# Patient Record
Sex: FEMALE | Race: WHITE | NOT HISPANIC OR LATINO | Employment: OTHER | URBAN - METROPOLITAN AREA
[De-identification: names, ages, dates, MRNs, and addresses within clinical notes are randomized per-mention and may not be internally consistent; named-entity substitution may affect disease eponyms.]

---

## 2017-01-16 ENCOUNTER — COMMUNICATION - HEALTHEAST (OUTPATIENT)
Dept: INTERNAL MEDICINE | Facility: CLINIC | Age: 62
End: 2017-01-16

## 2017-03-23 ENCOUNTER — COMMUNICATION - HEALTHEAST (OUTPATIENT)
Dept: INTERNAL MEDICINE | Facility: CLINIC | Age: 62
End: 2017-03-23

## 2017-03-23 DIAGNOSIS — F32.A DEPRESSION: ICD-10-CM

## 2017-05-09 ENCOUNTER — RECORDS - HEALTHEAST (OUTPATIENT)
Dept: MAMMOGRAPHY | Facility: CLINIC | Age: 62
End: 2017-05-09

## 2017-05-09 DIAGNOSIS — Z12.31 ENCOUNTER FOR SCREENING MAMMOGRAM FOR MALIGNANT NEOPLASM OF BREAST: ICD-10-CM

## 2017-05-22 ENCOUNTER — COMMUNICATION - HEALTHEAST (OUTPATIENT)
Dept: INTERNAL MEDICINE | Facility: CLINIC | Age: 62
End: 2017-05-22

## 2017-05-30 ENCOUNTER — RECORDS - HEALTHEAST (OUTPATIENT)
Dept: ADMINISTRATIVE | Facility: OTHER | Age: 62
End: 2017-05-30

## 2017-06-12 ENCOUNTER — COMMUNICATION - HEALTHEAST (OUTPATIENT)
Dept: INTERNAL MEDICINE | Facility: CLINIC | Age: 62
End: 2017-06-12

## 2017-06-12 DIAGNOSIS — F32.A DEPRESSION: ICD-10-CM

## 2017-07-05 ENCOUNTER — OFFICE VISIT - HEALTHEAST (OUTPATIENT)
Dept: INTERNAL MEDICINE | Facility: CLINIC | Age: 62
End: 2017-07-05

## 2017-07-05 DIAGNOSIS — N95.1 MENOPAUSAL HOT FLUSHES: ICD-10-CM

## 2017-07-05 DIAGNOSIS — F32.A DEPRESSION: ICD-10-CM

## 2017-07-05 DIAGNOSIS — M19.90 OSTEOARTHRITIS: ICD-10-CM

## 2017-07-05 DIAGNOSIS — G56.03 BILATERAL CARPAL TUNNEL SYNDROME: ICD-10-CM

## 2017-07-05 DIAGNOSIS — R25.2 CRAMP OF BOTH LOWER EXTREMITIES: ICD-10-CM

## 2017-07-17 ENCOUNTER — HOSPITAL ENCOUNTER (OUTPATIENT)
Dept: PHYSICAL MEDICINE AND REHAB | Facility: CLINIC | Age: 62
Discharge: HOME | End: 2017-07-17
Attending: INTERNAL MEDICINE

## 2017-07-17 DIAGNOSIS — R20.0 NUMBNESS AND TINGLING IN BOTH HANDS: ICD-10-CM

## 2017-07-17 DIAGNOSIS — R29.2 HYPERREFLEXIA: ICD-10-CM

## 2017-07-17 DIAGNOSIS — R29.6 FALLS FREQUENTLY: ICD-10-CM

## 2017-07-17 DIAGNOSIS — G56.03 BILATERAL CARPAL TUNNEL SYNDROME: ICD-10-CM

## 2017-07-17 DIAGNOSIS — R20.2 NUMBNESS AND TINGLING IN BOTH HANDS: ICD-10-CM

## 2017-07-17 ASSESSMENT — MIFFLIN-ST. JEOR: SCORE: 1446.47

## 2017-07-20 ENCOUNTER — COMMUNICATION - HEALTHEAST (OUTPATIENT)
Dept: PHYSICAL MEDICINE AND REHAB | Facility: CLINIC | Age: 62
End: 2017-07-20

## 2017-07-20 DIAGNOSIS — M48.02 CERVICAL STENOSIS OF SPINE: ICD-10-CM

## 2017-07-24 ENCOUNTER — RECORDS - HEALTHEAST (OUTPATIENT)
Dept: ADMINISTRATIVE | Facility: OTHER | Age: 62
End: 2017-07-24

## 2017-07-24 ENCOUNTER — OFFICE VISIT - HEALTHEAST (OUTPATIENT)
Dept: INTERNAL MEDICINE | Facility: CLINIC | Age: 62
End: 2017-07-24
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
## 2017-07-24 DIAGNOSIS — S01.01XA LACERATION OF SCALP: ICD-10-CM

## 2017-07-24 DIAGNOSIS — T14.8XXA CONTUSION: ICD-10-CM

## 2017-07-24 DIAGNOSIS — M48.02 CERVICAL SPINAL STENOSIS: ICD-10-CM

## 2017-07-26 ENCOUNTER — OFFICE VISIT - HEALTHEAST (OUTPATIENT)
Dept: INTERNAL MEDICINE | Facility: CLINIC | Age: 62
End: 2017-07-26

## 2017-07-27 ENCOUNTER — OFFICE VISIT - HEALTHEAST (OUTPATIENT)
Dept: NEUROSURGERY | Facility: CLINIC | Age: 62
End: 2017-07-27

## 2017-07-27 DIAGNOSIS — G95.89 MYELOMALACIA OF CERVICAL CORD (H): ICD-10-CM

## 2017-07-27 ASSESSMENT — MIFFLIN-ST. JEOR: SCORE: 1432.86

## 2017-07-28 ENCOUNTER — COMMUNICATION - HEALTHEAST (OUTPATIENT)
Dept: NEUROSURGERY | Facility: CLINIC | Age: 62
End: 2017-07-28

## 2017-07-31 ENCOUNTER — COMMUNICATION - HEALTHEAST (OUTPATIENT)
Dept: NEUROSURGERY | Facility: CLINIC | Age: 62
End: 2017-07-31

## 2017-07-31 ENCOUNTER — OFFICE VISIT - HEALTHEAST (OUTPATIENT)
Dept: INTERNAL MEDICINE | Facility: CLINIC | Age: 62
End: 2017-07-31

## 2017-07-31 ENCOUNTER — RECORDS - HEALTHEAST (OUTPATIENT)
Dept: ADMINISTRATIVE | Facility: OTHER | Age: 62
End: 2017-07-31

## 2017-07-31 DIAGNOSIS — Z01.818 PREOP EXAM FOR INTERNAL MEDICINE: ICD-10-CM

## 2017-07-31 DIAGNOSIS — M48.02 CERVICAL SPINAL STENOSIS: ICD-10-CM

## 2017-07-31 DIAGNOSIS — D47.9 LYMPHOPROLIFERATIVE DISORDER (H): ICD-10-CM

## 2017-07-31 DIAGNOSIS — J45.20 INTERMITTENT ASTHMA: ICD-10-CM

## 2017-07-31 ASSESSMENT — MIFFLIN-ST. JEOR: SCORE: 1432.86

## 2017-08-01 ENCOUNTER — COMMUNICATION - HEALTHEAST (OUTPATIENT)
Dept: INTERNAL MEDICINE | Facility: CLINIC | Age: 62
End: 2017-08-01

## 2017-08-01 ENCOUNTER — AMBULATORY - HEALTHEAST (OUTPATIENT)
Dept: NEUROSURGERY | Facility: CLINIC | Age: 62
End: 2017-08-01

## 2017-08-01 ENCOUNTER — AMBULATORY - HEALTHEAST (OUTPATIENT)
Dept: LAB | Facility: CLINIC | Age: 62
End: 2017-08-01

## 2017-08-01 ENCOUNTER — RECORDS - HEALTHEAST (OUTPATIENT)
Dept: ADMINISTRATIVE | Facility: OTHER | Age: 62
End: 2017-08-01

## 2017-08-01 DIAGNOSIS — Z01.818 PRE-OP EXAM: ICD-10-CM

## 2017-08-02 ENCOUNTER — ANESTHESIA - HEALTHEAST (OUTPATIENT)
Dept: SURGERY | Facility: CLINIC | Age: 62
End: 2017-08-02

## 2017-08-02 ENCOUNTER — SURGERY - HEALTHEAST (OUTPATIENT)
Dept: SURGERY | Facility: CLINIC | Age: 62
End: 2017-08-02

## 2017-08-02 ASSESSMENT — MIFFLIN-ST. JEOR: SCORE: 1427.76

## 2017-08-03 ENCOUNTER — COMMUNICATION - HEALTHEAST (OUTPATIENT)
Dept: NEUROSURGERY | Facility: CLINIC | Age: 62
End: 2017-08-03

## 2017-08-10 ENCOUNTER — AMBULATORY - HEALTHEAST (OUTPATIENT)
Dept: NEUROSURGERY | Facility: CLINIC | Age: 62
End: 2017-08-10

## 2017-08-10 DIAGNOSIS — G95.9 CERVICAL MYELOPATHY (H): ICD-10-CM

## 2017-08-10 DIAGNOSIS — Z51.89 VISIT FOR WOUND CHECK: ICD-10-CM

## 2017-08-23 ENCOUNTER — COMMUNICATION - HEALTHEAST (OUTPATIENT)
Dept: INTERNAL MEDICINE | Facility: CLINIC | Age: 62
End: 2017-08-23

## 2017-08-23 DIAGNOSIS — F32.A DEPRESSION: ICD-10-CM

## 2017-09-01 ENCOUNTER — OFFICE VISIT - HEALTHEAST (OUTPATIENT)
Dept: NEUROSURGERY | Facility: CLINIC | Age: 62
End: 2017-09-01

## 2017-09-01 ENCOUNTER — HOSPITAL ENCOUNTER (OUTPATIENT)
Dept: RADIOLOGY | Facility: CLINIC | Age: 62
Discharge: HOME | End: 2017-09-01
Attending: NEUROLOGICAL SURGERY

## 2017-09-01 DIAGNOSIS — G95.20 CORD COMPRESSION MYELOPATHY (H): ICD-10-CM

## 2017-09-01 DIAGNOSIS — G95.9 CERVICAL MYELOPATHY (H): ICD-10-CM

## 2017-09-06 ENCOUNTER — COMMUNICATION - HEALTHEAST (OUTPATIENT)
Dept: INTERNAL MEDICINE | Facility: CLINIC | Age: 62
End: 2017-09-06

## 2017-09-06 DIAGNOSIS — F32.A DEPRESSION: ICD-10-CM

## 2017-09-15 ENCOUNTER — RECORDS - HEALTHEAST (OUTPATIENT)
Dept: ADMINISTRATIVE | Facility: OTHER | Age: 62
End: 2017-09-15

## 2017-09-19 ENCOUNTER — RECORDS - HEALTHEAST (OUTPATIENT)
Dept: ADMINISTRATIVE | Facility: OTHER | Age: 62
End: 2017-09-19

## 2017-10-27 ENCOUNTER — RECORDS - HEALTHEAST (OUTPATIENT)
Dept: ADMINISTRATIVE | Facility: OTHER | Age: 62
End: 2017-10-27

## 2017-11-13 ENCOUNTER — RECORDS - HEALTHEAST (OUTPATIENT)
Dept: ADMINISTRATIVE | Facility: OTHER | Age: 62
End: 2017-11-13

## 2017-11-20 ENCOUNTER — RECORDS - HEALTHEAST (OUTPATIENT)
Dept: ADMINISTRATIVE | Facility: OTHER | Age: 62
End: 2017-11-20

## 2017-12-14 ENCOUNTER — RECORDS - HEALTHEAST (OUTPATIENT)
Dept: ADMINISTRATIVE | Facility: OTHER | Age: 62
End: 2017-12-14

## 2017-12-21 ENCOUNTER — RECORDS - HEALTHEAST (OUTPATIENT)
Dept: ADMINISTRATIVE | Facility: OTHER | Age: 62
End: 2017-12-21

## 2017-12-26 ENCOUNTER — COMMUNICATION - HEALTHEAST (OUTPATIENT)
Dept: INTERNAL MEDICINE | Facility: CLINIC | Age: 62
End: 2017-12-26

## 2017-12-26 DIAGNOSIS — F32.A DEPRESSION: ICD-10-CM

## 2018-01-03 ENCOUNTER — RECORDS - HEALTHEAST (OUTPATIENT)
Dept: ADMINISTRATIVE | Facility: OTHER | Age: 63
End: 2018-01-03

## 2018-03-03 ENCOUNTER — COMMUNICATION - HEALTHEAST (OUTPATIENT)
Dept: INTERNAL MEDICINE | Facility: CLINIC | Age: 63
End: 2018-03-03

## 2018-03-03 DIAGNOSIS — M19.90 OSTEOARTHRITIS: ICD-10-CM

## 2018-05-15 ENCOUNTER — RECORDS - HEALTHEAST (OUTPATIENT)
Dept: MAMMOGRAPHY | Facility: CLINIC | Age: 63
End: 2018-05-15

## 2018-05-15 ENCOUNTER — RECORDS - HEALTHEAST (OUTPATIENT)
Dept: ADMINISTRATIVE | Facility: OTHER | Age: 63
End: 2018-05-15

## 2018-05-15 DIAGNOSIS — Z12.31 ENCOUNTER FOR SCREENING MAMMOGRAM FOR MALIGNANT NEOPLASM OF BREAST: ICD-10-CM

## 2018-07-16 ENCOUNTER — COMMUNICATION - HEALTHEAST (OUTPATIENT)
Dept: INTERNAL MEDICINE | Facility: CLINIC | Age: 63
End: 2018-07-16

## 2018-07-16 DIAGNOSIS — F32.A DEPRESSION: ICD-10-CM

## 2018-07-18 ENCOUNTER — RECORDS - HEALTHEAST (OUTPATIENT)
Dept: ADMINISTRATIVE | Facility: OTHER | Age: 63
End: 2018-07-18

## 2018-07-19 ENCOUNTER — OFFICE VISIT - HEALTHEAST (OUTPATIENT)
Dept: INTERNAL MEDICINE | Facility: CLINIC | Age: 63
End: 2018-07-19

## 2018-07-19 DIAGNOSIS — R82.90 ABNORMAL URINE ODOR: ICD-10-CM

## 2018-07-19 DIAGNOSIS — R35.0 URINARY FREQUENCY: ICD-10-CM

## 2018-07-19 DIAGNOSIS — E78.5 HYPERLIPIDEMIA: ICD-10-CM

## 2018-07-19 DIAGNOSIS — M48.02 CERVICAL SPINAL STENOSIS: ICD-10-CM

## 2018-07-19 DIAGNOSIS — J45.20 INTERMITTENT ASTHMA: ICD-10-CM

## 2018-07-19 LAB
ALBUMIN UR-MCNC: NEGATIVE MG/DL
APPEARANCE UR: ABNORMAL
BILIRUB UR QL STRIP: ABNORMAL
COLOR UR AUTO: YELLOW
GLUCOSE UR STRIP-MCNC: NEGATIVE MG/DL
HGB UR QL STRIP: ABNORMAL
KETONES UR STRIP-MCNC: ABNORMAL MG/DL
LEUKOCYTE ESTERASE UR QL STRIP: NEGATIVE
NITRATE UR QL: NEGATIVE
PH UR STRIP: 5.5 [PH] (ref 5–8)
SP GR UR STRIP: >=1.03 (ref 1–1.03)
UROBILINOGEN UR STRIP-ACNC: ABNORMAL

## 2018-07-25 ENCOUNTER — RECORDS - HEALTHEAST (OUTPATIENT)
Dept: ADMINISTRATIVE | Facility: OTHER | Age: 63
End: 2018-07-25

## 2018-08-06 ENCOUNTER — COMMUNICATION - HEALTHEAST (OUTPATIENT)
Dept: INTERNAL MEDICINE | Facility: CLINIC | Age: 63
End: 2018-08-06

## 2018-08-06 ENCOUNTER — RECORDS - HEALTHEAST (OUTPATIENT)
Dept: ADMINISTRATIVE | Facility: OTHER | Age: 63
End: 2018-08-06

## 2018-08-06 DIAGNOSIS — M19.90 OSTEOARTHRITIS: ICD-10-CM

## 2018-08-27 ENCOUNTER — COMMUNICATION - HEALTHEAST (OUTPATIENT)
Dept: INTERNAL MEDICINE | Facility: CLINIC | Age: 63
End: 2018-08-27

## 2018-08-27 DIAGNOSIS — F32.A DEPRESSION: ICD-10-CM

## 2018-10-18 ENCOUNTER — OFFICE VISIT - HEALTHEAST (OUTPATIENT)
Dept: INTERNAL MEDICINE | Facility: CLINIC | Age: 63
End: 2018-10-18

## 2018-10-18 ENCOUNTER — COMMUNICATION - HEALTHEAST (OUTPATIENT)
Dept: SCHEDULING | Facility: CLINIC | Age: 63
End: 2018-10-18

## 2018-10-18 DIAGNOSIS — R03.0 ELEVATED BLOOD PRESSURE READING WITHOUT DIAGNOSIS OF HYPERTENSION: ICD-10-CM

## 2018-10-18 DIAGNOSIS — R73.09 OTHER ABNORMAL GLUCOSE: ICD-10-CM

## 2018-10-18 DIAGNOSIS — F32.9 MAJOR DEPRESSION: ICD-10-CM

## 2018-10-18 DIAGNOSIS — E78.5 HYPERLIPIDEMIA: ICD-10-CM

## 2018-10-18 DIAGNOSIS — E55.9 VITAMIN D DEFICIENCY: ICD-10-CM

## 2018-10-18 DIAGNOSIS — D47.9 LYMPHOPROLIFERATIVE DISORDER (H): ICD-10-CM

## 2018-10-18 DIAGNOSIS — Z23 NEED FOR VACCINATION FOR STREP PNEUMONIAE: ICD-10-CM

## 2018-10-18 DIAGNOSIS — M15.0 PRIMARY OSTEOARTHRITIS INVOLVING MULTIPLE JOINTS: ICD-10-CM

## 2018-10-18 LAB
ALBUMIN SERPL-MCNC: 3.7 G/DL (ref 3.5–5)
ALP SERPL-CCNC: 74 U/L (ref 45–120)
ALT SERPL W P-5'-P-CCNC: 21 U/L (ref 0–45)
ANION GAP SERPL CALCULATED.3IONS-SCNC: 11 MMOL/L (ref 5–18)
AST SERPL W P-5'-P-CCNC: 22 U/L (ref 0–40)
BASOPHILS # BLD AUTO: 0 THOU/UL (ref 0–0.2)
BASOPHILS NFR BLD AUTO: 0 % (ref 0–2)
BILIRUB DIRECT SERPL-MCNC: 0.2 MG/DL
BILIRUB SERPL-MCNC: 0.7 MG/DL (ref 0–1)
BUN SERPL-MCNC: 24 MG/DL (ref 8–22)
CALCIUM SERPL-MCNC: 9.5 MG/DL (ref 8.5–10.5)
CHLORIDE BLD-SCNC: 106 MMOL/L (ref 98–107)
CHOLEST SERPL-MCNC: 193 MG/DL
CO2 SERPL-SCNC: 24 MMOL/L (ref 22–31)
CREAT SERPL-MCNC: 0.67 MG/DL (ref 0.6–1.1)
EOSINOPHIL COUNT (ABSOLUTE): 0 THOU/UL (ref 0–0.4)
EOSINOPHIL NFR BLD AUTO: 0 % (ref 0–6)
ERYTHROCYTE [DISTWIDTH] IN BLOOD BY AUTOMATED COUNT: 14.8 % (ref 11–14.5)
FASTING STATUS PATIENT QL REPORTED: YES
GFR SERPL CREATININE-BSD FRML MDRD: >60 ML/MIN/1.73M2
GLUCOSE BLD-MCNC: 97 MG/DL (ref 70–125)
HBA1C MFR BLD: 6.1 % (ref 3.5–6)
HCT VFR BLD AUTO: 38.9 % (ref 35–47)
HDLC SERPL-MCNC: 56 MG/DL
HGB BLD-MCNC: 12.3 G/DL (ref 12–16)
LDLC SERPL CALC-MCNC: 124 MG/DL
LYMPHOCYTES # BLD AUTO: 27.7 THOU/UL (ref 0.8–4.4)
LYMPHOCYTES NFR BLD AUTO: 74 % (ref 20–40)
MCH RBC QN AUTO: 27.5 PG (ref 27–34)
MCHC RBC AUTO-ENTMCNC: 31.6 G/DL (ref 32–36)
MCV RBC AUTO: 87 FL (ref 80–100)
MONOCYTES # BLD AUTO: 4.1 THOU/UL (ref 0–0.9)
MONOCYTES NFR BLD AUTO: 11 % (ref 2–10)
PLAT MORPH BLD: NORMAL
PLATELET # BLD AUTO: 188 THOU/UL (ref 140–440)
PMV BLD AUTO: 11.1 FL (ref 8.5–12.5)
POTASSIUM BLD-SCNC: 3.8 MMOL/L (ref 3.5–5)
PROT SERPL-MCNC: 6.4 G/DL (ref 6–8)
RBC # BLD AUTO: 4.47 MILL/UL (ref 3.8–5.4)
REACTIVE LYMPHS: ABNORMAL
SODIUM SERPL-SCNC: 141 MMOL/L (ref 136–145)
TOTAL NEUTROPHILS-ABS(DIFF): 5.6 THOU/UL (ref 2–7.7)
TOTAL NEUTROPHILS-REL(DIFF): 15 % (ref 50–70)
TRIGL SERPL-MCNC: 66 MG/DL
TSH SERPL DL<=0.005 MIU/L-ACNC: 1.33 UIU/ML (ref 0.3–5)
WBC: 37.4 THOU/UL (ref 4–11)

## 2018-10-18 ASSESSMENT — MIFFLIN-ST. JEOR: SCORE: 1503.17

## 2018-10-19 LAB — 25(OH)D3 SERPL-MCNC: 35.1 NG/ML (ref 30–80)

## 2018-10-22 ENCOUNTER — COMMUNICATION - HEALTHEAST (OUTPATIENT)
Dept: INTERNAL MEDICINE | Facility: CLINIC | Age: 63
End: 2018-10-22

## 2018-11-21 ENCOUNTER — COMMUNICATION - HEALTHEAST (OUTPATIENT)
Dept: INTERNAL MEDICINE | Facility: CLINIC | Age: 63
End: 2018-11-21

## 2018-11-21 DIAGNOSIS — M15.0 PRIMARY OSTEOARTHRITIS INVOLVING MULTIPLE JOINTS: ICD-10-CM

## 2018-11-28 ENCOUNTER — RECORDS - HEALTHEAST (OUTPATIENT)
Dept: ADMINISTRATIVE | Facility: OTHER | Age: 63
End: 2018-11-28

## 2019-01-01 ENCOUNTER — COMMUNICATION - HEALTHEAST (OUTPATIENT)
Dept: INTERNAL MEDICINE | Facility: CLINIC | Age: 64
End: 2019-01-01

## 2019-01-01 DIAGNOSIS — M15.0 PRIMARY OSTEOARTHRITIS INVOLVING MULTIPLE JOINTS: ICD-10-CM

## 2019-01-16 ENCOUNTER — RECORDS - HEALTHEAST (OUTPATIENT)
Dept: ADMINISTRATIVE | Facility: OTHER | Age: 64
End: 2019-01-16

## 2019-01-22 ENCOUNTER — AMBULATORY - HEALTHEAST (OUTPATIENT)
Dept: SURGERY | Facility: CLINIC | Age: 64
End: 2019-01-22

## 2019-01-22 DIAGNOSIS — D17.30 LIPOMA OF SKIN AND SUBCUTANEOUS TISSUE: ICD-10-CM

## 2019-01-28 ENCOUNTER — OFFICE VISIT - HEALTHEAST (OUTPATIENT)
Dept: SURGERY | Facility: CLINIC | Age: 64
End: 2019-01-28

## 2019-01-28 DIAGNOSIS — D17.30 LIPOMA OF SKIN AND SUBCUTANEOUS TISSUE: ICD-10-CM

## 2019-01-28 ASSESSMENT — MIFFLIN-ST. JEOR: SCORE: 1503.17

## 2019-01-31 ENCOUNTER — RECORDS - HEALTHEAST (OUTPATIENT)
Dept: ADMINISTRATIVE | Facility: OTHER | Age: 64
End: 2019-01-31

## 2019-02-25 ENCOUNTER — RECORDS - HEALTHEAST (OUTPATIENT)
Dept: ADMINISTRATIVE | Facility: OTHER | Age: 64
End: 2019-02-25

## 2019-03-01 ENCOUNTER — COMMUNICATION - HEALTHEAST (OUTPATIENT)
Dept: NEUROSURGERY | Facility: CLINIC | Age: 64
End: 2019-03-01

## 2019-03-01 ENCOUNTER — AMBULATORY - HEALTHEAST (OUTPATIENT)
Dept: NEUROSURGERY | Facility: CLINIC | Age: 64
End: 2019-03-01

## 2019-03-01 DIAGNOSIS — M54.2 NECK PAIN: ICD-10-CM

## 2019-03-05 ENCOUNTER — RECORDS - HEALTHEAST (OUTPATIENT)
Dept: ADMINISTRATIVE | Facility: OTHER | Age: 64
End: 2019-03-05

## 2019-03-18 ENCOUNTER — HOSPITAL ENCOUNTER (OUTPATIENT)
Dept: MRI IMAGING | Facility: CLINIC | Age: 64
Discharge: HOME | End: 2019-03-18
Attending: SURGERY

## 2019-03-18 DIAGNOSIS — M54.2 NECK PAIN: ICD-10-CM

## 2019-03-18 LAB
CREAT BLD-MCNC: 0.8 MG/DL
CREAT BLD-MCNC: 0.8 MG/DL (ref 0.6–1.1)
POC GFR AMER AF HE - HISTORICAL: >60 ML/MIN/1.73M2
POC GFR NON AMER AF HE - HISTORICAL: >60 ML/MIN/1.73M2

## 2019-03-19 ENCOUNTER — RECORDS - HEALTHEAST (OUTPATIENT)
Dept: ADMINISTRATIVE | Facility: OTHER | Age: 64
End: 2019-03-19

## 2019-03-19 ENCOUNTER — AMBULATORY - HEALTHEAST (OUTPATIENT)
Dept: NEUROLOGY | Facility: CLINIC | Age: 64
End: 2019-03-19

## 2019-03-19 ENCOUNTER — OFFICE VISIT - HEALTHEAST (OUTPATIENT)
Dept: NEUROSURGERY | Facility: CLINIC | Age: 64
End: 2019-03-19

## 2019-03-19 ENCOUNTER — COMMUNICATION - HEALTHEAST (OUTPATIENT)
Dept: INTERNAL MEDICINE | Facility: CLINIC | Age: 64
End: 2019-03-19

## 2019-03-19 DIAGNOSIS — M48.02 SPINAL STENOSIS IN CERVICAL REGION: ICD-10-CM

## 2019-03-19 DIAGNOSIS — M47.12 CERVICAL SPONDYLOSIS WITH MYELOPATHY: ICD-10-CM

## 2019-03-19 ASSESSMENT — MIFFLIN-ST. JEOR: SCORE: 1503.17

## 2019-03-28 ENCOUNTER — HOSPITAL ENCOUNTER (OUTPATIENT)
Dept: CT IMAGING | Facility: CLINIC | Age: 64
Discharge: HOME | End: 2019-03-28
Attending: INTERNAL MEDICINE

## 2019-03-28 ENCOUNTER — HOSPITAL ENCOUNTER (OUTPATIENT)
Dept: RADIOLOGY | Facility: CLINIC | Age: 64
Discharge: HOME | End: 2019-03-28
Attending: SURGERY

## 2019-03-28 ENCOUNTER — HOSPITAL ENCOUNTER (OUTPATIENT)
Dept: CT IMAGING | Facility: CLINIC | Age: 64
Discharge: HOME | End: 2019-03-28
Attending: SURGERY

## 2019-03-28 DIAGNOSIS — M48.02 SPINAL STENOSIS IN CERVICAL REGION: ICD-10-CM

## 2019-03-28 DIAGNOSIS — D47.9 LYMPHOPROLIFERATIVE DISORDER (H): ICD-10-CM

## 2019-03-28 DIAGNOSIS — M47.12 CERVICAL SPONDYLOSIS WITH MYELOPATHY: ICD-10-CM

## 2019-04-08 ENCOUNTER — COMMUNICATION - HEALTHEAST (OUTPATIENT)
Dept: NEUROSURGERY | Facility: CLINIC | Age: 64
End: 2019-04-08

## 2019-04-09 ENCOUNTER — OFFICE VISIT - HEALTHEAST (OUTPATIENT)
Dept: INTERNAL MEDICINE | Facility: CLINIC | Age: 64
End: 2019-04-09

## 2019-04-09 ENCOUNTER — COMMUNICATION - HEALTHEAST (OUTPATIENT)
Dept: NEUROSURGERY | Facility: CLINIC | Age: 64
End: 2019-04-09

## 2019-04-09 DIAGNOSIS — D47.9 LYMPHOPROLIFERATIVE DISORDER (H): ICD-10-CM

## 2019-04-09 DIAGNOSIS — M15.0 PRIMARY OSTEOARTHRITIS INVOLVING MULTIPLE JOINTS: ICD-10-CM

## 2019-04-09 DIAGNOSIS — R15.9 INCONTINENCE OF FECES WITH FECAL URGENCY: ICD-10-CM

## 2019-04-09 DIAGNOSIS — D17.30 LIPOMA OF SKIN AND SUBCUTANEOUS TISSUE: ICD-10-CM

## 2019-04-09 DIAGNOSIS — R15.2 INCONTINENCE OF FECES WITH FECAL URGENCY: ICD-10-CM

## 2019-04-09 ASSESSMENT — MIFFLIN-ST. JEOR: SCORE: 1503.17

## 2019-04-24 ENCOUNTER — RECORDS - HEALTHEAST (OUTPATIENT)
Dept: ADMINISTRATIVE | Facility: OTHER | Age: 64
End: 2019-04-24

## 2019-05-01 ENCOUNTER — OFFICE VISIT - HEALTHEAST (OUTPATIENT)
Dept: NEUROSURGERY | Facility: CLINIC | Age: 64
End: 2019-05-01

## 2019-05-01 DIAGNOSIS — M47.12 CERVICAL SPONDYLOSIS WITH MYELOPATHY: ICD-10-CM

## 2019-05-01 ASSESSMENT — MIFFLIN-ST. JEOR: SCORE: 1503.17

## 2019-05-10 ENCOUNTER — RECORDS - HEALTHEAST (OUTPATIENT)
Dept: ADMINISTRATIVE | Facility: OTHER | Age: 64
End: 2019-05-10

## 2019-05-14 ENCOUNTER — HOSPITAL ENCOUNTER (OUTPATIENT)
Dept: INTERVENTIONAL RADIOLOGY/VASCULAR | Facility: HOSPITAL | Age: 64
Discharge: HOME | End: 2019-05-14
Attending: SURGERY

## 2019-05-14 ENCOUNTER — HOSPITAL ENCOUNTER (OUTPATIENT)
Dept: CT IMAGING | Facility: HOSPITAL | Age: 64
Discharge: HOME | End: 2019-05-14
Attending: SURGERY

## 2019-05-14 DIAGNOSIS — M47.12 CERVICAL SPONDYLOSIS WITH MYELOPATHY: ICD-10-CM

## 2019-05-14 DIAGNOSIS — M48.02 CERVICAL SPINAL STENOSIS: ICD-10-CM

## 2019-05-14 ASSESSMENT — MIFFLIN-ST. JEOR: SCORE: 1435.13

## 2019-05-17 ENCOUNTER — RECORDS - HEALTHEAST (OUTPATIENT)
Dept: MAMMOGRAPHY | Facility: CLINIC | Age: 64
End: 2019-05-17

## 2019-05-17 DIAGNOSIS — Z12.31 ENCOUNTER FOR SCREENING MAMMOGRAM FOR MALIGNANT NEOPLASM OF BREAST: ICD-10-CM

## 2019-05-28 ENCOUNTER — RECORDS - HEALTHEAST (OUTPATIENT)
Dept: ADMINISTRATIVE | Facility: OTHER | Age: 64
End: 2019-05-28

## 2019-06-11 ENCOUNTER — OFFICE VISIT - HEALTHEAST (OUTPATIENT)
Dept: NEUROSURGERY | Facility: CLINIC | Age: 64
End: 2019-06-11

## 2019-06-11 DIAGNOSIS — R26.89 IMBALANCE: ICD-10-CM

## 2019-06-11 ASSESSMENT — MIFFLIN-ST. JEOR: SCORE: 1435.13

## 2019-06-23 ENCOUNTER — COMMUNICATION - HEALTHEAST (OUTPATIENT)
Dept: INTERNAL MEDICINE | Facility: CLINIC | Age: 64
End: 2019-06-23

## 2019-06-23 DIAGNOSIS — F32.A DEPRESSION: ICD-10-CM

## 2019-06-28 ENCOUNTER — RECORDS - HEALTHEAST (OUTPATIENT)
Dept: ADMINISTRATIVE | Facility: OTHER | Age: 64
End: 2019-06-28

## 2019-07-09 ENCOUNTER — OFFICE VISIT - HEALTHEAST (OUTPATIENT)
Dept: INTERNAL MEDICINE | Facility: CLINIC | Age: 64
End: 2019-07-09

## 2019-07-09 DIAGNOSIS — E78.00 PURE HYPERCHOLESTEROLEMIA: ICD-10-CM

## 2019-07-09 DIAGNOSIS — M15.0 PRIMARY OSTEOARTHRITIS INVOLVING MULTIPLE JOINTS: ICD-10-CM

## 2019-07-09 DIAGNOSIS — M79.7 FIBROMYALGIA: ICD-10-CM

## 2019-07-09 DIAGNOSIS — G56.03 BILATERAL CARPAL TUNNEL SYNDROME: ICD-10-CM

## 2019-07-09 DIAGNOSIS — K21.9 GASTROESOPHAGEAL REFLUX DISEASE WITHOUT ESOPHAGITIS: ICD-10-CM

## 2019-07-09 DIAGNOSIS — F32.0 CURRENT MILD EPISODE OF MAJOR DEPRESSIVE DISORDER WITHOUT PRIOR EPISODE (H): ICD-10-CM

## 2019-07-09 ASSESSMENT — MIFFLIN-ST. JEOR: SCORE: 1435.13

## 2019-07-25 ASSESSMENT — MIFFLIN-ST. JEOR: SCORE: 1411.32

## 2019-08-06 ENCOUNTER — COMMUNICATION - HEALTHEAST (OUTPATIENT)
Dept: INTERNAL MEDICINE | Facility: CLINIC | Age: 64
End: 2019-08-06

## 2019-08-06 DIAGNOSIS — M15.0 PRIMARY OSTEOARTHRITIS INVOLVING MULTIPLE JOINTS: ICD-10-CM

## 2019-08-21 ENCOUNTER — COMMUNICATION - HEALTHEAST (OUTPATIENT)
Dept: INTERNAL MEDICINE | Facility: CLINIC | Age: 64
End: 2019-08-21

## 2019-08-21 DIAGNOSIS — M15.0 PRIMARY OSTEOARTHRITIS INVOLVING MULTIPLE JOINTS: ICD-10-CM

## 2019-09-04 ENCOUNTER — COMMUNICATION - HEALTHEAST (OUTPATIENT)
Dept: INTERNAL MEDICINE | Facility: CLINIC | Age: 64
End: 2019-09-04

## 2019-09-04 DIAGNOSIS — M15.0 PRIMARY OSTEOARTHRITIS INVOLVING MULTIPLE JOINTS: ICD-10-CM

## 2019-09-05 ENCOUNTER — COMMUNICATION - HEALTHEAST (OUTPATIENT)
Dept: INTERNAL MEDICINE | Facility: CLINIC | Age: 64
End: 2019-09-05

## 2019-09-05 ENCOUNTER — OFFICE VISIT - HEALTHEAST (OUTPATIENT)
Dept: INTERNAL MEDICINE | Facility: CLINIC | Age: 64
End: 2019-09-05

## 2019-09-05 DIAGNOSIS — M15.0 PRIMARY OSTEOARTHRITIS INVOLVING MULTIPLE JOINTS: ICD-10-CM

## 2019-09-05 DIAGNOSIS — D47.9 LYMPHOPROLIFERATIVE DISORDER (H): ICD-10-CM

## 2019-09-05 DIAGNOSIS — F32.0 CURRENT MILD EPISODE OF MAJOR DEPRESSIVE DISORDER WITHOUT PRIOR EPISODE (H): ICD-10-CM

## 2019-09-05 DIAGNOSIS — M17.11 PRIMARY OSTEOARTHRITIS OF RIGHT KNEE: ICD-10-CM

## 2019-09-05 DIAGNOSIS — Z01.818 PRE-OP EXAM: ICD-10-CM

## 2019-09-05 LAB
ALBUMIN SERPL-MCNC: 3.7 G/DL (ref 3.5–5)
ALP SERPL-CCNC: 70 U/L (ref 45–120)
ALT SERPL W P-5'-P-CCNC: 19 U/L (ref 0–45)
ANION GAP SERPL CALCULATED.3IONS-SCNC: 9 MMOL/L (ref 5–18)
AST SERPL W P-5'-P-CCNC: 23 U/L (ref 0–40)
ATRIAL RATE - MUSE: 73 BPM
BILIRUB DIRECT SERPL-MCNC: 0.2 MG/DL
BILIRUB SERPL-MCNC: 0.6 MG/DL (ref 0–1)
BUN SERPL-MCNC: 24 MG/DL (ref 8–22)
CALCIUM SERPL-MCNC: 9.5 MG/DL (ref 8.5–10.5)
CHLORIDE BLD-SCNC: 106 MMOL/L (ref 98–107)
CO2 SERPL-SCNC: 24 MMOL/L (ref 22–31)
CREAT SERPL-MCNC: 0.78 MG/DL (ref 0.6–1.1)
DIASTOLIC BLOOD PRESSURE - MUSE: NORMAL MMHG
GFR SERPL CREATININE-BSD FRML MDRD: >60 ML/MIN/1.73M2
GLUCOSE BLD-MCNC: 110 MG/DL (ref 70–125)
INTERPRETATION ECG - MUSE: NORMAL
P AXIS - MUSE: 55 DEGREES
POTASSIUM BLD-SCNC: 4.1 MMOL/L (ref 3.5–5)
PR INTERVAL - MUSE: 152 MS
PROT SERPL-MCNC: 6.6 G/DL (ref 6–8)
QRS DURATION - MUSE: 82 MS
QT - MUSE: 412 MS
QTC - MUSE: 453 MS
R AXIS - MUSE: 20 DEGREES
SODIUM SERPL-SCNC: 139 MMOL/L (ref 136–145)
SYSTOLIC BLOOD PRESSURE - MUSE: NORMAL MMHG
T AXIS - MUSE: 31 DEGREES
VENTRICULAR RATE- MUSE: 73 BPM

## 2019-09-05 ASSESSMENT — MIFFLIN-ST. JEOR: SCORE: 1438.54

## 2019-09-06 LAB
BASOPHILS # BLD AUTO: 0.2 THOU/UL (ref 0–0.2)
BASOPHILS NFR BLD AUTO: 1 % (ref 0–2)
EOSINOPHIL COUNT (ABSOLUTE): 0.3 THOU/UL (ref 0–0.4)
EOSINOPHIL NFR BLD AUTO: 1 % (ref 0–6)
ERYTHROCYTE [DISTWIDTH] IN BLOOD BY AUTOMATED COUNT: 15.4 % (ref 11–14.5)
HCT VFR BLD AUTO: 38.3 % (ref 35–47)
HGB BLD-MCNC: 11.8 G/DL (ref 12–16)
LYMPHOCYTES # BLD AUTO: 29.8 THOU/UL (ref 0.8–4.4)
LYMPHOCYTES NFR BLD AUTO: 86 % (ref 20–40)
MCH RBC QN AUTO: 25.8 PG (ref 27–34)
MCHC RBC AUTO-ENTMCNC: 30.8 G/DL (ref 32–36)
MCV RBC AUTO: 84 FL (ref 80–100)
MONOCYTES # BLD AUTO: 0.9 THOU/UL (ref 0–0.9)
MONOCYTES NFR BLD AUTO: 3 % (ref 2–10)
PLAT MORPH BLD: NORMAL
PLATELET # BLD AUTO: 191 THOU/UL (ref 140–440)
PMV BLD AUTO: 11.4 FL (ref 8.5–12.5)
RBC # BLD AUTO: 4.58 MILL/UL (ref 3.8–5.4)
TOTAL NEUTROPHILS-ABS(DIFF): 3.5 THOU/UL (ref 2–7.7)
TOTAL NEUTROPHILS-REL(DIFF): 10 % (ref 50–70)
WBC: 34.7 THOU/UL (ref 4–11)

## 2019-09-10 ENCOUNTER — COMMUNICATION - HEALTHEAST (OUTPATIENT)
Dept: INTERNAL MEDICINE | Facility: CLINIC | Age: 64
End: 2019-09-10

## 2019-09-10 DIAGNOSIS — M15.0 PRIMARY OSTEOARTHRITIS INVOLVING MULTIPLE JOINTS: ICD-10-CM

## 2019-09-11 ENCOUNTER — COMMUNICATION - HEALTHEAST (OUTPATIENT)
Dept: INTERNAL MEDICINE | Facility: CLINIC | Age: 64
End: 2019-09-11

## 2019-09-11 ENCOUNTER — ANESTHESIA - HEALTHEAST (OUTPATIENT)
Dept: SURGERY | Facility: CLINIC | Age: 64
End: 2019-09-11

## 2019-09-12 ENCOUNTER — SURGERY - HEALTHEAST (OUTPATIENT)
Dept: SURGERY | Facility: CLINIC | Age: 64
End: 2019-09-12

## 2019-09-12 ASSESSMENT — MIFFLIN-ST. JEOR
SCORE: 1447.61
SCORE: 1447.61

## 2019-09-17 ENCOUNTER — COMMUNICATION - HEALTHEAST (OUTPATIENT)
Dept: CARE COORDINATION | Facility: CLINIC | Age: 64
End: 2019-09-17

## 2019-09-20 ENCOUNTER — COMMUNICATION - HEALTHEAST (OUTPATIENT)
Dept: INTERNAL MEDICINE | Facility: CLINIC | Age: 64
End: 2019-09-20

## 2019-09-26 ENCOUNTER — RECORDS - HEALTHEAST (OUTPATIENT)
Dept: ADMINISTRATIVE | Facility: OTHER | Age: 64
End: 2019-09-26

## 2019-10-23 ENCOUNTER — RECORDS - HEALTHEAST (OUTPATIENT)
Dept: ADMINISTRATIVE | Facility: OTHER | Age: 64
End: 2019-10-23

## 2019-11-20 ENCOUNTER — RECORDS - HEALTHEAST (OUTPATIENT)
Dept: ADMINISTRATIVE | Facility: OTHER | Age: 64
End: 2019-11-20

## 2019-11-27 ENCOUNTER — RECORDS - HEALTHEAST (OUTPATIENT)
Dept: ADMINISTRATIVE | Facility: OTHER | Age: 64
End: 2019-11-27

## 2020-02-16 ENCOUNTER — HEALTH MAINTENANCE LETTER (OUTPATIENT)
Age: 65
End: 2020-02-16

## 2020-04-13 ENCOUNTER — RECORDS - HEALTHEAST (OUTPATIENT)
Dept: ADMINISTRATIVE | Facility: OTHER | Age: 65
End: 2020-04-13

## 2020-04-17 ENCOUNTER — RECORDS - HEALTHEAST (OUTPATIENT)
Dept: ADMINISTRATIVE | Facility: OTHER | Age: 65
End: 2020-04-17

## 2020-04-28 ENCOUNTER — RECORDS - HEALTHEAST (OUTPATIENT)
Dept: ADMINISTRATIVE | Facility: OTHER | Age: 65
End: 2020-04-28

## 2020-06-03 ENCOUNTER — HOSPITAL ENCOUNTER (OUTPATIENT)
Dept: CT IMAGING | Facility: CLINIC | Age: 65
Discharge: HOME | End: 2020-06-03
Attending: INTERNAL MEDICINE

## 2020-06-03 DIAGNOSIS — D47.9 ATYPICAL LYMPHOPROLIFERATIVE DISORDER (H): ICD-10-CM

## 2020-06-03 LAB
CREAT BLD-MCNC: 0.8 MG/DL (ref 0.6–1.1)
GFR SERPL CREATININE-BSD FRML MDRD: >60 ML/MIN/1.73M2

## 2020-06-12 ENCOUNTER — RECORDS - HEALTHEAST (OUTPATIENT)
Dept: ADMINISTRATIVE | Facility: OTHER | Age: 65
End: 2020-06-12

## 2020-06-16 ENCOUNTER — RECORDS - HEALTHEAST (OUTPATIENT)
Dept: ADMINISTRATIVE | Facility: OTHER | Age: 65
End: 2020-06-16

## 2020-07-05 ENCOUNTER — COMMUNICATION - HEALTHEAST (OUTPATIENT)
Dept: INTERNAL MEDICINE | Facility: CLINIC | Age: 65
End: 2020-07-05

## 2020-07-05 DIAGNOSIS — F32.A DEPRESSION: ICD-10-CM

## 2020-07-06 ENCOUNTER — RECORDS - HEALTHEAST (OUTPATIENT)
Dept: ADMINISTRATIVE | Facility: OTHER | Age: 65
End: 2020-07-06

## 2020-07-08 ENCOUNTER — COMMUNICATION - HEALTHEAST (OUTPATIENT)
Dept: INTERNAL MEDICINE | Facility: CLINIC | Age: 65
End: 2020-07-08

## 2020-07-08 DIAGNOSIS — M17.11 PRIMARY OSTEOARTHRITIS OF RIGHT KNEE: ICD-10-CM

## 2020-08-27 ENCOUNTER — OFFICE VISIT - HEALTHEAST (OUTPATIENT)
Dept: INTERNAL MEDICINE | Facility: CLINIC | Age: 65
End: 2020-08-27

## 2020-08-27 ENCOUNTER — COMMUNICATION - HEALTHEAST (OUTPATIENT)
Dept: LAB | Facility: CLINIC | Age: 65
End: 2020-08-27

## 2020-08-27 ENCOUNTER — COMMUNICATION - HEALTHEAST (OUTPATIENT)
Dept: SCHEDULING | Facility: CLINIC | Age: 65
End: 2020-08-27

## 2020-08-27 DIAGNOSIS — T73.3XXA FATIGUE DUE TO EXCESSIVE EXERTION, INITIAL ENCOUNTER: ICD-10-CM

## 2020-08-27 DIAGNOSIS — J45.20 MILD INTERMITTENT ASTHMA WITHOUT COMPLICATION: ICD-10-CM

## 2020-08-27 DIAGNOSIS — E78.00 PURE HYPERCHOLESTEROLEMIA: ICD-10-CM

## 2020-08-27 DIAGNOSIS — K21.9 GASTROESOPHAGEAL REFLUX DISEASE WITHOUT ESOPHAGITIS: ICD-10-CM

## 2020-08-27 DIAGNOSIS — F32.0 CURRENT MILD EPISODE OF MAJOR DEPRESSIVE DISORDER WITHOUT PRIOR EPISODE (H): ICD-10-CM

## 2020-08-27 DIAGNOSIS — M54.50 CHRONIC LOW BACK PAIN WITHOUT SCIATICA, UNSPECIFIED BACK PAIN LATERALITY: ICD-10-CM

## 2020-08-27 DIAGNOSIS — S46.012S TRAUMATIC TEAR OF LEFT ROTATOR CUFF, UNSPECIFIED TEAR EXTENT, SEQUELA: ICD-10-CM

## 2020-08-27 DIAGNOSIS — R60.0 LOWER LEG EDEMA: ICD-10-CM

## 2020-08-27 DIAGNOSIS — G89.29 CHRONIC LOW BACK PAIN WITHOUT SCIATICA, UNSPECIFIED BACK PAIN LATERALITY: ICD-10-CM

## 2020-08-27 LAB
ALBUMIN SERPL-MCNC: 3.7 G/DL (ref 3.5–5)
ALP SERPL-CCNC: 78 U/L (ref 45–120)
ALT SERPL W P-5'-P-CCNC: 23 U/L (ref 0–45)
ANION GAP SERPL CALCULATED.3IONS-SCNC: 12 MMOL/L (ref 5–18)
AST SERPL W P-5'-P-CCNC: 27 U/L (ref 0–40)
BILIRUB SERPL-MCNC: 0.4 MG/DL (ref 0–1)
BUN SERPL-MCNC: 31 MG/DL (ref 8–22)
CALCIUM SERPL-MCNC: 9.4 MG/DL (ref 8.5–10.5)
CHLORIDE BLD-SCNC: 108 MMOL/L (ref 98–107)
CO2 SERPL-SCNC: 23 MMOL/L (ref 22–31)
CREAT SERPL-MCNC: 0.71 MG/DL (ref 0.6–1.1)
ERYTHROCYTE [DISTWIDTH] IN BLOOD BY AUTOMATED COUNT: 15 % (ref 11–14.5)
GFR SERPL CREATININE-BSD FRML MDRD: >60 ML/MIN/1.73M2
GLUCOSE BLD-MCNC: 97 MG/DL (ref 70–125)
HCT VFR BLD AUTO: 35.3 % (ref 35–47)
HGB BLD-MCNC: 11 G/DL (ref 12–16)
MCH RBC QN AUTO: 25.2 PG (ref 27–34)
MCHC RBC AUTO-ENTMCNC: 31.2 G/DL (ref 32–36)
MCV RBC AUTO: 81 FL (ref 80–100)
PLATELET # BLD AUTO: 164 THOU/UL (ref 140–440)
PMV BLD AUTO: 11.6 FL (ref 8.5–12.5)
POTASSIUM BLD-SCNC: 3.9 MMOL/L (ref 3.5–5)
PROT SERPL-MCNC: 7.1 G/DL (ref 6–8)
RBC # BLD AUTO: 4.36 MILL/UL (ref 3.8–5.4)
SODIUM SERPL-SCNC: 143 MMOL/L (ref 136–145)
TSH SERPL DL<=0.005 MIU/L-ACNC: 1.96 UIU/ML (ref 0.3–5)
WBC: 42 THOU/UL (ref 4–11)

## 2020-08-27 RX ORDER — NAPROXEN SODIUM 220 MG
440 TABLET ORAL
Status: ON HOLD | COMMUNITY
Start: 2020-08-27 | End: 2024-02-26

## 2020-08-27 RX ORDER — MAGNESIUM 30 MG
30 TABLET ORAL DAILY
Status: ON HOLD | COMMUNITY
Start: 2020-08-27 | End: 2021-11-16

## 2020-08-27 ASSESSMENT — MIFFLIN-ST. JEOR
SCORE: 1470.29
SCORE: 1470.29

## 2020-08-28 LAB
25(OH)D3 SERPL-MCNC: 40.1 NG/ML (ref 30–80)
VIT B12 SERPL-MCNC: 514 PG/ML (ref 213–816)

## 2020-08-31 ENCOUNTER — COMMUNICATION - HEALTHEAST (OUTPATIENT)
Dept: INTERNAL MEDICINE | Facility: CLINIC | Age: 65
End: 2020-08-31

## 2020-08-31 ASSESSMENT — PATIENT HEALTH QUESTIONNAIRE - PHQ9: SUM OF ALL RESPONSES TO PHQ QUESTIONS 1-9: 12

## 2020-09-09 ENCOUNTER — RECORDS - HEALTHEAST (OUTPATIENT)
Dept: ADMINISTRATIVE | Facility: OTHER | Age: 65
End: 2020-09-09

## 2020-10-05 ENCOUNTER — COMMUNICATION - HEALTHEAST (OUTPATIENT)
Dept: INTERNAL MEDICINE | Facility: CLINIC | Age: 65
End: 2020-10-05

## 2020-10-05 DIAGNOSIS — F32.A DEPRESSION: ICD-10-CM

## 2020-11-10 ENCOUNTER — RECORDS - HEALTHEAST (OUTPATIENT)
Dept: ADMINISTRATIVE | Facility: OTHER | Age: 65
End: 2020-11-10

## 2020-11-16 ENCOUNTER — HEALTH MAINTENANCE LETTER (OUTPATIENT)
Age: 65
End: 2020-11-16

## 2020-12-03 ENCOUNTER — OFFICE VISIT (OUTPATIENT)
Dept: NEUROLOGY | Facility: CLINIC | Age: 65
End: 2020-12-03
Payer: COMMERCIAL

## 2020-12-03 DIAGNOSIS — R20.0 NUMBNESS: Primary | ICD-10-CM

## 2020-12-03 PROCEDURE — 95910 NRV CNDJ TEST 7-8 STUDIES: CPT | Performed by: PSYCHIATRY & NEUROLOGY

## 2020-12-03 PROCEDURE — 95886 MUSC TEST DONE W/N TEST COMP: CPT | Mod: RT | Performed by: PSYCHIATRY & NEUROLOGY

## 2020-12-03 NOTE — PROCEDURES
[facility]     (Formerly) [facility], .[address], [address]  [place] [address]  Tel: [phone]  Fax: [phone]          Full Name: Tessa Edwards Gender: Female  Patient ID: [id number] YOB: 1955      Visit Date: 12/3/2020 13:28  Age: 65 Years 4 Months Old  Interpreted By: Vinh Polanco MD   Ref Dr.: Irvin Bauman MD  Tech:    Height: 5 feet 4 inch  Reason for referral: Evaluate bilateral uppers. c/o pain, weakness, sensory changes in both hands > 3 years. Left > Right. h/o neck surgery, left shoulder surgery.       Motor NCS      Nerve / Sites Lat Amp Dist Saroj    ms mV cm m/s   L Median - APB      Wrist 4.69 7.1 7       Elbow 9.22 6.7 21.5 47   R Median - APB      Wrist 4.27 4.0 7       Elbow 9.17 3.9 23 47   L Ulnar - ADM      Wrist 2.86 8.4 7       B.Elbow 5.94 7.6 18 59      A.Elbow 7.66 7.2 10 58       F  Wave      Nerve Fmin    ms   L Ulnar - ADM 28.49       Sensory NCS      Nerve / Sites Onset Lat Pk Lat Amp.2-3 Dist Saroj Lat Diff    ms ms  V cm m/s ms   L Median - II (Antidr)      Wrist 3.02 3.65 26.6 14 46    R Median - II (Antidr)      Wrist 2.81 3.54 13.1 14 50    L Ulnar - V (Antidr)      Wrist 2.45 2.86 2.6 14 57    L Median, Ulnar - Transcarpal comparison      Median Palm 1.88 2.50 28.2 8 43       Ulnar Palm 1.88 2.14 3.7 8 43          0.36   R Median, Ulnar - Transcarpal comparison      Median Palm 1.93 2.55 21.4 8 42       Ulnar Palm 1.56 1.93 15.9 8 51          0.63       EMG Summary Table     Spontaneous MUAP Rcmt Note   Muscle Fib PSW Fasc IA # Amp Dur PPP Rate Type   L. Brachioradialis None None None N N N N N N N   L. Pronator teres None None None N N N N N N N   L. Biceps brachii None None None N N N N N N N   L. Deltoid None None None N N N N N N N   L. Flexor carpi ulnaris None None None N N N N N N N   L. Triceps brachii None None None N N N N N N N   L. First dorsal interosseous None None None N N  N N N N N   L. Abductor pollicis brevis None None None N N N N N N N   R. Brachioradialis None None None N N N N N N N   R. Pronator teres None None None N N N N N N N   R. Biceps brachii None None None N N N N N N N   R. Deltoid None None None N N N N N N N   R. Triceps brachii None None None N N N N N N N   R. First dorsal interosseous None None None N N N N N N N   R. Abductor pollicis brevis None None None N N N N N N N         SUMMARY  Nerve conduction and EMG study of bilateral upper extremities shows:    Normal right median nerve distal motor latency and borderline amplitude and borderline decreased conduction velocity.  Normal left median nerve distal motor latency, amplitude and borderline decreased conduction velocity.  Normal left ulnar distal motor latency, amplitude and conduction velocity.  Normal left ulnar F latency.  Normal bilateral median SNAPs (borderline amplitude on the right).  Decreased left ulnar SNAP amplitude.  Increased right median-ulnar transcarpal peak latency comparison.  Normal left median-ulnar transcarpal peak latency comparison.  Monopolar needle exam is normal.      CLINICAL INTERPRETATION:  This is an abnormal nerve conduction and EMG study.  The study is suggestive of a moderate right carpal tunnel syndrome.  The study is further suggestive of a mild left ulnar neuropathy.  Further clinical correlation is needed.     Vinh Polanco MD  Neurologist  [facility]  Tel:- [phone]

## 2020-12-03 NOTE — LETTER
12/3/2020         RE: Tessa Edwards  [address] [place] [address]        Dear Colleague,    Thank you for referring your patient, Tessa Edwards, to the [facility] [place]. Please see a copy of my visit note below.    See procedure note.       Again, thank you for allowing me to participate in the care of your patient.        Sincerely,        Vinh Polanco MD

## 2020-12-10 ENCOUNTER — RECORDS - HEALTHEAST (OUTPATIENT)
Dept: ADMINISTRATIVE | Facility: OTHER | Age: 65
End: 2020-12-10

## 2020-12-11 ENCOUNTER — HOSPITAL ENCOUNTER (OUTPATIENT)
Dept: CT IMAGING | Facility: CLINIC | Age: 65
Discharge: HOME | End: 2020-12-11
Attending: INTERNAL MEDICINE

## 2020-12-11 DIAGNOSIS — D47.9 ATYPICAL LYMPHOPROLIFERATIVE DISORDER (H): ICD-10-CM

## 2021-01-13 ENCOUNTER — COMMUNICATION - HEALTHEAST (OUTPATIENT)
Dept: INTERNAL MEDICINE | Facility: CLINIC | Age: 66
End: 2021-01-13

## 2021-01-13 DIAGNOSIS — G89.29 CHRONIC LOW BACK PAIN WITHOUT SCIATICA, UNSPECIFIED BACK PAIN LATERALITY: ICD-10-CM

## 2021-01-13 DIAGNOSIS — M54.50 CHRONIC LOW BACK PAIN WITHOUT SCIATICA, UNSPECIFIED BACK PAIN LATERALITY: ICD-10-CM

## 2021-01-14 RX ORDER — GABAPENTIN 300 MG/1
CAPSULE ORAL
Qty: 180 CAPSULE | Refills: 2 | Status: SHIPPED | OUTPATIENT
Start: 2021-01-14 | End: 2021-10-26

## 2021-02-23 ENCOUNTER — RECORDS - HEALTHEAST (OUTPATIENT)
Dept: ADMINISTRATIVE | Facility: OTHER | Age: 66
End: 2021-02-23

## 2021-03-11 ENCOUNTER — RECORDS - HEALTHEAST (OUTPATIENT)
Dept: ADMINISTRATIVE | Facility: OTHER | Age: 66
End: 2021-03-11

## 2021-04-04 ENCOUNTER — HEALTH MAINTENANCE LETTER (OUTPATIENT)
Age: 66
End: 2021-04-04

## 2021-04-15 ENCOUNTER — RECORDS - HEALTHEAST (OUTPATIENT)
Dept: ADMINISTRATIVE | Facility: OTHER | Age: 66
End: 2021-04-15

## 2021-05-20 ENCOUNTER — RECORDS - HEALTHEAST (OUTPATIENT)
Dept: ADMINISTRATIVE | Facility: OTHER | Age: 66
End: 2021-05-20

## 2021-05-27 ASSESSMENT — PATIENT HEALTH QUESTIONNAIRE - PHQ9: SUM OF ALL RESPONSES TO PHQ QUESTIONS 1-9: 12

## 2021-05-27 NOTE — TELEPHONE ENCOUNTER
Per Dr. Hudson, pt should follow up in clinic for discussion of CT results and the next step of treatment - referred to our .  Briseyda Acharya RN, CNRN

## 2021-05-27 NOTE — TELEPHONE ENCOUNTER
Patient called because she has not heard anything about her results of her most recent CT. She is concerned and frustrated because she has not received a call back on what the next steps are. I did not see a note in the chart if she was supposed to follow up with Dr. Hudson or if they were going to call with the results. Please review and call the patient back @ [phone] ok to CRISTO

## 2021-05-27 NOTE — PROGRESS NOTES
Office Visit - Follow Up   Skye Edwards   63 y.o. female    Date of Visit: 4/9/2019    Chief Complaint   Patient presents with     Diarrhea     every 2-3 days, incontinent     Hip Pain     RT hip 8/10, been to the pain clinic        Assessment and Plan   1. Lymphoproliferative disorder (H)  Had  incidental finding of abdominal lymphadenopathy  on her lumbar spine MRI.  An abdominal and pelvic CT was subsequently done  which showed stable lymphadenopathy and splenomegaly.  Has lymphoproliferative disorder and followed by oncology, Dr. Bucio and will see him sometime this month for follow-up.  Not getting treatment yet for her lymphoproliferative disorder.    2. Primary osteoarthritis involving multiple joints  Complains of aches and pains involving her hips lower back and neck.  Followed by neurosurgery.  Saw Dr. Mcintosh and subsequently Dr. Hudson in the absence of Dr. Mcintosh.  Assessed degenerative disc disease of her neck and lumbar spine.  Will follow with Dr. Hudson in the next few days for continuing neck and lower back pains.  The meantime okay to continue naproxen and Tylenol as needed.  - naproxen (NAPROSYN) 500 MG tablet; Take 1 tablet (500 mg total) by mouth daily as needed.  Dispense: 100 tablet; Refill: 3    3. Lipoma of skin and subcutaneous tissue  Has lumps on her belly and thighs due to lipomas.  Was seen by surgery, Dr. Lugo.  And was assessed no need to taken out since there is small and they are benign.    4. Incontinence of feces with fecal urgency  Complaints during this visit of fecal incontinence with urgency.  Happens every now and then.  Physical smears her underwear so she has to change to a new underwear when this happens.  Describes her stools to be loose.  Advised to avoid dairy products and avoid eating new diet since  these may be precipitating her loose stools.  In the meantime will give her loperamide 1 tablet twice a day until she sees gastroenterology.  Will refer  to gastroenterology for fecal incontinence.  - loperamide (IMODIUM) 2 mg capsule; Take 1 capsule (2 mg total) by mouth 2 (two) times a day.  Dispense: 60 capsule; Refill: 3  - Ambulatory referral to Gastroenterology    Reviewed her upper endoscopy and colonoscopy done on 4/29/2016.  Had polyps on her colonoscopy and was advised to have repeat colonoscopy in 5 years. Had negative upper endoscopy.    Reviewed Dr. Mcintosh's and Dr. Hudson's neurosurgery notes and their recommendations.  Reviewed her MRI and subsequent abdominal and pelvic CT.  Discussed these with the patient.      Follow up in in 3 months.     History of Present Illness   This 63 y.o. old female is here for follow-up.  Primarily she complains of intermittent fecal incontinence with urgency.  Described her stool to be loose.  Does not have abdominal cramps and pains.  Has to change her undergarment when it happens because stool smears her undergarment.  Happens once or twice a week.  Has some bumps and lumps on her thighs and abdomen which turned out to be lipomas.  Has lymphoproliferative disorder followed by  hematology.  On her MRI there were incidental findings of splenomegaly and abdominal lymphadenopathies.  Subsequent follow-up abdominal CT showed similar abdominal lymphadenopathy and splenomegaly but these are stable.  Has aches and pains involving her multiple joints.  Chronically complains of neck and low back pain due to degenerative disc disease of her cervical and lumbar spine respectively.  Followed and treated by neurosurgery.  Overall, stable.  No other complaints.    Review of Systems   A 12 point comprehensive review of systems was negative except as noted..     Medications, Allergies and Problem List   Reviewed and updated             Chief Complaint   Diarrhea (every 2-3 days, incontinent) and Hip Pain (RT hip 8/10, been to the pain clinic)       Patient Profile   Social History     Social History Narrative    , 5 grown  children. 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 Socially drinks alcohol.        Past Medical History   Patient Active Problem List   Diagnosis     Fibromyalgia     Esophageal Reflux     Vitamin D Deficiency     Intermittent asthma     Osteoarthritis     Prehypertension     Hyperlipidemia     Obesity     Prediabetes     Rotator cuff tear     Cervical spinal stenosis, C5-C6     Lymphoproliferative disorder (H)     Cord compression myelopathy (H)     Cervical nerve root compression     Major depression       Past Surgical History  She has a past surgical history that includes pr lap,cholecystectomy; pr total abdom hysterectomy; Hysterectomy; and Oophorectomy.       Medications and Allergies   Current Outpatient Medications   Medication Sig     FLUoxetine (PROZAC) 20 MG capsule TAKE 1 CAPSULE BY MOUTH ONCE DAILY     ibuprofen (ADVIL,MOTRIN) 600 MG tablet TAKE 1 TABLET BY MOUTH TWICE DAILY *MAX IBU DOSE IS 3200 MG IN 24 HOURS*     LYRICA 75 mg capsule Take 75 mg by mouth 2 (two) times a day.            MULTIVITAMIN (MULTIPLE VITAMINS ORAL) Take 1 tablet by mouth daily.     naproxen (NAPROSYN) 500 MG tablet Take 1 tablet (500 mg total) by mouth daily as needed.     omeprazole (PRILOSEC) 20 MG capsule Take 20 mg by mouth daily.     amoxicillin (AMOXIL) 500 MG tablet Take 2,000 mg by mouth once as needed. Prior to dental work     loperamide (IMODIUM) 2 mg capsule Take 1 capsule (2 mg total) by mouth 2 (two) times a day.     No Known Allergies     Family and Social History   Family History   Problem Relation Age of Onset     Heart disease Mother      Cancer Father      Breast cancer Paternal Grandmother      Breast cancer Paternal Aunt         Social History     Tobacco Use     Smoking status: Former Smoker     Last attempt to quit: 2000     Years since quittin.1     Smokeless tobacco: Never Used   Substance Use Topics     Alcohol use: Yes     Comment: Minimal social     Drug use: No        Physical  "Exam       Physical Exam  /68 (Patient Site: Left Arm, Patient Position: Sitting, Cuff Size: Adult Regular)   Pulse 77   Ht 5' 4.5\" (1.638 m)   Wt 215 lb (97.5 kg) Comment: patient refused  SpO2 97%   BMI 36.33 kg/m    General appearance: alert, appears stated age, cooperative, no distress and moderately obese  Head: Normocephalic, without obvious abnormality, atraumatic  Throat: lips, mucosa, and tongue normal; teeth and gums normal  Neck: no adenopathy, no carotid bruit, no JVD, supple, symmetrical, trachea midline and thyroid not enlarged, symmetric, no tenderness/mass/nodules  Lungs: clear to auscultation bilaterally  Heart: regular rate and rhythm, S1, S2 normal, no murmur, click, rub or gallop  Abdomen: soft, non-tender; bowel sounds normal; no masses,  no organomegaly  Extremities: extremities normal, atraumatic, no cyanosis or edema  Skin: Skin color, texture, turgor normal. No rashes or lesions  Neurologic: Grossly normal  Musculoskeletal: Normal neck and lower back exam with normal range of motion     Additional Information        Sreedhar Sidhu MD  Internal Medicine  Contact me at [phone]     Additional Information   Current Outpatient Medications   Medication Sig     FLUoxetine (PROZAC) 20 MG capsule TAKE 1 CAPSULE BY MOUTH ONCE DAILY     ibuprofen (ADVIL,MOTRIN) 600 MG tablet TAKE 1 TABLET BY MOUTH TWICE DAILY *MAX IBU DOSE IS 3200 MG IN 24 HOURS*     LYRICA 75 mg capsule Take 75 mg by mouth 2 (two) times a day.            MULTIVITAMIN (MULTIPLE VITAMINS ORAL) Take 1 tablet by mouth daily.     naproxen (NAPROSYN) 500 MG tablet Take 1 tablet (500 mg total) by mouth daily as needed.     omeprazole (PRILOSEC) 20 MG capsule Take 20 mg by mouth daily.     amoxicillin (AMOXIL) 500 MG tablet Take 2,000 mg by mouth once as needed. Prior to dental work     loperamide (IMODIUM) 2 mg capsule Take 1 capsule (2 mg total) by mouth 2 (two) times a day.     No Known Allergies  Social History "     Tobacco Use     Smoking status: Former Smoker     Last attempt to quit: 2000     Years since quittin.1     Smokeless tobacco: Never Used   Substance Use Topics     Alcohol use: Yes     Comment: Minimal social     Drug use: No         Time: total time spent with the patient was 40 minutes of which >50% was spent in counseling and coordination of care

## 2021-05-27 NOTE — TELEPHONE ENCOUNTER
[facility] to schedule f/u appt w Dr. Hudson to review imaging. Per Briseyda, just schedule first available.

## 2021-05-28 ASSESSMENT — ASTHMA QUESTIONNAIRES: ACT_TOTALSCORE: 25

## 2021-05-28 NOTE — PROGRESS NOTES
Tessa is here to f/u on imaging. She states symptoms are unchanged since last seen in clinic. She c/o pain in both arms, weakness in both hands, and numbness in both hands and feet feel chapped.   Zuleima,CMA

## 2021-05-28 NOTE — PROGRESS NOTES
"NEUROSURGERY FOLLOWUP  NOTE    Tessa Edwards comes today in f/u after prior apt on 3/19/19 for cervical myelopathy. 62 yo female s/p C5-6 MOBI-C by Dr Mcintosh on 8/2/17 for imbalance and hyperflexia and also s/p b/l CTR by Dr Hanson who presented with b/l hand burning and numbness and weakness, shoulder pain, imbalance, urinary urgency. She had a EMG following her CTR that showed moderate improved to mild median neuropathy. On exam she had signs of myelopathy.  MRI of the cervical spine was concerning for stenosis at the level of the prior MOBI-C. CT and xray of he cervical spine were obtained and she returns today in f/u.    She continues to have imbalance and urinary urgency. She feels her imbalance is worsening.       The pts PMH, PSH, ROS, Meds, Allergies, SH, FH are all unchanged and summarized in the pts health history from last visit        PHYSICAL EXAM:   Constitutional: /63   Pulse 84   Ht 5' 4.5\" (1.638 m)   Wt 215 lb (97.5 kg)   SpO2 96%   BMI 36.33 kg/m       Mental Status: A & O in no acute distress.  Affect is appropriate.  Speech is fluent.  Recent and remote memory are intact.  Attention span and concentration are normal.     Cranial Nerves: CN1: grossly intact per patient recall. CN2: No funduscopic exam performed. CN3,4 & 6: Pupillary light response, lateral and vertical gaze normal.  No nystagmus.  Visual fields are full to confrontation. CN5: Intact to touch CN7: No facial weakness, smile, facial symmetry intact. CN8: Intact to spoken voice. CN9&10: Gag reflex, uvula midline, palate rises with phonation. CN11: Shoulder shrug 5/5 intact bilaterally. CN12: Tongue midline and moves freely from side to side.     Motor: No pronator drift of upper extremity. Normal bulk and tone all muscle groups of upper and lower extremities.     Sensory: Sensation intact bilaterally to light touch.      Coordination: uneven wide based gait      Reflexes; supinator, biceps, triceps, knee/ ankle jerk " intact- brisk, L patellar absent (prior knee replacement).  b/l hoffmans/ no babinski/ clonus.    IMAGING:   I personally reviewed all radiographic images        CONSULTATION ASSESSMENT AND PLAN:    4 yo female s/p C5-6 MOBI-C by Dr Mcintosh on 8/2/17 for imbalance and hyperflexia and also s/p b/l CTR by Dr Hanson who presented with b/l hand burning and numbness and weakness, shoulder pain, imbalance, urinary urgency. She had a EMG following her CTR that showed moderate improved to mild median neuropathy. On exam she had signs of myelopathy.  Concern for ongoing stenosis at level of prior Mobi-C. Difficult to assess on MRI due to artifact from implant. She has anterior subluxation of C5 on C6 but with minimal change on flex/ex. CT cervical spine shows no hardware malfunction. Prior to her surgery she hd dorsal compression at C5-6 in combination with ventral C5-6 disc herniation resulting in cord compression. Question where her C6 dorsal osteophyte at the level of the C5-6 disc space in combination with her C6 dorsal disease is still not causing ongoing compression. Discussed that a CT myelogram would be recommended to definitively evaluate the canal at this level due to her artifact from her MOBI-C. She appeared to understand and agreed to proceed.     I spent more than 25 minutes in this apt, examining the pt, reviewing the scans, reviewing notes from chart, discussing treatment options with risks and benefits and coordinating care. >50 % clinic time was spent in face to face counseling and coordinating care    Brianne Hudson      CC:     Sreedhar Sidhu MD  [address] [place] [address]

## 2021-05-28 NOTE — PROCEDURES
Interventional Neuroradiology    Procedure:  Cervical myelogram    Radiologist:  Irvin Abreu    Fluoro Time:  1.6 minutes    Number of Images:  4    Complications:  none    Specimens:  None    Contrast:  15ml Omni 180    EBL: Minimal      Preliminary Findings (See dictation for full detail)  LP via L4-5 puncture, 15 ml Omni 180 instilled intrathecally  No myelographic block    Assess/Plan:  Bedrest for 2 hours  See Ct results    Irvin Abreu

## 2021-05-29 NOTE — PROGRESS NOTES
"NEUROSURGERY FOLLOWUP  NOTE    Tessa Edwards comes today in f/u after prior apt on 5/1/19. 64 yo female s/p C5-6 MOBI-C by Dr Mcintosh on 8/2/17 for imbalance and hyperflexia and also s/p b/l CTR by Dr Hanson who presented with b/l hand burning and numbness and weakness, shoulder pain, imbalance, urinary urgency. She had a EMG following her CTR that showed moderate improved to mild median neuropathy. Given her exam and history there was concern for ongoing stenosis at level of prior Mobi-C. Difficult to assess on MRI due to artifact from implant. She has anterior subluxation of C5 on C6 but with minimal change on flex/ex. CT cervical spine shows no hardware malfunction. She underwent a CT myelogram and returns for f/u.    She still has b/l hand burning and numbness and weakness. Will drop things. She also has imbalance. Of note she has a arthritis in her right knee and is undergoing a R TKA later this summer. Could be contributing to her imbalance. Urinary urgency unchanged.      The pts PMH, PSH, ROS, Meds, Allergies, SH, FH are all unchanged and summarized in the pts health history from last visit        PHYSICAL EXAM:   Constitutional: /78   Pulse 76   Resp 18   Ht 5' 4.5\" (1.638 m)   Wt 200 lb (90.7 kg)   SpO2 98%   BMI 33.80 kg/m       Mental Status: A & O in no acute distress.  Affect is appropriate.  Speech is fluent.  Recent and remote memory are intact.  Attention span and concentration are normal.    Motor:  Normal bulk and tone all muscle groups of upper and lower extremities.     Sensory: Sensation intact bilaterally to light touch.      Coordination:   Wide based gait      Reflexes; supinator, biceps, triceps, knee/ ankle jerk intact- slightly brisk UE>LE except L patellar abasent.      IMAGING:   I personally reviewed all radiographic images        CONSULTATION ASSESSMENT AND PLAN:    64 yo female s/p C5-6 MOBI-C by Dr Mcintosh on 8/2/17 for imbalance and hyperflexia and also s/p b/l CTR by " Dr Hanson who presented with b/l hand burning and numbness and weakness, shoulder pain, imbalance, urinary urgency. She had a EMG following her CTR that showed moderate improved to mild median neuropathy.  New CT myelogram shows again ADR at the C5-6 level. There is no significant canal stenosis. There is anterolisthesis of C3-4, C4-5, C5-6. Prior flex/ex showed very minimal accentuation with flexion and normal alignment with extension. Plans for R TKA late summer for knee arthritis. Difficult to say how much this may be contributing to her gait as well. Discussed presenting her case at spine board and following up with her with the results.      I spent more than 25 minutes in this apt, examining the pt, reviewing the scans, reviewing notes from chart, discussing treatment options with risks and benefits and coordinating care. >50 % clinic time was spent in face to face counseling and coordinating care    Brianne Hudson      CC:     Sreedhar Sidhu MD  [address] [place] [address]

## 2021-05-29 NOTE — TELEPHONE ENCOUNTER
Refill Approved    Rx renewed per Medication Renewal Policy. Medication was last renewed on 8/27/2018 for 90/2  Last OV 4/9/2019    Rajani Salmeron, [facility] Triage/Med Refill 6/25/2019     Requested Prescriptions   Pending Prescriptions Disp Refills     FLUoxetine (PROZAC) 20 MG capsule [Pharmacy Med Name: FLUOXETINE 20MG     CAP] 90 capsule 2     Sig: TAKE 1 CAPSULE BY MOUTH ONCE DAILY       SS[facility] Refill Protocol  Passed - 6/23/2019  2:34 PM        Passed - PCP or prescribing provider visit in last year     Last office visit with prescriber/PCP: 4/9/2019 Sreedhar Sidhu MD OR same dept: 4/9/2019 Sreedhar Sidhu MD OR same specialty: 4/9/2019 Sreedhar Sidhu MD  Last physical: 7/31/2017 Last MTM visit: Visit date not found   Next visit within 3 mo: Visit date not found  Next physical within 3 mo: Visit date not found  Prescriber OR PCP: Sreedhar Sidhu MD  Last diagnosis associated with med order: 1. Depression  - FLUoxetine (PROZAC) 20 MG capsule [Pharmacy Med Name: FLUOXETINE 20MG     CAP]; TAKE 1 CAPSULE BY MOUTH ONCE DAILY  Dispense: 90 capsule; Refill: 2    If protocol passes may refill for 12 months if within 3 months of last provider visit (or a total of 15 months).

## 2021-05-29 NOTE — PROGRESS NOTES
Tesas Edwards presents today for a follow up after recent myelograms. She c/o neck, arm (bilateral) and hand (bilateral) pain. She also c/o gait/balance changes, difficulty with picking things up with her hands and numbness/tingling from the elbows of both arms down . She states the bowel/bladder issues have resolved.   Angie Cooney, MARLENYN

## 2021-05-30 NOTE — PROGRESS NOTES
Office Visit - Follow Up   Tessa Edwards   63 y.o. female    Date of Visit: 7/9/2019    Chief Complaint   Patient presents with     Follow-up     seen neurosurgery x4 times and was dissatisfied. Wants US scan done on L arm, and R thigh to see if she has cyst or lymphomas.         Assessment and Plan   1. Primary osteoarthritis involving multiple joints   Has recurring aches and pains involving multiple joints as well as multiple muscles near the joints involved.  Due to osteoarthritis of multiple joints.  Okay to continue naproxen because it gives her relief.  Advised to take it twice a day.  - naproxen (NAPROSYN) 500 MG tablet; Take 1 tablet (500 mg total) by mouth 2 (two) times a day with meals.  Dispense: 100 tablet; Refill: 3    2. Fibromyalgia  Also has fibromyalgia causing her muscle pains aggravated by osteoarthritis.  Continue naproxen twice a day.    3. Pure hypercholesterolemia  Has borderline lipids specifically total cholesterol.  Does not need medication yet.  Continue low-fat diet.    4. Bilateral carpal tunnel syndrome  Has bilateral CTS.  Followed by orthopedics.  Will need carpal tunnel release soon.  Has a follow-up with her orthopedics next week.    5. Gastroesophageal reflux disease without esophagitis  Has GERD.  Takes omeprazole.  Continue omeprazole.  Gets GERD symptoms when she stopped taking it.    6. Current mild episode of major depressive disorder without prior episode (H)  Has mild major depression.  Controlled with duloxetine.  Continue fluoxetine.      Follow up in 3 months.     History of Present Illness   This 63 y.o. old female is here for follow-up.  Has osteoarthritis involving multiple joints causing aches and pains.  Aggravated by her fibromyalgia causing muscle pains.  Has CTS.  Followed by orthopedics.  Will need CTS  release soon.  Has hypercholesterolemia.  Not the medication.  Last lipids were borderline.  Has GERD.  Takes omeprazole.  Gets GERD symptoms when she  stopped taking omeprazole.  Has mild depression.  But now controlled by fluoxetine.  Overall, she feels well.  No complaints.    Review of Systems   A 12 point comprehensive review of systems was negative except as noted..     Medications, Allergies and Problem List   Reviewed and updated             Chief Complaint   Follow-up (seen neurosurgery x4 times and was dissatisfied. Wants US scan done on L arm, and R thigh to see if she has cyst or lymphomas. )       Patient Profile   Social History     Social History Narrative    , 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 Socially drinks alcohol.        Past Medical History   Patient Active Problem List   Diagnosis     Fibromyalgia     Esophageal Reflux     Vitamin D Deficiency     Intermittent asthma     Osteoarthritis     Prehypertension     Hyperlipidemia     Obesity     Prediabetes     Rotator cuff tear     Cervical spinal stenosis, C5-C6     Lymphoproliferative disorder (H)     Cord compression myelopathy (H)     Cervical nerve root compression     Major depression     Bilateral carpal tunnel syndrome       Past Surgical History  She has a past surgical history that includes pr lap,cholecystectomy; pr total abdom hysterectomy; Hysterectomy; Oophorectomy; and XR Myelogram Cervical (5/14/2019).       Medications and Allergies   Current Outpatient Medications   Medication Sig     amoxicillin (AMOXIL) 500 MG tablet Take 2,000 mg by mouth once as needed. Prior to dental work     cannabidiol (EPIDIOLEX) 100 mg/mL oral solution Take 2.5 mg/kg by mouth 2 (two) times a day.     FLUoxetine (PROZAC) 20 MG capsule Take 1 capsule (20 mg total) by mouth daily.     MULTIVITAMIN (MULTIPLE VITAMINS ORAL) Take 1 tablet by mouth daily.     naproxen (NAPROSYN) 500 MG tablet Take 1 tablet (500 mg total) by mouth 2 (two) times a day with meals.     omeprazole (PRILOSEC) 20 MG capsule Take 20 mg by mouth daily.     No Known Allergies     Family and  "Social History   Family History   Problem Relation Age of Onset     Heart disease Mother      Cancer Father      Breast cancer Paternal Grandmother      Breast cancer Paternal Aunt         Social History     Tobacco Use     Smoking status: Former Smoker     Last attempt to quit: 2000     Years since quittin.4     Smokeless tobacco: Never Used   Substance Use Topics     Alcohol use: Yes     Comment: Minimal social     Drug use: No        Physical Exam       Physical Exam  /70   Pulse 82   Ht 5' 4.5\" (1.638 m)   Wt 200 lb (90.7 kg)   SpO2 97%   BMI 33.80 kg/m    General appearance: alert, appears stated age, cooperative and no distress  Head: Normocephalic, without obvious abnormality, atraumatic  Throat: lips, mucosa, and tongue normal; teeth and gums normal  Neck: no adenopathy, no carotid bruit, no JVD, supple, symmetrical, trachea midline and thyroid not enlarged, symmetric, no tenderness/mass/nodules  Lungs: clear to auscultation bilaterally  Heart: regular rate and rhythm, S1, S2 normal, no murmur, click, rub or gallop  Abdomen: soft, non-tender; bowel sounds normal; no masses,  no organomegaly  Extremities: extremities normal, atraumatic, no cyanosis or edema  Skin: Skin color, texture, turgor normal. No rashes or lesions  Neurologic: Grossly normal       Additional Information        Sreedhar Sidhu MD  Internal Medicine  Contact me at [phone]     Additional Information   Current Outpatient Medications   Medication Sig     amoxicillin (AMOXIL) 500 MG tablet Take 2,000 mg by mouth once as needed. Prior to dental work     cannabidiol (EPIDIOLEX) 100 mg/mL oral solution Take 2.5 mg/kg by mouth 2 (two) times a day.     FLUoxetine (PROZAC) 20 MG capsule Take 1 capsule (20 mg total) by mouth daily.     MULTIVITAMIN (MULTIPLE VITAMINS ORAL) Take 1 tablet by mouth daily.     naproxen (NAPROSYN) 500 MG tablet Take 1 tablet (500 mg total) by mouth 2 (two) times a day with meals.     " omeprazole (PRILOSEC) 20 MG capsule Take 20 mg by mouth daily.     No Known Allergies  Social History     Tobacco Use     Smoking status: Former Smoker     Last attempt to quit: 2000     Years since quittin.4     Smokeless tobacco: Never Used   Substance Use Topics     Alcohol use: Yes     Comment: Minimal social     Drug use: No         Time: total time spent with the patient was 25 minutes of which >50% was spent in counseling and coordination of care

## 2021-05-31 VITALS — WEIGHT: 206 LBS | BODY MASS INDEX: 35.17 KG/M2 | HEIGHT: 64 IN

## 2021-05-31 VITALS — BODY MASS INDEX: 34.66 KG/M2 | HEIGHT: 64 IN | WEIGHT: 203 LBS

## 2021-05-31 VITALS — BODY MASS INDEX: 34.66 KG/M2 | WEIGHT: 203 LBS | HEIGHT: 64 IN

## 2021-05-31 VITALS — BODY MASS INDEX: 36.05 KG/M2 | WEIGHT: 206.75 LBS

## 2021-05-31 VITALS — BODY MASS INDEX: 35.48 KG/M2 | WEIGHT: 203.5 LBS

## 2021-05-31 VITALS — BODY MASS INDEX: 33.05 KG/M2 | WEIGHT: 198.38 LBS | HEIGHT: 65 IN

## 2021-05-31 NOTE — TELEPHONE ENCOUNTER
Controlled Substance Refill Request  Medication:   Requested Prescriptions     Pending Prescriptions Disp Refills     acetaminophen-codeine (TYLENOL #2) 300-15 mg per tablet [Pharmacy Med Name: ACETAMI/CODEI 300-15MG TAB] 30 tablet 0     Sig: TAKE 1 TABLET BY MOUTH EVERY 4 HOURS AS NEEDED FOR PAIN     Date Last Fill: 1/2/2019-not on current medication list  Pharmacy: [facility]   Submit electronically to pharmacy  Controlled Substance Agreement on File:   Encounter-Level CSA Scan Date:    There are no encounter-level csa scan date.       Last office visit: 7/9/2019 Sreedhar Sidhu MD

## 2021-05-31 NOTE — TELEPHONE ENCOUNTER
Prescription Monitoring Program activity reviewed with no discrepancies noted.      Last fill per : 8/7/19  Quantity/days supply: #30, 5 day supply    Controlled Substance Agreement on file: No  Date:     Last office visit with provider:  7/9/2019 Sreedhar Sidhu MD    Please advise.    Marry Lopez LPN

## 2021-05-31 NOTE — TELEPHONE ENCOUNTER
Controlled Substance Refill Request  Medication:   Requested Prescriptions     Pending Prescriptions Disp Refills     acetaminophen-codeine (TYLENOL #2) 300-15 mg per tablet [Pharmacy Med Name: ACETAMI/CODEI 300-15MG TAB] 30 tablet 0     Sig: TAKE 1 TABLET BY MOUTH EVERY 4 HOURS AS NEEDED FOR PAIN     Date Last Fill: 8/7/19  Pharmacy: [facility]   Submit electronically to pharmacy  Controlled Substance Agreement on File:   Encounter-Level CSA Scan Date:    There are no encounter-level csa scan date.       Last office visit: 7/9/2019 Sreedhar Sidhu MD Jill Thielen, RN  Triage Nurse Advisor

## 2021-06-01 ENCOUNTER — RECORDS - HEALTHEAST (OUTPATIENT)
Dept: ADMINISTRATIVE | Facility: CLINIC | Age: 66
End: 2021-06-01

## 2021-06-01 VITALS — BODY MASS INDEX: 35.82 KG/M2 | HEIGHT: 65 IN | WEIGHT: 215 LBS

## 2021-06-01 VITALS — BODY MASS INDEX: 33.53 KG/M2 | HEIGHT: 65 IN

## 2021-06-01 NOTE — TELEPHONE ENCOUNTER
Upcoming Appointment Question  When is the appointment: Tomorrow  What is your appointment for?: surgery  Who is your appointment scheduled with?: PCP only  What is your question/concern?: Mildred states they need an ok for patient to have surgery done with an WBC lab result of 34.7.   Need to call Mildred arauz at [phone] and can leave voice message.  Okay to leave a detailed message?: Yes

## 2021-06-01 NOTE — TELEPHONE ENCOUNTER
Prior Authorization Request  Who s requesting:  Pharmacy  Pharmacy Name and Location: [id number]  Medication Name: acetaminophen-codeine 300-15 mg  Insurance Plan: [employer]  Insurance Member ID Number:  [id number]  Informed patient that prior authorizations can take up to 10 business days for response:   No  Okay to leave a detailed message: No        KEY: [id number]

## 2021-06-01 NOTE — TELEPHONE ENCOUNTER
Controlled Substance Refill Request  Medication:   Requested Prescriptions     Pending Prescriptions Disp Refills     acetaminophen-codeine (TYLENOL #2) 300-15 mg per tablet [Pharmacy Med Name: ACETAMI/CODEI 300-15MG TAB] 30 tablet 0     Sig: TAKE 1 TABLET BY MOUTH EVERY 4 HOURS AS NEEDED FOR PAIN     Date Last Fill: 8/22/19  [id number]   Submit electronically to pharmacy  Controlled Substance Agreement on File:   Encounter-Level CSA Scan Date:    There are no encounter-level csa scan date.       Last office visit: Last office visit pertaining to requested medication was 7/9/19.

## 2021-06-01 NOTE — ANESTHESIA POSTPROCEDURE EVALUATION
Patient: Tessa Edwards  RIGHT TOTAL KNEE ARTHROPLASTY  Anesthesia type: No value filed.    Patient location: PACU  Last vitals:   Vitals Value Taken Time   /62 9/12/2019 11:56 AM   Temp 36.7  C (98  F) 9/12/2019 11:56 AM   Pulse 72 9/12/2019 11:56 AM   Resp 18 9/12/2019 11:56 AM   SpO2 97 % 9/12/2019 11:13 AM     Post vital signs: stable  Level of consciousness: awake and responds to simple questions  Post-anesthesia pain: pain controlled  Post-anesthesia nausea and vomiting: no  Pulmonary: unassisted, return to baseline  Cardiovascular: stable and blood pressure at baseline  Hydration: adequate  Anesthetic events: no    QCDR Measures:  ASA# 11 - Jojo-op Cardiac Arrest: ASA11B - Patient did NOT experience unanticipated cardiac arrest  ASA# 12 - Jojo-op Mortality Rate: ASA12B - Patient did NOT die  ASA# 13 - PACU Re-Intubation Rate: ASA13B - Patient did NOT require a new airway mgmt  ASA# 10 - Composite Anes Safety: ASA10A - No serious adverse event    Additional Notes:

## 2021-06-01 NOTE — PROGRESS NOTES
Hospital discharge follow up call to pt, no answer.  Left VM message reminding patient of their appt 9/18/19 at 1:00pm.  RN will attempt call back at another time.    Jasmyne Wilder RN Care Manager, Population Health

## 2021-06-01 NOTE — ANESTHESIA PROCEDURE NOTES
Spinal Block    Patient location during procedure: OR  Start time: 9/12/2019 7:42 AM  End time: 9/12/2019 7:47 AM  Reason for block: primary anesthetic    Staffing:  Performing  Anesthesiologist: Amna Morocho MD    Preanesthetic Checklist  Completed: patient identified, risks, benefits, and alternatives discussed, timeout performed, consent obtained, at patient's request, airway assessed, oxygen available, suction available, emergency drugs available and hand hygiene performed  Spinal Block  Patient position: sitting  Prep: ChloraPrep  Patient monitoring: heart rate, cardiac monitor, continuous pulse ox and blood pressure  Location: L3-4  Injection technique: single-shot  Needle type: pencil-tip   Needle gauge: 24 G    Assessment  Sensory level: T8    Additional Notes:  Facile block, single attempt, single pass.  Patient tolerated well and without complication.  Lot #[id number]

## 2021-06-01 NOTE — ANESTHESIA PROCEDURE NOTES
Peripheral Block    Patient location during procedure: pre-op  Start time: 9/12/2019 7:22 AM  End time: 9/12/2019 7:25 AM  post-op analgesia per surgeon order as noted in medical record  Staffing:  Performing  Anesthesiologist: Amna Morocho MD  Preanesthetic Checklist  Completed: patient identified, site marked, risks, benefits, and alternatives discussed, timeout performed, consent obtained, at patient's request, airway assessed, oxygen available, suction available, emergency drugs available and hand hygiene performed  Peripheral Block  Block type: saphenous, adductor canal block  Prep: ChloraPrep  Patient position: supine  Patient monitoring: cardiac monitor, continuous pulse oximetry, heart rate and blood pressure  Laterality: right  Injection technique: ultrasound guided    Ultrasound used to visualize needle placement in proximity to nerve being blocked: yes   Permanent ultrasound image captured for medical record  Sterile gel and probe cover used for ultrasound.    Needle  Needle type: Stimuplex   Needle gauge: 20G  Needle length: 4 in  no peripheral nerve catheter placed  Assessment  Injection assessment: no difficulty with injection, negative aspiration for heme, no paresthesia on injection and incremental injection  Additional Notes  Facile block, patient tolerated well and without complication.

## 2021-06-01 NOTE — TELEPHONE ENCOUNTER
Controlled Substance Refill Request  Medication:   Requested Prescriptions     Pending Prescriptions Disp Refills     acetaminophen-codeine (TYLENOL #2) 300-15 mg per tablet 30 tablet 0     Sig: Take 1 tablet by mouth every 4 (four) hours.     Date Last Fill: 9.5.19 #30  Pharmacy: [facility]   Submit electronically to pharmacy  Controlled Substance Agreement on File:   Encounter-Level CSA Scan Date:    There are no encounter-level csa scan date.       Last office visit: Last office visit pertaining to requested medication was 9.5.19.

## 2021-06-01 NOTE — PROGRESS NOTES
Second attempt to reach patient for hospital discharge follow up, no answer.  Left VM message reminding pt of their appt today at 1:00pm.  RN has made two unsuccessful attempts to reach patient.  Encounter closed.    Jasmyne Wilder RN Care Manager, Population Health

## 2021-06-01 NOTE — ANESTHESIA PROCEDURE NOTES
Peripheral Block    Patient location during procedure: pre-op  Start time: 9/12/2019 7:20 AM  End time: 9/12/2019 7:22 AM  post-op analgesia per surgeon order as noted in medical record  Staffing:  Performing  Anesthesiologist: Amna Morocho MD  Preanesthetic Checklist  Completed: patient identified, site marked, risks, benefits, and alternatives discussed, timeout performed, consent obtained, at patient's request, airway assessed, oxygen available, suction available, emergency drugs available and hand hygiene performed  Peripheral Block  Block type: sciatic, popliteal (selective tibial)  Prep: ChloraPrep  Patient position: supine  Patient monitoring: cardiac monitor, continuous pulse oximetry, heart rate and blood pressure  Laterality: right  Injection technique: ultrasound guided    Ultrasound used to visualize needle placement in proximity to nerve being blocked: yes   Permanent ultrasound image captured for medical record  Sterile gel and probe cover used for ultrasound.    Needle  Needle type: Stimuplex   Needle gauge: 20G  Needle length: 4 in  no peripheral nerve catheter placed  Assessment  Injection assessment: no difficulty with injection, negative aspiration for heme, no paresthesia on injection and incremental injection  Additional Notes  Facile block, patient tolerated well and without complication.  Good images obtained.

## 2021-06-01 NOTE — ANESTHESIA PREPROCEDURE EVALUATION
Anesthesia Evaluation      Patient summary reviewed   No history of anesthetic complications     Airway   Mallampati: II  Neck ROM: full   Pulmonary     breath sounds clear to auscultation  (+) asthma  mild, sleep apnea on CPAP, ,                          Cardiovascular   Exercise tolerance: > or = 4 METS  (+) , hypercholesterolemia,     ECG reviewed  Rhythm: regular  Rate: normal,         Neuro/Psych    (+) neuromuscular disease,      Comments: Cervical stenosis    Endo/Other - negative ROS   (-) no diabetes, hypothyroidism     GI/Hepatic/Renal    (+) GERD well controlled,             Dental - normal exam                        Anesthesia Plan  Planned anesthetic: spinal and peripheral nerve block  SAB  Adductor canal and selective tibial nerve block  1 PIV  Propofol gtt  Anti-emetic dexamethasone 10 mg, ondansetron 4 mg  ASA 2     Anesthetic plan and risks discussed with: patient  Anesthesia plan special considerations: antiemetics,   Post-op plan: routine recovery

## 2021-06-01 NOTE — TELEPHONE ENCOUNTER
Central PA team  [phone]  Pool: [facility] PA MED ([id number])          PA has been initiated.       PA form completed and faxed insurance via Cover My Meds     Key:  [id number]     Medication:  Acetaminophen-Codeine 300-15MG tablets    Insurance:  P1        Response will be received via fax and may take up to 5-10 business days depending on plan

## 2021-06-01 NOTE — PROGRESS NOTES
Preoperative Exam    Scheduled Procedure: RIGHT TOTAL KNEE ARTHROPLASTY  Surgery Date:  9/12/19  Surgery Location: [facility], fax [phone]    Surgeon:  Dr. Irvin Canas    Assessment/Plan:     1. Pre-op exam  Has normal physical exam.  Okay to go ahead with her surgery.  We will check the following.  - Electrocardiogram Perform and Read  - HM1(CBC and Differential)  - Basic Metabolic Panel  - Hepatic Profile  - HM1 (CBC with Diff)    2. Primary osteoarthritis of right knee  Has end-stage osteoarthritis of the right knee.  Followed by orthopedics.  Failed conservative treatment.  Has ongoing right knee pains and oftentimes right knee gives out when she walks.    3. Lymphoproliferative disorder (H)  Has lymphoproliferative disorder.  Followed by an hematology, Dr. Bucio twice a year.  Has indolent atypical lymphoproliferative disorder which does not need treatment unless she becomes symptomatic like night sweats, fever, weight loss.  Still completely asymptomatic.  Will check CBC.  - HM1(CBC and Differential)  - HM1 (CBC with Diff)    4. Current mild episode of major depressive disorder without prior episode (H)  Takes fluoxetine.  Okay to take fluoxetine in the morning of her surgery with sips of water.    Surgical Procedure Risk: Low (reported cardiac risk generally < 1%)  Have you had prior anesthesia?: Yes  Have you or any family members had a previous anesthesia reaction:  No  Do you or any family members have a history of a clotting or bleeding disorder?: No  Cardiac Risk Assessment: no increased risk for major cardiac complications    APPROVAL GIVEN to proceed with proposed procedure, without further diagnostic evaluation    Functional Status: Independent  Patient plans to recover at home with family.     Subjective:      Tessa Edwards is a 64 y.o. female who presents for a preoperative consultation.  Has persisting right knee pains due to end-stage osteoarthritis.  Did not respond to  conservative treatment of physical therapy, cortisone injection to the knee.  Was recommended right knee replacement.  Already had left knee replacement in 2010.  Also had neck surgery of C5 and C6 anterior cervical discectomy for cord compression with myelopathy in August 2017.  Tolerated both surgery without untoward complications or events.  Has indolent atypical lymphoproliferative disease which does not need treatment.  Followed by hematology, Dr. Bucio twice a year.  Was last seen in  April of this year.  Advised she was doing well.  Denies chest pains and shortness of breath.  Denies urinary and bowel symptoms.  Denies dizziness and lightheadedness.  Does not have lower extremity edema.  Overall, feels well except for recurring right knee pains.    All other systems reviewed and are negative, other than those listed in the HPI.    Pertinent History  Do you have difficulty breathing or chest pain after walking up a flight of stairs: No  History of obstructive sleep apnea: No  Steroid use in the last 6 months: No  Frequent Aspirin/NSAID use: Yes: Ibuprofen as needed for her right knee pains  Prior Blood Transfusion: No  Prior Blood Transfusion Reaction: No  If for some reason prior to, during or after the procedure, if it is medically indicated, would you be willing to have a blood transfusion?:  There is no transfusion refusal.    Current Outpatient Medications   Medication Sig Dispense Refill     acetaminophen-codeine (TYLENOL #2) 300-15 mg per tablet TAKE 1 TABLET BY MOUTH EVERY 4 HOURS AS NEEDED FOR PAIN 30 tablet 0     cannabidiol (EPIDIOLEX) 100 mg/mL oral solution Take 2.5 mg/kg by mouth 2 (two) times a day.       FLUoxetine (PROZAC) 20 MG capsule Take 1 capsule (20 mg total) by mouth daily. 90 capsule 3     MULTIVITAMIN (MULTIPLE VITAMINS ORAL) Take 1 tablet by mouth daily.       naproxen (NAPROSYN) 500 MG tablet Take 1 tablet (500 mg total) by mouth 2 (two) times a day with meals. 100 tablet 3      omeprazole (PRILOSEC) 20 MG capsule Take 20 mg by mouth daily.       amoxicillin (AMOXIL) 500 MG tablet Take 2,000 mg by mouth once as needed. Prior to dental work       No current facility-administered medications for this visit.         No Known Allergies    Patient Active Problem List   Diagnosis     Fibromyalgia     Esophageal Reflux     Vitamin D Deficiency     Intermittent asthma     Osteoarthritis     Prehypertension     Hyperlipidemia     Obesity     Prediabetes     Rotator cuff tear     Cervical spinal stenosis, C5-C6     Lymphoproliferative disorder (H)     Cord compression myelopathy (H)     Cervical nerve root compression     Major depression     Bilateral carpal tunnel syndrome       Past Medical History:   Diagnosis Date     Asthma      Cervical spinal stenosis      Depression      Fibromyalgia      GERD (gastroesophageal reflux disease)      Lymphoproliferative disorder (H)      Obesity      Osteoarthritis      Rotator cuff tear      Vitamin D deficiency        Past Surgical History:   Procedure Laterality Date     HYSTERECTOMY       JOINT REPLACEMENT       OOPHORECTOMY       [place] LAP,CHOLECYSTECTOMY      Description: Cholecystectomy Laparoscopic;  Recorded: 12/10/2012;     W[place] TOTAL ABDOM HYSTERECTOMY      Description: Total Abdominal Hysterectomy;  Recorded: 12/10/2012;  Comments: 44 Y/O FOR FIBROID TUMORS     XR MYELOGRAM CERVICAL  5/14/2019       Social History     Socioeconomic History     Marital status:      Spouse name: Not on file     Number of children: Not on file     Years of education: Not on file     Highest education level: Not on file   Occupational History     Not on file   Social Needs     Financial resource strain: Not on file     Food insecurity:     Worry: Not on file     Inability: Not on file     Transportation needs:     Medical: Not on file     Non-medical: Not on file   Tobacco Use     Smoking status: Former Smoker     Last attempt to quit: 2/18/2000     Years since  "quittin.5     Smokeless tobacco: Never Used   Substance and Sexual Activity     Alcohol use: Yes     Alcohol/week: 6.0 oz     Types: 10 Glasses of wine per week     Drug use: No     Sexual activity: Not on file   Lifestyle     Physical activity:     Days per week: Not on file     Minutes per session: Not on file     Stress: Not on file   Relationships     Social connections:     Talks on phone: Not on file     Gets together: Not on file     Attends L[employer] service: Not on file     Active member of club or organization: Not on file     Attends meetings of clubs or organizations: Not on file     Relationship status: Not on file     Intimate partner violence:     Fear of current or ex partner: Not on file     Emotionally abused: Not on file     Physically abused: Not on file     Forced sexual activity: Not on file   Other Topics Concern     Not on file   Social History Narrative    , 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 Socially drinks alcohol.       Patient Care Team:  Sreedhar Sidhu MD as PCP - General (Internal Medicine)  Sreedhar Sidhu MD as Assigned PCP          Objective:     Vitals:    19 0811   BP: 132/70   Pulse: 72   SpO2: 98%   Weight: 206 lb (93.4 kg)   Height: 5' 3\" (1.6 m)         Physical Exam:    General Appearance:    Alert, cooperative, no distress, appears stated age, pleasant   Head:    Normocephalic, without obvious abnormality, atraumatic   Eyes:    PERRL, conjunctiva/corneas clear, EOM's intact, fundi     benign, both eyes   Ears:    Normal TM's and external ear canals, both ears   Nose:   Nares normal, septum midline, mucosa normal, no drainage     or sinus tenderness   Throat:   Lips, mucosa, and tongue normal; teeth and gums normal   Neck:   Supple, symmetrical, trachea midline, no adenopathy;     thyroid:  no enlargement/tenderness/nodules; no carotid    bruit or JVD   Back:     Symmetric, no curvature, ROM normal, no CVA " tenderness   Lungs:     Clear to auscultation bilaterally, respirations unlabored   Chest Wall:    No tenderness or deformity    Heart:    Regular rate and rhythm, S1 and S2 normal, no murmur, rub    or gallop   Abdomen:     Soft, non-tender, bowel sounds active all four quadrants,     no masses, no organomegaly   Extremities:   Extremities normal, atraumatic, no cyanosis or edema   Pulses:   2+ and symmetric all extremities   Skin:   Skin color, texture, turgor normal, no rashes or lesions   Lymph nodes:   Cervical, supraclavicular, and axillary nodes normal   Neurologic:     Musculoskeletal:   CNII-XII intact, normal strength, sensation and reflexes     throughout    Normal right knee exam, only warm to touch without     swelling, redness and tenderness       There are no Patient Instructions on file for this visit.    EKG: Normal EKG.    Labs:  Labs pending at this time.  Results will be reviewed when available.    Immunization History   Administered Date(s) Administered     Influenza, Seasonal, Inj PF IIV3 10/02/2012, 10/05/2016, 09/27/2017     Influenza, inj, historic,unspecified 10/04/2011, 10/04/2018     Pneumo Conj 13-V (2010&after) 10/18/2018     Pneumo Polysac 23-V 09/06/2012     Td,adult,historic,unspecified 01/19/2004     Tdap 03/20/2013           Electronically signed by Sreedhar Sidhu MD 09/05/19 8:13 AM

## 2021-06-02 ENCOUNTER — RECORDS - HEALTHEAST (OUTPATIENT)
Dept: ADMINISTRATIVE | Facility: CLINIC | Age: 66
End: 2021-06-02

## 2021-06-02 VITALS — WEIGHT: 215 LBS | BODY MASS INDEX: 35.82 KG/M2 | HEIGHT: 65 IN

## 2021-06-02 VITALS — WEIGHT: 200 LBS | BODY MASS INDEX: 33.32 KG/M2 | HEIGHT: 65 IN

## 2021-06-02 VITALS — BODY MASS INDEX: 35.82 KG/M2 | HEIGHT: 65 IN | WEIGHT: 215 LBS

## 2021-06-02 VITALS — HEIGHT: 65 IN | WEIGHT: 215 LBS | BODY MASS INDEX: 35.82 KG/M2

## 2021-06-02 VITALS — WEIGHT: 200 LBS | HEIGHT: 65 IN | BODY MASS INDEX: 33.32 KG/M2

## 2021-06-03 VITALS
SYSTOLIC BLOOD PRESSURE: 132 MMHG | BODY MASS INDEX: 36.5 KG/M2 | HEIGHT: 63 IN | WEIGHT: 206 LBS | DIASTOLIC BLOOD PRESSURE: 70 MMHG | HEART RATE: 72 BPM | OXYGEN SATURATION: 98 %

## 2021-06-03 VITALS — BODY MASS INDEX: 33.32 KG/M2 | HEIGHT: 65 IN | WEIGHT: 200 LBS

## 2021-06-03 VITALS — WEIGHT: 208 LBS | BODY MASS INDEX: 36.86 KG/M2 | HEIGHT: 63 IN

## 2021-06-04 VITALS
HEIGHT: 63 IN | HEART RATE: 80 BPM | DIASTOLIC BLOOD PRESSURE: 80 MMHG | BODY MASS INDEX: 37.74 KG/M2 | SYSTOLIC BLOOD PRESSURE: 130 MMHG | WEIGHT: 213 LBS | OXYGEN SATURATION: 97 %

## 2021-06-09 ENCOUNTER — RECORDS - HEALTHEAST (OUTPATIENT)
Dept: ADMINISTRATIVE | Facility: OTHER | Age: 66
End: 2021-06-09

## 2021-06-09 NOTE — TELEPHONE ENCOUNTER
Spoke with the patient and relayed message below from Dr. Sidhu regarding requested prescription below.  She verbalized understanding and had no further questions at this time.    Prescription has been set up for Dr. Sidhu to review per message below.    Ute MOTTA CMA/TIGRE....................2:39 PM

## 2021-06-09 NOTE — TELEPHONE ENCOUNTER
Medication Request  Medication name: Amoxicillin 500 mg, four tablets all at once prior to dental procedure with 2 refills  Requested Pharmacy: [facility]  Reason for request: Patient has multiple upcoming dental procedures.  Patient has a history of knee replacement.  When did you use medication last?:  3 or 4 years ago  Patient offered appointment:  patient declined  Okay to leave a detailed message: yes

## 2021-06-09 NOTE — TELEPHONE ENCOUNTER
Refill Approved    Rx renewed per Medication Renewal Policy. Medication was last renewed on 6/25/19, last OV 9/5/19.    Greer Lloyd, [facility] Triage/Med Refill 7/5/2020     Requested Prescriptions   Pending Prescriptions Disp Refills     FLUoxetine (PROZAC) 20 MG capsule [Pharmacy Med Name: FLUoxetine HCl 20 MG Oral Capsule] 90 capsule 0     Sig: Take 1 capsule by mouth once daily       SSRI Refill Protocol  Passed - 7/5/2020  6:30 AM        Passed - PCP or prescribing provider visit in last year     Last office visit with prescriber/PCP: 7/9/2019 Sreedhar Sidhu MD OR same dept: 7/9/2019 Sreedhar Sidhu MD OR same specialty: 7/9/2019 Sreedhar Sidhu MD  Last physical: 9/5/2019 Last MTM visit: Visit date not found   Next visit within 3 mo: Visit date not found  Next physical within 3 mo: Visit date not found  Prescriber OR PCP: Sreedhar Sidhu MD  Last diagnosis associated with med order: 1. Depression  - FLUoxetine (PROZAC) 20 MG capsule [Pharmacy Med Name: FLUoxetine HCl 20 MG Oral Capsule]; Take 1 capsule by mouth once daily  Dispense: 90 capsule; Refill: 0    If protocol passes may refill for 12 months if within 3 months of last provider visit (or a total of 15 months).

## 2021-06-10 NOTE — TELEPHONE ENCOUNTER
junior had a critical wbc count, I put a copy of the prelim report on your desk, and the sample has to be sent to [facility]. Thank you.

## 2021-06-10 NOTE — TELEPHONE ENCOUNTER
Ana M with [facility] lab called to report critical white blood cell count - 42    Paged MD on call @ 2029 - Dr. Charlotte Warner on call.     MD visit today, complains of edema, fatigue, low back pain.     Return call from Dr. Warner @ 2031.  No new orders at this time.  Would like to have note routed to her for follow-up in the AM.     Will route note to primary and on-call MD.     Jasmyne Maharaj, RN/M [facility]    Reason for Disposition    Lab or radiology calling with CRITICAL test results    Additional Information    Negative: Lab calling with strep throat test results and triager can call in prescription    Negative: Lab calling with urinalysis test results and triager can call in prescription    Negative: Medication questions    Negative: ED call to PCP    Negative: Physician call to PCP    Negative: Call about patient who is currently hospitalized    Protocols used: PCP CALL - NO TRIAGE-A-

## 2021-06-10 NOTE — PROGRESS NOTES
Office Visit - Follow Up   Tessa Edwards   65 y.o. female    Date of Visit: 8/27/2020    Chief Complaint   Patient presents with     Edema     feet and ankles swelling, unable to put shoes on at times, and causing her pain     Fatigue     increased fatigue     Back Pain     wants refill on Gabapentin for low back pain        Assessment and Plan   1. Lower leg edema  Reports her legs are swollen for 3 months. Does not have shortness of breath and other cardiac complaints. Exam showed no pitting edema but more of fat collection in her legs. Advised patient to elevate her legs at all times when she can especially if she notices more swelling. Advised to avoid salty diet or foods. Suspect at the least she has venous insufficiency causing her leg swelling.      2. Fatigue due to excessive exertion, initial encounter  Complains of fatigue. Suspect due to deconditioning. Will check the following.   - HM2(CBC w/o Differential)  - Thyroid Stimulating Hormone (TSH)  - Comprehensive Metabolic Panel  - Vitamin D, Total (25-Hydroxy)  - Vitamin B12    3. Chronic low back pain without sciatica, unspecified back pain laterality  Has chronic back pains. Followed by ortho. Sent for physical therapy and still continuing to do it once a week. Ok to refill her gabapentin.   - gabapentin (NEURONTIN) 300 MG capsule; Take 2 capsules (600 mg total) by mouth at bedtime.  Dispense: 60 capsule; Refill: 3    4. Mild intermittent asthma without complication  In remission. ACT score is 25.     5. Traumatic tear of left rotator cuff, unspecified tear extent, sequela  Has left shoulder pains due to torn rotator cuff. Followed by ortho. Getting conservative treatment by physical therapy once a week.     6. Current mild episode of major depressive disorder without prior episode (H)  Has mild depression. Takes fluoxetine. PHQ 9 score today is 11. Advised I can increase her fluoxetine but wants to continue same dose of fluoxetine.     7.  Gastroesophageal reflux disease without esophagitis  Now controlled by omeprazole.     8. Pure hypercholesterolemia  Shows borderline increased lipids. Due for fasting lipids. Advised to fast next visit.     Has history of lymphoproliferative disorder which is indolent. She is followed by hematology, Dr Bucio. Her white cell count hovers between 40,000 to 47,000 and this remains stable. Her hematologist recommends no treatment for this. I expect her white cell count I ordered today will be increased.     Follow up in 4 weeks. Asked to fast on this visit.      History of Present Illness   This 65 y.o. old female is here for follow up. Was last seen in September 2019 for her preop prior to her right knee replacement. Now doing well with this. But has left shoulder pains due to torn rotator cuff and recurring low back pains due to osteoarthritis. Followed by ortho for these. Getting physical therapy for her left shoulder and low back. Has a few more sessions. Goes to her physical therapy. Still gets low back pains and left shoulder pains. Left should shows limited range of motion. Has mild depression currently takes fluoxetine. Had gerd which is now controlled by omeprazole. Has increased lipids specifically total cholesterol and borderline LDL. Overall, feels well.      Review of Systems   A 12 point comprehensive review of systems was negative except as noted..     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 Socially drinks alcohol.        Past Medical History   Patient Active Problem List   Diagnosis     Fibromyalgia     Esophageal Reflux      Vitamin D Deficiency     Intermittent asthma     Osteoarthritis     Prehypertension     Hyperlipidemia     Obesity     Prediabetes     Rotator cuff tear     Cervical spinal stenosis, C5-C6     Lymphoproliferative disorder (H)     Cord compression myelopathy (H)     Cervical nerve root compression     Major depression     Bilateral carpal tunnel syndrome     Osteoarthritis of right knee, unspecified osteoarthritis type       Past Surgical History  She has a past surgical history that includes pr lap,cholecystectomy; pr total abdom hysterectomy; Hysterectomy; Oophorectomy; XR Myelogram Cervical (2019); Joint replacement; Knee arthroscopy; Rotator cuff repair; Cervical discectomy (2017); Carpal tunnel release; Cervical disc arthroplasty; Carpal tunnel release; and pr total knee arthroplasty (Right, 2019).       Medications and Allergies   Current Outpatient Medications   Medication Sig     aspirin 81 MG EC tablet Take 81 mg by mouth daily.     calcium-vitamin D (CALCIUM-VITAMIN D) 500 mg(1,250mg) -200 unit per tablet Take 1 tablet by mouth 2 (two) times a day with meals.     FLUoxetine (PROZAC) 20 MG capsule Take 1 capsule by mouth once daily     gabapentin (NEURONTIN) 300 MG capsule Take 2 capsules (600 mg total) by mouth at bedtime.     magnesium 30 mg tablet Take 30 mg by mouth daily.     naproxen sodium (ALEVE) 220 MG tablet Take 440 mg by mouth 3 (three) times a day with meals.     omeprazole (PRILOSEC) 20 MG capsule Take 20 mg by mouth at bedtime.            No Known Allergies     Family and Social History   Family History   Problem Relation Age of Onset     Heart disease Mother      Cancer Father      Breast cancer Paternal Grandmother      Breast cancer Paternal Aunt         Social History     Tobacco Use     Smoking status: Former Smoker     Last attempt to quit: 2000     Years since quittin.5     Smokeless tobacco: Never Used   Substance Use Topics     Alcohol use: Yes     Comment:  "3-6     Drug use: No        Physical Exam       Physical Exam  /80   Pulse 80   Ht 5' 3\" (1.6 m)   Wt 208 lb (94.3 kg)   SpO2 97%   BMI 36.85 kg/m    General appearance: alert, appears stated age, cooperative and no distress  Head: Normocephalic, without obvious abnormality, atraumatic  Throat: lips, mucosa, and tongue normal; teeth and gums normal  Neck: no adenopathy, no carotid bruit, no JVD, supple, symmetrical, trachea midline and thyroid not enlarged, symmetric, no tenderness/mass/nodules  Lungs: clear to auscultation bilaterally  Heart: regular rate and rhythm, S1, S2 normal, no murmur, click, rub or gallop  Abdomen: soft, non-tender; bowel sounds normal; no masses,  no organomegaly  Extremities: extremities normal, atraumatic, no cyanosis or edema  Skin: Skin color, texture, turgor normal. No rashes or lesions  Musculoskeletal: tender lower back and left shoulder, limited abduction of the left shoulder     Additional Information   Post Discharge Medication Reconciliation Status: discharge medications reconciled, continue medications without change      Sreedhar Sidhu MD  Internal Medicine  Contact me at [phone]     Additional Information   Current Outpatient Medications   Medication Sig     aspirin 81 MG EC tablet Take 81 mg by mouth daily.     calcium-vitamin D (CALCIUM-VITAMIN D) 500 mg(1,250mg) -200 unit per tablet Take 1 tablet by mouth 2 (two) times a day with meals.     FLUoxetine (PROZAC) 20 MG capsule Take 1 capsule by mouth once daily     gabapentin (NEURONTIN) 300 MG capsule Take 2 capsules (600 mg total) by mouth at bedtime.     magnesium 30 mg tablet Take 30 mg by mouth daily.     naproxen sodium (ALEVE) 220 MG tablet Take 440 mg by mouth 3 (three) times a day with meals.     omeprazole (PRILOSEC) 20 MG capsule Take 20 mg by mouth at bedtime.            No Known Allergies  Social History     Tobacco Use     Smoking status: Former Smoker     Last attempt to quit: 2/18/2000     " Years since quittin.5     Smokeless tobacco: Never Used   Substance Use Topics     Alcohol use: Yes     Comment: 3-6     Drug use: No         Time: total time spent with the patient was 25 minutes of which >50% was spent in counseling and coordination of care

## 2021-06-11 ENCOUNTER — RECORDS - HEALTHEAST (OUTPATIENT)
Dept: ADMINISTRATIVE | Facility: OTHER | Age: 66
End: 2021-06-11

## 2021-06-11 ENCOUNTER — HOSPITAL ENCOUNTER (OUTPATIENT)
Dept: CT IMAGING | Facility: CLINIC | Age: 66
Discharge: HOME | End: 2021-06-11
Attending: INTERNAL MEDICINE
Payer: COMMERCIAL

## 2021-06-11 DIAGNOSIS — R16.1 SPLENOMEGALY: ICD-10-CM

## 2021-06-11 DIAGNOSIS — R59.0 ABDOMINAL LYMPHADENOPATHY: ICD-10-CM

## 2021-06-11 DIAGNOSIS — D47.9 ATYPICAL LYMPHOPROLIFERATIVE DISORDER (H): ICD-10-CM

## 2021-06-11 NOTE — PROGRESS NOTES
Office Visit - Follow Up   Skye Edwards   61 y.o. female    Date of Visit: 7/5/2017    Chief Complaint   Patient presents with     Peripheral Neuropathy     fell at work,        Assessment and Plan   1. Bilateral carpal tunnel syndrome  Has possible bilateral carpal tunnel syndrome.  Complains of tingling fingers which started on the second digit of the right hand followed by the second digit of the left hand in the last 2 or 3 months.  Numbness  progressed to involve both hands and wrists.  Now it is going up to her both arms.  Also has some intermittent pains.  Suspect she has bilateral carpal tunnel syndrome.  Will do EMG and then she will see her orthopedics Dr. Canas  of [facility].  Already followedfollowed by him for her osteoarthritis.  In fact she was just seen there 2 weeks ago for cortisone injection to her knee with relief of her knee pains.  - EMG; Future    2. Menopausal hot flushes  Complains of intermittent hot flashes occurring 2-3 times a day but for only 3 minutes at a time.  Already taking  fluoxetine 20 mg daily for her depression.  Advised to increase her fluoxetine to twice a day and see if this helps.  Will reassess in 1 month on her next follow-up.    3. Depression  Has depression.  Well controlled with fluoxetine.  Continue fluoxetine but increased the dose for her has osteoarthritis causing hot flashes.  - FLUoxetine (PROZAC) 20 MG capsule; Take 1 capsule (20 mg total) by mouth 2 (two) times a day.  Dispense: 90 capsule; Refill: 1    4. Osteoarthritis  Has aches and pains on multiple joints particularly knees and ankles.  Also has fibromyalgia causing diffuse muscle pains.  Relieved by ibuprofen.  Okay to get refill of ibuprofen  - ibuprofen (ADVIL,MOTRIN) 600 MG tablet; TAKE ONE TABLET BY MOUTH TWICE DAILY *MAX IBU DOSE IS 3200MG IN 24 HOURS*  Dispense: 60 tablet; Refill: 3    5. Cramp of both lower extremities  Complains of leg cramping intermittently.  Mostly during  the day and at night.  Describes this as restless leg which I think these are leg cramps.  Takes over-the-counter med for leg cramps which helps.  Advised to continue this med and will continue to observe this.  No need for prescription medication at this time.    Follow up in 4 weeks.      History of Present Illness   This 61 y.o. female was last seen for the first time in 2/ 2015 for preop prior to her left rotator cuff surgery.  Used to see Dr. Yang here.  No complaints of numbness on her fingers hands and arms.  Apparently started 2 or 3 months ago.  Uses her hands repeatedly.  Also complains of some pains with this numbness.  Has recurring diffuse aches and pains involving both multiple joints and muscles due to her osteoarthritis and fibromyalgia.  Takes ibuprofen which helps.  Complains of restless leg which is more of  leg cramps on her description.  Happens frequently during the day and not at night.  Has asthma but in remission.    Review of Systems   A 12 point comprehensive review of systems was negative except as noted..     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 Socially drinks alcohol.        Past Medical History   Patient Active Problem List   Diagnosis     Fibromyalgia     Esophageal Reflux     Vitamin D Deficiency     Intermittent Asthma     Osteoarthritis     Prehypertension     Hyperlipidemia     Obesity     Prediabetes     Rotator cuff tear       Past Surgical History  She has a past surgical history that includes lap,cholecystectomy; total abdom hysterectomy; Hysterectomy; and Oophorectomy.       Medications and Allergies   Current Outpatient Prescriptions   Medication Sig     albuterol (PROVENTIL HFA;VENTOLIN HFA) 90 mcg/actuation inhaler Inhale 2 puffs  every 6 (six) hours as needed for wheezing.     amoxicillin (AMOXIL) 500 MG tablet Take 2,000 mg by mouth once as needed. Prior to dental work     aspirin 81 MG EC tablet      FLUoxetine (PROZAC) 20 MG capsule Take 1 capsule (20 mg total) by mouth 2 (two) times a day.     ibuprofen (ADVIL,MOTRIN) 600 MG tablet TAKE ONE TABLET BY MOUTH TWICE DAILY *MAX IBU DOSE IS 3200MG IN 24 HOURS*     MULTIVITAMIN (MULTIPLE VITAMINS ORAL) Take 1 tablet by mouth daily.     omeprazole (PRILOSEC) 20 MG capsule Take 20 mg by mouth daily.     biotin 1 mg cap      calcium carbonate-vit D3-min (CALCIUM-VITAMIN D) 600 mg calcium- 400 unit Tab      DULoxetine (CYMBALTA) 60 MG capsule Take 1 capsule (60 mg total) by mouth daily.     FLUoxetine (PROZAC) 20 MG capsule TAKE ONE CAPSULE BY MOUTH ONCE DAILY     No Known Allergies     Family and Social History   Family History   Problem Relation Age of Onset     Heart disease Mother      Cancer Father      Breast cancer Paternal Grandmother      Breast cancer Paternal Aunt         Social History   Substance Use Topics     Smoking status: Former Smoker     Quit date: 2/18/2000     Smokeless tobacco: Never Used     Alcohol use Yes      Comment: Minimal social        Physical Exam       Physical Exam  /70  Pulse 76  Wt 206 lb 12 oz (93.8 kg)  BMI 36.05 kg/m2  General appearance: alert, appears stated age, cooperative and no distress  Head: Normocephalic, without obvious abnormality, atraumatic  Throat: lips, mucosa, and tongue normal; teeth and gums normal  Neck: no adenopathy, no carotid bruit, no JVD, supple, symmetrical, trachea midline and thyroid not enlarged, symmetric, no tenderness/mass/nodules  Lungs: clear to auscultation bilaterally  Heart: regular rate and rhythm, S1, S2 normal, no murmur, click, rub or gallop  Abdomen: soft, non-tender; bowel sounds normal; no masses,  no organomegaly  Extremities: extremities normal, atraumatic, no cyanosis or edema  Skin: Skin color, texture,  turgor normal. No rashes or lesions  Neurologic: Alert and oriented X 3, normal strength and tone. Normal symmetric reflexes. Normal coordination and gait  Musculoskeletal: Normal exam      Additional Information        Sreedhar Sidhu MD  Internal Medicine  Contact me at [phone]     Additional Information   Current Outpatient Prescriptions   Medication Sig     albuterol (PROVENTIL HFA;VENTOLIN HFA) 90 mcg/actuation inhaler Inhale 2 puffs every 6 (six) hours as needed for wheezing.     amoxicillin (AMOXIL) 500 MG tablet Take 2,000 mg by mouth once as needed. Prior to dental work     aspirin 81 MG EC tablet      FLUoxetine (PROZAC) 20 MG capsule Take 1 capsule (20 mg total) by mouth 2 (two) times a day.     ibuprofen (ADVIL,MOTRIN) 600 MG tablet TAKE ONE TABLET BY MOUTH TWICE DAILY *MAX IBU DOSE IS 3200MG IN 24 HOURS*     MULTIVITAMIN (MULTIPLE VITAMINS ORAL) Take 1 tablet by mouth daily.     omeprazole (PRILOSEC) 20 MG capsule Take 20 mg by mouth daily.     biotin 1 mg cap      calcium carbonate-vit D3-min (CALCIUM-VITAMIN D) 600 mg calcium- 400 unit Tab      DULoxetine (CYMBALTA) 60 MG capsule Take 1 capsule (60 mg total) by mouth daily.     FLUoxetine (PROZAC) 20 MG capsule TAKE ONE CAPSULE BY MOUTH ONCE DAILY     No Known Allergies  Social History   Substance Use Topics     Smoking status: Former Smoker     Quit date: 2/18/2000     Smokeless tobacco: Never Used     Alcohol use Yes      Comment: Minimal social         Time: total time spent with the patient was 25 minutes of which >50% was spent in counseling and coordination of care

## 2021-06-11 NOTE — PROGRESS NOTES
Assessment/Plan:      Diagnoses and all orders for this visit:    Numbness and tingling in both hands  -     MR Cervical Spine Without Contrast; Future; Expected date: 7/17/17  -     naproxen (NAPROSYN) 500 MG tablet; Take 1 tablet (500 mg total) by mouth 2 (two) times a day as needed (for pain.  Take with food.).  Dispense: 60 tablet; Refill: 0  -     MR Cervical Spine Without Contrast; Standing  -     MR Cervical Spine Without Contrast    Hyperreflexia  -     MR Cervical Spine Without Contrast; Future; Expected date: 7/17/17  -     naproxen (NAPROSYN) 500 MG tablet; Take 1 tablet (500 mg total) by mouth 2 (two) times a day as needed (for pain.  Take with food.).  Dispense: 60 tablet; Refill: 0  -     MR Cervical Spine Without Contrast; Standing  -     MR Cervical Spine Without Contrast    Falls frequently  -     MR Cervical Spine Without Contrast; Future; Expected date: 7/17/17  -     naproxen (NAPROSYN) 500 MG tablet; Take 1 tablet (500 mg total) by mouth 2 (two) times a day as needed (for pain.  Take with food.).  Dispense: 60 tablet; Refill: 0  -     MR Cervical Spine Without Contrast; Standing  -     MR Cervical Spine Without Contrast    Bilateral carpal tunnel syndrome  -     EMG; Standing  -     EMG        Assessment: Pleasant 61-year-old female with a history of depression with:    1.  Cervical thoracic junction pain with bilateral hand numbness and tingling rapidly progressive over the last 2-3 months.  She also has hyperreflexia.  The symptoms are most consistent with cervical myelopathy.  2.  Frequent falls.  3.  Question bilateral carpal tunnel.  From primary care provider.  Could have underlying carpal tunnel pathology however cervical symptoms are likely most significant issue for her.          Discussion:  1.  Discussed the diagnoses and treatment options.  She was referred for potential EMG of her upper extremities.  Given her significant myelopathic findings frequent falls and hyperreflexia, will  hold off on EMG for now.  We discussed options of proceeding with EMG versus further diagnostics.  2.  At this point I recommend MRI of the cervical spine to evaluate for spinal cord compression she agrees.  This will be ordered.  3.  Trial naproxen for some of her associated pain.  4.  We will contact by phone with results of imaging.  If her spinal cord is significantly compressed, would recommend neurosurgical evaluation.  Otherwise can see her back.  If there is no spinal cord compression will proceed with EMG as previously ordered.      It was our pleasure caring for your patient today, if there any questions or concerns please do not hesitate to contact us.      Subjective:   Patient ID: Skye Edwards is a 61 y.o. female.    History of Present Illness:Patient presents at the request of Dr. Sidhu for potential bilateral upper extremity EMG.  She has a 2-3 month history of numbness and tingling.  Started in the right hand digit to progress to both hands now up to the mid forearms.  She is having some cervical thoracic junction pain for the past 17 years which has been worsening and waxes and wanes.  This is not this early pain but a hot sensation.  She is having trouble with both arms becoming numb and tingly with activities such as driving.  She has poor balance and falls frequently.Walking down the halls she needs to use the wall for balance to prevent falls.  She is having restless leg symptoms over the past couple of months as well.  No recent illnesses fevers chills or sweats.  She is having some bowel irregularities but no bowel or bladder incontinence.  She works as a .      Imaging: No imaging of the cervical spine.      Review of Systems: No change in vision or hearing.  She has poor balance, she has bowel irregularities but no incontinence, frequent falls, restless legs with numbness and tingling in her hands.  Denies issues with her eyes, ears, chest, heart, abdomen.  She has some  reflux but takes omeprazole.  No recent illnesses fevers chills sweats.  Remainder of 12 point review systems negative unless listed above.    Past Medical History:   Diagnosis Date     Depression      GERD (gastroesophageal reflux disease)        Social history: She is a .    The following portions of the patient's history were reviewed and updated as appropriate: allergies, current medications, past family history, past medical history, past social history, past surgical history and problem list.      WHO 5: 14    NDI Score: 30      Objective:   Physical Exam:    Vitals:    07/17/17 1417   BP: 134/60   Pulse: 78       General:  Well-appearing female in no acute distress.  Pleasant, cooperative, and interactive throughout the examination and interview.  CV: No lower extremity edema on inspection or paltation.  Lymphatics: No cervical lymphadenopathy palpated.  Eyes: sclera clear.  Skin: No rashes or lesions seen .  Respirations unlabored.  MSK: Gait is cautious, mildly unsteady.    Increased sway on Romberg.  Spine: normal AP curves of the C, T, and L spine.  Generalized decreased cervical range of motion..  Palpation: T no significant tenderness palpation at that the cervical spine or thoracic spine.  Extremities: Full range of motion of the shoulders in abduction, elbows, and wrists with no effusions or tenderness to palpation.   Full range of motion of the  knees, and ankles from a seated position with no effusions . No hypermobility of the upper or lower extremities.  Neurologic exam: Mental status: Patient is alert and oriented with normal affect.  Attention, knowledge, memory, and language are intact.  Normal coordination throughout the examination.  Reflexes are 2+ and symmetric biceps, triceps, brachioradialis, 3+ left, 0 right patellar with crossed adductor reflex, and 2+Achilles with significant positive Miller's and equivocal toes.  Sensation is decreased to light touch bilateral upper  extremities to the mid forearms intact in the lower extremities.   Manual muscle testing reveals 5 out of 5 strength in the shoulder abductors, elbow flexors/extensors, wrist extensors, interosseous, and finger flexors; lower extremity strength appears grossly normal.   Normal muscle bulk and tone in the arms and legs.

## 2021-06-12 NOTE — PROGRESS NOTES
Office Visit - Follow Up   Skye Edwards   61 y.o. female    Date of Visit: 7/26/2017    Chief Complaint   Patient presents with     Suture / Staple Removal        Assessment and Plan   1. Laceration, scap  Incurred a laceration on the left side of the scalp after a fall hitting her head on the ground on 7/19/2017.  Was seen in the ER and laceration was sutured with 11 staples..  Scalp laceration is now healed.  Will remove sutures.  11 staple sutures were removed without difficulty.         History of Present Illness   This 61 y.o. old female is here for removal of the staple suture on the topmost left side of her scalp.  Incurred laceration this area after a fall.  Was seen in the ER and was sutured by by staple.  Now it is healed.  Needs to have the sutures removed.  Overall doing well.  No complaints.    Review of Systems   A 12 point comprehensive review of systems was negative except as noted..     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        Past Medical History   Patient Active Problem List   Diagnosis     Fibromyalgia     Esophageal Reflux     Vitamin D Deficiency     Intermittent Asthma     Osteoarthritis     Prehypertension     Hyperlipidemia     Obesity     Prediabetes     Rotator cuff tear     Cervical spinal stenosis, C5-C6       Past Surgical History  She has a past surgical history that includes lap,cholecystectomy; total abdom hysterectomy; Hysterectomy; and Oophorectomy.       Medications and Allergies   Current Outpatient Prescriptions   Medication Sig     albuterol (PROVENTIL HFA;VENTOLIN HFA) 90 mcg/actuation inhaler Inhale 2 puffs every 6 (six) hours as needed for wheezing.     amoxicillin (AMOXIL) 500 MG tablet Take 2,000 mg by mouth  once as needed. Prior to dental work     aspirin 81 MG EC tablet      biotin 1 mg cap      calcium carbonate-vit D3-min (CALCIUM-VITAMIN D) 600 mg calcium- 400 unit Tab      DULoxetine (CYMBALTA) 60 MG capsule Take 1 capsule (60 mg total) by mouth daily.     FLUoxetine (PROZAC) 20 MG capsule TAKE ONE CAPSULE BY MOUTH ONCE DAILY     FLUoxetine (PROZAC) 20 MG capsule Take 1 capsule (20 mg total) by mouth 2 (two) times a day.     ibuprofen (ADVIL,MOTRIN) 600 MG tablet TAKE ONE TABLET BY MOUTH TWICE DAILY *MAX IBU DOSE IS 3200MG IN 24 HOURS*     MULTIVITAMIN (MULTIPLE VITAMINS ORAL) Take 1 tablet by mouth daily.     naproxen (NAPROSYN) 500 MG tablet Take 1 tablet (500 mg total) by mouth 2 (two) times a day as needed (for pain.  Take with food.).     omeprazole (PRILOSEC) 20 MG capsule Take 20 mg by mouth daily.     No Known Allergies     Family and Social History   Family History   Problem Relation Age of Onset     Heart disease Mother      Cancer Father      Breast cancer Paternal Grandmother      Breast cancer Paternal Aunt         Social History   Substance Use Topics     Smoking status: Former Smoker     Quit date: 2/18/2000     Smokeless tobacco: Never Used     Alcohol use Yes      Comment: Minimal social        Physical Exam       Physical Exam  There were no vitals taken for this visit.  General appearance: alert, appears stated age, cooperative, no distress and pleasant  Head: Normocephalic, without obvious abnormality, Left side topmost scalp 11 s staple sutures were removed  Lungs: clear to auscultation bilaterally  Heart: regular rate and rhythm, S1, S2 normal, no murmur, click, rub or gallop  Abdomen: soft, non-tender; bowel sounds normal; no masses,  no organomegaly  Extremities: extremities normal, atraumatic, no cyanosis or edema     Additional Information        Sreedhar Sidhu MD  Internal Medicine  Contact me at [phone]     Additional Information   Current Outpatient Prescriptions   Medication  Sig     albuterol (PROVENTIL HFA;VENTOLIN HFA) 90 mcg/actuation inhaler Inhale 2 puffs every 6 (six) hours as needed for wheezing.     amoxicillin (AMOXIL) 500 MG tablet Take 2,000 mg by mouth once as needed. Prior to dental work     aspirin 81 MG EC tablet      biotin 1 mg cap      calcium carbonate-vit D3-min (CALCIUM-VITAMIN D) 600 mg calcium- 400 unit Tab      DULoxetine (CYMBALTA) 60 MG capsule Take 1 capsule (60 mg total) by mouth daily.     FLUoxetine (PROZAC) 20 MG capsule TAKE ONE CAPSULE BY MOUTH ONCE DAILY     FLUoxetine (PROZAC) 20 MG capsule Take 1 capsule (20 mg total) by mouth 2 (two) times a day.     ibuprofen (ADVIL,MOTRIN) 600 MG tablet TAKE ONE TABLET BY MOUTH TWICE DAILY *MAX IBU DOSE IS 3200MG IN 24 HOURS*     MULTIVITAMIN (MULTIPLE VITAMINS ORAL) Take 1 tablet by mouth daily.     naproxen (NAPROSYN) 500 MG tablet Take 1 tablet (500 mg total) by mouth 2 (two) times a day as needed (for pain.  Take with food.).     omeprazole (PRILOSEC) 20 MG capsule Take 20 mg by mouth daily.     No Known Allergies  Social History   Substance Use Topics     Smoking status: Former Smoker     Quit date: 2/18/2000     Smokeless tobacco: Never Used     Alcohol use Yes      Comment: Minimal social         Time: total time spent with the patient was 15 minutes of which >50% was spent in counseling and coordination of care

## 2021-06-12 NOTE — PROGRESS NOTES
Office Visit - Follow Up   Skye Edwards   61 y.o. female    Date of Visit: 7/24/2017    Chief Complaint   Patient presents with     Follow-up     [facility], hit head,Left side of face        Assessment and Plan   1. Fall  Fell on 7/19/2017 of work.  Apparently her foot got stuck on the linoleum.  Fell on her left side.  Incurred laceration on left top as part of the skull.  Also had contusion of the left face.  Was seen at [facility] ER on the same day.  Was worked up.  Had normal head CT and EKG.  Laceration was sutured.  Will need removal of the suture in 2 days.    2. Laceration of scalp  Status post laceration.  Now almost healed.  Will come back in 2 days for removal of suture.    3. Contusion  Has residual black and blue appearance of the left eye due to resolving hematoma..  No additional treatment needed.    4. Cervical spinal stenosis, C5-C6  Found to have cervical spinal stenosis C5 and C6 causing radiculopathy.  Will see neurosurgery Dr. Cheyenne Mcintosh in a few days.    Reviewed [facility] ER MD notes and her workups including head CT, EKG and labs.  Also reviewed her cervical neck MRI ordered by the spine care clinic.  All these were discussed with the patient.    Work slip was given to her that she can go back to work starting July 25, 2017 without restrictions.    Follow up in 2 days for suture removal.       History of Present Illness   This 61 y.o. old female here for ER follow-up.  Fell at work when her left foot caught on the linoleum.  Fell on her left side.  Incurred laceration on the left topmost part of the scalp.  Also had contusion of the left side of the face.  Evaluated at [facility] ER  Workups were negative including head CT, EKG other labs.  Laceration was sutured.  Now almost healed.  Has received well black and blue appearance of the left lower lead from her fall due to contusion and resolving hematoma.  Has some mild  finger numbness.  Evaluated at the spine care clinic.  Had a cervical MRI which showed cervical spinal stenosis of C5-C6 causing radiculopathy.  Was referred to neurosurgery and will see Dr. Mcintosh in few days.  Needs a work slip for her to return to work as a  In a nursing home.    Review of Systems   A 12 point comprehensive review of systems was negative except as noted..     Medications, Allergies and Problem List   Reviewed and updated             Chief Complaint   Follow-up ([facility], hit head,Left side of face)       Patient Profile   Social History     Social History Narrative    , 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 Socially drinks alcohol.        Past Medical History   Patient Active Problem List   Diagnosis     Fibromyalgia     Esophageal Reflux     Vitamin D Deficiency     Intermittent Asthma     Osteoarthritis     Prehypertension     Hyperlipidemia     Obesity     Prediabetes     Rotator cuff tear       Past Surgical History  She has a past surgical history that includes lap,cholecystectomy; total abdom hysterectomy; Hysterectomy; and Oophorectomy.       Medications and Allergies   Current Outpatient Prescriptions   Medication Sig     albuterol (PROVENTIL HFA;VENTOLIN HFA) 90 mcg/actuation inhaler Inhale 2 puffs every 6 (six) hours as needed for wheezing.     amoxicillin (AMOXIL) 500 MG tablet Take 2,000 mg by mouth once as needed. Prior to dental work     aspirin 81 MG EC tablet      biotin 1 mg cap      calcium carbonate-vit D3-min (CALCIUM-VITAMIN D) 600 mg calcium- 400 unit Tab      DULoxetine (CYMBALTA) 60 MG capsule Take 1 capsule (60 mg total) by mouth daily.     FLUoxetine (PROZAC) 20 MG capsule Take 1 capsule (20 mg total) by mouth 2 (two) times a day.     ibuprofen (ADVIL,MOTRIN) 600 MG tablet TAKE ONE TABLET BY MOUTH TWICE DAILY *MAX IBU DOSE IS 3200MG IN 24 HOURS*     MULTIVITAMIN (MULTIPLE VITAMINS ORAL) Take 1 tablet by mouth daily.     naproxen  (NAPROSYN) 500 MG tablet Take 1 tablet (500 mg total) by mouth 2 (two) times a day as needed (for pain.  Take with food.).     omeprazole (PRILOSEC) 20 MG capsule Take 20 mg by mouth daily.     FLUoxetine (PROZAC) 20 MG capsule TAKE ONE CAPSULE BY MOUTH ONCE DAILY     No Known Allergies     Family and Social History   Family History   Problem Relation Age of Onset     Heart disease Mother      Cancer Father      Breast cancer Paternal Grandmother      Breast cancer Paternal Aunt         Social History   Substance Use Topics     Smoking status: Former Smoker     Quit date: 2/18/2000     Smokeless tobacco: Never Used     Alcohol use Yes      Comment: Minimal social        Physical Exam       Physical Exam  /60  Pulse 64  Wt 203 lb 8 oz (92.3 kg)  BMI 35.48 kg/m2  General appearance: alert, appears stated age, cooperative and no distress  Head: Normocephalic, without obvious abnormality, atraumatic  Eyes: conjunctivae/corneas clear. PERRL, EOM's intact. Fundi benign.  Throat: lips, mucosa, and tongue normal; teeth and gums normal  Neck: no adenopathy, no carotid bruit, no JVD, supple, symmetrical, trachea midline and thyroid not enlarged, symmetric, no tenderness/mass/nodules  Lungs: clear to auscultation bilaterally  Heart: regular rate and rhythm, S1, S2 normal, no murmur, click, rub or gallop  Abdomen: soft, non-tender; bowel sounds normal; no masses,  no organomegaly  Extremities: extremities normal, atraumatic, no cyanosis or edema  Skin: Skin color, texture, turgor normal. No rashes or lesions  Neurologic: Alert and oriented X 3, normal strength and tone. Normal symmetric reflexes. Normal coordination and gait  Musculoskeletal: Received well contusion on the left lower lead and left shoulder manifesting as resolving hematoma      Additional Information        Sreedhar Sidhu MD  Internal Medicine  Contact me at [phone]     Additional Information   Current Outpatient Prescriptions   Medication Sig      albuterol (PROVENTIL HFA;VENTOLIN HFA) 90 mcg/actuation inhaler Inhale 2 puffs every 6 (six) hours as needed for wheezing.     amoxicillin (AMOXIL) 500 MG tablet Take 2,000 mg by mouth once as needed. Prior to dental work     aspirin 81 MG EC tablet      biotin 1 mg cap      calcium carbonate-vit D3-min (CALCIUM-VITAMIN D) 600 mg calcium- 400 unit Tab      DULoxetine (CYMBALTA) 60 MG capsule Take 1 capsule (60 mg total) by mouth daily.     FLUoxetine (PROZAC) 20 MG capsule Take 1 capsule (20 mg total) by mouth 2 (two) times a day.     ibuprofen (ADVIL,MOTRIN) 600 MG tablet TAKE ONE TABLET BY MOUTH TWICE DAILY *MAX IBU DOSE IS 3200MG IN 24 HOURS*     MULTIVITAMIN (MULTIPLE VITAMINS ORAL) Take 1 tablet by mouth daily.     naproxen (NAPROSYN) 500 MG tablet Take 1 tablet (500 mg total) by mouth 2 (two) times a day as needed (for pain.  Take with food.).     omeprazole (PRILOSEC) 20 MG capsule Take 20 mg by mouth daily.     FLUoxetine (PROZAC) 20 MG capsule TAKE ONE CAPSULE BY MOUTH ONCE DAILY     No Known Allergies  Social History   Substance Use Topics     Smoking status: Former Smoker     Quit date: 2/18/2000     Smokeless tobacco: Never Used     Alcohol use Yes      Comment: Minimal social         Time: total time spent with the patient was 40 minutes of which >50% was spent in counseling and coordination of care

## 2021-06-12 NOTE — ANESTHESIA CARE TRANSFER NOTE
Pt breathing spontaneously, follows commands, suctioned extubated. Spontaneous respirations with SFM to PACU. VSS.    Last vitals:   Vitals:    08/02/17 1634   BP: 164/73   Pulse: 80   Resp: 12   Temp: 37.1  C (98.8  F)   SpO2: 99%     Patient's level of consciousness is drowsy  Spontaneous respirations: yes  Maintains airway independently: yes  Dentition unchanged: yes  Oropharynx: oropharynx clear of all foreign objects    QCDR Measures:  ASA# 20 - Surgical Safety Checklist: WHO surgical safety checklist completed prior to induction  PQRS# 430 - Adult PONV Prevention: 4558F - Pt received => 2 anti-emetic agents (different classes) preop & intraop  ASA# 8 - Peds PONV Prevention: NA - Not pediatric patient, not GA or 2 or more risk factors NOT present  PQRS# 424 - Jojo-op Temp Management: 4559F - At least one body temp DOCUMENTED => 35.5C or 95.9F within required timeframe  PQRS# 426 - PACU Transfer Protocol: - Transfer of care checklist used  ASA# 14 - Acute Post-op Pain: ASA14B - Patient did NOT experience pain >= 7 out of 10

## 2021-06-12 NOTE — PROGRESS NOTES
NEUROSURGERY CONSULTATION NOTE:    Assessment:  Spinal cord compression, severe, with myelomalacia, C5-6    Plan: Due to the severity of her spinal cord compression, discal therapy would be dangerous.  She is having falls and at significant risk of quadriplegia with each of these falls.  She was just hospitalized at [facility] last week for a fall leaving her with a black eye and cuts on her head.  CT scan of the head was negative for subdural hematoma.  She was sent to me by Dr. Len Chow from the spine center because he felt there was no role for conservative management in this situation.  He has bilateral hand numbness, weakness in the proximal lower extremities, hyperreflexia, positive Babinski and positive Miller's.  I reviewed the risks and benefits of Mobi C disc arthroplasty.  She may need an ANNE MARIE fusion.  I will not know this for sure, until I am in the operating room and looking in the disc space.  I have told her this is urgent and in the meantime I am going to place her on steroids for protection.  He is to use her cane full-time to hopefully prevent another fall.  Time spent in this evaluation, interpretation of imaging, coronation of care, documentation and face-to-face discussion was 45 minutes.    Skye SHANI Edwards   [address] [address]  61 y.o. female is sent to me in consultation   by Sreedhar Sidhu MD     CC:    Chief Complaint   Patient presents with     Arm Pain     bilateral        HPI: Neurosurgery consultation was requested by: Dr. Len Chow  Pain:Denies neck pain   Radicular Pain is present: Pain in both hands and goes up the forearms.   Lhermitte sign: No  Motor complaints: Weakness in both hands, left worse  Sensory complaints: Tingling in both hands on and off   Gait and balance issues: Yes, using cane   Bowel or bladder issues: Denies   Duration of SX is: 3 months   The symptoms are worse with: Constant   The symptoms are better with: Resting    Injury: Denies   Severity is: Severe  Patient has tried the following conservative measures: None     PROBLEM LIST:  Cord myelomalacia, C5-6    REVIEW OF SYSTEMS:  A 12 point review is completed.  She has a black left eye from a fall last Wednesday.  No history of diabetes.  Does suffer from depression at times.  The neurological review as above.      Past Medical History:   Diagnosis Date     Depression      GERD (gastroesophageal reflux disease)          Past Surgical History:   Procedure Laterality Date     HYSTERECTOMY       OOPHORECTOMY       [place] LAP,CHOLECYSTECTOMY      Description: Cholecystectomy Laparoscopic;  Recorded: 12/10/2012;     [place] TOTAL ABDOM HYSTERECTOMY      Description: Total Abdominal Hysterectomy;  Recorded: 12/10/2012;  Comments: 46 Y/O FOR FIBROID TUMORS         MEDICATIONS:  Current Outpatient Prescriptions   Medication Sig Dispense Refill     albuterol (PROVENTIL HFA;VENTOLIN HFA) 90 mcg/actuation inhaler Inhale 2 puffs every 6 (six) hours as needed for wheezing. 1 Inhaler 1     aspirin 81 MG EC tablet        DULoxetine (CYMBALTA) 60 MG capsule Take 1 capsule (60 mg total) by mouth daily. 90 capsule 1     FLUoxetine (PROZAC) 20 MG capsule TAKE ONE CAPSULE BY MOUTH ONCE DAILY 90 capsule 0     ibuprofen (ADVIL,MOTRIN) 600 MG tablet TAKE ONE TABLET BY MOUTH TWICE DAILY *MAX IBU DOSE IS 3200MG IN 24 HOURS* 60 tablet 3     MULTIVITAMIN (MULTIPLE VITAMINS ORAL) Take 1 tablet by mouth daily.       naproxen (NAPROSYN) 500 MG tablet Take 1 tablet (500 mg total) by mouth 2 (two) times a day as needed (for pain.  Take with food.). 60 tablet 0     omeprazole (PRILOSEC) 20 MG capsule Take 20 mg by mouth daily.       amoxicillin (AMOXIL) 500 MG tablet Take 2,000 mg by mouth once as needed. Prior to dental work       No current facility-administered medications for this visit.          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         FAMILY HISTORY:  Family History   Problem Relation Age of Onset     Heart disease Mother      Cancer Father      Breast cancer Paternal Grandmother      Breast cancer Paternal Aunt         PHYSICAL EXAM:   Constitutional: /69  Pulse 89  Ht 5' 3.5\" (1.613 m)  Wt 203 lb (92.1 kg)  SpO2 97%  BMI 35.4 kg/m2    General appearance: Appropriately groomed.  No acute distress.  Interactive.  She has a black left eye and cuts on the left side of her skull.     Mental Status: Mental status: Alert and oriented, mood and affect appropriate, language reception and expression normal, recent and remote memory is normal, higher cortical function normal. Attention span, concentration and ability to follow commands is normal.       Cranial Nerves: Face is symmetric.  Extraocular movements are full, conjugate and without nystagmus.  Hearing is preserved.  Shoulder position is symmetric.  Tongue is midline with normal motion.       Motor: Motor exam with bilateral  weakness.  She has proximal hip weakness where she has to push herself out of the chair. Tone nl, bulk nl and strength 5/5 all other groups.      Sensory: She has a glove loss of sensation in both upper extremities.  No numbness of the feet.     Station and Gait: Very unstable with short steps.  She usually holds on to her cane.     Reflexes; marked hyperreflexia with spreading of reflexes.  Positive Miller's.  Babinski +    IMAGING:  I have personally reviewed the " images and discussed the findings with Skye Edwards.  She has a disc osteophyte complex at C5-6 that severely compresses her cervical spinal cord.  She has T2 changes within the spinal cord at this level.  She has other diffuse degenerative disease, as expected for age.    CC:     Len Chow, [id number] [place] [address]

## 2021-06-12 NOTE — ANESTHESIA PREPROCEDURE EVALUATION
Anesthesia Evaluation      Patient summary reviewed     Airway   Mallampati: II  Neck ROM: limited   Pulmonary - normal exam   (+) asthma                           Cardiovascular - negative ROS and normal exam  Rhythm: regular  Rate: normal,         Neuro/Psych      Comments: Cervical stenosis with cord compression      Endo/Other    (+) diabetes mellitus (pre diabetes),      GI/Hepatic/Renal    (+) GERD,             Dental - normal exam                 Chemistry        Component Value Date/Time     07/31/2017 1115    K 3.7 07/31/2017 1115     07/31/2017 1115    CO2 24 07/31/2017 1115    BUN 22 07/31/2017 1115    CREATININE 0.71 07/31/2017 1115     (H) 07/31/2017 1115        Component Value Date/Time    CALCIUM 9.7 07/31/2017 1115    ALKPHOS 103 05/06/2014 1433    AST 19 05/06/2014 1433    ALT 29 05/06/2014 1433    BILITOT 0.53 05/06/2014 1433        Lab Results   Component Value Date    WBC 40.7 (HH) 08/01/2017    HGB 12.4 08/01/2017    HCT 36.7 08/01/2017    MCV 91 08/01/2017     08/01/2017                Anesthesia Plan  Planned anesthetic: general endotracheal    ASA 2   Induction: intravenous   Anesthetic plan and risks discussed with: patient  Anesthesia plan special considerations: video-assisted,   Post-op plan: routine recovery

## 2021-06-12 NOTE — TELEPHONE ENCOUNTER
Refill Approved    Rx renewed per Medication Renewal Policy. Medication was last renewed on 07/05/2020.  Last office visit was 08/27/2020 with PCP.    Mildred Caicedo, [facility] Triage/Med Refill 10/7/2020     Requested Prescriptions   Pending Prescriptions Disp Refills     FLUoxetine (PROZAC) 20 MG capsule [Pharmacy Med Name: FLUoxetine HCl 20 MG Oral Capsule] 90 capsule 0     Sig: Take 1 capsule by mouth once daily       SSRI Refill Protocol  Passed - 10/5/2020  5:30 AM        Passed - PCP or prescribing provider visit in last year     Last office visit with prescriber/PCP: 8/27/2020 Sreedhar Sidhu MD OR same dept: 8/27/2020 Sreedhar Sidhu MD OR same specialty: 8/27/2020 Sreedhar Sidhu MD  Last physical: 9/5/2019 Last MTM visit: Visit date not found   Next visit within 3 mo: Visit date not found  Next physical within 3 mo: Visit date not found  Prescriber OR PCP: Sreedhar Sidhu MD  Last diagnosis associated with med order: 1. Depression  - FLUoxetine (PROZAC) 20 MG capsule [Pharmacy Med Name: FLUoxetine HCl 20 MG Oral Capsule]; Take 1 capsule by mouth once daily  Dispense: 90 capsule; Refill: 0    If protocol passes may refill for 12 months if within 3 months of last provider visit (or a total of 15 months).

## 2021-06-12 NOTE — ANESTHESIA POSTPROCEDURE EVALUATION
Patient: Skye MEDLEY DISC ARTHROPLASTY C5-6   Anesthesia type: general    Patient location: Phase II Recovery  Last vitals:   Vitals:    08/02/17 1715   BP: 145/66   Pulse: 80   Resp: 12   Temp:    SpO2: 97%     Post vital signs: stable  Level of consciousness: awake and responds to simple questions  Post-anesthesia pain: pain controlled  Post-anesthesia nausea and vomiting: no  Pulmonary: unassisted, return to baseline  Cardiovascular: stable and blood pressure at baseline  Hydration: adequate  Anesthetic events: no    QCDR Measures:  ASA# 11 - Jojo-op Cardiac Arrest: ASA11B - Patient did NOT experience unanticipated cardiac arrest  ASA# 12 - Jojo-op Mortality Rate: ASA12B - Patient did NOT die  ASA# 13 - PACU Re-Intubation Rate: ASA13B - Patient did NOT require a new airway mgmt  ASA# 10 - Composite Anes Safety: ASA10A - No serious adverse event  ASA# 38 - New Corneal Injury: ASA38A - No new exposure keratitis or corneal abrasion in PACU    Additional Notes:

## 2021-06-12 NOTE — PROGRESS NOTES
Neurosurgery consultation was requested by: Dr. Len Chow  Pain:Denies neck pain   Radicular Pain is present: Pain in both hands and goes up the forearms.   Lhermitte sign: No  Motor complaints: Weakness in both hands, left worse  Sensory complaints: Tingling in both hands on and off   Gait and balance issues: Yes, using cane   Bowel or bladder issues: Denies   Duration of SX is: 3 months   The symptoms are worse with: Constant   The symptoms are better with: Resting   Injury: Denies   Severity is: Severe  Patient has tried the following conservative measures: None   JShowen,CMA

## 2021-06-12 NOTE — PROGRESS NOTES
Pt is here for a wound check s/p MOBI C DISC ARTHROPLASTY C5-6 on 8-2-17 by Dr. Mcintosh.   She presented to surgery with gait disturbance and falls. She also had numbness in hand which Dr. Mcintosh told her might be carpal tunnel syndrome.   Today, she reports she is getting stronger every day. She is not using her cane as much, denies falls, feels more stable. Numbness in hands is better. Restless legs is gone.  Initially post op, she had swallowing issues as expected, but swallowing now back to normal. Still a little hoarse.     Surgical wound WNL, dermabond in place- CDI, no signs of infection or skin breakdown.  Incision healing well: good skin approximation, no redness or visible/palpable edema, no tenderness to palpation.  PT. AF, denies fever, chills or sweats.  Pt. reports that the symptoms are improved from pre-op.    Nika Leyva

## 2021-06-12 NOTE — PROGRESS NOTES
CHART NOTE     DATE OF SERVICE:  2017     : 1955   62 y.o.     ASSESSMENT :   Doing well with improvement in symptoms and function.       PLAN:  Loosen restrictions. Refer to PT. RTC prn. Enc to call with any new questions or concerns.     HPI:  Skye Edwards is status post MOBI C DISC ARTHROPLASTY C5-6 on 17 by Dr. Mcintosh.  Presented to surgery with gait disturbance and falls. Hyperreflexic on exam.  Also c/o numbness in hand which might be carpal tunnel syndrome.      TODAY, Skye Edwards reports feels that her balance is improving at least 20% better. Has fallen once but due to getting foot caught in a fence while gardening.  Legs and arms still feel weak.  Numbness in hands--has EMG ordered per Dr. Sidhu. Patient is back to work as a  at [employer].      PAST MEDICAL HISTORY, SURGICAL HISTORY, REVIEW OF SYMPTOMS, MEDICATIONS AND ALLERGIES:  Past medical history, surgical history, ROS, medications and allergies reviewed with patient and remain unchanged from previous visit.    Past Medical History:   Diagnosis Date     Asthma      Cervical spinal stenosis      Depression      Fibromyalgia      GERD (gastroesophageal reflux disease)      Lymphoproliferative disorder      Obesity      Osteoarthritis      Rotator cuff tear      Vitamin D deficiency        PHYSICAL EXAM:    /78  Pulse 77  Resp 16  SpO2 97%    Neurological exam reveals:  Respirations easy, non-labored.   Skin: W/D/I. No rashes, lesions or breaks in integrity.   Recent and remote memory intact, fund of knowledge wnl.    Alert and oriented x3, speech fluent and appropriate.   PERRL, EOMI, No nystagmus,   Face symmetric, tongue midline, Uvula midline,  palate rises with phonation   Shoulder shrug equal  Arm strength bilateral grasp, biceps, triceps, and deltoids 5/5   Leg strength bilateral dorsiflexion, plantar flexion, and hip flexion 5/5  No extremity edema noted.   Can heel/toe walk, do toe  rises and squats without difficulty.   Muscle Bulk and tone wnl.   Reflexes: Maximilian Miller's, mild spreading of reflexes.   Gait and station:Normal, little unsteady tandem gait but can maintain stride.   Incision: CDI without erythema or edema  NDI/JUDITH: 20%     RADIOGRAPHIC IMAGING: XR:   Intervertebral disc prosthesis at C5-C6. Stable 2 mm spondylolisthesis of C5 on C6. Sagittal alignment is otherwise within normal limits. Vertebral body heights are maintained. No prevertebral soft tissue edema. Moderate facet arthropathy at C5-C6. Mild facet arthropathy at C4-C5 and C6-C7. Intravertebral disc spaces are otherwise maintained.      Films personally reviewed and interpreted.  Also reviewed and discussed with Dr. Mcintosh.   Reviewed imaging with patient and family.

## 2021-06-12 NOTE — PROGRESS NOTES
Preoperative Consultation   Skye Edwards   61 y.o.  female    Date of visit: 7/31/2017  Physician: Sreedhar Sidhu MD    This is a preoperative consultation requested by Dr. Cheyenne Mcintosh  in preparation for C5-C6 disc arthroplasty on 8 2/2017 at [facility], fax [phone].       Assessment and Plan   Skye Edwards was seen in preoperative consultation in preparation for her C5-C6 this arthroplasty.  This is a low risk surgery and the patient has no increased risk for major cardiac complications.       1. Preop exam for internal medicine    2. Cervical spinal stenosis, C5-C6    3. Lymphoproliferative disorder    4. Intermittent asthma      Recommendations:  1.  Okay to proceed with her surgery.  No contraindications.  2.  Will give herself 2 puffs of albuterol an hour before her surgery.  Okay to take her  fluoxetine on the morning for surgery with sips of water.    Addendum:  Discussed with her hematologist, Dr. Bucio. Has lymphoproliferative disorder. Recommended getting differential on her increased white cell count of 34.9.  Differential shows absolute neutrophil count of  7.7 and absolute lymphocyte count of 30.5 which are in the range he wanted. Hence she is cleared to go ahead with her surgery. Expect her white cell count to increase post surgery.      Patient Profile   Social History     Social History Narrative    , 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 Socially drinks alcohol.        Past Medical History   Patient Active Problem List   Diagnosis     Fibromyalgia     Esophageal Reflux     Vitamin D Deficiency     Intermittent asthma     Osteoarthritis     Prehypertension     Hyperlipidemia     Obesity     Prediabetes     Rotator cuff tear     Cervical spinal stenosis, C5-C6     Lymphoproliferative disorder       Past Surgical History  She has a past surgical history that includes lap,cholecystectomy; total abdom hysterectomy;  Hysterectomy; and Oophorectomy.     History of Present Illness   This 61 y.o. old female here for her preop history and physical.  Found to have C5-C6 compression with severe myelomalacia after having some hand numbness.  Does not have neck pains.  Was referred initially to the [facility] care clinic for treatment.  Was thought to have this problem so she underwent MRI which showed C5-C6 spinal stenosis.  Was referred to Dr. Cheyenne Mcintosh and was recommended to undergo C5-C6 disc arthroplasty.  Overall, she is doing well.  Denies chest pain and shortness of breath.  Denies urinary and bowel symptoms.  Denies dizziness and lightheadedness.  Has lymphoproliferative disorder followed by hematology Dr. De La Torre and was last seen in on May 30, 2017.  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  Follows with hematology every 6 months and will get CAT scan of the chest, abdomen and pelvis.    Recent antiplatelet use: yes Already  stopped her naproxen and ibuprofen.  Personal or family history of bleeding or clotting disorders: no  Steroid use in the past year: no  Personal or family history of difficulty with anesthesia: no  Current cardiopulmonary symptoms: no    Review of Systems: A comprehensive review of systems was negative except as noted.     Medications and Allergies   Current Outpatient Prescriptions   Medication Sig Dispense Refill     albuterol (PROVENTIL HFA;VENTOLIN HFA) 90 mcg/actuation inhaler Inhale 2 puffs every 6 (six) hours as needed for wheezing. 1 Inhaler 1     amoxicillin (AMOXIL) 500 MG tablet Take 2,000 mg by mouth once as needed. Prior to dental work       aspirin 81 MG EC tablet        FLUoxetine (PROZAC) 20 MG capsule TAKE ONE CAPSULE BY MOUTH ONCE DAILY 90 capsule 0     ibuprofen (ADVIL,MOTRIN) 600 MG tablet TAKE ONE TABLET BY MOUTH TWICE DAILY *MAX IBU DOSE IS 3200MG IN 24 HOURS* 60 tablet 3     MULTIVITAMIN (MULTIPLE VITAMINS ORAL) Take 1 tablet by mouth daily.       naproxen  "(NAPROSYN) 500 MG tablet Take 1 tablet (500 mg total) by mouth 2 (two) times a day as needed (for pain.  Take with food.). 60 tablet 0     omeprazole (PRILOSEC) 20 MG capsule Take 20 mg by mouth daily.       DULoxetine (CYMBALTA) 60 MG capsule Take 1 capsule (60 mg total) by mouth daily. 90 capsule 1     No current facility-administered medications for this visit.      No Known Allergies     Family and Social History   Family History   Problem Relation Age of Onset     Heart disease Mother      Cancer Father      Breast cancer Paternal Grandmother      Breast cancer Paternal Aunt         Social History   Substance Use Topics     Smoking status: Former Smoker     Quit date: 2/18/2000     Smokeless tobacco: Never Used     Alcohol use Yes      Comment: Minimal social        Physical Exam   General Appearance:   Alert, oriented, pleasant, comfortable and not in acute distress or in pain.    /62  Pulse 68  Ht 5' 3.5\" (1.613 m)  Wt 203 lb (92.1 kg)  BMI 35.4 kg/m2    EYES: Eyelids, conjunctiva, and sclera were normal. Pupils were normal. Cornea, iris, and lens were normal bilaterally.  HEAD, EARS, NOSE, MOUTH, AND THROAT: Head and face were normal. Hearing was normal to voice and the ears were normal to external exam. Nose appearance was normal and there was no discharge. Oropharynx was normal.  NECK: Neck appearance was normal. There were no neck masses and the thyroid was not enlarged.  RESPIRATORY: Breathing pattern was normal and the chest moved symmetrically.  Percussion/auscultatory percussion was normal.  Lung sounds were normal and there were no abnormal sounds.  CARDIOVASCULAR: Heart rate and rhythm were normal.  S1 and S2 were normal and there were no extra sounds or murmurs. Peripheral pulses in arms and legs were normal.  Jugular venous pressure was normal.  There was no peripheral edema.  GASTROINTESTINAL: The abdomen was normal in contour.  Bowel sounds were present.  Percussion detected no organ " enlargement or tenderness.  Palpation detected no tenderness, mass, or enlarged organs.   MUSCULOSKELETAL: Skeletal configuration was normal and muscle mass was normal for age. Joint appearance was overall normal.  LYMPHATIC: There were no enlarged nodes.  SKIN/HAIR/NAILS: Skin color was normal.  There were no skin lesions.  Hair and nails were normal.  NEUROLOGIC: The patient was alert and oriented to person, place, time, and circumstance. Speech was normal. Cranial nerves were normal. Motor strength was normal for age. The patient was normally coordinated.  PSYCHIATRIC:  Mood and affect were normal and the patient had normal recent and remote memory. The patient's judgment and insight were normal.       Additional Tests   Laboratory: Pending labs are CBC and basic metabolic panel.    Radiology:    Electrocardiogram: Sinus rhythm with occasional PVC, 7/19/2017.    Total time spent with the patient today was 40 minutes of which > 50% was spent in counseling and coordination of care     Sreedhar Sidhu MD  Internal Medicine  Contact me at [phone]

## 2021-06-14 NOTE — TELEPHONE ENCOUNTER
Refill Approved    Rx renewed per Medication Renewal Policy. Medication was last renewed on 8/27/20.    Chiara Andres, [facility] Triage/Med Refill 1/14/2021     Requested Prescriptions   Pending Prescriptions Disp Refills     gabapentin (NEURONTIN) 300 MG capsule [Pharmacy Med Name: Gabapentin 300 MG Oral Capsule] 60 capsule 0     Sig: TAKE 2 CAPSULES BY MOUTH AT BEDTIME       Gabapentin/Levetiracetam/Tiagabine Refill Protocol  Passed - 1/13/2021  5:15 PM        Passed - PCP or prescribing provider visit in past 12 months or next 3 months     Last office visit with prescriber/PCP: 8/27/2020 Sreedhar Sidhu MD OR same dept: 8/27/2020 Sreedhar Sidhu MD OR same specialty: 8/27/2020 Sreedhar Sidhu MD  Last physical: 9/5/2019 Last MTM visit: Visit date not found   Next visit within 3 mo: Visit date not found  Next physical within 3 mo: Visit date not found  Prescriber OR PCP: Sreedhar Sidhu MD  Last diagnosis associated with med order: 1. Chronic low back pain without sciatica, unspecified back pain laterality  - gabapentin (NEURONTIN) 300 MG capsule [Pharmacy Med Name: Gabapentin 300 MG Oral Capsule]; TAKE 2 CAPSULES BY MOUTH AT BEDTIME  Dispense: 60 capsule; Refill: 0    If protocol passes may refill for 12 months if within 3 months of last provider visit (or a total of 15 months).

## 2021-06-15 ENCOUNTER — COMMUNICATION - HEALTHEAST (OUTPATIENT)
Dept: SCHEDULING | Facility: CLINIC | Age: 66
End: 2021-06-15

## 2021-06-16 NOTE — TELEPHONE ENCOUNTER
Telephone Encounter by Sofya Acosta at 9/26/2019  8:08 AM     Author: Sofya Acosta Service: -- Author Type: --    Filed: 9/26/2019  8:08 AM Encounter Date: 9/20/2019 Status: Signed    : Sofya Acosta APPROVED:    Approval start date:09/12/2019  Approval end date:03/12/2020    Pharmacy has been notified of approval and will contact patient when medication is ready for pickup.

## 2021-06-19 NOTE — PROGRESS NOTES
Office Visit - Follow Up   Skye Edwards   62 y.o. female    Date of Visit: 7/19/2018    Chief Complaint   Patient presents with     Urinary Tract Infection     complains of frequency, lower abdominal pain pressure, and odor with urination        Assessment and Plan   1. Urinary frequency  Complains of urinary frequency.  But no dysuria.  Afraid she has UTI.  Did her urinalysis and this was negative. Informed she does not have urinary tract infection.  Informed she does not need antibiotic.  - Urinalysis-UC if Indicated    2. Abnormal urine odor  Reports abnormal odor of her urine.  Urinalysis done is negative for infection.  Informed there is no need for antibiotic  - Urinalysis-UC if Indicated    3. Intermittent asthma  In remission.  ACT score is 23.  Only uses albuterol inhaler as needed.    4. Hyperlipidemia  Not controlled.  Was supposed to see me for follow-up for treatment of her hyperlipidemia.  But failed her appointments.  Lipids in 2014 showed , HDL 50, triglyceride 1.3 and total cholesterol 256.  Advised to see me for recheck of her lipids and subsequent treatment.    5. Cervical spinal stenosis, C5-C6  Status post disc arthroplasty for cervical spinal stenosis C5-C6 on 8/2/2017 by neurosurgery, Dr. Cheyenne Mcintosh due to radiculopathy from cervical spinal stenosis.  Doing well now.  No neck pains and upper extremity numbness or weakness.      Follow up in 3 months.  Will fast next visit.     History of Present Illness   This 62 y.o. old female complains of frequency and odor of her urine.  Does not have urgency, dysuria or hesitancy.  Afraid she has urinary tract infection.  So she came to see me.   have some intermittent discomfort on the lower abdomen.  But no flank or lumbar pains.  Has intermittent asthma but in remission.  Found to have hyperlipidemia.  Lipids were increased on her  previous visits.  But did not follow through with her treatment.  Does not have neck pains anymore does  not have upper extremity weakness and numbness.  Had cervical spinal stenosis of C5 and C6 with radiculopathy for which she underwent this arthroplasty by  neurosurgery in 2017.  Doing well overall except for her urinary symptoms.    Review of Systems   A 12 point comprehensive review of systems was negative except as noted..     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 Socially drinks alcohol.        Past Medical History   Patient Active Problem List   Diagnosis     Fibromyalgia     Esophageal Reflux     Vitamin D Deficiency     Intermittent asthma     Osteoarthritis     Prehypertension     Hyperlipidemia     Obesity     Prediabetes     Rotator cuff tear     Cervical spinal stenosis, C5-C6     Lymphoproliferative disorder (H)     Cord compression myelopathy (H)     Cervical nerve root compression       Past Surgical History  She has a past surgical history that includes pr lap,cholecystectomy; pr total abdom hysterectomy; Hysterectomy; and Oophorectomy.       Medications and Allergies   Current Outpatient Prescriptions   Medication Sig     albuterol (PROVENTIL HFA;VENTOLIN HFA) 90 mcg/actuation inhaler Inhale 2 puffs every 6 (six) hours as needed for wheezing.     amoxicillin (AMOXIL) 500 MG tablet Take 2,000 mg by mouth once as needed. Prior to dental work     FLUoxetine (PROZAC) 20 MG capsule Take 1 capsule (20 mg total) by mouth daily.     ibuprofen (ADVIL,MOTRIN) 600 MG tablet TAKE ONE TABLET BY MOUTH TWICE DAILY *MAX IBU DOSE IS 3200MG IN 24 HOURS*     MULTIVITAMIN (MULTIPLE VITAMINS ORAL) Take 1 tablet by mouth daily.     omeprazole (PRILOSEC) 20 MG capsule Take 20 mg by mouth daily.     No Known Allergies  "    Family and Social History   Family History   Problem Relation Age of Onset     Heart disease Mother      Cancer Father      Breast cancer Paternal Grandmother      Breast cancer Paternal Aunt         Social History   Substance Use Topics     Smoking status: Former Smoker     Quit date: 2/18/2000     Smokeless tobacco: Never Used     Alcohol use Yes      Comment: Minimal social      Physical Exam       Physical Exam  /80  Pulse 80  Temp 98.1  F (36.7  C) (Oral)   Ht 5' 4.5\" (1.638 m)  SpO2 96%  General appearance: alert, appears stated age, cooperative and no distress  Head: Normocephalic, without obvious abnormality, atraumatic  Throat: lips, mucosa, and tongue normal; teeth and gums normal  Neck: no adenopathy, no carotid bruit, no JVD, supple, symmetrical, trachea midline and thyroid not enlarged, symmetric, no tenderness/mass/nodules  Back: symmetric, no curvature. ROM normal. No CVA tenderness.  Lungs: clear to auscultation bilaterally  Heart: regular rate and rhythm, S1, S2 normal, no murmur, click, rub or gallop  Abdomen: soft, non-tender; bowel sounds normal; no masses,  no organomegaly  Extremities: extremities normal, atraumatic, no cyanosis or edema  Skin: Skin color, texture, turgor normal. No rashes or lesions     Additional Information        Sreedhar Sidhu MD  Internal Medicine  Contact me at [phone]     Additional Information   Current Outpatient Prescriptions   Medication Sig     albuterol (PROVENTIL HFA;VENTOLIN HFA) 90 mcg/actuation inhaler Inhale 2 puffs every 6 (six) hours as needed for wheezing.     amoxicillin (AMOXIL) 500 MG tablet Take 2,000 mg by mouth once as needed. Prior to dental work     FLUoxetine (PROZAC) 20 MG capsule Take 1 capsule (20 mg total) by mouth daily.     ibuprofen (ADVIL,MOTRIN) 600 MG tablet TAKE ONE TABLET BY MOUTH TWICE DAILY *MAX IBU DOSE IS 3200MG IN 24 HOURS*     MULTIVITAMIN (MULTIPLE VITAMINS ORAL) Take 1 tablet by mouth daily.     " omeprazole (PRILOSEC) 20 MG capsule Take 20 mg by mouth daily.     No Known Allergies  Social History   Substance Use Topics     Smoking status: Former Smoker     Quit date: 2/18/2000     Smokeless tobacco: Never Used     Alcohol use Yes      Comment: Minimal social         Time: total time spent with the patient was 25 minutes of which >50% was spent in counseling and coordination of care

## 2021-06-20 NOTE — LETTER
Letter by Sreedhar Sidhu MD at      Author: Sreedhar Sidhu MD Service: -- Author Type: --    Filed:  Encounter Date: 8/31/2020 Status: (Other)         Tessa Edwards  [address] [place] [address]             August 31, 2020         Dear Ms. Edwards,    Below are the results from your recent visit:    Resulted Orders   HM2(CBC w/o Differential)   Result Value Ref Range    WBC 42.0 (HH) 4.0 - 11.0 thou/uL    RBC 4.36 3.80 - 5.40 mill/uL    Hemoglobin 11.0 (L) 12.0 - 16.0 g/dL    Hematocrit 35.3 35.0 - 47.0 %    MCV 81 80 - 100 fL    MCH 25.2 (L) 27.0 - 34.0 pg    MCHC 31.2 (L) 32.0 - 36.0 g/dL    RDW 15.0 (H) 11.0 - 14.5 %    Platelets 164 140 - 440 thou/uL    MPV 11.6 8.5 - 12.5 fL   Thyroid Stimulating Hormone (TSH)   Result Value Ref Range    TSH 1.96 0.30 - 5.00 uIU/mL   Comprehensive Metabolic Panel   Result Value Ref Range    Sodium 143 136 - 145 mmol/L    Potassium 3.9 3.5 - 5.0 mmol/L    Chloride 108 (H) 98 - 107 mmol/L    CO2 23 22 - 31 mmol/L    Anion Gap, Calculation 12 5 - 18 mmol/L    Glucose 97 70 - 125 mg/dL    BUN 31 (H) 8 - 22 mg/dL    Creatinine 0.71 0.60 - 1.10 mg/dL    GFR MDRD Af Amer >60 >60 mL/min/1.73m2    GFR MDRD Non Af Amer >60 >60 mL/min/1.73m2    Bilirubin, Total 0.4 0.0 - 1.0 mg/dL    Calcium 9.4 8.5 - 10.5 mg/dL    Protein, Total 7.1 6.0 - 8.0 g/dL    Albumin 3.7 3.5 - 5.0 g/dL    Alkaline Phosphatase 78 45 - 120 U/L    AST 27 0 - 40 U/L    ALT 23 0 - 45 U/L    Narrative    Fasting Glucose reference range is 70-99 mg/dL per  American Diabetes Association (ADA) guidelines.   Vitamin D, Total (25-Hydroxy)   Result Value Ref Range    Vitamin D, Total (25-Hydroxy) 40.1 30.0 - 80.0 ng/mL    Narrative    Deficiency <10.0 ng/mL  Insufficiency 10.0-29.9 ng/mL  Sufficiency 30.0-80.0 ng/mL  Toxicity (possible) >100.0 ng/mL   Vitamin B12   Result Value Ref Range    Vitamin B-12 514 213 - 816 pg/mL       Increased white cell count but stable due to your lymphoproliferative  disorder. Continue to follow with Dr. Bucio.     Normal all other labs. Nothing in your labs to cause your fatigue.  Continue same medications. Thanks.     Please call with questions or contact us using [employer].    Sincerely,        Electronically signed by Sreedhar Sidhu MD

## 2021-06-21 NOTE — PROGRESS NOTES
Office Visit - Follow Up   Skye Edwards   63 y.o. female    Date of Visit: 10/18/2018    Chief Complaint   Patient presents with     Follow-up     fasting, complains of pain in arms, knees, R hip pain -wants to discuss pain control         Assessment and Plan   1. Primary osteoarthritis involving multiple joints  Complains of aches and pains involving multiple joints specifically hands, arms, shoulders, knees and ankles.  Due to osteoarthritis.  Already had bilateral carpal tunnel release of the left and right hands.  Despite the surgery, still complains of hand pains and wrist pains.  Thinks her carpal tunnel surgery did not help her.  Advised her aches and pains are due to osteoarthritis and not due to carpal tunnel syndrome.  Will try Celebrex 200 mg daily and  acetaminophen with codeine every 4-6 hours as needed.  - celecoxib (CELEBREX) 200 MG capsule; Take 1 capsule (200 mg total) by mouth daily.  Dispense: 30 capsule; Refill: 2  - acetaminophen-codeine (TYLENOL #2) 300-15 mg per tablet; Take 1 tablet by mouth every 4 (four) hours as needed for pain.  Dispense: 30 tablet; Refill: 0    2. Hyperlipidemia  Has not been checked for her lipids since her last visit with her previous MD.  Fasting today.  Check fasting lipids, TSH and liver function.  - Lipid Cascade  - Thyroid Stimulating Hormone (TSH)  - Hepatic Profile    3. Prehypertension  Has prehypertension but her blood pressures  are now always controlled.  Does not need blood pressure medication.    4. Prediabetes  Known  to have prediabetes.  But last A1c was 6.1.  Check A1c and basic metabolic panel.   - Glycosylated Hemoglobin A1c  - Basic Metabolic Panel    5. Lymphoproliferative disorder (H)  Followed by hematology, Dr. De La Torre.  Sees him twice a year.  Due to see him in November.  Not on treatment.  Blood counts are stable.  Check CBC.  - HM1(CBC and Differential)  - HM1 (CBC with Diff)    6. Vitamin D deficiency  Has vitamin D deficiency.   Check vitamin D.  Not taking vitamin D supplement anymore.  - Vitamin D, Total (25-Hydroxy)    7. Major depression  Has major depression.  PHQ 9 score is increased to 14.  Grieving because her significant other  2 weeks ago due to complication of liver cirrhosis.  This causes her to be more depressed. Takes fluoxetine and will continue  fluoxetine.    8. Need for vaccination for Strep pneumoniae  Due for Prevnar 13.  Will give it today  - Pneumococcal conjugate vaccine 13-valent 6wks-17yrs; >50yrs      Follow up in  to 4 months.       History of Present Illness   This 63 y.o. old female is here for follow-up.  Complains of aches and pains involving multiple joints of the hands, wrists, fingers, arms, knees and ankles.  Had bilateral carpal tunnel release last year.  Now feels her carpal tunnel surgery did not help her wrist pains.  Her aches and pains are due to osteoarthritis and not related to her carpal tunnel surgery.  Has hyperlipidemia.  Has not been checked for lipids since her last visit with his previous MD.  Has prediabetes.  But last A1c was normal.  Has vitamin D deficiency.  Does not take vitamin D supplement.  Has pre-tension.  But her blood pressure remains controlled now.  Does not need blood pressure medication anymore.  Grieving because her  partner life  2 weeks ago liver cirrhosis complications. Hence, this is aggravating her depression.  Takes fluoxetine for her depression.  Overall, however, she is stable.    Review of Systems   A 12 point comprehensive review of systems was negative except as noted..     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 Socially drinks alcohol.        Past Medical  "History   Patient Active Problem List   Diagnosis     Fibromyalgia     Esophageal Reflux     Vitamin D Deficiency     Intermittent asthma     Osteoarthritis     Prehypertension     Hyperlipidemia     Obesity     Prediabetes     Rotator cuff tear     Cervical spinal stenosis, C5-C6     Lymphoproliferative disorder (H)     Cord compression myelopathy (H)     Cervical nerve root compression     Major depression       Past Surgical History  She has a past surgical history that includes pr lap,cholecystectomy; pr total abdom hysterectomy; Hysterectomy; and Oophorectomy.       Medications and Allergies   Current Outpatient Prescriptions   Medication Sig     albuterol (PROVENTIL HFA;VENTOLIN HFA) 90 mcg/actuation inhaler Inhale 2 puffs every 6 (six) hours as needed for wheezing.     amoxicillin (AMOXIL) 500 MG tablet Take 2,000 mg by mouth once as needed. Prior to dental work     FLUoxetine (PROZAC) 20 MG capsule TAKE 1 CAPSULE BY MOUTH ONCE DAILY     ibuprofen (ADVIL,MOTRIN) 600 MG tablet TAKE 1 TABLET BY MOUTH TWICE DAILY *MAX IBU DOSE IS 3200 MG IN 24 HOURS*     MULTIVITAMIN (MULTIPLE VITAMINS ORAL) Take 1 tablet by mouth daily.     omeprazole (PRILOSEC) 20 MG capsule Take 20 mg by mouth daily.     acetaminophen-codeine (TYLENOL #2) 300-15 mg per tablet Take 1 tablet by mouth every 4 (four) hours as needed for pain.     celecoxib (CELEBREX) 200 MG capsule Take 1 capsule (200 mg total) by mouth daily.     No Known Allergies     Family and Social History   Family History   Problem Relation Age of Onset     Heart disease Mother      Cancer Father      Breast cancer Paternal Grandmother      Breast cancer Paternal Aunt         Social History   Substance Use Topics     Smoking status: Former Smoker     Quit date: 2/18/2000     Smokeless tobacco: Never Used     Alcohol use Yes      Comment: Minimal social      Physical Exam       Physical Exam  /70  Pulse 79  Ht 5' 4.5\" (1.638 m)  Wt 215 lb (97.5 kg)  SpO2 98%  BMI " 36.33 kg/m2  General appearance: alert, appears stated age, cooperative and no distress  Head: Normocephalic, without obvious abnormality, atraumatic  Throat: lips, mucosa, and tongue normal; teeth and gums normal  Neck: no adenopathy, no carotid bruit, no JVD, supple, symmetrical, trachea midline and thyroid not enlarged, symmetric, no tenderness/mass/nodules  Lungs: clear to auscultation bilaterally  Heart: regular rate and rhythm, S1, S2 normal, no murmur, click, rub or gallop  Abdomen: soft, non-tender; bowel sounds normal; no masses,  no organomegaly  Extremities: extremities normal, atraumatic, no cyanosis or edema  Skin: Skin color, texture, turgor normal. No rashes or lesions  Neurologic: Grossly normal  Psychiatric: Sad and bit stressed     Additional Information        Sreedhar Sidhu MD  Internal Medicine  Contact me at [phone]     Additional Information   Current Outpatient Prescriptions   Medication Sig     albuterol (PROVENTIL HFA;VENTOLIN HFA) 90 mcg/actuation inhaler Inhale 2 puffs every 6 (six) hours as needed for wheezing.     amoxicillin (AMOXIL) 500 MG tablet Take 2,000 mg by mouth once as needed. Prior to dental work     FLUoxetine (PROZAC) 20 MG capsule TAKE 1 CAPSULE BY MOUTH ONCE DAILY     ibuprofen (ADVIL,MOTRIN) 600 MG tablet TAKE 1 TABLET BY MOUTH TWICE DAILY *MAX IBU DOSE IS 3200 MG IN 24 HOURS*     MULTIVITAMIN (MULTIPLE VITAMINS ORAL) Take 1 tablet by mouth daily.     omeprazole (PRILOSEC) 20 MG capsule Take 20 mg by mouth daily.     acetaminophen-codeine (TYLENOL #2) 300-15 mg per tablet Take 1 tablet by mouth every 4 (four) hours as needed for pain.     celecoxib (CELEBREX) 200 MG capsule Take 1 capsule (200 mg total) by mouth daily.     No Known Allergies  Social History   Substance Use Topics     Smoking status: Former Smoker     Quit date: 2/18/2000     Smokeless tobacco: Never Used     Alcohol use Yes      Comment: Minimal social         Time: total time spent with the  patient was 25 minutes of which >50% was spent in counseling and coordination of care

## 2021-06-22 NOTE — TELEPHONE ENCOUNTER
Controlled Substance Refill Request  Medication:   Requested Prescriptions     Pending Prescriptions Disp Refills     acetaminophen-codeine (TYLENOL #2) 300-15 mg per tablet [Pharmacy Med Name: ACETAMI/CODEI 300-15MG TAB] 30 tablet 0     Sig: TAKE 1 TABLET BY MOUTH EVERY 4 HOURS AS NEEDED FOR PAIN     Date Last Fill: 11/21/18  Pharmacy: [id number]   Submit electronically to pharmacy  Controlled Substance Agreement on File:   Encounter-Level CSA Scan Date:    There are no encounter-level csa scan date.       Last office visit: Last office visit pertaining to requested medication was 10/18/18 .

## 2021-06-23 NOTE — PROGRESS NOTES
This is a consultation from Sreedhar Sidhu MD for 2 subcutaneous lesions.    HPI: Pt is here with concerns about masses located on the lateral aspect of the right thigh. These have been present for a year or so, but the timing of these lesions being present is not certain..   These lesion  are tender.  The lesions have not drained.  She can feel these lesions in the subcutaneous tissues but they are fairly indistinct in location and size.    Allergies:Patient has no known allergies.    Past Medical History:   Diagnosis Date     Asthma      Cervical spinal stenosis      Depression      Fibromyalgia      GERD (gastroesophageal reflux disease)      Lymphoproliferative disorder (H)      Obesity      Osteoarthritis      Rotator cuff tear      Vitamin D deficiency        Past Surgical History:   Procedure Laterality Date     HYSTERECTOMY       OOPHORECTOMY       [place] LAP,CHOLECYSTECTOMY      Description: Cholecystectomy Laparoscopic;  Recorded: 12/10/2012;     [place] TOTAL ABDOM HYSTERECTOMY      Description: Total Abdominal Hysterectomy;  Recorded: 12/10/2012;  Comments: 44 Y/O FOR FIBROID TUMORS       REVIEW OF SYSTEMS:  10 point ROS is negative except for the issues mentioned in the HPI and the PMHx.    CURRENT MEDS:    Current Outpatient Medications:      acetaminophen-codeine (TYLENOL #2) 300-15 mg per tablet, TAKE 1 TABLET BY MOUTH EVERY 4 HOURS AS NEEDED FOR PAIN, Disp: 30 tablet, Rfl: 0     albuterol (PROVENTIL HFA;VENTOLIN HFA) 90 mcg/actuation inhaler, Inhale 2 puffs every 6 (six) hours as needed for wheezing., Disp: 1 Inhaler, Rfl: 1     amoxicillin (AMOXIL) 500 MG tablet, Take 2,000 mg by mouth once as needed. Prior to dental work, Disp: , Rfl:      celecoxib (CELEBREX) 200 MG capsule, Take 1 capsule (200 mg total) by mouth daily., Disp: 30 capsule, Rfl: 2     FLUoxetine (PROZAC) 20 MG capsule, TAKE 1 CAPSULE BY MOUTH ONCE DAILY, Disp: 90 capsule, Rfl: 2     ibuprofen (ADVIL,MOTRIN) 600 MG tablet, TAKE 1 TABLET  BY MOUTH TWICE DAILY *MAX IBU DOSE IS 3200 MG IN 24 HOURS*, Disp: 60 tablet, Rfl: 3     MULTIVITAMIN (MULTIPLE VITAMINS ORAL), Take 1 tablet by mouth daily., Disp: , Rfl:      omeprazole (PRILOSEC) 20 MG capsule, Take 20 mg by mouth daily., Disp: , Rfl:     There were no vitals taken for this visit.  There is no height or weight on file to calculate BMI.    EXAM:  GENERAL:Well developed female appears her stated age  Lungs: Clear to auscultation  Heart: Regular rate and rhythm  HEAD & NECK: Extraocular motions intact, anicteric sclera, no neck masses  ABDOMEN: Soft and nondistended, positive bowel sounds  EXTREMITIES: Full mobility,   INTEGUMENT: The patient has subcutaneous lesions located on the lateral aspect of her right thigh.  The first lesions about 2 cm in diameter is low-profile deep in the subcutaneous tissues not particularly tender to palpation no erythema no induration.  The second lesion is a bit lower than that about two thirds of the way down the lateral thigh and its a much broader indistinct area of fatty tissue of which I cannot distinguish from the surrounding subcutaneous fat.    Assessment/Plan: Pt to subcutaneous lesions with the characteristics mentioned above.  These lesions are likely Lipomas.  Patient has pain in her legs but then again she has pain all through her body.  She describes her fibromyalgia is been a real problem and she is felt somewhat better since she has been started on a steroid treatment for some arthritides.  I described to her that removal of these lesions is most certainly going to cause her pain and that I suspect that the improvement that we are likely to see, if we will not be worth the pain that we are liable to cause with this procedure.  The upper lipoma is one that I can clearly palpate and that one I can see a role for removing.  The lower lesion is a very indistinct broad-based and these lesions in particularly, I cautioned against removing it because of the  broad-based nature it is very difficult to know where to stop taking fatty tissue.  The patient understands the issues that I presented and for now she plans to wait and see how these progress.  If they enlarge the becoming more symptomatic then we can reevaluate and potentially remove them.  If these lesions remain as they are then we decided that surgery may not be the best way to proceed..      Hakan Lugo MD  [phone]  [facility] Department of Surgery

## 2021-06-23 NOTE — PATIENT INSTRUCTIONS - HE
Excision education & surgery packet provided to [facility]  P: [phone]  F: [phone]

## 2021-06-24 NOTE — TELEPHONE ENCOUNTER
Skye called to make a follow up appt, she had surgery in 2017 with Dr. Mcintosh. She is having issues again would like to come back. She had an EMG in June 2018, she does not remember where, she has a copy and will bring it with her. No recent imaging. Please order any imaging needed for 3/20/19 appointment with Dr. Hudson.

## 2021-06-25 NOTE — TELEPHONE ENCOUNTER
Dr. Sidhu,  Please see message below.   Patient is wanting to know if she still needs to see a hematologist since she is under the care of a neurologist and oncologist.  Please advise.  Thank you.  Ute MOTTA CMA/TIGRE....................12:00 PM

## 2021-06-25 NOTE — TELEPHONE ENCOUNTER
Left detailed message for the patient relaying message below from Dr. Sidhu.  Asked the patient to call back if she has any further questions.  Ute MOTTA, BERNARD/CMT....................1:16 PM

## 2021-06-25 NOTE — TELEPHONE ENCOUNTER
Patient Returning Call  Reason for call:  Patient   Information relayed to patient:  Below information relayed. Patient states she is already under the care of a Neurologist and Oncologist. Stating the neurologist ordered the MRI and is ordering a CT for the same issue. Questioning if she needs a hematologist still?   Patient has additional questions:  Yes  If YES, what are your questions/concerns:  See above.   Okay to leave a detailed message?: Yes

## 2021-06-25 NOTE — PATIENT INSTRUCTIONS - HE
Orders Placed This Encounter   Procedures     XR Cervical Spine Flexion Extension 2-3     Include ap and lateral     Standing Status:   Future     Standing Expiration Date:   3/19/2020     Order Specific Question:   Can the procedure be changed per Radiologist protocol?     Answer:   Yes     CT of Cervical spine

## 2021-06-25 NOTE — PROGRESS NOTES
NEUROSURGERY CONSULTATION NOTE    3/19/2019     Tessa Edwards is a 63 y.o. female who is sent to us in consultation by Sreedhar Sidhu for evaluation of cervical stenosis.         CONSULTATION ASSESSMENT AND PLAN:    64 yo female s/p C5-6 MOBI-C by Dr Mcintosh on 8/2/17 for imbalance and hyperflexia and also s/p b/l CTR by Dr Hanson who presents with b/l hand burning and numbness and weakness, shoulder pain, imbalance, urinary urgency. She had a EMG following her CTR that showed moderate improved to mild median neuropathy. On exam has a arboleda on the R and imbalance. MRI of the cervical spine is concerning for stenosis at the level of the prior MOBI-C. Discussed with her obtaining a CT cervical spine and cervical flex/ex films and returning to clinic.     I spent more than 45 minutes in this apt, examining the pt, reviewing the scans, reviewing notes from chart, discussing treatment options with risks and benefits and coordinating care. >50 % clinic time was spent in face to face counseling and coordinating care    Brianne Hudson     HPI: 64 yo female s/p C5-6 MOBI-C by Dr Mcintosh on 8/2/17 for imbalance and hyperflexia and also s/p b/l CTR by Dr Hanson who presents with b/l hand burning and numbness and weakness, shoulder pain, imbalance, urinary urgency. She had a CTR b/l and never really had much improvement in her b/l hand burning and numbness. She also notes b/l hand weakness. She has pain in her b/l shoulders as well that radie through her arms. Has had imbalance but  worsneing over last 6 week. New urinary urgency for a few months as well. No weakness or numbness in the legs. No bowel incontinence.     Has been taking Lyrica, naproxen and tylenol. She feels some improved with her lyrica especially her fibromyalgia symptoms. No recent injections or PT sessions.       Past Medical History:   Diagnosis Date     Asthma      Cervical spinal stenosis      Depression      Fibromyalgia      GERD  (gastroesophageal reflux disease)      Lymphoproliferative disorder (H)      Obesity      Osteoarthritis      Rotator cuff tear      Vitamin D deficiency      Past Surgical History:   Procedure Laterality Date     HYSTERECTOMY       OOPHORECTOMY       OR LAP,CHOLECYSTECTOMY      Description: Cholecystectomy Laparoscopic;  Recorded: 12/10/2012;     [place] TOTAL ABDOM HYSTERECTOMY      Description: Total Abdominal Hysterectomy;  Recorded: 12/10/2012;  Comments: 46 Y/O FOR FIBROID TUMORS       REVIEW OF SYSTEMS:  ROS reviewed with pt as documented on pt health form of 3/19/2019.    Negative cardiac, pulmonary, hematological with exception of neurological as per HPI.  No family hx of anesthetic reactions .  No family hx of hypercoagulability.       MEDICATIONS:  Current Outpatient Medications   Medication Sig Dispense Refill     acetaminophen-codeine (TYLENOL #2) 300-15 mg per tablet TAKE 1 TABLET BY MOUTH EVERY 4 HOURS AS NEEDED FOR PAIN 30 tablet 0     albuterol (PROVENTIL HFA;VENTOLIN HFA) 90 mcg/actuation inhaler Inhale 2 puffs every 6 (six) hours as needed for wheezing. 1 Inhaler 1     amoxicillin (AMOXIL) 500 MG tablet Take 2,000 mg by mouth once as needed. Prior to dental work       FLUoxetine (PROZAC) 20 MG capsule TAKE 1 CAPSULE BY MOUTH ONCE DAILY 90 capsule 2     ibuprofen (ADVIL,MOTRIN) 600 MG tablet TAKE 1 TABLET BY MOUTH TWICE DAILY *MAX IBU DOSE IS 3200 MG IN 24 HOURS* 60 tablet 3     LYRICA 75 mg capsule        meloxicam (MOBIC) 7.5 MG tablet        MULTIVITAMIN (MULTIPLE VITAMINS ORAL) Take 1 tablet by mouth daily.       omeprazole (PRILOSEC) 20 MG capsule Take 20 mg by mouth daily.       naproxen (NAPROSYN) 500 MG tablet Take 1 tablet by mouth daily as needed.       No current facility-administered medications for this visit.          ALLERGIES/SENSITIVITIES:     Denies drug allergies    PERTINENT SOCIAL HISTORY:   Social History     Socioeconomic History     Marital status:      Spouse name: None  "    Number of children: None     Years of education: None     Highest education level: None   Occupational History     None   Social Needs     Financial resource strain: None     Food insecurity:     Worry: None     Inability: None     Transportation needs:     Medical: None     Non-medical: None   Tobacco Use     Smoking status: Former Smoker     Last attempt to quit: 2000     Years since quittin.0     Smokeless tobacco: Never Used   Substance and Sexual Activity     Alcohol use: Yes     Comment: Minimal social     Drug use: No     Sexual activity: None   Lifestyle     Physical activity:     Days per week: None     Minutes per session: None     Stress: None   Relationships     Social connections:     Talks on phone: None     Gets together: None     Attends [employer] service: None     Active member of club or organization: None     Attends meetings of clubs or organizations: None     Relationship status: None     Intimate partner violence:     Fear of current or ex partner: None     Emotionally abused: None     Physically abused: None     Forced sexual activity: None   Other Topics Concern     None   Social History Narrative    , 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 Socially drinks alcohol.         FAMILY HISTORY:  Family History   Problem Relation Age of Onset     Heart disease Mother      Cancer Father      Breast cancer Paternal Grandmother      Breast cancer Paternal Aunt         PHYSICAL EXAM:   Constitutional: /72   Pulse 77   Ht 5' 4.5\" (1.638 m)   Wt 215 lb (97.5 kg)   SpO2 97%   BMI 36.33 kg/m       Mental Status: A & O in no acute distress.  Affect is appropriate.  Speech is fluent.  Recent and remote memory are intact.  Attention span and concentration are normal.     Cranial Nerves: CN1: grossly intact per patient recall. CN2: No funduscopic exam performed. CN3,4 & 6: Pupillary light response, lateral and vertical gaze normal.  No nystagmus.  " Visual fields are full to confrontation. CN5: Intact to touch CN7: No facial weakness, smile, facial symmetry intact. CN8: Intact to spoken voice. CN9&10: Gag reflex, uvula midline, palate rises with phonation. CN11: Shoulder shrug 5/5 intact bilaterally. CN12: Tongue midline and moves freely from side to side.     Motor: No pronator drift of upper extremity. Normal bulk and tone all muscle groups of upper and lower extremities.      Sensory: Sensation intact bilaterally to light touch.      Coordination:  Uneven wide based gait.      Reflexes; supinator, biceps, triceps, knee/ ankle jerk intact. R hoffmans/ No babinski/ clonus.    IMAGING: I personally reviewed all radiographic images     EMG 7/25/18: b/l median mononeroatphies at the wrist R>L mild and chronic left C8 radiculopathy     Cc:   Sreedhar Sidhu MD  [address] [place] [address]

## 2021-06-25 NOTE — TELEPHONE ENCOUNTER
Been having shortness of breath and thinks she has sleep apnea and tired during the day.  Wakes often during sleep.    Was transferred to triage due to shortness of breath but none during the day.  Just a gasp once kathryn while that wakes her that she needs to talk to her pcp about.    Warm transferred to scheduling    Amna Decker RN [facility] Triage Nurse Advisor

## 2021-06-25 NOTE — PROGRESS NOTES
Neurosurgery consultation was requested by: Payam Sidhu   Pain: Denies neck pain  Radicular Pain is present: pain in both arms  Lhermitte sign: No  Motor complaints: Weakness in both hands   Sensory complaints: Numbness in both hands and feel chapped   Gait and balance issues: Yes  Bowel or bladder issues: Denies   Duration of SX is: 2 years   The symptoms are worse with: Constant   The symptoms are better with: Medication   Injury: Denies   Severity is: Mild - moderate  Patient has tried the following conservative measures: Pt has done PT and tens does help.  NDI score is :   JSBERNARD mojica

## 2021-06-25 NOTE — TELEPHONE ENCOUNTER
Left a detailed message with patient. Advised her MRI Cervical showed increased lymph nodes in neck, will need to see hematologist again per Dr. Sidhu.    Advised to call back and let Dr. Sidhu know she received the results. \    Marry Lopez LPN

## 2021-07-03 NOTE — ADDENDUM NOTE
Addendum Note by Nika David RN at 8/10/2017 10:35 AM     Author: Nika David RN Service: -- Author Type: Registered Nurse    Filed: 8/10/2017 10:35 AM Encounter Date: 8/10/2017 Status: Signed    : Nika David RN (Registered Nurse)    Addended by: NIKA DAVID on: 8/10/2017 10:35 AM        Modules accepted: Orders

## 2021-07-03 NOTE — ADDENDUM NOTE
Addendum Note by iNka David RN at 8/10/2017 11:50 AM     Author: Nika David RN Service: -- Author Type: Registered Nurse    Filed: 8/10/2017 11:50 AM Encounter Date: 8/10/2017 Status: Signed    : Nika David RN (Registered Nurse)    Addended by: NIKA DAVID on: 8/10/2017 11:50 AM        Modules accepted: Orders

## 2021-07-09 ENCOUNTER — RECORDS - HEALTHEAST (OUTPATIENT)
Dept: ADMINISTRATIVE | Facility: OTHER | Age: 66
End: 2021-07-09

## 2021-07-09 ENCOUNTER — TRANSFERRED RECORDS (OUTPATIENT)
Dept: HEALTH INFORMATION MANAGEMENT | Facility: CLINIC | Age: 66
End: 2021-07-09

## 2021-07-20 ENCOUNTER — HOSPITAL ENCOUNTER (INPATIENT)
Facility: HOSPITAL | Age: 66
Setting detail: SURGERY ADMIT
End: 2021-07-20
Attending: ORTHOPAEDIC SURGERY | Admitting: ORTHOPAEDIC SURGERY
Payer: COMMERCIAL

## 2021-07-20 DIAGNOSIS — Z11.59 ENCOUNTER FOR SCREENING FOR OTHER VIRAL DISEASES: ICD-10-CM

## 2021-07-22 ENCOUNTER — ANCILLARY PROCEDURE (OUTPATIENT)
Dept: MAMMOGRAPHY | Facility: CLINIC | Age: 66
End: 2021-07-22
Attending: INTERNAL MEDICINE
Payer: COMMERCIAL

## 2021-07-22 ENCOUNTER — ANCILLARY PROCEDURE (OUTPATIENT)
Dept: BONE DENSITY | Facility: CLINIC | Age: 66
End: 2021-07-22
Attending: INTERNAL MEDICINE
Payer: COMMERCIAL

## 2021-07-22 DIAGNOSIS — Z12.31 VISIT FOR SCREENING MAMMOGRAM: ICD-10-CM

## 2021-07-22 DIAGNOSIS — Z13.820 SCREENING FOR OSTEOPOROSIS: ICD-10-CM

## 2021-07-22 PROCEDURE — 77067 SCR MAMMO BI INCL CAD: CPT | Mod: TC | Performed by: RADIOLOGY

## 2021-07-22 PROCEDURE — 77080 DXA BONE DENSITY AXIAL: CPT | Performed by: INTERNAL MEDICINE

## 2021-08-02 ENCOUNTER — TRANSFERRED RECORDS (OUTPATIENT)
Dept: HEALTH INFORMATION MANAGEMENT | Facility: CLINIC | Age: 66
End: 2021-08-02

## 2021-08-04 ENCOUNTER — TRANSFERRED RECORDS (OUTPATIENT)
Dept: HEALTH INFORMATION MANAGEMENT | Facility: CLINIC | Age: 66
End: 2021-08-04

## 2021-08-23 ENCOUNTER — TRANSFERRED RECORDS (OUTPATIENT)
Dept: HEALTH INFORMATION MANAGEMENT | Facility: CLINIC | Age: 66
End: 2021-08-23

## 2021-08-30 ENCOUNTER — LAB (OUTPATIENT)
Dept: LAB | Facility: CLINIC | Age: 66
End: 2021-08-30
Payer: COMMERCIAL

## 2021-08-30 DIAGNOSIS — K63.89 MASS OF COLON: Primary | ICD-10-CM

## 2021-08-30 LAB — CEA SERPL-MCNC: 1.2 NG/ML (ref 0–3)

## 2021-08-30 PROCEDURE — 36415 COLL VENOUS BLD VENIPUNCTURE: CPT

## 2021-08-30 PROCEDURE — 82378 CARCINOEMBRYONIC ANTIGEN: CPT

## 2021-09-01 ENCOUNTER — HOSPITAL ENCOUNTER (INPATIENT)
Facility: HOSPITAL | Age: 66
Setting detail: SURGERY ADMIT
End: 2021-09-01
Attending: COLON & RECTAL SURGERY | Admitting: COLON & RECTAL SURGERY
Payer: MEDICARE

## 2021-09-01 DIAGNOSIS — Z11.59 ENCOUNTER FOR SCREENING FOR OTHER VIRAL DISEASES: ICD-10-CM

## 2021-09-03 RX ORDER — ONDANSETRON 2 MG/ML
4 INJECTION INTRAMUSCULAR; INTRAVENOUS ONCE
Status: CANCELLED | OUTPATIENT
Start: 2021-09-03 | End: 2021-09-03

## 2021-09-03 RX ORDER — CEFAZOLIN SODIUM 2 G/100ML
2 INJECTION, SOLUTION INTRAVENOUS SEE ADMIN INSTRUCTIONS
Status: CANCELLED | OUTPATIENT
Start: 2021-09-03

## 2021-09-03 RX ORDER — CEFAZOLIN SODIUM 2 G/100ML
2 INJECTION, SOLUTION INTRAVENOUS
Status: CANCELLED | OUTPATIENT
Start: 2021-09-03

## 2021-09-03 RX ORDER — ACETAMINOPHEN 325 MG/1
975 TABLET ORAL ONCE
Status: CANCELLED | OUTPATIENT
Start: 2021-09-03 | End: 2021-09-03

## 2021-09-03 RX ORDER — HEPARIN SODIUM 5000 [USP'U]/.5ML
5000 INJECTION, SOLUTION INTRAVENOUS; SUBCUTANEOUS
Status: CANCELLED | OUTPATIENT
Start: 2021-09-03

## 2021-09-05 ENCOUNTER — LAB (OUTPATIENT)
Dept: FAMILY MEDICINE | Facility: CLINIC | Age: 66
End: 2021-09-05
Payer: COMMERCIAL

## 2021-09-05 DIAGNOSIS — Z11.59 ENCOUNTER FOR SCREENING FOR OTHER VIRAL DISEASES: ICD-10-CM

## 2021-09-05 PROCEDURE — U0003 INFECTIOUS AGENT DETECTION BY NUCLEIC ACID (DNA OR RNA); SEVERE ACUTE RESPIRATORY SYNDROME CORONAVIRUS 2 (SARS-COV-2) (CORONAVIRUS DISEASE [COVID-19]), AMPLIFIED PROBE TECHNIQUE, MAKING USE OF HIGH THROUGHPUT TECHNOLOGIES AS DESCRIBED BY CMS-2020-01-R: HCPCS

## 2021-09-05 PROCEDURE — U0005 INFEC AGEN DETEC AMPLI PROBE: HCPCS

## 2021-09-06 ENCOUNTER — TELEPHONE (OUTPATIENT)
Dept: FAMILY MEDICINE | Facility: CLINIC | Age: 66
End: 2021-09-06

## 2021-09-06 LAB — SARS-COV-2 RNA RESP QL NAA+PROBE: POSITIVE

## 2021-09-06 NOTE — TELEPHONE ENCOUNTER
Coronavirus (COVID-19) Notification    Reason for call  Notify of POSITIVE  COVID-19 lab result, assess symptoms,  review [facility] recommendations    Lab Result   Lab test for 2019-nCoV rRt-PCR or SARS-COV-2 PCR  Oropharyngeal AND/OR nasopharyngeal swabs were POSITIVE for 2019-nCoV RNA [OR] SARS-COV-2 RNA (COVID-19) RNA     We have been unable to reach Patient by phone at this time to notify of their Positive COVID-19 result.  Left voicemail message requesting a call back to [phone] [facility] for results.        POSITIVE COVID-19 Letter sent.    Carly Del Rio LPN

## 2021-09-06 NOTE — TELEPHONE ENCOUNTER
"Coronavirus (COVID-19) Notification    Caller Name (Patient, parent, daughter/son, grandparent, etc)  patient    Reason for call  Notify of Positive Coronavirus (COVID-19) lab results, assess symptoms,  review [facility] recommendations    Lab Result    Lab test:  2019-nCoV rRt-PCR or SARS-CoV-2 PCR    Oropharyngeal AND/OR nasopharyngeal swabs is POSITIVE for 2019-nCoV RNA/SARS-COV-2 PCR (COVID-19 virus)    RN Recommendations/Instructions per [facility] Coronavirus COVID-19 recommendations    Brief introduction script  Introduce self then review script:  \"I am calling on behalf of [employer].  We were notified that your Coronavirus test (COVID-19) for was POSITIVE for the virus.  I have some information to relay to you but first I wanted to mention that the [place] Dept of Health will be contacting you shortly [it's possible MD already called Patient] to talk to you more about how you are feeling and other people you have had contact with who might now also have the virus.  Also, [facility] is Partnering with the [facility] for Covid-19 research, you may be contacted directly by research staff.\"    Patient reports being fully vaccinated for Covid with Moderna.    Assessment (Inquire about Patient's current symptoms)   Assessment   Current Symptoms at time of phone call: (if no symptoms, document No symptoms] Nasal congestion, cough, nausea   Symptoms onset (if applicable) 9/5/2021     If at time of call, Patients symptoms hare worsened, the Patient should contact 911 or have someone drive them to Emergency Dept promptly:      If Patient calling 911, inform 911 personal that you have tested positive for the Coronavirus (COVID-19).  Place mask on and await 911 to arrive.    If Emergency Dept, If possible, please have another adult drive you to the Emergency Dept but you need to wear mask when in contact with other people.      Monoclonal Antibody Administration    You may be eligible to " "receive a new treatment with a monoclonal antibody for preventing hospitalization in patients at high risk for complications from COVID-19.   This medication is still experimental and available on a limited basis; it is given through an IV and must be given at an infusion center. Please note that not all people who are eligible will receive the medication since it is in limited supply.     Are you interested in being considered for this medication?  No.   Does the patient fit the criteria: Patient declined    If patient qualifies based on above criteria:  \"You will be contacted if you are selected to receive this treatment in the next 1-2 business days.   This is time sensitive and if you are not selected in the next 1-2 business days, you will not receive the medication.  If you do not receive a call to schedule, you have not been selected.\"      Review information with Patient    Your result was positive. This means you have COVID-19 (coronavirus).  We have sent you a letter that reviews the information that I'll be reviewing with you now.    How can I protect others?    If you have symptoms: stay home and away from others (self-isolate) until:    You've had no fever--and no medicine that reduces fever--for 1 full day (24 hours). And       Your other symptoms have gotten better. For example, your cough or breathing has improved. And     At least 10 days have passed since your symptoms started. (If you've been told by a doctor that you have a weak immune system, wait 20 days.)     If you don't have symptoms: Stay home and away from others (self-isolate) until at least 10 days have passed since your first positive COVID-19 test. (Date test collected)    During this time:    Stay in your own room, including for meals. Use your own bathroom if you can.    Stay away from others in your home. No hugging, kissing or shaking hands. No visitors.     Don't go to work, school or anywhere else.     Clean  high touch  surfaces " often (doorknobs, counters, handles, etc.). Use a household cleaning spray or wipes. You'll find a full list on the EPA website at www.epa.gov/pesticide-registration/list-n-disinfectants-use-against-sars-cov-2.     Cover your mouth and nose with a mask, tissue or other face covering to avoid spreading germs.    Wash your hands and face often with soap and water.    Make a list of people you have been in close contact with recently, even if either of you wore a face covering.   ; Start your list from 2 days before you became ill or had a positive test.  ; Include anyone that was within 6 feet of you for a cumulative total of 15 minutes or more in 24 hours. (Example: if you sat next to Norberto for 5 minutes in the morning and 10 minutes in the afternoon, then you were in close contact for 15 minutes total that day. Norberto would be added to your list.)    A public health worker will call or text you. It is important that you answer. They will ask you questions about possible exposures to COVID-19, such as people you have been in direct contact with and places you have visited.    Tell the people on your list that you have COVID-19; they should stay away from others for 14 days starting from the last time they were in contact with you (unless you are told something different from a public health worker).     Caregivers in these groups are at risk for severe illness due to COVID-19:  o People 65 years and older  o People who live in a nursing home or long-term care facility  o People with chronic disease (lung, heart, cancer, diabetes, kidney, liver, immunologic)  o People who have a weakened immune system, including those who:  - Are in cancer treatment  - Take medicine that weakens the immune system, such as corticosteroids  - Had a bone marrow or organ transplant  - Have an immune deficiency  - Have poorly controlled HIV or AIDS  - Are obese (body mass index of 40 or higher)  - Smoke regularly    Caregivers should wear  gloves while washing dishes, handling laundry and cleaning bedrooms and bathrooms.    Wash and dry laundry with special caution. Don't shake dirty laundry, and use the warmest water setting you can.    If you have a weakened immune system, ask your doctor about other actions you should take.    For more tips, go to www.cdc.gov/coronavirus/2019-ncov/downloads/10Things.pdf.    You should not go back to work until you meet the guidelines above for ending your home isolation. You don't need to be retested for COVID-19 before going back to work--studies show that you won't spread the virus if it's been at least 10 days since your symptoms started (or 20 days, if you have a weak immune system).    Employers: This document serves as formal notice of your employee's medical guidelines for going back to work. They must meet the above guidelines before going back to work in person.    How can I take care of myself?    1. Get lots of rest. Drink extra fluids (unless a doctor has told you not to).    2. Take Tylenol (acetaminophen) for fever or pain. If you have liver or kidney problems, ask your family doctor if it's okay to take Tylenol.     Take either:     650 mg (two 325 mg pills) every 4 to 6 hours, or     1,000 mg (two 500 mg pills) every 8 hours as needed.     Note: Don't take more than 3,000 mg in one day. Acetaminophen is found in many medicines (both prescribed and over-the-counter medicines). Read all labels to be sure you don't take too much.    For children, check the Tylenol bottle for the right dose (based on their age or weight).    3. If you have other health problems (like cancer, heart failure, an organ transplant or severe kidney disease): Call your specialty clinic if you don't feel better in the next 2 days.    4. Know when to call 911: Emergency warning signs include:    Trouble breathing or shortness of breath    Pain or pressure in the chest that doesn't go away    Feeling confused like you haven't  felt before, or not being able to wake up    Bluish-colored lips or face    5. Sign up for [employer]. We know it's scary to hear that you have COVID-19. We want to track your symptoms to make sure you're okay over the next 2 weeks. Please look for an email from [employer]--this is a free, online program that we'll use to keep in touch. To sign up, follow the link in the email. Learn more at [email or web address].    Where can I get more information?    [facility]: [email or web address]    Coronavirus Basics: www.health.[facility].[place]/diseases/coronavirus/basics.html    What to Do If You're Sick: www.cdc.gov/coronavirus/2019-ncov/about/steps-when-sick.html    Ending Home Isolation: www.cdc.gov/coronavirus/2019-ncov/hcp/disposition-in-home-patients.html     Caring for Someone with COVID-19: www.cdc.gov/coronavirus/2019-ncov/if-you-are-sick/care-for-someone.html     [facility] clinical trials (COVID-19 research studies): clinicalaffairs.[facility]-clinical-trials     A Positive COVID-19 letter will be sent via [employer] or the mail. (Exception, no letters sent to Presurgerical/Preprocedure Patients)    Carly Del Rio LPN

## 2021-09-07 ENCOUNTER — TELEPHONE (OUTPATIENT)
Dept: FAMILY MEDICINE | Facility: CLINIC | Age: 66
End: 2021-09-07

## 2021-09-07 NOTE — TELEPHONE ENCOUNTER
Please call.  This patient is on my schedule this afternoon for a preoperative valuation.  However, she tested positive for Covid-19.  She should not come to the clinic for preop to avoid exposing other patients.      She should also contact the surgeon to see if the procedure should be canceled. If she has difficulty with this then we may need to assist and call the surgeon.       Finally, she should follow-up with her primary care physician, Dr. Sidhu,  as to when the preoperative physical examination can be done.

## 2021-09-14 ENCOUNTER — TELEPHONE (OUTPATIENT)
Dept: INTERNAL MEDICINE | Facility: CLINIC | Age: 66
End: 2021-09-14

## 2021-09-14 NOTE — TELEPHONE ENCOUNTER
Daughter in law Cynthia is on the phone with patient.  Verbal consent given.    Patient diagnosed with covid 9/6/21.  Pt is still very weak, and not feeling well.  Wondering if she should have some lab work done, or could this still be COVID.    [phone] Patient asked call is returned to [facility] as she is sleeping so much during the day she does not want to miss call.

## 2021-09-14 NOTE — TELEPHONE ENCOUNTER
Advise her this is still COVID residual effect. This will get better with time. No need for labs at this time.

## 2021-09-18 ENCOUNTER — HEALTH MAINTENANCE LETTER (OUTPATIENT)
Age: 66
End: 2021-09-18

## 2021-10-21 ENCOUNTER — HOSPITAL ENCOUNTER (OUTPATIENT)
Dept: PET IMAGING | Facility: HOSPITAL | Age: 66
Discharge: HOME | End: 2021-10-21
Attending: COLON & RECTAL SURGERY | Admitting: COLON & RECTAL SURGERY
Payer: COMMERCIAL

## 2021-10-21 DIAGNOSIS — K63.89 MASS OF COLON: ICD-10-CM

## 2021-10-21 LAB — GLUCOSE BLDC GLUCOMTR-MCNC: 123 MG/DL (ref 70–99)

## 2021-10-21 PROCEDURE — 343N000001 HC RX 343: Performed by: COLON & RECTAL SURGERY

## 2021-10-21 PROCEDURE — 78816 PET IMAGE W/CT FULL BODY: CPT | Mod: 26 | Performed by: STUDENT IN AN ORGANIZED HEALTH CARE EDUCATION/TRAINING PROGRAM

## 2021-10-21 PROCEDURE — 78816 PET IMAGE W/CT FULL BODY: CPT | Mod: PI

## 2021-10-21 PROCEDURE — 82962 GLUCOSE BLOOD TEST: CPT

## 2021-10-21 PROCEDURE — A9552 F18 FDG: HCPCS | Performed by: COLON & RECTAL SURGERY

## 2021-10-21 RX ADMIN — FLUDEOXYGLUCOSE F-18 12.3 MCI.: 500 INJECTION, SOLUTION INTRAVENOUS at 13:46

## 2021-10-22 DIAGNOSIS — Z11.59 ENCOUNTER FOR SCREENING FOR OTHER VIRAL DISEASES: ICD-10-CM

## 2021-10-26 ENCOUNTER — OFFICE VISIT (OUTPATIENT)
Dept: FAMILY MEDICINE | Facility: CLINIC | Age: 66
End: 2021-10-26
Payer: COMMERCIAL

## 2021-10-26 VITALS
BODY MASS INDEX: 33.97 KG/M2 | TEMPERATURE: 98.7 F | SYSTOLIC BLOOD PRESSURE: 137 MMHG | OXYGEN SATURATION: 95 % | HEART RATE: 89 BPM | WEIGHT: 199 LBS | HEIGHT: 64 IN | DIASTOLIC BLOOD PRESSURE: 79 MMHG

## 2021-10-26 DIAGNOSIS — R63.4 WEIGHT LOSS: ICD-10-CM

## 2021-10-26 DIAGNOSIS — D47.9 LYMPHOPROLIFERATIVE DISORDER (H): ICD-10-CM

## 2021-10-26 DIAGNOSIS — Z01.818 PREOP GENERAL PHYSICAL EXAM: Primary | ICD-10-CM

## 2021-10-26 DIAGNOSIS — G25.81 RESTLESS LEGS SYNDROME: ICD-10-CM

## 2021-10-26 DIAGNOSIS — D50.9 IRON DEFICIENCY ANEMIA, UNSPECIFIED IRON DEFICIENCY ANEMIA TYPE: ICD-10-CM

## 2021-10-26 DIAGNOSIS — R16.1 SPLENOMEGALY: ICD-10-CM

## 2021-10-26 DIAGNOSIS — E04.1 THYROID NODULE: ICD-10-CM

## 2021-10-26 DIAGNOSIS — M54.50 CHRONIC LOW BACK PAIN WITHOUT SCIATICA, UNSPECIFIED BACK PAIN LATERALITY: ICD-10-CM

## 2021-10-26 DIAGNOSIS — G89.29 CHRONIC LOW BACK PAIN WITHOUT SCIATICA, UNSPECIFIED BACK PAIN LATERALITY: ICD-10-CM

## 2021-10-26 DIAGNOSIS — R93.5 ABNORMAL CT OF THE ABDOMEN: ICD-10-CM

## 2021-10-26 PROCEDURE — 93000 ELECTROCARDIOGRAM COMPLETE: CPT | Performed by: FAMILY MEDICINE

## 2021-10-26 PROCEDURE — 80048 BASIC METABOLIC PNL TOTAL CA: CPT | Performed by: FAMILY MEDICINE

## 2021-10-26 PROCEDURE — 36415 COLL VENOUS BLD VENIPUNCTURE: CPT | Performed by: FAMILY MEDICINE

## 2021-10-26 PROCEDURE — 99214 OFFICE O/P EST MOD 30 MIN: CPT | Performed by: FAMILY MEDICINE

## 2021-10-26 RX ORDER — GABAPENTIN 300 MG/1
CAPSULE ORAL
Qty: 180 CAPSULE | Refills: 2 | Status: ON HOLD | COMMUNITY
Start: 2021-10-26 | End: 2021-11-16

## 2021-10-26 ASSESSMENT — MIFFLIN-ST. JEOR: SCORE: 1417.66

## 2021-10-26 NOTE — PROGRESS NOTES
[facility]  [id number] [place] [id number]  Phone: [phone]  Primary Provider: Sreedhar Sidhu  Pre-op Performing Provider: CHRISTIAN HASSAN      PREOPERATIVE EVALUATION:  Today's date: 10/26/2021    Tessa Edwards is a 66 year old female who presents for a preoperative evaluation.    Surgical Information:   Surgery/Procedure: laparoscopic Right Colectomy/colonoscopy  Surgery Location: [facility]  Surgeon: Efraín Guerrero MD  Surgery Date: 11/05/21- colonoscopy/ 11/16/21-laparoscopic Right Colectomy.  Time of Surgery: 11:55 AM- colonoscopy/ 12:00 PM- laparoscopic Right Colectomy.    Where patient plans to recover: At home with family  Fax number for surgical facility: Note does not need to be faxed, will be available electronically in Epic.    Type of Anesthesia Anticipated: General    Assessment & Plan     The proposed surgical procedure is considered INTERMEDIATE risk.    Problem List Items Addressed This Visit     Abnormal CT of the abdomen     She is to have colonoscopy under anesthesia followed by right hemicolectomy         Anemia, iron deficiency     2 recent iron infusions         Chronic low back pain without sciatica, unspecified back pain laterality     Gabapentin is taken at bedtime         Relevant Medications    gabapentin (NEURONTIN) 300 MG capsule    Lymphoproliferative disorder (H)     PET scan suggestive of lymphoma, diffuse         Restless legs syndrome     On gabapentin, Requip.  Recently worsened symptoms attributed to her blood disorder.         Relevant Orders    SLEEP EVALUATION & MANAGEMENT REFERRAL - ADULT -    Splenomegaly     Suggestive of lymphoma         Thyroid nodule     Hypermetabolic by PET.  Focal cancer versus lymphoma         Weight loss     Welcome, not volitional           Other Visit Diagnoses     Preop general physical exam    -  Primary    Relevant Orders    EKG 12-lead complete w/read - Clinics (Completed)     Basic metabolic panel  (Ca, Cl, CO2, Creat, Gluc, K, Na, BUN)              Body habitus suggests sleep apnea.  Empiric use of CPAP in the perioperative period if indicated  Patient will hold all medicines a.m. of surgery.  She will hold her nonsteroidals for 1 week prior to surgery    RECOMMENDATION:  APPROVAL GIVEN to proceed with proposed procedure, without further diagnostic evaluation.    Review of prior external note(s) from -    [facility], [facility], Colorectal                  Subjective     HPI related to upcoming procedure: Patient with R colonic wall thickening, diffuse lymphoproliferation, splenomegaly.  Colonoscopy unsuccessful previously.  Planned colonoscopy followed by right hemicolectomy      Preop Questions 8/18/2021   1. Have you ever had a heart attack or stroke? No   2. Have you ever had surgery on your heart or blood vessels, such as a stent placement, a coronary artery bypass, or surgery on an artery in your head, neck, heart, or legs? No   3. Do you have chest pain with activity? No   4. Do you have a history of  heart failure? No   5. Do you currently have a cold, bronchitis or symptoms of other infection? No   6. Do you have a cough, shortness of breath, or wheezing? No   7. Do you or anyone in your family have previous history of blood clots? No   8. Do you or does anyone in your family have a serious bleeding problem such as prolonged bleeding following surgeries or cuts? No   9. Have you ever had problems with anemia or been told to take iron pills? YES -    10. Have you had any abnormal blood loss such as black, tarry or bloody stools, or abnormal vaginal bleeding? No   11. Have you ever had a blood transfusion? No   12. Are you willing to have a blood transfusion if it is medically needed before, during, or after your surgery? Yes   13. Have you or any of your relatives ever had problems with anesthesia? UNKNOWN -    14. Do you have , excessive snoring or daytime drowsiness? YES -   "  14a. Do you have a CPAP machine? No   15. Do you have any artifical heart valves or other implanted medical devices like a pacemaker, defibrillator, or continuous glucose monitor? No   16. Do you have artificial joints? YES -    17. Are you allergic to latex? No     Health Care Directive:  Patient does not have a Health Care Directive or Living Will:     Preoperative Review of :   reviewed - controlled substances reflected in medication list.          Review of Systems   Constitutional: Negative for chills and fever.   HENT: Negative for ear pain and sore throat.    Eyes: Negative for pain, redness and visual disturbance.   Respiratory: Negative for cough and shortness of breath.         AM \"Phlegm\"   Cardiovascular: Negative for chest pain, palpitations and leg swelling.   Gastrointestinal: Negative for abdominal pain and vomiting.   Genitourinary: Negative for dysuria and hematuria.   Musculoskeletal: Negative for arthralgias and back pain.   Skin: Negative for color change and rash.   Neurological: Negative.    Hematological: Does not bruise/bleed easily.   Psychiatric/Behavioral: Negative for dysphoric mood.   All other systems reviewed and are negative.        Patient Active Problem List    Diagnosis Date Noted     Chronic low back pain without sciatica, unspecified back pain laterality 10/27/2021     Priority: Medium     Spondylosis,central  stenosis, foraminal encroachment       Thyroid nodule 10/27/2021     Priority: Medium     Splenomegaly 10/27/2021     Priority: Medium     Weight loss 10/27/2021     Priority: Medium     Lymphoproliferative disorder (H) 10/26/2021     Priority: Medium     Anemia, iron deficiency 10/26/2021     Priority: Medium     Restless legs syndrome 10/26/2021     Priority: Medium     Abnormal CT of the abdomen 10/26/2021     Priority: Medium     Primary localized osteoarthrosis, lower leg 08/27/2007     Priority: Medium      Past Medical History:   Diagnosis Date     Abnormal " CT of the abdomen 10/26/2021     Anemia, iron deficiency 10/26/2021     Asthma     resolved per pt. report     Cervical spinal stenosis      Chronic low back pain without sciatica, unspecified back pain laterality 10/27/2021    Spondylosis,central  stenosis, foraminal encroachment     Depression      Disease of thyroid gland      Fibromyalgia      GERD (gastroesophageal reflux disease)      Hyperlipemia      Lymphoproliferative disorder (H)      Obesity      Osteoarthritis      Primary localized osteoarthrosis, lower leg 8/27/2007     Restless legs syndrome 10/26/2021     Rotator cuff tear      Splenomegaly 10/27/2021     Thyroid nodule 10/27/2021     Vitamin D deficiency      Weight loss 10/27/2021     Past Surgical History:   Procedure Laterality Date     ARTHROPLASTY SHOULDER Left      ARTHROSCOPY KNEE       ARTHROSCOPY SHOULDER ROTATOR CUFF REPAIR       C LAP,CHOLECYSTECTOMY/EXPLORE      Description: Cholecystectomy Laparoscopic;  Recorded: 12/10/2012;     C TOTAL ABDOM HYSTERECTOMY      Description: Total Abdominal Hysterectomy;  Recorded: 12/10/2012;  Comments: 44 Y/O FOR FIBROID TUMORS     C TOTAL KNEE ARTHROPLASTY Right 09/12/2019    Procedure: RIGHT TOTAL KNEE ARTHROPLASTY;  Surgeon: Irvin Canas DO;  [facility];  Service: Orthopedics     CERVICAL DISC ARTHROPLASTY       CERVICAL DISCECTOMY  08/01/2017    C5, C6     CRW LT SHOULDER AP AXILLA 2 VW       HYSTERECTOMY       JOINT REPLACEMENT       OOPHORECTOMY       RELEASE CARPAL TUNNEL       RELEASE CARPAL TUNNEL       XR MYELOGRAM CERVICAL  05/14/2019     Current Outpatient Medications   Medication Sig Dispense Refill     gabapentin (NEURONTIN) 300 MG capsule [GABAPENTIN (NEURONTIN) 300 MG CAPSULE] TAKE 1CAPSULES BY MOUTH AT BEDTIME 180 capsule 2     calcium-vitamin D (CALCIUM-VITAMIN D) 500 mg(1,250mg) -200 unit per tablet [CALCIUM-VITAMIN D (CALCIUM-VITAMIN D) 500 MG(1,250MG) -200 UNIT PER TABLET] Take 1 tablet by mouth 2 (two)  "times a day with meals.       FLUoxetine (PROZAC) 40 MG capsule [FLUOXETINE (PROZAC) 40 MG CAPSULE] Take 1 capsule (40 mg total) by mouth daily. 90 capsule 3     magnesium 30 mg tablet [MAGNESIUM 30 MG TABLET] Take 30 mg by mouth daily.       naproxen sodium (ALEVE) 220 MG tablet [NAPROXEN SODIUM (ALEVE) 220 MG TABLET] Take 440 mg by mouth 3 (three) times a day with meals.       omeprazole (PRILOSEC) 20 MG capsule [OMEPRAZOLE (PRILOSEC) 20 MG CAPSULE] Take 20 mg by mouth at bedtime.              rOPINIRole (REQUIP) 0.25 MG tablet TAKE 1 TABLET BY MOUTH AT BEDTIME 90 tablet 1       No Known Allergies     Social History     Tobacco Use     Smoking status: Former Smoker     Quit date: 2000     Years since quittin.7     Smokeless tobacco: Never Used   Substance Use Topics     Alcohol use: Yes     Comment: Alcoholic Drinks/day: 3-6       History   Drug Use No         Objective     /79 (BP Location: Right arm, Patient Position: Chair, Cuff Size: Adult Large)   Pulse 89   Temp 98.7  F (37.1  C) (Oral)   Ht 1.626 m (5' 4\")   Wt 90.3 kg (199 lb)   SpO2 95%   BMI 34.16 kg/m      Physical Exam  Constitutional:       General: She is not in acute distress.     Appearance: She is well-developed.   HENT:      Right Ear: Tympanic membrane and external ear normal.      Left Ear: Tympanic membrane and external ear normal.      Nose: Nose normal.      Mouth/Throat:      Pharynx: No oropharyngeal exudate.   Eyes:      General:         Right eye: No discharge.         Left eye: No discharge.      Conjunctiva/sclera: Conjunctivae normal.      Pupils: Pupils are equal, round, and reactive to light.   Neck:      Thyroid: No thyromegaly.      Trachea: No tracheal deviation.   Cardiovascular:      Rate and Rhythm: Normal rate and regular rhythm.      Heart sounds: Normal heart sounds, S1 normal and S2 normal. No murmur heard.   No friction rub. No S3 or S4 sounds.    Pulmonary:      Effort: Pulmonary effort is normal. " No respiratory distress.      Breath sounds: Normal breath sounds. No wheezing or rales.   Abdominal:      General: Bowel sounds are normal.      Palpations: Abdomen is soft. There is no mass.      Tenderness: There is no abdominal tenderness.      Comments: Splenomegaly   Musculoskeletal:         General: Normal range of motion.      Cervical back: Neck supple.      Right lower leg: Edema present.      Left lower leg: Edema present.      Comments: trace   Skin:     General: Skin is warm and dry.      Findings: No rash.   Neurological:      Mental Status: She is alert and oriented to person, place, and time.      Motor: No abnormal muscle tone.      Deep Tendon Reflexes: Reflexes are normal and symmetric.   Psychiatric:         Thought Content: Thought content normal.         Judgment: Judgment normal.           Recent Labs   Lab Test 08/27/20  1423 06/03/20  1611   HGB 11.0*  --      --      --    POTASSIUM 3.9  --    CR 0.71 0.8        Diagnostics:  Labs pending at this time.  Results will be reviewed when available.  Additional lab has been signed and held for day of procedure  EKG: appears normal, NSR, normal axis, normal intervals, no acute ST/T changes c/w ischemia, no LVH by voltage criteria, unchanged from previous tracings    Revised Cardiac Risk Index (RCRI):  The patient has the following serious cardiovascular risks for perioperative complications:   - No serious cardiac risks = 0 points     RCRI Interpretation: 0 points: Class I (very low risk - 0.4% complication rate)           Signed Electronically by: Bryson Huang MD  Copy of this evaluation report is provided to requesting physician.    Bryson Huang MD

## 2021-10-26 NOTE — H&P (VIEW-ONLY)
[facility]  [id number] [place] [id number]  Phone: [phone]  Primary Provider: Sreedhar Sidhu  Pre-op Performing Provider: CHRISTIAN HASSAN      PREOPERATIVE EVALUATION:  Today's date: 10/26/2021    Tessa Edwards is a 66 year old female who presents for a preoperative evaluation.    Surgical Information:   Surgery/Procedure: laparoscopic Right Colectomy/colonoscopy  Surgery Location: [facility]  Surgeon: Efraín Guerrero MD  Surgery Date: 11/05/21- colonoscopy/ 11/16/21-laparoscopic Right Colectomy.  Time of Surgery: 11:55 AM- colonoscopy/ 12:00 PM- laparoscopic Right Colectomy.    Where patient plans to recover: At home with family  Fax number for surgical facility: Note does not need to be faxed, will be available electronically in Epic.    Type of Anesthesia Anticipated: General    Assessment & Plan     The proposed surgical procedure is considered INTERMEDIATE risk.    Problem List Items Addressed This Visit     Abnormal CT of the abdomen     She is to have colonoscopy under anesthesia followed by right hemicolectomy         Anemia, iron deficiency     2 recent iron infusions         Chronic low back pain without sciatica, unspecified back pain laterality     Gabapentin is taken at bedtime         Relevant Medications    gabapentin (NEURONTIN) 300 MG capsule    Lymphoproliferative disorder (H)     PET scan suggestive of lymphoma, diffuse         Restless legs syndrome     On gabapentin, Requip.  Recently worsened symptoms attributed to her blood disorder.         Relevant Orders    SLEEP EVALUATION & MANAGEMENT REFERRAL - ADULT -    Splenomegaly     Suggestive of lymphoma         Thyroid nodule     Hypermetabolic by PET.  Focal cancer versus lymphoma         Weight loss     Welcome, not volitional           Other Visit Diagnoses     Preop general physical exam    -  Primary    Relevant Orders    EKG 12-lead complete w/read - Clinics (Completed)     Basic metabolic panel  (Ca, Cl, CO2, Creat, Gluc, K, Na, BUN)              Body habitus suggests sleep apnea.  Empiric use of CPAP in the perioperative period if indicated  Patient will hold all medicines a.m. of surgery.  She will hold her nonsteroidals for 1 week prior to surgery    RECOMMENDATION:  APPROVAL GIVEN to proceed with proposed procedure, without further diagnostic evaluation.    Review of prior external note(s) from -    [facility], Colorectal                  Subjective     HPI related to upcoming procedure: Patient with R colonic wall thickening, diffuse lymphoproliferation, splenomegaly.  Colonoscopy unsuccessful previously.  Planned colonoscopy followed by right hemicolectomy      Preop Questions 8/18/2021   1. Have you ever had a heart attack or stroke? No   2. Have you ever had surgery on your heart or blood vessels, such as a stent placement, a coronary artery bypass, or surgery on an artery in your head, neck, heart, or legs? No   3. Do you have chest pain with activity? No   4. Do you have a history of  heart failure? No   5. Do you currently have a cold, bronchitis or symptoms of other infection? No   6. Do you have a cough, shortness of breath, or wheezing? No   7. Do you or anyone in your family have previous history of blood clots? No   8. Do you or does anyone in your family have a serious bleeding problem such as prolonged bleeding following surgeries or cuts? No   9. Have you ever had problems with anemia or been told to take iron pills? YES -    10. Have you had any abnormal blood loss such as black, tarry or bloody stools, or abnormal vaginal bleeding? No   11. Have you ever had a blood transfusion? No   12. Are you willing to have a blood transfusion if it is medically needed before, during, or after your surgery? Yes   13. Have you or any of your relatives ever had problems with anesthesia? UNKNOWN -    14. Do you have , excessive snoring or daytime drowsiness? YES -   "  14a. Do you have a CPAP machine? No   15. Do you have any artifical heart valves or other implanted medical devices like a pacemaker, defibrillator, or continuous glucose monitor? No   16. Do you have artificial joints? YES -    17. Are you allergic to latex? No     Health Care Directive:  Patient does not have a Health Care Directive or Living Will:     Preoperative Review of :   reviewed - controlled substances reflected in medication list.          Review of Systems   Constitutional: Negative for chills and fever.   HENT: Negative for ear pain and sore throat.    Eyes: Negative for pain, redness and visual disturbance.   Respiratory: Negative for cough and shortness of breath.         AM \"Phlegm\"   Cardiovascular: Negative for chest pain, palpitations and leg swelling.   Gastrointestinal: Negative for abdominal pain and vomiting.   Genitourinary: Negative for dysuria and hematuria.   Musculoskeletal: Negative for arthralgias and back pain.   Skin: Negative for color change and rash.   Neurological: Negative.    Hematological: Does not bruise/bleed easily.   Psychiatric/Behavioral: Negative for dysphoric mood.   All other systems reviewed and are negative.        Patient Active Problem List    Diagnosis Date Noted     Chronic low back pain without sciatica, unspecified back pain laterality 10/27/2021     Priority: Medium     Spondylosis,central  stenosis, foraminal encroachment       Thyroid nodule 10/27/2021     Priority: Medium     Splenomegaly 10/27/2021     Priority: Medium     Weight loss 10/27/2021     Priority: Medium     Lymphoproliferative disorder (H) 10/26/2021     Priority: Medium     Anemia, iron deficiency 10/26/2021     Priority: Medium     Restless legs syndrome 10/26/2021     Priority: Medium     Abnormal CT of the abdomen 10/26/2021     Priority: Medium     Primary localized osteoarthrosis, lower leg 08/27/2007     Priority: Medium      Past Medical History:   Diagnosis Date     Abnormal " CT of the abdomen 10/26/2021     Anemia, iron deficiency 10/26/2021     Asthma     resolved per pt. report     Cervical spinal stenosis      Chronic low back pain without sciatica, unspecified back pain laterality 10/27/2021    Spondylosis,central  stenosis, foraminal encroachment     Depression      Disease of thyroid gland      Fibromyalgia      GERD (gastroesophageal reflux disease)      Hyperlipemia      Lymphoproliferative disorder (H)      Obesity      Osteoarthritis      Primary localized osteoarthrosis, lower leg 8/27/2007     Restless legs syndrome 10/26/2021     Rotator cuff tear      Splenomegaly 10/27/2021     Thyroid nodule 10/27/2021     Vitamin D deficiency      Weight loss 10/27/2021     Past Surgical History:   Procedure Laterality Date     ARTHROPLASTY SHOULDER Left      ARTHROSCOPY KNEE       ARTHROSCOPY SHOULDER ROTATOR CUFF REPAIR       C LAP,CHOLECYSTECTOMY/EXPLORE      Description: Cholecystectomy Laparoscopic;  Recorded: 12/10/2012;     C TOTAL ABDOM HYSTERECTOMY      Description: Total Abdominal Hysterectomy;  Recorded: 12/10/2012;  Comments: 44 Y/O FOR FIBROID TUMORS     C TOTAL KNEE ARTHROPLASTY Right 09/12/2019    Procedure: RIGHT TOTAL KNEE ARTHROPLASTY;  Surgeon: Irvin Canas DO;  Location: [facility];  Service: Orthopedics     CERVICAL DISC ARTHROPLASTY       CERVICAL DISCECTOMY  08/01/2017    C5, C6     CRW LT SHOULDER AP AXILLA 2 VW       HYSTERECTOMY       JOINT REPLACEMENT       OOPHORECTOMY       RELEASE CARPAL TUNNEL       RELEASE CARPAL TUNNEL       XR MYELOGRAM CERVICAL  05/14/2019     Current Outpatient Medications   Medication Sig Dispense Refill     gabapentin (NEURONTIN) 300 MG capsule [GABAPENTIN (NEURONTIN) 300 MG CAPSULE] TAKE 1CAPSULES BY MOUTH AT BEDTIME 180 capsule 2     calcium-vitamin D (CALCIUM-VITAMIN D) 500 mg(1,250mg) -200 unit per tablet [CALCIUM-VITAMIN D (CALCIUM-VITAMIN D) 500 MG(1,250MG) -200 UNIT PER TABLET] Take 1 tablet by mouth 2 (two)  "times a day with meals.       FLUoxetine (PROZAC) 40 MG capsule [FLUOXETINE (PROZAC) 40 MG CAPSULE] Take 1 capsule (40 mg total) by mouth daily. 90 capsule 3     magnesium 30 mg tablet [MAGNESIUM 30 MG TABLET] Take 30 mg by mouth daily.       naproxen sodium (ALEVE) 220 MG tablet [NAPROXEN SODIUM (ALEVE) 220 MG TABLET] Take 440 mg by mouth 3 (three) times a day with meals.       omeprazole (PRILOSEC) 20 MG capsule [OMEPRAZOLE (PRILOSEC) 20 MG CAPSULE] Take 20 mg by mouth at bedtime.              rOPINIRole (REQUIP) 0.25 MG tablet TAKE 1 TABLET BY MOUTH AT BEDTIME 90 tablet 1       No Known Allergies     Social History     Tobacco Use     Smoking status: Former Smoker     Quit date: 2000     Years since quittin.7     Smokeless tobacco: Never Used   Substance Use Topics     Alcohol use: Yes     Comment: Alcoholic Drinks/day: 3-6       History   Drug Use No         Objective     /79 (BP Location: Right arm, Patient Position: Chair, Cuff Size: Adult Large)   Pulse 89   Temp 98.7  F (37.1  C) (Oral)   Ht 1.626 m (5' 4\")   Wt 90.3 kg (199 lb)   SpO2 95%   BMI 34.16 kg/m      Physical Exam  Constitutional:       General: She is not in acute distress.     Appearance: She is well-developed.   HENT:      Right Ear: Tympanic membrane and external ear normal.      Left Ear: Tympanic membrane and external ear normal.      Nose: Nose normal.      Mouth/Throat:      Pharynx: No oropharyngeal exudate.   Eyes:      General:         Right eye: No discharge.         Left eye: No discharge.      Conjunctiva/sclera: Conjunctivae normal.      Pupils: Pupils are equal, round, and reactive to light.   Neck:      Thyroid: No thyromegaly.      Trachea: No tracheal deviation.   Cardiovascular:      Rate and Rhythm: Normal rate and regular rhythm.      Heart sounds: Normal heart sounds, S1 normal and S2 normal. No murmur heard.   No friction rub. No S3 or S4 sounds.    Pulmonary:      Effort: Pulmonary effort is normal. " No respiratory distress.      Breath sounds: Normal breath sounds. No wheezing or rales.   Abdominal:      General: Bowel sounds are normal.      Palpations: Abdomen is soft. There is no mass.      Tenderness: There is no abdominal tenderness.      Comments: Splenomegaly   Musculoskeletal:         General: Normal range of motion.      Cervical back: Neck supple.      Right lower leg: Edema present.      Left lower leg: Edema present.      Comments: trace   Skin:     General: Skin is warm and dry.      Findings: No rash.   Neurological:      Mental Status: She is alert and oriented to person, place, and time.      Motor: No abnormal muscle tone.      Deep Tendon Reflexes: Reflexes are normal and symmetric.   Psychiatric:         Thought Content: Thought content normal.         Judgment: Judgment normal.           Recent Labs   Lab Test 08/27/20  1423 06/03/20  1611   HGB 11.0*  --      --      --    POTASSIUM 3.9  --    CR 0.71 0.8        Diagnostics:  Labs pending at this time.  Results will be reviewed when available.  Additional lab has been signed and held for day of procedure  EKG: appears normal, NSR, normal axis, normal intervals, no acute ST/T changes c/w ischemia, no LVH by voltage criteria, unchanged from previous tracings    Revised Cardiac Risk Index (RCRI):  The patient has the following serious cardiovascular risks for perioperative complications:   - No serious cardiac risks = 0 points     RCRI Interpretation: 0 points: Class I (very low risk - 0.4% complication rate)           Signed Electronically by: Bryson Huang MD  Copy of this evaluation report is provided to requesting physician.    Bryson Huang MD

## 2021-10-26 NOTE — H&P (VIEW-ONLY)
[facility]  [address] [place] [id number]  Phone: [phone]  Primary Provider: Sreedhar Sidhu  Pre-op Performing Provider: CHRISTIAN HASSAN      PREOPERATIVE EVALUATION:  Today's date: 10/26/2021    Tessa Edwards is a 66 year old female who presents for a preoperative evaluation.    Surgical Information:   Surgery/Procedure: laparoscopic Right Colectomy/colonoscopy  Surgery Location: [facility]  Surgeon: Efraín Guerrero MD  Surgery Date: 11/05/21- colonoscopy/ 11/16/21-laparoscopic Right Colectomy.  Time of Surgery: 11:55 AM- colonoscopy/ 12:00 PM- laparoscopic Right Colectomy.    Where patient plans to recover: At home with family  Fax number for surgical facility: Note does not need to be faxed, will be available electronically in Epic.    Type of Anesthesia Anticipated: General    Assessment & Plan     The proposed surgical procedure is considered INTERMEDIATE risk.    Problem List Items Addressed This Visit     Abnormal CT of the abdomen     She is to have colonoscopy under anesthesia followed by right hemicolectomy         Anemia, iron deficiency     2 recent iron infusions         Chronic low back pain without sciatica, unspecified back pain laterality     Gabapentin is taken at bedtime         Relevant Medications    gabapentin (NEURONTIN) 300 MG capsule    Lymphoproliferative disorder (H)     PET scan suggestive of lymphoma, diffuse         Restless legs syndrome     On gabapentin, Requip.  Recently worsened symptoms attributed to her blood disorder.         Relevant Orders    SLEEP EVALUATION & MANAGEMENT REFERRAL - ADULT -    Splenomegaly     Suggestive of lymphoma         Thyroid nodule     Hypermetabolic by PET.  Focal cancer versus lymphoma         Weight loss     Welcome, not volitional           Other Visit Diagnoses     Preop general physical exam    -  Primary    Relevant Orders    EKG 12-lead complete w/read - Clinics (Completed)     Basic metabolic panel  (Ca, Cl, CO2, Creat, Gluc, K, Na, BUN)              Body habitus suggests sleep apnea.  Empiric use of CPAP in the perioperative period if indicated  Patient will hold all medicines a.m. of surgery.  She will hold her nonsteroidals for 1 week prior to surgery    RECOMMENDATION:  APPROVAL GIVEN to proceed with proposed procedure, without further diagnostic evaluation.    Review of prior external note(s) from -    [facility], Colorectal                  Subjective     HPI related to upcoming procedure: Patient with R colonic wall thickening, diffuse lymphoproliferation, splenomegaly.  Colonoscopy unsuccessful previously.  Planned colonoscopy followed by right hemicolectomy      Preop Questions 8/18/2021   1. Have you ever had a heart attack or stroke? No   2. Have you ever had surgery on your heart or blood vessels, such as a stent placement, a coronary artery bypass, or surgery on an artery in your head, neck, heart, or legs? No   3. Do you have chest pain with activity? No   4. Do you have a history of  heart failure? No   5. Do you currently have a cold, bronchitis or symptoms of other infection? No   6. Do you have a cough, shortness of breath, or wheezing? No   7. Do you or anyone in your family have previous history of blood clots? No   8. Do you or does anyone in your family have a serious bleeding problem such as prolonged bleeding following surgeries or cuts? No   9. Have you ever had problems with anemia or been told to take iron pills? YES -    10. Have you had any abnormal blood loss such as black, tarry or bloody stools, or abnormal vaginal bleeding? No   11. Have you ever had a blood transfusion? No   12. Are you willing to have a blood transfusion if it is medically needed before, during, or after your surgery? Yes   13. Have you or any of your relatives ever had problems with anesthesia? UNKNOWN -    14. Do you have , excessive snoring or daytime drowsiness? YES -   "  14a. Do you have a CPAP machine? No   15. Do you have any artifical heart valves or other implanted medical devices like a pacemaker, defibrillator, or continuous glucose monitor? No   16. Do you have artificial joints? YES -    17. Are you allergic to latex? No     Health Care Directive:  Patient does not have a Health Care Directive or Living Will:     Preoperative Review of :   reviewed - controlled substances reflected in medication list.          Review of Systems   Constitutional: Negative for chills and fever.   HENT: Negative for ear pain and sore throat.    Eyes: Negative for pain, redness and visual disturbance.   Respiratory: Negative for cough and shortness of breath.         AM \"Phlegm\"   Cardiovascular: Negative for chest pain, palpitations and leg swelling.   Gastrointestinal: Negative for abdominal pain and vomiting.   Genitourinary: Negative for dysuria and hematuria.   Musculoskeletal: Negative for arthralgias and back pain.   Skin: Negative for color change and rash.   Neurological: Negative.    Hematological: Does not bruise/bleed easily.   Psychiatric/Behavioral: Negative for dysphoric mood.   All other systems reviewed and are negative.        Patient Active Problem List    Diagnosis Date Noted     Chronic low back pain without sciatica, unspecified back pain laterality 10/27/2021     Priority: Medium     Spondylosis,central  stenosis, foraminal encroachment       Thyroid nodule 10/27/2021     Priority: Medium     Splenomegaly 10/27/2021     Priority: Medium     Weight loss 10/27/2021     Priority: Medium     Lymphoproliferative disorder (H) 10/26/2021     Priority: Medium     Anemia, iron deficiency 10/26/2021     Priority: Medium     Restless legs syndrome 10/26/2021     Priority: Medium     Abnormal CT of the abdomen 10/26/2021     Priority: Medium     Primary localized osteoarthrosis, lower leg 08/27/2007     Priority: Medium      Past Medical History:   Diagnosis Date     Abnormal " CT of the abdomen 10/26/2021     Anemia, iron deficiency 10/26/2021     Asthma     resolved per pt. report     Cervical spinal stenosis      Chronic low back pain without sciatica, unspecified back pain laterality 10/27/2021    Spondylosis,central  stenosis, foraminal encroachment     Depression      Disease of thyroid gland      Fibromyalgia      GERD (gastroesophageal reflux disease)      Hyperlipemia      Lymphoproliferative disorder (H)      Obesity      Osteoarthritis      Primary localized osteoarthrosis, lower leg 8/27/2007     Restless legs syndrome 10/26/2021     Rotator cuff tear      Splenomegaly 10/27/2021     Thyroid nodule 10/27/2021     Vitamin D deficiency      Weight loss 10/27/2021     Past Surgical History:   Procedure Laterality Date     ARTHROPLASTY SHOULDER Left      ARTHROSCOPY KNEE       ARTHROSCOPY SHOULDER ROTATOR CUFF REPAIR       C LAP,CHOLECYSTECTOMY/EXPLORE      Description: Cholecystectomy Laparoscopic;  Recorded: 12/10/2012;     C TOTAL ABDOM HYSTERECTOMY      Description: Total Abdominal Hysterectomy;  Recorded: 12/10/2012;  Comments: 44 Y/O FOR FIBROID TUMORS     C TOTAL KNEE ARTHROPLASTY Right 09/12/2019    Procedure: RIGHT TOTAL KNEE ARTHROPLASTY;  Surgeon: Irvin Canas DO;  Location: [facility];  Service: Orthopedics     CERVICAL DISC ARTHROPLASTY       CERVICAL DISCECTOMY  08/01/2017    C5, C6     CRW LT SHOULDER AP AXILLA 2 VW       HYSTERECTOMY       JOINT REPLACEMENT       OOPHORECTOMY       RELEASE CARPAL TUNNEL       RELEASE CARPAL TUNNEL       XR MYELOGRAM CERVICAL  05/14/2019     Current Outpatient Medications   Medication Sig Dispense Refill     gabapentin (NEURONTIN) 300 MG capsule [GABAPENTIN (NEURONTIN) 300 MG CAPSULE] TAKE 1CAPSULES BY MOUTH AT BEDTIME 180 capsule 2     calcium-vitamin D (CALCIUM-VITAMIN D) 500 mg(1,250mg) -200 unit per tablet [CALCIUM-VITAMIN D (CALCIUM-VITAMIN D) 500 MG(1,250MG) -200 UNIT PER TABLET] Take 1 tablet by mouth 2 (two)  "times a day with meals.       FLUoxetine (PROZAC) 40 MG capsule [FLUOXETINE (PROZAC) 40 MG CAPSULE] Take 1 capsule (40 mg total) by mouth daily. 90 capsule 3     magnesium 30 mg tablet [MAGNESIUM 30 MG TABLET] Take 30 mg by mouth daily.       naproxen sodium (ALEVE) 220 MG tablet [NAPROXEN SODIUM (ALEVE) 220 MG TABLET] Take 440 mg by mouth 3 (three) times a day with meals.       omeprazole (PRILOSEC) 20 MG capsule [OMEPRAZOLE (PRILOSEC) 20 MG CAPSULE] Take 20 mg by mouth at bedtime.              rOPINIRole (REQUIP) 0.25 MG tablet TAKE 1 TABLET BY MOUTH AT BEDTIME 90 tablet 1       No Known Allergies     Social History     Tobacco Use     Smoking status: Former Smoker     Quit date: 2000     Years since quittin.7     Smokeless tobacco: Never Used   Substance Use Topics     Alcohol use: Yes     Comment: Alcoholic Drinks/day: 3-6       History   Drug Use No         Objective     /79 (BP Location: Right arm, Patient Position: Chair, Cuff Size: Adult Large)   Pulse 89   Temp 98.7  F (37.1  C) (Oral)   Ht 1.626 m (5' 4\")   Wt 90.3 kg (199 lb)   SpO2 95%   BMI 34.16 kg/m      Physical Exam  Constitutional:       General: She is not in acute distress.     Appearance: She is well-developed.   HENT:      Right Ear: Tympanic membrane and external ear normal.      Left Ear: Tympanic membrane and external ear normal.      Nose: Nose normal.      Mouth/Throat:      Pharynx: No oropharyngeal exudate.   Eyes:      General:         Right eye: No discharge.         Left eye: No discharge.      Conjunctiva/sclera: Conjunctivae normal.      Pupils: Pupils are equal, round, and reactive to light.   Neck:      Thyroid: No thyromegaly.      Trachea: No tracheal deviation.   Cardiovascular:      Rate and Rhythm: Normal rate and regular rhythm.      Heart sounds: Normal heart sounds, S1 normal and S2 normal. No murmur heard.   No friction rub. No S3 or S4 sounds.    Pulmonary:      Effort: Pulmonary effort is normal. " No respiratory distress.      Breath sounds: Normal breath sounds. No wheezing or rales.   Abdominal:      General: Bowel sounds are normal.      Palpations: Abdomen is soft. There is no mass.      Tenderness: There is no abdominal tenderness.      Comments: Splenomegaly   Musculoskeletal:         General: Normal range of motion.      Cervical back: Neck supple.      Right lower leg: Edema present.      Left lower leg: Edema present.      Comments: trace   Skin:     General: Skin is warm and dry.      Findings: No rash.   Neurological:      Mental Status: She is alert and oriented to person, place, and time.      Motor: No abnormal muscle tone.      Deep Tendon Reflexes: Reflexes are normal and symmetric.   Psychiatric:         Thought Content: Thought content normal.         Judgment: Judgment normal.           Recent Labs   Lab Test 08/27/20  1423 06/03/20  1611   HGB 11.0*  --      --      --    POTASSIUM 3.9  --    CR 0.71 0.8        Diagnostics:  Labs pending at this time.  Results will be reviewed when available.  Additional lab has been signed and held for day of procedure  EKG: appears normal, NSR, normal axis, normal intervals, no acute ST/T changes c/w ischemia, no LVH by voltage criteria, unchanged from previous tracings    Revised Cardiac Risk Index (RCRI):  The patient has the following serious cardiovascular risks for perioperative complications:   - No serious cardiac risks = 0 points     RCRI Interpretation: 0 points: Class I (very low risk - 0.4% complication rate)           Signed Electronically by: Bryson Huang MD  Copy of this evaluation report is provided to requesting physician.    Bryson Huang MD

## 2021-10-27 PROBLEM — M54.50 CHRONIC LOW BACK PAIN WITHOUT SCIATICA, UNSPECIFIED BACK PAIN LATERALITY: Status: ACTIVE | Noted: 2021-10-27

## 2021-10-27 PROBLEM — G89.29 CHRONIC LOW BACK PAIN WITHOUT SCIATICA, UNSPECIFIED BACK PAIN LATERALITY: Status: ACTIVE | Noted: 2021-10-27

## 2021-10-27 PROBLEM — R63.4 WEIGHT LOSS: Status: ACTIVE | Noted: 2021-10-27

## 2021-10-27 PROBLEM — E04.1 THYROID NODULE: Status: ACTIVE | Noted: 2021-10-27

## 2021-10-27 PROBLEM — R16.1 SPLENOMEGALY: Status: ACTIVE | Noted: 2021-10-27

## 2021-10-27 ASSESSMENT — ENCOUNTER SYMPTOMS
PALPITATIONS: 0
BACK PAIN: 0
ARTHRALGIAS: 0
VOMITING: 0
DYSURIA: 0
SHORTNESS OF BREATH: 0
BRUISES/BLEEDS EASILY: 0
FEVER: 0
SORE THROAT: 0
COUGH: 0
NEUROLOGICAL NEGATIVE: 1
EYE REDNESS: 0
EYE PAIN: 0
ABDOMINAL PAIN: 0
DYSPHORIC MOOD: 0
HEMATURIA: 0
CHILLS: 0
COLOR CHANGE: 0

## 2021-10-28 LAB
ANION GAP SERPL CALCULATED.3IONS-SCNC: 6 MMOL/L (ref 3–14)
BUN SERPL-MCNC: 27 MG/DL (ref 7–30)
CALCIUM SERPL-MCNC: 9 MG/DL (ref 8.5–10.1)
CHLORIDE BLD-SCNC: 109 MMOL/L (ref 94–109)
CO2 SERPL-SCNC: 23 MMOL/L (ref 20–32)
CREAT SERPL-MCNC: 0.66 MG/DL (ref 0.52–1.04)
GFR SERPL CREATININE-BSD FRML MDRD: >90 ML/MIN/1.73M2
GLUCOSE BLD-MCNC: 130 MG/DL (ref 70–99)
POTASSIUM BLD-SCNC: 5 MMOL/L (ref 3.4–5.3)
SODIUM SERPL-SCNC: 138 MMOL/L (ref 133–144)

## 2021-11-04 ENCOUNTER — ANESTHESIA EVENT (OUTPATIENT)
Dept: SURGERY | Facility: HOSPITAL | Age: 66
End: 2021-11-04
Payer: COMMERCIAL

## 2021-11-05 ENCOUNTER — ANESTHESIA (OUTPATIENT)
Dept: SURGERY | Facility: HOSPITAL | Age: 66
End: 2021-11-05
Payer: COMMERCIAL

## 2021-11-05 ENCOUNTER — HOSPITAL ENCOUNTER (OUTPATIENT)
Facility: HOSPITAL | Age: 66
Discharge: HOME | End: 2021-11-05
Attending: COLON & RECTAL SURGERY | Admitting: COLON & RECTAL SURGERY
Payer: COMMERCIAL

## 2021-11-05 VITALS
WEIGHT: 190.5 LBS | OXYGEN SATURATION: 95 % | DIASTOLIC BLOOD PRESSURE: 57 MMHG | RESPIRATION RATE: 14 BRPM | SYSTOLIC BLOOD PRESSURE: 127 MMHG | HEART RATE: 80 BPM | TEMPERATURE: 97.3 F | BODY MASS INDEX: 32.7 KG/M2

## 2021-11-05 PROCEDURE — 710N000009 HC RECOVERY PHASE 1, LEVEL 1, PER MIN: Performed by: COLON & RECTAL SURGERY

## 2021-11-05 PROCEDURE — 250N000011 HC RX IP 250 OP 636: Performed by: NURSE ANESTHETIST, CERTIFIED REGISTERED

## 2021-11-05 PROCEDURE — 250N000009 HC RX 250: Performed by: NURSE ANESTHETIST, CERTIFIED REGISTERED

## 2021-11-05 PROCEDURE — 710N000012 HC RECOVERY PHASE 2, PER MINUTE: Performed by: COLON & RECTAL SURGERY

## 2021-11-05 PROCEDURE — 272N000001 HC OR GENERAL SUPPLY STERILE: Performed by: COLON & RECTAL SURGERY

## 2021-11-05 PROCEDURE — 370N000017 HC ANESTHESIA TECHNICAL FEE, PER MIN: Performed by: COLON & RECTAL SURGERY

## 2021-11-05 PROCEDURE — 999N000141 HC STATISTIC PRE-PROCEDURE NURSING ASSESSMENT: Performed by: COLON & RECTAL SURGERY

## 2021-11-05 PROCEDURE — 250N000025 HC SEVOFLURANE, PER MIN: Performed by: COLON & RECTAL SURGERY

## 2021-11-05 PROCEDURE — 360N000075 HC SURGERY LEVEL 2, PER MIN: Performed by: COLON & RECTAL SURGERY

## 2021-11-05 PROCEDURE — 258N000003 HC RX IP 258 OP 636: Performed by: ANESTHESIOLOGY

## 2021-11-05 RX ORDER — ONDANSETRON 4 MG/1
4 TABLET, ORALLY DISINTEGRATING ORAL EVERY 30 MIN PRN
Status: DISCONTINUED | OUTPATIENT
Start: 2021-11-05 | End: 2021-11-05 | Stop reason: HOSPADM

## 2021-11-05 RX ORDER — FENTANYL CITRATE 50 UG/ML
25 INJECTION, SOLUTION INTRAMUSCULAR; INTRAVENOUS EVERY 5 MIN PRN
Status: DISCONTINUED | OUTPATIENT
Start: 2021-11-05 | End: 2021-11-05 | Stop reason: HOSPADM

## 2021-11-05 RX ORDER — FENTANYL CITRATE 50 UG/ML
25 INJECTION, SOLUTION INTRAMUSCULAR; INTRAVENOUS
Status: DISCONTINUED | OUTPATIENT
Start: 2021-11-05 | End: 2021-11-05 | Stop reason: HOSPADM

## 2021-11-05 RX ORDER — FLUMAZENIL 0.1 MG/ML
0.2 INJECTION, SOLUTION INTRAVENOUS
Status: DISCONTINUED | OUTPATIENT
Start: 2021-11-05 | End: 2021-11-05 | Stop reason: HOSPADM

## 2021-11-05 RX ORDER — NALOXONE HYDROCHLORIDE 0.4 MG/ML
0.2 INJECTION, SOLUTION INTRAMUSCULAR; INTRAVENOUS; SUBCUTANEOUS
Status: DISCONTINUED | OUTPATIENT
Start: 2021-11-05 | End: 2021-11-05 | Stop reason: HOSPADM

## 2021-11-05 RX ORDER — ONDANSETRON 2 MG/ML
4 INJECTION INTRAMUSCULAR; INTRAVENOUS EVERY 30 MIN PRN
Status: DISCONTINUED | OUTPATIENT
Start: 2021-11-05 | End: 2021-11-05 | Stop reason: HOSPADM

## 2021-11-05 RX ORDER — SODIUM CHLORIDE, SODIUM LACTATE, POTASSIUM CHLORIDE, CALCIUM CHLORIDE 600; 310; 30; 20 MG/100ML; MG/100ML; MG/100ML; MG/100ML
INJECTION, SOLUTION INTRAVENOUS CONTINUOUS
Status: DISCONTINUED | OUTPATIENT
Start: 2021-11-05 | End: 2021-11-05 | Stop reason: HOSPADM

## 2021-11-05 RX ORDER — PROPOFOL 10 MG/ML
INJECTION, EMULSION INTRAVENOUS PRN
Status: DISCONTINUED | OUTPATIENT
Start: 2021-11-05 | End: 2021-11-05

## 2021-11-05 RX ORDER — LIDOCAINE HYDROCHLORIDE 20 MG/ML
INJECTION, SOLUTION INFILTRATION; PERINEURAL PRN
Status: DISCONTINUED | OUTPATIENT
Start: 2021-11-05 | End: 2021-11-05

## 2021-11-05 RX ORDER — DEXAMETHASONE SODIUM PHOSPHATE 10 MG/ML
INJECTION, SOLUTION INTRAMUSCULAR; INTRAVENOUS PRN
Status: DISCONTINUED | OUTPATIENT
Start: 2021-11-05 | End: 2021-11-05

## 2021-11-05 RX ORDER — NALOXONE HYDROCHLORIDE 0.4 MG/ML
0.4 INJECTION, SOLUTION INTRAMUSCULAR; INTRAVENOUS; SUBCUTANEOUS
Status: DISCONTINUED | OUTPATIENT
Start: 2021-11-05 | End: 2021-11-05 | Stop reason: HOSPADM

## 2021-11-05 RX ORDER — LIDOCAINE 40 MG/G
CREAM TOPICAL
Status: DISCONTINUED | OUTPATIENT
Start: 2021-11-05 | End: 2021-11-05 | Stop reason: HOSPADM

## 2021-11-05 RX ORDER — ONDANSETRON 2 MG/ML
INJECTION INTRAMUSCULAR; INTRAVENOUS PRN
Status: DISCONTINUED | OUTPATIENT
Start: 2021-11-05 | End: 2021-11-05

## 2021-11-05 RX ADMIN — ONDANSETRON 4 MG: 2 INJECTION INTRAMUSCULAR; INTRAVENOUS at 13:23

## 2021-11-05 RX ADMIN — LIDOCAINE HYDROCHLORIDE 60 MG: 20 INJECTION, SOLUTION INFILTRATION; PERINEURAL at 13:07

## 2021-11-05 RX ADMIN — PROPOFOL 150 MG: 10 INJECTION, EMULSION INTRAVENOUS at 13:07

## 2021-11-05 RX ADMIN — DEXAMETHASONE SODIUM PHOSPHATE 10 MG: 10 INJECTION, SOLUTION INTRAMUSCULAR; INTRAVENOUS at 13:07

## 2021-11-05 RX ADMIN — Medication 100 MG: at 13:07

## 2021-11-05 RX ADMIN — PROPOFOL 50 MG: 10 INJECTION, EMULSION INTRAVENOUS at 13:13

## 2021-11-05 RX ADMIN — SODIUM CHLORIDE, POTASSIUM CHLORIDE, SODIUM LACTATE AND CALCIUM CHLORIDE: 600; 310; 30; 20 INJECTION, SOLUTION INTRAVENOUS at 10:47

## 2021-11-05 NOTE — INTERVAL H&P NOTE
I have reviewed the surgical (or preoperative) H&P that is linked to this encounter, and examined the patient. There are no significant changes    OK to proceed with colonoscopy.    Efraín Guerrero MD [facility]  [facility]  Office: [phone]  11/5/2021 12:54 PM

## 2021-11-05 NOTE — OP NOTE
CRSAL COLONOSCOPY PROCEDURE NOTE        Patient Name: Tessa Edwards    Date of Procedure: November 5, 2021    Endoscopist: Efraín Guerrero MD    Procedure: COLONOSCOPY (incomplete)    Pre-operative Diagnosis:   1. Lymphoproliferative disorder  2. Colon mass  3. Reflux    Post-operative Diagnosis:  Same    Indications: Colon mass.  Ms. Edwards is a 66-year-old female with multiple medical comorbidities including prior pelvic surgery and extensive sigmoid diverticulosis leading to incomplete colonoscopy in the past, lymphoproliferative disorder, splenomegaly, reflux disease, and recent diagnosis of COVID-19.  She has a right colon mass of unknown etiology.  We are attempting a repeat colonoscopy under general anesthesia to see if we can identify and biopsy this mass in the right colon.  She is aware that there is a higher risk than usual with this colonoscopy particularly for perforation in the sigmoid colon however, if we would be able to visualize the right colon and identify the fact that there is no colon cancer, this would potentially give her the opportunity to avoid abdominal surgery which given her comorbidities would be a significantly better choice.    Medications: See anesthesia (General endotracheal anesthesia)    Procedure Details    The patient was informed of the risks, benefits and alternatives and gave informed consent. The patient had stable cardiovascular status and was judged to be an  adequate candidate for general anesthesia and endoscopy. A timeout was performed.    The patient was placed in the left lateral decubitus position. A digital rectal examination was performed this was normal. The lubricated pediatric colonoscope was introduced through the anus and advanced to sigmoid colon.  The sigmoid colon was densely adherent at multiple locations within the pelvis making visualization extremely difficult.  In addition, she had significant diverticulosis making the lumen very difficult to  identify at multiple points along the way.  The pediatric colonoscope was changed out to an EGD scope however the areas of angulation and diverticulosis in the sigmoid colon could not be passed.  Therefore the procedure was terminated without reaching the right colon.    Prep Type: MiraLAX/Gatorade    Findings: Extensive angulation within the pelvis at the sigmoid colon with innumerable diverticuli.    Estimated Blood Loss: 0    Specimens: None    Withdrawal time: Not applicable    Complications: None; patient tolerated the procedure well.    Impression: Incomplete colonoscopy secondary to sigmoid colon angulation and diverticulosis        Recommendations: Proceed with surgery for minimally invasive right colectomy    Efraín Guerrero MD, AQUILES  [facility]  Office: [phone]  11/5/2021 1:54 PM

## 2021-11-05 NOTE — ANESTHESIA PROCEDURE NOTES
Airway       Patient location during procedure: OR       Procedure Start/Stop Times: 11/5/2021 1:08 PM  Staff -        Performed By: CRNA  Consent for Airway        Urgency: elective  Indications and Patient Condition       Indications for airway management: nadia-procedural       Induction type:intravenous       Mask difficulty assessment: 0 - not attempted    Final Airway Details       Final airway type: endotracheal airway       Successful airway: ETT - single  Endotracheal Airway Details        ETT size (mm): 7.0       Successful intubation technique: video laryngoscopy       VL Blade Size: Glidescope 3       Grade View of Cords: 1       Adjucts: stylet and tooth guard       Position: Right       Measured from: lips       Secured at (cm): 22    Post intubation assessment        Placement verified by: capnometry, equal breath sounds and chest rise        Number of attempts at approach: 1       Secured with: silk tape       Ease of procedure: easy       Dentition: Intact and Unchanged

## 2021-11-05 NOTE — ANESTHESIA POSTPROCEDURE EVALUATION
Patient: Tessa Edwards    Procedure: Procedure(s):  COLONOSCOPY       Diagnosis:Mass of colon [K63.89]  Diagnosis Additional Information: No value filed.    Anesthesia Type:  General    Note:  Disposition: Outpatient   Postop Pain Control: Uneventful            Sign Out: Well controlled pain   PONV: No   Neuro/Psych: Uneventful            Sign Out: Acceptable/Baseline neuro status   Airway/Respiratory: Uneventful            Sign Out: Acceptable/Baseline resp. status   CV/Hemodynamics: Uneventful            Sign Out: Acceptable CV status; No obvious hypovolemia; No obvious fluid overload   Other NRE: NONE   DID A NON-ROUTINE EVENT OCCUR? No           Last vitals:  Vitals Value Taken Time   /57 11/05/21 1415   Temp 36.3  C (97.3  F) 11/05/21 1400   Pulse 80 11/05/21 1419   Resp 17 11/05/21 1419   SpO2 95 % 11/05/21 1419   Vitals shown include unvalidated device data.    Electronically Signed By: KRYSTAL NICHOLS MD  November 5, 2021  3:28 PM

## 2021-11-05 NOTE — ANESTHESIA CARE TRANSFER NOTE
Patient: Tessa Edwards    Procedure: Procedure(s):  COLONOSCOPY       Diagnosis: Mass of colon [K63.89]  Diagnosis Additional Information: No value filed.    Anesthesia Type:   General     Note:    Oropharynx: oropharynx clear of all foreign objects  Level of Consciousness: awake  Oxygen Supplementation: face mask  Level of Supplemental Oxygen (L/min / FiO2): 10  Independent Airway: airway patency satisfactory and stable  Dentition: dentition unchanged  Vital Signs Stable: post-procedure vital signs reviewed and stable  Report to RN Given: handoff report given  Patient transferred to: PACU    Handoff Report: Identifed the Patient, Identified the Reponsible Provider, Reviewed the pertinent medical history, Discussed the surgical course, Reviewed Intra-OP anesthesia mangement and issues during anesthesia, Set expectations for post-procedure period and Allowed opportunity for questions and acknowledgement of understanding      Vitals:  Vitals Value Taken Time   /67 11/05/21 1400   Temp 36.3  C (97.3  F) 11/05/21 1400   Pulse 79 11/05/21 1402   Resp 20 11/05/21 1402   SpO2 100 % 11/05/21 1402   Vitals shown include unvalidated device data.    Electronically Signed By: FELIPE Velez CRNA  November 5, 2021  2:03 PM

## 2021-11-05 NOTE — DISCHARGE INSTRUCTIONS
Colonoscopy Discharge Instructions:     You may be drowsy for the next few hours.  DO NOT conduct important business, have any alcohol, drive a vehicle, or operate heavy machinery for the next 12 hours.   It is recommended that the responsible person stay with the patient for a minimum of 12 hours.  You may resume your usual diet.  You may resume your regular activity tomorrow.  If you experience a temperature above 101 degrees, please call your doctor.  If you have redness or swelling where the medications were given, place a warm, wet washcloth over the affected area for 20 minutes, 3 times a day until the redness subsides. If the symptoms continue for more than 2-3 days call your doctor.  Notify your doctor immediately if you experience more than 2 tablespoons of blood from your rectum.  Notify your doctor immediately if you develop severe abdominal pain.  You are advised to avoid air travel for 24 hours following your procedure.  [facility] has physicians on call 24 hours a day, 7 days per week.

## 2021-11-05 NOTE — ANESTHESIA PREPROCEDURE EVALUATION
Anesthesia Pre-Procedure Evaluation    Patient: Tessa Edwards   MRN: [id number] : 1955        Preoperative Diagnosis: Mass of colon [K63.89]    Procedure : Procedure(s):  COLONOSCOPY          Past Medical History:   Diagnosis Date     Abnormal CT of the abdomen 10/26/2021     Anemia, iron deficiency 10/26/2021     Cervical spinal stenosis      Chronic low back pain without sciatica, unspecified back pain laterality 10/27/2021    Spondylosis,central  stenosis, foraminal encroachment     Depression      Disease of thyroid gland      Fibromyalgia      GERD (gastroesophageal reflux disease)      Hyperlipemia      Lymphoproliferative disorder (H)      Obesity      Osteoarthritis      Primary localized osteoarthrosis, lower leg 2007     Restless legs syndrome 10/26/2021     Rotator cuff tear      Sleep apnea      Splenomegaly 10/27/2021     Thyroid nodule 10/27/2021     Vitamin D deficiency      Weight loss 10/27/2021      Past Surgical History:   Procedure Laterality Date     ARTHROPLASTY SHOULDER Left      ARTHROSCOPY KNEE       ARTHROSCOPY SHOULDER ROTATOR CUFF REPAIR       C LAP,CHOLECYSTECTOMY/EXPLORE      Description: Cholecystectomy Laparoscopic;  Recorded: 12/10/2012;     C TOTAL ABDOM HYSTERECTOMY      Description: Total Abdominal Hysterectomy;  Recorded: 12/10/2012;  Comments: 46 Y/O FOR FIBROID TUMORS     C TOTAL KNEE ARTHROPLASTY Right 2019    Procedure: RIGHT TOTAL KNEE ARTHROPLASTY;  Surgeon: Irvin Canas DO;  Location: [facility];  Service: Orthopedics     CERVICAL DISC ARTHROPLASTY       CERVICAL DISCECTOMY  2017    C5, C6     CRW LT SHOULDER AP AXILLA 2 VW       HYSTERECTOMY       JOINT REPLACEMENT       OOPHORECTOMY       RELEASE CARPAL TUNNEL       RELEASE CARPAL TUNNEL       XR MYELOGRAM CERVICAL  2019      No Known Allergies   Social History     Tobacco Use     Smoking status: Former Smoker     Quit date: 2000     Years since quittin.7      Smokeless tobacco: Never Used   Substance Use Topics     Alcohol use: Yes     Comment: Alcoholic Drinks/day: 3-6      Wt Readings from Last 1 Encounters:   11/05/21 86.4 kg (190 lb 8 oz)        Anesthesia Evaluation   Pt has had prior anesthetic.     No history of anesthetic complications       ROS/MED HX  ENT/Pulmonary: Comment: Hx of thick pulmonary secretions, currently symptomatic. Further evaluation pending      (+) sleep apnea (Getting tested for CPAP next week),     Neurologic: Comment: Hx of cervical stenosis s/p surgical repair  RLS  Numbness in bilateral LE 2/2 lower back; uses a cane      Cardiovascular:  - neg cardiovascular ROS     METS/Exercise Tolerance:     Hematologic: Comments: Lymphoproliferative disorder       Musculoskeletal: Comment: Fibromylagia  (+) arthritis,     GI/Hepatic:     (+) GERD, Asymptomatic on medication,     Renal/Genitourinary:  - neg Renal ROS     Endo:     (+) Obesity,     Psychiatric/Substance Use:     (+) psychiatric history anxiety and depression     Infectious Disease:       Malignancy:  - neg malignancy ROS     Other:  - neg other ROS          Physical Exam    Airway  airway exam normal      Mallampati: II   TM distance: > 3 FB   Neck ROM: full   Mouth opening: > 3 cm    Respiratory Devices and Support         Dental  no notable dental history     (+) partials      Cardiovascular   cardiovascular exam normal       Rhythm and rate: regular and normal     Pulmonary   pulmonary exam normal        breath sounds clear to auscultation           OUTSIDE LABS:  CBC:   Lab Results   Component Value Date    WBC 42.0 (HH) 08/27/2020    WBC 36.7 (HH) 09/12/2019    HGB 11.0 (L) 08/27/2020    HGB 9.4 (L) 09/14/2019    HCT 35.3 08/27/2020    HCT 36.4 09/12/2019     08/27/2020     09/12/2019     BMP:   Lab Results   Component Value Date     10/26/2021     08/27/2020    POTASSIUM 5.0 10/26/2021    POTASSIUM 3.9 08/27/2020    CHLORIDE 109 10/26/2021    CHLORIDE  108 (H) 08/27/2020    CO2 23 10/26/2021    CO2 23 08/27/2020    BUN 27 10/26/2021    BUN 31 (H) 08/27/2020    CR 0.66 10/26/2021    CR 0.71 08/27/2020     (H) 10/26/2021     (H) 10/21/2021     COAGS:   Lab Results   Component Value Date    PTT 23 (L) 09/12/2019    INR 0.99 09/14/2019     POC: No results found for: BGM, HCG, HCGS  HEPATIC:   Lab Results   Component Value Date    ALBUMIN 3.7 08/27/2020    PROTTOTAL 7.1 08/27/2020    ALT 23 08/27/2020    AST 27 08/27/2020    ALKPHOS 78 08/27/2020    BILITOTAL 0.4 08/27/2020     OTHER:   Lab Results   Component Value Date    A1C 6.1 (H) 10/18/2018    KAYLAN 9.0 10/26/2021    TSH 1.96 08/27/2020       Anesthesia Plan    ASA Status:  3   NPO Status:  NPO Appropriate    Anesthesia Type: General.     - Airway: ETT   Induction: Intravenous, RSI, Propofol.   Maintenance: Balanced.   Techniques and Equipment:     - Airway: Video-Laryngoscope         Consents    Anesthesia Plan(s) and associated risks, benefits, and realistic alternatives discussed. Questions answered and patient/representative(s) expressed understanding.     - Discussed with:  Patient         Postoperative Care    Pain management: IV analgesics, Oral pain medications, Multi-modal analgesia.   PONV prophylaxis: Ondansetron (or other 5HT-3), Dexamethasone or Solumedrol, Droperidol or Haldol, Background Propofol Infusion     Comments:                KRYSTAL NICHOLS MD

## 2021-11-10 ENCOUNTER — TELEPHONE (OUTPATIENT)
Dept: NURSING | Facility: CLINIC | Age: 66
End: 2021-11-10
Payer: COMMERCIAL

## 2021-11-10 NOTE — TELEPHONE ENCOUNTER
Telephone Call:    Pt wants a prescription sent to a new pharmacy. Pt was advised to call the pharmacy that the patient wants her prescription transferred to and have that pharmacy call the pharmacy that the medication is at for the transfer. Pt is going to do that now.     Vianca Gonzalez RN  [facility] Nurse Advisor 12:25 PM 11/10/2021

## 2021-11-12 RX ORDER — CEFAZOLIN SODIUM 2 G/100ML
2 INJECTION, SOLUTION INTRAVENOUS
Status: CANCELLED | OUTPATIENT
Start: 2021-11-12

## 2021-11-12 RX ORDER — CEFAZOLIN SODIUM 2 G/100ML
2 INJECTION, SOLUTION INTRAVENOUS SEE ADMIN INSTRUCTIONS
Status: CANCELLED | OUTPATIENT
Start: 2021-11-12

## 2021-11-12 RX ORDER — HEPARIN SODIUM 5000 [USP'U]/.5ML
5000 INJECTION, SOLUTION INTRAVENOUS; SUBCUTANEOUS
Status: CANCELLED | OUTPATIENT
Start: 2021-11-12

## 2021-11-12 RX ORDER — ONDANSETRON 2 MG/ML
4 INJECTION INTRAMUSCULAR; INTRAVENOUS ONCE
Status: CANCELLED | OUTPATIENT
Start: 2021-11-12 | End: 2021-11-12

## 2021-11-12 RX ORDER — ACETAMINOPHEN 325 MG/1
975 TABLET ORAL ONCE
Status: CANCELLED | OUTPATIENT
Start: 2021-11-12 | End: 2021-11-12

## 2021-11-16 ENCOUNTER — ANESTHESIA (OUTPATIENT)
Dept: SURGERY | Facility: HOSPITAL | Age: 66
DRG: 821 | End: 2021-11-16
Payer: COMMERCIAL

## 2021-11-16 ENCOUNTER — ANESTHESIA EVENT (OUTPATIENT)
Dept: SURGERY | Facility: HOSPITAL | Age: 66
DRG: 821 | End: 2021-11-16
Payer: COMMERCIAL

## 2021-11-16 ENCOUNTER — HOSPITAL ENCOUNTER (INPATIENT)
Facility: HOSPITAL | Age: 66
LOS: 3 days | Discharge: HOME | DRG: 821 | End: 2021-11-19
Attending: COLON & RECTAL SURGERY | Admitting: COLON & RECTAL SURGERY
Payer: COMMERCIAL

## 2021-11-16 ENCOUNTER — APPOINTMENT (OUTPATIENT)
Dept: RADIOLOGY | Facility: HOSPITAL | Age: 66
DRG: 821 | End: 2021-11-16
Attending: COLON & RECTAL SURGERY
Payer: COMMERCIAL

## 2021-11-16 DIAGNOSIS — K63.89 COLONIC MASS: Primary | ICD-10-CM

## 2021-11-16 LAB
ABO/RH(D): NORMAL
ANION GAP SERPL CALCULATED.3IONS-SCNC: 10 MMOL/L (ref 5–18)
ANTIBODY SCREEN: NEGATIVE
BUN SERPL-MCNC: 15 MG/DL (ref 8–22)
CALCIUM SERPL-MCNC: 9.1 MG/DL (ref 8.5–10.5)
CHLORIDE BLD-SCNC: 106 MMOL/L (ref 98–107)
CO2 SERPL-SCNC: 23 MMOL/L (ref 22–31)
CREAT SERPL-MCNC: 0.73 MG/DL (ref 0.6–1.1)
GFR SERPL CREATININE-BSD FRML MDRD: 85 ML/MIN/1.73M2
GLUCOSE BLD-MCNC: 103 MG/DL (ref 70–125)
HGB BLD-MCNC: 11.4 G/DL (ref 11.7–15.7)
HOLD SPECIMEN: NORMAL
POTASSIUM BLD-SCNC: 4 MMOL/L (ref 3.5–5)
SODIUM SERPL-SCNC: 139 MMOL/L (ref 136–145)
SPECIMEN EXPIRATION DATE: NORMAL

## 2021-11-16 PROCEDURE — 250N000011 HC RX IP 250 OP 636: Performed by: NURSE ANESTHETIST, CERTIFIED REGISTERED

## 2021-11-16 PROCEDURE — 74018 RADEX ABDOMEN 1 VIEW: CPT

## 2021-11-16 PROCEDURE — 250N000011 HC RX IP 250 OP 636: Performed by: SURGERY

## 2021-11-16 PROCEDURE — 258N000003 HC RX IP 258 OP 636: Performed by: NURSE ANESTHETIST, CERTIFIED REGISTERED

## 2021-11-16 PROCEDURE — 120N000001 HC R&B MED SURG/OB

## 2021-11-16 PROCEDURE — 88184 FLOWCYTOMETRY/ TC 1 MARKER: CPT | Performed by: PATHOLOGY

## 2021-11-16 PROCEDURE — 272N000001 HC OR GENERAL SUPPLY STERILE: Performed by: COLON & RECTAL SURGERY

## 2021-11-16 PROCEDURE — 999N000141 HC STATISTIC PRE-PROCEDURE NURSING ASSESSMENT: Performed by: COLON & RECTAL SURGERY

## 2021-11-16 PROCEDURE — 258N000003 HC RX IP 258 OP 636: Performed by: ANESTHESIOLOGY

## 2021-11-16 PROCEDURE — 250N000013 HC RX MED GY IP 250 OP 250 PS 637: Performed by: STUDENT IN AN ORGANIZED HEALTH CARE EDUCATION/TRAINING PROGRAM

## 2021-11-16 PROCEDURE — 88185 FLOWCYTOMETRY/TC ADD-ON: CPT | Performed by: COLON & RECTAL SURGERY

## 2021-11-16 PROCEDURE — 250N000025 HC SEVOFLURANE, PER MIN: Performed by: COLON & RECTAL SURGERY

## 2021-11-16 PROCEDURE — 86900 BLOOD TYPING SEROLOGIC ABO: CPT | Performed by: STUDENT IN AN ORGANIZED HEALTH CARE EDUCATION/TRAINING PROGRAM

## 2021-11-16 PROCEDURE — 250N000011 HC RX IP 250 OP 636: Performed by: ANESTHESIOLOGY

## 2021-11-16 PROCEDURE — 250N000013 HC RX MED GY IP 250 OP 250 PS 637: Performed by: SURGERY

## 2021-11-16 PROCEDURE — 370N000017 HC ANESTHESIA TECHNICAL FEE, PER MIN: Performed by: COLON & RECTAL SURGERY

## 2021-11-16 PROCEDURE — P9041 ALBUMIN (HUMAN),5%, 50ML: HCPCS | Performed by: NURSE ANESTHETIST, CERTIFIED REGISTERED

## 2021-11-16 PROCEDURE — 250N000011 HC RX IP 250 OP 636: Performed by: STUDENT IN AN ORGANIZED HEALTH CARE EDUCATION/TRAINING PROGRAM

## 2021-11-16 PROCEDURE — 88309 TISSUE EXAM BY PATHOLOGIST: CPT | Mod: TC | Performed by: COLON & RECTAL SURGERY

## 2021-11-16 PROCEDURE — 88184 FLOWCYTOMETRY/ TC 1 MARKER: CPT | Performed by: COLON & RECTAL SURGERY

## 2021-11-16 PROCEDURE — 99223 1ST HOSP IP/OBS HIGH 75: CPT | Performed by: INTERNAL MEDICINE

## 2021-11-16 PROCEDURE — 250N000009 HC RX 250: Performed by: NURSE ANESTHETIST, CERTIFIED REGISTERED

## 2021-11-16 PROCEDURE — 250N000013 HC RX MED GY IP 250 OP 250 PS 637: Performed by: INTERNAL MEDICINE

## 2021-11-16 PROCEDURE — 258N000003 HC RX IP 258 OP 636: Performed by: SURGERY

## 2021-11-16 PROCEDURE — C9290 INJ, BUPIVACAINE LIPOSOME: HCPCS | Performed by: ANESTHESIOLOGY

## 2021-11-16 PROCEDURE — 88189 FLOWCYTOMETRY/READ 16 & >: CPT | Performed by: PATHOLOGY

## 2021-11-16 PROCEDURE — 36415 COLL VENOUS BLD VENIPUNCTURE: CPT | Performed by: STUDENT IN AN ORGANIZED HEALTH CARE EDUCATION/TRAINING PROGRAM

## 2021-11-16 PROCEDURE — 80048 BASIC METABOLIC PNL TOTAL CA: CPT | Performed by: ANESTHESIOLOGY

## 2021-11-16 PROCEDURE — 88360 TUMOR IMMUNOHISTOCHEM/MANUAL: CPT | Mod: TC | Performed by: COLON & RECTAL SURGERY

## 2021-11-16 PROCEDURE — 710N000009 HC RECOVERY PHASE 1, LEVEL 1, PER MIN: Performed by: COLON & RECTAL SURGERY

## 2021-11-16 PROCEDURE — 360N000077 HC SURGERY LEVEL 4, PER MIN: Performed by: COLON & RECTAL SURGERY

## 2021-11-16 PROCEDURE — 99207 PR CDG-CODE CATEGORY CHANGED: CPT | Performed by: INTERNAL MEDICINE

## 2021-11-16 PROCEDURE — 85018 HEMOGLOBIN: CPT | Performed by: STUDENT IN AN ORGANIZED HEALTH CARE EDUCATION/TRAINING PROGRAM

## 2021-11-16 RX ORDER — ALBUMIN, HUMAN INJ 5% 5 %
SOLUTION INTRAVENOUS CONTINUOUS PRN
Status: DISCONTINUED | OUTPATIENT
Start: 2021-11-16 | End: 2021-11-16

## 2021-11-16 RX ORDER — ESMOLOL HYDROCHLORIDE 10 MG/ML
INJECTION INTRAVENOUS PRN
Status: DISCONTINUED | OUTPATIENT
Start: 2021-11-16 | End: 2021-11-16

## 2021-11-16 RX ORDER — NALOXONE HYDROCHLORIDE 0.4 MG/ML
0.4 INJECTION, SOLUTION INTRAMUSCULAR; INTRAVENOUS; SUBCUTANEOUS
Status: DISCONTINUED | OUTPATIENT
Start: 2021-11-16 | End: 2021-11-19 | Stop reason: HOSPADM

## 2021-11-16 RX ORDER — DEXAMETHASONE SODIUM PHOSPHATE 10 MG/ML
INJECTION, SOLUTION INTRAMUSCULAR; INTRAVENOUS PRN
Status: DISCONTINUED | OUTPATIENT
Start: 2021-11-16 | End: 2021-11-16

## 2021-11-16 RX ORDER — ONDANSETRON 4 MG/1
4 TABLET, ORALLY DISINTEGRATING ORAL EVERY 6 HOURS PRN
Status: DISCONTINUED | OUTPATIENT
Start: 2021-11-16 | End: 2021-11-19 | Stop reason: HOSPADM

## 2021-11-16 RX ORDER — FENTANYL CITRATE 50 UG/ML
INJECTION, SOLUTION INTRAMUSCULAR; INTRAVENOUS PRN
Status: DISCONTINUED | OUTPATIENT
Start: 2021-11-16 | End: 2021-11-16

## 2021-11-16 RX ORDER — ONDANSETRON 2 MG/ML
4 INJECTION INTRAMUSCULAR; INTRAVENOUS ONCE
Status: COMPLETED | OUTPATIENT
Start: 2021-11-16 | End: 2021-11-16

## 2021-11-16 RX ORDER — NALOXONE HYDROCHLORIDE 0.4 MG/ML
0.2 INJECTION, SOLUTION INTRAMUSCULAR; INTRAVENOUS; SUBCUTANEOUS
Status: DISCONTINUED | OUTPATIENT
Start: 2021-11-16 | End: 2021-11-19 | Stop reason: HOSPADM

## 2021-11-16 RX ORDER — ACETAMINOPHEN 325 MG/1
975 TABLET ORAL ONCE
Status: COMPLETED | OUTPATIENT
Start: 2021-11-16 | End: 2021-11-16

## 2021-11-16 RX ORDER — LIDOCAINE HYDROCHLORIDE 20 MG/ML
INJECTION, SOLUTION INFILTRATION; PERINEURAL PRN
Status: DISCONTINUED | OUTPATIENT
Start: 2021-11-16 | End: 2021-11-16

## 2021-11-16 RX ORDER — FENTANYL CITRATE 50 UG/ML
25 INJECTION, SOLUTION INTRAMUSCULAR; INTRAVENOUS
Status: DISCONTINUED | OUTPATIENT
Start: 2021-11-16 | End: 2021-11-16 | Stop reason: HOSPADM

## 2021-11-16 RX ORDER — ACETAMINOPHEN 325 MG/1
975 TABLET ORAL EVERY 8 HOURS
Status: DISCONTINUED | OUTPATIENT
Start: 2021-11-16 | End: 2021-11-19 | Stop reason: HOSPADM

## 2021-11-16 RX ORDER — HYDROMORPHONE HCL IN WATER/PF 6 MG/30 ML
0.4 PATIENT CONTROLLED ANALGESIA SYRINGE INTRAVENOUS EVERY 5 MIN PRN
Status: DISCONTINUED | OUTPATIENT
Start: 2021-11-16 | End: 2021-11-16 | Stop reason: HOSPADM

## 2021-11-16 RX ORDER — ACETAMINOPHEN 500 MG
1000 TABLET ORAL
Status: ON HOLD | COMMUNITY
End: 2024-03-12

## 2021-11-16 RX ORDER — SODIUM CHLORIDE, SODIUM LACTATE, POTASSIUM CHLORIDE, CALCIUM CHLORIDE 600; 310; 30; 20 MG/100ML; MG/100ML; MG/100ML; MG/100ML
INJECTION, SOLUTION INTRAVENOUS CONTINUOUS
Status: DISCONTINUED | OUTPATIENT
Start: 2021-11-16 | End: 2021-11-16 | Stop reason: HOSPADM

## 2021-11-16 RX ORDER — CEFAZOLIN SODIUM 2 G/100ML
2 INJECTION, SOLUTION INTRAVENOUS
Status: COMPLETED | OUTPATIENT
Start: 2021-11-16 | End: 2021-11-16

## 2021-11-16 RX ORDER — ACETAMINOPHEN 325 MG/1
650 TABLET ORAL EVERY 4 HOURS PRN
Status: DISCONTINUED | OUTPATIENT
Start: 2021-11-19 | End: 2021-11-19 | Stop reason: HOSPADM

## 2021-11-16 RX ORDER — LABETALOL HYDROCHLORIDE 5 MG/ML
5 INJECTION, SOLUTION INTRAVENOUS EVERY 5 MIN PRN
Status: DISCONTINUED | OUTPATIENT
Start: 2021-11-16 | End: 2021-11-16 | Stop reason: HOSPADM

## 2021-11-16 RX ORDER — ACETAMINOPHEN 325 MG/1
975 TABLET ORAL
Status: DISCONTINUED | OUTPATIENT
Start: 2021-11-16 | End: 2021-11-16 | Stop reason: HOSPADM

## 2021-11-16 RX ORDER — HALOPERIDOL 5 MG/ML
1 INJECTION INTRAMUSCULAR
Status: COMPLETED | OUTPATIENT
Start: 2021-11-16 | End: 2021-11-16

## 2021-11-16 RX ORDER — MAGNESIUM SULFATE 4 G/50ML
4 INJECTION INTRAVENOUS ONCE
Status: COMPLETED | OUTPATIENT
Start: 2021-11-16 | End: 2021-11-16

## 2021-11-16 RX ORDER — GABAPENTIN 100 MG/1
100 CAPSULE ORAL AT BEDTIME
Status: DISCONTINUED | OUTPATIENT
Start: 2021-11-16 | End: 2021-11-17

## 2021-11-16 RX ORDER — CEFAZOLIN SODIUM 2 G/100ML
2 INJECTION, SOLUTION INTRAVENOUS SEE ADMIN INSTRUCTIONS
Status: DISCONTINUED | OUTPATIENT
Start: 2021-11-16 | End: 2021-11-16 | Stop reason: HOSPADM

## 2021-11-16 RX ORDER — HYDROMORPHONE HCL IN WATER/PF 6 MG/30 ML
0.2 PATIENT CONTROLLED ANALGESIA SYRINGE INTRAVENOUS
Status: DISCONTINUED | OUTPATIENT
Start: 2021-11-16 | End: 2021-11-19 | Stop reason: HOSPADM

## 2021-11-16 RX ORDER — BUPIVACAINE HYDROCHLORIDE 2.5 MG/ML
INJECTION, SOLUTION EPIDURAL; INFILTRATION; INTRACAUDAL PRN
Status: DISCONTINUED | OUTPATIENT
Start: 2021-11-16 | End: 2021-11-16

## 2021-11-16 RX ORDER — ONDANSETRON 2 MG/ML
4 INJECTION INTRAMUSCULAR; INTRAVENOUS EVERY 30 MIN PRN
Status: DISCONTINUED | OUTPATIENT
Start: 2021-11-16 | End: 2021-11-16 | Stop reason: HOSPADM

## 2021-11-16 RX ORDER — ONDANSETRON 2 MG/ML
4 INJECTION INTRAMUSCULAR; INTRAVENOUS EVERY 6 HOURS PRN
Status: DISCONTINUED | OUTPATIENT
Start: 2021-11-16 | End: 2021-11-19 | Stop reason: HOSPADM

## 2021-11-16 RX ORDER — HYDROMORPHONE HCL IN WATER/PF 6 MG/30 ML
0.4 PATIENT CONTROLLED ANALGESIA SYRINGE INTRAVENOUS
Status: DISCONTINUED | OUTPATIENT
Start: 2021-11-16 | End: 2021-11-19 | Stop reason: HOSPADM

## 2021-11-16 RX ORDER — KETAMINE HYDROCHLORIDE 50 MG/ML
INJECTION, SOLUTION INTRAMUSCULAR; INTRAVENOUS PRN
Status: DISCONTINUED | OUTPATIENT
Start: 2021-11-16 | End: 2021-11-16

## 2021-11-16 RX ORDER — LIDOCAINE 40 MG/G
CREAM TOPICAL
Status: DISCONTINUED | OUTPATIENT
Start: 2021-11-16 | End: 2021-11-16 | Stop reason: HOSPADM

## 2021-11-16 RX ORDER — MULTIPLE VITAMINS W/ MINERALS TAB 9MG-400MCG
1 TAB ORAL DAILY
COMMUNITY

## 2021-11-16 RX ORDER — GLYCOPYRROLATE 0.2 MG/ML
INJECTION, SOLUTION INTRAMUSCULAR; INTRAVENOUS PRN
Status: DISCONTINUED | OUTPATIENT
Start: 2021-11-16 | End: 2021-11-16

## 2021-11-16 RX ORDER — PROPOFOL 10 MG/ML
INJECTION, EMULSION INTRAVENOUS CONTINUOUS PRN
Status: DISCONTINUED | OUTPATIENT
Start: 2021-11-16 | End: 2021-11-16

## 2021-11-16 RX ORDER — SODIUM CHLORIDE, SODIUM LACTATE, POTASSIUM CHLORIDE, CALCIUM CHLORIDE 600; 310; 30; 20 MG/100ML; MG/100ML; MG/100ML; MG/100ML
INJECTION, SOLUTION INTRAVENOUS CONTINUOUS
Status: DISCONTINUED | OUTPATIENT
Start: 2021-11-16 | End: 2021-11-18

## 2021-11-16 RX ORDER — ROPINIROLE 0.25 MG/1
0.25 TABLET, FILM COATED ORAL AT BEDTIME
Status: DISCONTINUED | OUTPATIENT
Start: 2021-11-16 | End: 2021-11-19 | Stop reason: HOSPADM

## 2021-11-16 RX ORDER — FENTANYL CITRATE 50 UG/ML
25-50 INJECTION, SOLUTION INTRAMUSCULAR; INTRAVENOUS EVERY 5 MIN PRN
Status: DISCONTINUED | OUTPATIENT
Start: 2021-11-16 | End: 2021-11-16 | Stop reason: HOSPADM

## 2021-11-16 RX ORDER — LIDOCAINE 40 MG/G
CREAM TOPICAL
Status: DISCONTINUED | OUTPATIENT
Start: 2021-11-16 | End: 2021-11-19 | Stop reason: HOSPADM

## 2021-11-16 RX ORDER — HEPARIN SODIUM 5000 [USP'U]/.5ML
5000 INJECTION, SOLUTION INTRAVENOUS; SUBCUTANEOUS
Status: COMPLETED | OUTPATIENT
Start: 2021-11-16 | End: 2021-11-16

## 2021-11-16 RX ORDER — ONDANSETRON 4 MG/1
4 TABLET, ORALLY DISINTEGRATING ORAL EVERY 30 MIN PRN
Status: DISCONTINUED | OUTPATIENT
Start: 2021-11-16 | End: 2021-11-16 | Stop reason: HOSPADM

## 2021-11-16 RX ORDER — OXYCODONE HYDROCHLORIDE 5 MG/1
5 TABLET ORAL EVERY 4 HOURS PRN
Status: DISCONTINUED | OUTPATIENT
Start: 2021-11-16 | End: 2021-11-16 | Stop reason: HOSPADM

## 2021-11-16 RX ADMIN — ONDANSETRON 4 MG: 2 INJECTION INTRAMUSCULAR; INTRAVENOUS at 11:05

## 2021-11-16 RX ADMIN — MAGNESIUM SULFATE HEPTAHYDRATE 4 G: 80 INJECTION, SOLUTION INTRAVENOUS at 11:27

## 2021-11-16 RX ADMIN — CEFAZOLIN SODIUM 2 G: 2 INJECTION, SOLUTION INTRAVENOUS at 15:12

## 2021-11-16 RX ADMIN — BUPIVACAINE HYDROCHLORIDE 40 ML: 2.5 INJECTION, SOLUTION EPIDURAL; INFILTRATION; INTRACAUDAL at 15:22

## 2021-11-16 RX ADMIN — HYDROMORPHONE HYDROCHLORIDE 0.2 MG: 0.2 INJECTION, SOLUTION INTRAMUSCULAR; INTRAVENOUS; SUBCUTANEOUS at 23:13

## 2021-11-16 RX ADMIN — GLYCOPYRROLATE 0.4 MG: 0.2 INJECTION, SOLUTION INTRAMUSCULAR; INTRAVENOUS at 15:00

## 2021-11-16 RX ADMIN — FLUOXETINE HYDROCHLORIDE 40 MG: 20 CAPSULE ORAL at 22:01

## 2021-11-16 RX ADMIN — ROCURONIUM BROMIDE 20 MG: 50 INJECTION, SOLUTION INTRAVENOUS at 17:43

## 2021-11-16 RX ADMIN — MIDAZOLAM HYDROCHLORIDE 2 MG: 1 INJECTION, SOLUTION INTRAMUSCULAR; INTRAVENOUS at 14:52

## 2021-11-16 RX ADMIN — HYDROMORPHONE HYDROCHLORIDE 0.5 MG: 1 INJECTION, SOLUTION INTRAMUSCULAR; INTRAVENOUS; SUBCUTANEOUS at 16:05

## 2021-11-16 RX ADMIN — FENTANYL CITRATE 50 MCG: 50 INJECTION, SOLUTION INTRAMUSCULAR; INTRAVENOUS at 15:00

## 2021-11-16 RX ADMIN — PROPOFOL 30 MCG/KG/MIN: 10 INJECTION, EMULSION INTRAVENOUS at 15:08

## 2021-11-16 RX ADMIN — PROPOFOL 110 MG: 10 INJECTION, EMULSION INTRAVENOUS at 15:00

## 2021-11-16 RX ADMIN — CALCIUM CARBONATE-VITAMIN D TAB 500 MG-200 UNIT 1 TABLET: 500-200 TAB at 23:56

## 2021-11-16 RX ADMIN — SODIUM CHLORIDE, POTASSIUM CHLORIDE, SODIUM LACTATE AND CALCIUM CHLORIDE: 600; 310; 30; 20 INJECTION, SOLUTION INTRAVENOUS at 15:38

## 2021-11-16 RX ADMIN — ESMOLOL HYDROCHLORIDE 100 MG: 100 INJECTION, SOLUTION INTRAVENOUS at 15:00

## 2021-11-16 RX ADMIN — KETAMINE HYDROCHLORIDE 50 MG: 50 INJECTION, SOLUTION INTRAMUSCULAR; INTRAVENOUS at 15:00

## 2021-11-16 RX ADMIN — OMEPRAZOLE 20 MG: 20 CAPSULE, DELAYED RELEASE ORAL at 23:20

## 2021-11-16 RX ADMIN — DEXAMETHASONE SODIUM PHOSPHATE 10 MG: 10 INJECTION, SOLUTION INTRAMUSCULAR; INTRAVENOUS at 15:34

## 2021-11-16 RX ADMIN — ROCURONIUM BROMIDE 50 MG: 50 INJECTION, SOLUTION INTRAVENOUS at 15:00

## 2021-11-16 RX ADMIN — PHENYLEPHRINE HYDROCHLORIDE 100 MCG: 10 INJECTION INTRAVENOUS at 17:03

## 2021-11-16 RX ADMIN — HEPARIN SODIUM 5000 UNITS: 10000 INJECTION, SOLUTION INTRAVENOUS; SUBCUTANEOUS at 11:07

## 2021-11-16 RX ADMIN — ROCURONIUM BROMIDE 10 MG: 50 INJECTION, SOLUTION INTRAVENOUS at 16:30

## 2021-11-16 RX ADMIN — HYDROMORPHONE HYDROCHLORIDE 0.5 MG: 1 INJECTION, SOLUTION INTRAMUSCULAR; INTRAVENOUS; SUBCUTANEOUS at 16:18

## 2021-11-16 RX ADMIN — ONDANSETRON 4 MG: 2 INJECTION INTRAMUSCULAR; INTRAVENOUS at 15:34

## 2021-11-16 RX ADMIN — SODIUM CHLORIDE, POTASSIUM CHLORIDE, SODIUM LACTATE AND CALCIUM CHLORIDE: 600; 310; 30; 20 INJECTION, SOLUTION INTRAVENOUS at 11:05

## 2021-11-16 RX ADMIN — ROCURONIUM BROMIDE 20 MG: 50 INJECTION, SOLUTION INTRAVENOUS at 15:40

## 2021-11-16 RX ADMIN — METRONIDAZOLE 500 MG: 500 INJECTION, SOLUTION INTRAVENOUS at 11:20

## 2021-11-16 RX ADMIN — ACETAMINOPHEN 975 MG: 325 TABLET ORAL at 21:58

## 2021-11-16 RX ADMIN — HALOPERIDOL LACTATE 1 MG: 5 INJECTION, SOLUTION INTRAMUSCULAR at 19:22

## 2021-11-16 RX ADMIN — ALBUMIN HUMAN: 0.05 INJECTION, SOLUTION INTRAVENOUS at 17:02

## 2021-11-16 RX ADMIN — LIDOCAINE HYDROCHLORIDE 2 ML: 20 INJECTION, SOLUTION INFILTRATION; PERINEURAL at 15:00

## 2021-11-16 RX ADMIN — BUPIVACAINE 20 ML: 13.3 INJECTION, SUSPENSION, LIPOSOMAL INFILTRATION at 15:22

## 2021-11-16 RX ADMIN — SODIUM CHLORIDE, POTASSIUM CHLORIDE, SODIUM LACTATE AND CALCIUM CHLORIDE: 600; 310; 30; 20 INJECTION, SOLUTION INTRAVENOUS at 21:21

## 2021-11-16 RX ADMIN — ACETAMINOPHEN 975 MG: 325 TABLET ORAL at 11:17

## 2021-11-16 RX ADMIN — SUGAMMADEX 200 MG: 100 INJECTION, SOLUTION INTRAVENOUS at 18:45

## 2021-11-16 RX ADMIN — PHENYLEPHRINE HYDROCHLORIDE 150 MCG: 10 INJECTION INTRAVENOUS at 16:37

## 2021-11-16 RX ADMIN — PHENYLEPHRINE HYDROCHLORIDE 200 MCG: 10 INJECTION INTRAVENOUS at 15:43

## 2021-11-16 RX ADMIN — FENTANYL CITRATE 50 MCG: 50 INJECTION, SOLUTION INTRAMUSCULAR; INTRAVENOUS at 15:12

## 2021-11-16 RX ADMIN — ROPINIROLE HYDROCHLORIDE 0.25 MG: 0.25 TABLET, FILM COATED ORAL at 23:19

## 2021-11-16 ASSESSMENT — ACTIVITIES OF DAILY LIVING (ADL)
ADLS_ACUITY_SCORE: 3
ADLS_ACUITY_SCORE: 3
ADLS_ACUITY_SCORE: 7
ADLS_ACUITY_SCORE: 3
ADLS_ACUITY_SCORE: 3
ADLS_ACUITY_SCORE: 7
ADLS_ACUITY_SCORE: 3
ADLS_ACUITY_SCORE: 5
ADLS_ACUITY_SCORE: 3
ADLS_ACUITY_SCORE: 3
ADLS_ACUITY_SCORE: 5

## 2021-11-16 ASSESSMENT — MIFFLIN-ST. JEOR: SCORE: 1385.45

## 2021-11-16 NOTE — ANESTHESIA PROCEDURE NOTES
Airway       Patient location during procedure: OR       Procedure Start/Stop Times: 11/16/2021 3:06 PM  Staff -        Performed By: CRNAIndications and Patient Condition       Indications for airway management: nadia-procedural       Induction type:intravenous       Mask difficulty assessment: 1 - vent by mask    Final Airway Details       Final airway type: endotracheal airway       Successful airway: ETT - single  Endotracheal Airway Details        ETT size (mm): 7.0       Cuffed: yes       Successful intubation technique: direct laryngoscopy       DL Blade Type: Saha 2       Grade View of Cords: 1       Adjucts: stylet and tooth guard       Position: Center       Measured from: lips       Secured at (cm): 21       Bite block used: None    Post intubation assessment        Placement verified by: capnometry, equal breath sounds and chest rise        Number of attempts at approach: 1       Number of other approaches attempted: 0       Secured with: silk tape       Ease of procedure: easy       Dentition: Intact

## 2021-11-16 NOTE — INTERVAL H&P NOTE
I have reviewed the surgical (or preoperative) H&P that is linked to this encounter, and examined the patient. There are no significant changes    Plan for lap right colectomy.    Efraín Guerrero MD, AQUILES  [facility]  Office: [phone]  11/16/2021 2:06 PM

## 2021-11-16 NOTE — ANESTHESIA PROCEDURE NOTES
TAP (Subcostal) Procedure Note    Pre-Procedure   Staff -        Anesthesiologist:  Jana Quiroz MD       Performed By: anesthesiologist       Location: OR       Procedure Start/Stop Times: 11/16/2021 3:16 PM and 11/16/2021 3:19 PM       Pre-Anesthestic Checklist: patient identified, IV checked, site marked, risks and benefits discussed, informed consent, monitors and equipment checked, pre-op evaluation, at physician/surgeon's request and post-op pain management  Timeout:       Correct Patient: Yes        Correct Procedure: Yes        Correct Site: Yes        Correct Position: Yes        Correct Laterality: Yes        Site Marked: Yes  Procedure Documentation  Procedure: TAP (Subcostal)       Laterality: bilateral       Patient Position: supine       Patient Prep/Sterile Barriers: sterile gloves, mask       Skin prep: Chloraprep       Needle Gauge: 20.        Needle Length (Inches): 4        Ultrasound guided       1. Ultrasound was used to identify targeted nerve, plexus, vascular marker, or fascial plane and place a needle adjacent to it in real-time.       2. Ultrasound was used to visualize the spread of anesthetic in close proximity to the above referenced structure.       3. A permanent image is entered into the patient's record.       4. The visualized anatomic structures appeared normal.       5. There were no apparent abnormal pathologic findings.    Assessment/Narrative         The placement was negative for: blood aspirated and painful injection       Paresthesias: No.     Bolus given via needle..        Secured via.        Insertion/Infusion Method: Single Shot       Complications: none

## 2021-11-16 NOTE — PHARMACY-ADMISSION MEDICATION HISTORY
Pharmacy Note - Admission Medication History    Pertinent Provider Info: Patient completed antibiotic prep yesterday as instructed.     ______________________________________________________________________    Prior To Admission (PTA) med list completed and updated in EMR.       PTA Med List   Medication Sig Last Dose     acetaminophen (TYLENOL) 500 MG tablet Take 1,000 mg by mouth 3 times daily 11/16/2021 at 0400     calcium-vitamin D (CALCIUM-VITAMIN D) 500 mg(1,250mg) -200 unit per tablet Take 1 tablet by mouth every evening  Past Week     FLUoxetine (PROZAC) 40 MG capsule [FLUOXETINE (PROZAC) 40 MG CAPSULE] Take 1 capsule (40 mg total) by mouth daily. 11/15/2021     multivitamin w/minerals (THERA-VIT-M) tablet Take 1 tablet by mouth daily Past Week     naproxen sodium (ALEVE) 220 MG tablet [NAPROXEN SODIUM (ALEVE) 220 MG TABLET] Take 440 mg by mouth 3 (three) times a day with meals. 11/15/2021     omeprazole (PRILOSEC) 20 MG capsule [OMEPRAZOLE (PRILOSEC) 20 MG CAPSULE] Take 20 mg by mouth at bedtime.        11/15/2021     rOPINIRole (REQUIP) 0.25 MG tablet TAKE 1 TABLET BY MOUTH AT BEDTIME 11/15/2021 at Unknown time       Information source(s): Patient, Clinic records and [facility]    Method of interview communication: in-person    Patient was asked about OTC/herbal products specifically.  PTA med list reflects this.    Based on the pharmacist's assessment, the PTA med list information appears reliable    Allergies were reviewed, assessed, and updated with the patient.      Patient does not use any multi-dose medications prior to admission.     Thank you for the opportunity to participate in the care of this patient.      Pablito Warner [facility]     11/16/2021     11:01 AM

## 2021-11-16 NOTE — ANESTHESIA PREPROCEDURE EVALUATION
Anesthesia Pre-Procedure Evaluation    Patient: Tessa Edwards   MRN: [id number] : 1955        Preoperative Diagnosis: Mass of colon [K63.89]    Procedure : Procedure(s):  LAPAROSCOPIC RIGHT COLECTOMY (Right Abdomen)            Past Medical History:   Diagnosis Date     Abnormal CT of the abdomen 10/26/2021     Anemia, iron deficiency 10/26/2021     Cervical spinal stenosis      Chronic low back pain without sciatica, unspecified back pain laterality 10/27/2021    Spondylosis,central  stenosis, foraminal encroachment     Depression      Disease of thyroid gland      Fibromyalgia      GERD (gastroesophageal reflux disease)      Hyperlipemia      Lymphoproliferative disorder (H)      Obesity      Osteoarthritis      Primary localized osteoarthrosis, lower leg 2007     Restless legs syndrome 10/26/2021     Rotator cuff tear      Sleep apnea      Splenomegaly 10/27/2021     Thyroid nodule 10/27/2021     Weight loss 10/27/2021      Past Surgical History:   Procedure Laterality Date     ARTHROPLASTY SHOULDER Left      ARTHROSCOPY KNEE       ARTHROSCOPY SHOULDER ROTATOR CUFF REPAIR       C LAP,CHOLECYSTECTOMY/EXPLORE      Description: Cholecystectomy Laparoscopic;  Recorded: 12/10/2012;     C TOTAL ABDOM HYSTERECTOMY      Description: Total Abdominal Hysterectomy;  Recorded: 12/10/2012;  Comments: 46 Y/O FOR FIBROID TUMORS     C TOTAL KNEE ARTHROPLASTY Right 2019    Procedure: RIGHT TOTAL KNEE ARTHROPLASTY;  Surgeon: Irvin Canas DO;  Location: [facility];  Service: Orthopedics     CERVICAL DISC ARTHROPLASTY       CERVICAL DISCECTOMY  2017    C5, C6     COLONOSCOPY N/A 2021    Procedure: COLONOSCOPY;  Surgeon: Efraín Guerrero MD;  Location: [facility] [place]     CRW LT SHOULDER AP AXILLA 2 VW       HYSTERECTOMY       JOINT REPLACEMENT       OOPHORECTOMY       RELEASE CARPAL TUNNEL       RELEASE CARPAL TUNNEL       XR MYELOGRAM CERVICAL  2019      No Known Allergies    Social History     Tobacco Use     Smoking status: Former Smoker     Quit date: 2000     Years since quittin.7     Smokeless tobacco: Never Used   Substance Use Topics     Alcohol use: Yes     Comment: states socially      Wt Readings from Last 1 Encounters:   21 86.4 kg (190 lb 6.4 oz)        Anesthesia Evaluation   Pt has had prior anesthetic.     No history of anesthetic complications       ROS/MED HX  ENT/Pulmonary: Comment: Hx of thick pulmonary secretions, currently symptomatic. Further evaluation pending      (+) sleep apnea (Getting tested for CPAP next week),     Neurologic: Comment: Hx of cervical stenosis s/p surgical repair  RLS  Numbness in bilateral LE 2/2 lower back; uses a cane      Cardiovascular:  - neg cardiovascular ROS     METS/Exercise Tolerance:     Hematologic: Comments: Lymphoproliferative disorder       Musculoskeletal: Comment: Fibromylagia  (+) arthritis,     GI/Hepatic:     (+) GERD, Asymptomatic on medication,     Renal/Genitourinary:  - neg Renal ROS     Endo:     (+) Obesity,     Psychiatric/Substance Use:     (+) psychiatric history anxiety and depression     Infectious Disease:       Malignancy:  - neg malignancy ROS     Other:  - neg other ROS          Physical Exam    Airway  airway exam normal      Mallampati: II   TM distance: > 3 FB   Neck ROM: full   Mouth opening: > 3 cm    Respiratory Devices and Support         Dental  no notable dental history     (+) partials      Cardiovascular   cardiovascular exam normal       Rhythm and rate: regular and normal     Pulmonary   pulmonary exam normal        breath sounds clear to auscultation           OUTSIDE LABS:  CBC:   Lab Results   Component Value Date    WBC 42.0 (HH) 2020    WBC 36.7 (HH) 2019    HGB 11.4 (L) 2021    HGB 11.0 (L) 2020    HCT 35.3 2020    HCT 36.4 2019     2020     2019     BMP:   Lab Results   Component Value Date      10/26/2021     2020    POTASSIUM 5.0 10/26/2021    POTASSIUM 3.9 2020    CHLORIDE 109 10/26/2021    CHLORIDE 108 (H) 2020    CO2 23 10/26/2021    CO2 23 2020    BUN 27 10/26/2021    BUN 31 (H) 2020    CR 0.66 10/26/2021    CR 0.71 2020     (H) 10/26/2021     (H) 10/21/2021     COAGS:   Lab Results   Component Value Date    PTT 23 (L) 2019    INR 0.99 2019     POC: No results found for: BGM, HCG, HCGS  HEPATIC:   Lab Results   Component Value Date    ALBUMIN 3.7 2020    PROTTOTAL 7.1 2020    ALT 23 2020    AST 27 2020    ALKPHOS 78 2020    BILITOTAL 0.4 2020     OTHER:   Lab Results   Component Value Date    A1C 6.1 (H) 10/18/2018    KAYLAN 9.0 10/26/2021    TSH 1.96 2020       Anesthesia Plan    ASA Status:  3   NPO Status:  NPO Appropriate    Anesthesia Type: General.     - Airway: ETT   Induction: Intravenous, RSI, Propofol.   Maintenance: Balanced.   Techniques and Equipment:     - Airway: Video-Laryngoscope         Consents    Anesthesia Plan(s) and associated risks, benefits, and realistic alternatives discussed. Questions answered and patient/representative(s) expressed understanding.     - Discussed with:  Patient         Postoperative Care    Pain management: IV analgesics, Oral pain medications, Multi-modal analgesia.   PONV prophylaxis: Ondansetron (or other 5HT-3), Dexamethasone or Solumedrol, Droperidol or Haldol, Background Propofol Infusion     Comments:                Emily Elder Pre-Procedure Evaluation    Patient: Tessa Edwards   MRN: [id number] : 1955        Preoperative Diagnosis: Mass of colon [K63.89]    Procedure : Procedure(s):  LAPAROSCOPIC RIGHT COLECTOMY  POSSIBLE OPEN          Past Medical History:   Diagnosis Date     Abnormal CT of the abdomen 10/26/2021     Anemia, iron deficiency 10/26/2021     Cervical spinal stenosis      Chronic low back  pain without sciatica, unspecified back pain laterality 10/27/2021    Spondylosis,central  stenosis, foraminal encroachment     Depression      Disease of thyroid gland      Fibromyalgia      GERD (gastroesophageal reflux disease)      Hyperlipemia      Lymphoproliferative disorder (H)      Obesity      Osteoarthritis      Primary localized osteoarthrosis, lower leg 2007     Restless legs syndrome 10/26/2021     Rotator cuff tear      Sleep apnea      Splenomegaly 10/27/2021     Thyroid nodule 10/27/2021     Weight loss 10/27/2021      Past Surgical History:   Procedure Laterality Date     ARTHROPLASTY SHOULDER Left      ARTHROSCOPY KNEE       ARTHROSCOPY SHOULDER ROTATOR CUFF REPAIR       C LAP,CHOLECYSTECTOMY/EXPLORE      Description: Cholecystectomy Laparoscopic;  Recorded: 12/10/2012;     C TOTAL ABDOM HYSTERECTOMY      Description: Total Abdominal Hysterectomy;  Recorded: 12/10/2012;  Comments: 46 Y/O FOR FIBROID TUMORS     C TOTAL KNEE ARTHROPLASTY Right 2019    Procedure: RIGHT TOTAL KNEE ARTHROPLASTY;  Surgeon: Irvin Canas DO;  Location: [facility];  Service: Orthopedics     CERVICAL DISC ARTHROPLASTY       CERVICAL DISCECTOMY  2017    C5, C6     COLONOSCOPY N/A 2021    Procedure: COLONOSCOPY;  Surgeon: Efraín Guerrero MD;  Location: [facility]     CRW LT SHOULDER AP AXILLA 2 VW       HYSTERECTOMY       JOINT REPLACEMENT       OOPHORECTOMY       RELEASE CARPAL TUNNEL       RELEASE CARPAL TUNNEL       XR MYELOGRAM CERVICAL  2019      No Known Allergies   Social History     Tobacco Use     Smoking status: Former Smoker     Quit date: 2000     Years since quittin.7     Smokeless tobacco: Never Used   Substance Use Topics     Alcohol use: Yes     Comment: states socially      Wt Readings from Last 1 Encounters:   21 86.4 kg (190 lb 6.4 oz)        Anesthesia Evaluation            ROS/MED HX  ENT/Pulmonary:     (+) SUZIE risk factors, Intermittent,  asthma     Neurologic:  - neg neurologic ROS     Cardiovascular:       METS/Exercise Tolerance: 4 - Raking leaves, gardening    Hematologic:  - neg hematologic  ROS     Musculoskeletal:   (+) arthritis,     GI/Hepatic:     (+) GERD,     Renal/Genitourinary:  - neg Renal ROS     Endo:     (+) thyroid problem,  nodules, Obesity,     Psychiatric/Substance Use:     (+) psychiatric history anxiety and depression     Infectious Disease:  - neg infectious disease ROS     Malignancy:   (+) Malignancy, History of GI and Lymphoma/Leukemia.    Other:      (+) , H/O Chronic Pain,        Physical Exam    Airway        Mallampati: III       Respiratory Devices and Support         Dental     Comment: partial        Cardiovascular          Rhythm and rate: regular     Pulmonary   pulmonary exam normal        breath sounds clear to auscultation           OUTSIDE LABS:  CBC:   Lab Results   Component Value Date    WBC 42.0 (HH) 08/27/2020    WBC 36.7 (HH) 09/12/2019    HGB 11.4 (L) 11/16/2021    HGB 11.0 (L) 08/27/2020    HCT 35.3 08/27/2020    HCT 36.4 09/12/2019     08/27/2020     09/12/2019     BMP:   Lab Results   Component Value Date     10/26/2021     08/27/2020    POTASSIUM 5.0 10/26/2021    POTASSIUM 3.9 08/27/2020    CHLORIDE 109 10/26/2021    CHLORIDE 108 (H) 08/27/2020    CO2 23 10/26/2021    CO2 23 08/27/2020    BUN 27 10/26/2021    BUN 31 (H) 08/27/2020    CR 0.66 10/26/2021    CR 0.71 08/27/2020     (H) 10/26/2021     (H) 10/21/2021     COAGS:   Lab Results   Component Value Date    PTT 23 (L) 09/12/2019    INR 0.99 09/14/2019     POC: No results found for: BGM, HCG, HCGS  HEPATIC:   Lab Results   Component Value Date    ALBUMIN 3.7 08/27/2020    PROTTOTAL 7.1 08/27/2020    ALT 23 08/27/2020    AST 27 08/27/2020    ALKPHOS 78 08/27/2020    BILITOTAL 0.4 08/27/2020     OTHER:   Lab Results   Component Value Date    A1C 6.1 (H) 10/18/2018    KAYLAN 9.0 10/26/2021    TSH 1.96 08/27/2020        Anesthesia Plan    ASA Status:  3   NPO Status:  NPO Appropriate    Anesthesia Type: General.   Induction: Intravenous.   Maintenance: Balanced.   Techniques and Equipment:     - Lines/Monitors: 2nd IV     Consents    Anesthesia Plan(s) and associated risks, benefits, and realistic alternatives discussed. Questions answered and patient/representative(s) expressed understanding.     - Discussed with:  Patient      - Patient is DNR/DNI Status: No    Use of blood products discussed: Yes.     - Discussed with: Patient.     - Consented: consented to blood products            Reason for refusal: other.     Postoperative Care    Pain management: Multi-modal analgesia, Peripheral nerve block (Single Shot).   PONV prophylaxis: Dexamethasone or Solumedrol, Ondansetron (or other 5HT-3), Background Propofol Infusion     Comments:        Ketamine  Mag gtt  Esmolol  Robinul  Zofran, decadron 10 mg  TAP blocks with Exparel                Jana Quiroz MD

## 2021-11-16 NOTE — ANESTHESIA PROCEDURE NOTES
TAP (Classic) Procedure Note    Pre-Procedure   Staff -        Anesthesiologist:  Jana Quiroz MD       Performed By: anesthesiologist       Location: OR       Procedure Start/Stop Times: 11/16/2021 3:19 PM and 11/16/2021 3:22 PM       Pre-Anesthestic Checklist: patient identified, IV checked, site marked, risks and benefits discussed, informed consent, monitors and equipment checked, pre-op evaluation, at physician/surgeon's request and post-op pain management  Timeout:       Correct Patient: Yes        Correct Procedure: Yes        Correct Site: Yes        Correct Position: Yes        Correct Laterality: Yes        Site Marked: Yes  Procedure Documentation  Procedure: TAP (Classic)       Laterality: bilateral       Patient Position: supine       Patient Prep/Sterile Barriers: sterile gloves, mask       Skin prep: Chloraprep       Needle Gauge: 20.        Needle Length (Inches): 4        Ultrasound guided       1. Ultrasound was used to identify targeted nerve, plexus, vascular marker, or fascial plane and place a needle adjacent to it in real-time.       2. Ultrasound was used to visualize the spread of anesthetic in close proximity to the above referenced structure.       3. A permanent image is entered into the patient's record.       4. The visualized anatomic structures appeared normal.       5. There were no apparent abnormal pathologic findings.    Assessment/Narrative         The placement was negative for: blood aspirated and painful injection       Paresthesias: No.     Bolus given via needle..        Secured via.        Insertion/Infusion Method: Single Shot       Complications: none

## 2021-11-17 ENCOUNTER — APPOINTMENT (OUTPATIENT)
Dept: PHYSICAL THERAPY | Facility: HOSPITAL | Age: 66
DRG: 821 | End: 2021-11-17
Attending: COLON & RECTAL SURGERY
Payer: COMMERCIAL

## 2021-11-17 ENCOUNTER — APPOINTMENT (OUTPATIENT)
Dept: OCCUPATIONAL THERAPY | Facility: HOSPITAL | Age: 66
DRG: 821 | End: 2021-11-17
Attending: COLON & RECTAL SURGERY
Payer: COMMERCIAL

## 2021-11-17 LAB
ANION GAP SERPL CALCULATED.3IONS-SCNC: 6 MMOL/L (ref 5–18)
BUN SERPL-MCNC: 11 MG/DL (ref 8–22)
CALCIUM SERPL-MCNC: 8.4 MG/DL (ref 8.5–10.5)
CHLORIDE BLD-SCNC: 107 MMOL/L (ref 98–107)
CO2 SERPL-SCNC: 26 MMOL/L (ref 22–31)
CREAT SERPL-MCNC: 0.68 MG/DL (ref 0.6–1.1)
ERYTHROCYTE [DISTWIDTH] IN BLOOD BY AUTOMATED COUNT: 20.2 % (ref 10–15)
GFR SERPL CREATININE-BSD FRML MDRD: 90 ML/MIN/1.73M2
GLUCOSE BLD-MCNC: 104 MG/DL (ref 70–125)
GLUCOSE BLDC GLUCOMTR-MCNC: 98 MG/DL (ref 70–99)
HCT VFR BLD AUTO: 32.6 % (ref 35–47)
HGB BLD-MCNC: 9.7 G/DL (ref 11.7–15.7)
MCH RBC QN AUTO: 26.1 PG (ref 26.5–33)
MCHC RBC AUTO-ENTMCNC: 29.8 G/DL (ref 31.5–36.5)
MCV RBC AUTO: 88 FL (ref 78–100)
PLATELET # BLD AUTO: 123 10E3/UL (ref 150–450)
POTASSIUM BLD-SCNC: 3.8 MMOL/L (ref 3.5–5)
RBC # BLD AUTO: 3.71 10E6/UL (ref 3.8–5.2)
SODIUM SERPL-SCNC: 139 MMOL/L (ref 136–145)
WBC # BLD AUTO: 23.6 10E3/UL (ref 4–11)

## 2021-11-17 PROCEDURE — 250N000013 HC RX MED GY IP 250 OP 250 PS 637: Performed by: COLON & RECTAL SURGERY

## 2021-11-17 PROCEDURE — 250N000013 HC RX MED GY IP 250 OP 250 PS 637: Performed by: SURGERY

## 2021-11-17 PROCEDURE — 80048 BASIC METABOLIC PNL TOTAL CA: CPT | Performed by: SURGERY

## 2021-11-17 PROCEDURE — 97530 THERAPEUTIC ACTIVITIES: CPT | Mod: GP

## 2021-11-17 PROCEDURE — 97162 PT EVAL MOD COMPLEX 30 MIN: CPT | Mod: GP

## 2021-11-17 PROCEDURE — 97530 THERAPEUTIC ACTIVITIES: CPT | Mod: GO | Performed by: OCCUPATIONAL THERAPIST

## 2021-11-17 PROCEDURE — 99232 SBSQ HOSP IP/OBS MODERATE 35: CPT | Performed by: INTERNAL MEDICINE

## 2021-11-17 PROCEDURE — 250N000011 HC RX IP 250 OP 636: Performed by: SURGERY

## 2021-11-17 PROCEDURE — 99207 PR CDG-DOWN CODE ROS EXAM: CPT | Performed by: INTERNAL MEDICINE

## 2021-11-17 PROCEDURE — 120N000001 HC R&B MED SURG/OB

## 2021-11-17 PROCEDURE — 97116 GAIT TRAINING THERAPY: CPT | Mod: GP

## 2021-11-17 PROCEDURE — 85027 COMPLETE CBC AUTOMATED: CPT | Performed by: SURGERY

## 2021-11-17 PROCEDURE — 36415 COLL VENOUS BLD VENIPUNCTURE: CPT | Performed by: SURGERY

## 2021-11-17 PROCEDURE — 97165 OT EVAL LOW COMPLEX 30 MIN: CPT | Mod: GO | Performed by: OCCUPATIONAL THERAPIST

## 2021-11-17 RX ORDER — GABAPENTIN 100 MG/1
100 CAPSULE ORAL 3 TIMES DAILY
Status: DISCONTINUED | OUTPATIENT
Start: 2021-11-17 | End: 2021-11-19 | Stop reason: HOSPADM

## 2021-11-17 RX ADMIN — HYDROMORPHONE HYDROCHLORIDE 0.2 MG: 0.2 INJECTION, SOLUTION INTRAMUSCULAR; INTRAVENOUS; SUBCUTANEOUS at 09:48

## 2021-11-17 RX ADMIN — OMEPRAZOLE 20 MG: 20 CAPSULE, DELAYED RELEASE ORAL at 20:29

## 2021-11-17 RX ADMIN — ACETAMINOPHEN 975 MG: 325 TABLET ORAL at 13:42

## 2021-11-17 RX ADMIN — FLUOXETINE HYDROCHLORIDE 40 MG: 20 CAPSULE ORAL at 09:44

## 2021-11-17 RX ADMIN — GABAPENTIN 100 MG: 100 CAPSULE ORAL at 09:44

## 2021-11-17 RX ADMIN — HYDROMORPHONE HYDROCHLORIDE 0.2 MG: 0.2 INJECTION, SOLUTION INTRAMUSCULAR; INTRAVENOUS; SUBCUTANEOUS at 01:54

## 2021-11-17 RX ADMIN — ROPINIROLE HYDROCHLORIDE 0.25 MG: 0.25 TABLET, FILM COATED ORAL at 20:29

## 2021-11-17 RX ADMIN — HYDROMORPHONE HYDROCHLORIDE 0.2 MG: 0.2 INJECTION, SOLUTION INTRAMUSCULAR; INTRAVENOUS; SUBCUTANEOUS at 01:52

## 2021-11-17 RX ADMIN — HYDROMORPHONE HYDROCHLORIDE 0.4 MG: 0.2 INJECTION, SOLUTION INTRAMUSCULAR; INTRAVENOUS; SUBCUTANEOUS at 20:29

## 2021-11-17 RX ADMIN — ACETAMINOPHEN 975 MG: 325 TABLET ORAL at 04:49

## 2021-11-17 ASSESSMENT — ACTIVITIES OF DAILY LIVING (ADL)
ADLS_ACUITY_SCORE: 7
ADLS_ACUITY_SCORE: 7
ADLS_ACUITY_SCORE: 6
ADLS_ACUITY_SCORE: 7
ADLS_ACUITY_SCORE: 5
ADLS_ACUITY_SCORE: 7
ADLS_ACUITY_SCORE: 6
ADLS_ACUITY_SCORE: 5
ADLS_ACUITY_SCORE: 7
ADLS_ACUITY_SCORE: 6
ADLS_ACUITY_SCORE: 7
ADLS_ACUITY_SCORE: 7
ADLS_ACUITY_SCORE: 6
ADLS_ACUITY_SCORE: 6
PREVIOUS_RESPONSIBILITIES: MEAL PREP;HOUSEKEEPING;LAUNDRY;SHOPPING;MEDICATION MANAGEMENT;FINANCES;DRIVING;WORK
ADLS_ACUITY_SCORE: 6
ADLS_ACUITY_SCORE: 7
ADLS_ACUITY_SCORE: 6
ADLS_ACUITY_SCORE: 6

## 2021-11-17 NOTE — ANESTHESIA CARE TRANSFER NOTE
Patient: Tessa Edwards    Procedure: Procedure(s):  LAPAROSCOPIC RIGHT COLECTOMY       Diagnosis: Mass of colon [K63.89]  Diagnosis Additional Information: No value filed.    Anesthesia Type:   General     Note:    Oropharynx: oropharynx clear of all foreign objects  Level of Consciousness: awake  Oxygen Supplementation: face mask  Level of Supplemental Oxygen (L/min / FiO2): 8  Independent Airway: airway patency satisfactory and stable  Dentition: dentition unchanged  Vital Signs Stable: post-procedure vital signs reviewed and stable  Report to RN Given: handoff report given  Patient transferred to: PACU    Handoff Report: Identifed the Patient, Identified the Reponsible Provider, Reviewed the pertinent medical history, Discussed the surgical course, Reviewed Intra-OP anesthesia mangement and issues during anesthesia, Set expectations for post-procedure period and Allowed opportunity for questions and acknowledgement of understanding      Vitals:  Vitals Value Taken Time   /66 11/16/21 1902   Temp 36.7  C (98  F) 11/16/21 1902   Pulse 83 11/16/21 1902   Resp 18 11/16/21 1902   SpO2 100%        Electronically Signed By: FELIPE Grover CRNA  November 16, 2021  7:05 PM

## 2021-11-17 NOTE — PLAN OF CARE
Pt up to chair this morning for breakfast. She is currently working with PT.    Denies nausea.    Pain controlled with prn dilaudid and scheduled tylenol.    Wean off O2 this am unsuccessful. Wean off O2 this afternoon successful.

## 2021-11-17 NOTE — PROGRESS NOTES
[facility]  Daily Progress Note    Subjective  No acute events overnight. Pain well controlled with medications. Tolerating clears without nausea. No return of bowel function yet. Adequate clear UOP. Requiring 4 L O2 NC while sleeping, monitor during day shift.     Objective  Intake/Output last 24 hrs:    Intake/Output Summary (Last 24 hours) at 11/17/2021 0731  Last data filed at 11/17/2021 0700  Gross per 24 hour   Intake 3115 ml   Output 1140 ml   Net 1975 ml     Temp:  [97.8  F (36.6  C)-99.1  F (37.3  C)] 98.2  F (36.8  C)  Pulse:  [76-86] 77  Resp:  [12-18] 17  BP: (114-150)/(55-66) 118/59  SpO2:  [85 %-98 %] 97 %    Physical Exam:  General: awake, alert, laying in bed, in no acute distress  Head: normocephalic, atraumatic  Respiratory: non-labored breathing  Abdomen: soft, appropriately tender and non-distended              Incisions: clean, dry and intact  Skin: No rashes or lesions  Musculoskeletal: moves all four extremities equally; no calf edema or tenderness  Psychological: alert and oriented, answers questions appropriately    Pertinent Labs  Lab Results: personally reviewed.  Lab Results   Component Value Date     11/16/2021     10/26/2021     08/27/2020    CO2 23 11/16/2021    CO2 23 10/26/2021    CO2 23 08/27/2020    BUN 15 11/16/2021    BUN 27 10/26/2021    BUN 31 08/27/2020     Lab Results   Component Value Date    WBC 42.0 08/27/2020    WBC 36.7 09/12/2019    WBC 34.7 09/05/2019    HGB 11.4 11/16/2021    HGB 11.0 08/27/2020    HGB 9.4 09/14/2019    HCT 35.3 08/27/2020    HCT 36.4 09/12/2019    HCT 38.3 09/05/2019    MCV 81 08/27/2020    MCV 82 09/12/2019    MCV 84 09/05/2019     08/27/2020     09/12/2019     09/05/2019       Assessment/Plan: This is a 66 year old female POD #1 s/p laparoscopic right colectomy for mass of colon    Labs pending   VSS    -Discontinue marrero today  -Advance to fulls liquids  -OOB/ambulate  -Encourage IS  -Plan to  start lovenox dvt ppx once labs return  -Monitor I&Os  -AROBF  -IV narcotics until tolerating LFD  -Recheck labs tomorrow    Discussed with Dr. Cesar Posada PA-C  [facility]  [phone]..............................main

## 2021-11-17 NOTE — PROGRESS NOTES
11/17/21 1347   Quick Adds   Type of Visit Initial PT Evaluation   Living Environment   People in home child(saad), adult  (will stay at son and DILs home. )   Current Living Arrangements house   Home Accessibility stairs to enter home;stairs within home  (one step to enter with no rail. Split level entry.  )   Number of Stairs, Main Entrance 1   Stair Railings, Main Entrance none   Number of Stairs, Within Home, Primary 6   Stair Railings, Within Home, Primary railings on both sides of stairs   Transportation Anticipated car, drives self   Living Environment Comments Pt stays upper level.,     Self-Care   Usual Activity Tolerance good   Equipment Currently Used at Home cane, straight;walker, rolling   Activity/Exercise/Self-Care Comment Pt is indep with and without the SEC, Indep with all mobiolity.    Disability/Function   Hearing Difficulty or Deaf no   Wear Glasses or Blind yes   Vision Management glasses.    Concentrating, Remembering or Making Decisions Difficulty no   Difficulty Communicating no   Walking or Climbing Stairs ambulation difficulty, assistance 1 person   Mobility Management   (LBP. )   Change in Functional Status Since Onset of Current Illness/Injury yes  (Pt was to have back surg and has right leg pain. )   General Information   Onset of Illness/Injury or Date of Surgery 11/16/21   Referring Physician Dr Vineet Mancuso   Patient/Family Therapy Goals Statement (PT) To go home.    Pertinent History of Current Problem (include personal factors and/or comorbidities that impact the POC) Pt was admitted colon mass.  Pt is s/p right colectomy.   (Pt was going to have back surg and they found a colon mass. )   Existing Precautions/Restrictions   (abdominal and can use abdominal binder for comfort.)   Weight-Bearing Status - LLE weight-bearing as tolerated   Weight-Bearing Status - RLE weight-bearing as tolerated   Cognition   Orientation Status (Cognition) oriented x 4   Pain Assessment   Patient Currently  in Pain   (some back pain and R leg pain. Abdominal pain.)   Posture    Posture Comments Cues for posture.    Range of Motion (ROM)   ROM Comment LE AROM WFL with some abdominal discomfort.    Strength   Manual Muscle Testing Quick Adds Strength WNL   Bed Mobility   Comment (Bed Mobility) supine>sit with mod A x 1 with HOB elevated.  Sit >supine with min A x LEs.  Cues for technique. .    (increased abdominal pain. . )   Transfers   Transfer Safety Comments CGA with sit<>stand with FWW.  Cues for hand placement.      Gait/Stairs (Locomotion)   [place] Level (Gait) contact guard;verbal cues   Assistive Device (Gait) walker, front-wheeled   Distance in Feet (Required for LE Total Joints) 160   Pattern (Gait) step-through  (Pt walked slowly and did have some abdominal pain. )   Comment (Gait/Stairs) steps not traci yet.    Balance   Balance Comments CGA with FWW   Sensory Examination   Sensory Perception WNL   Clinical Impression   Criteria for Skilled Therapeutic Intervention yes, treatment indicated   PT Diagnosis (PT) impaired mobility   Influenced by the following impairments bed mobility, transfers, gait and steps.    Functional limitations due to impairments abd surg, weakness, LE pain, and pt is below her PLOF.  dec endurance.    Clinical Presentation Evolving/Changing   Clinical Presentation Rationale Pt presents medically diagnosed.    Clinical Decision Making (Complexity) moderate complexity   Therapy Frequency (PT) Daily   Predicted Duration of Therapy Intervention (days/wks) 7   Planned Therapy Interventions (PT) bed mobility training;gait training;home exercise program;stair training;strengthening   Anticipated Equipment Needs at Discharge (PT) cane, straight;walker, rolling  (FWW TBD)   Risk & Benefits of therapy have been explained evaluation/treatment results reviewed;care plan/treatment goals reviewed;risks/benefits reviewed;patient;participants voiced agreement with care plan   PT Discharge  Planning    PT Discharge Recommendation (DC Rec) home with assist  (Does not want any home PT. )   PT Rationale for DC Rec home pending progress with steps. Pt will have assist at home.  Pt does have steps to get into the house and steps inside.  Pt did have incr. abdominal pain with mobility. .     Total Evaluation Time   Total Evaluation Time (Minutes) 10

## 2021-11-17 NOTE — PLAN OF CARE
VSS, A&Ox4,  Pleasant and Cooperative with cares. O2 now on 4L NC, desats to about 85% when sleeping when on O2 3L NC. Verbalized good pain control on current meds. Denies nausea. Abdominal incision dermabonded & well approximated. No drainage noted. Abdominal binder on. 240cc's  clear liquids tolerated well. Camejo catheter patent & draining. Uses call light appropriately. Continue to monitor.

## 2021-11-17 NOTE — PROGRESS NOTES
Daily Progress Note        CODE STATUS:  Full Code    11/17/21  Assessment/Plan:  Tessa Edwards is a 66 year old female admitted on 11/16/2021.  Patient has a past medical history including anemia, sciatica, restless leg syndrome, cervical spine stenosis, GERD, fibromyalgia, osteoarthritis, vitamin D deficiency and mood disorder. She is admitted to the hospital for right colectomy after being found to have incidental finding of colon mass while being prepped for a spinal surgery.  On 11/16/2021 underwent right colectomy.     Colon mass   -- Status post laparoscopic right colectomy 11/16/2021  -- Postop care per colorectal surgery     Acute postoperative respiratory insufficiency  -- Wean O2 as able     Anemia  -- Hemoglobin currently 11.4.  Baseline appears to be 9-11 range  -- No active source of bleeding  --Leucocytosis and thrombocytopenia noted. Has a h/o atypical lymphoproliferative disorder per patient. Follows up with Dr Hill     Restless leg syndrome  -- Continue Requip     Vitamin D deficiency  --Continue vitamin D     Mood disorder  -- Continue Prozac     GERD  --Continue omeprazole     Fibromyalgia  -- Cont home meds     LOS: 1 day     Subjective:  Interval History: Patient seen and examined this morning.  Notes, labs, imaging reports personally reviewed. Patient is new to me. Chart reviewed. Patient reported doing better. No gas or BMs yet.    Review of Systems:   As mentioned in subjective.    Patient Active Problem List   Diagnosis     Primary localized osteoarthrosis, lower leg     Lymphoproliferative disorder (H)     Anemia, iron deficiency     Restless legs syndrome     Abnormal CT of the abdomen     Chronic low back pain without sciatica, unspecified back pain laterality     Thyroid nodule     Splenomegaly     Weight loss     Colonic mass       Scheduled Meds:    acetaminophen  975 mg Oral Q8H     calcium carbonate-vitamin D  1 tablet Oral QPM     FLUoxetine  40 mg Oral Daily      "gabapentin  100 mg Oral TID     omeprazole  20 mg Oral At Bedtime     rOPINIRole  0.25 mg Oral At Bedtime     sodium chloride (PF)  3 mL Intracatheter Q8H     Continuous Infusions:    bupivacaine liposome (EXPAREL) LA inj was given in the infiltration site to produce post-op analgesia. Duration of action is up to 72 hours. Other \"timo\" meds should not be given for 96 hours except for lidocaine 4% patch. This is for INFORMATION ONLY.       lactated ringers 75 mL/hr at 11/17/21 0642     PRN Meds:.[START ON 11/19/2021] acetaminophen, bupivacaine liposome (EXPAREL) LA inj was given in the infiltration site to produce post-op analgesia. Duration of action is up to 72 hours. Other \"timo\" meds should not be given for 96 hours except for lidocaine 4% patch. This is for INFORMATION ONLY., HYDROmorphone **OR** HYDROmorphone, lidocaine 4%, lidocaine (buffered or not buffered), naloxone **OR** naloxone **OR** naloxone **OR** naloxone, ondansetron **OR** ondansetron, sodium chloride (PF)    Objective:  Vital signs in last 24 hours:  Temp:  [98  F (36.7  C)-99.1  F (37.3  C)] 98  F (36.7  C)  Pulse:  [76-86] 82  Resp:  [12-18] 18  BP: (114-150)/(55-66) 129/60  SpO2:  [85 %-98 %] 93 %        Intake/Output Summary (Last 24 hours) at 11/17/2021 1437  Last data filed at 11/17/2021 1429  Gross per 24 hour   Intake 3435 ml   Output 1465 ml   Net 1970 ml       Physical Exam:    General: Not in obvious distress.  HEENT: NC, AT   Chest: Clear to auscultation bilaterally  Heart: S1S2 normal, regular. No M/R/G  Abdomen: Soft. Expected tenderness. Bowel sounds- active.  Extremities: No legs swelling  Neuro: alert and awake, grossly non-focal      Lab Results:(I have personally reviewed the results)    Recent Results (from the past 24 hour(s))   Glucose by meter    Collection Time: 11/17/21  5:16 AM   Result Value Ref Range    GLUCOSE BY METER POCT 98 70 - 99 mg/dL   Basic metabolic panel    Collection Time: 11/17/21  7:29 AM   Result Value " Ref Range    Sodium 139 136 - 145 mmol/L    Potassium 3.8 3.5 - 5.0 mmol/L    Chloride 107 98 - 107 mmol/L    Carbon Dioxide (CO2) 26 22 - 31 mmol/L    Anion Gap 6 5 - 18 mmol/L    Urea Nitrogen 11 8 - 22 mg/dL    Creatinine 0.68 0.60 - 1.10 mg/dL    Calcium 8.4 (L) 8.5 - 10.5 mg/dL    Glucose 104 70 - 125 mg/dL    GFR Estimate 90 >60 mL/min/1.73m2   CBC with platelets    Collection Time: 11/17/21  7:29 AM   Result Value Ref Range    WBC Count 23.6 (H) 4.0 - 11.0 10e3/uL    RBC Count 3.71 (L) 3.80 - 5.20 10e6/uL    Hemoglobin 9.7 (L) 11.7 - 15.7 g/dL    Hematocrit 32.6 (L) 35.0 - 47.0 %    MCV 88 78 - 100 fL    MCH 26.1 (L) 26.5 - 33.0 pg    MCHC 29.8 (L) 31.5 - 36.5 g/dL    RDW 20.2 (H) 10.0 - 15.0 %    Platelet Count 123 (L) 150 - 450 10e3/uL       All laboratory and imaging data in the past 24 hours reviewed  Serum Glucose range:   Recent Labs   Lab 11/17/21  0729 11/17/21  0516 11/16/21  1030    98 103     ABG: No lab results found in last 7 days.  CBC:   Recent Labs   Lab 11/17/21  0729 11/16/21  1031   WBC 23.6*  --    HGB 9.7* 11.4*   HCT 32.6*  --    MCV 88  --    *  --      Chemistry:   Recent Labs   Lab 11/17/21  0729 11/16/21  1030    139   POTASSIUM 3.8 4.0   CHLORIDE 107 106   CO2 26 23   BUN 11 15   CR 0.68 0.73   GFRESTIMATED 90 85   KAYLAN 8.4* 9.1     Coags:  No results for input(s): INR, PROTIME, PTT in the last 168 hours.    Invalid input(s): APTT  Cardiac Markers:  No results for input(s): CKTOTAL, TROPONINI in the last 168 hours.       XR Abdomen Port 1 View    Result Date: 11/16/2021  EXAM: XR ABDOMEN PORT 1 VIEWS LOCATION: [facility] DATE/TIME: 11/16/2021 6:28 PM INDICATION: instrument count- more instruments than what was counted COMPARISON: CT 08/30/2021     IMPRESSION: Staple line seen in the right midabdomen from recent right hemicolectomy. Clips again noted from prior cholecystectomy. No other foreign bodies. Findings called by the undersigned to  the OR staff at 1850.    PET Oncology Whole Body    Result Date: 10/21/2021  Combined Report of: PET-CT on  10/21/2021 3:17 PM: 1. PET of the neck, chest, abdomen, and pelvis. 2. PET-CT Fusion for Attenuation Correction and Anatomical Localization.  3. CT of the chest, abdomen and pelvis obtained for attenuation correction. 4. 3D MIP and PET-CT fused images were processed on an independent workstation and archived to PACS and reviewed by a radiologist. Technique: 1. PET: The patient received 12.3 mCi of 18F-FDG. Serum glucose was 123 mg/dL prior to administration. Body weight was 95.7 kg. Images were evaluated in the axial, sagittal and coronal planes as well as the rotational whole body MIP. Images were acquired from the vertex to the feet. Uptake was measured at 64 minutes. BACKGROUND: Liver SUV max= 4.2; Aorta Blood SUV Max: 2.6. 2. CT: Volumetric acquisition of the chest, abdomen and pelvis acquired at 3 mm sections without intravenous contrast. The chest, abdomen and pelvis were evaluated at 5 mm sections in bone, soft tissue and lung windows.  3. 3D MIP and PET-CT fused images were processed on an independent workstation and archived to PACS and reviewed by a radiologist. INDICATION: Lymphadenopathy COMPARISON: CT dated 6/11/2021.. FINDINGS: HEAD/NECK: Right thyroid uptake measuring SUV max 11.7. Bilateral palatine tonsil uptake measuring up to SUV max 12.2. There is mild fullness of the palatine tonsils on CT images. The paranasal sinuses are clear. Mastoid air cells are clear. No masses, mass effect or pathologically enlarged lymph nodes. CHEST: Numerous prominent mildly hypermetabolic mediastinal lymph nodes are mildly increased since 6/11/2021. For example a right low paratracheal lymph node measuring 1.5 x 1.2 cm with SUV max 2.9 (series 3, image 177), previously 1.4 x 1.1 cm. Central tracheal brachial tree is patent. Heart size is normal. No pericardial effusion. No pleural effusion or pneumothorax.  Multiple solid pulmonary nodules, including in the left lower lobe measuring 3 mm (series 3 image 208) is unchanged and too small to characterize by PET. New scattered subpleural groundglass opacities without significant FDG avidity, favor atelectasis. ABDOMEN AND PELVIS: Increased hazy attenuation in the central mesentery with generalized low levels of uptake. There are mesenteric and retroperitoneal lymphadenopathy demonstrating mild abnormal FDG uptake, mildly worsened since 6/11/2021. For example in the central mesentery a 1.8 x 1.6 cm lymph node with SUV max 4.3 (series 3, image 329), which previously measured 1.7 x 0.9 cm. In the inferior left peripancreatic space, there is a larger 1.7 x 0.9 cm lymph node with SUV max 3.4 (series 3, image 313), previously 1.4 x 0.9 cm. Mildly increased splenomegaly measuring 18.5 cm in craniocaudal dimension, previously 17.2 cm on 6/11/2021. There is mild generalized uptake throughout the spleen. Nonspecific FDG uptake to the bowel. For example to the cecum with SUV max 6.6 (series 5, image 380), and to the proximal ascending colon with SUV max 10.7 (series 5, image 365). No bowel obstruction. No suspicious hepatic lesions. Hepatic cyst in the right hepatic lobe measuring 1.4 cm. Cholecystectomy. No pancreas masses or ductal dilatation. Increased pulmonary blood. No adrenal nodules. No hydronephrosis or obstructing renal stones. No suspicious renal masses or cysts. The bladder is partially filled and otherwise unremarkable. LOWER EXTREMITIES: Focus of activity uptake in the subcutaneous fat overlying the right buttocks measuring SUV max 3.2. BONES: Focal uptake in the proximal left humeral head measuring SUV max 9.1 (series 3 image 121). This appears within vicinity of multiple linear tracts through the cortex and is favored to be postsurgical in etiology. Bilateral knee arthroplasties.     IMPRESSION: In this patient with a history of lymphadenopathy: 1. Mildly avid  "lymphadenopathy in the chest, abdomen, and pelvis suspicious for low-grade lymphoma such as follicular lymphoma. These lymph nodes are mildly larger since 6/11/2021. 2. Increased splenomegaly also suspicious for lymphoma. 3. Bilateral palatine tonsil hypermetabolism and tissue fullness which could be related to lymphoma versus infectious/inflammatory etiology. 4. Hypermetabolic right thyroid nodule which has increased risk for primary thyroid malignancy, or possibly lymphoma within thyroid. Recommend thyroid ultrasound and consideration of tissue sampling. 5. Multiple tiny solid pulmonary nodules are too small to characterize by PET. Attention on follow-up. 6. Multifocal bowel uptake favored to be related to physiologic from peristalsis, although bowel involvement of malignancy is a possibility. I have personally reviewed the examination and initial interpretation and I agree with the findings. ALMAS MARTINEZ MD   SYSTEM ID:  [id number]    POC US Guidance Needle Placement    Result Date: 11/16/2021  Ultrasound was performed as guidance to an anesthesia procedure.  Click \"PACS images\" hyperlink below to view any stored images.  For specific procedure details, view procedure note authored by anesthesia.      Latest radiology report personally reviewed.    Note created using dragon voice recognition software so sounds alike errors may have escaped editing.      11/17/2021   Heron Argueta MD  Hospitalist, [facility]  Pager: [phone]                "

## 2021-11-17 NOTE — PROGRESS NOTES
11/17/21 1105   Quick Adds   Type of Visit Initial Occupational Therapy Evaluation   Living Environment   People in home child(saad), adult   Current Living Arrangements house   Home Accessibility no concerns   Transportation Anticipated car, drives self   Living Environment Comments Per pt, is planning on going to son/DIL home for recovery time after hosp. Just bought a home with dtr. Will go there after son's home.    Self-Care   Usual Activity Tolerance good   Current Activity Tolerance moderate   Equipment Currently Used at Home cane, straight   Instrumental Activities of Daily Living (IADL)   Previous Responsibilities meal prep;housekeeping;laundry;shopping;medication management;finances;driving;work   Disability/Function   Hearing Difficulty or Deaf no   Wear Glasses or Blind yes   Vision Management glasses   Concentrating, Remembering or Making Decisions Difficulty no   Difficulty Communicating no   Difficulty Eating/Swallowing no   Walking or Climbing Stairs Difficulty no   Mobility Management cane d/t low back pain.    Dressing/Bathing Difficulty no   Toileting issues no   Doing Errands Independently Difficulty (such as shopping) no   Fall history within last six months no   Change in Functional Status Since Onset of Current Illness/Injury yes   General Information   Onset of Illness/Injury or Date of Surgery 11/16/21   Referring Physician  Vineet Torres MD    Patient/Family Therapy Goal Statement (OT) get home   Additional Occupational Profile Info/Pertinent History of Current Problem  66 year old female admitted on 11/16/2021.  Patient has a past medical history including anemia, sciatica, restless leg syndrome, cervical spine stenosis, GERD, fibromyalgia, osteoarthritis, vitamin D deficiency and mood disorder. She is admitted to the hospital for right colectomy after being found to have incidental finding of colon mass while being prepped for a spinal surgery.  On 11/16/2021 underwent right colectomy.    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    Left Upper Extremity (Weight-bearing Status) full weight-bearing (FWB)   Right Upper Extremity (Weight-bearing Status) full weight-bearing (FWB)   Left Lower Extremity (Weight-bearing Status) full weight-bearing (FWB)   Right Lower Extremity (Weight-bearing Status) full weight-bearing (FWB)   General Observations and Info Pt alert, cooperative and eager to increase IND for d/c home.    Cognitive Status Examination   Orientation Status orientation to person, place and time   Affect/Mental Status (Cognitive) WNL   Follows Commands WNL   Visual Perception   Visual Impairment/Limitations WNL   Sensory   Sensory Quick Adds No deficits were identified   Pain Assessment   Patient Currently in Pain No   Integumentary/Edema   Integumentary/Edema no deficits were identifed   Posture   Posture not impaired   Range of Motion Comprehensive   General Range of Motion no range of motion deficits identified   Strength Comprehensive (MMT)   General Manual Muscle Testing (MMT) Assessment no strength deficits identified   Muscle Tone Assessment   Muscle Tone Quick Adds No deficits were identified   Coordination   Upper Extremity Coordination No deficits were identified   Transfers   Transfers sit-stand transfer;bed-chair transfer   Transfer Comments completed w/ SEC. O2L, SATs 91-93%   Transfer Skill: Bed to Chair/Chair to Bed   Bed-Chair [place] (Transfers) modified independence   Assistive Device (Bed-Chair Transfers) straight cane   Transfer Comments No LOB   Sit-Stand Transfer   Sit-Stand [place] (Transfers) independent   Assistive Device (Sit-Stand Transfers) cane, straight   Sit/Stand Transfer Comments demo good hand placement and effort with standing despite some discomfort at surgery site.    Balance   Balance Assessment no deficits were identified    Clinical Impression   Criteria for Skilled Therapeutic Interventions Met (OT) yes;skilled treatment is necessary   OT Diagnosis decresaed ADL/trf and activity tolerance   OT Problem List-Impairments impacting ADL problems related to;strength  (activity tolerance)   ADL comments/analysis dressing, bathing, trf   Assessment of Occupational Performance 1-3 Performance Deficits   Planned Therapy Interventions (OT) ADL retraining;transfer training   Clinical Decision Making Complexity (OT) low complexity   Therapy Frequency (OT) Daily   Predicted Duration of Therapy x4 days   Risk & Benefits of therapy have been explained evaluation/treatment results reviewed;care plan/treatment goals reviewed   Comment-Clinical Impression Pt prestning with decreased activity tolerance fr daily tasks. LImited by lines and fatigue.    OT Discharge Planning    OT Discharge Recommendation (DC Rec) Home with assist   OT Rationale for DC Rec Pt young, motivated to increase IND and well aware of needs. Has a good support system at home.    Total Evaluation Time (Minutes)   Total Evaluation Time (Minutes) 13

## 2021-11-17 NOTE — BRIEF OP NOTE
[facility]    Brief Operative Note    Pre-operative diagnosis: Mass of colon [K63.89]  Post-operative diagnosis Same as pre-operative diagnosis    Procedure: Procedure(s):  LAPAROSCOPIC RIGHT COLECTOMY  Surgeon: Surgeon(s) and Role:     * Efraín Guerrero MD - Primary     * Hakan Barr II, MD - Assisting  Anesthesia: Combined General with Tap Block   Estimated Blood Loss: 50 mL    Drains: None  Specimens:   ID Type Source Tests Collected by Time Destination   1 : Messentaric lymphnode, LYMPHOMA WORKUP Tissue Mesentery SURGICAL PATHOLOGY EXAM Efraín Guerrero MD 11/16/2021  5:28 PM    2 :  Tissue Large Intestine, Colon SURGICAL PATHOLOGY EXAM Efraín Guerrero MD 11/16/2021  5:28 PM    3 :  Tissue Omentum SURGICAL PATHOLOGY EXAM Efraín Guerrero MD 11/16/2021  6:08 PM      Findings:   mass in the cecum, mesenteric lymphadenopathy .  Complications: None.  Implants: * No implants in log *      Condition on discharge from OR: Satisfactory    Vineet Torres MD   [facility]   [phone].        ADDENDUM:    PATIENT DATA  Indicate Y or N:  Home O2 No  Hemodialysis  No  Transplant patient  No  Cirrhosis  No  Steroids in last 30 days  No  Immunomodulators in last 30 days  No  Anticoagulation at time of surgery  No   List medication NO  Prior abdominal surgery  Yes  Pelvic irradiation  No    Albumin within 30 days if known no  Hgb within 30 days if known 11.4  Hemoglobin   Date Value Ref Range Status   11/16/2021 11.4 (L) 11.7 - 15.7 g/dL Final   ]  Cr within 30 days if known 0.73  Creatinine   Date Value Ref Range Status   11/16/2021 0.73 0.60 - 1.10 mg/dL Final   ]  Body mass index is 32.68 kg/m .      OR DATA  Emergent  No   <24 hours  No   <1 week  No  Bowel Prep Yes  Antibiotics  Yes  DVT prophylaxis    Heparin  Yes   SCD  Yes   None  No  Drain  No  ASA (1,2,3,4) 3  OR time (min) 183  Stents  No  Transfuse >/= 2U  No  Anastomosis   Stapled  Yes   Handsewn  No  Leak Test     Positive  No   Negative  No   Not done  Yes    FOR CANCER ALSO COMPLETE:  Preoperative treatment (Y or N)   Chemo  No   Radiation No   Diversion  No    CEA 1.2  Metastatic disease at time of operation  No

## 2021-11-17 NOTE — CONSULTS
[facility]  Consult Note - Hospitalist Service     Date of Admission:  11/16/2021  Consult Requested by: Dr. Vineet Torres  Reason for Consult: Medical management     Assessment & Plan   Tessa Edwards is a 66 year old female admitted on 11/16/2021.  Patient has a past medical history including anemia, sciatica, restless leg syndrome, cervical spine stenosis, GERD, fibromyalgia, osteoarthritis, vitamin D deficiency and mood disorder.  She is admitted to the hospital for right colectomy after being found to have incidental finding of colon mass while being prepped for a spinal surgery.  On 11/16/2021 underwent right colectomy.    Colon mass status post laparoscopic right colectomy 11/16/2021   Postop care per colorectal surgery    Acute postoperative respiratory insufficiency   Wean O2    Anemia   Hemoglobin currently 11.4.  Baseline appears to be 9-11 range   No active source of bleeding    Restless leg syndrome-continue Requip    Vitamin D deficiency-continue vitamin D    Mood disorder-continue Prozac    GERD-continue omeprazole    Fibromyalgia        Olamide Downey DO  [facility]  Securely message with the Vocera Web Console (learn more here)  Text page via [employer] Paging/Directory        Clinically Significant Risk Factors Present on Admission                   ______________________________________________________________________    Chief Complaint   Colon mass    History is obtained from the patient    History of Present Illness   Tessa Edwards is a 66 year old female who has a h/o anemia, sciatica, restless leg syndrome, cervical spine stenosis, GERD, fibromyalgia, osteoarthritis, vitamin D deficiency and mood disorder.  She is admitted to the hospital for right colectomy after being found to have incidental finding of colon mass while being prepped for a spinal surgery.  On 11/16/2021 underwent right colectomy.  On my evaluation of the patient, patient  notes that her pain is well controlled.  She denies any chest pain, shortness of breath, chest pressure, nausea or vomiting.  She is otherwise feeling well.      Review of Systems   12 point review of systems is reviewed and is negative except for what has already been mention in the history of presenting illness.    Past Medical History    I have reviewed this patient's medical history and updated it with pertinent information if needed.   Past Medical History:   Diagnosis Date     Abnormal CT of the abdomen 10/26/2021     Anemia, iron deficiency 10/26/2021     Cervical spinal stenosis      Chronic low back pain without sciatica, unspecified back pain laterality 10/27/2021    Spondylosis,central  stenosis, foraminal encroachment     Depression      Disease of thyroid gland      Fibromyalgia      GERD (gastroesophageal reflux disease)      Hyperlipemia      Lymphoproliferative disorder (H)      Obesity      Osteoarthritis      Primary localized osteoarthrosis, lower leg 8/27/2007     Restless legs syndrome 10/26/2021     Rotator cuff tear      Sleep apnea      Splenomegaly 10/27/2021     Thyroid nodule 10/27/2021     Weight loss 10/27/2021       Past Surgical History   I have reviewed this patient's surgical history and updated it with pertinent information if needed.  Past Surgical History:   Procedure Laterality Date     ARTHROPLASTY SHOULDER Left      ARTHROSCOPY KNEE       ARTHROSCOPY SHOULDER ROTATOR CUFF REPAIR       C LAP,CHOLECYSTECTOMY/EXPLORE      Description: Cholecystectomy Laparoscopic;  Recorded: 12/10/2012;     C TOTAL ABDOM HYSTERECTOMY      Description: Total Abdominal Hysterectomy;  Recorded: 12/10/2012;  Comments: 44 Y/O FOR FIBROID TUMORS     C TOTAL KNEE ARTHROPLASTY Right 09/12/2019    Procedure: RIGHT TOTAL KNEE ARTHROPLASTY;  Surgeon: Irvin Canas DO;  Location: [facility];  Service: Orthopedics     CERVICAL DISC ARTHROPLASTY       CERVICAL DISCECTOMY  08/01/2017    C5, C6      COLONOSCOPY N/A 2021    Procedure: COLONOSCOPY;  Surgeon: Efraín Guerrero MD;  Location: [facility]     [facility] LT SHOULDER AP AXILLA 2 VW       HYSTERECTOMY       JOINT REPLACEMENT       OOPHORECTOMY       RELEASE CARPAL TUNNEL       RELEASE CARPAL TUNNEL       XR MYELOGRAM CERVICAL  2019       Social History   I have reviewed this patient's social history and updated it with pertinent information if needed.  Social History     Tobacco Use     Smoking status: Former Smoker     Quit date: 2000     Years since quittin.7     Smokeless tobacco: Never Used   Substance Use Topics     Alcohol use: Yes     Comment: states socially     Drug use: No       Family History   I have reviewed this patient's family history and updated it with pertinent information if needed.  Family History   Problem Relation Age of Onset     Heart Disease Mother      Cancer Father      Breast Cancer Paternal Grandmother      Breast Cancer Paternal Aunt        Medications   I have reviewed this patient's current medications    Allergies   No Known Allergies    Physical Exam   Vital Signs: Temp: 98.8  F (37.1  C) Temp src: Oral BP: 122/60 Pulse: 78   Resp: 16 SpO2: 94 % O2 Device: Nasal cannula Oxygen Delivery: 3 LPM  Weight: 191 lbs 14.4 oz    GENRL: Alert and answering questions appropriately. Not in acute distress. Lying in bed   HEENT: Moist mucous membranes. no lymphadenopathy or thyromegaly  CHEST: Clear to auscultation bilaterally. No wheezes, rhonchi or crackles. Breathing easily   HEART: Regular rate and rhythm, S1S2 auscultated.   ABDMN: Soft. Non-tender, abdominal binder in place.    EXTRM: No pedal edema, DP pulses 2+. No discoloration   NEURO: Cranial nerves II-XII grossly intact. No focal neurological deficit. No involuntary movements. Normal mentation  PSYCH: Normal affect and mood.   INTGM: No skin rash, no cyanosis or clubbing    Data

## 2021-11-17 NOTE — PLAN OF CARE
"./58 (BP Location: Left arm)   Pulse 80   Temp 99  F (37.2  C) (Oral)   Resp 18   Ht 1.626 m (5' 4\")   Wt 87 kg (191 lb 14.4 oz)   SpO2 96%   BMI 32.94 kg/m     Patient denied nausea and vomiting. She also complained of  abdominal discomfort and requested PRN Pain medication. Patient voided 200 and intake was 300. Patient burping on this shift but had not passed flatus.Continue to monitor.Kaley Crowell, RN    Problem: Pain Acute  Goal: Acceptable Pain Control and Functional Ability  Outcome: Improving  Intervention: Develop Pain Management Plan  Recent Flowsheet Documentation  Taken 11/16/2021 2158 by Kaley Castro, RN  Pain Management Interventions: medication (see MAR)     Problem: Postoperative Nausea and Vomiting (Surgery Nonspecified)  Goal: Nausea and Vomiting Relief  Outcome: Improving     Problem: Postoperative Urinary Retention (Surgery Nonspecified)  Goal: Effective Urinary Elimination  Outcome: Improving     "

## 2021-11-17 NOTE — ANESTHESIA POSTPROCEDURE EVALUATION
Patient: Tessa Edwards    Procedure: Procedure(s):  LAPAROSCOPIC RIGHT COLECTOMY       Diagnosis:Mass of colon [K63.89]  Diagnosis Additional Information: No value filed.    Anesthesia Type:  General    Note:  Disposition: Inpatient   Postop Pain Control: Uneventful            Sign Out: Well controlled pain   PONV: No   Neuro/Psych: Uneventful            Sign Out: Acceptable/Baseline neuro status   Airway/Respiratory: Uneventful            Sign Out: Acceptable/Baseline resp. status   CV/Hemodynamics: Uneventful            Sign Out: Acceptable CV status; No obvious hypovolemia; No obvious fluid overload   Other NRE: NONE   DID A NON-ROUTINE EVENT OCCUR? No           Last vitals:  Vitals Value Taken Time   /56 11/16/21 2000   Temp 36.7  C (98  F) 11/16/21 1902   Pulse 78 11/16/21 2000   Resp 14 11/16/21 2000   SpO2 96 % 11/16/21 2000       Electronically Signed By: Jhon Verma MD  November 16, 2021  8:04 PM

## 2021-11-18 LAB
ANION GAP SERPL CALCULATED.3IONS-SCNC: 8 MMOL/L (ref 5–18)
BUN SERPL-MCNC: 9 MG/DL (ref 8–22)
CALCIUM SERPL-MCNC: 8.8 MG/DL (ref 8.5–10.5)
CHLORIDE BLD-SCNC: 106 MMOL/L (ref 98–107)
CO2 SERPL-SCNC: 25 MMOL/L (ref 22–31)
CREAT SERPL-MCNC: 0.59 MG/DL (ref 0.6–1.1)
ERYTHROCYTE [DISTWIDTH] IN BLOOD BY AUTOMATED COUNT: 20.4 % (ref 10–15)
GFR SERPL CREATININE-BSD FRML MDRD: >90 ML/MIN/1.73M2
GLUCOSE BLD-MCNC: 128 MG/DL (ref 70–125)
GLUCOSE BLDC GLUCOMTR-MCNC: 104 MG/DL (ref 70–99)
HCT VFR BLD AUTO: 34.4 % (ref 35–47)
HGB BLD-MCNC: 10.2 G/DL (ref 11.7–15.7)
MCH RBC QN AUTO: 26.4 PG (ref 26.5–33)
MCHC RBC AUTO-ENTMCNC: 29.7 G/DL (ref 31.5–36.5)
MCV RBC AUTO: 89 FL (ref 78–100)
PLATELET # BLD AUTO: 126 10E3/UL (ref 150–450)
POTASSIUM BLD-SCNC: 3.7 MMOL/L (ref 3.5–5)
RBC # BLD AUTO: 3.86 10E6/UL (ref 3.8–5.2)
SODIUM SERPL-SCNC: 139 MMOL/L (ref 136–145)
WBC # BLD AUTO: 35.3 10E3/UL (ref 4–11)

## 2021-11-18 PROCEDURE — 99231 SBSQ HOSP IP/OBS SF/LOW 25: CPT | Performed by: INTERNAL MEDICINE

## 2021-11-18 PROCEDURE — 120N000001 HC R&B MED SURG/OB

## 2021-11-18 PROCEDURE — 250N000011 HC RX IP 250 OP 636: Performed by: SURGERY

## 2021-11-18 PROCEDURE — 250N000013 HC RX MED GY IP 250 OP 250 PS 637: Performed by: SURGERY

## 2021-11-18 PROCEDURE — 0DBU4ZX EXCISION OF OMENTUM, PERCUTANEOUS ENDOSCOPIC APPROACH, DIAGNOSTIC: ICD-10-PCS | Performed by: COLON & RECTAL SURGERY

## 2021-11-18 PROCEDURE — 85027 COMPLETE CBC AUTOMATED: CPT | Performed by: COLON & RECTAL SURGERY

## 2021-11-18 PROCEDURE — 80048 BASIC METABOLIC PNL TOTAL CA: CPT | Performed by: COLON & RECTAL SURGERY

## 2021-11-18 PROCEDURE — 99207 PR CDG-DOWN CODE ROS EXAM: CPT | Performed by: INTERNAL MEDICINE

## 2021-11-18 PROCEDURE — 0DTF4ZZ RESECTION OF RIGHT LARGE INTESTINE, PERCUTANEOUS ENDOSCOPIC APPROACH: ICD-10-PCS | Performed by: COLON & RECTAL SURGERY

## 2021-11-18 PROCEDURE — 36415 COLL VENOUS BLD VENIPUNCTURE: CPT | Performed by: COLON & RECTAL SURGERY

## 2021-11-18 RX ORDER — OXYCODONE HYDROCHLORIDE 5 MG/1
5-10 TABLET ORAL EVERY 4 HOURS PRN
Status: DISCONTINUED | OUTPATIENT
Start: 2021-11-18 | End: 2021-11-19 | Stop reason: HOSPADM

## 2021-11-18 RX ADMIN — ROPINIROLE HYDROCHLORIDE 0.25 MG: 0.25 TABLET, FILM COATED ORAL at 21:01

## 2021-11-18 RX ADMIN — ONDANSETRON 4 MG: 2 INJECTION INTRAMUSCULAR; INTRAVENOUS at 15:17

## 2021-11-18 RX ADMIN — OMEPRAZOLE 20 MG: 20 CAPSULE, DELAYED RELEASE ORAL at 21:01

## 2021-11-18 RX ADMIN — HYDROMORPHONE HYDROCHLORIDE 0.4 MG: 0.2 INJECTION, SOLUTION INTRAMUSCULAR; INTRAVENOUS; SUBCUTANEOUS at 03:32

## 2021-11-18 RX ADMIN — ACETAMINOPHEN 975 MG: 325 TABLET ORAL at 04:22

## 2021-11-18 RX ADMIN — FLUOXETINE HYDROCHLORIDE 40 MG: 20 CAPSULE ORAL at 09:30

## 2021-11-18 RX ADMIN — ONDANSETRON 4 MG: 2 INJECTION INTRAMUSCULAR; INTRAVENOUS at 03:39

## 2021-11-18 RX ADMIN — ACETAMINOPHEN 975 MG: 325 TABLET ORAL at 16:30

## 2021-11-18 ASSESSMENT — ACTIVITIES OF DAILY LIVING (ADL)
ADLS_ACUITY_SCORE: 6

## 2021-11-18 NOTE — PLAN OF CARE
Problem: Pain Acute  Goal: Acceptable Pain Control and Functional Ability  Outcome: Improving  Intervention: Develop Pain Management Plan  Recent Flowsheet Documentation  Taken 11/17/2021 1559 by Val Helms RN  Pain Management Interventions: declines     Problem: Postoperative Urinary Retention (Surgery Nonspecified)  Goal: Effective Urinary Elimination  Outcome: Improving   Patient reports abdominal pain 3-4/10, and declined need for medication until HS. Given IV Dilaudid 0.4mg with good relief. Tolerating diet. Not passing gas yet. Camejo discontinue 1420. Patient voided well after dinner time. AMB halls with SBA. Incisions with Derma bond and CDI.  O2 sats drop when sleeping. Back on 1 liter at HS. Also gave IS for patient to work on. 1250.

## 2021-11-18 NOTE — PROGRESS NOTES
Colon and Rectal Surgery  Daily Progress Note    Subjective  No acute events overnight. Pain well controlled with medications. Tolerating full liquids without nausea. No return of bowel function yet.    Objective    Intake/Output Summary (Last 24 hours) at 11/18/2021 0937  Last data filed at 11/18/2021 0539  Gross per 24 hour   Intake 1040 ml   Output 725 ml   Net 315 ml         Temp:  [97.5  F (36.4  C)-99.6  F (37.6  C)] 97.5  F (36.4  C)  Pulse:  [77-85] 78  Resp:  [16-18] 16  BP: (129-147)/(60-65) 147/65  FiO2 (%):  [2 %] 2 %  SpO2:  [80 %-95 %] 93 %    Physical Exam:  General: awake, alert, laying in bed, in no acute distress  Head: normocephalic, atraumatic  Respiratory: non-labored breathing  Abdomen: soft, appropriately tender and non-distended              Incisions: clean, dry and intact  Skin: No rashes or lesions  Musculoskeletal: moves all four extremities equally; no calf edema or tenderness  Psychological: alert and oriented, answers questions appropriately    Pertinent Labs  Lab Results: personally reviewed.  Lab Results   Component Value Date     11/16/2021     10/26/2021     08/27/2020    CO2 23 11/16/2021    CO2 23 10/26/2021    CO2 23 08/27/2020    BUN 15 11/16/2021    BUN 27 10/26/2021    BUN 31 08/27/2020     Lab Results   Component Value Date    WBC 42.0 08/27/2020    WBC 36.7 09/12/2019    WBC 34.7 09/05/2019    HGB 11.4 11/16/2021    HGB 11.0 08/27/2020    HGB 9.4 09/14/2019    HCT 35.3 08/27/2020    HCT 36.4 09/12/2019    HCT 38.3 09/05/2019    MCV 81 08/27/2020    MCV 82 09/12/2019    MCV 84 09/05/2019     08/27/2020     09/12/2019     09/05/2019       Assessment/Plan: This is a 66 year old female POD #2 s/p laparoscopic right colectomy for mass of colon    - follow-up CBC/BMP  -Continue fulls liquids  -OOB/ambulate  -Encourage IS  -Monitor I&Os  -AROBF  -IV narcotics until tolerating LFD    Discussed with Dr. Cesar Torres MD  Fellow in Colon and  Rectal Surgery  [facility]  Pager: [phone]    Colorectal Surgery Staff:  I have seen and examined the patient. I agree with the above documentation and plan of the fellow/PA above with the following additions/chages:    Passing gas and stool. Did well with fulls. Has chronic nausea and spit up since prior to surgery secondary to her splenomegaly and compression on the stomach. Has been ambulated.     Abdomen soft, still a little tympanic over the stomach.    WBC: 23.6 ->35.3  PLT: 123->126  Hb: 9.7->10.2    66F w/ POD 2 s/p lap right.  - Doing well overall  - WBC likely increased from lymphoproliferative disorder  - Low fiber diet/HLIV/oral meds  - Potentially home tomorrow    Efraín Guerrero MD MBA  [facility]  Office: [phone]  11/18/2021 4:15 PM

## 2021-11-18 NOTE — PLAN OF CARE
Denies pain throughout the shift.    Incisions MAMADOU, CDI, and approximated with dermabond.    Denies nausea.     Refused lovenox

## 2021-11-18 NOTE — OP NOTE
COLORECTAL SURGERY OPERATIVE NOTE    Date of Service: 11/16/2021    Pre-Operative Diagnosis:   1. Right colon mass  2. B-cell lymphoproliferative disorder    Post-Operative Diagnosis: Same    Procedure: Laparoscopic right colectomy    Surgeon: Efraín Guerrero MD    Assistant: Hakan Barr MD    Second Assistant: Vineet Torres MD [facility] Colorectal Surgery Fellow    Anesthesia: General     Estimated Blood Loss: 50mL    Indication: The patient is a 66 year old female with a history of a B-cell lymphoproliferative disorder, splenomegaly, and sigmoid diverticulosis causing angulation of the colon to an extent that colonoscopy was not possible.  She was found to have a mass in her right colon on CT colonography. We discussed the plan of laparoscopic right colectomy.  Please see the details of our discussion regarding our preoperative discussions.  We discussed the risks, benefits and alternatives. The risks include bleeding, infection, and damage to surrounding structures. Specifically, we discussed infection in terms of anastomotic leak (<5%) requiring anything from antibiotics to a percutaneous drain, to a temporary or permanent ostomy. We discussed damage to surrounding structures including the ureters and small bowel. Finally we discussed the general medical risks including heart attack, stroke, and even death. The patient elected to proceed.    Findings: Significant splenomegaly.  The retroperitoneum was large and bulky with lymphadenopathy.  The peritoneum felt somewhat fibrotic and it was difficult to separate the tissue planes in the normal anatomic planes.  A mass was seen in the cecum and a primary anastomosis was performed after right colectomy.    Specimen: Right colon    Operative Details: After informed consent was obtained, the patient was brought to the operating room and placed in supine position on the operating room table. A Pigazzi pink pad was used. Sequential compression devices were  placed on bilateral lower extremities prior to induction, and general anesthesia was induced without difficulty. A marrero catheter was placed by nursing.  Tap blocks were performed by anesthesia. The patient had been given 5000 units of subcutaneous heparin in the pre-operative holding area. The patient was placed in well-padded dorsal lithotomy position and IV antibiotics were administered. The patient was secured to the table with an additional chest strap. The patient's abdomen was sterilely prepped and draped in usual fashion.    We began with a Tan cutdown technique in the supraumbilical position.  This was taken down to the skin and the subcutaneous tissues, however there was a fascial defect here.  It was difficult to enter the peritoneal cavity as the preperitoneal fat would push away within the hernia sac.  Due to the splenomegaly, Veress entry in the left upper quadrant would not be possible, so therefore I turned my attention to performing an optical trocar entry in the right midabdomen.  A 5 mm incision was made in the right midabdomen.  A 5 mm 0 degree scope was used to evaluate the layers of the abdominal wall and enter the peritoneal cavity.  The peritoneal cavity was insufflated to 15 mmHg.  The laparoscope was inserted.  There was no evidence of any entry injury.  We then proceeded to make 2 more laparoscopic trocar sites a 12 mm balloon port in the supraumbilical position and a 5 mm port in the left lower quadrant.    The peritoneal cavity was inspected.  There is no evidence of metastatic disease on the liver, stomach, peritoneal surfaces, or pelvis.  She had a very large spleen.  The liver also demonstrated fatty changes.  The patient was moved into Trendelenburg position and the left side was placed down.  Due to the redundancy of the colon, an additional 5 mm trocar was placed in the suprapubic position to aid with retraction.  The pedicle of the ileocolic trunk was elevated and the  peritoneum between the mesentery and the retroperitoneum was scored.  However, it was very difficult to separate this plane in its normal anatomical way.  Therefore, I turned to a lateral to medial approach.  Again the peritoneum was very fibrotic and the typical plane did not open up.  I turned back to the medial to lateral plane.  After further dissection, I was able to eventually get into the correct plane between the mesentery and the retroperitoneum.  The duodenum was identified and swept down out of harm's way.  There was a significant amount of fat on the vascular pedicle of the ileocolic artery which was cleared off using the LigaSure device.  With the artery skeletonized, I used the LigaSure device to divide the artery twice on the patient side, twice on the specimen side, and twice in between.  I continued my dissection in a cephalad fashion until I was underneath the transverse colon.  I continued this dissection out laterally to the sidewall.  I now again turned to the lateral sidewall.  I could see my dissection from underneath and used the LigaSure device to separate the thickened peritoneum so the colon was free laterally as well.  The patient was then turned into reverse Trendelenburg position.  The omentum was reflected cephalad.  I entered the plane between the greater omentum and the mesentery of the transverse colon.  Continuing this plane I eventually dissected down to my previously dissected area from below.  I connected this to the mobilization of the hepatic flexure laterally.  The duodenum was again identified and clearly out of harm's way.  A grasper was placed on the appendix.  We now turned our attention to exteriorization and anastomosis.    Our incision was extended up approximately 4 cm.  The fascia was opened up accordingly.  An Cliff wound protector was placed.  The colon was easily exteriorized.  I could feel a palpable lump in the cecum.  A window was created in the terminal ileum  small bowel mesentery and the colon was divided using a KAILEY 75 mm blue load stapler.  I selected an appropriate point for transection of the proximal transverse colon and divided the colon in the same way.  The intervening mesentery was divided using the LigaSure device.  The specimen was passed off of the field.  This was opened on the back table by my partner Dr. Barr and we did confirm that there was a mass in the right colon.  Sterile blue towels were placed down and we turned our attention to creation of the anastomosis.    The corners were cut off of the staple lines and a limb of a KAILEY 75 mm blue load stapler was advanced into each limb.  The limbs of the bowel were rotated in an antimesenteric fashion and the stapler was fired.  The mucosa was inspected and hemostatic.  The staple lines were offset and then closed using a single firing of a TA 90 blue load stapler.  Outer gloves were now changed and the dirty instruments were passed off the field.  The transverse staple line was oversewn using 3-0 Vicryl Lembert sutures.  A 3-0 Vicryl crotch stitch was placed.  Our anastomosis was visually inspected and appeared intact.  We returned to the peritoneal cavity.  We then irrigated until the effluent ran clear.  The greater omentum was pulled down over the anastomosis.  We now turned our attention to closure.    Gowns, gloves, and instruments were changed for the clean closure portion of the operation.  The hernia at the umbilicus was identified and the fascia was mobilized to get good clean fascia outside of hernia sac.  The hernia sac was excised.  The fascia was then closed primarily using running #1 PDS sutures.  The soft tissues were then washed out.  Due to the amount of fat in the depth of this cavity, it was reapproximated using 3-0 Vicryl sutures.  The skin was closed using 4-0 Monocryl.  The 5 mm laparoscopic port sites were also closed using 4-0 Monocryl and all the incisions were sealed with  Dermabond.  This completed our planned procedure.    All sponge, needle, and instrument counts were correct x 2.     Complications: None    Disposition: Stable, extubated, to PACU    Efraín Guerrero MD, AQUILES  [facility]  Pager: [phone]  Office: [phone]  11/18/2021 7:33 AM

## 2021-11-18 NOTE — PLAN OF CARE
Problem: Bowel Motility Impaired (Surgery Nonspecified)  Goal: Effective Bowel Elimination  Outcome: No Change     Problem: Postoperative Nausea and Vomiting (Surgery Nonspecified)  Goal: Nausea and Vomiting Relief  Outcome: No Change  Intervention: Prevent or Manage Nausea and Vomiting  Recent Flowsheet Documentation  Taken 11/18/2021 0332 by Dawna Bangura, RN  Nausea/Vomiting Interventions: antiemetic       Unable to wean O2, sats drop to low 80's while asleep, oxymask placed d/t sinus congestion.  Abdominal pain 7/10, PRN dilaudid effective, Zofran given x1 for nausea/ vomiting after pain med.  Tolerating PO fluids, hypoactive bowels, no flatus.  Voiding frequently in small amounts, bladder scan 92.

## 2021-11-18 NOTE — PROGRESS NOTES
"CLINICAL NUTRITION SERVICES - ASSESSMENT NOTE     Nutrition Prescription    RECOMMENDATIONS FOR MDs/PROVIDERS TO ORDER:  None at this time    Malnutrition Status:    Patient does not meet two criteria    Recommendations already ordered by Registered Dietitian (RD):  None at this time    Future/Additional Recommendations:  Monitor intake, suggest supplements if less than 75% of needs     REASON FOR ASSESSMENT  Tessa Edwards is a/an 66 year old female assessed by the dietitian for Admission Nutrition Risk Screen for positive with 24-33lb weight loss d/t poor appetite.     PMHx: anemia, sciatica, restless leg syndrome, cervical spine stenosis, GERD, fibromyalgia, osteoarthritis, vitamin D deficiency and mood disorder.     NUTRITION HISTORY  Pt's weight loss this summer was voluntary. She did not have any special kind of diet. More recently, she reports weight loss of about 30lb over 3mo starting with illness from COVID. She reports she does not have much of an appetite. She avoids dairy, says that she gags when she eats too much of it. She does not take any nutritional supplements.     CURRENT NUTRITION ORDERS  Diet: Full Liquid  Intake/Tolerance: Underwent right colectomy 11/16 for colon mass. Per RN note 11/17, she tolerated clears well and per charting ate all of her pudding and ice cream at dinner after switching to full liquid 11/17. 11/18-- she ordered a breakfast of desserts and hot chocolate but declined a lunch.     LABS  Labs reviewed  - Creat 0.59 (L)  - K WNL  - BG , more recently 98    MEDICATIONS  Medications reviewed  - Ca + Vit D  - Omeprazole    ANTHROPOMETRICS  Height: 162.6 cm (5' 4\")  Most Recent Weight: 87 kg (191 lb 14.4 oz)    IBW: 54.5 kg (120 lb)  BMI: Obesity Grade I BMI 30-34.9  Weight History: 9% wt loss in 3mo clinically significant (pt was not trying to lose wt at this time)  Wt Readings from Last 10 Encounters:   11/16/21 87 kg (191 lb 14.4 oz)   11/05/21 86.4 kg (190 lb 8 oz) "   10/26/21 90.3 kg (199 lb)   08/18/21 95.7 kg (211 lb)   08/27/20 96.6 kg (213 lb)   09/12/19 94.3 kg (208 lb)   09/05/19 93.4 kg (206 lb)   07/09/19 90.7 kg (200 lb)   06/11/19 90.7 kg (200 lb)   05/01/19 97.5 kg (215 lb)     Dosing Weight: 62.6 kg, adjusted    ASSESSED NUTRITION NEEDS  Estimated Energy Needs: 1565- 1878 kcals/day (25 - 30 kcals/kg)  Justification: Maintenance and Obese  Estimated Protein Needs: 75-94 grams protein/day (1.2 - 1.5 grams of pro/kg)  Justification: Maintenance and Surgical wound healing  Estimated Fluid Needs: 1565- 1878 mL/day (1 mL/kcal)   Justification: Maintenance and Per provider pending fluid status    PHYSICAL FINDINGS  See malnutrition section below.  No distention or tension in abdomen & appropriate tenderness per provider 11/17    MALNUTRITION:  % Weight Loss:  > 7.5% in 3 months (severe malnutrition)  % Intake:  Decreased intake does not meet criteria  Subcutaneous Fat Loss:  None observed  Muscle Loss:  None observed  Fluid Retention:  None noted    Malnutrition Diagnosis: Patient does not meet two of the above criteria necessary for diagnosing malnutrition    NUTRITION DIAGNOSIS  Inadequate protein-energy intake related to poor appetite as evidenced by pt report of poor appetite and recent operation.     INTERVENTIONS  Implementation  Advance diet per provider  Pt declined supplements    Goals  Patient to consume % of nutritionally adequate meals three times per day, or the equivalent with supplements/snacks.     Monitoring/Evaluation  Progress toward goals will be monitored and evaluated per protocol.

## 2021-11-19 ENCOUNTER — APPOINTMENT (OUTPATIENT)
Dept: OCCUPATIONAL THERAPY | Facility: HOSPITAL | Age: 66
DRG: 821 | End: 2021-11-19
Attending: COLON & RECTAL SURGERY
Payer: COMMERCIAL

## 2021-11-19 VITALS
HEART RATE: 83 BPM | SYSTOLIC BLOOD PRESSURE: 138 MMHG | OXYGEN SATURATION: 92 % | WEIGHT: 191.9 LBS | RESPIRATION RATE: 15 BRPM | DIASTOLIC BLOOD PRESSURE: 64 MMHG | BODY MASS INDEX: 32.76 KG/M2 | TEMPERATURE: 97.6 F | HEIGHT: 64 IN

## 2021-11-19 LAB
PATH REPORT.COMMENTS IMP SPEC: NORMAL
PATH REPORT.FINAL DX SPEC: NORMAL
PATH REPORT.MICROSCOPIC SPEC OTHER STN: NORMAL
PATH REPORT.RELEVANT HX SPEC: NORMAL

## 2021-11-19 PROCEDURE — 88365 INSITU HYBRIDIZATION (FISH): CPT | Mod: 26 | Performed by: PATHOLOGY

## 2021-11-19 PROCEDURE — 88305 TISSUE EXAM BY PATHOLOGIST: CPT | Mod: 26 | Performed by: PATHOLOGY

## 2021-11-19 PROCEDURE — 88341 IMHCHEM/IMCYTCHM EA ADD ANTB: CPT | Mod: 26 | Performed by: PATHOLOGY

## 2021-11-19 PROCEDURE — 99207 PR CDG-DOWN CODE ROS EXAM: CPT | Performed by: INTERNAL MEDICINE

## 2021-11-19 PROCEDURE — 88342 IMHCHEM/IMCYTCHM 1ST ANTB: CPT | Mod: 26 | Performed by: PATHOLOGY

## 2021-11-19 PROCEDURE — 250N000011 HC RX IP 250 OP 636: Performed by: SURGERY

## 2021-11-19 PROCEDURE — 250N000013 HC RX MED GY IP 250 OP 250 PS 637: Performed by: SURGERY

## 2021-11-19 PROCEDURE — 88360 TUMOR IMMUNOHISTOCHEM/MANUAL: CPT | Mod: 26 | Performed by: PATHOLOGY

## 2021-11-19 PROCEDURE — 99231 SBSQ HOSP IP/OBS SF/LOW 25: CPT | Performed by: INTERNAL MEDICINE

## 2021-11-19 PROCEDURE — 97535 SELF CARE MNGMENT TRAINING: CPT | Mod: GO

## 2021-11-19 PROCEDURE — 88309 TISSUE EXAM BY PATHOLOGIST: CPT | Mod: 26 | Performed by: PATHOLOGY

## 2021-11-19 RX ORDER — OXYCODONE HYDROCHLORIDE 5 MG/1
5 TABLET ORAL EVERY 4 HOURS PRN
Qty: 6 TABLET | Refills: 0 | Status: SHIPPED | OUTPATIENT
Start: 2021-11-19 | End: 2022-03-22

## 2021-11-19 RX ADMIN — FLUOXETINE HYDROCHLORIDE 40 MG: 20 CAPSULE ORAL at 08:05

## 2021-11-19 RX ADMIN — ACETAMINOPHEN 650 MG: 325 TABLET ORAL at 08:06

## 2021-11-19 RX ADMIN — ENOXAPARIN SODIUM 40 MG: 40 INJECTION SUBCUTANEOUS at 12:45

## 2021-11-19 RX ADMIN — ACETAMINOPHEN 975 MG: 325 TABLET ORAL at 07:25

## 2021-11-19 ASSESSMENT — ACTIVITIES OF DAILY LIVING (ADL)
ADLS_ACUITY_SCORE: 6

## 2021-11-19 NOTE — PROGRESS NOTES
Colon and Rectal Surgery  Daily Progress Note    Subjective  Patient reports continuing to do well, eating, drinking, passing gas and bowel movements.    Objective  Intake/Output last 24 hrs:    Intake/Output Summary (Last 24 hours) at 11/19/2021 1201  Last data filed at 11/19/2021 0700  Gross per 24 hour   Intake 363 ml   Output 425 ml   Net -62 ml     Temp:  [97.6  F (36.4  C)-98.6  F (37  C)] 97.6  F (36.4  C)  Pulse:  [83-91] 83  Resp:  [15-20] 15  BP: (138-148)/(64-67) 138/64  SpO2:  [92 %-96 %] 92 %    Physical Exam:  General: awake, alert, in no acute distress  Respiratory: non-labored breathing  Abdomen: soft, appropriately tender, non-distended              Incisions: clean, dry and intact  Musculoskeletal: moves all four extremities equally; no calf edema or tenderness  Psychological: alert and oriented, answers questions appropriately    Pertinent Labs  Lab Results: personally reviewed.  Lab Results   Component Value Date     11/18/2021     11/17/2021     11/16/2021    CO2 25 11/18/2021    CO2 26 11/17/2021    CO2 23 11/16/2021    BUN 9 11/18/2021    BUN 11 11/17/2021    BUN 15 11/16/2021     Lab Results   Component Value Date    WBC 35.3 11/18/2021    WBC 23.6 11/17/2021    WBC 42.0 08/27/2020    WBC 36.7 09/12/2019    WBC 34.7 09/05/2019    HGB 10.2 11/18/2021    HGB 9.7 11/17/2021    HGB 11.4 11/16/2021    HCT 34.4 11/18/2021    HCT 32.6 11/17/2021    HCT 35.3 08/27/2020    MCV 89 11/18/2021    MCV 88 11/17/2021    MCV 81 08/27/2020     11/18/2021     11/17/2021     08/27/2020       Assessment/Plan: This is a 66 year old female POD # 3 s/p lap right colectomy.  - Eating well, pain controlled, moving her bowels, ready for discharge home.   - We discussed discharge instructions.  - She will go home on 28 days Lovenox DVT PPx.    Efraín Guerrero MD, AQUILES  [facility]  Office: [phone]  11/19/2021 12:01 PM

## 2021-11-19 NOTE — PLAN OF CARE
Problem: Pain Acute  Goal: Acceptable Pain Control and Functional Ability  Outcome: Adequate for Discharge  Intervention: Prevent or Manage Pain  Recent Flowsheet Documentation  Taken 11/19/2021 0800 by Steph Dee RN  Medication Review/Management: medications reviewed     Problem: Bleeding (Surgery Nonspecified)  Goal: Absence of Bleeding  Outcome: Adequate for Discharge     Problem: Bowel Motility Impaired (Surgery Nonspecified)  Goal: Effective Bowel Elimination  Outcome: Adequate for Discharge     Problem: Infection (Surgery Nonspecified)  Goal: Absence of Infection Signs and Symptoms  Outcome: Adequate for Discharge     Problem: Ongoing Anesthesia Effects (Surgery Nonspecified)  Goal: Anesthesia/Sedation Recovery  Outcome: Adequate for Discharge  Intervention: Optimize Anesthesia Recovery  Recent Flowsheet Documentation  Taken 11/19/2021 0800 by Steph Dee RN  Safety Promotion/Fall Prevention: assistive device/personal items within reach     Problem: Pain (Surgery Nonspecified)  Goal: Acceptable Pain Control  Outcome: Adequate for Discharge     Problem: Postoperative Nausea and Vomiting (Surgery Nonspecified)  Goal: Nausea and Vomiting Relief  Outcome: Adequate for Discharge  Intervention: Prevent or Manage Nausea and Vomiting  Recent Flowsheet Documentation  Taken 11/19/2021 0800 by Steph Dee RN  Nausea/Vomiting Interventions: acupressure applied     Problem: Postoperative Urinary Retention (Surgery Nonspecified)  Goal: Effective Urinary Elimination  Outcome: Adequate for Discharge     Problem: Adult Inpatient Plan of Care  Goal: Plan of Care Review  Outcome: Adequate for Discharge  Goal: Patient-Specific Goal (Individualized)  Outcome: Adequate for Discharge  Goal: Absence of Hospital-Acquired Illness or Injury  Outcome: Adequate for Discharge  Intervention: Identify and Manage Fall Risk  Recent Flowsheet Documentation  Taken 11/19/2021 0800 by Steph Dee RN  Safety  Promotion/Fall Prevention: assistive device/personal items within reach  Intervention: Prevent Skin Injury  Recent Flowsheet Documentation  Taken 11/19/2021 0800 by Steph Dee, RN  Body Position: position changed independently  Goal: Optimal Comfort and Wellbeing  Outcome: Adequate for Discharge  Goal: Readiness for Transition of Care  Outcome: Adequate for Discharge     Problem: OT General Care Plan  Goal: Toilet Transfer/Toileting (OT)  Description: Toilet Transfer/Toileting (OT)  Outcome: Adequate for Discharge

## 2021-11-19 NOTE — PROGRESS NOTES
ONCOLOGY CHART CHECK:    Primary oncologist:  Norberto Bucio MD  [facility] [phone]    Awaiting path report/flow cytometry from 11/16/2021 surgery.  Will continue to check for results.      Patient has an appointment to follow up with Dr. Bucio on Wednesday, December 15, 2021 with labs at 9:55 am and MD visit at 10:15 am.       We will continue to follow remotely for pathology results.      Please contact me if there are any questions or concerns.     Vale Mcnulty, RN, MS, CNP  [facility]  Cell:  [phone] (Tuesday through Friday, 8:30 am to 5 pm)

## 2021-11-19 NOTE — PLAN OF CARE
Occupational Therapy Discharge Summary    Reason for therapy discharge:    Discharged to home.    Progress towards therapy goal(s). See goals on Care Plan in [facility] electronic health record for goal details.  Goals partially met.  Barriers to achieving goals:   discharge from facility.    Therapy recommendation(s):    No further therapy is recommended.

## 2021-11-19 NOTE — DISCHARGE SUMMARY
Discharge Summary    Patient name: Tessa Edwards  YOB: 1955   Age: 66 year old  Medical Record Number: [id number]  Primary Care Physician:  Sreedhar Sidhu       Admission Date: 11/16/2021.  Discharge Date: 11/19/2021  Condition at Discharge: Stable       Principal Diagnosis:  <principal problem not specified>    HISTORY OF PRESENT ILLNESS (per H&P, operative note, or clinic note): The patient is a 66 year old female with a history of a B-cell lymphoproliferative disorder, splenomegaly, and sigmoid diverticulosis causing angulation of the colon to an extent that colonoscopy was not possible.  She was found to have a mass in her right colon on CT colonography. We discussed the plan of laparoscopic right colectomy.     PROCEDURES PERFORMED DURING HOSPITALIZATION: Laparoscopic right colectomy    FINAL PATHOLOGY:   A. MESENTERIC LYMPH NODE, EXCISION:     CD5(+) B-CELL LYMPHOMA ([id number], [facility])    PENDING FISH STUDIES ([facility], ADDENDUM)     B. RIGHT COLON, RIGHT COLECTOMY:     CD5(+) B-CELL LYMPHOMA    TUMOR INVOLVES PERIPHERAL SURGICAL MARGINS AND INVADES INTO BUT NOT THROUGH PERICOLIC ADIPOSE TISSUE    FORTY-TWO LYMPH NODES POSITIVE FOR LYMPHOMA (42/42)     OMENTUM BIOPSY:     CD5(+) B-CELL LYMPHOMA            BRIEF HOSPITAL COURSE: This 66 year old female presented for an elective laparoscopic right colectomy and was transferred to the surgical [facility] following the procedure.  Diet was advanced with return of bowel function.  Pain medication was transitioned from IV to oral uneventfully.  At the time of discharge, she was voiding freely, tolerating a regular diet, and pain was controlled with oral pain medications.  The patient was discharged home on POD #3 in stable condition.     PHYSICAL EXAM ON DATE OF DISCHARGE: Temp:  [97.6  F (36.4  C)-98.6  F (37  C)] 97.6  F (36.4  C)  Pulse:  [83-91] 83  Resp:  [15-20] 15  BP: (138-148)/(64-67) 138/64  SpO2:  [92 %-96 %] 92 %     Physical Exam:  General:  awake, alert, in no acute distress  Respiratory: non-labored breathing  Abdomen: soft, appropriately tender, non-distended              Incisions: clean, dry and intact  Musculoskeletal: moves all four extremities equally; no calf edema or tenderness  Psychological: alert and oriented, answers questions appropriately    Performed by Dr. Guerrero    Vital Signs in last 24 hours:   Temp:  [97.6  F (36.4  C)-98.6  F (37  C)] 97.6  F (36.4  C)  Pulse:  [83-91] 83  Resp:  [15-20] 15  BP: (138-148)/(64-67) 138/64  SpO2:  [92 %-96 %] 92 %    COMPLICATIONS IN HOSPITAL: None  IMPORTANT PENDING TEST RESULTS: None    Discharge Medications/Orders:  Lovenox extended cours    FOLLOW UP: She should see Sreedhar Sidhu in 1 week.  Follow up with Dr. Guerrero in 3-4 weeks.     Total time spent for discharge on date of discharge: 15 minutes, with over half spent in counseling and coordinating care  I saw the patient on the date of discharge    Doroteo Posada PA-C  [facility]  [phone]    ADDENDUM:  Length of stay: 3 days  Indicate Y or N for the following:  UTI NO  C diff NO  PNA NO  SSI NO  DVT NO  PE NO  CVA NO  MI NO  Enterocutaneous fistula NO  Peripheral nerve injury NO  Abscess (not adjacent to anastomosis) NO  Leak NO            Death within 30 days NO  Reintubation NO  Reoperation NO    FOR CANCER CASES:  CD5(+) B-CELL LYMPHOMA

## 2021-11-19 NOTE — PROGRESS NOTES
Daily Progress Note        CODE STATUS:  Full Code    11/17/21  Assessment/Plan:  Tessa Edwards is a 66 year old female admitted on 11/16/2021.  Patient has a past medical history including anemia, sciatica, restless leg syndrome, cervical spine stenosis, GERD, fibromyalgia, osteoarthritis, vitamin D deficiency and mood disorder. She is admitted to the hospital for right colectomy after being found to have incidental finding of colon mass while being prepped for a spinal surgery.  On 11/16/2021 underwent right colectomy.     Colon mass   -- Status post laparoscopic right colectomy 11/16/2021  -- Postop care per colorectal surgery     Acute postoperative respiratory insufficiency  -- Wean O2 as able     Anemia  -- Hemoglobin currently 11.4.  Baseline appears to be 9-11 range  -- No active source of bleeding  --Leucocytosis and thrombocytopenia noted. Has a h/o atypical lymphoproliferative disorder per patient. Follows up with Dr Hill     Restless leg syndrome  -- Continue Requip     Vitamin D deficiency  --Continue vitamin D     Mood disorder  -- Continue Prozac     GERD  --Continue omeprazole     Fibromyalgia  -- Cont home meds     LOS: 1 day     Subjective:  Interval History: Patient seen and examined this morning. Doing well. Passing gas and BM. Tolerating regular food. Excited about the prospect of going home today.     Review of Systems:   As mentioned in subjective.    Patient Active Problem List   Diagnosis     Primary localized osteoarthrosis, lower leg     Lymphoproliferative disorder (H)     Anemia, iron deficiency     Restless legs syndrome     Abnormal CT of the abdomen     Chronic low back pain without sciatica, unspecified back pain laterality     Thyroid nodule     Splenomegaly     Weight loss     Colonic mass       Scheduled Meds:    acetaminophen  975 mg Oral Q8H     calcium carbonate-vitamin D  1 tablet Oral QPM     enoxaparin ANTICOAGULANT  40 mg Subcutaneous Q24H     FLUoxetine  40 mg  Date of Surgery Update:  Levi Blair was seen, history and physical examination reviewed, and patient examined by me today. There have been no significant clinical changes since the completion of the previous history and physical.    The risk, benefits, and alternatives of the proposed procedure have been explained to the patient (or appropriate guardian) and understanding verbalized. All questions answered. Patient wishes to proceed.     Electronically signed by: Beatriz Almanzar MD,3/15/2021,11:59 AM "Oral Daily     gabapentin  100 mg Oral TID     omeprazole  20 mg Oral At Bedtime     rOPINIRole  0.25 mg Oral At Bedtime     sodium chloride (PF)  3 mL Intracatheter Q8H     Continuous Infusions:    bupivacaine liposome (EXPAREL) LA inj was given in the infiltration site to produce post-op analgesia. Duration of action is up to 72 hours. Other \"timo\" meds should not be given for 96 hours except for lidocaine 4% patch. This is for INFORMATION ONLY.       PRN Meds:.acetaminophen, bupivacaine liposome (EXPAREL) LA inj was given in the infiltration site to produce post-op analgesia. Duration of action is up to 72 hours. Other \"timo\" meds should not be given for 96 hours except for lidocaine 4% patch. This is for INFORMATION ONLY., HYDROmorphone **OR** HYDROmorphone, lidocaine 4%, lidocaine (buffered or not buffered), naloxone **OR** naloxone **OR** naloxone **OR** naloxone, ondansetron **OR** ondansetron, oxyCODONE, sodium chloride (PF)    Objective:  Vital signs in last 24 hours:  Temp:  [97.6  F (36.4  C)-98.6  F (37  C)] 97.6  F (36.4  C)  Pulse:  [83-91] 83  Resp:  [15-20] 15  BP: (138-148)/(64-67) 138/64  SpO2:  [92 %-96 %] 92 %        Intake/Output Summary (Last 24 hours) at 11/17/2021 1437  Last data filed at 11/17/2021 1429  Gross per 24 hour   Intake 3435 ml   Output 1465 ml   Net 1970 ml       Physical Exam:    General: Not in obvious distress.  HEENT: NC, AT   Chest: Clear to auscultation bilaterally  Heart: S1S2 normal, regular. No M/R/G  Abdomen: Soft. Expected tenderness. Bowel sounds- active.  Extremities: No legs swelling  Neuro: alert and awake, grossly non-focal      Lab Results:(I have personally reviewed the results)    No results found for this or any previous visit (from the past 24 hour(s)).    All laboratory and imaging data in the past 24 hours reviewed  Serum Glucose range:   Recent Labs   Lab 11/18/21  1013 11/18/21  0813 11/17/21  0729 11/17/21  0516   * 104* 104 98     ABG: No lab " results found in last 7 days.  CBC:   Recent Labs   Lab 11/18/21  1013 11/17/21  0729 11/16/21  1031   WBC 35.3* 23.6*  --    HGB 10.2* 9.7* 11.4*   HCT 34.4* 32.6*  --    MCV 89 88  --    * 123*  --      Chemistry:   Recent Labs   Lab 11/18/21  1013 11/17/21  0729 11/16/21  1030    139 139   POTASSIUM 3.7 3.8 4.0   CHLORIDE 106 107 106   CO2 25 26 23   BUN 9 11 15   CR 0.59* 0.68 0.73   GFRESTIMATED >90 90 85   KAYLAN 8.8 8.4* 9.1     Coags:  No results for input(s): INR, PROTIME, PTT in the last 168 hours.    Invalid input(s): APTT  Cardiac Markers:  No results for input(s): CKTOTAL, TROPONINI in the last 168 hours.       XR Abdomen Port 1 View    Result Date: 11/16/2021  EXAM: XR ABDOMEN PORT 1 VIEWS LOCATION: [facility] DATE/TIME: 11/16/2021 6:28 PM INDICATION: instrument count- more instruments than what was counted COMPARISON: CT 08/30/2021     IMPRESSION: Staple line seen in the right midabdomen from recent right hemicolectomy. Clips again noted from prior cholecystectomy. No other foreign bodies. Findings called by the undersigned to the OR staff at 1850.    PET Oncology Whole Body    Result Date: 10/21/2021  Combined Report of: PET-CT on  10/21/2021 3:17 PM: 1. PET of the neck, chest, abdomen, and pelvis. 2. PET-CT Fusion for Attenuation Correction and Anatomical Localization.  3. CT of the chest, abdomen and pelvis obtained for attenuation correction. 4. 3D MIP and PET-CT fused images were processed on an independent workstation and archived to PACS and reviewed by a radiologist. Technique: 1. PET: The patient received 12.3 mCi of 18F-FDG. Serum glucose was 123 mg/dL prior to administration. Body weight was 95.7 kg. Images were evaluated in the axial, sagittal and coronal planes as well as the rotational whole body MIP. Images were acquired from the vertex to the feet. Uptake was measured at 64 minutes. BACKGROUND: Liver SUV max= 4.2; Aorta Blood SUV Max: 2.6. 2. CT:  Volumetric acquisition of the chest, abdomen and pelvis acquired at 3 mm sections without intravenous contrast. The chest, abdomen and pelvis were evaluated at 5 mm sections in bone, soft tissue and lung windows.  3. 3D MIP and PET-CT fused images were processed on an independent workstation and archived to PACS and reviewed by a radiologist. INDICATION: Lymphadenopathy COMPARISON: CT dated 6/11/2021.. FINDINGS: HEAD/NECK: Right thyroid uptake measuring SUV max 11.7. Bilateral palatine tonsil uptake measuring up to SUV max 12.2. There is mild fullness of the palatine tonsils on CT images. The paranasal sinuses are clear. Mastoid air cells are clear. No masses, mass effect or pathologically enlarged lymph nodes. CHEST: Numerous prominent mildly hypermetabolic mediastinal lymph nodes are mildly increased since 6/11/2021. For example a right low paratracheal lymph node measuring 1.5 x 1.2 cm with SUV max 2.9 (series 3, image 177), previously 1.4 x 1.1 cm. Central tracheal brachial tree is patent. Heart size is normal. No pericardial effusion. No pleural effusion or pneumothorax. Multiple solid pulmonary nodules, including in the left lower lobe measuring 3 mm (series 3 image 208) is unchanged and too small to characterize by PET. New scattered subpleural groundglass opacities without significant FDG avidity, favor atelectasis. ABDOMEN AND PELVIS: Increased hazy attenuation in the central mesentery with generalized low levels of uptake. There are mesenteric and retroperitoneal lymphadenopathy demonstrating mild abnormal FDG uptake, mildly worsened since 6/11/2021. For example in the central mesentery a 1.8 x 1.6 cm lymph node with SUV max 4.3 (series 3, image 329), which previously measured 1.7 x 0.9 cm. In the inferior left peripancreatic space, there is a larger 1.7 x 0.9 cm lymph node with SUV max 3.4 (series 3, image 313), previously 1.4 x 0.9 cm. Mildly increased splenomegaly measuring 18.5 cm in craniocaudal  dimension, previously 17.2 cm on 6/11/2021. There is mild generalized uptake throughout the spleen. Nonspecific FDG uptake to the bowel. For example to the cecum with SUV max 6.6 (series 5, image 380), and to the proximal ascending colon with SUV max 10.7 (series 5, image 365). No bowel obstruction. No suspicious hepatic lesions. Hepatic cyst in the right hepatic lobe measuring 1.4 cm. Cholecystectomy. No pancreas masses or ductal dilatation. Increased pulmonary blood. No adrenal nodules. No hydronephrosis or obstructing renal stones. No suspicious renal masses or cysts. The bladder is partially filled and otherwise unremarkable. LOWER EXTREMITIES: Focus of activity uptake in the subcutaneous fat overlying the right buttocks measuring SUV max 3.2. BONES: Focal uptake in the proximal left humeral head measuring SUV max 9.1 (series 3 image 121). This appears within vicinity of multiple linear tracts through the cortex and is favored to be postsurgical in etiology. Bilateral knee arthroplasties.     IMPRESSION: In this patient with a history of lymphadenopathy: 1. Mildly avid lymphadenopathy in the chest, abdomen, and pelvis suspicious for low-grade lymphoma such as follicular lymphoma. These lymph nodes are mildly larger since 6/11/2021. 2. Increased splenomegaly also suspicious for lymphoma. 3. Bilateral palatine tonsil hypermetabolism and tissue fullness which could be related to lymphoma versus infectious/inflammatory etiology. 4. Hypermetabolic right thyroid nodule which has increased risk for primary thyroid malignancy, or possibly lymphoma within thyroid. Recommend thyroid ultrasound and consideration of tissue sampling. 5. Multiple tiny solid pulmonary nodules are too small to characterize by PET. Attention on follow-up. 6. Multifocal bowel uptake favored to be related to physiologic from peristalsis, although bowel involvement of malignancy is a possibility. I have personally reviewed the examination and  "initial interpretation and I agree with the findings. ALMAS MARTINEZ MD   SYSTEM ID:  [id number]    POC US Guidance Needle Placement    Result Date: 11/16/2021  Ultrasound was performed as guidance to an anesthesia procedure.  Click \"PACS images\" hyperlink below to view any stored images.  For specific procedure details, view procedure note authored by anesthesia.      Latest radiology report personally reviewed.    Note created using dragon voice recognition software so sounds alike errors may have escaped editing.      11/19/2021   Heron Argueta MD  Hospitalist, [facility]  Pager: [phone]                "

## 2021-11-19 NOTE — PLAN OF CARE
Problem: Pain Acute  Goal: Acceptable Pain Control and Functional Ability  Outcome: Improving  Intervention: Develop Pain Management Plan  Recent Flowsheet Documentation  Taken 11/18/2021 2102 by Val Helms, RN  Pain Management Interventions: medication (see MAR)  Taken 11/18/2021 1630 by Val Helms, RN  Pain Management Interventions: medication (see MAR)   Patient had some nausea at very beginning of shift. Was medicated with Zofran and she said it helped a lot. Tolerated dinner. Out of bed to BR. Daughter in law here for visit. Tylenol given at 1630 for RLE ache, and was helpful.  Incisions CDI.

## 2021-11-19 NOTE — PLAN OF CARE
Physical Therapy Discharge Summary    Reason for therapy discharge:    Discharged to home with home therapy.    Progress towards therapy goal(s). See goals on Care Plan in [facility] electronic health record for goal details.  Goals not met.  Barriers to achieving goals:   discharge from facility.    Therapy recommendation(s):    Continued therapy is recommended.  Rationale/Recommendations:  to  improve mobility and strength.  PT does recommend the pt have assist with walking and assist on steps.  Steps not traci yet.   .

## 2021-11-19 NOTE — PLAN OF CARE
Problem: Pain Acute  Goal: Acceptable Pain Control and Functional Ability  Outcome: Improving  Intervention: Prevent or Manage Pain  Recent Flowsheet Documentation  Taken 11/19/2021 0145 by Paige Mari RN  Medication Review/Management: medications reviewed     Problem: Infection (Surgery Nonspecified)  Goal: Absence of Infection Signs and Symptoms  Outcome: Improving     Problem: Postoperative Nausea and Vomiting (Surgery Nonspecified)  Goal: Nausea and Vomiting Relief  Outcome: Improving  Intervention: Prevent or Manage Nausea and Vomiting  Recent Flowsheet Documentation  Taken 11/19/2021 0145 by Paige Mari RN  Nausea/Vomiting Interventions: acupressure applied  Patient alert, oriented x 3. Denied pain. Reported occasional dry heaving, offered Z-band. Pleasant and co-operative with cares. Independent to use the bathroom. Used a walker to ambulate in the room. Noted sleeping well overnight.

## 2021-11-20 ENCOUNTER — PATIENT OUTREACH (OUTPATIENT)
Dept: CARE COORDINATION | Facility: CLINIC | Age: 66
End: 2021-11-20
Payer: COMMERCIAL

## 2021-11-20 DIAGNOSIS — Z71.89 OTHER SPECIFIED COUNSELING: ICD-10-CM

## 2021-11-20 NOTE — PROGRESS NOTES
Clinic Care Coordination Contact  [facility]/Voicemail       Clinical Data: Care Coordinator Outreach  Outreach attempted x 1.  Left message on patient's voicemail with call back information and requested return call.  Plan:  Care Coordinator will try to reach patient again in 1-2 business days.      ABBY Carranza  [phone]  [facility]

## 2021-11-21 NOTE — PROGRESS NOTES
Clinic Care Coordination Contact  [facility]/Voicemail       Clinical Data: Care Coordinator Outreach  Outreach attempted x 2.  Left message on patient's voicemail with call back information and requested return call.  Plan:  Care Coordinator will do no further outreaches at this time.      ABBY Carranza  [phone]  [facility]

## 2021-12-02 LAB
PATH REPORT.ADDENDUM SPEC: ABNORMAL
PATH REPORT.COMMENTS IMP SPEC: ABNORMAL
PATH REPORT.COMMENTS IMP SPEC: YES
PATH REPORT.FINAL DX SPEC: ABNORMAL
PATH REPORT.GROSS SPEC: ABNORMAL
PATH REPORT.MICROSCOPIC SPEC OTHER STN: ABNORMAL
PATH REPORT.MICROSCOPIC SPEC OTHER STN: ABNORMAL
PATH REPORT.RELEVANT HX SPEC: ABNORMAL
PHOTO IMAGE: ABNORMAL

## 2022-02-09 ENCOUNTER — VIRTUAL VISIT (OUTPATIENT)
Dept: SLEEP MEDICINE | Facility: CLINIC | Age: 67
End: 2022-02-09
Payer: COMMERCIAL

## 2022-02-09 VITALS — WEIGHT: 175 LBS | HEIGHT: 64 IN | BODY MASS INDEX: 29.88 KG/M2

## 2022-02-09 DIAGNOSIS — R53.82 CHRONIC FATIGUE: Primary | ICD-10-CM

## 2022-02-09 ASSESSMENT — SLEEP AND FATIGUE QUESTIONNAIRES
HOW LIKELY ARE YOU TO NOD OFF OR FALL ASLEEP WHILE SITTING AND TALKING TO SOMEONE: MODERATE CHANCE OF DOZING
HOW LIKELY ARE YOU TO NOD OFF OR FALL ASLEEP WHILE SITTING INACTIVE IN A PUBLIC PLACE: MODERATE CHANCE OF DOZING
HOW LIKELY ARE YOU TO NOD OFF OR FALL ASLEEP WHILE LYING DOWN TO REST IN THE AFTERNOON WHEN CIRCUMSTANCES PERMIT: HIGH CHANCE OF DOZING
HOW LIKELY ARE YOU TO NOD OFF OR FALL ASLEEP WHILE SITTING AND READING: HIGH CHANCE OF DOZING
HOW LIKELY ARE YOU TO NOD OFF OR FALL ASLEEP IN A CAR, WHILE STOPPED FOR A FEW MINUTES IN TRAFFIC: SLIGHT CHANCE OF DOZING
HOW LIKELY ARE YOU TO NOD OFF OR FALL ASLEEP WHEN YOU ARE A PASSENGER IN A CAR FOR AN HOUR WITHOUT A BREAK: WOULD NEVER DOZE
HOW LIKELY ARE YOU TO NOD OFF OR FALL ASLEEP WHILE WATCHING TV: SLIGHT CHANCE OF DOZING
HOW LIKELY ARE YOU TO NOD OFF OR FALL ASLEEP WHILE SITTING QUIETLY AFTER LUNCH WITHOUT ALCOHOL: MODERATE CHANCE OF DOZING

## 2022-02-09 ASSESSMENT — MIFFLIN-ST. JEOR: SCORE: 1308.79

## 2022-02-09 NOTE — PROGRESS NOTES
Tessa is a 66 year old who is being evaluated via a billable video visit.      How would you like to obtain your AVS? MyChart  If the video visit is dropped, the invitation should be resent by: Text to cell phone: [phone]  Will anyone else be joining your video visit? No      Spoke with patient. She states that most of her symptoms are most likely related to her recent diagnosis of non-Hodgkin's lymphoma. She would like to cancel today's visit.    No charge.

## 2022-02-17 ENCOUNTER — TRANSFERRED RECORDS (OUTPATIENT)
Dept: HEALTH INFORMATION MANAGEMENT | Facility: CLINIC | Age: 67
End: 2022-02-17
Payer: COMMERCIAL

## 2022-03-15 NOTE — PROGRESS NOTES
Pre-Visit Planning   Next 5 appointments (look out 90 days)    Mar 22, 2022 10:30 AM  (Arrive by 10:10 AM)  Provider Visit with Bryson Huang MD  [facility] ([facility] ) [address] [place] [id number] [phone]        Appointment Notes for this encounter:   follow up    Questionnaires Reviewed/Assigned  Additional questionnaires assigned: PHQ-9, SAUL-7    Patient preferred phone number: [phone]    Contacted patient via phone/ProtoSharet. Are there any additional questions or concerns you'd like to review with your provider during your visit?     Visit is not preventive.    Meds  Entered patient-preferred pharmacy. and [facility] [address]     Current Outpatient Medications   Medication     acetaminophen (TYLENOL) 500 MG tablet     calcium-vitamin D (CALCIUM-VITAMIN D) 500 mg(1,250mg) -200 unit per tablet     FLUoxetine (PROZAC) 40 MG capsule     multivitamin w/minerals (THERA-VIT-M) tablet     naproxen sodium (ALEVE) 220 MG tablet     omeprazole (PRILOSEC) 20 MG capsule     oxyCODONE (ROXICODONE) 5 MG tablet     rOPINIRole (REQUIP) 0.25 MG tablet     No current facility-administered medications for this visit.     Health Maintenance   Health Maintenance Due   Topic Date Due     URINE DRUG SCREEN  Never done     ASTHMA ACTION PLAN  Never done     ADVANCE CARE PLANNING  Never done     DEPRESSION ACTION PLAN  Never done     HEPATITIS C SCREENING  Never done     ZOSTER IMMUNIZATION (1 of 2) Never done     MEDICARE ANNUAL WELLNESS VISIT  Never done     Pneumococcal Vaccine: 65+ Years (3 of 4 - PPSV23) 12/13/2018     ASTHMA CONTROL TEST  12/29/2021     PHQ-9  02/18/2022       Health Maintenance reviewed and Health Maintenance orders pended    [employer]  Patient is active on [employer].    Questionnaire Review   Offered information on completing questionnaires via [employer].    Call Summary  Advised patient  to call back at [phone] if needed.   Arianna Irene, Registered Nurse, PAL (Patient Advocate Liaison)   [facility]     [phone]      Answers for HPI/ROS submitted by the patient on 3/22/2022  If you checked off any problems, how difficult have these problems made it for you to do your work, take care of things at home, or get along with other people?: Somewhat difficult  PHQ9 TOTAL SCORE: 9  SAUL 7 TOTAL SCORE: 11

## 2022-03-22 ENCOUNTER — OFFICE VISIT (OUTPATIENT)
Dept: FAMILY MEDICINE | Facility: CLINIC | Age: 67
End: 2022-03-22
Payer: COMMERCIAL

## 2022-03-22 VITALS
OXYGEN SATURATION: 98 % | BODY MASS INDEX: 31.97 KG/M2 | SYSTOLIC BLOOD PRESSURE: 138 MMHG | DIASTOLIC BLOOD PRESSURE: 64 MMHG | TEMPERATURE: 99.2 F | WEIGHT: 186.25 LBS | HEART RATE: 81 BPM | RESPIRATION RATE: 16 BRPM

## 2022-03-22 DIAGNOSIS — F32.1 MODERATE MAJOR DEPRESSION (H): ICD-10-CM

## 2022-03-22 DIAGNOSIS — C85.90 NON-HODGKIN'S LYMPHOMA, UNSPECIFIED BODY REGION, UNSPECIFIED NON-HODGKIN LYMPHOMA TYPE (H): Primary | ICD-10-CM

## 2022-03-22 DIAGNOSIS — Z23 ENCOUNTER FOR IMMUNIZATION: ICD-10-CM

## 2022-03-22 DIAGNOSIS — G25.81 RESTLESS LEG SYNDROME: ICD-10-CM

## 2022-03-22 DIAGNOSIS — R33.9 URINARY RETENTION: ICD-10-CM

## 2022-03-22 DIAGNOSIS — R10.13 DYSPEPSIA: ICD-10-CM

## 2022-03-22 DIAGNOSIS — R35.0 URINARY FREQUENCY: ICD-10-CM

## 2022-03-22 PROBLEM — G56.03 BILATERAL CARPAL TUNNEL SYNDROME: Status: ACTIVE | Noted: 2019-07-09

## 2022-03-22 PROBLEM — K63.89 COLONIC MASS: Status: RESOLVED | Noted: 2021-11-16 | Resolved: 2022-03-22

## 2022-03-22 PROBLEM — E66.01 MORBID OBESITY (H): Status: ACTIVE | Noted: 2022-03-22

## 2022-03-22 PROBLEM — R73.09 OTHER ABNORMAL GLUCOSE: Status: ACTIVE | Noted: 2022-03-22

## 2022-03-22 PROBLEM — G54.2 CERVICAL NERVE ROOT COMPRESSION: Status: ACTIVE | Noted: 2017-07-24

## 2022-03-22 PROBLEM — D47.9 LYMPHOPROLIFERATIVE DISORDER (H): Status: RESOLVED | Noted: 2021-10-26 | Resolved: 2022-03-22

## 2022-03-22 PROBLEM — R93.5 ABNORMAL CT OF THE ABDOMEN: Status: RESOLVED | Noted: 2021-10-26 | Resolved: 2022-03-22

## 2022-03-22 PROBLEM — E66.01 MORBID OBESITY (H): Status: RESOLVED | Noted: 2022-03-22 | Resolved: 2022-03-22

## 2022-03-22 PROBLEM — M17.11 OSTEOARTHRITIS OF RIGHT KNEE, UNSPECIFIED OSTEOARTHRITIS TYPE: Status: ACTIVE | Noted: 2019-09-12

## 2022-03-22 PROBLEM — M48.061 SPINAL STENOSIS OF LUMBAR REGION WITHOUT NEUROGENIC CLAUDICATION: Status: ACTIVE | Noted: 2021-10-27

## 2022-03-22 LAB
ALBUMIN UR-MCNC: NEGATIVE MG/DL
APPEARANCE UR: CLEAR
BILIRUB UR QL STRIP: NEGATIVE
COLOR UR AUTO: YELLOW
GLUCOSE UR STRIP-MCNC: NEGATIVE MG/DL
HGB UR QL STRIP: NEGATIVE
KETONES UR STRIP-MCNC: NEGATIVE MG/DL
LEUKOCYTE ESTERASE UR QL STRIP: NEGATIVE
NITRATE UR QL: NEGATIVE
PH UR STRIP: 5 [PH] (ref 5–7)
SP GR UR STRIP: 1.02 (ref 1–1.03)
UROBILINOGEN UR STRIP-ACNC: 0.2 E.U./DL

## 2022-03-22 PROCEDURE — 96127 BRIEF EMOTIONAL/BEHAV ASSMT: CPT | Performed by: FAMILY MEDICINE

## 2022-03-22 PROCEDURE — 90750 HZV VACC RECOMBINANT IM: CPT | Performed by: FAMILY MEDICINE

## 2022-03-22 PROCEDURE — 81003 URINALYSIS AUTO W/O SCOPE: CPT | Performed by: FAMILY MEDICINE

## 2022-03-22 PROCEDURE — 90732 PPSV23 VACC 2 YRS+ SUBQ/IM: CPT | Performed by: FAMILY MEDICINE

## 2022-03-22 PROCEDURE — 90472 IMMUNIZATION ADMIN EACH ADD: CPT | Performed by: FAMILY MEDICINE

## 2022-03-22 PROCEDURE — 87086 URINE CULTURE/COLONY COUNT: CPT | Performed by: FAMILY MEDICINE

## 2022-03-22 PROCEDURE — 99214 OFFICE O/P EST MOD 30 MIN: CPT | Mod: 25 | Performed by: FAMILY MEDICINE

## 2022-03-22 PROCEDURE — 90471 IMMUNIZATION ADMIN: CPT | Performed by: FAMILY MEDICINE

## 2022-03-22 RX ORDER — ROPINIROLE 0.5 MG/1
0.5 TABLET, FILM COATED ORAL 3 TIMES DAILY
Qty: 90 TABLET | Refills: 3 | Status: SHIPPED | OUTPATIENT
Start: 2022-03-22 | End: 2022-07-26

## 2022-03-22 RX ORDER — ACALABRUTINIB 100 MG/1
100 CAPSULE, GELATIN COATED ORAL 2 TIMES DAILY
Status: ON HOLD
Start: 2022-03-22 | End: 2024-03-14

## 2022-03-22 ASSESSMENT — PATIENT HEALTH QUESTIONNAIRE - PHQ9
SUM OF ALL RESPONSES TO PHQ QUESTIONS 1-9: 9
SUM OF ALL RESPONSES TO PHQ QUESTIONS 1-9: 9
10. IF YOU CHECKED OFF ANY PROBLEMS, HOW DIFFICULT HAVE THESE PROBLEMS MADE IT FOR YOU TO DO YOUR WORK, TAKE CARE OF THINGS AT HOME, OR GET ALONG WITH OTHER PEOPLE: SOMEWHAT DIFFICULT

## 2022-03-22 ASSESSMENT — ANXIETY QUESTIONNAIRES
4. TROUBLE RELAXING: MORE THAN HALF THE DAYS
5. BEING SO RESTLESS THAT IT IS HARD TO SIT STILL: SEVERAL DAYS
GAD7 TOTAL SCORE: 11
6. BECOMING EASILY ANNOYED OR IRRITABLE: NOT AT ALL
7. FEELING AFRAID AS IF SOMETHING AWFUL MIGHT HAPPEN: NEARLY EVERY DAY
7. FEELING AFRAID AS IF SOMETHING AWFUL MIGHT HAPPEN: NEARLY EVERY DAY
1. FEELING NERVOUS, ANXIOUS, OR ON EDGE: SEVERAL DAYS
2. NOT BEING ABLE TO STOP OR CONTROL WORRYING: MORE THAN HALF THE DAYS
GAD7 TOTAL SCORE: 11
3. WORRYING TOO MUCH ABOUT DIFFERENT THINGS: MORE THAN HALF THE DAYS
GAD7 TOTAL SCORE: 11

## 2022-03-22 NOTE — PROGRESS NOTES
"  Assessment & Plan   Problem List Items Addressed This Visit     Dyspepsia     Effective PPI contraindicated, reduces efficacy of antineoplastic therapies.  Placed on Pepcid twice daily, this is ineffective.  Refer for study.  She may need to put up with discomfort course is complete         Relevant Orders    Adult Gastro Ref - Procedure Only    Moderate major depression (H)     Satisfied with current fluoxetine.  PHQ-9 score:    PHQ 3/22/2022   PHQ-9 Total Score 9   Q9: Thoughts of better off dead/self-harm past 2 weeks Not at all   F/U: Thoughts of suicide or self-harm -   F/U: Safety concerns -     Continue                 Non-Hodgkin's lymphoma (H) - Primary     Currently responding to chemotherapy well-tolerated.  Discussed         Urinary frequency     Urinary frequency and difficulty initiating urination without dysuria, longstanding.  Broaden database, refer         Relevant Orders    Adult Urology Referral    UA Macro with Reflex to Micro and Culture - lab collect (Completed)    Urine Culture Aerobic Bacterial - lab collect    Urinary retention     Difficulty draining her bladder, no dysuria.  Refer         Relevant Orders    Adult Urology Referral    UA Macro with Reflex to Micro and Culture - lab collect (Completed)    Urine Culture Aerobic Bacterial - lab collect      Other Visit Diagnoses     Restless leg syndrome        Relevant Medications    rOPINIRole (REQUIP) 0.5 MG tablet    Encounter for immunization        Relevant Orders    Pneumococcal vaccine 23 valent PPSV23  (Pneumovax) [16872] (Completed)           Patient notes that she is thinking of retiring.  Her current chemotherapy is expensive and well covered by her current insurance.  She is responding.  Medicare does not cover this agent.  She is meeting with a  this afternoon discussed         BMI:   Estimated body mass index is 31.97 kg/m  as calculated from the following:    Height as of 2/9/22: 1.626 m (5' 4\").    Weight as " of this encounter: 84.5 kg (186 lb 4 oz).   Weight management plan: maintain        Return in 6 months (on 9/22/2022) for P 23 to lab go, Physical Exam.    Bryson Huang MD  [facility]    Armando Zarate is a 66 year old who presents for the following health issues     HPI     Pt is here for a follow up.      Review of Systems   As described      Objective    /64 (BP Location: Right arm, Patient Position: Chair, Cuff Size: Adult Regular)   Pulse 81   Temp 99.2  F (37.3  C) (Oral)   Resp 16   Wt 84.5 kg (186 lb 4 oz)   SpO2 98%   BMI 31.97 kg/m    Body mass index is 31.97 kg/m .  Physical Exam   Affect is cheerful  Abdomen is soft        Bryson Huang MD

## 2022-03-23 ASSESSMENT — ANXIETY QUESTIONNAIRES: GAD7 TOTAL SCORE: 11

## 2022-03-23 NOTE — ASSESSMENT & PLAN NOTE
Urinary frequency and difficulty initiating urination without dysuria, longstanding.  Broaden database, refer

## 2022-03-23 NOTE — ASSESSMENT & PLAN NOTE
Effective PPI contraindicated, reduces efficacy of antineoplastic therapies.  Placed on Pepcid twice daily, this is ineffective.  Refer for study.  She may need to put up with discomfort course is complete

## 2022-03-23 NOTE — ASSESSMENT & PLAN NOTE
Satisfied with current fluoxetine.  PHQ-9 score:    PHQ 3/22/2022   PHQ-9 Total Score 9   Q9: Thoughts of better off dead/self-harm past 2 weeks Not at all   F/U: Thoughts of suicide or self-harm -   F/U: Safety concerns -     Continue

## 2022-03-24 LAB — BACTERIA UR CULT: NORMAL

## 2022-03-25 ENCOUNTER — HOSPITAL ENCOUNTER (OUTPATIENT)
Dept: ULTRASOUND IMAGING | Facility: HOSPITAL | Age: 67
Discharge: HOME | End: 2022-03-25
Attending: INTERNAL MEDICINE
Payer: COMMERCIAL

## 2022-03-25 DIAGNOSIS — C83.07: ICD-10-CM

## 2022-03-25 PROCEDURE — 272N000431 US BIOPSY THYROID FINE NEEDLE ASPIRATION

## 2022-03-25 PROCEDURE — 76536 US EXAM OF HEAD AND NECK: CPT

## 2022-03-25 PROCEDURE — 88173 CYTOPATH EVAL FNA REPORT: CPT | Mod: TC | Performed by: INTERNAL MEDICINE

## 2022-03-29 LAB
PATH REPORT.COMMENTS IMP SPEC: NORMAL
PATH REPORT.COMMENTS IMP SPEC: NORMAL
PATH REPORT.FINAL DX SPEC: NORMAL
PATH REPORT.GROSS SPEC: NORMAL
PATH REPORT.MICROSCOPIC SPEC OTHER STN: NORMAL
PATH REPORT.RELEVANT HX SPEC: NORMAL

## 2022-03-29 PROCEDURE — 88173 CYTOPATH EVAL FNA REPORT: CPT | Mod: 26 | Performed by: PATHOLOGY

## 2022-03-29 PROCEDURE — 88305 TISSUE EXAM BY PATHOLOGIST: CPT | Mod: 26 | Performed by: PATHOLOGY

## 2022-03-29 PROCEDURE — 88172 CYTP DX EVAL FNA 1ST EA SITE: CPT | Mod: 26 | Performed by: PATHOLOGY

## 2022-04-01 ENCOUNTER — TELEPHONE (OUTPATIENT)
Dept: GASTROENTEROLOGY | Facility: CLINIC | Age: 67
End: 2022-04-01
Payer: COMMERCIAL

## 2022-04-01 NOTE — TELEPHONE ENCOUNTER
Screening Questions  BlueKIND OF PREP RedLOCATION [review exclusion criteria] GreenSEDATION TYPE  1. Have you had a positive covid test in the last 90 days? n     2. Are you active on mychart? Y    3. What insurance is in the chart? [employer]     3.   Ordering/Referring Provider: Bryson Huang MD in  FAMILY PRACTICE    4. BMI 31.9  [BMI OVER 40-EXTENDED PREP]  If greater than 40 review exclusion criteria [PAC APPT IF @ [facility]]        5.  Respiratory Screening :  [If yes to any of the following HOSPITAL setting only]     Do you use daily home oxygen? N    Do you have mod to severe Obstructive Sleep Apnea? N  [OKAY @ [facility] SH PH RI]   Do you have Pulmonary Hypertension? N     Do you have UNCONTROLLED asthma? N        6.   Have you had a heart or lung transplant? N      7.   Are you currently on dialysis? N [ If yes, G-PREP & HOSPITAL setting only]     8.   Do you have chronic kidney disease? N [ If yes, G-PREP ]    9.   Have you had a stroke or Transient ischemic attack (TIA - aka  mini stroke ) within 6 months?  N (If yes, please review exclusion criteria)    10.   In the past 6 months, have you had any heart related issues including cardiomyopathy or heart attack? N           If yes, did it require cardiac stenting or other implantable device? N      11.   Do you have any implantable devices in your body (pacemaker, defib, LVAD)? N (If yes, please review exclusion criteria)    12.   Do you take nitroglycerin? N           If yes, how often? N  (if yes, HOSPITAL setting ONLY)    13.   Are you currently taking any blood thinners? N           [IF YES, INFORM PATIENT TO FOLLOW UP W/ ORDERING PROVIDER FOR BRIDGING INSTRUCTIONS]     14.   Do you have a diagnosis of diabetes? N   [ If yes, G-PREP ]    15.   [FEMALES] Are you currently pregnant?     If yes, how many weeks?     16.   Are you taking any prescription pain medications on a routine schedule?  N  [ If yes, EXTENDED PREP.] [If yes, MAC]    17.   Do  you have any chemical dependencies such as alcohol, street drugs, or methadone?  N [If yes, MAC]    18.   Do you have any history of post-traumatic stress syndrome, severe anxiety or history of psychosis?  N  [If yes, MAC]    19.   Do you have a significant intellectual disability? N [If yes, MAC]    20.   Do you transfer independently?  Y    21.  On a regular basis do you go 3-5 days between bowel movements?    [ If yes, EXTENDED PREP.]    22.   Preferred LOCAL Pharmacy for Pre Prescription      [facility] [address] [place], [place] - [address] [facility] [address] [place] [address] [address] [place] [address] PHARMACY [place], [place] - [address] [facility]      Scheduling Details      Caller : Tessa  (Please ask for phone number if not scheduled by patient)    Type of Procedure Scheduled: EGD  Which Colonoscopy Prep was Sent?:   DAV CF PATIENTS & GROEN'S PATIENTS NEEDS EXTENDED PREP  Surgeon:   Date of Procedure:   Location: [facility]      Sedation Type: MAC  Conscious Sedation- Needs  for 6 hours after the procedure  MAC/General-Needs  for 24 hours after procedure    Pre-op Required at [facility] [place] [place] and OR for MAC sedation:   (advise patient they will need a pre-op prior to procedure -)      Informed patient they will need an adult    Cannot take any type of public or medical transportation alone    Pre-Procedure Covid test to be completed at [facility] or Externally:     Confirmed Nurse will call to complete assessment y    Additional comments: Pt requests [facility] because of mobility in/out. Pt requests afternoon appt. Only, before April 29 (when she retires). Pt will return call to us to inquire about any April afternoon availability at [facility] only. Currently first available is May 3 @ [facility]. Not interested in [facility] or [facility].

## 2022-04-27 ENCOUNTER — TRANSFERRED RECORDS (OUTPATIENT)
Dept: HEALTH INFORMATION MANAGEMENT | Facility: CLINIC | Age: 67
End: 2022-04-27
Payer: COMMERCIAL

## 2022-04-30 ENCOUNTER — HEALTH MAINTENANCE LETTER (OUTPATIENT)
Age: 67
End: 2022-04-30

## 2022-05-05 ENCOUNTER — TRANSFERRED RECORDS (OUTPATIENT)
Dept: HEALTH INFORMATION MANAGEMENT | Facility: CLINIC | Age: 67
End: 2022-05-05
Payer: COMMERCIAL

## 2022-05-17 ENCOUNTER — TRANSFERRED RECORDS (OUTPATIENT)
Dept: HEALTH INFORMATION MANAGEMENT | Facility: CLINIC | Age: 67
End: 2022-05-17
Payer: COMMERCIAL

## 2022-05-24 ENCOUNTER — ALLIED HEALTH/NURSE VISIT (OUTPATIENT)
Dept: FAMILY MEDICINE | Facility: CLINIC | Age: 67
End: 2022-05-24
Payer: COMMERCIAL

## 2022-05-24 DIAGNOSIS — Z23 NEED FOR SHINGLES VACCINE: Primary | ICD-10-CM

## 2022-05-24 PROCEDURE — 99207 PR NO CHARGE NURSE ONLY: CPT

## 2022-05-24 PROCEDURE — 90471 IMMUNIZATION ADMIN: CPT

## 2022-05-24 PROCEDURE — 90750 HZV VACC RECOMBINANT IM: CPT

## 2022-06-10 ENCOUNTER — TRANSFERRED RECORDS (OUTPATIENT)
Dept: HEALTH INFORMATION MANAGEMENT | Facility: CLINIC | Age: 67
End: 2022-06-10
Payer: COMMERCIAL

## 2022-07-25 DIAGNOSIS — G25.81 RESTLESS LEG SYNDROME: ICD-10-CM

## 2022-07-26 RX ORDER — ROPINIROLE 0.5 MG/1
TABLET, FILM COATED ORAL
Qty: 90 TABLET | Refills: 0 | Status: SHIPPED | OUTPATIENT
Start: 2022-07-26 | End: 2022-08-08

## 2022-07-26 NOTE — TELEPHONE ENCOUNTER
Refilled x 1 month; needs follow-up visit with PCP  Covering for Dr. Huang while out of office.  FELIPE Fields CNP on 7/26/2022 at 4:56 PM

## 2022-07-26 NOTE — TELEPHONE ENCOUNTER
Routing refill request to provider for review/approval because:  Patty given x1 and patient did not follow up, please advise  Labs not current:  DC Orosco RN on 7/26/2022 at 4:43 PM

## 2022-08-08 ENCOUNTER — VIRTUAL VISIT (OUTPATIENT)
Dept: FAMILY MEDICINE | Facility: CLINIC | Age: 67
End: 2022-08-08
Payer: COMMERCIAL

## 2022-08-08 DIAGNOSIS — G25.81 RESTLESS LEG SYNDROME: ICD-10-CM

## 2022-08-08 DIAGNOSIS — Z29.9 ENCOUNTER FOR PREVENTIVE MEASURE: ICD-10-CM

## 2022-08-08 DIAGNOSIS — F32.1 MODERATE MAJOR DEPRESSION (H): ICD-10-CM

## 2022-08-08 DIAGNOSIS — C83.07 SMALL B-CELL LYMPHOMA OF SPLEEN (H): Primary | ICD-10-CM

## 2022-08-08 PROCEDURE — 99214 OFFICE O/P EST MOD 30 MIN: CPT | Mod: 95 | Performed by: FAMILY MEDICINE

## 2022-08-08 RX ORDER — ROPINIROLE 1 MG/1
TABLET, FILM COATED ORAL
Qty: 90 TABLET | Refills: 5 | Status: SHIPPED | OUTPATIENT
Start: 2022-08-08 | End: 2023-01-02

## 2022-08-08 RX ORDER — FLUOXETINE 40 MG/1
40 CAPSULE ORAL DAILY
Qty: 90 CAPSULE | Refills: 3 | Status: SHIPPED | OUTPATIENT
Start: 2022-08-08 | End: 2023-07-27

## 2022-08-08 NOTE — PROGRESS NOTES
Tessa is a 67 year old who is being evaluated via a billable telephone visit.      What phone number would you like to be contacted at? [phone]  How would you like to obtain your AVS? [facility]    Assessment & Plan   Problem List Items Addressed This Visit     Encounter for preventive measure     Routine           Relevant Orders    Hepatitis C Screen Reflex to HCV RNA Quant and Genotype    Moderate major depression (H)     PHQ-9 score:    PHQ 3/22/2022   PHQ-9 Total Score 9   Q9: Thoughts of better off dead/self-harm past 2 weeks Not at all   F/U: Thoughts of suicide or self-harm -   F/U: Safety concerns -     Fluoxetine refills.  Continue                   Relevant Medications    FLUoxetine (PROZAC) 40 MG capsule    Non-Hodgkin's lymphoma (H) - Primary     Monthly lab.  Infusion scheduled later this week           Restless leg syndrome     Clinical diagnosis.  Requip incompletely effective, no intolerance.  Increased dose           Relevant Medications    rOPINIRole (REQUIP) 1 MG tablet                        No follow-ups on file.   Follow-up Visit   Expected date:  Nov 08, 2022 (Approximate)      Follow Up Appointment Details:     Follow-up with whom?: Me    Follow-Up for what?: Adult Preventive    Any Additional Chronic Condition Management?: Asthma    How?: In Person    Is this an as-needed follow-up?: No                    Bryson Huang MD  [facility]   Tessa is a 67 year old presenting for the following health issues:  No chief complaint on file.      HPI     Medication Followup of Ropinirole    Taking Medication as prescribed: yes    Side Effects:  None    Medication Helping Symptoms:  Yes. But I take 3 times a day and I think it wares off before my next dose time        Review of Systems   She feels well except for her restless legs      Objective           Vitals:  No vitals were obtained today due to virtual visit.    Physical Exam   healthy, alert and no  distress  PSYCH: Alert and oriented times 3; coherent speech, normal   rate and volume, able to articulate logical thoughts, able   to abstract reason, no tangential thoughts, no hallucinations   or delusions  Her affect is normal  RESP: No cough, no audible wheezing, able to talk in full sentences  Remainder of exam unable to be completed due to telephone visits                Phone call duration: 14 minutes    Bryson Huang MD      .  ..

## 2022-08-08 NOTE — LETTER
My Depression Action Plan  Name: Tessa Edwards   Date of Birth 1955  Date: 8/8/2022    My doctor: No Ref-Primary, Physician   My clinic: [address] [address] [phone]          GREEN    ZONE   Good Control    What it looks like:     Things are going generally well. You have normal ups and downs. You may even feel depressed from time to time, but bad moods usually last less than a day.   What you need to do:  1. Continue to care for yourself (see self care plan)  2. Check your depression survival kit and update it as needed  3. Follow your physician s recommendations including any medication.  4. Do not stop taking medication unless you consult with your physician first.           YELLOW         ZONE Getting Worse    What it looks like:     Depression is starting to interfere with your life.     It may be hard to get out of bed; you may be starting to isolate yourself from others.    Symptoms of depression are starting to last most all day and this has happened for several days.     You may have suicidal thoughts but they are not constant.   What you need to do:     1. Call your care team. Your response to treatment will improve if you keep your care team informed of your progress. Yellow periods are signs an adjustment may need to be made.     2. Continue your self-care.  Just get dressed and ready for the day.  Don't give yourself time to talk yourself out of it.    3. Talk to someone in your support network.    4. Open up your Depression Self-Care Plan/Wellness Kit.           RED    ZONE Medical Alert - Get Help    What it looks like:     Depression is seriously interfering with your life.     You may experience these or other symptoms: You can t get out of bed most days, can t work or engage in other necessary activities, you have trouble taking care of basic hygiene, or basic responsibilities, thoughts of suicide or death  that will not go away, self-injurious behavior.     What you need to do:  1. Call your care team and request a same-day appointment. If they are not available (weekends or after hours) call your local crisis line, emergency room or 911.          Depression Self-Care Plan / Wellness Kit    Many people find that medication and therapy are helpful treatments for managing depression. In addition, making small changes to your everyday life can help to boost your mood and improve your wellbeing. Below are some tips for you to consider. Be sure to talk with your medical provider and/or behavioral health consultant if your symptoms are worsening or not improving.     Sleep   Sleep hygiene  means all of the habits that support good, restful sleep. It includes maintaining a consistent bedtime and wake time, using your bedroom only for sleeping or sex, and keeping the bedroom dark and free of distractions like a computer, smartphone, or television.     Develop a Healthy Routine  Maintain good hygiene. Get out of bed in the morning, make your bed, brush your teeth, take a shower, and get dressed. Don t spend too much time viewing media that makes you feel stressed. Find time to relax each day.    Exercise  Get some form of exercise every day. This will help reduce pain and release endorphins, the  feel good  chemicals in your brain. It can be as simple as just going for a walk or doing some gardening, anything that will get you moving.      Diet  Strive to eat healthy foods, including fruits and vegetables. Drink plenty of water. Avoid excessive sugar, caffeine, alcohol, and other mood-altering substances.     Stay Connected with Others  Stay in touch with friends and family members.    Manage Your Mood  Try deep breathing, massage therapy, biofeedback, or meditation. Take part in fun activities when you can. Try to find something to smile about each day.     Psychotherapy  Be open to working with a therapist if your provider  recommends it.     Medication  Be sure to take your medication as prescribed. Most anti-depressants need to be taken every day. It usually takes several weeks for medications to work. Not all medicines work for all people. It is important to follow-up with your provider to make sure you have a treatment plan that is working for you. Do not stop your medication abruptly without first discussing it with your provider.    Crisis Resources   These hotlines are for both adults and children. They and are open 24 hours a day, 7 days a week unless noted otherwise.      National Suicide Prevention Lifeline   988 or 4-742-123-GVLR (0606)      Crisis Text Line    www.crisistextline.org  Text HOME to 575122 from anywhere in the [place], anytime, about any type of crisis. A live, trained crisis counselor will receive the text and respond quickly.      [place] Lifeline for LGBTQ Youth  A national crisis intervention and suicide lifeline for LGBTQ youth under 25. Provides a safe place to talk without judgement. Call 1-242.494.7880; text START to 062938 or visit www.thetrevorproject.org to talk to a trained counselor.      For [facility] crisis numbers, visit the [facility] website at:  https://[place].gov/dhs/people-we-serve/adults/health-care/mental-health/resources/crisis-contacts.jsp

## 2022-08-08 NOTE — ASSESSMENT & PLAN NOTE
PHQ-9 score:    PHQ 3/22/2022   PHQ-9 Total Score 9   Q9: Thoughts of better off dead/self-harm past 2 weeks Not at all   F/U: Thoughts of suicide or self-harm -   F/U: Safety concerns -     Fluoxetine refills.  Continue

## 2022-08-15 ENCOUNTER — ANCILLARY PROCEDURE (OUTPATIENT)
Dept: MAMMOGRAPHY | Facility: CLINIC | Age: 67
End: 2022-08-15
Payer: COMMERCIAL

## 2022-08-15 DIAGNOSIS — Z12.31 VISIT FOR SCREENING MAMMOGRAM: ICD-10-CM

## 2022-08-15 PROCEDURE — 77067 SCR MAMMO BI INCL CAD: CPT | Mod: TC | Performed by: RADIOLOGY

## 2022-08-17 ENCOUNTER — HOSPITAL ENCOUNTER (OUTPATIENT)
Facility: CLINIC | Age: 67
Discharge: HOME | End: 2022-08-17
Admitting: FAMILY MEDICINE
Payer: COMMERCIAL

## 2022-08-17 PROCEDURE — 88368 INSITU HYBRIDIZATION MANUAL: CPT

## 2022-08-17 PROCEDURE — 88369 M/PHMTRC ALYSISHQUANT/SEMIQ: CPT

## 2022-08-17 PROCEDURE — 84999 UNLISTED CHEMISTRY PROCEDURE: CPT

## 2022-08-17 PROCEDURE — 88377 M/PHMTRC ALYS ISHQUANT/SEMIQ: CPT

## 2022-09-20 ENCOUNTER — TRANSFERRED RECORDS (OUTPATIENT)
Dept: HEALTH INFORMATION MANAGEMENT | Facility: CLINIC | Age: 67
End: 2022-09-20

## 2022-10-11 ENCOUNTER — TRANSFERRED RECORDS (OUTPATIENT)
Dept: HEALTH INFORMATION MANAGEMENT | Facility: CLINIC | Age: 67
End: 2022-10-11

## 2022-11-20 ENCOUNTER — HEALTH MAINTENANCE LETTER (OUTPATIENT)
Age: 67
End: 2022-11-20

## 2022-12-06 ENCOUNTER — TRANSFERRED RECORDS (OUTPATIENT)
Dept: HEALTH INFORMATION MANAGEMENT | Facility: CLINIC | Age: 67
End: 2022-12-06

## 2022-12-30 SDOH — ECONOMIC STABILITY: FOOD INSECURITY: WITHIN THE PAST 12 MONTHS, YOU WORRIED THAT YOUR FOOD WOULD RUN OUT BEFORE YOU GOT MONEY TO BUY MORE.: PATIENT DECLINED

## 2022-12-30 SDOH — HEALTH STABILITY: PHYSICAL HEALTH
ON AVERAGE, HOW MANY DAYS PER WEEK DO YOU ENGAGE IN MODERATE TO STRENUOUS EXERCISE (LIKE A BRISK WALK)?: PATIENT DECLINED

## 2022-12-30 SDOH — ECONOMIC STABILITY: INCOME INSECURITY: HOW HARD IS IT FOR YOU TO PAY FOR THE VERY BASICS LIKE FOOD, HOUSING, MEDICAL CARE, AND HEATING?: PATIENT DECLINED

## 2022-12-30 SDOH — ECONOMIC STABILITY: FOOD INSECURITY: WITHIN THE PAST 12 MONTHS, THE FOOD YOU BOUGHT JUST DIDN'T LAST AND YOU DIDN'T HAVE MONEY TO GET MORE.: PATIENT DECLINED

## 2022-12-30 SDOH — ECONOMIC STABILITY: INCOME INSECURITY: IN THE LAST 12 MONTHS, WAS THERE A TIME WHEN YOU WERE NOT ABLE TO PAY THE MORTGAGE OR RENT ON TIME?: PATIENT REFUSED

## 2022-12-30 SDOH — HEALTH STABILITY: PHYSICAL HEALTH: ON AVERAGE, HOW MANY MINUTES DO YOU ENGAGE IN EXERCISE AT THIS LEVEL?: PATIENT DECLINED

## 2022-12-30 SDOH — ECONOMIC STABILITY: TRANSPORTATION INSECURITY
IN THE PAST 12 MONTHS, HAS LACK OF TRANSPORTATION KEPT YOU FROM MEETINGS, WORK, OR FROM GETTING THINGS NEEDED FOR DAILY LIVING?: PATIENT DECLINED

## 2022-12-30 SDOH — ECONOMIC STABILITY: TRANSPORTATION INSECURITY
IN THE PAST 12 MONTHS, HAS THE LACK OF TRANSPORTATION KEPT YOU FROM MEDICAL APPOINTMENTS OR FROM GETTING MEDICATIONS?: PATIENT DECLINED

## 2022-12-30 ASSESSMENT — ANXIETY QUESTIONNAIRES
IF YOU CHECKED OFF ANY PROBLEMS ON THIS QUESTIONNAIRE, HOW DIFFICULT HAVE THESE PROBLEMS MADE IT FOR YOU TO DO YOUR WORK, TAKE CARE OF THINGS AT HOME, OR GET ALONG WITH OTHER PEOPLE: SOMEWHAT DIFFICULT
6. BECOMING EASILY ANNOYED OR IRRITABLE: SEVERAL DAYS
GAD7 TOTAL SCORE: 15
7. FEELING AFRAID AS IF SOMETHING AWFUL MIGHT HAPPEN: MORE THAN HALF THE DAYS
2. NOT BEING ABLE TO STOP OR CONTROL WORRYING: MORE THAN HALF THE DAYS
3. WORRYING TOO MUCH ABOUT DIFFERENT THINGS: NEARLY EVERY DAY
GAD7 TOTAL SCORE: 15
1. FEELING NERVOUS, ANXIOUS, OR ON EDGE: NEARLY EVERY DAY
5. BEING SO RESTLESS THAT IT IS HARD TO SIT STILL: SEVERAL DAYS
GAD7 TOTAL SCORE: 15
4. TROUBLE RELAXING: NEARLY EVERY DAY
7. FEELING AFRAID AS IF SOMETHING AWFUL MIGHT HAPPEN: MORE THAN HALF THE DAYS
8. IF YOU CHECKED OFF ANY PROBLEMS, HOW DIFFICULT HAVE THESE MADE IT FOR YOU TO DO YOUR WORK, TAKE CARE OF THINGS AT HOME, OR GET ALONG WITH OTHER PEOPLE?: SOMEWHAT DIFFICULT

## 2022-12-30 ASSESSMENT — ASTHMA QUESTIONNAIRES
ACT_TOTALSCORE: 25
QUESTION_1 LAST FOUR WEEKS HOW MUCH OF THE TIME DID YOUR ASTHMA KEEP YOU FROM GETTING AS MUCH DONE AT WORK, SCHOOL OR AT HOME: NONE OF THE TIME
QUESTION_5 LAST FOUR WEEKS HOW WOULD YOU RATE YOUR ASTHMA CONTROL: COMPLETELY CONTROLLED
QUESTION_2 LAST FOUR WEEKS HOW OFTEN HAVE YOU HAD SHORTNESS OF BREATH: NOT AT ALL
ACT_TOTALSCORE: 25
QUESTION_4 LAST FOUR WEEKS HOW OFTEN HAVE YOU USED YOUR RESCUE INHALER OR NEBULIZER MEDICATION (SUCH AS ALBUTEROL): NOT AT ALL
QUESTION_3 LAST FOUR WEEKS HOW OFTEN DID YOUR ASTHMA SYMPTOMS (WHEEZING, COUGHING, SHORTNESS OF BREATH, CHEST TIGHTNESS OR PAIN) WAKE YOU UP AT NIGHT OR EARLIER THAN USUAL IN THE MORNING: NOT AT ALL

## 2022-12-30 ASSESSMENT — LIFESTYLE VARIABLES
SKIP TO QUESTIONS 9-10: 0
HOW MANY STANDARD DRINKS CONTAINING ALCOHOL DO YOU HAVE ON A TYPICAL DAY: PATIENT DECLINED
HOW OFTEN DO YOU HAVE SIX OR MORE DRINKS ON ONE OCCASION: PATIENT DECLINED
AUDIT-C TOTAL SCORE: -1
HOW OFTEN DO YOU HAVE A DRINK CONTAINING ALCOHOL: PATIENT DECLINED

## 2022-12-30 ASSESSMENT — SOCIAL DETERMINANTS OF HEALTH (SDOH)
DO YOU BELONG TO ANY CLUBS OR ORGANIZATIONS SUCH AS CHURCH GROUPS UNIONS, FRATERNAL OR ATHLETIC GROUPS, OR SCHOOL GROUPS?: PATIENT DECLINED
ARE YOU MARRIED, WIDOWED, DIVORCED, SEPARATED, NEVER MARRIED, OR LIVING WITH A PARTNER?: PATIENT DECLINED
IN A TYPICAL WEEK, HOW MANY TIMES DO YOU TALK ON THE PHONE WITH FAMILY, FRIENDS, OR NEIGHBORS?: PATIENT DECLINED
HOW OFTEN DO YOU GET TOGETHER WITH FRIENDS OR RELATIVES?: PATIENT DECLINED
HOW OFTEN DO YOU ATTEND CHURCH OR RELIGIOUS SERVICES?: PATIENT DECLINED

## 2023-01-02 ENCOUNTER — OFFICE VISIT (OUTPATIENT)
Dept: FAMILY MEDICINE | Facility: CLINIC | Age: 68
End: 2023-01-02
Payer: COMMERCIAL

## 2023-01-02 VITALS
DIASTOLIC BLOOD PRESSURE: 52 MMHG | BODY MASS INDEX: 31.97 KG/M2 | HEART RATE: 79 BPM | RESPIRATION RATE: 15 BRPM | SYSTOLIC BLOOD PRESSURE: 128 MMHG | OXYGEN SATURATION: 99 % | TEMPERATURE: 98.7 F | HEIGHT: 64 IN

## 2023-01-02 DIAGNOSIS — R73.09 OTHER ABNORMAL GLUCOSE: ICD-10-CM

## 2023-01-02 DIAGNOSIS — Z11.59 SCREENING FOR VIRAL DISEASE: ICD-10-CM

## 2023-01-02 DIAGNOSIS — G25.81 RESTLESS LEG SYNDROME: ICD-10-CM

## 2023-01-02 DIAGNOSIS — C83.07 SMALL B-CELL LYMPHOMA OF SPLEEN (H): ICD-10-CM

## 2023-01-02 DIAGNOSIS — Z01.818 PREOP GENERAL PHYSICAL EXAM: Primary | ICD-10-CM

## 2023-01-02 DIAGNOSIS — D50.9 IRON DEFICIENCY ANEMIA, UNSPECIFIED IRON DEFICIENCY ANEMIA TYPE: ICD-10-CM

## 2023-01-02 DIAGNOSIS — F32.1 MODERATE MAJOR DEPRESSION (H): ICD-10-CM

## 2023-01-02 DIAGNOSIS — M79.7 FIBROMYALGIA: ICD-10-CM

## 2023-01-02 DIAGNOSIS — M54.16 LUMBAR RADICULOPATHY: ICD-10-CM

## 2023-01-02 LAB
ANION GAP SERPL CALCULATED.3IONS-SCNC: 11 MMOL/L (ref 7–15)
BASOPHILS # BLD AUTO: 0.1 10E3/UL (ref 0–0.2)
BASOPHILS NFR BLD AUTO: 1 %
BUN SERPL-MCNC: 26.7 MG/DL (ref 8–23)
CALCIUM SERPL-MCNC: 9.8 MG/DL (ref 8.8–10.2)
CHLORIDE SERPL-SCNC: 106 MMOL/L (ref 98–107)
CREAT SERPL-MCNC: 0.7 MG/DL (ref 0.51–0.95)
DEPRECATED HCO3 PLAS-SCNC: 23 MMOL/L (ref 22–29)
EOSINOPHIL # BLD AUTO: 0.2 10E3/UL (ref 0–0.7)
EOSINOPHIL NFR BLD AUTO: 1 %
ERYTHROCYTE [DISTWIDTH] IN BLOOD BY AUTOMATED COUNT: 12.1 % (ref 10–15)
GFR SERPL CREATININE-BSD FRML MDRD: >90 ML/MIN/1.73M2
GLUCOSE SERPL-MCNC: 104 MG/DL (ref 70–99)
HBA1C MFR BLD: 5.4 %
HCT VFR BLD AUTO: 38.4 % (ref 35–47)
HCV AB SERPL QL IA: NONREACTIVE
HGB BLD-MCNC: 12 G/DL (ref 11.7–15.7)
IMM GRANULOCYTES # BLD: 0.1 10E3/UL
IMM GRANULOCYTES NFR BLD: 0 %
LYMPHOCYTES # BLD AUTO: 15.8 10E3/UL (ref 0.8–5.3)
LYMPHOCYTES NFR BLD AUTO: 70 %
MCH RBC QN AUTO: 29.4 PG (ref 26.5–33)
MCHC RBC AUTO-ENTMCNC: 31.3 G/DL (ref 31.5–36.5)
MCV RBC AUTO: 94 FL (ref 78–100)
MONOCYTES # BLD AUTO: 0.9 10E3/UL (ref 0–1.3)
MONOCYTES NFR BLD AUTO: 4 %
NEUTROPHILS # BLD AUTO: 5.5 10E3/UL (ref 1.6–8.3)
NEUTROPHILS NFR BLD AUTO: 24 %
NRBC # BLD AUTO: 0 10E3/UL
NRBC BLD AUTO-RTO: 0 /100
PLAT MORPH BLD: ABNORMAL
PLATELET # BLD AUTO: 161 10E3/UL (ref 150–450)
POTASSIUM SERPL-SCNC: 4.3 MMOL/L (ref 3.4–5.3)
RBC # BLD AUTO: 4.08 10E6/UL (ref 3.8–5.2)
RBC MORPH BLD: ABNORMAL
SODIUM SERPL-SCNC: 140 MMOL/L (ref 136–145)
VARIANT LYMPHS BLD QL SMEAR: PRESENT
WBC # BLD AUTO: 22.6 10E3/UL (ref 4–11)

## 2023-01-02 PROCEDURE — 36415 COLL VENOUS BLD VENIPUNCTURE: CPT | Performed by: FAMILY MEDICINE

## 2023-01-02 PROCEDURE — 93000 ELECTROCARDIOGRAM COMPLETE: CPT | Performed by: FAMILY MEDICINE

## 2023-01-02 PROCEDURE — 85025 COMPLETE CBC W/AUTO DIFF WBC: CPT | Performed by: FAMILY MEDICINE

## 2023-01-02 PROCEDURE — 83036 HEMOGLOBIN GLYCOSYLATED A1C: CPT | Performed by: FAMILY MEDICINE

## 2023-01-02 PROCEDURE — 80048 BASIC METABOLIC PNL TOTAL CA: CPT | Performed by: FAMILY MEDICINE

## 2023-01-02 PROCEDURE — 86803 HEPATITIS C AB TEST: CPT | Performed by: FAMILY MEDICINE

## 2023-01-02 PROCEDURE — 99214 OFFICE O/P EST MOD 30 MIN: CPT | Performed by: FAMILY MEDICINE

## 2023-01-02 RX ORDER — ROPINIROLE 1 MG/1
TABLET, FILM COATED ORAL
Qty: 120 TABLET | Refills: 3 | Status: SHIPPED | OUTPATIENT
Start: 2023-01-02 | End: 2023-05-05

## 2023-01-02 NOTE — PROGRESS NOTES
[facility]  [id number] [place] [id number]  Phone: [phone]  Primary Provider: No Ref-Primary, Physician  Pre-op Performing Provider: CHRISTIAN HASSAN      PREOPERATIVE EVALUATION:  Today's date: 1/2/2023    Tessa Edwards is a 67 year old female who presents for a preoperative evaluation.    Surgical Information:  Surgery/Procedure: Carpel Tunnel on left hand  Surgery Location: [facility] in [place]  Surgeon: Dr. Marin  Surgery Date: 1/9/2023  Time of Surgery: TBD  Where patient plans to recover: At home with family  Fax number for surgical facility: patient does not have fax number    Type of Anesthesia Anticipated: to be determined    Assessment & Plan     The proposed surgical procedure is considered LOW risk.    Problem List Items Addressed This Visit     Anemia, iron deficiency     Update database         Fibromyalgia     She wonders if her leg complaints might represent fibromyalgia.  Discussed broadening database         Lumbar radiculopathy     She reports that her surgeon recommends a lumbar fusion.  Discussed evaluation.  I recommend a neurology referral but time constraints are present.  Proceed with EMG/NCV         Relevant Medications    rOPINIRole (REQUIP) 1 MG tablet    Other Relevant Orders    Adult Neurology  Referral    Moderate major depression (H)     She reports she feels well.  No PHQ-9         Non-Hodgkin's lymphoma (H)     Ongoing  acalabrutinib therapy         Other abnormal glucose     Hemoglobin A1C   Date Value Ref Range Status   10/18/2018 6.1 (H) 3.5 - 6.0 % Final     Update database         Relevant Orders    Hemoglobin A1c    Restless leg syndrome     She has found increased ropinirole effective but incompletely so.  Increase to maximum 4 mg daily.  Discussed gabapentinoid therapy         Relevant Medications    rOPINIRole (REQUIP) 1 MG tablet    Screening for viral disease     Routine         Relevant Orders    Hepatitis  C Screen Reflex to HCV RNA Quant and Genotype (Completed)   Other Visit Diagnoses     Preop general physical exam    -  Primary    Relevant Orders    EKG 12-lead complete w/read - Clinics (Completed)    CBC with platelets and differential (Completed)    Basic metabolic panel  (Ca, Cl, CO2, Creat, Gluc, K, Na, BUN) (Completed)            Patient wonders about the multiple surgeries recommended by her surgeons.  She has no primary care physician to organize and direct her care, therefore is doing it herself.  She expresses concerns about the diagnosis of pain that radiates down her right leg to various places.  Her surgeon is recommended a lumbar fusion.  I will order a nerve conduction study.  However, I note for the patient that I am retiring and not excepting new patients.  She would do well to establish with a primary care physician.  We shall attempt to facilitate             Patient will hold her naproxen starting now.  She reports that she has some OxyContin at home that she can use for pain while not on naproxen.  I recommend she not take OxyContin a.m. of surgery.  She will take her other medications    RECOMMENDATION:  APPROVAL GIVEN to proceed with proposed procedure, without further diagnostic evaluation.            Subjective     HPI related to upcoming procedure: Symptomatic carpal tunnel recurrence    Preop Questions 12/30/2022   1. Have you ever had a heart attack or stroke? No   2. Have you ever had surgery on your heart or blood vessels, such as a stent placement, a coronary artery bypass, or surgery on an artery in your head, neck, heart, or legs? No   3. Do you have chest pain with activity? No   4. Do you have a history of  heart failure? No   5. Do you currently have a cold, bronchitis or symptoms of other infection? No   6. Do you have a cough, shortness of breath, or wheezing? No   7. Do you or anyone in your family have previous history of blood clots? No   8. Do you or does anyone in your  family have a serious bleeding problem such as prolonged bleeding following surgeries or cuts? No   9. Have you ever had problems with anemia or been told to take iron pills? YES -    10. Have you had any abnormal blood loss such as black, tarry or bloody stools, or abnormal vaginal bleeding? No   11. Have you ever had a blood transfusion? YES -    11a. Have you ever had a transfusion reaction? No   12. Are you willing to have a blood transfusion if it is medically needed before, during, or after your surgery? Yes   13. Have you or any of your relatives ever had problems with anesthesia? No   14. Do you have sleep apnea, excessive snoring or daytime drowsiness? UNKNOWN -    14a. Do you have a CPAP machine? -   15. Do you have any artifical heart valves or other implanted medical devices like a pacemaker, defibrillator, or continuous glucose monitor? No   16. Do you have artificial joints? YES -    17. Are you allergic to latex? No       Health Care Directive:  Patient does not have a Health Care Directive or Living Will:     Preoperative Review of :   reviewed - no record of controlled substances prescribed.          Review of Systems  CONSTITUTIONAL: NEGATIVE for fever, chills, change in weight  ENT/MOUTH: NEGATIVE for ear, mouth and throat problems  RESP: NEGATIVE for significant cough or SOB  CV: NEGATIVE for chest pain, palpitations or peripheral edema  HEME/ALLERGY/IMMUNE: NEGATIVE for bleeding problems    Patient Active Problem List    Diagnosis Date Noted     Encounter for preventive measure 08/08/2022     Priority: Medium     Other abnormal glucose 03/22/2022     Priority: Medium     Formatting of this note might be different from the original.  Created by Conversion       Fibromyalgia 03/22/2022     Priority: Medium     Formatting of this note might be different from the original.  Created by Conversion       Moderate major depression (H) 03/22/2022     Priority: Medium     Non-Hodgkin's lymphoma (H)  03/22/2022     Priority: Medium     Urinary retention 03/22/2022     Priority: Medium     Urinary frequency 03/22/2022     Priority: Medium     Dyspepsia 03/22/2022     Priority: Medium     Spinal stenosis of lumbar region without neurogenic claudication 10/27/2021     Priority: Medium     Spondylosis,central  stenosis, foraminal encroachment       Thyroid nodule 10/27/2021     Priority: Medium     Splenomegaly 10/27/2021     Priority: Medium     Weight loss 10/27/2021     Priority: Medium     Anemia, iron deficiency 10/26/2021     Priority: Medium     Restless leg syndrome 10/26/2021     Priority: Medium     Osteoarthritis of right knee, unspecified osteoarthritis type 09/12/2019     Priority: Medium     Bilateral carpal tunnel syndrome 07/09/2019     Priority: Medium     Cervical nerve root compression 07/24/2017     Priority: Medium     Allergic rhinitis 11/05/2015     Priority: Medium     Vocal cord dysfunction 11/05/2015     Priority: Medium     Rotator cuff tear 02/18/2015     Priority: Medium     Primary localized osteoarthrosis, lower leg 08/27/2007     Priority: Medium      Past Medical History:   Diagnosis Date     Abnormal CT of the abdomen 10/26/2021     Allergic rhinitis 11/05/2015     Anemia, iron deficiency 10/26/2021     Bilateral carpal tunnel syndrome 07/09/2019     Cervical nerve root compression 07/24/2017     Cervical spinal stenosis      Chronic low back pain without sciatica, unspecified back pain laterality 10/27/2021    Spondylosis,central  stenosis, foraminal encroachment     Colonic mass 11/16/2021     Depression      Depressive disorder      Disease of thyroid gland      Dyspepsia 03/22/2022     Fibromyalgia      GERD (gastroesophageal reflux disease)      History of blood transfusion      Hyperlipemia      Lymphoproliferative disorder (H)      Moderate major depression (H) 03/22/2022     Morbid obesity (H) 03/22/2022     Non-Hodgkin's lymphoma (H) 03/22/2022     Obesity       Osteoarthritis      Osteoarthritis of right knee, unspecified osteoarthritis type 09/12/2019     Other abnormal glucose 03/22/2022    Formatting of this note might be different from the original. Created by Conversion     Primary localized osteoarthrosis, lower leg 08/27/2007     Restless legs syndrome 10/26/2021     Rotator cuff tear      Sleep apnea      Spinal stenosis of lumbar region without neurogenic claudication 10/27/2021    Spondylosis,central  stenosis, foraminal encroachment     Splenomegaly 10/27/2021     Thyroid nodule 10/27/2021     Urinary frequency 03/22/2022     Urinary retention 03/22/2022     Vocal cord dysfunction 11/05/2015     Weight loss 10/27/2021     Past Surgical History:   Procedure Laterality Date     ARTHROPLASTY SHOULDER Left      ARTHROSCOPY KNEE       ARTHROSCOPY SHOULDER ROTATOR CUFF REPAIR       BACK SURGERY       BIOPSY       CERVICAL DISC ARTHROPLASTY       CERVICAL DISCECTOMY  08/01/2017    C5, C6     COLONOSCOPY N/A 11/05/2021    Procedure: COLONOSCOPY;  Surgeon: Efraín Guerrero MD;  Location: [facility] [place]     CRW LT SHOULDER AP AXILLA 2 VW       HYSTERECTOMY       JOINT REPLACEMENT       LAPAROSCOPIC ASSISTED COLECTOMY Right 11/16/2021    Procedure: LAPAROSCOPIC RIGHT COLECTOMY;  Surgeon: Erfaín Guerrero MD;  Location: [facility] [place]     OOPHORECTOMY       RELEASE CARPAL TUNNEL       RELEASE CARPAL TUNNEL       XR MYELOGRAM CERVICAL  05/14/2019     [facility]CHOLE[facility]ECT[facility]/EXPLORE      Description: Cholecystectomy Laparoscopic;  Recorded: 12/10/2012;     [facility] TOTAL ABDOM HYSTERECTOMY      Description: Total Abdominal Hysterectomy;  Recorded: 12/10/2012;  Comments: 46 Y/O FOR FIBROID TUMORS     [facility] TOTAL KNEE ARTHROPLASTY Right 09/12/2019    Procedure: RIGHT TOTAL KNEE ARTHROPLASTY;  Surgeon: Irvin Canas DO;  Location: [facility];  Service: Orthopedics     Current Outpatient Medications   Medication Sig Dispense Refill     acalabrutinib (CALQUENCE) 100 MG  "capsule Take 1 capsule (100 mg) by mouth 2 times daily       acetaminophen (TYLENOL) 500 MG tablet Take 1,000 mg by mouth 3 times daily       calcium-vitamin D (CALCIUM-VITAMIN D) 500 mg(1,250mg) -200 unit per tablet Take 1 tablet by mouth every evening        FLUoxetine (PROZAC) 40 MG capsule Take 1 capsule (40 mg) by mouth daily 90 capsule 3     multivitamin w/minerals (THERA-VIT-M) tablet Take 1 tablet by mouth daily       naproxen sodium (ALEVE) 220 MG tablet [NAPROXEN SODIUM (ALEVE) 220 MG TABLET] Take 440 mg by mouth 3 (three) times a day with meals.       rOPINIRole (REQUIP) 1 MG tablet TAKE 1 TABLET BY MOUTH THREE TIMES DAILY 90 tablet 5       No Known Allergies     Social History     Tobacco Use     Smoking status: Former     Packs/day: 0.50     Years: 10.00     Pack years: 5.00     Types: Cigarettes     Quit date: 2000     Years since quittin.8     Smokeless tobacco: Never   Substance Use Topics     Alcohol use: Yes     Comment: social       History   Drug Use No         Objective     /52 (BP Location: Right arm, Patient Position: Sitting, Cuff Size: Adult Large)   Pulse 79   Temp 98.7  F (37.1  C) (Oral)   Resp 15   Ht 1.626 m (5' 4\")   SpO2 99%   BMI 31.97 kg/m      Physical Exam  Constitutional:       Appearance: Normal appearance.   HENT:      Head: Atraumatic.      Right Ear: Tympanic membrane normal.      Left Ear: Tympanic membrane normal.      Nose: Nose normal.      Mouth/Throat:      Mouth: Mucous membranes are moist.   Eyes:      Pupils: Pupils are equal, round, and reactive to light.   Cardiovascular:      Rate and Rhythm: Normal rate and regular rhythm.      Heart sounds: Normal heart sounds.   Pulmonary:      Effort: Pulmonary effort is normal.      Breath sounds: Normal breath sounds.   Abdominal:      General: Bowel sounds are normal.      Palpations: Abdomen is soft.   Musculoskeletal:      Cervical back: Neck supple.   Skin:     General: Skin is warm and dry. "   Neurological:      General: No focal deficit present.      Mental Status: She is alert.   Psychiatric:         Mood and Affect: Mood normal.           Recent Labs   Lab Test 11/18/21  1013 11/17/21  0729   HGB 10.2* 9.7*   * 123*    139   POTASSIUM 3.7 3.8   CR 0.59* 0.68        Diagnostics:  Labs pending at this time.  Results will be reviewed when available.   EKG: appears normal, NSR, normal axis, normal intervals, no acute ST/T changes c/w ischemia, no LVH by voltage criteria, unchanged from previous tracings    Revised Cardiac Risk Index (RCRI):  The patient has the following serious cardiovascular risks for perioperative complications:   - No serious cardiac risks = 0 points     RCRI Interpretation: 0 points: Class I (very low risk - 0.4% complication rate)           Signed Electronically by: Bryson Huang MD  Copy of this evaluation report is provided to requesting physician.

## 2023-01-03 PROBLEM — R73.03 PREDIABETES: Status: ACTIVE | Noted: 2022-03-22

## 2023-01-03 NOTE — ASSESSMENT & PLAN NOTE
She has found increased ropinirole effective but incompletely so.  Increase to maximum 4 mg daily.  Discussed gabapentinoid therapy

## 2023-01-03 NOTE — ASSESSMENT & PLAN NOTE
Hemoglobin A1C   Date Value Ref Range Status   10/18/2018 6.1 (H) 3.5 - 6.0 % Final     Update database

## 2023-01-03 NOTE — RESULT ENCOUNTER NOTE
Still prediabetes.  Check that yearly.  Your white count is lower than it has been in the past.  Kidneys have excellent function  Bryson Huang MD

## 2023-01-03 NOTE — ASSESSMENT & PLAN NOTE
She reports that her surgeon recommends a lumbar fusion.  Discussed evaluation.  I recommend a neurology referral but time constraints are present.  Proceed with EMG/NCV

## 2023-01-11 ENCOUNTER — TRANSFERRED RECORDS (OUTPATIENT)
Dept: HEALTH INFORMATION MANAGEMENT | Facility: CLINIC | Age: 68
End: 2023-01-11

## 2023-01-20 ENCOUNTER — TRANSFERRED RECORDS (OUTPATIENT)
Dept: HEALTH INFORMATION MANAGEMENT | Facility: CLINIC | Age: 68
End: 2023-01-20

## 2023-04-03 ENCOUNTER — OFFICE VISIT (OUTPATIENT)
Dept: NEUROLOGY | Facility: CLINIC | Age: 68
End: 2023-04-03
Attending: FAMILY MEDICINE
Payer: COMMERCIAL

## 2023-04-03 VITALS — SYSTOLIC BLOOD PRESSURE: 120 MMHG | RESPIRATION RATE: 18 BRPM | DIASTOLIC BLOOD PRESSURE: 66 MMHG | HEART RATE: 72 BPM

## 2023-04-03 DIAGNOSIS — M54.41 CHRONIC BILATERAL LOW BACK PAIN WITH RIGHT-SIDED SCIATICA: ICD-10-CM

## 2023-04-03 DIAGNOSIS — G57.93 NEUROPATHY OF BOTH FEET: ICD-10-CM

## 2023-04-03 DIAGNOSIS — G89.29 CHRONIC BILATERAL LOW BACK PAIN WITH RIGHT-SIDED SCIATICA: ICD-10-CM

## 2023-04-03 DIAGNOSIS — M79.661 PAIN OF RIGHT LOWER LEG: ICD-10-CM

## 2023-04-03 DIAGNOSIS — M54.16 LUMBAR RADICULOPATHY: ICD-10-CM

## 2023-04-03 DIAGNOSIS — G62.9 NEUROPATHY: Primary | ICD-10-CM

## 2023-04-03 PROCEDURE — 99205 OFFICE O/P NEW HI 60 MIN: CPT | Performed by: PSYCHIATRY & NEUROLOGY

## 2023-04-03 NOTE — LETTER
4/3/2023         RE: Tessa Edwards  [address] [facility] [address]        Dear Colleague,    Thank you for referring your patient, Tessa Edwards, to the [facility] [place]. Please see a copy of my visit note below.    NEUROLOGY OUTPATIENT CONSULT NOTE   Apr 3, 2023     CHIEF COMPLAINT/REASON FOR VISIT/REASON FOR CONSULT  Patient presents with:  Consult: Generalized Pain     REASON FOR CONSULTATION- Leg pain    REFERRAL SOURCE  Dr. Bryson Huang  CC Dr. Bryson Huang    HISTORY OF PRESENT ILLNESS  Tessa Edwards is a 67 year old female seen today for evaluation of leg pain/back pain.  She reports that she has been having this pain for several years.  She has been seen by [facility] and they recommended for her to have surgery for lumbar spinal stenosis.  She also reports that she has had the right knee replaced about 3 years ago.  Pain generally starts in the hip and then can radiate down to the knee.  Sometimes this can be a sharp pain.  She is tried taking 2 Aleve's and 2 Tylenol which reduces the pain symptoms slightly.  Has also tried oxycodone in the past.  Gabapentin was tried years ago without benefit.  Has not done oral steroids.  Denies any numbness or weakness.  There is questionable foot drop on the right side with the patient.  No other new neurological symptoms.  Has had cervical spine surgery in the past.    Previous history is reviewed and this is unchanged.    PAST MEDICAL/SURGICAL HISTORY  Past Medical History:   Diagnosis Date     Abnormal CT of the abdomen 10/26/2021     Allergic rhinitis 11/05/2015     Anemia, iron deficiency 10/26/2021     Bilateral carpal tunnel syndrome 07/09/2019     Cervical nerve root compression 07/24/2017     Cervical spinal stenosis      Chronic low back pain without sciatica, unspecified back pain laterality 10/27/2021    Spondylosis,central  stenosis, foraminal encroachment     Colonic mass 11/16/2021      Depression      Depressive disorder      Disease of thyroid gland      Dyspepsia 03/22/2022     Fibromyalgia      GERD (gastroesophageal reflux disease)      History of blood transfusion      Hyperlipemia      Lymphoproliferative disorder (H)      Moderate major depression (H) 03/22/2022     Morbid obesity (H) 03/22/2022     Non-Hodgkin's lymphoma (H) 03/22/2022     Obesity      Osteoarthritis      Osteoarthritis of right knee, unspecified osteoarthritis type 09/12/2019     Other abnormal glucose 03/22/2022    Formatting of this note might be different from the original. Created by Conversion     Prediabetes 3/22/2022    Formatting of this note might be different from the original. Created by Conversion     Primary localized osteoarthrosis, lower leg 08/27/2007     Restless legs syndrome 10/26/2021     Rotator cuff tear      Sleep apnea      Spinal stenosis of lumbar region without neurogenic claudication 10/27/2021    Spondylosis,central  stenosis, foraminal encroachment     Splenomegaly 10/27/2021     Thyroid nodule 10/27/2021     Urinary frequency 03/22/2022     Urinary retention 03/22/2022     Vocal cord dysfunction 11/05/2015     Weight loss 10/27/2021     Patient Active Problem List   Diagnosis     Primary localized osteoarthrosis, lower leg     Anemia, iron deficiency     Restless leg syndrome     Spinal stenosis of lumbar region without neurogenic claudication     Thyroid nodule     Splenomegaly     Weight loss     Prediabetes     Allergic rhinitis     Bilateral carpal tunnel syndrome     Cervical nerve root compression     Osteoarthritis of right knee, unspecified osteoarthritis type     Fibromyalgia     Rotator cuff tear     Vocal cord dysfunction     Moderate major depression (H)     Non-Hodgkin's lymphoma (H)     Urinary retention     Urinary frequency     Dyspepsia     Encounter for preventive measure     Screening for viral disease     Lumbar radiculopathy   Significant arthritis, depression,  osteoporosis, lymphoma with use of Calquence.  Diagnosis fibromyalgia.     FAMILY HISTORY  Family History   Problem Relation Age of Onset     Heart Disease Mother      Cancer Father      Breast Cancer Paternal Grandmother      Breast Cancer Paternal Aunt      Diabetes Sister      Colon Cancer Brother      Depression Sister      Obesity Brother     Negative for neurological problems.    SOCIAL HISTORY  Social History     Tobacco Use     Smoking status: Former     Packs/day: 0.50     Years: 10.00     Pack years: 5.00     Types: Cigarettes     Quit date: 2000     Years since quittin.1     Smokeless tobacco: Never   Substance Use Topics     Alcohol use: Yes     Comment: social     Drug use: No       SYSTEMS REVIEW  Twelve-system ROS was done and other than the HPI this was negative except for neck pain, back pain, neck pain, joint pain, numbness/.  No weakness/paralysis, difficulty walking, balance/coordination problems, bladder symptoms, sleepy during the day, anxiety, depression, weight gain.    MEDICATIONS  acalabrutinib (CALQUENCE) 100 MG capsule, Take 1 capsule (100 mg) by mouth 2 times daily  acetaminophen (TYLENOL) 500 MG tablet, Take 1,000 mg by mouth 3 times daily  calcium-vitamin D (CALCIUM-VITAMIN D) 500 mg(1,250mg) -200 unit per tablet, Take 1 tablet by mouth every evening   FLUoxetine (PROZAC) 40 MG capsule, Take 1 capsule (40 mg) by mouth daily  multivitamin w/minerals (THERA-VIT-M) tablet, Take 1 tablet by mouth daily  naproxen sodium (ALEVE) 220 MG tablet, [NAPROXEN SODIUM (ALEVE) 220 MG TABLET] Take 440 mg by mouth 3 (three) times a day with meals.  rOPINIRole (REQUIP) 1 MG tablet, TAKE 1 TABLET BY MOUTH TWO TIMES DAILY, 2 AT BEDTIME    No current facility-administered medications on file prior to visit.       PHYSICAL EXAMINATION  VITALS: /66   Pulse 72   Resp 18   GENERAL: Healthy appearing, alert, no acute distress, normal habitus.  CARDIOVASCULAR: Extremities warm and well  perfused. Pulses present.   NEUROLOGICAL:  Patient is awake and oriented to self, place and time.  Attention span is normal.  Memory is grossly intact.  Language is fluent and follows commands appropriately.  Appropriate fund of knowledge. Cranial nerves 2-12 are intact. There is no pronator drift.  Motor exam shows 5/5 strength in all extremities.  Tone is symmetric bilaterally in upper and lower extremities.  Reflexes are symmetric and 2+ in upper extremities and 2+ brisk in the left patella and 1+ in the right patella.  Ankle jerks were decreased bilaterally. Sensory exam is grossly intact to light touch, pin prick and vibration except for decreased pinprick and vibratory sensation to the midshin level.  Finger to nose and heel to shin is without dysmetria.  Romberg is unsteady.  Gait is slightly wide-based and the patient is able to do tandem walk and walk on toes and heels with difficulty.    DIAGNOSTICS  MRI C spine  1.  Postoperative changes related to intervertebral disc placement C5-C6. Spinal canal stenosis appears to be present noting that the exact degree is difficult to assess due to artifact. Bilateral C5-C6 left greater than right foraminal stenosis.  2.  No additional site of significant spinal canal stenosis.  3.  Moderate left foraminal narrowing at C4-C5.  4.  Extensive bilateral cervical lymphadenopathy measuring up to 1.7 cm on the left is nonspecific. While this could be reactive/infectious, neoplastic considerations should be considered and further evaluation is needed.    EMG  CLINICAL INTERPRETATION:  This is an abnormal nerve conduction and EMG study.  The study is suggestive of a moderate right carpal tunnel syndrome.  The study is further suggestive of a mild left ulnar neuropathy.  Further clinical correlation is needed.     EMG 2019 [facility]    RELEVANT LABS  Component      Latest Ref Rng 1/2/2023   WBC      4.0 - 11.0 10e3/uL 22.6 (H)    RBC Count      3.80 - 5.20 10e6/uL 4.08     Hemoglobin      11.7 - 15.7 g/dL 12.0    Hematocrit      35.0 - 47.0 % 38.4    MCV      78 - 100 fL 94    MCH      26.5 - 33.0 pg 29.4    MCHC      31.5 - 36.5 g/dL 31.3 (L)    RDW      10.0 - 15.0 % 12.1    Platelet Count      150 - 450 10e3/uL 161    % Neutrophils      % 24    % Lymphocytes      % 70    % Monocytes      % 4    % Eosinophils      % 1    % Basophils      % 1    % Immature Granulocytes      % 0    NRBCs per 100 WBC      <1 /100 0    Absolute Neutrophils      1.6 - 8.3 10e3/uL 5.5    Absolute Lymphocytes      0.8 - 5.3 10e3/uL 15.8 (H)    Absolute Monocytes      0.0 - 1.3 10e3/uL 0.9    Absolute Eosinophils      0.0 - 0.7 10e3/uL 0.2    Absolute Basophils      0.0 - 0.2 10e3/uL 0.1    Absolute Immature Granulocytes      <=0.4 10e3/uL 0.1    Absolute NRBCs      10e3/uL 0.0    Sodium      136 - 145 mmol/L 140    Potassium      3.4 - 5.3 mmol/L 4.3    Chloride      98 - 107 mmol/L 106    Carbon Dioxide (CO2)      22 - 29 mmol/L 23    Anion Gap      7 - 15 mmol/L 11    Urea Nitrogen      8.0 - 23.0 mg/dL 26.7 (H)    Creatinine      0.51 - 0.95 mg/dL 0.70    Calcium      8.8 - 10.2 mg/dL 9.8    Glucose      70 - 99 mg/dL 104 (H)    GFR Estimate      >60 mL/min/1.73m2 >90    Platelet Morphology      Automated Count Confirmed. Platelet morphology is normal.  Automated Count Confirmed. Platelet morphology is normal.    Reactive Lymphs      None Seen  Present !    RBC Morphology Confirmed RBC Indices    Hemoglobin A1C      <5.7 % 5.4       OUTSIDE RECORDS  Outside referral notes and chart notes were reviewed and pertinent information has been summarized (in addition to the HPI):-        MRI L spine      IMPRESSION/REPORT/PLAN  Neuropathy  Rule out lumbar radiculopathy  Pain of right lower leg  Chronic bilateral low back pain with possible right-sided sciatica  Lumbar spinal stenosis  Neuropathy of both feet    This is a 67 year old female with history of chronic generalized pain previously diagnosed as  fibromyalgia with right thigh pain and low back pain.    1.  On exam patient has decreased vibratory and pinprick sensation in both legs suggestive of a peripheral neuropathy.  We will check an EMG and blood work to look for evidence of neuropathy.  Would be unrelated to her generalized pain or the right thigh pain.    2.  MRI L-spine has shown severe right-sided neuroforaminal stenosis at the L4-L5 level and moderate neuroforaminal stenosis at L2-L3.  This could be related to the patient's right thigh pain.  Other possibilities could be meralgia paresthetica versus neuromuscular disorder.  We will see what the EMG shows.  Discussed treatment options.    3.  MRI L-spine does show severe spinal canal stenosis.  She has been recommended surgery by orthopedics.  This might or might not help with the right leg pain.    I can see her back after the testing.    -     Vitamin B12; Future  -     Vitamin B1 whole blood; Future  -     TSH with free T4 reflex; Future  -     Protein Immunofixation Serum; Future  -     Protein electrophoresis; Future  -     CK total; Future  -     EMG; Future    Return for In-Clinic Visit (must), After testing.    Over 60 minutes were spent coordinating the care for the patient on the day of the encounter.  This includes previsit, during visit and post visit activities as documented above.  Counseling patient.  Reviewing chart.  Notes from multiple sources reviewed.  Reviewing previous testing.  Testing ordered.  (Activities include but not inclusive of reviewing chart, reviewing outside records, reviewing labs and imaging study results as well as the images, patient visit time including getting history and exam,  use if applicable, review of test results with the patient and coming up with a plan in a shared model, counseling patient and family, education and answering patient questions, EMR , EMR diagnosis entry and problem list management, medication reconciliation and  prescription management if applicable, paperwork if applicable, printing documents and documentation of the visit activities.)    Vinh Polanco MD  Neurologist  [facility]  Tel:- [phone]    This note was dictated using voice recognition software.  Any grammatical or context distortions are unintentional and inherent to the software.        Again, thank you for allowing me to participate in the care of your patient.        Sincerely,        Vinh Polanco MD

## 2023-04-03 NOTE — NURSING NOTE
Chief Complaint   Patient presents with     Consult     Generalized Pain     Rosa Azevedo MA,CMA,9:12 AM

## 2023-04-03 NOTE — PROGRESS NOTES
NEUROLOGY OUTPATIENT CONSULT NOTE   Apr 3, 2023     CHIEF COMPLAINT/REASON FOR VISIT/REASON FOR CONSULT  Patient presents with:  Consult: Generalized Pain     REASON FOR CONSULTATION- Leg pain    REFERRAL SOURCE  Dr. Bryson Huang  CC Dr. Bryson Huang    HISTORY OF PRESENT ILLNESS  Tessa Edwards is a 67 year old female seen today for evaluation of leg pain/back pain.  She reports that she has been having this pain for several years.  She has been seen by [facility] and they recommended for her to have surgery for lumbar spinal stenosis.  She also reports that she has had the right knee replaced about 3 years ago.  Pain generally starts in the hip and then can radiate down to the knee.  Sometimes this can be a sharp pain.  She is tried taking 2 Aleve's and 2 Tylenol which reduces the pain symptoms slightly.  Has also tried oxycodone in the past.  Gabapentin was tried years ago without benefit.  Has not done oral steroids.  Denies any numbness or weakness.  There is questionable foot drop on the right side with the patient.  No other new neurological symptoms.  Has had cervical spine surgery in the past.    Previous history is reviewed and this is unchanged.    PAST MEDICAL/SURGICAL HISTORY  Past Medical History:   Diagnosis Date     Abnormal CT of the abdomen 10/26/2021     Allergic rhinitis 11/05/2015     Anemia, iron deficiency 10/26/2021     Bilateral carpal tunnel syndrome 07/09/2019     Cervical nerve root compression 07/24/2017     Cervical spinal stenosis      Chronic low back pain without sciatica, unspecified back pain laterality 10/27/2021    Spondylosis,central  stenosis, foraminal encroachment     Colonic mass 11/16/2021     Depression      Depressive disorder      Disease of thyroid gland      Dyspepsia 03/22/2022     Fibromyalgia      GERD (gastroesophageal reflux disease)      History of blood transfusion      Hyperlipemia      Lymphoproliferative disorder (H)      Moderate major  depression (H) 03/22/2022     Morbid obesity (H) 03/22/2022     Non-Hodgkin's lymphoma (H) 03/22/2022     Obesity      Osteoarthritis      Osteoarthritis of right knee, unspecified osteoarthritis type 09/12/2019     Other abnormal glucose 03/22/2022    Formatting of this note might be different from the original. Created by Conversion     Prediabetes 3/22/2022    Formatting of this note might be different from the original. Created by Conversion     Primary localized osteoarthrosis, lower leg 08/27/2007     Restless legs syndrome 10/26/2021     Rotator cuff tear      Sleep apnea      Spinal stenosis of lumbar region without neurogenic claudication 10/27/2021    Spondylosis,central  stenosis, foraminal encroachment     Splenomegaly 10/27/2021     Thyroid nodule 10/27/2021     Urinary frequency 03/22/2022     Urinary retention 03/22/2022     Vocal cord dysfunction 11/05/2015     Weight loss 10/27/2021     Patient Active Problem List   Diagnosis     Primary localized osteoarthrosis, lower leg     Anemia, iron deficiency     Restless leg syndrome     Spinal stenosis of lumbar region without neurogenic claudication     Thyroid nodule     Splenomegaly     Weight loss     Prediabetes     Allergic rhinitis     Bilateral carpal tunnel syndrome     Cervical nerve root compression     Osteoarthritis of right knee, unspecified osteoarthritis type     Fibromyalgia     Rotator cuff tear     Vocal cord dysfunction     Moderate major depression (H)     Non-Hodgkin's lymphoma (H)     Urinary retention     Urinary frequency     Dyspepsia     Encounter for preventive measure     Screening for viral disease     Lumbar radiculopathy   Significant arthritis, depression, osteoporosis, lymphoma with use of Calquence.  Diagnosis fibromyalgia.     FAMILY HISTORY  Family History   Problem Relation Age of Onset     Heart Disease Mother      Cancer Father      Breast Cancer Paternal Grandmother      Breast Cancer Paternal Aunt      Diabetes  Sister      Colon Cancer Brother      Depression Sister      Obesity Brother     Negative for neurological problems.    SOCIAL HISTORY  Social History     Tobacco Use     Smoking status: Former     Packs/day: 0.50     Years: 10.00     Pack years: 5.00     Types: Cigarettes     Quit date: 2000     Years since quittin.1     Smokeless tobacco: Never   Substance Use Topics     Alcohol use: Yes     Comment: social     Drug use: No       SYSTEMS REVIEW  Twelve-system ROS was done and other than the HPI this was negative except for neck pain, back pain, neck pain, joint pain, numbness/.  No weakness/paralysis, difficulty walking, balance/coordination problems, bladder symptoms, sleepy during the day, anxiety, depression, weight gain.    MEDICATIONS  acalabrutinib (CALQUENCE) 100 MG capsule, Take 1 capsule (100 mg) by mouth 2 times daily  acetaminophen (TYLENOL) 500 MG tablet, Take 1,000 mg by mouth 3 times daily  calcium-vitamin D (CALCIUM-VITAMIN D) 500 mg(1,250mg) -200 unit per tablet, Take 1 tablet by mouth every evening   FLUoxetine (PROZAC) 40 MG capsule, Take 1 capsule (40 mg) by mouth daily  multivitamin w/minerals (THERA-VIT-M) tablet, Take 1 tablet by mouth daily  naproxen sodium (ALEVE) 220 MG tablet, [NAPROXEN SODIUM (ALEVE) 220 MG TABLET] Take 440 mg by mouth 3 (three) times a day with meals.  rOPINIRole (REQUIP) 1 MG tablet, TAKE 1 TABLET BY MOUTH TWO TIMES DAILY, 2 AT BEDTIME    No current facility-administered medications on file prior to visit.       PHYSICAL EXAMINATION  VITALS: /66   Pulse 72   Resp 18   GENERAL: Healthy appearing, alert, no acute distress, normal habitus.  CARDIOVASCULAR: Extremities warm and well perfused. Pulses present.   NEUROLOGICAL:  Patient is awake and oriented to self, place and time.  Attention span is normal.  Memory is grossly intact.  Language is fluent and follows commands appropriately.  Appropriate fund of knowledge. Cranial nerves 2-12 are intact. There  is no pronator drift.  Motor exam shows 5/5 strength in all extremities.  Tone is symmetric bilaterally in upper and lower extremities.  Reflexes are symmetric and 2+ in upper extremities and 2+ brisk in the left patella and 1+ in the right patella.  Ankle jerks were decreased bilaterally. Sensory exam is grossly intact to light touch, pin prick and vibration except for decreased pinprick and vibratory sensation to the midshin level.  Finger to nose and heel to shin is without dysmetria.  Romberg is unsteady.  Gait is slightly wide-based and the patient is able to do tandem walk and walk on toes and heels with difficulty.    DIAGNOSTICS  MRI C spine  1.  Postoperative changes related to intervertebral disc placement C5-C6. Spinal canal stenosis appears to be present noting that the exact degree is difficult to assess due to artifact. Bilateral C5-C6 left greater than right foraminal stenosis.  2.  No additional site of significant spinal canal stenosis.  3.  Moderate left foraminal narrowing at C4-C5.  4.  Extensive bilateral cervical lymphadenopathy measuring up to 1.7 cm on the left is nonspecific. While this could be reactive/infectious, neoplastic considerations should be considered and further evaluation is needed.    EMG  CLINICAL INTERPRETATION:  This is an abnormal nerve conduction and EMG study.  The study is suggestive of a moderate right carpal tunnel syndrome.  The study is further suggestive of a mild left ulnar neuropathy.  Further clinical correlation is needed.     EMG 2019 [facility]    RELEVANT LABS  Component      Latest Ref Rng 1/2/2023   WBC      4.0 - 11.0 10e3/uL 22.6 (H)    RBC Count      3.80 - 5.20 10e6/uL 4.08    Hemoglobin      11.7 - 15.7 g/dL 12.0    Hematocrit      35.0 - 47.0 % 38.4    MCV      78 - 100 fL 94    MCH      26.5 - 33.0 pg 29.4    MCHC      31.5 - 36.5 g/dL 31.3 (L)    RDW      10.0 - 15.0 % 12.1    Platelet Count      150 - 450 10e3/uL 161    % Neutrophils      % 24     % Lymphocytes      % 70    % Monocytes      % 4    % Eosinophils      % 1    % Basophils      % 1    % Immature Granulocytes      % 0    NRBCs per 100 WBC      <1 /100 0    Absolute Neutrophils      1.6 - 8.3 10e3/uL 5.5    Absolute Lymphocytes      0.8 - 5.3 10e3/uL 15.8 (H)    Absolute Monocytes      0.0 - 1.3 10e3/uL 0.9    Absolute Eosinophils      0.0 - 0.7 10e3/uL 0.2    Absolute Basophils      0.0 - 0.2 10e3/uL 0.1    Absolute Immature Granulocytes      <=0.4 10e3/uL 0.1    Absolute NRBCs      10e3/uL 0.0    Sodium      136 - 145 mmol/L 140    Potassium      3.4 - 5.3 mmol/L 4.3    Chloride      98 - 107 mmol/L 106    Carbon Dioxide (CO2)      22 - 29 mmol/L 23    Anion Gap      7 - 15 mmol/L 11    Urea Nitrogen      8.0 - 23.0 mg/dL 26.7 (H)    Creatinine      0.51 - 0.95 mg/dL 0.70    Calcium      8.8 - 10.2 mg/dL 9.8    Glucose      70 - 99 mg/dL 104 (H)    GFR Estimate      >60 mL/min/1.73m2 >90    Platelet Morphology      Automated Count Confirmed. Platelet morphology is normal.  Automated Count Confirmed. Platelet morphology is normal.    Reactive Lymphs      None Seen  Present !    RBC Morphology Confirmed RBC Indices    Hemoglobin A1C      <5.7 % 5.4       OUTSIDE RECORDS  Outside referral notes and chart notes were reviewed and pertinent information has been summarized (in addition to the HPI):-        MRI L spine      IMPRESSION/REPORT/PLAN  Neuropathy  Rule out lumbar radiculopathy  Pain of right lower leg  Chronic bilateral low back pain with possible right-sided sciatica  Lumbar spinal stenosis  Neuropathy of both feet    This is a 67 year old female with history of chronic generalized pain previously diagnosed as fibromyalgia with right thigh pain and low back pain.    1.  On exam patient has decreased vibratory and pinprick sensation in both legs suggestive of a peripheral neuropathy.  We will check an EMG and blood work to look for evidence of neuropathy.  Would be unrelated to her  generalized pain or the right thigh pain.    2.  MRI L-spine has shown severe right-sided neuroforaminal stenosis at the L4-L5 level and moderate neuroforaminal stenosis at L2-L3.  This could be related to the patient's right thigh pain.  Other possibilities could be meralgia paresthetica versus neuromuscular disorder.  We will see what the EMG shows.  Discussed treatment options.    3.  MRI L-spine does show severe spinal canal stenosis.  She has been recommended surgery by orthopedics.  This might or might not help with the right leg pain.    I can see her back after the testing.    -     Vitamin B12; Future  -     Vitamin B1 whole blood; Future  -     TSH with free T4 reflex; Future  -     Protein Immunofixation Serum; Future  -     Protein electrophoresis; Future  -     CK total; Future  -     EMG; Future    Return for In-Clinic Visit (must), After testing.    Over 60 minutes were spent coordinating the care for the patient on the day of the encounter.  This includes previsit, during visit and post visit activities as documented above.  Counseling patient.  Reviewing chart.  Notes from multiple sources reviewed.  Reviewing previous testing.  Testing ordered.  (Activities include but not inclusive of reviewing chart, reviewing outside records, reviewing labs and imaging study results as well as the images, patient visit time including getting history and exam,  use if applicable, review of test results with the patient and coming up with a plan in a shared model, counseling patient and family, education and answering patient questions, EMR , EMR diagnosis entry and problem list management, medication reconciliation and prescription management if applicable, paperwork if applicable, printing documents and documentation of the visit activities.)    Vinh Polanco MD  Neurologist  [facility]  Tel:- [phone]    This note was dictated using voice  recognition software.  Any grammatical or context distortions are unintentional and inherent to the software.

## 2023-04-05 ENCOUNTER — LAB (OUTPATIENT)
Dept: LAB | Facility: CLINIC | Age: 68
End: 2023-04-05
Payer: COMMERCIAL

## 2023-04-05 DIAGNOSIS — G62.9 NEUROPATHY: ICD-10-CM

## 2023-04-05 DIAGNOSIS — G57.93 NEUROPATHY OF BOTH FEET: ICD-10-CM

## 2023-04-05 LAB
CK SERPL-CCNC: 74 U/L (ref 30–190)
TOTAL PROTEIN SERUM FOR ELP: 5.8 G/DL (ref 6.4–8.3)
TSH SERPL DL<=0.005 MIU/L-ACNC: 0.97 UIU/ML (ref 0.3–5)
VIT B12 SERPL-MCNC: 564 PG/ML (ref 232–1245)

## 2023-04-05 PROCEDURE — 86334 IMMUNOFIX E-PHORESIS SERUM: CPT | Performed by: PATHOLOGY

## 2023-04-05 PROCEDURE — 84425 ASSAY OF VITAMIN B-1: CPT

## 2023-04-05 PROCEDURE — 82550 ASSAY OF CK (CPK): CPT

## 2023-04-05 PROCEDURE — 84165 PROTEIN E-PHORESIS SERUM: CPT | Mod: TC | Performed by: PATHOLOGY

## 2023-04-05 PROCEDURE — 82607 VITAMIN B-12: CPT

## 2023-04-05 PROCEDURE — 84443 ASSAY THYROID STIM HORMONE: CPT

## 2023-04-05 PROCEDURE — 84155 ASSAY OF PROTEIN SERUM: CPT

## 2023-04-05 PROCEDURE — 36415 COLL VENOUS BLD VENIPUNCTURE: CPT

## 2023-04-06 LAB
ALBUMIN SERPL ELPH-MCNC: 3.8 G/DL (ref 3.7–5.1)
ALPHA1 GLOB SERPL ELPH-MCNC: 0.3 G/DL (ref 0.2–0.4)
ALPHA2 GLOB SERPL ELPH-MCNC: 0.6 G/DL (ref 0.5–0.9)
B-GLOBULIN SERPL ELPH-MCNC: 0.7 G/DL (ref 0.6–1)
GAMMA GLOB SERPL ELPH-MCNC: 0.5 G/DL (ref 0.7–1.6)
M PROTEIN SERPL ELPH-MCNC: 0 G/DL
PROT PATTERN SERPL ELPH-IMP: ABNORMAL
PROT PATTERN SERPL IFE-IMP: NORMAL

## 2023-04-06 PROCEDURE — 84165 PROTEIN E-PHORESIS SERUM: CPT | Mod: 26

## 2023-04-06 PROCEDURE — 86334 IMMUNOFIX E-PHORESIS SERUM: CPT | Mod: 26

## 2023-04-09 LAB — VIT B1 PYROPHOSHATE BLD-SCNC: 195 NMOL/L

## 2023-05-03 DIAGNOSIS — G25.81 RESTLESS LEG SYNDROME: ICD-10-CM

## 2023-05-04 RX ORDER — ROPINIROLE 1 MG/1
TABLET, FILM COATED ORAL
Qty: 180 TABLET | Refills: 0 | OUTPATIENT
Start: 2023-05-04

## 2023-05-04 NOTE — TELEPHONE ENCOUNTER
Routing refill request to provider for review/approval because:  Labs not current:    Lab Results   Component Value Date    ALT 23 08/27/2020

## 2023-05-04 NOTE — TELEPHONE ENCOUNTER
Declined  Neurology recently adjusted dose upward  She should contact that office  Also needs to establish with any provider seeing new patients  Bryson Huang MD

## 2023-05-05 DIAGNOSIS — G25.81 RESTLESS LEG SYNDROME: ICD-10-CM

## 2023-05-05 NOTE — TELEPHONE ENCOUNTER
"ABISAI called and spoke with pt   -Informed of declined refill request for rOPINIRole (REQUIP), Neurology managing  -Advised to contact Neurology office [phone] for refill of medication   -Offered to schedule appt to establish care with provider accepting new patients, pt declined and states she will call back and schedule- \"I have too much stuff going on right now\"    Patient was given an opportunity to ask questions, verbalized understanding of plan, and is agreeable.    Leticia MCLEOD RN  Patient Advocate Liaison - PAL RN  [facility]  [phone]    "

## 2023-05-05 NOTE — TELEPHONE ENCOUNTER
Refill request for: Ropinirole   Directions: Take 1 tablet by mouth two times daily and 2 at bedtime     LOV: 4/3/23  NOV: 7/11/23    30 day supply with 3 refills Medication T'd for review and signature

## 2023-05-05 NOTE — TELEPHONE ENCOUNTER
[facility] Call Center    Phone Message    May a detailed message be left on voicemail: yes     Reason for Call: Medication Refill Request    Has the patient contacted the pharmacy for the refill? Yes   Name of medication being requested: rOPINIRole (REQUIP)   Provider who prescribed the medication:  Bryson Huang MD- pmd will retire soon and pt will be out in 2 weeks  Pharmacy: [facility] [address] [place] [address]  Date medication is needed:     Patient called states that her primary provider is retiring and she was informed by her pharmacy that  will be taking over her refills for her rOPINIRole (REQUIP). Patient is running low with only 2 weeks left and will need a refill soon. Per patient there is an issue with her getting this refilled at the pharmacy. Please call patient back to discuss at [phone]     Action Taken: Other: [facility] neurology    Travel Screening: Not Applicable

## 2023-05-08 RX ORDER — ROPINIROLE 1 MG/1
TABLET, FILM COATED ORAL
Qty: 120 TABLET | Refills: 3 | Status: SHIPPED | OUTPATIENT
Start: 2023-05-08 | End: 2024-02-14

## 2023-06-02 ENCOUNTER — HEALTH MAINTENANCE LETTER (OUTPATIENT)
Age: 68
End: 2023-06-02

## 2023-07-11 ENCOUNTER — OFFICE VISIT (OUTPATIENT)
Dept: NEUROLOGY | Facility: CLINIC | Age: 68
End: 2023-07-11
Attending: PSYCHIATRY & NEUROLOGY
Payer: COMMERCIAL

## 2023-07-11 VITALS — HEART RATE: 68 BPM | SYSTOLIC BLOOD PRESSURE: 136 MMHG | DIASTOLIC BLOOD PRESSURE: 70 MMHG | RESPIRATION RATE: 16 BRPM

## 2023-07-11 DIAGNOSIS — G89.29 CHRONIC BILATERAL LOW BACK PAIN WITH RIGHT-SIDED SCIATICA: ICD-10-CM

## 2023-07-11 DIAGNOSIS — M79.661 PAIN OF RIGHT LOWER LEG: ICD-10-CM

## 2023-07-11 DIAGNOSIS — M54.41 CHRONIC BILATERAL LOW BACK PAIN WITH RIGHT-SIDED SCIATICA: ICD-10-CM

## 2023-07-11 DIAGNOSIS — G62.9 NEUROPATHY: Primary | ICD-10-CM

## 2023-07-11 DIAGNOSIS — G62.9 NEUROPATHY: ICD-10-CM

## 2023-07-11 PROCEDURE — 95910 NRV CNDJ TEST 7-8 STUDIES: CPT | Performed by: PSYCHIATRY & NEUROLOGY

## 2023-07-11 PROCEDURE — 95886 MUSC TEST DONE W/N TEST COMP: CPT | Mod: RT | Performed by: PSYCHIATRY & NEUROLOGY

## 2023-07-11 PROCEDURE — 99215 OFFICE O/P EST HI 40 MIN: CPT | Performed by: PSYCHIATRY & NEUROLOGY

## 2023-07-11 RX ORDER — BACLOFEN 10 MG/1
10 TABLET ORAL 3 TIMES DAILY
Qty: 90 TABLET | Refills: 1 | Status: SHIPPED | OUTPATIENT
Start: 2023-07-11 | End: 2023-09-23

## 2023-07-11 NOTE — NURSING NOTE
Chief Complaint   Patient presents with     Follow Up     EMG review     Rosa Azevedo MA,CMA,9:30 AM

## 2023-07-11 NOTE — LETTER
7/11/2023         RE: Tessa Edwards  [address] [facility] [address]        Dear Colleague,    Thank you for referring your patient, Tessa Edwards, to the [facility] [place]. Please see a copy of my visit note below.    NEUROLOGY OUTPATIENT PROGRESS NOTE   Jul 11, 2023     CHIEF COMPLAINT/REASON FOR VISIT/REASON FOR CONSULT  Patient presents with:  Follow Up: EMG review     REASON FOR CONSULTATION- Leg pain    REFERRAL SOURCE  Dr. Bryson Huang  CC Dr. Bryson Huang    HISTORY OF PRESENT ILLNESS  Tessa Edwards is a 67 year old female seen today for evaluation of leg pain/back pain.  She reports that she has been having this pain for several years.  She has been seen by [facility] and they recommended for her to have surgery for lumbar spinal stenosis.  She also reports that she has had the right knee replaced about 3 years ago.  Pain generally starts in the hip and then can radiate down to the knee.  Sometimes this can be a sharp pain.  She is tried taking 2 Aleve's and 2 Tylenol which reduces the pain symptoms slightly.  Has also tried oxycodone in the past.  Gabapentin was tried years ago without benefit.  Has not done oral steroids.  Denies any numbness or weakness.  There is questionable foot drop on the right side with the patient.  No other new neurological symptoms.  Has had cervical spine surgery in the past.    7/11/23  Patient returns today.  Symptoms are about the same as before.  Has gained about 50 pounds of weight in the last 3 months.  Continues to have right leg pain.  Also has pain in her knees and thighs.  This is mainly with standing or exercising.  Continues to have back pain.  Does complain of balance issues.  Reviewed with patient she has non-Hodgkin's lymphoma though never needed any chemotherapy.  She was drinking significant amount of alcohol about 3 years ago for several years.  Has been sober for 3 years currently drinks about  once or twice a month.    Previous history is reviewed and this is unchanged.    PAST MEDICAL/SURGICAL HISTORY  Past Medical History:   Diagnosis Date     Abnormal CT of the abdomen 10/26/2021     Allergic rhinitis 11/05/2015     Anemia, iron deficiency 10/26/2021     Bilateral carpal tunnel syndrome 07/09/2019     Cervical nerve root compression 07/24/2017     Cervical spinal stenosis      Chronic low back pain without sciatica, unspecified back pain laterality 10/27/2021    Spondylosis,central  stenosis, foraminal encroachment     Colonic mass 11/16/2021     Depression      Depressive disorder      Disease of thyroid gland      Dyspepsia 03/22/2022     Fibromyalgia      GERD (gastroesophageal reflux disease)      History of blood transfusion      Hyperlipemia      Lymphoproliferative disorder (H)      Moderate major depression (H) 03/22/2022     Morbid obesity (H) 03/22/2022     Non-Hodgkin's lymphoma (H) 03/22/2022     Obesity      Osteoarthritis      Osteoarthritis of right knee, unspecified osteoarthritis type 09/12/2019     Other abnormal glucose 03/22/2022    Formatting of this note might be different from the original. Created by Conversion     Prediabetes 3/22/2022    Formatting of this note might be different from the original. Created by Conversion     Primary localized osteoarthrosis, lower leg 08/27/2007     Restless legs syndrome 10/26/2021     Rotator cuff tear      Sleep apnea      Spinal stenosis of lumbar region without neurogenic claudication 10/27/2021    Spondylosis,central  stenosis, foraminal encroachment     Splenomegaly 10/27/2021     Thyroid nodule 10/27/2021     Urinary frequency 03/22/2022     Urinary retention 03/22/2022     Vocal cord dysfunction 11/05/2015     Weight loss 10/27/2021     Patient Active Problem List   Diagnosis     Primary localized osteoarthrosis, lower leg     Anemia, iron deficiency     Restless leg syndrome     Spinal stenosis of lumbar region without neurogenic  claudication     Thyroid nodule     Splenomegaly     Weight loss     Prediabetes     Allergic rhinitis     Bilateral carpal tunnel syndrome     Cervical nerve root compression     Osteoarthritis of right knee, unspecified osteoarthritis type     Fibromyalgia     Rotator cuff tear     Vocal cord dysfunction     Moderate major depression (H)     Non-Hodgkin's lymphoma (H)     Urinary retention     Urinary frequency     Dyspepsia     Encounter for preventive measure     Screening for viral disease     Lumbar radiculopathy   Significant arthritis, depression, osteoporosis, lymphoma with use of Calquence.  Diagnosis fibromyalgia.     FAMILY HISTORY  Family History   Problem Relation Age of Onset     Heart Disease Mother      Cancer Father      Breast Cancer Paternal Grandmother      Breast Cancer Paternal Aunt      Diabetes Sister      Colon Cancer Brother      Depression Sister      Obesity Brother     Negative for neurological problems.    SOCIAL HISTORY  Social History     Tobacco Use     Smoking status: Former     Packs/day: 0.50     Years: 10.00     Pack years: 5.00     Types: Cigarettes     Quit date: 2000     Years since quittin.4     Smokeless tobacco: Never   Substance Use Topics     Alcohol use: Yes     Comment: social     Drug use: No       SYSTEMS REVIEW  Twelve-system ROS was done and other than the HPI this was negative except for neck pain, back pain, neck pain, joint pain, numbness/.  No weakness/paralysis, difficulty walking, balance/coordination problems, bladder symptoms, sleepy during the day, anxiety, depression, weight gain.  No new concerns/issues.    MEDICATIONS  acalabrutinib (CALQUENCE) 100 MG capsule, Take 1 capsule (100 mg) by mouth 2 times daily  acetaminophen (TYLENOL) 500 MG tablet, Take 1,000 mg by mouth 3 times daily  calcium-vitamin D (CALCIUM-VITAMIN D) 500 mg(1,250mg) -200 unit per tablet, Take 1 tablet by mouth every evening   FLUoxetine (PROZAC) 40 MG capsule, Take 1  capsule (40 mg) by mouth daily  multivitamin w/minerals (THERA-VIT-M) tablet, Take 1 tablet by mouth daily  naproxen sodium (ALEVE) 220 MG tablet, [NAPROXEN SODIUM (ALEVE) 220 MG TABLET] Take 440 mg by mouth 3 (three) times a day with meals.  rOPINIRole (REQUIP) 1 MG tablet, TAKE 1 TABLET BY MOUTH TWO TIMES DAILY, 2 AT BEDTIME    No current facility-administered medications on file prior to visit.       PHYSICAL EXAMINATION  VITALS: /70   Pulse 68   Resp 16   GENERAL: Healthy appearing, alert, no acute distress, normal habitus.  CARDIOVASCULAR: Extremities warm and well perfused. Pulses present.   NEUROLOGICAL:  Patient is awake and oriented to self, place and time.  Attention span is normal.  Memory is grossly intact.  Language is fluent and follows commands appropriately.  Appropriate fund of knowledge. Cranial nerves 2-12 are intact. There is no pronator drift.  Motor exam shows 5/5 strength in all extremities.  Tone is symmetric bilaterally in upper and lower extremities.  Reflexes are symmetric and 2+ in upper extremities and 2+ brisk in the left patella and 1+ in the right patella.  Ankle jerks were decreased bilaterally. Sensory exam is grossly intact to light touch, pin prick and vibration except for decreased pinprick and vibratory sensation to the midshin level.  Finger to nose and heel to shin is without dysmetria.  Romberg is unsteady.  Gait is slightly wide-based and the patient is able to do tandem walk and walk on toes and heels with difficulty.  Exam stable compared to before.    DIAGNOSTICS  MRI C spine  1.  Postoperative changes related to intervertebral disc placement C5-C6. Spinal canal stenosis appears to be present noting that the exact degree is difficult to assess due to artifact. Bilateral C5-C6 left greater than right foraminal stenosis.  2.  No additional site of significant spinal canal stenosis.  3.  Moderate left foraminal narrowing at C4-C5.  4.  Extensive bilateral cervical  lymphadenopathy measuring up to 1.7 cm on the left is nonspecific. While this could be reactive/infectious, neoplastic considerations should be considered and further evaluation is needed.    EMG  CLINICAL INTERPRETATION:  This is an abnormal nerve conduction and EMG study.  The study is suggestive of a moderate right carpal tunnel syndrome.  The study is further suggestive of a mild left ulnar neuropathy.  Further clinical correlation is needed.     EMG 2019 [facility]    RELEVANT LABS  Component      Latest Ref Rng 1/2/2023   WBC      4.0 - 11.0 10e3/uL 22.6 (H)    RBC Count      3.80 - 5.20 10e6/uL 4.08    Hemoglobin      11.7 - 15.7 g/dL 12.0    Hematocrit      35.0 - 47.0 % 38.4    MCV      78 - 100 fL 94    MCH      26.5 - 33.0 pg 29.4    MCHC      31.5 - 36.5 g/dL 31.3 (L)    RDW      10.0 - 15.0 % 12.1    Platelet Count      150 - 450 10e3/uL 161    % Neutrophils      % 24    % Lymphocytes      % 70    % Monocytes      % 4    % Eosinophils      % 1    % Basophils      % 1    % Immature Granulocytes      % 0    NRBCs per 100 WBC      <1 /100 0    Absolute Neutrophils      1.6 - 8.3 10e3/uL 5.5    Absolute Lymphocytes      0.8 - 5.3 10e3/uL 15.8 (H)    Absolute Monocytes      0.0 - 1.3 10e3/uL 0.9    Absolute Eosinophils      0.0 - 0.7 10e3/uL 0.2    Absolute Basophils      0.0 - 0.2 10e3/uL 0.1    Absolute Immature Granulocytes      <=0.4 10e3/uL 0.1    Absolute NRBCs      10e3/uL 0.0    Sodium      136 - 145 mmol/L 140    Potassium      3.4 - 5.3 mmol/L 4.3    Chloride      98 - 107 mmol/L 106    Carbon Dioxide (CO2)      22 - 29 mmol/L 23    Anion Gap      7 - 15 mmol/L 11    Urea Nitrogen      8.0 - 23.0 mg/dL 26.7 (H)    Creatinine      0.51 - 0.95 mg/dL 0.70    Calcium      8.8 - 10.2 mg/dL 9.8    Glucose      70 - 99 mg/dL 104 (H)    GFR Estimate      >60 mL/min/1.73m2 >90    Platelet Morphology      Automated Count Confirmed. Platelet morphology is normal.  Automated Count Confirmed. Platelet  morphology is normal.    Reactive Lymphs      None Seen  Present !    RBC Morphology Confirmed RBC Indices    Hemoglobin A1C      <5.7 % 5.4       OUTSIDE RECORDS  Outside referral notes and chart notes were reviewed and pertinent information has been summarized (in addition to the HPI):-        MRI L spine    EMG  CLINICAL INTERPRETATION:  This is an abnormal nerve conduction and EMG study.  The study is suggestive of a severe sensorimotor polyneuropathy in both legs.  Due to presence of an underlying severe neuropathy the study is unable to rule out a superimposed lumbar radiculopathy.  Further clinical correlation is needed.    LABS    Component      Latest Ref Rng 4/5/2023  12:50 PM   Albumin Fraction      3.7 - 5.1 g/dL 3.8    Alpha 1 Fraction      0.2 - 0.4 g/dL 0.3    Alpha 2 Fraction      0.5 - 0.9 g/dL 0.6    Beta Fraction      0.6 - 1.0 g/dL 0.7    Gamma Fraction      0.7 - 1.6 g/dL 0.5 (L)    Monoclonal Peak      <=0.0 g/dL 0.0    ELP Interpretation: Hypogammaglobulinemia. No monoclonal protein seen, recommend quantitative immunoglobulins if clinically indicated. Recommend a urine for immunofixation to rule out a light chain secreting myeloma. Pathologic significance requires clinical correlation. Mari Melo M.D.    CK Total      30 - 190 U/L 74    Immunofixation ELP No monoclonal protein seen on immunofixation. Pathologic significance requires clinical correlation. Mari Melo MD    TSH      0.30 - 5.00 uIU/mL 0.97    Vitamin B1 Whole Blood Level      70 - 180 nmol/L 195 (H)    Vitamin B12      232 - 1,245 pg/mL 564    Total Protein Serum for ELP      6.4 - 8.3 g/dL 5.8 (L)       Legend:  (L) Low  (H) High    EMG  CLINICAL INTERPRETATION:  This is an abnormal nerve conduction and EMG study.  The study is suggestive of a severe sensorimotor polyneuropathy in both legs.  Due to presence of an underlying severe neuropathy the study is unable to rule out a superimposed lumbar radiculopathy.   Further clinical correlation is needed.       IMPRESSION/REPORT/PLAN  Neuropathy  Rule out lumbar radiculopathy  Pain of right lower leg  Chronic bilateral low back pain with possible right-sided sciatica  Lumbar spinal stenosis/neurogenic claudication  Neuropathy of both feet    This is a 67 year old female with history of chronic generalized pain previously diagnosed as fibromyalgia with right thigh pain and low back pain.    1.  On exam patient has decreased vibratory and pinprick sensation in both legs suggestive of a peripheral neuropathy.  EMG does suggest a severe neuropathy.  Blood work overall has been noncontributory though could be related to history of prediabetes/obesity.  Could also be related to history of alcohol abuse.  Discussed prognosis of idiopathic neuropathy.  She does not have any neuropathic pain in her feet.  This would be unrelated to her right thigh pain/generalized pain.    2.  MRI L-spine has shown severe right-sided neuroforaminal stenosis at the L4-L5 level and moderate neuroforaminal stenosis at L2-L3.  This could be related to the patient's right thigh pain.  Other possibilities could be meralgia paresthetica due to recent weight gain.  She is interested in medical management will refer to the spine center.  Encouraged her to lose weight.  EMG was limited due to underlying severe neuropathy.    3.  MRI L-spine does show severe spinal canal stenosis.  She has been recommended surgery by orthopedics.  She wants to try medical management of this first.  Will refer to the spine center.  We will prescribe baclofen for neurogenic claudication to see if that helps.  This would explain more for generalized leg pain.    He can see her back on as-needed basis.    -    Recommend working with primary care to lose weight.  -     baclofen (LIORESAL) 10 MG tablet; Take 1 tablet (10 mg) by mouth 3 times daily  -     Spine  Referral; Future    Return if symptoms worsen or fail to improve,  for In-Clinic Visit (must).    Over 40 minutes were spent coordinating the care for the patient on the day of the encounter.  This includes previsit, during visit and post visit activities as documented above.  Counseling patient.  Multiple test reviewed.  Prescription management.  Multiple problems reviewed/addressed.  (Activities include but not inclusive of reviewing chart, reviewing outside records, reviewing labs and imaging study results as well as the images, patient visit time including getting history and exam,  use if applicable, review of test results with the patient and coming up with a plan in a shared model, counseling patient and family, education and answering patient questions, EMR , EMR diagnosis entry and problem list management, medication reconciliation and prescription management if applicable, paperwork if applicable, printing documents and documentation of the visit activities.)    Vinh Polanco MD  Neurologist  [facility]  Tel:- [phone]    This note was dictated using voice recognition software.  Any grammatical or context distortions are unintentional and inherent to the software.        Again, thank you for allowing me to participate in the care of your patient.        Sincerely,        Vinh Polanco MD

## 2023-07-11 NOTE — PROCEDURES
[facility]    Formerly [facility]  [address], [address]  [place] [address]  Tel: [phone]  Fax: [phone]          Full Name: Tessa Edwards Gender: Female  Patient ID: [id number] YOB: 1955      Visit Date: 7/11/2023 08:46  Age: 67 Years 11 Months Old  Interpreted By: Vinh Polanco MD   Ref Dr.: [facility]  Tech: ST   Height: 5 feet 4 inch  Reason for referral: Evaluate bilateral lowers. c/o pain in both hips/legs/feet > 4 years. h/o both knees replaced.       Motor NCS      Nerve / Sites Lat Amp Dist Saroj    ms mV cm m/s   R Peroneal - EDB      Ankle NR NR 8       Fib head NR NR 34 NR      Pop fossa NR NR 11 NR   L Peroneal - EDB      Ankle NR NR 8       Fib head NR NR 29 NR      Pop fossa NR NR 11 NR   R Tibial - AH      Ankle NR NR 8       Pop fossa NR NR 38 NR   L Tibial - AH      Ankle NR NR 8       Pop fossa NR NR 36 NR   R Peroneal - Tib Ant      Fib Head NR NR        Pop fossa NR NR 11 NR       Sensory NCS      Nerve / Sites Onset Lat Pk Lat Amp.2-3 Dist Saroj    ms ms  V cm m/s   R Sural - Ankle (Calf)      Calf NR NR NR 14 NR   R Superficial peroneal - Ankle      Lat leg NR NR NR 12 NR       EMG Summary Table     Spontaneous MUAP Rcmt Note   Muscle Fib PSW Fasc IA # Amp Dur PPP Rate Type   R. Gluteus medius None None None N N N N N N N   R. Gluteus prabhakar None None None N N N N N N N   R. L3 paraspinal None None None N N N N N N N   R. L4 paraspinal None None None N N N N N N N   R. L5 paraspinal None None None N N N N N N N   R. S1 paraspinal None None None N N N N N N N   R. Iliopsoas None None None N N N N N N N   R. Adductor soto None None None N N N N N N N   R. Quadriceps None None None N N N N N N N   R. Tibialis anterior Occ Occ None N Mod Red Incr Incr N N N   R. Peroneus longus Occ Occ None N Mod Red Incr Incr N N N   R. Gastrocnemius (Medial head) Occ Occ None N Mod Red Incr Incr N  N N   R. Gastrocnemius (Lateral head) Occ Occ None N Mod Red Incr Incr N N N   R. Tibialis posterior Occ Occ None N Mod Red Incr Incr N N N   L. Adductor soto None None None N N N N N N N   L. Quadriceps None None None N N N N N N N   L. Tibialis anterior Occ Occ None N Mod Red Incr Incr N N N   L. Peroneus longus Occ Occ None N Mod Red Incr Incr N N N   L. Gastrocnemius (Medial head) Occ Occ None N Mod Red Incr Incr N N N   L. Gastrocnemius (Lateral head) Occ Occ None N Mod Red Incr Incr N N N   L. Tibialis posterior Occ Occ None N Mod Red Incr Incr N N N     SUMMARY  Nerve conduction and EMG study of bilateral lower extremities shows:    Absent bilateral tibial and peroneal CMAP.  Absent right peroneal CMAP to the anterior tibialis muscle.  Absent right sural and superficial peroneal SNAP.  Monopolar needle exam as above.      CLINICAL INTERPRETATION:  This is an abnormal nerve conduction and EMG study.  The study is suggestive of a severe sensorimotor polyneuropathy in both legs.  Due to presence of an underlying severe neuropathy the study is unable to rule out a superimposed lumbar radiculopathy.  Further clinical correlation is needed.     Vinh Polanco MD  Neurologist  [facility] Neurology [facility]  Tel:- [phone]

## 2023-07-11 NOTE — PROGRESS NOTES
NEUROLOGY OUTPATIENT PROGRESS NOTE   Jul 11, 2023     CHIEF COMPLAINT/REASON FOR VISIT/REASON FOR CONSULT  Patient presents with:  Follow Up: EMG review     REASON FOR CONSULTATION- Leg pain    REFERRAL SOURCE  Dr. Bryson Huang  CC Dr. Bryson Huang    HISTORY OF PRESENT ILLNESS  Tessa Edwards is a 67 year old female seen today for evaluation of leg pain/back pain.  She reports that she has been having this pain for several years.  She has been seen by [facility] and they recommended for her to have surgery for lumbar spinal stenosis.  She also reports that she has had the right knee replaced about 3 years ago.  Pain generally starts in the hip and then can radiate down to the knee.  Sometimes this can be a sharp pain.  She is tried taking 2 Aleve's and 2 Tylenol which reduces the pain symptoms slightly.  Has also tried oxycodone in the past.  Gabapentin was tried years ago without benefit.  Has not done oral steroids.  Denies any numbness or weakness.  There is questionable foot drop on the right side with the patient.  No other new neurological symptoms.  Has had cervical spine surgery in the past.    7/11/23  Patient returns today.  Symptoms are about the same as before.  Has gained about 50 pounds of weight in the last 3 months.  Continues to have right leg pain.  Also has pain in her knees and thighs.  This is mainly with standing or exercising.  Continues to have back pain.  Does complain of balance issues.  Reviewed with patient she has non-Hodgkin's lymphoma though never needed any chemotherapy.  She was drinking significant amount of alcohol about 3 years ago for several years.  Has been sober for 3 years currently drinks about once or twice a month.    Previous history is reviewed and this is unchanged.    PAST MEDICAL/SURGICAL HISTORY  Past Medical History:   Diagnosis Date     Abnormal CT of the abdomen 10/26/2021     Allergic rhinitis 11/05/2015     Anemia, iron deficiency 10/26/2021      Bilateral carpal tunnel syndrome 07/09/2019     Cervical nerve root compression 07/24/2017     Cervical spinal stenosis      Chronic low back pain without sciatica, unspecified back pain laterality 10/27/2021    Spondylosis,central  stenosis, foraminal encroachment     Colonic mass 11/16/2021     Depression      Depressive disorder      Disease of thyroid gland      Dyspepsia 03/22/2022     Fibromyalgia      GERD (gastroesophageal reflux disease)      History of blood transfusion      Hyperlipemia      Lymphoproliferative disorder (H)      Moderate major depression (H) 03/22/2022     Morbid obesity (H) 03/22/2022     Non-Hodgkin's lymphoma (H) 03/22/2022     Obesity      Osteoarthritis      Osteoarthritis of right knee, unspecified osteoarthritis type 09/12/2019     Other abnormal glucose 03/22/2022    Formatting of this note might be different from the original. Created by Conversion     Prediabetes 3/22/2022    Formatting of this note might be different from the original. Created by Conversion     Primary localized osteoarthrosis, lower leg 08/27/2007     Restless legs syndrome 10/26/2021     Rotator cuff tear      Sleep apnea      Spinal stenosis of lumbar region without neurogenic claudication 10/27/2021    Spondylosis,central  stenosis, foraminal encroachment     Splenomegaly 10/27/2021     Thyroid nodule 10/27/2021     Urinary frequency 03/22/2022     Urinary retention 03/22/2022     Vocal cord dysfunction 11/05/2015     Weight loss 10/27/2021     Patient Active Problem List   Diagnosis     Primary localized osteoarthrosis, lower leg     Anemia, iron deficiency     Restless leg syndrome     Spinal stenosis of lumbar region without neurogenic claudication     Thyroid nodule     Splenomegaly     Weight loss     Prediabetes     Allergic rhinitis     Bilateral carpal tunnel syndrome     Cervical nerve root compression     Osteoarthritis of right knee, unspecified osteoarthritis type     Fibromyalgia     Rotator  cuff tear     Vocal cord dysfunction     Moderate major depression (H)     Non-Hodgkin's lymphoma (H)     Urinary retention     Urinary frequency     Dyspepsia     Encounter for preventive measure     Screening for viral disease     Lumbar radiculopathy   Significant arthritis, depression, osteoporosis, lymphoma with use of Calquence.  Diagnosis fibromyalgia.     FAMILY HISTORY  Family History   Problem Relation Age of Onset     Heart Disease Mother      Cancer Father      Breast Cancer Paternal Grandmother      Breast Cancer Paternal Aunt      Diabetes Sister      Colon Cancer Brother      Depression Sister      Obesity Brother     Negative for neurological problems.    SOCIAL HISTORY  Social History     Tobacco Use     Smoking status: Former     Packs/day: 0.50     Years: 10.00     Pack years: 5.00     Types: Cigarettes     Quit date: 2000     Years since quittin.4     Smokeless tobacco: Never   Substance Use Topics     Alcohol use: Yes     Comment: social     Drug use: No       SYSTEMS REVIEW  Twelve-system ROS was done and other than the HPI this was negative except for neck pain, back pain, neck pain, joint pain, numbness/.  No weakness/paralysis, difficulty walking, balance/coordination problems, bladder symptoms, sleepy during the day, anxiety, depression, weight gain.  No new concerns/issues.    MEDICATIONS  acalabrutinib (CALQUENCE) 100 MG capsule, Take 1 capsule (100 mg) by mouth 2 times daily  acetaminophen (TYLENOL) 500 MG tablet, Take 1,000 mg by mouth 3 times daily  calcium-vitamin D (CALCIUM-VITAMIN D) 500 mg(1,250mg) -200 unit per tablet, Take 1 tablet by mouth every evening   FLUoxetine (PROZAC) 40 MG capsule, Take 1 capsule (40 mg) by mouth daily  multivitamin w/minerals (THERA-VIT-M) tablet, Take 1 tablet by mouth daily  naproxen sodium (ALEVE) 220 MG tablet, [NAPROXEN SODIUM (ALEVE) 220 MG TABLET] Take 440 mg by mouth 3 (three) times a day with meals.  rOPINIRole (REQUIP) 1 MG tablet,  TAKE 1 TABLET BY MOUTH TWO TIMES DAILY, 2 AT BEDTIME    No current facility-administered medications on file prior to visit.       PHYSICAL EXAMINATION  VITALS: /70   Pulse 68   Resp 16   GENERAL: Healthy appearing, alert, no acute distress, normal habitus.  CARDIOVASCULAR: Extremities warm and well perfused. Pulses present.   NEUROLOGICAL:  Patient is awake and oriented to self, place and time.  Attention span is normal.  Memory is grossly intact.  Language is fluent and follows commands appropriately.  Appropriate fund of knowledge. Cranial nerves 2-12 are intact. There is no pronator drift.  Motor exam shows 5/5 strength in all extremities.  Tone is symmetric bilaterally in upper and lower extremities.  Reflexes are symmetric and 2+ in upper extremities and 2+ brisk in the left patella and 1+ in the right patella.  Ankle jerks were decreased bilaterally. Sensory exam is grossly intact to light touch, pin prick and vibration except for decreased pinprick and vibratory sensation to the midshin level.  Finger to nose and heel to shin is without dysmetria.  Romberg is unsteady.  Gait is slightly wide-based and the patient is able to do tandem walk and walk on toes and heels with difficulty.  Exam stable compared to before.    DIAGNOSTICS  MRI C spine  1.  Postoperative changes related to intervertebral disc placement C5-C6. Spinal canal stenosis appears to be present noting that the exact degree is difficult to assess due to artifact. Bilateral C5-C6 left greater than right foraminal stenosis.  2.  No additional site of significant spinal canal stenosis.  3.  Moderate left foraminal narrowing at C4-C5.  4.  Extensive bilateral cervical lymphadenopathy measuring up to 1.7 cm on the left is nonspecific. While this could be reactive/infectious, neoplastic considerations should be considered and further evaluation is needed.    EMG  CLINICAL INTERPRETATION:  This is an abnormal nerve conduction and EMG study.   The study is suggestive of a moderate right carpal tunnel syndrome.  The study is further suggestive of a mild left ulnar neuropathy.  Further clinical correlation is needed.     EMG 2019 [facility]    RELEVANT LABS  Component      Latest Ref Rng 1/2/2023   WBC      4.0 - 11.0 10e3/uL 22.6 (H)    RBC Count      3.80 - 5.20 10e6/uL 4.08    Hemoglobin      11.7 - 15.7 g/dL 12.0    Hematocrit      35.0 - 47.0 % 38.4    MCV      78 - 100 fL 94    MCH      26.5 - 33.0 pg 29.4    MCHC      31.5 - 36.5 g/dL 31.3 (L)    RDW      10.0 - 15.0 % 12.1    Platelet Count      150 - 450 10e3/uL 161    % Neutrophils      % 24    % Lymphocytes      % 70    % Monocytes      % 4    % Eosinophils      % 1    % Basophils      % 1    % Immature Granulocytes      % 0    NRBCs per 100 WBC      <1 /100 0    Absolute Neutrophils      1.6 - 8.3 10e3/uL 5.5    Absolute Lymphocytes      0.8 - 5.3 10e3/uL 15.8 (H)    Absolute Monocytes      0.0 - 1.3 10e3/uL 0.9    Absolute Eosinophils      0.0 - 0.7 10e3/uL 0.2    Absolute Basophils      0.0 - 0.2 10e3/uL 0.1    Absolute Immature Granulocytes      <=0.4 10e3/uL 0.1    Absolute NRBCs      10e3/uL 0.0    Sodium      136 - 145 mmol/L 140    Potassium      3.4 - 5.3 mmol/L 4.3    Chloride      98 - 107 mmol/L 106    Carbon Dioxide (CO2)      22 - 29 mmol/L 23    Anion Gap      7 - 15 mmol/L 11    Urea Nitrogen      8.0 - 23.0 mg/dL 26.7 (H)    Creatinine      0.51 - 0.95 mg/dL 0.70    Calcium      8.8 - 10.2 mg/dL 9.8    Glucose      70 - 99 mg/dL 104 (H)    GFR Estimate      >60 mL/min/1.73m2 >90    Platelet Morphology      Automated Count Confirmed. Platelet morphology is normal.  Automated Count Confirmed. Platelet morphology is normal.    Reactive Lymphs      None Seen  Present !    RBC Morphology Confirmed RBC Indices    Hemoglobin A1C      <5.7 % 5.4       OUTSIDE RECORDS  Outside referral notes and chart notes were reviewed and pertinent information has been summarized (in addition to the  HPI):-        MRI L spine    EMG  CLINICAL INTERPRETATION:  This is an abnormal nerve conduction and EMG study.  The study is suggestive of a severe sensorimotor polyneuropathy in both legs.  Due to presence of an underlying severe neuropathy the study is unable to rule out a superimposed lumbar radiculopathy.  Further clinical correlation is needed.    LABS    Component      Latest Ref Rng 4/5/2023  12:50 PM   Albumin Fraction      3.7 - 5.1 g/dL 3.8    Alpha 1 Fraction      0.2 - 0.4 g/dL 0.3    Alpha 2 Fraction      0.5 - 0.9 g/dL 0.6    Beta Fraction      0.6 - 1.0 g/dL 0.7    Gamma Fraction      0.7 - 1.6 g/dL 0.5 (L)    Monoclonal Peak      <=0.0 g/dL 0.0    ELP Interpretation: Hypogammaglobulinemia. No monoclonal protein seen, recommend quantitative immunoglobulins if clinically indicated. Recommend a urine for immunofixation to rule out a light chain secreting myeloma. Pathologic significance requires clinical correlation. Mari Melo M.D.    CK Total      30 - 190 U/L 74    Immunofixation ELP No monoclonal protein seen on immunofixation. Pathologic significance requires clinical correlation. Mari Melo MD    TSH      0.30 - 5.00 uIU/mL 0.97    Vitamin B1 Whole Blood Level      70 - 180 nmol/L 195 (H)    Vitamin B12      232 - 1,245 pg/mL 564    Total Protein Serum for ELP      6.4 - 8.3 g/dL 5.8 (L)       Legend:  (L) Low  (H) High    EMG  CLINICAL INTERPRETATION:  This is an abnormal nerve conduction and EMG study.  The study is suggestive of a severe sensorimotor polyneuropathy in both legs.  Due to presence of an underlying severe neuropathy the study is unable to rule out a superimposed lumbar radiculopathy.  Further clinical correlation is needed.       IMPRESSION/REPORT/PLAN  Neuropathy  Rule out lumbar radiculopathy  Pain of right lower leg  Chronic bilateral low back pain with possible right-sided sciatica  Lumbar spinal stenosis/neurogenic claudication  Neuropathy of both feet    This is  a 67 year old female with history of chronic generalized pain previously diagnosed as fibromyalgia with right thigh pain and low back pain.    1.  On exam patient has decreased vibratory and pinprick sensation in both legs suggestive of a peripheral neuropathy.  EMG does suggest a severe neuropathy.  Blood work overall has been noncontributory though could be related to history of prediabetes/obesity.  Could also be related to history of alcohol abuse.  Discussed prognosis of idiopathic neuropathy.  She does not have any neuropathic pain in her feet.  This would be unrelated to her right thigh pain/generalized pain.  Of note-she complains of some unsteadiness which most likely is from her neuropathy.  She does have some hyperreflexia on exam which most likely is related to history of cervical spine surgery/stenosis.  This could also be affecting her unsteadiness in addition to her lumbar problems.    2.  MRI L-spine has shown severe right-sided neuroforaminal stenosis at the L4-L5 level and moderate neuroforaminal stenosis at L2-L3.  This could be related to the patient's right thigh pain.  Other possibilities could be meralgia paresthetica due to recent weight gain.  She is interested in medical management will refer to the spine center.  Encouraged her to lose weight.  EMG was limited due to underlying severe neuropathy.    3.  MRI L-spine does show severe spinal canal stenosis.  She has been recommended surgery by orthopedics.  She wants to try medical management of this first.  Will refer to the spine center.  We will prescribe baclofen for neurogenic claudication to see if that helps.  This would explain more for generalized leg pain.    He can see her back on as-needed basis.    -    Recommend working with primary care to lose weight.  -     baclofen (LIORESAL) 10 MG tablet; Take 1 tablet (10 mg) by mouth 3 times daily  -     Spine  Referral; Future    Return if symptoms worsen or fail to improve, for  In-Clinic Visit (must).    Over 40 minutes were spent coordinating the care for the patient on the day of the encounter.  This includes previsit, during visit and post visit activities as documented above.  Counseling patient.  Multiple test reviewed.  Prescription management.  Multiple problems reviewed/addressed.  (Activities include but not inclusive of reviewing chart, reviewing outside records, reviewing labs and imaging study results as well as the images, patient visit time including getting history and exam,  use if applicable, review of test results with the patient and coming up with a plan in a shared model, counseling patient and family, education and answering patient questions, EMR , EMR diagnosis entry and problem list management, medication reconciliation and prescription management if applicable, paperwork if applicable, printing documents and documentation of the visit activities.)    Vinh Polanco MD  Neurologist  [facility] Neurology [facility]  Tel:- [phone]    This note was dictated using voice recognition software.  Any grammatical or context distortions are unintentional and inherent to the software.

## 2023-07-11 NOTE — LETTER
7/11/2023         RE: Tessa Edwards  [address] [place] [address]        Dear Colleague,    Thank you for referring your patient, Tessa Edwards, to the [facility] [place]. Please see a copy of my visit note below.    See procedure note.       Again, thank you for allowing me to participate in the care of your patient.        Sincerely,        Vinh Polanco MD

## 2023-07-27 ENCOUNTER — OFFICE VISIT (OUTPATIENT)
Dept: PHYSICAL MEDICINE AND REHAB | Facility: CLINIC | Age: 68
End: 2023-07-27
Payer: COMMERCIAL

## 2023-07-27 VITALS
DIASTOLIC BLOOD PRESSURE: 67 MMHG | WEIGHT: 200 LBS | HEART RATE: 75 BPM | SYSTOLIC BLOOD PRESSURE: 143 MMHG | BODY MASS INDEX: 33.32 KG/M2 | HEIGHT: 65 IN

## 2023-07-27 DIAGNOSIS — M54.41 CHRONIC BILATERAL LOW BACK PAIN WITH RIGHT-SIDED SCIATICA: ICD-10-CM

## 2023-07-27 DIAGNOSIS — R29.898 WEAKNESS OF BOTH HANDS: ICD-10-CM

## 2023-07-27 DIAGNOSIS — M54.2 NECK PAIN: ICD-10-CM

## 2023-07-27 DIAGNOSIS — M48.062 SPINAL STENOSIS OF LUMBAR REGION WITH NEUROGENIC CLAUDICATION: Primary | ICD-10-CM

## 2023-07-27 DIAGNOSIS — R26.89 IMBALANCE: ICD-10-CM

## 2023-07-27 DIAGNOSIS — R32 URINARY INCONTINENCE, UNSPECIFIED TYPE: ICD-10-CM

## 2023-07-27 DIAGNOSIS — G89.29 CHRONIC BILATERAL LOW BACK PAIN WITH RIGHT-SIDED SCIATICA: ICD-10-CM

## 2023-07-27 PROCEDURE — 99204 OFFICE O/P NEW MOD 45 MIN: CPT | Performed by: NURSE PRACTITIONER

## 2023-07-27 RX ORDER — GABAPENTIN 300 MG/1
CAPSULE ORAL
Qty: 90 CAPSULE | Refills: 0 | Status: SHIPPED | OUTPATIENT
Start: 2023-07-27 | End: 2023-08-29

## 2023-07-27 ASSESSMENT — PAIN SCALES - GENERAL: PAINLEVEL: SEVERE PAIN (7)

## 2023-07-27 NOTE — PATIENT INSTRUCTIONS
Imaging (Xray, CT, or MRI) has been ordered today.   Radiology will call you to schedule. Please call below if you do not hear from them in the next couple of days.     [facility] Scheduling:  Please call [phone] to schedule your image(s) (select option #1).    There are 3 different locations:    [facility]  [address] [address]    [facility] [place]  [address]   [place] [facility] [address]    [place] [address]     Prescribed Gabapentin today, 300 mg tablets, to be titrated up to 3 tablets 3 times a day as tolerated for your nerve pain. Please follow Gabapentin dosing chart below.    Gabapentin 300mg Dosing Chart    DATE  MORNING AFTERNOON BEDTIME    Day 1 0 0 1    Day 2 0 0 1    Day 3 0 0 1    Day 4 1 0 1    Day 5 1 0 1    Day 6 1 0 1    Day 7 1 1 1    Day 8 1 1 1    Day 9 1 1 1    Day 10 1 1 2    Day 11 1 1 2    Day 12 1 1 2    Day 13 2 1 2    Day 14 2 1 2    Day 15 2 1 2    Day 16 2 2 2    Day 17 2 2 2    Day 18 2 2 2    Day 19 2 2 3    Day 20 2 2 3    Day 21 2 2 3    Day 22 3 2 3    Day 23 3 2 3    Day 24 3 2 3    Day 25 3 3 3    Day 26 3 3 3    Day 27 3 3 3     Continue medication, taking 3 capsules three times daily    Please call if you have any questions regarding how to take your medication  Clinic Phone # [phone]        ~You have been referred for Physical Therapy to [facility]. They will call you to schedule an appointment.      Scheduling phone number is [phone] for [facility], or [place] location.  If you have not heard from the scheduling office within 2 business days, please call [phone] for ALL other locations.    Discussed the importance of core strengthening, ROM, stretching exercises and how each of these entities is important in decreasing pain and improving long term spine health.  The purpose of  physical therapy is to teach you an individualized home exercise program.  These exercises need to be performed every day in order to decrease pain and prevent future occurrences of pain.         ~Please call our [facility] Nurse Navigation line [phone] with any questions or concerns about your treatment plan, if symptoms worsen and you would like to be seen urgently, or if you have any new or worsening numbness, weakness, or problems controlling bladder and bowel function.  ~You are also welcome to contact Pamela Warner via [employer], but please be aware that responses to [employer] message may take 2-3 days due to the high volume of patients seen in clinic.         We will decide plan based on your imaging results. Please call our office to let us know when your imaging is complete and we will call to discuss

## 2023-07-27 NOTE — LETTER
7/27/2023         RE: Tessa Edwards  [address] [facility] [address]        Dear Colleague,    Thank you for referring your patient, Tessa Edwards, to the [facility]. Please see a copy of my visit note below.    ASSESSMENT: Tessa Edwards is a 67 year old female who presents for consultation at the request of PCP No Ref-Primary, Physician, who presents today for new patient evaluation of:    -Lumbar stenosis, cervical radiculopathy    Patient complains of symptoms concerning for cervical myelopathy and potential progression of her already severe lumbar canal stenosis. Given her progressive neurologic decline, recommend rechecking both areas with a new MRI study for treatment planning. Recommend starting PT and trialing gabapentin. We will review results of new imaging and she will call to discuss plan.         No data to display                     Diagnoses and all orders for this visit:  Spinal stenosis of lumbar region with neurogenic claudication  -     gabapentin (NEURONTIN) 300 MG capsule; 300mg PO at bedtime x 3 days, 300mg twice daily x 3 days, then 300mg TID ongoing. Follow titration schedule  -     Physical Therapy Referral; Future  Chronic bilateral low back pain with right-sided sciatica  -     Spine  Referral  Urinary incontinence, unspecified type  -     MR Lumbar Spine w/o & w Contrast; Future  -     gabapentin (NEURONTIN) 300 MG capsule; 300mg PO at bedtime x 3 days, 300mg twice daily x 3 days, then 300mg TID ongoing. Follow titration schedule  Neck pain  -     Physical Therapy Referral; Future  Imbalance  -     MR Cervical Spine w/o Contrast; Future  Weakness of both hands  -     MR Cervical Spine w/o Contrast; Future      PLAN:  Reviewed spine anatomy and disease process. Discussed diagnosis and treatment options with the patient today. A shared decision making model was used.  The patient's values and choices were respected. The  following represents what was discussed and decided upon by the provider and the patient.      -DIAGNOSTIC TESTS:  Images were personally reviewed and interpreted and explained to patient today using a spine model.   -- I ordered an MRI lumbar spine without contrast given her leg symptoms and history of moderate to severe spinal canal stenosis on her prior lumbar MRI from several years ago.  -I ordered an MRI cervical spine without contrast given her arm symptoms and progressing imbalance.    -PHYSICAL THERAPY:    -I ordered a full course of physical therapy today  Discussed the importance of core strengthening, ROM, stretching exercises with the patient and how each of these entities is important in decreasing pain.  Explained to the patient that the purpose of physical therapy is to teach the patient a home exercise program.  These exercises need to be performed every day in order to decrease pain and prevent future occurrences of pain.        -MEDICATIONS:    -I sent gabapentin 300 mg to her pharmacy today, to be increased slowly per titration schedule, which I gave the patient at time of appointment.  Discussed multiple medication options today with patient. Discussed risks, side effects, and proper use of medications. Patient verbalized understanding.    -INTERVENTIONS:    We discussed the potential for lumbar spine injections for pain control, however did explain that they do not help critical pathology heal. if she has severe lumbar canal stenosis on her MRI, I would likely only order 1 maximum 2 lumbar epidural steroid injections prior to referring her for surgical consult with neurosurgery.  Discussed risks and benefits of injections with patient today.    -PATIENT EDUCATION:  Total time of 40 minutes, on the day of service, spent with the patient, reviewing the chart, placing orders, and documenting.   -Today we also discussed the pros and cons of the current treatment plan.    -FOLLOW-UP:   Patient will  call for results of MRI scans and we will decide follow-up plan    Advised patient to call the Spine Center if symptoms worsen, new numbness or weakness develops in the legs, or if they develop new or worsening problems controlling bladder or bowel function.   ______________________________________________________________________    SUBJECTIVE:    HPI:  Tessa Edwards  Is a 67 year old retired  with history of C5-6 MOBI-C by Dr Mcintosh in 2017, bilateral carpal tunnel surgeries, asthma, acid reflux, anemia, depression, and nonhodgkin's lymphoma who presents today for new patient evaluation of neck pain, bilateral arm pain, bilateral arm numbness, lower back pain, bilateral leg pain.    She complains of lower back pain going into both legs posteriorly into the top of the feet, and into the right hip which started 4 to 5 years ago without any known injury.  She describes it as dull, shooting.  Today the pain is between a 7 and 8.  Pain at worst is a 10 out of 10.  Pain at best is a 5 out of 10.  The pain is worse with standing, walking, and physical activity and work.  The pain improves with sitting down and bending forward. She was reportedly scheduled for lumbar surgery in June of 2021, but it was delayed due to anemia. In anemia workup, she had a colonoscopy which revealed a mass, which is now s/p resection in Dec 2021. She saw [facility] about her lower back and felt reluctant about proceeding due to concerns that she may have ongoing symptoms afterwards despite surgery. After that, she saw her pcp for H&P and was referred for evaluation at the spine center.  She does endorse urinary incontinence over the last 6 months.  She has to change pads frequently during the day.  This is a new problem.  She walks without assistive device.  She lives independently.    She also describes pain in the left side of her neck, left arm, and both wrists.  She has numbness and tingling in both hands. She has been dropping  things, her dexterity is worsening. She has been followed by [facility] with surgeries done on her hands left shoulder and knees.    Had seen [facility] in 2019 and case was to be discussed at spine board and is not sure the outcome.    She is followed by Dr Thompson with colorectal who ordered pelvic PT and imaging of her colon. She was told to discontinue her aleve due to what they think is a GI Bleed in the last 2-3 mos, but she is back on the aleve due to the severity of her pain.  She states she did take gabapentin in the past but it did not help.    She was referred to pain management, neurology,       -Treatment to Date:     -Medications:  -aleve  -tylenol  -gabapentin    Current Outpatient Medications   Medication     acalabrutinib (CALQUENCE) 100 MG capsule     acetaminophen (TYLENOL) 500 MG tablet     baclofen (LIORESAL) 10 MG tablet     calcium-vitamin D (CALCIUM-VITAMIN D) 500 mg(1,250mg) -200 unit per tablet     gabapentin (NEURONTIN) 300 MG capsule     multivitamin w/minerals (THERA-VIT-M) tablet     naproxen sodium (ALEVE) 220 MG tablet     rOPINIRole (REQUIP) 1 MG tablet     FLUoxetine (PROZAC) 40 MG capsule     No current facility-administered medications for this visit.       No Known Allergies    Past Medical History:   Diagnosis Date     Abnormal CT of the abdomen 10/26/2021     Allergic rhinitis 11/05/2015     Anemia, iron deficiency 10/26/2021     Bilateral carpal tunnel syndrome 07/09/2019     Cervical nerve root compression 07/24/2017     Cervical spinal stenosis      Chronic low back pain without sciatica, unspecified back pain laterality 10/27/2021    Spondylosis,central  stenosis, foraminal encroachment     Colonic mass 11/16/2021     Depression      Depressive disorder      Disease of thyroid gland      Dyspepsia 03/22/2022     Fibromyalgia      GERD (gastroesophageal reflux disease)      History of blood transfusion      Hyperlipemia      Lymphoproliferative disorder (H)      Moderate major  depression (H) 03/22/2022     Morbid obesity (H) 03/22/2022     Non-Hodgkin's lymphoma (H) 03/22/2022     Obesity      Osteoarthritis      Osteoarthritis of right knee, unspecified osteoarthritis type 09/12/2019     Other abnormal glucose 03/22/2022    Formatting of this note might be different from the original. Created by Conversion     Prediabetes 3/22/2022    Formatting of this note might be different from the original. Created by Conversion     Primary localized osteoarthrosis, lower leg 08/27/2007     Restless legs syndrome 10/26/2021     Rotator cuff tear      Sleep apnea      Spinal stenosis of lumbar region without neurogenic claudication 10/27/2021    Spondylosis,central  stenosis, foraminal encroachment     Splenomegaly 10/27/2021     Thyroid nodule 10/27/2021     Urinary frequency 03/22/2022     Urinary retention 03/22/2022     Vocal cord dysfunction 11/05/2015     Weight loss 10/27/2021        Patient Active Problem List   Diagnosis     Primary localized osteoarthrosis, lower leg     Anemia, iron deficiency     Restless leg syndrome     Spinal stenosis of lumbar region without neurogenic claudication     Thyroid nodule     Splenomegaly     Weight loss     Prediabetes     Allergic rhinitis     Bilateral carpal tunnel syndrome     Cervical nerve root compression     Osteoarthritis of right knee, unspecified osteoarthritis type     Fibromyalgia     Rotator cuff tear     Vocal cord dysfunction     Moderate major depression (H)     Non-Hodgkin's lymphoma (H)     Urinary retention     Urinary frequency     Dyspepsia     Encounter for preventive measure     Screening for viral disease     Lumbar radiculopathy       Past Surgical History:   Procedure Laterality Date     ARTHROPLASTY SHOULDER Left      ARTHROSCOPY KNEE       ARTHROSCOPY SHOULDER ROTATOR CUFF REPAIR       BACK SURGERY       BIOPSY       CERVICAL DISC ARTHROPLASTY       CERVICAL DISCECTOMY  08/01/2017    C5, C6     COLONOSCOPY N/A 11/05/2021     "Procedure: COLONOSCOPY;  Surgeon: Efraín Guerrero MD;  [place] [facility] [place]     CRW LT SHOULDER AP AXILLA 2 VW       HYSTERECTOMY       JOINT REPLACEMENT       LAPAROSCOPIC ASSISTED COLECTOMY Right 11/16/2021    Procedure: LAPAROSCOPIC RIGHT COLECTOMY;  Surgeon: Efraín Guerrero MD;  [place] [facility] [place] [facility] [place]     OOPHORECTOMY       RELEASE CARPAL TUNNEL       RELEASE CARPAL TUNNEL       XR MYELOGRAM CERVICAL  05/14/2019     [facility] LAP,CHOLECYSTECTOMY/EXPLORE      Description: Cholecystectomy Laparoscopic;  Recorded: 12/10/2012;     ZZC TOTAL ABDOM HYSTERECTOMY      Description: Total Abdominal Hysterectomy;  Recorded: 12/10/2012;  Comments: 46 Y/O FOR FIBROID TUMORS     [facility] TOTAL KNEE ARTHROPLASTY Right 09/12/2019    Procedure: RIGHT TOTAL KNEE ARTHROPLASTY;  Surgeon: Irvin Canas DO;  Location: [facility];  Service: Orthopedics       Family History   Problem Relation Age of Onset     Heart Disease Mother      Cancer Father      Breast Cancer Paternal Grandmother      Breast Cancer Paternal Aunt      Diabetes Sister      Colon Cancer Brother      Depression Sister      Obesity Brother        Reviewed past medical, surgical, and family history with patient found on new patient intake packet located in EMR [employer] tab.     SOCIAL HX: Social alcohol use, non-smoker, no recreational drug use    ROS: Positive for weight gain, hoarseness, diarrhea, some nausea and vomiting, loss of bladder control, joint pain, muscle pain, muscle fatigue, imbalance, excessive tiredness, and depression.  Specifically negative for headache, dizziness, foot drop, fevers, chills, appetite changes, nausea/vomiting, unexplained weight loss. Otherwise 13 systems reviewed are negative. Please see the patient's intake questionnaire from today for details.    OBJECTIVE:  BP (!) 143/67   Pulse 75   Ht 5' 4.5\" (1.638 m)   Wt 200 lb (90.7 kg)   BMI 33.80 kg/m      PHYSICAL EXAMINATION:    --CONSTITUTIONAL:  Vital signs as above.  No " acute distress.  The patient is well nourished and well groomed.  --PSYCHIATRIC:  Appropriate mood and affect. The patient is awake, alert, oriented to person, place, time and answering questions appropriately with clear speech.    --SKIN:  Skin over the face, bilateral lower extremities, and posterior torso is clean, dry, intact without rashes.    --RESPIRATORY: Normal rhythm and effort. No abnormal accessory muscle breathing patterns noted.   --ABDOMINAL:  Non-distended.    --GROSS MOTOR: Poor baseline gait, Poor tandem gait. Unable to walk on heels, unable to walk on toes due to poor balance    Some restriction left shoulder due to torn rotator     UPPER EXTREMITY MOTOR TESTING:  Deltoids, triceps, biceps, hand  were 5/5 bilaterally  Intrinsics and extensors were 4/5 bilaterally    --LOWER EXTREMITY MOTOR TESTING:  Hip flexion: right 5/5, left 5/5  Hip abduction: right 5/5, left 5/5  Hip adduction: right 5/5, left 5/5   Quads: right 5/5, left 5/5  Hamstrings: right 5/5, left 5/5  Dorsiflexion: right 5/5, left 5/5  Plantar flexion: right 5/5, left 5/5    Great toe MTP extension/EHL: right 5/5, left 5/5    --NEUROLOGICAL: Hyperreflexia throughout upper extremities bilaterally. 0+ patellar and achilles reflexes bilaterally. Sensation to light touch is intact throughout both lower extremities. Babinski is negative. No clonus.  Miller's positive bilaterally    --STANDING EXAMINATION:  Normal lumbar lordosis noted, no lateral shift.    --MUSCULOSKELETAL: Lumbar spine inspection reveals no evidence of deformity. Range of motion is not limited in lumbar flexion, extension, lateral rotation. No point tenderness to palpation lumbar spine. No paraspinal musculature tenderness. Straight leg raising is negative.     --SACROILIAC JOINT: No tenderness to palp of either SI joint. One finger point test was negative.    --VASCULAR:  Bilateral lower extremities are warm without any discoloration.  There is no pitting edema of  the bilateral lower extremities.    RESULTS:   Prior medical [facility] from [facility] and [facility] Everywhere were reviewed today.    Imaging: Spine imaging was personally reviewed and interpreted today. The images were shown to the patient and the findings were explained using a spine model.    Narrative & Impression   EXAM: CT CERVICAL MYELOGRAM  LOCATION: [facility]  DATE/TIME: 5/14/2019 10:33 AM     INDICATION: Worsening imbalance.  COMPARISON: 3/28/2019 cervical spine CT and 3/18/2019 cervical spine MRI.  TECHNIQUE: Routine with intrathecal contrast administered in a separately dictated procedure. Multiplanar reformats. Dose reduction techniques were used.     FINDINGS:  Broad kyphosis spanning the lower cervical and partially visualized upper thoracic spine has developed in the interim. This is accompanied by a stable 2 mm anterolisthesis of C3 on C4, C4 on C5, and C5 on C6. Vertebral body heights are unchanged. Small   Schmorl's node along the inferior endplate of C2. C5-C6 disc prosthesis with component parts in stable position. No evident hardware complication. No identifiable fracture and no suspicious lytic or blastic bone lesion. Mild atlantodental degenerative   change. 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 Normal myelographic appearance of the spinal cord. Partially visualized lymphadenopathy involving the bilateral cervical   chain and upper mediastinum. Upper mediastinal lymphadenopathy is evaluated to better effect on recent chest CT. 1.3 cm hypoattenuating nodule in the right thyroid lobe and 0.9 cm hypoattenuating nodule in the left thyroid lobe are unchanged.     C2-C3: Minimal loss of disc height with no posterior disc osteophyte complex and no significant facet arthropathy. Mild right-sided uncinate spurring. Spinal canal is patent. Mild right and no significant left neural foraminal stenosis.      C3-C4: Normal disc height with no significant posterior disc osteophyte complex. Facet arthropathy is mild on the left. Spinal canal stenosis is minimal. Uncinate spurring contributes to a mild right and minimal left neural foraminal stenosis.     C4-C5: Minimal loss of disc height with no significant posterior disc osteophyte complex. Mild right and moderate left-sided facet arthropathy. Uncinate spurring bilaterally. Spinal canal stenosis is minimal. Minimal right neural foraminal stenosis.   Moderate left neural foraminal stenosis.     C5-C6: Disc prosthesis with component parts in stable position. No identifiable posterior disc osteophyte complex. Moderate bilateral facet arthropathy. 2 mm anterolisthesis of C5 on C6. Uncinate spurring bilaterally. Mild spinal canal stenosis with   mild/minimal right and minimal left neural foraminal stenosis.     C6-C7: Normal disc height with no posterior disc osteophyte complex. Mild left and moderate right-sided facet arthropathy. Spinal canal is patent. Minimal bilateral neural foraminal stenosis.     C7-T1: Normal disc height with no posterior disc osteophyte complex and no significant facet arthropathy. Spinal canal and neural foramina are widely patent.     IMPRESSION:  CONCLUSION:  1.  C5-C6 disc prosthesis with component parts in stable position. No evident hardware complication.     2.  Stable multilevel 2 mm anterolisthesis of C3 on C4, C4 on C5, and C5 on C6.     3.  No high-grade spinal canal stenosis at any level.     4.  Stable moderate left C4-C5 neural foraminal stenosis with more mild/minimal neural foraminal stenosis elsewhere.     5.  Partially visualized cervical and upper mediastinal lymphadenopathy.          Narrative & Impression   EXAM: MR CERVICAL SPINE W WO CONTRAST  LOCATION: [facility]  DATE/TIME: 3/18/2019 2:33 PM     INDICATION: Recurrent neck pain.  COMPARISON: 7/19/2017, 9/1/2017.  CONTRAST: Gadavist 9 mL.   TECHNIQUE: Without and with  IV contrast.     FINDINGS:   Again seen is an interbody disc at C5-C6 with associated artifact. Alignment and assessment at the bone metallic interface is limited. No concerning marrow replacing lesions. Remaining alignment of the cervical spine is within normal limits. No cord   signal abnormality is identified. No rim-enhancing extraspinal fluid collections. There are numerous and diffuse bilateral cervical lymph nodes some of which are enlarged measuring up to 1.7 cm in short axis dimension in the left level 2A region.   Numerous intraparotid lymph nodes are also present. There is a 9 mm left thyroid nodule.     C2-C3: Normal disc height. No herniation. No facet arthropathy. No spinal canal stenosis. No right neural foraminal stenosis. No left neural foraminal stenosis.     C3-C4: Normal disc height. No herniation. Uncovertebral and facet arthropathy. No spinal canal stenosis. Mild right neural foraminal stenosis. Mild left neural foraminal stenosis.     C4-C5: Normal disc height. No herniation. Uncovertebral and facet arthropathy. No spinal canal stenosis. Mild right neural foraminal stenosis. Moderate left neural foraminal stenosis.       C5-C6: Artifact from intervertebral disc placement. There is suggestion that spinal canal stenosis may be present; however, evaluation is limited. There is bilateral foraminal narrowing, greater on the left.     C6-C7: Small posterior disc osteophyte complex. Uncovertebral and facet arthropathy. Mild effacement of ventral CSF without dipesh spinal canal stenosis. No right neural foraminal stenosis. Mild left neural foraminal stenosis.     C7-T1: Normal disc height. No herniation. No facet arthropathy. No spinal canal stenosis. No right neural foraminal stenosis. No left neural foraminal stenosis.     IMPRESSION:  CONCLUSION:  1.  Postoperative changes related to intervertebral disc placement C5-C6. Spinal canal stenosis appears to be present noting that the exact degree is  difficult to assess due to artifact. Bilateral C5-C6 left greater than right foraminal stenosis.  2.  No additional site of significant spinal canal stenosis.  3.  Moderate left foraminal narrowing at C4-C5.  4.  Extensive bilateral cervical lymphadenopathy measuring up to 1.7 cm on the left is nonspecific. While this could be reactive/infectious, neoplastic considerations should be considered and further evaluation is needed.     Findings discussed with Brianne Hudson at 9 am on 3/19/2019.      NOTE: ABNORMAL REPORT     THE DICTATION ABOVE DESCRIBES AN ABNORMALITY FOR WHICH FOLLOW-UP IS NEEDED.         Lumbar MRI from Feb 2022  No report available however   Severe canal stenosis at L4-5 looks worse than in 2021    Lumbar MRI without contrast Mar 2021     impression:  Severe spinal stenosis at L4-5 secondary to severe facet arthropathy with degenerative anterior spondylolisthesis with severe right and moderate to severe left foraminal stenosis at this level.  Moderate to severe left and moderate right foraminal stenosis at L2-3 with mild foraminal stenosis at other levels as detailed.  Please see outside report for more details.      Last DXA July 2021 was normal    Pamela Warner FNP-C  [facility]  O. [phone]             Again, thank you for allowing me to participate in the care of your patient.        Sincerely,        Pamela Warner, FELIPE CNP

## 2023-07-27 NOTE — PROGRESS NOTES
ASSESSMENT: Tessa Edwards is a 67 year old female who presents for consultation at the request of PCP No Ref-Primary, Physician, who presents today for new patient evaluation of:    -Lumbar stenosis, cervical radiculopathy    Patient complains of symptoms concerning for cervical myelopathy and potential progression of her already severe lumbar canal stenosis. Given her progressive neurologic decline, recommend rechecking both areas with a new MRI study for treatment planning. Recommend starting PT and trialing gabapentin. We will review results of new imaging and she will call to discuss plan.         No data to display                     Diagnoses and all orders for this visit:  Spinal stenosis of lumbar region with neurogenic claudication  -     gabapentin (NEURONTIN) 300 MG capsule; 300mg PO at bedtime x 3 days, 300mg twice daily x 3 days, then 300mg TID ongoing. Follow titration schedule  -     Physical Therapy Referral; Future  Chronic bilateral low back pain with right-sided sciatica  -     Spine  Referral  Urinary incontinence, unspecified type  -     MR Lumbar Spine w/o & w Contrast; Future  -     gabapentin (NEURONTIN) 300 MG capsule; 300mg PO at bedtime x 3 days, 300mg twice daily x 3 days, then 300mg TID ongoing. Follow titration schedule  Neck pain  -     Physical Therapy Referral; Future  Imbalance  -     MR Cervical Spine w/o Contrast; Future  Weakness of both hands  -     MR Cervical Spine w/o Contrast; Future      PLAN:  Reviewed spine anatomy and disease process. Discussed diagnosis and treatment options with the patient today. A shared decision making model was used.  The patient's values and choices were respected. The following represents what was discussed and decided upon by the provider and the patient.      -DIAGNOSTIC TESTS:  Images were personally reviewed and interpreted and explained to patient today using a spine model.   -- I ordered an MRI lumbar spine without contrast  given her leg symptoms and history of moderate to severe spinal canal stenosis on her prior lumbar MRI from several years ago.  -I ordered an MRI cervical spine without contrast given her arm symptoms and progressing imbalance.    -PHYSICAL THERAPY:    -I ordered a full course of physical therapy today  Discussed the importance of core strengthening, ROM, stretching exercises with the patient and how each of these entities is important in decreasing pain.  Explained to the patient that the purpose of physical therapy is to teach the patient a home exercise program.  These exercises need to be performed every day in order to decrease pain and prevent future occurrences of pain.        -MEDICATIONS:    -I sent gabapentin 300 mg to her pharmacy today, to be increased slowly per titration schedule, which I gave the patient at time of appointment.  Discussed multiple medication options today with patient. Discussed risks, side effects, and proper use of medications. Patient verbalized understanding.    -INTERVENTIONS:    We discussed the potential for lumbar spine injections for pain control, however did explain that they do not help critical pathology heal. if she has severe lumbar canal stenosis on her MRI, I would likely only order 1 maximum 2 lumbar epidural steroid injections prior to referring her for surgical consult with neurosurgery.  Discussed risks and benefits of injections with patient today.    -PATIENT EDUCATION:  Total time of 40 minutes, on the day of service, spent with the patient, reviewing the chart, placing orders, and documenting.   -Today we also discussed the pros and cons of the current treatment plan.    -FOLLOW-UP:   Patient will call for results of MRI scans and we will decide follow-up plan    Advised patient to call the Spine Center if symptoms worsen, new numbness or weakness develops in the legs, or if they develop new or worsening problems controlling bladder or bowel function.    ______________________________________________________________________    SUBJECTIVE:    HPI:  Tessa Edwards  Is a 67 year old retired  with history of C5-6 MOBI-C by Dr Mcintosh in 2017, bilateral carpal tunnel surgeries, asthma, acid reflux, anemia, depression, and nonhodgkin's lymphoma who presents today for new patient evaluation of neck pain, bilateral arm pain, bilateral arm numbness, lower back pain, bilateral leg pain.    She complains of lower back pain going into both legs posteriorly into the top of the feet, and into the right hip which started 4 to 5 years ago without any known injury.  She describes it as dull, shooting.  Today the pain is between a 7 and 8.  Pain at worst is a 10 out of 10.  Pain at best is a 5 out of 10.  The pain is worse with standing, walking, and physical activity and work.  The pain improves with sitting down and bending forward. She was reportedly scheduled for lumbar surgery in June of 2021, but it was delayed due to anemia. In anemia workup, she had a colonoscopy which revealed a mass, which is now s/p resection in Dec 2021. She saw Jamshid about her lower back and felt reluctant about proceeding due to concerns that she may have ongoing symptoms afterwards despite surgery. After that, she saw her pcp for H&P and was referred for evaluation at the spine center.  She does endorse urinary incontinence over the last 6 months.  She has to change pads frequently during the day.  This is a new problem.  She walks without assistive device.  She lives independently.    She also describes pain in the left side of her neck, left arm, and both wrists.  She has numbness and tingling in both hands. She has been dropping things, her dexterity is worsening. She has been followed by Jamshid with surgeries done on her hands left shoulder and knees.    Had seen Tristan in 2019 and case was to be discussed at spine board and is not sure the outcome.    She is followed by Dr Thompson  with colorectal who ordered pelvic PT and imaging of her colon. She was told to discontinue her aleve due to what they think is a GI Bleed in the last 2-3 mos, but she is back on the aleve due to the severity of her pain.  She states she did take gabapentin in the past but it did not help.    She was referred to pain management, neurology,       -Treatment to Date:     -Medications:  -aleve  -tylenol  -gabapentin    Current Outpatient Medications   Medication    acalabrutinib (CALQUENCE) 100 MG capsule    acetaminophen (TYLENOL) 500 MG tablet    baclofen (LIORESAL) 10 MG tablet    calcium-vitamin D (CALCIUM-VITAMIN D) 500 mg(1,250mg) -200 unit per tablet    gabapentin (NEURONTIN) 300 MG capsule    multivitamin w/minerals (THERA-VIT-M) tablet    naproxen sodium (ALEVE) 220 MG tablet    rOPINIRole (REQUIP) 1 MG tablet    FLUoxetine (PROZAC) 40 MG capsule     No current facility-administered medications for this visit.       No Known Allergies    Past Medical History:   Diagnosis Date    Abnormal CT of the abdomen 10/26/2021    Allergic rhinitis 11/05/2015    Anemia, iron deficiency 10/26/2021    Bilateral carpal tunnel syndrome 07/09/2019    Cervical nerve root compression 07/24/2017    Cervical spinal stenosis     Chronic low back pain without sciatica, unspecified back pain laterality 10/27/2021    Spondylosis,central  stenosis, foraminal encroachment    Colonic mass 11/16/2021    Depression     Depressive disorder     Disease of thyroid gland     Dyspepsia 03/22/2022    Fibromyalgia     GERD (gastroesophageal reflux disease)     History of blood transfusion     Hyperlipemia     Lymphoproliferative disorder (H)     Moderate major depression (H) 03/22/2022    Morbid obesity (H) 03/22/2022    Non-Hodgkin's lymphoma (H) 03/22/2022    Obesity     Osteoarthritis     Osteoarthritis of right knee, unspecified osteoarthritis type 09/12/2019    Other abnormal glucose 03/22/2022    Formatting of this note might be different  from the original. Created by Conversion    Prediabetes 3/22/2022    Formatting of this note might be different from the original. Created by Conversion    Primary localized osteoarthrosis, lower leg 08/27/2007    Restless legs syndrome 10/26/2021    Rotator cuff tear     Sleep apnea     Spinal stenosis of lumbar region without neurogenic claudication 10/27/2021    Spondylosis,central  stenosis, foraminal encroachment    Splenomegaly 10/27/2021    Thyroid nodule 10/27/2021    Urinary frequency 03/22/2022    Urinary retention 03/22/2022    Vocal cord dysfunction 11/05/2015    Weight loss 10/27/2021        Patient Active Problem List   Diagnosis    Primary localized osteoarthrosis, lower leg    Anemia, iron deficiency    Restless leg syndrome    Spinal stenosis of lumbar region without neurogenic claudication    Thyroid nodule    Splenomegaly    Weight loss    Prediabetes    Allergic rhinitis    Bilateral carpal tunnel syndrome    Cervical nerve root compression    Osteoarthritis of right knee, unspecified osteoarthritis type    Fibromyalgia    Rotator cuff tear    Vocal cord dysfunction    Moderate major depression (H)    Non-Hodgkin's lymphoma (H)    Urinary retention    Urinary frequency    Dyspepsia    Encounter for preventive measure    Screening for viral disease    Lumbar radiculopathy       Past Surgical History:   Procedure Laterality Date    ARTHROPLASTY SHOULDER Left     ARTHROSCOPY KNEE      ARTHROSCOPY SHOULDER ROTATOR CUFF REPAIR      BACK SURGERY      BIOPSY      CERVICAL DISC ARTHROPLASTY      CERVICAL DISCECTOMY  08/01/2017    C5, C6    COLONOSCOPY N/A 11/05/2021    Procedure: COLONOSCOPY;  Surgeon: Efraín Guerrero MD;  Location: [facility] OR    CRW LT SHOULDER AP AXILLA 2 VW      HYSTERECTOMY      JOINT REPLACEMENT      LAPAROSCOPIC ASSISTED COLECTOMY Right 11/16/2021    Procedure: LAPAROSCOPIC RIGHT COLECTOMY;  Surgeon: Efraín Guerrero MD;  Location: [facility] OR    OOPHORECTOMY      RELEASE  "CARPAL TUNNEL      RELEASE CARPAL TUNNEL      XR MYELOGRAM CERVICAL  05/14/2019    [facility] LAP,CHOLECYSTECTOMY/EXPLORE      Description: Cholecystectomy Laparoscopic;  Recorded: 12/10/2012;    [facility] TOTAL ABDOM HYSTERECTOMY      Description: Total Abdominal Hysterectomy;  Recorded: 12/10/2012;  Comments: 44 Y/O FOR FIBROID TUMORS    [facility] TOTAL KNEE ARTHROPLASTY Right 09/12/2019    Procedure: RIGHT TOTAL KNEE ARTHROPLASTY;  Surgeon: Irvin Canas DO;  Location: [facility] OR;  Service: Orthopedics       Family History   Problem Relation Age of Onset    Heart Disease Mother     Cancer Father     Breast Cancer Paternal Grandmother     Breast Cancer Paternal Aunt     Diabetes Sister     Colon Cancer Brother     Depression Sister     Obesity Brother        Reviewed past medical, surgical, and family history with patient found on new patient intake packet located in EMR Media tab.     SOCIAL HX: Social alcohol use, non-smoker, no recreational drug use    ROS: Positive for weight gain, hoarseness, diarrhea, some nausea and vomiting, loss of bladder control, joint pain, muscle pain, muscle fatigue, imbalance, excessive tiredness, and depression.  Specifically negative for headache, dizziness, foot drop, fevers, chills, appetite changes, nausea/vomiting, unexplained weight loss. Otherwise 13 systems reviewed are negative. Please see the patient's intake questionnaire from today for details.    OBJECTIVE:  BP (!) 143/67   Pulse 75   Ht 5' 4.5\" (1.638 m)   Wt 200 lb (90.7 kg)   BMI 33.80 kg/m      PHYSICAL EXAMINATION:    --CONSTITUTIONAL:  Vital signs as above.  No acute distress.  The patient is well nourished and well groomed.  --PSYCHIATRIC:  Appropriate mood and affect. The patient is awake, alert, oriented to person, place, time and answering questions appropriately with clear speech.    --SKIN:  Skin over the face, bilateral lower extremities, and posterior torso is clean, dry, intact without rashes.  "   --RESPIRATORY: Normal rhythm and effort. No abnormal accessory muscle breathing patterns noted.   --ABDOMINAL:  Non-distended.    --GROSS MOTOR: Poor baseline gait, Poor tandem gait. Unable to walk on heels, unable to walk on toes due to poor balance    Some restriction left shoulder due to torn rotator     UPPER EXTREMITY MOTOR TESTING:  Deltoids, triceps, biceps, hand  were 5/5 bilaterally  Intrinsics and extensors were 4/5 bilaterally    --LOWER EXTREMITY MOTOR TESTING:  Hip flexion: right 5/5, left 5/5  Hip abduction: right 5/5, left 5/5  Hip adduction: right 5/5, left 5/5   Quads: right 5/5, left 5/5  Hamstrings: right 5/5, left 5/5  Dorsiflexion: right 5/5, left 5/5  Plantar flexion: right 5/5, left 5/5    Great toe MTP extension/EHL: right 5/5, left 5/5    --NEUROLOGICAL: Hyperreflexia throughout upper extremities bilaterally. 0+ patellar and achilles reflexes bilaterally. Sensation to light touch is intact throughout both lower extremities. Babinski is negative. No clonus.  Miller's positive bilaterally    --STANDING EXAMINATION:  Normal lumbar lordosis noted, no lateral shift.    --MUSCULOSKELETAL: Lumbar spine inspection reveals no evidence of deformity. Range of motion is not limited in lumbar flexion, extension, lateral rotation. No point tenderness to palpation lumbar spine. No paraspinal musculature tenderness. Straight leg raising is negative.     --SACROILIAC JOINT: No tenderness to palp of either SI joint. One finger point test was negative.    --VASCULAR:  Bilateral lower extremities are warm without any discoloration.  There is no pitting edema of the bilateral lower extremities.    RESULTS:   Prior medical records from [facility]where were reviewed today.    Imaging: Spine imaging was personally reviewed and interpreted today. The images were shown to the patient and the findings were explained using a spine model.    Narrative & Impression   EXAM: CT CERVICAL  MYELOGRAM  LOCATION: [facility]  DATE/TIME: 5/14/2019 10:33 AM     INDICATION: Worsening imbalance.  COMPARISON: 3/28/2019 cervical spine CT and 3/18/2019 cervical spine MRI.  TECHNIQUE: Routine with intrathecal contrast administered in a separately dictated procedure. Multiplanar reformats. Dose reduction techniques were used.     FINDINGS:  Broad kyphosis spanning the lower cervical and partially visualized upper thoracic spine has developed in the interim. This is accompanied by a stable 2 mm anterolisthesis of C3 on C4, C4 on C5, and C5 on C6. Vertebral body heights are unchanged. Small   Schmorl's node along the inferior endplate of C2. C5-C6 disc prosthesis with component parts in stable position. No evident hardware complication. No identifiable fracture and no suspicious lytic or blastic bone lesion. Mild atlantodental degenerative   change. 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 Normal myelographic appearance of the spinal cord. Partially visualized lymphadenopathy involving the bilateral cervical   chain and upper mediastinum. Upper mediastinal lymphadenopathy is evaluated to better effect on recent chest CT. 1.3 cm hypoattenuating nodule in the right thyroid lobe and 0.9 cm hypoattenuating nodule in the left thyroid lobe are unchanged.     C2-C3: Minimal loss of disc height with no posterior disc osteophyte complex and no significant facet arthropathy. Mild right-sided uncinate spurring. Spinal canal is patent. Mild right and no significant left neural foraminal stenosis.     C3-C4: Normal disc height with no significant posterior disc osteophyte complex. Facet arthropathy is mild on the left. Spinal canal stenosis is minimal. Uncinate spurring contributes to a mild right and minimal left neural foraminal stenosis.     C4-C5: Minimal loss of disc height with no significant posterior disc osteophyte complex. Mild  right and moderate left-sided facet arthropathy. Uncinate spurring bilaterally. Spinal canal stenosis is minimal. Minimal right neural foraminal stenosis.   Moderate left neural foraminal stenosis.     C5-C6: Disc prosthesis with component parts in stable position. No identifiable posterior disc osteophyte complex. Moderate bilateral facet arthropathy. 2 mm anterolisthesis of C5 on C6. Uncinate spurring bilaterally. Mild spinal canal stenosis with   mild/minimal right and minimal left neural foraminal stenosis.     C6-C7: Normal disc height with no posterior disc osteophyte complex. Mild left and moderate right-sided facet arthropathy. Spinal canal is patent. Minimal bilateral neural foraminal stenosis.     C7-T1: Normal disc height with no posterior disc osteophyte complex and no significant facet arthropathy. Spinal canal and neural foramina are widely patent.     IMPRESSION:  CONCLUSION:  1.  C5-C6 disc prosthesis with component parts in stable position. No evident hardware complication.     2.  Stable multilevel 2 mm anterolisthesis of C3 on C4, C4 on C5, and C5 on C6.     3.  No high-grade spinal canal stenosis at any level.     4.  Stable moderate left C4-C5 neural foraminal stenosis with more mild/minimal neural foraminal stenosis elsewhere.     5.  Partially visualized cervical and upper mediastinal lymphadenopathy.          Narrative & Impression   EXAM: MR CERVICAL SPINE W WO CONTRAST  LOCATION: [facility]  DATE/TIME: 3/18/2019 2:33 PM     INDICATION: Recurrent neck pain.  COMPARISON: 7/19/2017, 9/1/2017.  CONTRAST: Gadavist 9 mL.   TECHNIQUE: Without and with IV contrast.     FINDINGS:   Again seen is an interbody disc at C5-C6 with associated artifact. Alignment and assessment at the bone metallic interface is limited. No concerning marrow replacing lesions. Remaining alignment of the cervical spine is within normal limits. No cord   signal abnormality is identified. No rim-enhancing extraspinal  fluid collections. There are numerous and diffuse bilateral cervical lymph nodes some of which are enlarged measuring up to 1.7 cm in short axis dimension in the left level 2A region.   Numerous intraparotid lymph nodes are also present. There is a 9 mm left thyroid nodule.     C2-C3: Normal disc height. No herniation. No facet arthropathy. No spinal canal stenosis. No right neural foraminal stenosis. No left neural foraminal stenosis.     C3-C4: Normal disc height. No herniation. Uncovertebral and facet arthropathy. No spinal canal stenosis. Mild right neural foraminal stenosis. Mild left neural foraminal stenosis.     C4-C5: Normal disc height. No herniation. Uncovertebral and facet arthropathy. No spinal canal stenosis. Mild right neural foraminal stenosis. Moderate left neural foraminal stenosis.       C5-C6: Artifact from intervertebral disc placement. There is suggestion that spinal canal stenosis may be present; however, evaluation is limited. There is bilateral foraminal narrowing, greater on the left.     C6-C7: Small posterior disc osteophyte complex. Uncovertebral and facet arthropathy. Mild effacement of ventral CSF without dipesh spinal canal stenosis. No right neural foraminal stenosis. Mild left neural foraminal stenosis.     C7-T1: Normal disc height. No herniation. No facet arthropathy. No spinal canal stenosis. No right neural foraminal stenosis. No left neural foraminal stenosis.     IMPRESSION:  CONCLUSION:  1.  Postoperative changes related to intervertebral disc placement C5-C6. Spinal canal stenosis appears to be present noting that the exact degree is difficult to assess due to artifact. Bilateral C5-C6 left greater than right foraminal stenosis.  2.  No additional site of significant spinal canal stenosis.  3.  Moderate left foraminal narrowing at C4-C5.  4.  Extensive bilateral cervical lymphadenopathy measuring up to 1.7 cm on the left is nonspecific. While this could be  reactive/infectious, neoplastic considerations should be considered and further evaluation is needed.     Findings discussed with Brianne Hudson at 9 am on 3/19/2019.      NOTE: ABNORMAL REPORT     THE DICTATION ABOVE DESCRIBES AN ABNORMALITY FOR WHICH FOLLOW-UP IS NEEDED.         Lumbar MRI from Feb 2022  No report available however   Severe canal stenosis at L4-5 looks worse than in 2021    Lumbar MRI without contrast Mar 2021     impression:  Severe spinal stenosis at L4-5 secondary to severe facet arthropathy with degenerative anterior spondylolisthesis with severe right and moderate to severe left foraminal stenosis at this level.  Moderate to severe left and moderate right foraminal stenosis at L2-3 with mild foraminal stenosis at other levels as detailed.  Please see outside report for more details.      Last DXA July 2021 was normal    Pamela DEXTERP-C  [facility]  O. [phone]

## 2023-08-11 ENCOUNTER — HOSPITAL ENCOUNTER (OUTPATIENT)
Dept: MRI IMAGING | Facility: CLINIC | Age: 68
Discharge: HOME | End: 2023-08-11
Attending: NURSE PRACTITIONER
Payer: COMMERCIAL

## 2023-08-11 DIAGNOSIS — R32 URINARY INCONTINENCE, UNSPECIFIED TYPE: ICD-10-CM

## 2023-08-11 DIAGNOSIS — R29.898 WEAKNESS OF BOTH HANDS: ICD-10-CM

## 2023-08-11 DIAGNOSIS — R26.89 IMBALANCE: ICD-10-CM

## 2023-08-11 PROCEDURE — 72158 MRI LUMBAR SPINE W/O & W/DYE: CPT

## 2023-08-11 PROCEDURE — A9585 GADOBUTROL INJECTION: HCPCS | Performed by: NURSE PRACTITIONER

## 2023-08-11 PROCEDURE — 72141 MRI NECK SPINE W/O DYE: CPT

## 2023-08-11 PROCEDURE — 255N000002 HC RX 255 OP 636: Performed by: NURSE PRACTITIONER

## 2023-08-11 RX ORDER — GADOBUTROL 604.72 MG/ML
9 INJECTION INTRAVENOUS ONCE
Status: COMPLETED | OUTPATIENT
Start: 2023-08-11 | End: 2023-08-11

## 2023-08-11 RX ADMIN — GADOBUTROL 9 ML: 604.72 INJECTION INTRAVENOUS at 14:07

## 2023-08-15 ENCOUNTER — TELEPHONE (OUTPATIENT)
Dept: PHYSICAL MEDICINE AND REHAB | Facility: CLINIC | Age: 68
End: 2023-08-15
Payer: COMMERCIAL

## 2023-08-15 DIAGNOSIS — M43.16 ANTEROLISTHESIS OF LUMBAR SPINE: Primary | ICD-10-CM

## 2023-08-15 NOTE — TELEPHONE ENCOUNTER
----- Message from Lazaro Roberts DO sent at 8/15/2023  7:01 AM CDT -----  Please call the patient and let her know that I have reviewed MRI of her cervical spine as Pamela Warner is out of the office.  It does show wear-and-tear changes including  moderate arthritis at C5-6 with no severe spinal canal stenosis.  There is some difficulty seen this level at C5-6 because of the interbody fusion.  I recommend that the patient follow-up with Pamela Warner regarding reviewing this MRI as well as the MRI of lumbar spine which will have a separate result note.

## 2023-08-15 NOTE — TELEPHONE ENCOUNTER
"Also per Dr. Roberts, \"Please call the patient and let her know I reviewed MRI of the lumbar spine which does show a grade 1 anterolisthesis of L4 on L5.  There are large bilateral joint effusions or fluid in the facet joints at L4-5.  There is question if there is instability at this level.  I would like to order x-rays and flexion-extension to further evaluate.  The patient is okay with this please order this.  There is no evidence of malignant process in the lumbar spine.  Recommend the patient be scheduled with Pamela Warner for follow-up to review cervical and lumbar MRIs.\"    Call placed to patient with provider's results and recommendations.  Left message to return call.    "

## 2023-08-18 NOTE — TELEPHONE ENCOUNTER
Patient returned call. Results given and explained for both the lumbar and cervical MRIs. Discussed recommendation for further imaging to check for instability at L4-L5 level. States she has had this type of injection in past so she is aware of what they would be doing. Contact information provided for [facility] radiology scheduling at [facility]. Order placed in Epic for Lumbar x-rays 2/3 views (flex/ext views).    Transferred to scheduling to make follow up appointment with PSP FELIPE Gillespie, CNP.      Patient did report that she has noticed more unsteadiness or instability in her gait since she was seen. Did use her cane when she knew she had to walk a further distance. Also noting she has to sit down a lot sooner when walk. Explained PSP will be notified of the update/concern. Patient will be called if any further recommendations. Stated understanding.

## 2023-08-21 ENCOUNTER — HOSPITAL ENCOUNTER (OUTPATIENT)
Dept: GENERAL RADIOLOGY | Facility: CLINIC | Age: 68
Discharge: HOME | End: 2023-08-21
Attending: NURSE PRACTITIONER | Admitting: NURSE PRACTITIONER
Payer: COMMERCIAL

## 2023-08-21 ENCOUNTER — TELEPHONE (OUTPATIENT)
Dept: PHYSICAL MEDICINE AND REHAB | Facility: CLINIC | Age: 68
End: 2023-08-21

## 2023-08-21 DIAGNOSIS — M43.16 ANTEROLISTHESIS OF LUMBAR SPINE: ICD-10-CM

## 2023-08-21 PROCEDURE — 72100 X-RAY EXAM L-S SPINE 2/3 VWS: CPT

## 2023-08-21 NOTE — TELEPHONE ENCOUNTER
----- Message from Lazaro Roberts DO sent at 8/21/2023 12:38 PM CDT -----  Please call the patient and let her know that x-ray of the lumbar spine shows no instability.  Recommend that she follow-up with Pamela Warner to further evaluate.

## 2023-08-21 NOTE — TELEPHONE ENCOUNTER
MARLONM for pt to call [facility] for results and plan to follow up with Pamela GARCÍA as she is already scheduled.

## 2023-08-29 DIAGNOSIS — M48.062 SPINAL STENOSIS OF LUMBAR REGION WITH NEUROGENIC CLAUDICATION: ICD-10-CM

## 2023-08-29 DIAGNOSIS — R32 URINARY INCONTINENCE, UNSPECIFIED TYPE: ICD-10-CM

## 2023-08-29 RX ORDER — GABAPENTIN 300 MG/1
CAPSULE ORAL
Qty: 90 CAPSULE | Refills: 1 | Status: ON HOLD | OUTPATIENT
Start: 2023-08-29 | End: 2024-02-23

## 2023-09-07 ENCOUNTER — OFFICE VISIT (OUTPATIENT)
Dept: PHYSICAL MEDICINE AND REHAB | Facility: CLINIC | Age: 68
End: 2023-09-07
Payer: COMMERCIAL

## 2023-09-07 ENCOUNTER — HOSPITAL ENCOUNTER (OUTPATIENT)
Dept: RADIOLOGY | Facility: HOSPITAL | Age: 68
Discharge: HOME | End: 2023-09-07
Attending: NURSE PRACTITIONER | Admitting: NURSE PRACTITIONER
Payer: COMMERCIAL

## 2023-09-07 VITALS — HEART RATE: 71 BPM | DIASTOLIC BLOOD PRESSURE: 72 MMHG | SYSTOLIC BLOOD PRESSURE: 167 MMHG

## 2023-09-07 DIAGNOSIS — M48.061 SPINAL STENOSIS, LUMBAR REGION, WITHOUT NEUROGENIC CLAUDICATION: ICD-10-CM

## 2023-09-07 DIAGNOSIS — M48.02 CERVICAL STENOSIS OF SPINAL CANAL: ICD-10-CM

## 2023-09-07 DIAGNOSIS — R32 URINARY INCONTINENCE, UNSPECIFIED TYPE: ICD-10-CM

## 2023-09-07 DIAGNOSIS — R32 URINARY INCONTINENCE, UNSPECIFIED TYPE: Primary | ICD-10-CM

## 2023-09-07 PROCEDURE — 99214 OFFICE O/P EST MOD 30 MIN: CPT | Performed by: NURSE PRACTITIONER

## 2023-09-07 PROCEDURE — 72040 X-RAY EXAM NECK SPINE 2-3 VW: CPT

## 2023-09-07 ASSESSMENT — PAIN SCALES - GENERAL: PAINLEVEL: SEVERE PAIN (6)

## 2023-09-07 NOTE — PATIENT INSTRUCTIONS
Imaging (Xray, CT, or MRI) has been ordered today.   Radiology will call you to schedule. Please call below if you do not hear from them in the next couple of days.     [facility] Radiology Scheduling:  Please call [phone] to schedule your image(s) (select option #1).    There are 3 different locations:    [facility]  [address] [place] [address] Imaging - [place]  [address]   [place] [phone]       Follow up with neurosurgery On Tuesday with Dr Hudson in Neurosurgery at 3pm for surgical consult to discuss plan

## 2023-09-07 NOTE — PROGRESS NOTES
ASSESSMENT: Tessa Edwards is a 67 year old female who presents for consultation at the request of PCP No Ref-Primary, Physician, who presents today for a follow up visit for evaluation of:    -Lumbar stenosis, cervical stenosis    Tessa's balance and urinary incontinence has worsened, even since her last appointment. We reviewed her MRI studies. Her severe lumbar stenosis at L4-5 appears worse than prior. She also has significant C5-6 canal stenosis with cord signal change. It is unclear if the signal change is new. Her balance is quite poor on exam and she has positive faith bilaterally. She also has positive tinel and phalen's suggesting her hand symptoms may be multifactoriaI. I recommended seeing neurosurgery for an opinion on any intervention for either area. If Dr Hudson does not feel surgery would be of benefit or if she is not a candidate, I explained we could proceed with further conservative cares.    Last DXA July 2021 was normal. nonsmoker        8/1/2023     3:37 PM   OSWESTRY DISABILITY INDEX   Count 9   Sum 16   Oswestry Score (%) 35.56 %            Diagnoses and all orders for this visit:  Urinary incontinence, unspecified type  -     XR Cervical Spine Flex/Ext 2/3 Views; Future  -     Neurosurgery Referral; Future  Cervical stenosis of spinal canal  -     XR Cervical Spine Flex/Ext 2/3 Views; Future  -     Neurosurgery Referral; Future  Spinal stenosis, lumbar region, without neurogenic claudication  -     Neurosurgery Referral; Future        PLAN:  Reviewed spine anatomy and disease process. Discussed diagnosis and treatment options with the patient today. A shared decision making model was used.  The patient's values and choices were respected. The following represents what was discussed and decided upon by the provider and the patient.      -DIAGNOSTIC TESTS:  Images were personally reviewed and interpreted and explained to patient today using a spine model.   -I ordered flexion extension  "cervical xr today to have done prior to her surgical consult    -PHYSICAL THERAPY:    -recommended continuing PT      -MEDICATIONS:    -recommended continuing gabapentin, naproxen, tylenol  Discussed multiple medication options today with patient. Discussed risks, side effects, and proper use of medications. Patient verbalized understanding.    -INTERVENTIONS:    We will hold off on injections for now in light of her declining neurologic exam  and refer her to neurosurgery. Discussed with Dr Hudson    -PATIENT EDUCATION:  Total time of 40 minutes, on the day of service, spent with the patient, reviewing the chart, placing orders, and documenting.   -Today we also discussed the pros and cons of the current treatment plan.    -FOLLOW-UP:   follow up prn       ______________________________________________________________________    SUBJECTIVE:  Tessa reports worsening balance since last visit as well as worsening bladder incontinence. Now whenever she stands up, she loses bladder control. She is holding on to furniture when she walks to help with balance. She otherwise uses a cane. She still has claudicating back pain into her R thigh and knee at a 6/10. It is 5/10 at best and 10/10 at worst. She describes ongoing bilateral hand pain, numbness, and difficulty with dexterity. She has had multiple carpal tunnel surgeries in the past, but they did not provide any relief. Her neck pain however has improved since starting on gabapentin. She continues to take tylenol, naproxen sometimes as well.    She denies radicular arm pain, left leg symptoms, or leg weakness.           From previous visit:   HPI:\"Tessa Edwards  Is a 67 year old retired  with history of C5-6 MOBI-C by Dr Mcintosh in 2017, bilateral carpal tunnel surgeries, asthma, acid reflux, anemia, depression, and nonhodgkin's lymphoma who presents today for new patient evaluation of neck pain, bilateral arm pain, bilateral arm numbness, lower back pain, " bilateral leg pain.    She complains of lower back pain going into both legs posteriorly into the top of the feet, and into the right hip which started 4 to 5 years ago without any known injury.  She describes it as dull, shooting.  Today the pain is between a 7 and 8.  Pain at worst is a 10 out of 10.  Pain at best is a 5 out of 10.  The pain is worse with standing, walking, and physical activity and work.  The pain improves with sitting down and bending forward. She was reportedly scheduled for lumbar surgery in June of 2021, but it was delayed due to anemia. In anemia workup, she had a colonoscopy which revealed a mass, which is now s/p resection in Dec 2021. She saw Maury about her lower back and felt reluctant about proceeding due to concerns that she may have ongoing symptoms afterwards despite surgery. After that, she saw her pcp for H&P and was referred for evaluation at the spine center.  She does endorse urinary incontinence over the last 6 months.  She has to change pads frequently during the day.  This is a new problem.  She walks without assistive device.  She lives independently.    She also describes pain in the left side of her neck, left arm, and both wrists.  She has numbness and tingling in both hands. She has been dropping things, her dexterity is worsening. She has been followed by Maury with surgeries done on her hands left shoulder and knees.    Had seen Tristan in 2019 and case was to be discussed at spine board and is not sure the outcome.    She is followed by Dr Thompson with colorectal who ordered pelvic PT and imaging of her colon. She was told to discontinue her aleve due to what they think is a GI Bleed in the last 2-3 mos, but she is back on the aleve due to the severity of her pain.  She states she did take gabapentin in the past but it did not help.    She was referred to pain management, neurology,       -Treatment to Date:  "    -Medications:  -aleve  -tylenol  -gabapentin\"    Current Outpatient Medications   Medication    acalabrutinib (CALQUENCE) 100 MG capsule    acetaminophen (TYLENOL) 500 MG tablet    calcium-vitamin D (CALCIUM-VITAMIN D) 500 mg(1,250mg) -200 unit per tablet    FLUoxetine (PROZAC) 40 MG capsule    gabapentin (NEURONTIN) 300 MG capsule    multivitamin w/minerals (THERA-VIT-M) tablet    naproxen sodium (ALEVE) 220 MG tablet    rOPINIRole (REQUIP) 1 MG tablet    baclofen (LIORESAL) 10 MG tablet     No current facility-administered medications for this visit.       No Known Allergies    Past Medical History:   Diagnosis Date    Abnormal CT of the abdomen 10/26/2021    Allergic rhinitis 11/05/2015    Anemia, iron deficiency 10/26/2021    Bilateral carpal tunnel syndrome 07/09/2019    Cervical nerve root compression 07/24/2017    Cervical spinal stenosis     Chronic low back pain without sciatica, unspecified back pain laterality 10/27/2021    Spondylosis,central  stenosis, foraminal encroachment    Colonic mass 11/16/2021    Depression     Depressive disorder     Disease of thyroid gland     Dyspepsia 03/22/2022    Fibromyalgia     GERD (gastroesophageal reflux disease)     History of blood transfusion     Hyperlipemia     Lymphoproliferative disorder (H)     Moderate major depression (H) 03/22/2022    Morbid obesity (H) 03/22/2022    Non-Hodgkin's lymphoma (H) 03/22/2022    Obesity     Osteoarthritis     Osteoarthritis of right knee, unspecified osteoarthritis type 09/12/2019    Other abnormal glucose 03/22/2022    Formatting of this note might be different from the original. Created by Conversion    Prediabetes 3/22/2022    Formatting of this note might be different from the original. Created by Conversion    Primary localized osteoarthrosis, lower leg 08/27/2007    Restless legs syndrome 10/26/2021    Rotator cuff tear     Sleep apnea     Spinal stenosis of lumbar region without neurogenic claudication 10/27/2021    " Spondylosis,central  stenosis, foraminal encroachment    Splenomegaly 10/27/2021    Thyroid nodule 10/27/2021    Urinary frequency 03/22/2022    Urinary retention 03/22/2022    Vocal cord dysfunction 11/05/2015    Weight loss 10/27/2021          OBJECTIVE:  BP (!) 167/72   Pulse 71     PHYSICAL EXAMINATION:    --CONSTITUTIONAL:  Vital signs as above.  No acute distress.  The patient is well nourished and well groomed.  --PSYCHIATRIC:  Appropriate mood and affect. The patient is awake, alert, oriented to person, place, time and answering questions appropriately with clear speech.    --SKIN:  Skin over the face, bilateral lower extremities, and posterior torso is clean, dry, intact without rashes.    --RESPIRATORY: Normal rhythm and effort. No abnormal accessory muscle breathing patterns noted.   --ABDOMINAL:  Non-distended.    --GROSS MOTOR: Poor winding baseline gait, Poor tandem gait. Unable to walk on heels, unable to walk on toes due to poor balance       UPPER EXTREMITY MOTOR TESTING:  Deltoids, triceps, biceps, hand  were 5/5 bilaterally  Intrinsics and extensors were 4/5 bilaterally    --LOWER EXTREMITY MOTOR TESTING:  Hip flexion: right 5/5, left 5/5  Hip abduction: right 5/5, left 5/5  Hip adduction: right 5/5, left 5/5   Quads: right 5/5, left 5/5  Hamstrings: right 5/5, left 5/5  Dorsiflexion: right 5/5, left 5/5  Plantar flexion: right 5/5, left 5/5    Great toe MTP extension/EHL: right 5/5, left 5/5    --NEUROLOGICAL: Hyperreflexia throughout upper extremities bilaterally. 0+ patellar and achilles reflexes bilaterally. Sensation to light touch is intact throughout both lower extremities. Babinski is negative. No clonus.  Miller's positive bilaterally    Positive tinel and phalens bilaterally    --MUSCULOSKELETAL: Lumbar spine inspection reveals no evidence of deformity. No point tenderness to palpation lumbar spine. No paraspinal musculature tenderness. Straight leg raising is negative.      Cervical spine is without any point tenderness or paraspinal musculature tenderness.    --SACROILIAC JOINT: No tenderness to palp of either SI joint. One finger point test was negative.    Mildly positive R MIGUEL  R hip tenderness    --VASCULAR:  Bilateral lower extremities are warm without any discoloration.  There is no pitting edema of the bilateral lower extremities.    RESULTS:   Prior medical records from [facility] were reviewed today.    Imaging: Spine imaging was personally reviewed and interpreted today. The images were shown to the patient and the findings were explained using a spine model.    Last DXA July 2021 was normal    Narrative & Impression   XR LUMBAR SPINE TWO-THREE VIEWS   8/21/2023 11:01 AM      COMPARISON: Lumbar spine MRI 8/11/2023     HISTORY: Grade 1 anterolisthesis of L4 on L5; large bilateral joint  effusions or fluid in the facet joints at L4-5 Looking for instability  here; Anterolisthesis of lumbar spine                                                                      IMPRESSION: Minimal degenerative retrolisthesis of L2 upon L3, grade  1-2 degenerative anterolisthesis of L4 upon L5 and grade 1  degenerative anterolisthesis of L5 upon S1 again noted. Alignment  otherwise normal. Vertebral body heights normal. No fractures. Loss of  disc space height and degenerative endplate spurring at T12-L1, L2-L3  and L4-L5. Facet arthropathy at L4-L5 and L5-S1.     KARIN LEBLANC MD         SYSTEM ID:  [id number]     Narrative & Impression   MRI OF THE LUMBAR SPINE WITHOUT AND WITH CONTRAST  8/11/2023 3:19 PM      HISTORY: History cancer, severe lumbar stenosis. New bladder  incontinence. Urinary incontinence, unspecified type     TECHNIQUE: Multiplanar, multisequence MRI images of the lumbar spine  were acquired without and with 9 mL Gadavist IV contrast.     COMPARISON: None.     FINDINGS: There are five lumbar-type vertebrae for the purposes of  this  dictation.      Normal vertebral body heights and marrow signal. 5 mm L4-L5  degenerative anterolisthesis. The conus tip is identified at L2.  Incidental 23.9 x 13.4 mm lipoma in the right dorsal paraspinal soft  tissues at L1-L2. No abnormal contrast enhancement.     T12-L1: Moderate disc height and T2 signal loss. Mild circumferential  disc osteophyte complex. Normal facets. No spinal canal or neural  foraminal stenosis.     L1-L2: Normal disc height and signal. No herniation. Normal facets. No  spinal canal or neural foraminal stenosis.     L2-L3: Moderate disc height and T2 signal loss. Mild circumferential  disc osteophyte complex. Mild right and moderate left facet  arthropathy. No spinal canal stenosis. Mild right neural foraminal  stenosis. Moderate to severe left neural foraminal stenosis.     L3-L4: Mild disc height loss. Mild posterior annular bulge. Mild  bilateral facet arthropathy and thickening of ligamenta flava. No  spinal canal stenosis. Mild bilateral neural foraminal stenosis.     L4-L5: 5 mm degenerative anterolisthesis. Severe disc height and T2  signal loss. Moderate unroofing of the disc. Severe bilateral facet  arthropathy. Large bilateral joint effusions. Severe spinal canal  stenosis. Severe bilateral neural foraminal stenosis.     L5-S1: Robust iliolumbar ligaments bilaterally. Moderate disc height  and T2 signal loss. Mild posterior annular bulge. Moderate bilateral  facet arthropathy and thickening of ligamenta flava. No spinal canal  or neural foraminal stenosis.                                                                      IMPRESSION:  1.  5 mm L4-L5 degenerative anterolisthesis associated with severe  spinal canal stenosis and severe bilateral neural foraminal stenoses.  2.  Large bilateral L4-L5 joint effusions. Correlate for segmental  instability.  3.  No MRI evidence of a malignant process.  4.  Incidental right dorsal paraspinal lipoma at L1-L2.     IVY GREENE MD          SYSTEM ID:  [id number]     Narrative & Impression   MRI OF THE CERVICAL SPINE WITHOUT CONTRAST August 11, 2023 3:00 PM      HISTORY: Positive Miller, history neck surgery, worsening hand  functioning. Imbalance. Weakness of both hands.     TECHNIQUE: Multiplanar, multisequence MRI images of the cervical spine  were acquired without contrast.     COMPARISON: None.     FINDINGS: Normal vertebral body heights and alignment. Normal  visualized marrow signal. Susceptibility at C5-C6 secondary to  interbody instrumentation. This was present on the prior study.  Interval development of T2 prolongation within the cord at C5-C6.  Visualized extraspinal soft tissues are within normal limits.      C2-C3: Normal disc height. No herniation. Normal facets. No spinal  canal or neural foraminal stenosis.     C3-C4: Normal disc height. Mild left uncinate spurring. Moderate right  and mild left facet arthropathy. No spinal canal or right neural  foraminal stenosis. Mild left neural foraminal stenosis.     C4-C5: 1 mm degenerative anterolisthesis. Normal disc height. Mild  posterior annular bulge. Moderate left uncinate spurring. Mild  bilateral facet arthropathy. No spinal canal or right neural foraminal  stenosis. Moderate left neural foraminal stenosis.     C5-C6: Susceptibility from an arthroplasty or interbody fusion.  Moderate bilateral facet arthropathy, more pronounced than on the  prior exam. There may now be severe canal stenosis. Mild bilateral  neural foraminal stenoses. Evaluation is limited.     C6-C7: Mild disc height loss. Mild posterior osteophytic spurring.  Normal facets. Mild effacement of the ventral spinal canal. No neural  foraminal stenosis.     C7-T1: Normal disc height. No herniation. Normal facets. No spinal  canal or neural foraminal stenosis.                                                                      IMPRESSION:  1.  Susceptibility again noted at C5-C6 secondary to an interbody  fusion  device or arthroplasty.  2.  Moderate bilateral C5-C6 facet arthropathy appears progressed  compared to 3/18/2019.  3.  There may now be severe canal stenosis and abnormal cord signal at  C5-C6. Evaluation is limited secondary to susceptibility.  4.  Progressed C6-C7 interbody degeneration without high-grade canal  or foraminal stenosis.  5.  Unchanged moderate left C4-C5 neural foraminal stenosis.     IVY GREENE MD         SYSTEM ID:  [id number]               Pamela TREVINOC  [facility]  O. [phone]

## 2023-09-07 NOTE — LETTER
9/7/2023         RE: Tessa Edwards  [address] [facility] [address]        Dear Colleague,    Thank you for referring your patient, Tessa Edwards, to the [facility]. Please see a copy of my visit note below.    ASSESSMENT: Tessa Edwards is a 67 year old female who presents for consultation at the request of PCP No Ref-Primary, Physician, who presents today for a follow up visit for evaluation of:    -Lumbar stenosis, cervical stenosis    Maylins balance and urinary incontinence has worsened, even since her last appointment. We reviewed her MRI studies. Her severe lumbar stenosis at L4-5 appears worse than prior. She also has significant C5-6 canal stenosis with cord signal change. It is unclear if the signal change is new. Her balance is quite poor on exam and she has positive faith bilaterally. She also has positive tinel and phalen's suggesting her hand symptoms may be multifactoriaI. I recommended seeing neurosurgery for an opinion on any intervention for either area. If Dr Hudson does not feel surgery would be of benefit or if she is not a candidate, I explained we could proceed with further conservative cares.    Last DXA July 2021 was normal. nonsmoker        8/1/2023     3:37 PM   OSWESTRY DISABILITY INDEX   Count 9   Sum 16   Oswestry Score (%) 35.56 %            Diagnoses and all orders for this visit:  Urinary incontinence, unspecified type  -     XR Cervical Spine Flex/Ext 2/3 Views; Future  -     Neurosurgery Referral; Future  Cervical stenosis of spinal canal  -     XR Cervical Spine Flex/Ext 2/3 Views; Future  -     Neurosurgery Referral; Future  Spinal stenosis, lumbar region, without neurogenic claudication  -     Neurosurgery Referral; Future        PLAN:  Reviewed spine anatomy and disease process. Discussed diagnosis and treatment options with the patient today. A shared decision making model was used.  The patient's values and choices  "were respected. The following represents what was discussed and decided upon by the provider and the patient.      -DIAGNOSTIC TESTS:  Images were personally reviewed and interpreted and explained to patient today using a spine model.   -I ordered flexion extension cervical xr today to have done prior to her surgical consult    -PHYSICAL THERAPY:    -recommended continuing PT      -MEDICATIONS:    -recommended continuing gabapentin, naproxen, tylenol  Discussed multiple medication options today with patient. Discussed risks, side effects, and proper use of medications. Patient verbalized understanding.    -INTERVENTIONS:    We will hold off on injections for now in light of her declining neurologic exam  and refer her to neurosurgery. Discussed with Dr Hudson    -PATIENT EDUCATION:  Total time of 40 minutes, on the day of service, spent with the patient, reviewing the chart, placing orders, and documenting.   -Today we also discussed the pros and cons of the current treatment plan.    -FOLLOW-UP:   follow up prn       ______________________________________________________________________    SUBJECTIVE:  Tessa reports worsening balance since last visit as well as worsening bladder incontinence. Now whenever she stands up, she loses bladder control. She is holding on to furniture when she walks to help with balance. She otherwise uses a cane. She still has claudicating back pain into her R thigh and knee at a 6/10. It is 5/10 at best and 10/10 at worst. She describes ongoing bilateral hand pain, numbness, and difficulty with dexterity. She has had multiple carpal tunnel surgeries in the past, but they did not provide any relief. Her neck pain however has improved since starting on gabapentin. She continues to take tylenol, naproxen sometimes as well.    She denies radicular arm pain, left leg symptoms, or leg weakness.           From previous visit:   HPI:\"Tessa Edwards  Is a 67 year old retired  with " history of C5-6 MOBI-C by Dr Mcintosh in 2017, bilateral carpal tunnel surgeries, asthma, acid reflux, anemia, depression, and nonhodgkin's lymphoma who presents today for new patient evaluation of neck pain, bilateral arm pain, bilateral arm numbness, lower back pain, bilateral leg pain.    She complains of lower back pain going into both legs posteriorly into the top of the feet, and into the right hip which started 4 to 5 years ago without any known injury.  She describes it as dull, shooting.  Today the pain is between a 7 and 8.  Pain at worst is a 10 out of 10.  Pain at best is a 5 out of 10.  The pain is worse with standing, walking, and physical activity and work.  The pain improves with sitting down and bending forward. She was reportedly scheduled for lumbar surgery in June of 2021, but it was delayed due to anemia. In anemia workup, she had a colonoscopy which revealed a mass, which is now s/p resection in Dec 2021. She saw [facility]son about her lower back and felt reluctant about proceeding due to concerns that she may have ongoing symptoms afterwards despite surgery. After that, she saw her pcp for H&P and was referred for evaluation at the spine center.  She does endorse urinary incontinence over the last 6 months.  She has to change pads frequently during the day.  This is a new problem.  She walks without assistive device.  She lives independently.    She also describes pain in the left side of her neck, left arm, and both wrists.  She has numbness and tingling in both hands. She has been dropping things, her dexterity is worsening. She has been followed by [facility]son with surgeries done on her hands left shoulder and knees.    Had seen Tristan in 2019 and case was to be discussed at spine board and is not sure the outcome.    She is followed by Dr Thompson with colorectal who ordered pelvic PT and imaging of her colon. She was told to discontinue her aleve due to what they think is a GI Bleed in the last 2-3  "mos, but she is back on the aleve due to the severity of her pain.  She states she did take gabapentin in the past but it did not help.    She was referred to pain management, neurology,       -Treatment to Date:     -Medications:  -aleve  -tylenol  -gabapentin\"    Current Outpatient Medications   Medication     acalabrutinib (CALQUENCE) 100 MG capsule     acetaminophen (TYLENOL) 500 MG tablet     calcium-vitamin D (CALCIUM-VITAMIN D) 500 mg(1,250mg) -200 unit per tablet     FLUoxetine (PROZAC) 40 MG capsule     gabapentin (NEURONTIN) 300 MG capsule     multivitamin w/minerals (THERA-VIT-M) tablet     naproxen sodium (ALEVE) 220 MG tablet     rOPINIRole (REQUIP) 1 MG tablet     baclofen (LIORESAL) 10 MG tablet     No current facility-administered medications for this visit.       No Known Allergies    Past Medical History:   Diagnosis Date     Abnormal CT of the abdomen 10/26/2021     Allergic rhinitis 11/05/2015     Anemia, iron deficiency 10/26/2021     Bilateral carpal tunnel syndrome 07/09/2019     Cervical nerve root compression 07/24/2017     Cervical spinal stenosis      Chronic low back pain without sciatica, unspecified back pain laterality 10/27/2021    Spondylosis,central  stenosis, foraminal encroachment     Colonic mass 11/16/2021     Depression      Depressive disorder      Disease of thyroid gland      Dyspepsia 03/22/2022     Fibromyalgia      GERD (gastroesophageal reflux disease)      History of blood transfusion      Hyperlipemia      Lymphoproliferative disorder (H)      Moderate major depression (H) 03/22/2022     Morbid obesity (H) 03/22/2022     Non-Hodgkin's lymphoma (H) 03/22/2022     Obesity      Osteoarthritis      Osteoarthritis of right knee, unspecified osteoarthritis type 09/12/2019     Other abnormal glucose 03/22/2022    Formatting of this note might be different from the original. Created by Conversion     Prediabetes 3/22/2022    Formatting of this note might be different from the " original. Created by Conversion     Primary localized osteoarthrosis, lower leg 08/27/2007     Restless legs syndrome 10/26/2021     Rotator cuff tear      Sleep apnea      Spinal stenosis of lumbar region without neurogenic claudication 10/27/2021    Spondylosis,central  stenosis, foraminal encroachment     Splenomegaly 10/27/2021     Thyroid nodule 10/27/2021     Urinary frequency 03/22/2022     Urinary retention 03/22/2022     Vocal cord dysfunction 11/05/2015     Weight loss 10/27/2021          OBJECTIVE:  BP (!) 167/72   Pulse 71     PHYSICAL EXAMINATION:    --CONSTITUTIONAL:  Vital signs as above.  No acute distress.  The patient is well nourished and well groomed.  --PSYCHIATRIC:  Appropriate mood and affect. The patient is awake, alert, oriented to person, place, time and answering questions appropriately with clear speech.    --SKIN:  Skin over the face, bilateral lower extremities, and posterior torso is clean, dry, intact without rashes.    --RESPIRATORY: Normal rhythm and effort. No abnormal accessory muscle breathing patterns noted.   --ABDOMINAL:  Non-distended.    --GROSS MOTOR: Poor winding baseline gait, Poor tandem gait. Unable to walk on heels, unable to walk on toes due to poor balance       UPPER EXTREMITY MOTOR TESTING:  Deltoids, triceps, biceps, hand  were 5/5 bilaterally  Intrinsics and extensors were 4/5 bilaterally    --LOWER EXTREMITY MOTOR TESTING:  Hip flexion: right 5/5, left 5/5  Hip abduction: right 5/5, left 5/5  Hip adduction: right 5/5, left 5/5   Quads: right 5/5, left 5/5  Hamstrings: right 5/5, left 5/5  Dorsiflexion: right 5/5, left 5/5  Plantar flexion: right 5/5, left 5/5    Great toe MTP extension/EHL: right 5/5, left 5/5    --NEUROLOGICAL: Hyperreflexia throughout upper extremities bilaterally. 0+ patellar and achilles reflexes bilaterally. Sensation to light touch is intact throughout both lower extremities. Babinski is negative. No clonus.  Miller's positive  bilaterally    Positive tinel and phalens bilaterally    --MUSCULOSKELETAL: Lumbar spine inspection reveals no evidence of deformity. No point tenderness to palpation lumbar spine. No paraspinal musculature tenderness. Straight leg raising is negative.     Cervical spine is without any point tenderness or paraspinal musculature tenderness.    --SACROILIAC JOINT: No tenderness to palp of either SI joint. One finger point test was negative.    Mildly positive R MIGUEL  R hip tenderness    --VASCULAR:  Bilateral lower extremities are warm without any discoloration.  There is no pitting edema of the bilateral lower extremities.    RESULTS:   Prior medical records from [facility]where were reviewed today.    Imaging: Spine imaging was personally reviewed and interpreted today. The images were shown to the patient and the findings were explained using a spine model.    Last DXA July 2021 was normal    Narrative & Impression   XR LUMBAR SPINE TWO-THREE VIEWS   8/21/2023 11:01 AM      COMPARISON: Lumbar spine MRI 8/11/2023     HISTORY: Grade 1 anterolisthesis of L4 on L5; large bilateral joint  effusions or fluid in the facet joints at L4-5 Looking for instability  here; Anterolisthesis of lumbar spine                                                                      IMPRESSION: Minimal degenerative retrolisthesis of L2 upon L3, grade  1-2 degenerative anterolisthesis of L4 upon L5 and grade 1  degenerative anterolisthesis of L5 upon S1 again noted. Alignment  otherwise normal. Vertebral body heights normal. No fractures. Loss of  disc space height and degenerative endplate spurring at T12-L1, L2-L3  and L4-L5. Facet arthropathy at L4-L5 and L5-S1.     KARIN LEBLANC MD         SYSTEM ID:  [id number]     Narrative & Impression   MRI OF THE LUMBAR SPINE WITHOUT AND WITH CONTRAST  8/11/2023 3:19 PM      HISTORY: History cancer, severe lumbar stenosis. New bladder  incontinence. Urinary incontinence,  unspecified type     TECHNIQUE: Multiplanar, multisequence MRI images of the lumbar spine  were acquired without and with 9 mL Gadavist IV contrast.     COMPARISON: None.     FINDINGS: There are five lumbar-type vertebrae for the purposes of  this dictation.      Normal vertebral body heights and marrow signal. 5 mm L4-L5  degenerative anterolisthesis. The conus tip is identified at L2.  Incidental 23.9 x 13.4 mm lipoma in the right dorsal paraspinal soft  tissues at L1-L2. No abnormal contrast enhancement.     T12-L1: Moderate disc height and T2 signal loss. Mild circumferential  disc osteophyte complex. Normal facets. No spinal canal or neural  foraminal stenosis.     L1-L2: Normal disc height and signal. No herniation. Normal facets. No  spinal canal or neural foraminal stenosis.     L2-L3: Moderate disc height and T2 signal loss. Mild circumferential  disc osteophyte complex. Mild right and moderate left facet  arthropathy. No spinal canal stenosis. Mild right neural foraminal  stenosis. Moderate to severe left neural foraminal stenosis.     L3-L4: Mild disc height loss. Mild posterior annular bulge. Mild  bilateral facet arthropathy and thickening of ligamenta flava. No  spinal canal stenosis. Mild bilateral neural foraminal stenosis.     L4-L5: 5 mm degenerative anterolisthesis. Severe disc height and T2  signal loss. Moderate unroofing of the disc. Severe bilateral facet  arthropathy. Large bilateral joint effusions. Severe spinal canal  stenosis. Severe bilateral neural foraminal stenosis.     L5-S1: Robust iliolumbar ligaments bilaterally. Moderate disc height  and T2 signal loss. Mild posterior annular bulge. Moderate bilateral  facet arthropathy and thickening of ligamenta flava. No spinal canal  or neural foraminal stenosis.                                                                      IMPRESSION:  1.  5 mm L4-L5 degenerative anterolisthesis associated with severe  spinal canal stenosis and severe  bilateral neural foraminal stenoses.  2.  Large bilateral L4-L5 joint effusions. Correlate for segmental  instability.  3.  No MRI evidence of a malignant process.  4.  Incidental right dorsal paraspinal lipoma at L1-L2.     IVY GREENE MD         SYSTEM ID:  [id number]     Narrative & Impression   MRI OF THE CERVICAL SPINE WITHOUT CONTRAST August 11, 2023 3:00 PM      HISTORY: Positive Miller, history neck surgery, worsening hand  functioning. Imbalance. Weakness of both hands.     TECHNIQUE: Multiplanar, multisequence MRI images of the cervical spine  were acquired without contrast.     COMPARISON: None.     FINDINGS: Normal vertebral body heights and alignment. Normal  visualized marrow signal. Susceptibility at C5-C6 secondary to  interbody instrumentation. This was present on the prior study.  Interval development of T2 prolongation within the cord at C5-C6.  Visualized extraspinal soft tissues are within normal limits.      C2-C3: Normal disc height. No herniation. Normal facets. No spinal  canal or neural foraminal stenosis.     C3-C4: Normal disc height. Mild left uncinate spurring. Moderate right  and mild left facet arthropathy. No spinal canal or right neural  foraminal stenosis. Mild left neural foraminal stenosis.     C4-C5: 1 mm degenerative anterolisthesis. Normal disc height. Mild  posterior annular bulge. Moderate left uncinate spurring. Mild  bilateral facet arthropathy. No spinal canal or right neural foraminal  stenosis. Moderate left neural foraminal stenosis.     C5-C6: Susceptibility from an arthroplasty or interbody fusion.  Moderate bilateral facet arthropathy, more pronounced than on the  prior exam. There may now be severe canal stenosis. Mild bilateral  neural foraminal stenoses. Evaluation is limited.     C6-C7: Mild disc height loss. Mild posterior osteophytic spurring.  Normal facets. Mild effacement of the ventral spinal canal. No neural  foraminal stenosis.     C7-T1: Normal disc  height. No herniation. Normal facets. No spinal  canal or neural foraminal stenosis.                                                                      IMPRESSION:  1.  Susceptibility again noted at C5-C6 secondary to an interbody  fusion device or arthroplasty.  2.  Moderate bilateral C5-C6 facet arthropathy appears progressed  compared to 3/18/2019.  3.  There may now be severe canal stenosis and abnormal cord signal at  C5-C6. Evaluation is limited secondary to susceptibility.  4.  Progressed C6-C7 interbody degeneration without high-grade canal  or foraminal stenosis.  5.  Unchanged moderate left C4-C5 neural foraminal stenosis.     IVY GREENE MD         SYSTEM ID:  [id number]               Pamela Warner [facility]  [facility]  O. [phone]             Again, thank you for allowing me to participate in the care of your patient.        Sincerely,        FELIPE Gillespie CNP

## 2023-09-08 ENCOUNTER — PRE VISIT (OUTPATIENT)
Dept: NEUROSURGERY | Facility: CLINIC | Age: 68
End: 2023-09-08
Payer: COMMERCIAL

## 2023-09-08 NOTE — TELEPHONE ENCOUNTER
"  RECORDS RECEIVED FROM: Internal   REFERRED BY: Pamela Warner CNP   REASON FOR VISIT: Urinary incontinence, unspecified type; cervical stenosis of spinal canal; spinal stenosis, lumbar region, without neurogenic claudication   Date of Appt: 9/12/23   NOTES (FOR ALL VISITS) STATUS DETAILS   OFFICE NOTE from referring provider Internal Pamela Warner 9/7/23:  \"Tessa's balance and urinary incontinence has worsened, even since her last appointment. We reviewed her MRI studies. Her severe lumbar stenosis at L4-5 appears worse than prior. She also has significant C5-6 canal stenosis with cord signal change. It is unclear if the signal change is new. Her balance is quite poor on exam and she has positive afith bilaterally. She also has positive tinel and phalen's suggesting her hand symptoms may be multifactoriaI. I recommended seeing neurosurgery for an opinion on any intervention for either area. If Dr Hudson does not feel surgery would be of benefit or if she is not a candidate, I explained we could proceed with further conservative cares. \"   OFFICE NOTE from other specialist N/A    DISCHARGE SUMMARY from hospital N/A    DISCHARGE REPORT from the ER N/A    SURGERY Internal C5-6 MOBI-C  by Dr Mcintosh in 2017  Bilateral carpal tunnel release (?)   PHYSICAL THERAPY Internal Referred recently, though no encounters found in chart or via CE   INJECTIONS N/A NO   EMG Internal Bilateral LE 7/11/23   IMAGING  (FOR ALL VISITS)     XR (HEAD, NECK, SPINE) Internal LUMBAR 8/21/23  CERVICAL 9/8/23   MRI (HEAD, NECK, SPINE) Internal LUMBAR 8/11/23  CERVICAL 8/11/23   CT (HEAD, NECK, SPINE) N/A        "

## 2023-09-12 ENCOUNTER — TELEPHONE (OUTPATIENT)
Dept: NEUROSURGERY | Facility: CLINIC | Age: 68
End: 2023-09-12

## 2023-09-12 NOTE — TELEPHONE ENCOUNTER
Date: 9/12/2023  (Provide the date when patient was called)  Provider: Tristan  (with whom patient needs to schedule with)  Detailed message: LDVM for patient to call and move appt with Tristan to 12:30 pm. If unable patient will need to r/s appt with Tristan per Provider.

## 2023-09-19 ENCOUNTER — PREP FOR PROCEDURE (OUTPATIENT)
Dept: NEUROLOGY | Facility: CLINIC | Age: 68
End: 2023-09-19

## 2023-09-19 ENCOUNTER — OFFICE VISIT (OUTPATIENT)
Dept: NEUROSURGERY | Facility: CLINIC | Age: 68
End: 2023-09-19
Attending: NURSE PRACTITIONER
Payer: COMMERCIAL

## 2023-09-19 VITALS
DIASTOLIC BLOOD PRESSURE: 57 MMHG | HEART RATE: 67 BPM | OXYGEN SATURATION: 98 % | SYSTOLIC BLOOD PRESSURE: 118 MMHG | HEIGHT: 64 IN | WEIGHT: 200 LBS | BODY MASS INDEX: 34.15 KG/M2

## 2023-09-19 DIAGNOSIS — M48.02 CERVICAL STENOSIS OF SPINAL CANAL: Primary | ICD-10-CM

## 2023-09-19 DIAGNOSIS — M48.061 SPINAL STENOSIS, LUMBAR REGION, WITHOUT NEUROGENIC CLAUDICATION: ICD-10-CM

## 2023-09-19 DIAGNOSIS — R32 URINARY INCONTINENCE, UNSPECIFIED TYPE: ICD-10-CM

## 2023-09-19 DIAGNOSIS — M47.12 CERVICAL SPONDYLOSIS WITH MYELOPATHY: Primary | ICD-10-CM

## 2023-09-19 DIAGNOSIS — G95.20 CORD COMPRESSION (H): ICD-10-CM

## 2023-09-19 PROCEDURE — 99214 OFFICE O/P EST MOD 30 MIN: CPT | Performed by: SURGERY

## 2023-09-19 ASSESSMENT — PAIN SCALES - GENERAL: PAINLEVEL: EXTREME PAIN (8)

## 2023-09-19 ASSESSMENT — PATIENT HEALTH QUESTIONNAIRE - PHQ9: SUM OF ALL RESPONSES TO PHQ QUESTIONS 1-9: 9

## 2023-09-19 NOTE — PATIENT INSTRUCTIONS
Recommend cervical decompression and fusion at C5-6    Please review COMPLETE information about your proposed surgery, pre-operative requirements, post-operative course and expectations - available in a folder provided to you in clinic!    Your surgery scheduler will call you within 3 business days to begin the process of scheduling your surgery and appointments.     Pre-Operative    Pre-operative physical / Lab work with primary care physician within 30 days of surgical date. We prefer the pre-op exam to be done 2 weeks prior to surgery. We also prefer pre-op lab work be done at [facility] outpatient lab, 2 weeks prior to surgery.     If all pre-op appointments/test are not completed prior to your surgery date, you will be asked to reschedule your surgery.           As part of preparation for your upcoming procedure your primary care doctor may order a test to rule out a COVID-19 infection. This is no longer a requirement prior to surgery.     Readiness Visits    Prior to surgery, you may have a telephone or in person readiness visit with our RN team to discuss your upcomming surgery, results of your pre-op physical, and lab work.   If you will require a collar/neck brace after surgery, you will be fitted for one at your readiness visit prior to surgery (scheduled by the surgery scheduler).     Shower procedure    Hibiclens shower: Use one packet the night before surgery and one packet the morning of surgery for a whole body shower. Avoid face, hair, and genitals.      Eating/Drinking    Stop all solid foods 8 hours before surgery.  Stop all clear liquids 2 hours prior to arrival time     Clear liquids include water, clear juice, black coffee, or clear tea without milk, Gatorade, clear soda.     Medications - please refer to the pre-operative medication instructions sheet in your folder    Hold Aspirin, NSAIDs (Advil/Ibuprofen, Indocin, Naproxen,Nuprin,Relafen/Nabumetone, Diclofenac,Meloxicam,  Aleve, Celebrex) and all vitamins and supplements x 7-10 days prior to surgical date  You can take Tylenol (Acetaminophen) for pain up until the date of your surgery   Do not exceed 3,000 mg per day   Any other medications prescribed, please discuss with your primary care provider at your pre-operative physical. Please discuss when/if it is safe for you to stop taking blood thinners with your primary care provider.   We will NOT provide pain medications prior to surgery. We will prescribe post-op pain medications for up to 6 weeks after surgery.       FMLA/Short-term disability    If you are currently employed, you will likely need to be off work for 4-6 weeks for post-op recovery and healing.  Please fax any FMLA/short term disability paperwork to [phone], mail it into the clinic, drop it off in person, or send via a Vayable message.   You may also call our clinic with the date in which you'd like to return to work, and we can provide a work letter to your employer  We will support Short-Term Disability up to 12 weeks, beginning the date of your surgery. We do NOT support Long-Term Disability/Social Security Benefits.     Pain Management after surgery    Dealing with pain    As your body heals, you might feel a stabbing, burning, or aching pain. You may also have some numbness.  Everyone feels pain differently, we may ask you to rate your pain using a pain scale. This will let us know how much pain you feel.   Keep in mind that medicine won't take away all of your pain. It helps to try other ways to relax and ease pain.     Things to help with pain    After surgery, we will give you medicine for your pain. These medications work well, but they can make you drowsy, itchy, or sick to your stomach, and constipated. Try to take narcotics with food if they cause nausea.   For mild to moderate pain, you can take medication such as Tylenol or Ibuprofen. These can be used with narcotics and muscle relaxants. *If you  have had a fusion: do NOT use NSAIDs for 6 months after surgery.   Do NOT drive while taking narcotic pain medication  Do NOT drink alcohol while using narcotic pain medication  You can utilize ice as needed (no longer than 20 minutes at one time) you may apply this over your covered incision  Utilize heat for muscle spasms, do not apply heat over your incision  If a muscle relaxer is prescribed, please do NOT take it at the same time as your narcotic pain medication. Take them at least 90 minutes apart.   You may also use pain cream/patches on sore muscles. Do NOT apply these on your incision. Patches may be cut in 1/2 if needed.     *After your surgery, if you will be staying in-patient, a nursing team will be monitoring you closely throughout your stay and communicate your health status to your surgeon and other providers.  You will be seen by Advanced Practice Providers (e.g., nurse practitioners, clinic nurse specialists, and physician assistants) who will check on you regularly to assess the status of your surgical recovery.     Incision Care    Look at your incision site every day. You  may need a mirror, or family member to help you.   Do not submerge your incision in water such as pools, hot tubs, baths for at least 6 weeks or until incision is healed  You may get your incision wet in the shower. Allow water and soap to run over incision, and gently pat dry.   Remove the dressing the day after you are discharged from the hospital. Keep the incision clean and dry at all times. This may require several bandage changes.   Contact us right away if you have:   Fever over 101 degrees farenheit  Green or yellow drainage (pus) from your incision or increased bloody drainage   Redness, swelling, or warmth at your surgery site   Notify the clinic [phone].    Activity Restrictions    For the first 6 weeks, no lifting,pushing, or pulling > 5-10 pounds, no bending, twisting.  Use the stairs in moderation   Walking:  Walking is the best way to start exercise after surgery. Take short frequent walks. You may gradually increase the distance as tolerated. If you feel pain, decrease your activity, but DO NOT stop walking. Walking will help you regain strength.  Avoid prolonged positioning for longer than 30 minutes (change positions frequently while awake)  No contact sports until after follow up visit  No high impact activities such as; running/jogging, snowmobile or 4 brito riding or any other recreational vehicles until deemed safe by your surgeon/care team.   Please call the clinic if you develop any of the following symptoms:  Swelling and/or warmth in one or both legs  Pain or tenderness in your leg, ankle, foot, or arm   Red or discolored/pale skin     Post-Op Follow Up Appointments    We will call you to schedule these appointments after your discharge from the hospital.   Incision assessment within 2 weeks with a Registered Nurse   6 week post-op with a Nurse Practitioner/Physician Assistant. Your surgeon will be available on this day.

## 2023-09-19 NOTE — LETTER
9/19/2023         RE: Tessa Edwards  [address] [facility] [address]        Dear Colleague,    Thank you for referring your patient, Tessa Edwards, to the [facility]. Please see a copy of my visit note below.    NEUROSURGERY CONSULTATION NOTE      Neurosurgery was asked to see this patient by Pamela Warner* for evaluation of cervical and lumbar stenosis.       CONSULTATION ASSESSMENT AND PLAN:    69 yo female s/p C5-6 MOBI-C by Dr Mcintosh on 8/2/17 for imbalance and hyperflexia and also s/p b/l CTR by Dr Hanson who presents with imbalance as well as claudicating low back pain.  MRI of her cervical spine shows what looks like severe spinal canal stenosis with abnormal cord signal change cervical 5-6 in addition to moderate bilateral facet arthropathy which progressed since 2019.  There is susceptibility from her past Mobi-C at this level.  Does not appear to be any other high-grade stenosis.  We discussed getting a CT of her cervical spine to confirm the narrowing at this level would likely recommend a posterior cervical 5-6 bilateral laminectomies and instrumented fusion and arthrodesis.  Her findings on imaging are likely to worsen. We discussed the natural history of cervical myelopathy and the tendency for patient's symptoms to progress overtime.  We also discussed if he elected not to have surgery any new neurological deficits may not be reversed with the surgery at a later date.  Surgery is done to decompress the spinal cord and stabilize his symptoms with a possibility to improve his current symptoms but that is not guaranteed.  Risks and benefits were discussed in detail including but not limited to infection, hematoma, nerve damage including paralysis, post op radiculitis, durotomy, lack of a sold bone fusion, hardware malfunction, risks associated with the use of general anesthesia, blood clots in the lungs or legs.     In regards to her  claudicating low back pain, we reviewed her MRI which shows a degenerative anterolisthesis at lumbar 4-5 resulting in severe spinal canal stenosis with severe bilateral foraminal narrowing in the setting of large bilateral joint effusions given these findings if she would wish to proceed with lumbar surgery would recommend lumbar 4-5 bilateral laminectomies and posterior instrumented fusion.  Would proceed with cervical surgery prior to lumbar surgery and see how recovery went from cervical surgery prior to scheduling lumbar surgery.     Brianne Hudson MD      HPI:   69 yo female s/p C5-6 MOBI-C by Dr Mcintosh on 8/2/17 for imbalance and hyperflexia and also s/p b/l CTR by Dr Hanson who presents with imbalance. Last 4-5 months more significant worsening of her gait. Ambulates with a cane. Recent diagnosis of peripheral neuropathy.  She thought was more restless legs at first. She now notices it more. Back pain worsens with standing and walking and improves with sitting. Hands are now more clumsy and weak. She was diagnosed with CTS and had CTR but never got better. No significant arm weakness or tingling or numbness. No neck pain currently. She has a left torn rotator cuff.       Past Medical History:   Diagnosis Date     Abnormal CT of the abdomen 10/26/2021     Allergic rhinitis 11/05/2015     Anemia, iron deficiency 10/26/2021     Bilateral carpal tunnel syndrome 07/09/2019     Cervical nerve root compression 07/24/2017     Cervical spinal stenosis      Chronic low back pain without sciatica, unspecified back pain laterality 10/27/2021    Spondylosis,central  stenosis, foraminal encroachment     Colonic mass 11/16/2021     Depression      Depressive disorder      Disease of thyroid gland      Dyspepsia 03/22/2022     Fibromyalgia      GERD (gastroesophageal reflux disease)      History of blood transfusion      Hyperlipemia      Lymphoproliferative disorder (H)      Moderate major depression (H) 03/22/2022      Morbid obesity (H) 03/22/2022     Non-Hodgkin's lymphoma (H) 03/22/2022     Obesity      Osteoarthritis      Osteoarthritis of right knee, unspecified osteoarthritis type 09/12/2019     Other abnormal glucose 03/22/2022    Formatting of this note might be different from the original. Created by Conversion     Prediabetes 3/22/2022    Formatting of this note might be different from the original. Created by Conversion     Primary localized osteoarthrosis, lower leg 08/27/2007     Restless legs syndrome 10/26/2021     Rotator cuff tear      Sleep apnea      Spinal stenosis of lumbar region without neurogenic claudication 10/27/2021    Spondylosis,central  stenosis, foraminal encroachment     Splenomegaly 10/27/2021     Thyroid nodule 10/27/2021     Urinary frequency 03/22/2022     Urinary retention 03/22/2022     Vocal cord dysfunction 11/05/2015     Weight loss 10/27/2021       Past Surgical History:   Procedure Laterality Date     ARTHROPLASTY SHOULDER Left      ARTHROSCOPY KNEE       ARTHROSCOPY SHOULDER ROTATOR CUFF REPAIR       BACK SURGERY       BIOPSY       CERVICAL DISC ARTHROPLASTY       CERVICAL DISCECTOMY  08/01/2017    C5, C6     COLONOSCOPY N/A 11/05/2021    Procedure: COLONOSCOPY;  Surgeon: Efraín Guerrero MD;  Location: [facility] OR     [facility] LT SHOULDER AP AXILLA 2 VW       HYSTERECTOMY       JOINT REPLACEMENT       LAPAROSCOPIC ASSISTED COLECTOMY Right 11/16/2021    Procedure: LAPAROSCOPIC RIGHT COLECTOMY;  Surgeon: Efraín Guerrero MD;  Location: [facility] OR     OOPHORECTOMY       RELEASE CARPAL TUNNEL       RELEASE CARPAL TUNNEL       XR MYELOGRAM CERVICAL  05/14/2019     [facility] LAP,CHOLECYSTECTOMY/EXPLORE      Description: Cholecystectomy Laparoscopic;  Recorded: 12/10/2012;     [facility] TOTAL ABDOM HYSTERECTOMY      Description: Total Abdominal Hysterectomy;  Recorded: 12/10/2012;  Comments: 44 Y/O FOR FIBROID TUMORS     [facility] TOTAL KNEE ARTHROPLASTY Right 09/12/2019    Procedure: RIGHT TOTAL KNEE  ARTHROPLASTY;  Surgeon: Irvin Canas DO;  Location: [facility] OR;  Service: Orthopedics       REVIEW OF SYSTEMS:  See ROS form under media     MEDICATIONS:    Current Outpatient Medications   Medication Sig Dispense Refill     acalabrutinib (CALQUENCE) 100 MG capsule Take 1 capsule (100 mg) by mouth 2 times daily       acetaminophen (TYLENOL) 500 MG tablet Take 1,000 mg by mouth 3 times daily       calcium-vitamin D (CALCIUM-VITAMIN D) 500 mg(1,250mg) -200 unit per tablet Take 1 tablet by mouth every evening        FLUoxetine (PROZAC) 40 MG capsule Take 1 capsule by mouth once daily 90 capsule 0     gabapentin (NEURONTIN) 300 MG capsule Please take 300mg PO TID 90 capsule 1     multivitamin w/minerals (THERA-VIT-M) tablet Take 1 tablet by mouth daily       naproxen sodium (ALEVE) 220 MG tablet [NAPROXEN SODIUM (ALEVE) 220 MG TABLET] Take 440 mg by mouth 3 (three) times a day with meals.       rOPINIRole (REQUIP) 1 MG tablet TAKE 1 TABLET BY MOUTH TWO TIMES DAILY, 2 AT BEDTIME 120 tablet 3     baclofen (LIORESAL) 10 MG tablet Take 1 tablet (10 mg) by mouth 3 times daily 90 tablet 1         ALLERGIES/SENSITIVITIES:     No Known Allergies    PERTINENT SOCIAL HISTORY:   Social History     Socioeconomic History     Marital status: Single   Tobacco Use     Smoking status: Former     Packs/day: 0.50     Years: 10.00     Pack years: 5.00     Types: Cigarettes     Quit date: 2000     Years since quittin.6     Smokeless tobacco: Never   Substance and Sexual Activity     Alcohol use: Yes     Comment: social     Drug use: No     Sexual activity: Not Currently   Other Topics Concern     Parent/sibling w/ CABG, MI or angioplasty before 65F 55M? No   Social History Narrative    , 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 Socially drinks alcohol.     Social Determinants of Health     Financial Resource Strain: Unknown (2022)    Overall Financial Resource Strain  "(CARDIA)      Difficulty of Paying Living Expenses: Patient refused   Food Insecurity: Unknown (12/30/2022)    Hunger Vital Sign      Worried About Running Out of Food in the Last Year: Patient refused      Ran Out of Food in the Last Year: Patient refused   Transportation Needs: Unknown (12/30/2022)    PRAPARE - Transportation      Lack of Transportation (Medical): Patient refused      Lack of Transportation (Non-Medical): Patient refused   Physical Activity: Unknown (12/30/2022)    Exercise Vital Sign      Days of Exercise per Week: Patient refused      Minutes of Exercise per Session: Patient refused   Stress: Unknown (12/30/2022)    [employer] - Occupational Stress Questionnaire      Feeling of Stress : Patient refused   Social Connections: Unknown (12/30/2022)    Social Connection and Isolation Panel [NHANES]      Frequency of Communication with Friends and Family: Patient refused      Frequency of Social Gatherings with Friends and Family: Patient refused      Attends Denominational Services: Patient refused      Active Member of Clubs or Organizations: Patient refused      Marital Status: Patient refused   Housing Stability: Unknown (12/30/2022)    Housing Stability Vital Sign      Unable to Pay for Housing in the Last Year: Patient refused      Unstable Housing in the Last Year: Patient refused         FAMILY HISTORY:  Family History   Problem Relation Age of Onset     Heart Disease Mother      Cancer Father      Breast Cancer Paternal Grandmother      Breast Cancer Paternal Aunt      Diabetes Sister      Colon Cancer Brother      Depression Sister      Obesity Brother         PHYSICAL EXAM:   Constitutional: /57   Pulse 67   Ht 1.626 m (5' 4\")   Wt 90.7 kg (200 lb)   SpO2 98%   BMI 34.33 kg/m       Mental Status: A & O in no acute distress.  Affect is appropriate.  Speech is fluent.  Recent and remote memory are intact.  Attention span and concentration are " normal.    Motor:  Normal bulk and tone all muscle groups of upper and lower extremities.    Strength: 5/5 except slight bilateral HF weakness L>R     Sensory: Sensation intact bilaterally to light touch.     Coordination: ambulates with a cane.     Reflexes; supinator, biceps, triceps, knee/ ankle jerk intact- 3+/4 in bilateral UE with 1+/4 in bilateral LE. Positive bilateral arboleda's    IMAGING:  I personally reviewed all radiographic images         Cc:   No Ref-Primary, Physician             Again, thank you for allowing me to participate in the care of your patient.        Sincerely,        Brianne Hudson MD

## 2023-09-19 NOTE — NURSING NOTE
"Tessa Edwards is a 68 year old female who presents for:  Chief Complaint   Patient presents with    Consult     Low back pain  Right side thigh and knee pain, sometimes left side pain  Referred by Pamela Warner at Spine          Initial Vitals:  /57   Pulse 67   Ht 5' 4\" (1.626 m)   Wt 200 lb (90.7 kg)   SpO2 98%   BMI 34.33 kg/m   Estimated body mass index is 34.33 kg/m  as calculated from the following:    Height as of this encounter: 5' 4\" (1.626 m).    Weight as of this encounter: 200 lb (90.7 kg).. Body surface area is 2.02 meters squared. BP completed using cuff size: regular  Extreme Pain (8)      Jhon Odom  "

## 2023-09-19 NOTE — PROGRESS NOTES
NEUROSURGERY CONSULTATION NOTE      Neurosurgery was asked to see this patient by Pamela Warner* for evaluation of cervical and lumbar stenosis.       CONSULTATION ASSESSMENT AND PLAN:    69 yo female s/p C5-6 MOBI-C by Dr Mcintosh on 8/2/17 for imbalance and hyperflexia and also s/p b/l CTR by Dr Hanson who presents with imbalance as well as claudicating low back pain.  MRI of her cervical spine shows what looks like severe spinal canal stenosis with abnormal cord signal change cervical 5-6 in addition to moderate bilateral facet arthropathy which progressed since 2019.  There is susceptibility from her past Mobi-C at this level.  Does not appear to be any other high-grade stenosis.  We discussed getting a CT of her cervical spine to confirm the narrowing at this level would likely recommend a posterior cervical 5-6 bilateral laminectomies and instrumented fusion and arthrodesis.  Her findings on imaging are likely to worsen. We discussed the natural history of cervical myelopathy and the tendency for patient's symptoms to progress overtime.  We also discussed if he elected not to have surgery any new neurological deficits may not be reversed with the surgery at a later date.  Surgery is done to decompress the spinal cord and stabilize his symptoms with a possibility to improve his current symptoms but that is not guaranteed.  Risks and benefits were discussed in detail including but not limited to infection, hematoma, nerve damage including paralysis, post op radiculitis, durotomy, lack of a sold bone fusion, hardware malfunction, risks associated with the use of general anesthesia, blood clots in the lungs or legs.     In regards to her claudicating low back pain, we reviewed her MRI which shows a degenerative anterolisthesis at lumbar 4-5 resulting in severe spinal canal stenosis with severe bilateral foraminal narrowing in the setting of large bilateral joint effusions given these findings if she would  wish to proceed with lumbar surgery would recommend lumbar 4-5 bilateral laminectomies and posterior instrumented fusion.  Would proceed with cervical surgery prior to lumbar surgery and see how recovery went from cervical surgery prior to scheduling lumbar surgery.     Brianne Hudson MD      HPI:   69 yo female s/p C5-6 MOBI-C by Dr Mcintosh on 8/2/17 for imbalance and hyperflexia and also s/p b/l CTR by Dr Hanson who presents with imbalance. Last 4-5 months more significant worsening of her gait. Ambulates with a cane. Recent diagnosis of peripheral neuropathy.  She thought was more restless legs at first. She now notices it more. Back pain worsens with standing and walking and improves with sitting. Hands are now more clumsy and weak. She was diagnosed with CTS and had CTR but never got better. No significant arm weakness or tingling or numbness. No neck pain currently. She has a left torn rotator cuff.       Past Medical History:   Diagnosis Date    Abnormal CT of the abdomen 10/26/2021    Allergic rhinitis 11/05/2015    Anemia, iron deficiency 10/26/2021    Bilateral carpal tunnel syndrome 07/09/2019    Cervical nerve root compression 07/24/2017    Cervical spinal stenosis     Chronic low back pain without sciatica, unspecified back pain laterality 10/27/2021    Spondylosis,central  stenosis, foraminal encroachment    Colonic mass 11/16/2021    Depression     Depressive disorder     Disease of thyroid gland     Dyspepsia 03/22/2022    Fibromyalgia     GERD (gastroesophageal reflux disease)     History of blood transfusion     Hyperlipemia     Lymphoproliferative disorder (H)     Moderate major depression (H) 03/22/2022    Morbid obesity (H) 03/22/2022    Non-Hodgkin's lymphoma (H) 03/22/2022    Obesity     Osteoarthritis     Osteoarthritis of right knee, unspecified osteoarthritis type 09/12/2019    Other abnormal glucose 03/22/2022    Formatting of this note might be different from the original. Created  by Conversion    Prediabetes 3/22/2022    Formatting of this note might be different from the original. Created by Conversion    Primary localized osteoarthrosis, lower leg 08/27/2007    Restless legs syndrome 10/26/2021    Rotator cuff tear     Sleep apnea     Spinal stenosis of lumbar region without neurogenic claudication 10/27/2021    Spondylosis,central  stenosis, foraminal encroachment    Splenomegaly 10/27/2021    Thyroid nodule 10/27/2021    Urinary frequency 03/22/2022    Urinary retention 03/22/2022    Vocal cord dysfunction 11/05/2015    Weight loss 10/27/2021       Past Surgical History:   Procedure Laterality Date    ARTHROPLASTY SHOULDER Left     ARTHROSCOPY KNEE      ARTHROSCOPY SHOULDER ROTATOR CUFF REPAIR      BACK SURGERY      BIOPSY      CERVICAL DISC ARTHROPLASTY      CERVICAL DISCECTOMY  08/01/2017    C5, C6    COLONOSCOPY N/A 11/05/2021    Procedure: COLONOSCOPY;  Surgeon: Efraín Guerrero MD;  Location: [facility]    CRW LT SHOULDER AP AXILLA 2 VW      HYSTERECTOMY      JOINT REPLACEMENT      LAPAROSCOPIC ASSISTED COLECTOMY Right 11/16/2021    Procedure: LAPAROSCOPIC RIGHT COLECTOMY;  Surgeon: Efraín Guerrero MD;  Location: [facility]    OOPHORECTOMY      RELEASE CARPAL TUNNEL      RELEASE CARPAL TUNNEL      XR MYELOGRAM CERVICAL  05/14/2019    [facility] LAP,CHOLECYSTECTOMY/EXPLORE      Description: Cholecystectomy Laparoscopic;  Recorded: 12/10/2012;    [facility] TOTAL ABDOM HYSTERECTOMY      Description: Total Abdominal Hysterectomy;  Recorded: 12/10/2012;  Comments: 44 Y/O FOR FIBROID TUMORS    [facility] TOTAL KNEE ARTHROPLASTY Right 09/12/2019    Procedure: RIGHT TOTAL KNEE ARTHROPLASTY;  Surgeon: Irvin Canas DO;  Location: [facility];  Service: Orthopedics       REVIEW OF SYSTEMS:  See ROS form under media     MEDICATIONS:    Current Outpatient Medications   Medication Sig Dispense Refill    acalabrutinib (CALQUENCE) 100 MG capsule Take 1 capsule (100 mg) by mouth 2 times daily       acetaminophen (TYLENOL) 500 MG tablet Take 1,000 mg by mouth 3 times daily      calcium-vitamin D (CALCIUM-VITAMIN D) 500 mg(1,250mg) -200 unit per tablet Take 1 tablet by mouth every evening       FLUoxetine (PROZAC) 40 MG capsule Take 1 capsule by mouth once daily 90 capsule 0    gabapentin (NEURONTIN) 300 MG capsule Please take 300mg PO TID 90 capsule 1    multivitamin w/minerals (THERA-VIT-M) tablet Take 1 tablet by mouth daily      naproxen sodium (ALEVE) 220 MG tablet [NAPROXEN SODIUM (ALEVE) 220 MG TABLET] Take 440 mg by mouth 3 (three) times a day with meals.      rOPINIRole (REQUIP) 1 MG tablet TAKE 1 TABLET BY MOUTH TWO TIMES DAILY, 2 AT BEDTIME 120 tablet 3    baclofen (LIORESAL) 10 MG tablet Take 1 tablet (10 mg) by mouth 3 times daily 90 tablet 1         ALLERGIES/SENSITIVITIES:     No Known Allergies    PERTINENT SOCIAL HISTORY:   Social History     Socioeconomic History    Marital status: Single   Tobacco Use    Smoking status: Former     Packs/day: 0.50     Years: 10.00     Pack years: 5.00     Types: Cigarettes     Quit date: 2000     Years since quittin.6    Smokeless tobacco: Never   Substance and Sexual Activity    Alcohol use: Yes     Comment: social    Drug use: No    Sexual activity: Not Currently   Other Topics Concern    Parent/sibling w/ CABG, MI or angioplasty before 65F 55M? No   Social History Narrative    , 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 Socially drinks alcohol.     Social Determinants of Health     Financial Resource Strain: Unknown (2022)    Overall Financial Resource Strain (CARDIA)     Difficulty of Paying Living Expenses: Patient refused   Food Insecurity: Unknown (2022)    Hunger Vital Sign     Worried About Running Out of Food in the Last Year: Patient refused     Ran Out of Food in the Last Year: Patient refused   Transportation Needs: Unknown (2022)    PRAPARE - Transportation     Lack of Transportation  "(Medical): Patient refused     Lack of Transportation (Non-Medical): Patient refused   Physical Activity: Unknown (12/30/2022)    Exercise Vital Sign     Days of Exercise per Week: Patient refused     Minutes of Exercise per Session: Patient refused   Stress: Unknown (12/30/2022)    [employer] - Occupational Stress Questionnaire     Feeling of Stress : Patient refused   Social Connections: Unknown (12/30/2022)    Social Connection and Isolation Panel [NHANES]     Frequency of Communication with Friends and Family: Patient refused     Frequency of Social Gatherings with Friends and Family: Patient refused     Attends [employer] Services: Patient refused     Active Member of Clubs or Organizations: Patient refused     Marital Status: Patient refused   Housing Stability: Unknown (12/30/2022)    Housing Stability Vital Sign     Unable to Pay for Housing in the Last Year: Patient refused     Unstable Housing in the Last Year: Patient refused         FAMILY HISTORY:  Family History   Problem Relation Age of Onset    Heart Disease Mother     Cancer Father     Breast Cancer Paternal Grandmother     Breast Cancer Paternal Aunt     Diabetes Sister     Colon Cancer Brother     Depression Sister     Obesity Brother         PHYSICAL EXAM:   Constitutional: /57   Pulse 67   Ht 1.626 m (5' 4\")   Wt 90.7 kg (200 lb)   SpO2 98%   BMI 34.33 kg/m       Mental Status: A & O in no acute distress.  Affect is appropriate.  Speech is fluent.  Recent and remote memory are intact.  Attention span and concentration are normal.    Motor:  Normal bulk and tone all muscle groups of upper and lower extremities.    Strength: 5/5 except slight bilateral HF weakness L>R     Sensory: Sensation intact bilaterally to light touch.     Coordination: ambulates with a cane.     Reflexes; supinator, biceps, triceps, knee/ ankle jerk intact- 3+/4 in bilateral UE with 1+/4 in bilateral LE. Positive bilateral " robles's    IMAGING:  I personally reviewed all radiographic images         Cc:   No Ref-Primary, Physician

## 2023-09-20 ENCOUNTER — TELEPHONE (OUTPATIENT)
Dept: NEUROSURGERY | Facility: CLINIC | Age: 68
End: 2023-09-20
Payer: COMMERCIAL

## 2023-09-20 NOTE — TELEPHONE ENCOUNTER
Date:9/20/23  (Provide the date when patient was called)  Provider: Radiology Department  (with whom patient needs to schedule with)  Detailed message: LM to call back to schedule CT Scan prior to surgery.

## 2023-10-01 ENCOUNTER — HOSPITAL ENCOUNTER (OUTPATIENT)
Dept: CT IMAGING | Facility: CLINIC | Age: 68
Discharge: HOME | End: 2023-10-01
Attending: SURGERY | Admitting: SURGERY
Payer: COMMERCIAL

## 2023-10-01 DIAGNOSIS — M48.02 CERVICAL STENOSIS OF SPINAL CANAL: ICD-10-CM

## 2023-10-01 DIAGNOSIS — M48.061 SPINAL STENOSIS, LUMBAR REGION, WITHOUT NEUROGENIC CLAUDICATION: ICD-10-CM

## 2023-10-01 PROCEDURE — 72125 CT NECK SPINE W/O DYE: CPT

## 2023-10-04 ENCOUNTER — HOSPITAL ENCOUNTER (INPATIENT)
Facility: CLINIC | Age: 68
Setting detail: SURGERY ADMIT
End: 2023-10-04
Attending: SURGERY | Admitting: SURGERY
Payer: COMMERCIAL

## 2023-10-04 ENCOUNTER — TELEPHONE (OUTPATIENT)
Dept: NEUROSURGERY | Facility: CLINIC | Age: 68
End: 2023-10-04
Payer: COMMERCIAL

## 2023-10-04 NOTE — TELEPHONE ENCOUNTER
Date: 10/4/2023  (Provide the date when patient was called)  Provider: Ke thacker/ Tristan  (with whom patient needs to schedule with)  Detailed message:  Ldvm for patient to call and jr tele-visit with Tristan to review imaging

## 2023-10-09 ENCOUNTER — TELEPHONE (OUTPATIENT)
Dept: NEUROSURGERY | Facility: CLINIC | Age: 68
End: 2023-10-09
Payer: COMMERCIAL

## 2023-10-10 ENCOUNTER — VIRTUAL VISIT (OUTPATIENT)
Dept: NEUROSURGERY | Facility: CLINIC | Age: 68
End: 2023-10-10
Payer: COMMERCIAL

## 2023-10-10 DIAGNOSIS — M48.02 CERVICAL STENOSIS OF SPINAL CANAL: Primary | ICD-10-CM

## 2023-10-10 PROCEDURE — 99207 PR NO CHARGE LOS: CPT | Performed by: SURGERY

## 2023-10-10 NOTE — LETTER
10/10/2023         RE: Tessa Edwards  [address] [facility] [address]        Dear Colleague,    Thank you for referring your patient, Tessa Edwards, to the [facility]INE [facility] NEUROSURGERY. Please see a copy of my visit note below.    Billable Telephone Visit    Recommend CT myelogram given CT results discordant from MRI cervical. She will follow-up after CT myelogram.     Location of patient: [place]  Location of Dr Hudson: [facility] [place], [place]   Length of Telephone visit: 2: 45 minutes    Brianne Hudson MD        Again, thank you for allowing me to participate in the care of your patient.        Sincerely,        Brianne Hudson MD

## 2023-10-10 NOTE — PROGRESS NOTES
Billable Telephone Visit    Recommend CT myelogram given CT results discordant from MRI cervical. She will follow-up after CT myelogram.     Location of patient: [place]  Location of Dr Hudson: [place], [place]   Length of Telephone visit: 2: 45 minutes    Brianne Hudson MD

## 2023-10-16 ENCOUNTER — TELEPHONE (OUTPATIENT)
Dept: NEUROSURGERY | Facility: CLINIC | Age: 68
End: 2023-10-16
Payer: COMMERCIAL

## 2023-10-17 NOTE — TELEPHONE ENCOUNTER
Called Tessa to discuss the necessity for obtaining a cervical CT/Myelogram - she verbalized understanding and agreement.  Briseyda Acharya RN, CNRN

## 2023-10-23 ENCOUNTER — TELEPHONE (OUTPATIENT)
Dept: NEUROSURGERY | Facility: CLINIC | Age: 68
End: 2023-10-23
Payer: COMMERCIAL

## 2023-10-25 ENCOUNTER — TELEPHONE (OUTPATIENT)
Dept: INTERVENTIONAL RADIOLOGY/VASCULAR | Facility: HOSPITAL | Age: 68
End: 2023-10-25
Payer: COMMERCIAL

## 2023-10-30 ENCOUNTER — HOSPITAL ENCOUNTER (OUTPATIENT)
Dept: RADIOLOGY | Facility: HOSPITAL | Age: 68
Discharge: HOME | End: 2023-10-30
Attending: SURGERY
Payer: COMMERCIAL

## 2023-10-30 ENCOUNTER — HOSPITAL ENCOUNTER (OUTPATIENT)
Dept: CT IMAGING | Facility: HOSPITAL | Age: 68
Discharge: HOME | End: 2023-10-30
Attending: SURGERY
Payer: COMMERCIAL

## 2023-10-30 VITALS
OXYGEN SATURATION: 95 % | SYSTOLIC BLOOD PRESSURE: 127 MMHG | DIASTOLIC BLOOD PRESSURE: 60 MMHG | RESPIRATION RATE: 16 BRPM | HEART RATE: 66 BPM | TEMPERATURE: 98.1 F

## 2023-10-30 DIAGNOSIS — M48.02 CERVICAL STENOSIS OF SPINAL CANAL: ICD-10-CM

## 2023-10-30 PROCEDURE — 72126 CT NECK SPINE W/DYE: CPT

## 2023-10-30 PROCEDURE — 255N000002 HC RX 255 OP 636: Performed by: SURGERY

## 2023-10-30 PROCEDURE — 62302 MYELOGRAPHY LUMBAR INJECTION: CPT

## 2023-10-30 RX ORDER — LOPERAMIDE HCL 2 MG
2 CAPSULE ORAL 3 TIMES DAILY
COMMUNITY

## 2023-10-30 RX ADMIN — IOHEXOL 18 ML: 180 INJECTION INTRAVENOUS at 13:55

## 2023-10-30 NOTE — PROCEDURES
Neurointerventional Surgery    Procedure:  Cervical myelogram     Radiologist:  Irvin Abreu MD    Fluoro Time:  1.1 minutes    Air Kerma:  47.5 mGy     Complications:  None     Contrast:  18 ml Omni 180     EBL: Minimal    Preliminary Findings (See dictation for full detail)  Cervical and lumbar degenerative chances. See CT results.     Assess/Plan:  Bedrest for 2 hours   Discharge when meets criteria        Irvin Abreu MD  Pager: [phone]  Emergency pager: [phone]  Office: [phone]

## 2023-10-30 NOTE — PROGRESS NOTES
Reviewed discharge instructions with pt. Verbalized understanding. Pt accompanied to car to meet ride via W/C.

## 2023-11-01 ENCOUNTER — TELEPHONE (OUTPATIENT)
Dept: NEUROSURGERY | Facility: CLINIC | Age: 68
End: 2023-11-01
Payer: COMMERCIAL

## 2023-12-20 ENCOUNTER — TRANSFERRED RECORDS (OUTPATIENT)
Dept: HEALTH INFORMATION MANAGEMENT | Facility: CLINIC | Age: 68
End: 2023-12-20
Payer: COMMERCIAL

## 2023-12-27 DIAGNOSIS — G25.81 RESTLESS LEG SYNDROME: ICD-10-CM

## 2023-12-27 RX ORDER — ROPINIROLE 1 MG/1
TABLET, FILM COATED ORAL
Qty: 120 TABLET | Refills: 0 | OUTPATIENT
Start: 2023-12-27

## 2023-12-27 NOTE — TELEPHONE ENCOUNTER
Refill request for: rOPINIRole (REQUIP) 1 MG tablet    Directions: TAKE 1 TABLET BY MOUTH TWO TIMES DAILY, 2 AT BEDTIME     LOV: 7/11/23  NOV: Not scheduled. Per last OV follow up PRN- Do you want to see her back July 2024?    30 day supply with 0 refills Medication T'd for review and signature  Celena Argueta CMA on 12/27/2023 at 10:24 AM  [facility]

## 2023-12-27 NOTE — TELEPHONE ENCOUNTER
What is the reasoning for refusal?   Patient was advised to follow up PRN. Does she need appointment?

## 2023-12-27 NOTE — TELEPHONE ENCOUNTER
You prescribed medication on 5/8/23.   Her primary retired and you were taking over the medication refills for this medication. See refill encounter 5/5/23.

## 2023-12-27 NOTE — TELEPHONE ENCOUNTER
Would she be establishing with a different primary?  I am not a primary care provider.  It was a one-time refill only.  What is this medication prescribed for?  If it is for restless leg it is not properly dosed?  If we need to address the restless leg she will need an appointment.

## 2023-12-27 NOTE — TELEPHONE ENCOUNTER
Left message for patient to return call to clinic.   Need to clarify if patient is wanting a refill of Requip and if so she should schedule an appointment with Dr. Polanco for med management.

## 2023-12-27 NOTE — TELEPHONE ENCOUNTER
It is for restless legs. I do not see in her chart that she has established with a new pcp.   You put the following in your note.

## 2023-12-27 NOTE — TELEPHONE ENCOUNTER
That's primary care note.   I can do temp refill. She will need appointment to establish this condition.

## 2023-12-28 ENCOUNTER — OFFICE VISIT (OUTPATIENT)
Dept: NEUROSURGERY | Facility: CLINIC | Age: 68
End: 2023-12-28
Payer: COMMERCIAL

## 2023-12-28 ENCOUNTER — PREP FOR PROCEDURE (OUTPATIENT)
Dept: NEUROLOGY | Facility: CLINIC | Age: 68
End: 2023-12-28

## 2023-12-28 VITALS — OXYGEN SATURATION: 97 % | SYSTOLIC BLOOD PRESSURE: 128 MMHG | HEART RATE: 68 BPM | DIASTOLIC BLOOD PRESSURE: 60 MMHG

## 2023-12-28 DIAGNOSIS — M48.062 SPINAL STENOSIS OF LUMBAR REGION WITH NEUROGENIC CLAUDICATION: Primary | ICD-10-CM

## 2023-12-28 DIAGNOSIS — G83.4 CAUDA EQUINA COMPRESSION (H): ICD-10-CM

## 2023-12-28 DIAGNOSIS — M54.16 LUMBAR RADICULOPATHY: Primary | ICD-10-CM

## 2023-12-28 PROCEDURE — 99214 OFFICE O/P EST MOD 30 MIN: CPT | Performed by: SURGERY

## 2023-12-28 ASSESSMENT — PAIN SCALES - GENERAL: PAINLEVEL: SEVERE PAIN (6)

## 2023-12-28 NOTE — NURSING NOTE
"Tessa Edwards is a 68 year old female who presents for:  Chief Complaint   Patient presents with    Follow Up     Low left back pain  Right leg pain  Pain in both hands        Initial Vitals:  /60   Pulse 68   SpO2 97%  Estimated body mass index is 34.33 kg/m  as calculated from the following:    Height as of 9/19/23: 5' 4\" (1.626 m).    Weight as of 9/19/23: 200 lb (90.7 kg).. There is no height or weight on file to calculate BSA. BP completed using cuff size: regular  Severe Pain (6)    Jhon Odom  "

## 2023-12-28 NOTE — LETTER
12/28/2023         RE: Tessa Edwards  [address] [place] [facility] [address]        Dear Colleague,    Thank you for referring your patient, Tessa Edwards, to the [facility] SPINE AND NEUROSURGERY. Please see a copy of my visit note below.    NEUROSURGERY FOLLOW UP  NOTE    Tessa Edwards comes today in follow-up.     Past CTR multiple times. Ongoing tingling and numbness in her hands. Some neck pain ongoing as well. MRI cervical spine was concerning for cord compression at prior mobi-c level, cervical 5-6.    However, she underwent a CT myelogram which showed only mild spinal canal stenosis without any severe spinal cord compression.  Numbness and tingling likely residual from past multiple CTR or past myelopathy.     Ongoing low back pain. This gets worse with standing and walking. Substantial right leg pain. Wiggles to try to get comfortable with sitting. Can be 12/10. Right posterolateral leg to her knee. Also pain behind the knees. Uncomfortable to sleep. Gabapentin, Requip, and taking CBD gummies to help her sleep. She also did fall due to the pain. Feet have numbness. Legs feel weak with prolonged standing. Using a cane. Bowel and bladder loss. This is getting worse and is newer. On imodium.     Normal bone quality in 2021.    PHYSICAL EXAM:   Constitutional: /60   Pulse 68   SpO2 97%      Mental Status: A & O in no acute distress.  Affect is appropriate.  Speech is fluent.  Recent and remote memory are intact.  Attention span and concentration are normal.     Motor:  Normal bulk and tone all muscle groups of lower extremities.     Sensory: Sensation intact bilaterally to light touch in LE     Reflexes; knee/ ankle jerk 1+     IMAGING:   I personally reviewed all radiographic images        CONSULTATION ASSESSMENT AND PLAN:    Patient has ongoing symptoms of cauda equina compression including bowel bladder loss as well as pain and weakness in her low back and lower  extremities.Her spine shows severe spinal canal stenosis at lumbar 4-5 with large facet effusions at this level suspicious for segmental instability.  Does appear to be some worsening of her spondylolisthesis from sitting to standing positions. Recommend bilateral lumbar 4-lumbar 5 laminectomies, medial facetectomies, foraminotomies and right transforaminal lumbar interbody fusion and bilateral posterior instrumented fusion with posterolateral arthrodesis and use of stealth.  Risks and benefits were discussed in detail including but not limited to infection, hematoma, nerve damage including paralysis, post op radiculitis, durotomy, lack of a sold bone fusion, hardware malfunction, risks associated with the use of general anesthesia, blood clots in the lungs or legs. She agreed to proceed.      Brianne Hudson MD      CC:     [facility]  [id number] [place] [id number]      Again, thank you for allowing me to participate in the care of your patient.        Sincerely,        Brianne Hudson MD

## 2023-12-28 NOTE — PROGRESS NOTES
NEUROSURGERY FOLLOW UP  NOTE    Tessa Edwards comes today in follow-up.     Past CTR multiple times. Ongoing tingling and numbness in her hands. Some neck pain ongoing as well. MRI cervical spine was concerning for cord compression at prior mobi-c level, cervical 5-6.    However, she underwent a CT myelogram which showed only mild spinal canal stenosis without any severe spinal cord compression.  Numbness and tingling likely residual from past multiple CTR or past myelopathy.     Ongoing low back pain. This gets worse with standing and walking. Substantial right leg pain. Wiggles to try to get comfortable with sitting. Can be 12/10. Right posterolateral leg to her knee. Also pain behind the knees. Uncomfortable to sleep. Gabapentin, Requip, and taking CBD gummies to help her sleep. She also did fall due to the pain. Feet have numbness. Legs feel weak with prolonged standing. Using a cane. Bowel and bladder loss. This is getting worse and is newer. On imodium.     Normal bone quality in 2021.    PHYSICAL EXAM:   Constitutional: /60   Pulse 68   SpO2 97%      Mental Status: A & O in no acute distress.  Affect is appropriate.  Speech is fluent.  Recent and remote memory are intact.  Attention span and concentration are normal.     Motor:  Normal bulk and tone all muscle groups of lower extremities.     Sensory: Sensation intact bilaterally to light touch in LE     Reflexes; knee/ ankle jerk 1+     IMAGING:   I personally reviewed all radiographic images        CONSULTATION ASSESSMENT AND PLAN:    Patient has ongoing symptoms of cauda equina compression including bowel bladder loss as well as pain and weakness in her low back and lower extremities.Her spine shows severe spinal canal stenosis at lumbar 4-5 with large facet effusions at this level suspicious for segmental instability.  Does appear to be some worsening of her spondylolisthesis from sitting to standing positions. Recommend bilateral lumbar  4-lumbar 5 laminectomies, medial facetectomies, foraminotomies and right transforaminal lumbar interbody fusion and bilateral posterior instrumented fusion with posterolateral arthrodesis and use of stealth.  Risks and benefits were discussed in detail including but not limited to infection, hematoma, nerve damage including paralysis, post op radiculitis, durotomy, lack of a sold bone fusion, hardware malfunction, risks associated with the use of general anesthesia, blood clots in the lungs or legs. She agreed to proceed.      Brianne Hudson MD      CC:     Clinic - [facility]  [address] [facility] [address]

## 2023-12-28 NOTE — PATIENT INSTRUCTIONS
Recommend lumbar 4-5 laminectomies and instrumented fusion.        Please review COMPLETE information about your proposed surgery, pre-operative requirements, post-operative course and expectations - available in a folder provided to you in clinic!    Your surgery scheduler will call you within 3 business days to begin the process of scheduling your surgery and appointments.     Pre-Operative    Pre-operative physical / Lab work with primary care physician within 30 days of surgical date. We prefer the pre-op exam to be done 2 weeks prior to surgery. We also prefer pre-op lab work be done at [facility] outpatient lab, 2 weeks prior to surgery.     If all pre-op appointments/test are not completed prior to your surgery date, you will be asked to reschedule your surgery.           As part of preparation for your upcoming procedure your primary care doctor may order a test to rule out a COVID-19 infection. This is no longer a requirement prior to surgery.     Readiness Visits    Prior to surgery, you may have a telephone or in person readiness visit with our RN team to discuss your upcomming surgery, results of your pre-op physical, and lab work.   If you will require a collar/neck brace after surgery, you will be fitted for one at your readiness visit prior to surgery (scheduled by the surgery scheduler).     Shower procedure    Hibiclens shower: Use one packet the night before surgery and one packet the morning of surgery for a whole body shower. Avoid face, hair, and genitals.      Eating/Drinking    Stop all solid foods 8 hours before surgery.  Stop all clear liquids 2 hours prior to arrival time     Clear liquids include water, clear juice, black coffee, or clear tea without milk, Gatorade, clear soda.     Medications - please refer to the pre-operative medication instructions sheet in your folder    Hold Aspirin, NSAIDs (Advil/Ibuprofen, Indocin, Naproxen,Nuprin,Relafen/Nabumetone,  Diclofenac,Meloxicam, Aleve, Celebrex) and all vitamins and supplements x 7-10 days prior to surgical date  You can take Tylenol (Acetaminophen) for pain up until the date of your surgery   Do not exceed 3,000 mg per day   Any other medications prescribed, please discuss with your primary care provider at your pre-operative physical. Please discuss when/if it is safe for you to stop taking blood thinners with your primary care provider.   We will NOT provide pain medications prior to surgery. We will prescribe post-op pain medications for up to 6 weeks after surgery.       FMLA/Short-term disability    If you are currently employed, you will likely need to be off work for 4-6 weeks for post-op recovery and healing.  Please fax any FMLA/short term disability paperwork to [phone], mail it into the clinic, drop it off in person, or send via a [employer] message.   You may also call our clinic with the date in which you'd like to return to work, and we can provide a work letter to your employer  We will support Short-Term Disability up to 12 weeks, beginning the date of your surgery. We do NOT support Long-Term Disability/Social Security Benefits.     Pain Management after surgery    Dealing with pain    As your body heals, you might feel a stabbing, burning, or aching pain. You may also have some numbness.  Everyone feels pain differently, we may ask you to rate your pain using a pain scale. This will let us know how much pain you feel.   Keep in mind that medicine won't take away all of your pain. It helps to try other ways to relax and ease pain.     Things to help with pain    After surgery, we will give you medicine for your pain. These medications work well, but they can make you drowsy, itchy, or sick to your stomach, and constipated. Try to take narcotics with food if they cause nausea.   For mild to moderate pain, you can take medication such as Tylenol or Ibuprofen. These can be used with narcotics and  muscle relaxants. *If you have had a fusion: do NOT use NSAIDs for 6 months after surgery.   Do NOT drive while taking narcotic pain medication  Do NOT drink alcohol while using narcotic pain medication  You can utilize ice as needed (no longer than 20 minutes at one time) you may apply this over your covered incision  Utilize heat for muscle spasms, do not apply heat over your incision  If a muscle relaxer is prescribed, please do NOT take it at the same time as your narcotic pain medication. Take them at least 90 minutes apart.   You may also use pain cream/patches on sore muscles. Do NOT apply these on your incision. Patches may be cut in 1/2 if needed.     *After your surgery, if you will be staying in-patient, a nursing team will be monitoring you closely throughout your stay and communicate your health status to your surgeon and other providers.  You will be seen by Advanced Practice Providers (e.g., nurse practitioners, clinic nurse specialists, and physician assistants) who will check on you regularly to assess the status of your surgical recovery.     Incision Care    Look at your incision site every day. You  may need a mirror, or family member to help you.   Do not submerge your incision in water such as pools, hot tubs, baths for at least 6 weeks or until incision is healed  You may get your incision wet in the shower. Allow water and soap to run over incision, and gently pat dry.   Remove the dressing the day after you are discharged from the hospital. Keep the incision clean and dry at all times. This may require several bandage changes.   Contact us right away if you have:   Fever over 101 degrees farenheit  Green or yellow drainage (pus) from your incision or increased bloody drainage   Redness, swelling, or warmth at your surgery site   Notify the clinic [phone].    Activity Restrictions    For the first 6 weeks, no lifting,pushing, or pulling > 5-10 pounds, no bending, twisting.  Use the  stairs in moderation   Walking: Walking is the best way to start exercise after surgery. Take short frequent walks. You may gradually increase the distance as tolerated. If you feel pain, decrease your activity, but DO NOT stop walking. Walking will help you regain strength.  Avoid prolonged positioning for longer than 30 minutes (change positions frequently while awake)  No contact sports until after follow up visit  No high impact activities such as; running/jogging, snowmobile or 4 brito riding or any other recreational vehicles until deemed safe by your surgeon/care team.   Please call the clinic if you develop any of the following symptoms:  Swelling and/or warmth in one or both legs  Pain or tenderness in your leg, ankle, foot, or arm   Red or discolored/pale skin     Post-Op Follow Up Appointments    We will call you to schedule these appointments after your discharge from the hospital.   Incision assessment within 2 weeks with a Registered Nurse   6 week post-op with a Nurse Practitioner/Physician Assistant. Your surgeon will be available on this day.

## 2023-12-28 NOTE — TELEPHONE ENCOUNTER
[facility] Call Center    Phone Message    May a detailed message be left on voicemail: yes     Reason for Call: Other: Pt was returning a call from [employer]. Please return [employer]'s call at [phone]    Action Taken: Message routed to:  Other: [facility]     Travel Screening: Not Applicable

## 2023-12-29 ENCOUNTER — TELEPHONE (OUTPATIENT)
Dept: NEUROSURGERY | Facility: CLINIC | Age: 68
End: 2023-12-29
Payer: COMMERCIAL

## 2023-12-29 NOTE — TELEPHONE ENCOUNTER
Left a voice message asking the patient to return the call to receive surgery details for 2/23/24.

## 2023-12-29 NOTE — TELEPHONE ENCOUNTER
Left message for patient to return call.   If patient wants this medication refilled, please schedule appointment with Dr. Polanco.

## 2024-01-04 NOTE — TELEPHONE ENCOUNTER
Patient is scheduled for surgery on 2/23/24. I gave the patient surgery details, and she verbalized understanding.

## 2024-01-09 ENCOUNTER — ANCILLARY PROCEDURE (OUTPATIENT)
Dept: MAMMOGRAPHY | Facility: CLINIC | Age: 69
End: 2024-01-09
Payer: COMMERCIAL

## 2024-01-09 DIAGNOSIS — Z12.31 VISIT FOR SCREENING MAMMOGRAM: ICD-10-CM

## 2024-01-09 PROCEDURE — 77067 SCR MAMMO BI INCL CAD: CPT | Mod: TC | Performed by: RADIOLOGY

## 2024-02-08 ENCOUNTER — TELEPHONE (OUTPATIENT)
Dept: NEUROSURGERY | Facility: CLINIC | Age: 69
End: 2024-02-08
Payer: COMMERCIAL

## 2024-02-08 NOTE — TELEPHONE ENCOUNTER
[facility] Call Center    Phone Message    May a detailed message be left on voicemail: yes     Reason for Call: Other: patient calling today as she has lost the list of labs needed for her upcoming surgery. He appt is today at 10:30 am and she is requesting this list of labs be faxed over to her provider. The fax number is: [phone] - [facility]      Action Taken: Other: TE     Travel Screening: Not Applicable

## 2024-02-08 NOTE — H&P (VIEW-ONLY)
[facility]      Preoperative Consultation   TITUS Edwards   : 1955   Gender: female    Date of Encounter: 2024    Nursing Notes:   Gaby Baum  2024 10:47 AM  Sign at exiting of workspace  Chief Complaint   Patient presents with     Pre-Op Exam       Additional visit information (chief complaint/health maintenance) shared by patient:     Health Maintenance Due   Topic Date Due     Depression screening for age 12+  Never done     Hepatitis C screening for age 18-79  Never done     Mammogram for age 45-75  04/10/2013     Colonoscopy through age 75  2016     DEXA/DXA scan for age 65+  Never done     Medicare Wellness for age 65+  Never done     Lipids for age 45-75  10/18/2023     COVID-19 vaccine series ( season) 2024       Health maintenance reviewed with patient Yes    Patient presents for an in-person office visit: alone  Communication Method: Patient is active on [employer] and has been instructed that results/communications will be made via [employer]  If a phone call is needed, the preferred number is:  Mobile   Home Phone [phone]   Mobile [phone]     May we leave a detailed message at this number? Yes    Gaby Baum MA    ....................  2024   10:44 AM      TITUS Edwards is a 68 y.o. female (1955) who presents for preop evaluation undergoing Lumbar radiculopthy 4 and 5     Date of Surgery: 24  Surgical Specialty: [facility]/Surgical Facility:  [facility]   Fax number: [phone]  Surgery type: inpatient  Primary Physician: Anne-Marie Doll MA...................  2024   10:47 AM         History of Present Illness   TITUS Edwards is a 68 y.o. female who presents to the clinic for a pre-operative evaluation.  Patient is undergoing L4/L5 Fusion.     Fever NONE  Chest Pain NONE  Shortness of Breath NONE  URI/COVID-19 symptoms NONE  UTI symptoms  NONE  Tobacco use NONE (FORMER, Q 1998)  SUZIE NONE      Review of Systems   A comprehensive review of systems was negative except for items noted in HPI.    Patient Active Problem List   Diagnosis Code     Degenerative joint disease of knee M17.9     Myalgia M79.10     Arthralgia M25.50     Depression F32.A     Hyperlipidemia E78.5     Heel spur M77.30     Postmenopausal Z78.0     Left upper lobe pneumonia J18.9     Lung nodules R91.8     Health Maintanence Z00.00     Vocal cord dysfunction J38.3     Allergic rhinitis J30.9     Bilateral incipient cataracts H26.9     Morbid obesity (HC) E66.01     Lymphoproliferative disorder (HC) D47.9     Current Outpatient Medications   Medication Sig     acalabrutinib (Calquence) 100 mg capsule Take 100 mg by mouth. 2 times per day     amoxicillin (AMOXIL) 500 mg capsule Take 4 capsules by mouth one time. PRIOR TO DENTAL VISITS ONLY     calcium with vitamin D3 (OS-KAYLAN 500 + D) tablet Calcium-Cholecalciferol Oral 600 mg-10 mcg (400 unit)    1 daily    active     famotidine (PEPCID) 10 mg tablet Famotidine Oral    QD    inactive     FLUoxetine (PROZAC) 40 mg capsule Take 1 Capsule (40 mg) by mouth every morning.     gabapentin (NEURONTIN) 100 mg capsule TAKE 1 CAPSULE BY MOUTH THREE TIMES DAILY     gabapentin (NEURONTIN) 300 mg capsule Take 1 tablet (300mg) by mouth once daily prn for pain.     loperamide (IMODIUM) 2 mg capsule Take 2 mg by mouth.     MULTIPLE VITAMIN, WOMENS TAB 1 daily     naproxen (NAPROSYN) 500 mg tablet Take 500 mg by mouth.     omeprazole (PRILOSEC OTC) 20 mg tablet Take 1 tablet by mouth once daily.     pseudoephedrine-guaFENesin CR,  mg, (GUAIFENEX PSE 60) tablet      ropinirole HCl (ROPINIROLE ORAL) 10 mg at bed times  or up to 3 times per day     No current facility-administered medications for this visit.     Medications have been reviewed by me and are current to the best of my knowledge and ability.     No Known Allergies  Past Surgical History:  "  . Laterality Date     BLADDER SUSPENSION       HYSTERECTOMY  2000    fibroids     Laparoscopic cholecystectomy  2010     MYOMECTOMY  1985     Social History     Tobacco Use     Smoking status: Former     Current packs/day: 0.00     Types: Cigarettes     Quit date: 1998     Years since quittin.8     Smokeless tobacco: Never     Tobacco comments:     quit    smokes outside   Vaping Use     Vaping Use: Never used   Substance Use Topics     Alcohol use: Yes     Comment: ocassionally     Drug use: Yes     Comment: thc gummies 1/4 of one     Family History   Problem Relation Age of Onset     Arthritis Sister         rheumatoid arthritis     Cancer Father         Pancreatic cancer     Alcohol/Drug Father      Heart Disease Mother      Cancer Mother         Melanoma     Other Brother         Idiopathic pulmonary fibrosis     Hypertension Brother      Cancer-breast Paternal Grandmother        PAST DIFFICULTY WITH ANESTHESIA:  Most recent surgery (about 1 year ago) - patient reports oxygen levels were dropping so had to stop early.  After surgery ended 02 improved and no further or ongoing issues.     Physical Exam   /68   Pulse 70   Ht 1.6 m (5' 3\")   Wt 103.1 kg (227 lb 6.4 oz)   SpO2 97%   BMI 40.28 kg/m   Body mass index is 40.28 kg/m .  General Appearance: Pleasant, alert, appropriate appearance for age. No acute distress  Head Exam: Normocephalic, atraumatic.  Eye Exam:  Normal external eye, conjunctiva, lids.  Ear Exam: Normal TM's bilaterally. Normal auditory canals and external ears. Non-tender.  Nose Exam: Normal external nose, mucous membranes.  OroPharynx Exam:  Dental hygiene adequate. Normal buccal mucosa. Normal pharynx.  Neck Exam:  Supple.  Chest/Respiratory Exam: Normal chest wall and respirations. Clear to auscultation.  Cardiovascular Exam: Regular rate and rhythm. S1, S2, no murmur, click, gallop, or rubs.  Musculoskeletal Exam: Full ROM of upper and lower " extremities.   Skin: no rash or abnormalities  Neurologic Exam: Nonfocal, normal gross motor function and tone. Coordination normal. No tremor. Normal gait.  Psychiatric Exam: Alert and oriented - appropriate affect. Mood, thought process and judgment normal.       Assessment / Plan       The Pre-Op Tool    Recommendations      Intermediate Risk Procedure    Risk of CV Complication (RCRI)  0.5%    Current Cardiac Status  Good exertional capacity ( > 4 mets )    Cardiac History  No history of coronary artery disease    COVID-19  COVID-19 > 7 weeks ago, now asymptomatic: proceed with surgery as planned.           Labs  HGB within last 30 days  PLT within last 30 days  EKG  Baseline EKG within the last 12 months  CXR  Not indicated    Stress Testing  Not indicated    * Testing recommendations are intended to assist, but not direct, clinical decisions.           Type & Screen should be obtained by Anesthesia only if the risk of transfusion is > 5% for the procedure         Hold Naproxen (Aleve) for 4 days prior to the procedure.  Take your other medications as usual prior to the procedure  Hold vitamins and/or supplements for 1 week prior to the procedure  Hold fish oil for 2 weeks prior to the procedure  Okay to take Acetaminophen (Tylenol) up until the procedure    * Medication recommendations are not intended to be exhaustive; they are limited to common medications that are potentially dangerous if incorrectly managed          Labs  * Data supports elimination of  routine  laboratory testing in favor of focused,  indicated  testing based on medical co-morbidities. A 2009 study randomized 1061 patients undergoing ambulatory, non-cataract surgery to routine or to indicated testing. Perioperative adverse events were similar (Anesthesia & Analgesia 2009;108:467-75; Anesthesiol. Clin. 2016 Mar;34(1):43-58).  Stress Testing  * The current ACC/AHA guideline states that 'non-invasive stress testing is not useful for patients  [with no clinical risk factors] undergoing noncardiac surgery' ([facility]. 2014;64(21);e1-76.).     Session ID: [id number]  Endnotes and bibliography available upon request: [email or web address]    Labs: pending  ECG: no - completed 10/24/2023    Pre-op examination  -     CBC W PLT NO DIFF; Future  -     VITAMIN D 25 (DEFICIENCY); Future  -     BASIC METABOLIC PANEL; Future  -     PROTIME-INR; Future  -     CBC W PLT NO DIFF  -     VITAMIN D 25 (DEFICIENCY)  -     BASIC METABOLIC PANEL  -     PROTIME-INR    Cervical radicular pain    Spinal stenosis of lumbar region with neurogenic claudication    Body mass index (BMI) 40.0-44.9, adult (HC)  -     VITAMIN D 25 (DEFICIENCY); Future  -     VITAMIN D 25 (DEFICIENCY)    Primary osteoarthritis, other specified site  -     PROTIME-INR; Future  -     PROTIME-INR    Patient is cleared, pending tests ordered today, for planned procedure.     Electronically Signed by:   PILY Jj   2/8/2024    CBC: WBC 12.1 otherwise WNL  BMP: Cr 0.93 BUN 24 eGFR 66 otherwise WNL  Vit D: 38  INR: 11.1    Patient's active problems diagnostically and therapeutically are optimized for planned procedure.

## 2024-02-14 DIAGNOSIS — G25.81 RESTLESS LEG SYNDROME: ICD-10-CM

## 2024-02-14 RX ORDER — ROPINIROLE 1 MG/1
TABLET, FILM COATED ORAL
Qty: 180 TABLET | Refills: 0 | Status: SHIPPED | OUTPATIENT
Start: 2024-02-14 | End: 2024-03-27

## 2024-02-14 NOTE — TELEPHONE ENCOUNTER
Refill request for: Ropinerole    Directions: Take 1 tablet BID     LOV: 7/11/23  NOV:  PRN     90 day supply with 0 refills Medication T'd for review and signature

## 2024-02-16 ENCOUNTER — VIRTUAL VISIT (OUTPATIENT)
Dept: NEUROSURGERY | Facility: CLINIC | Age: 69
End: 2024-02-16
Payer: COMMERCIAL

## 2024-02-16 DIAGNOSIS — Z01.818 PRE-OP TESTING: Primary | ICD-10-CM

## 2024-02-16 PROCEDURE — 99207 PR NO CHARGE NURSE ONLY: CPT | Mod: 93

## 2024-02-16 NOTE — PROGRESS NOTES
Called patient, discussed surgery, post-op course, expectations, follow up plan.    Reviewed H&P from - 2/8/24  Labs, EKG - Elevated WBC count. CBC was redrawn by [facility] today, will watch for result and address if needed.    MRI done on  - in [place]    To OR as planned.     Check in - 5:30 AM    Nothing to eat or drink after midnight the night before surgery.     Reviewed with patient: Arrive 2 hours prior to scheduled surgery.    Continue to refrain from NSAIDS (Ibuprofen, Aleve, Naprosyn), ASA, Over the counter herbal medications or supplements, anti-coagulants and blood thinners.     Patient confirmed they have help/assistance in place at home upon discharge    Instructions: using a washcloth and a bottle of provided Hibiclens, wash your body, avoiding your face and genitals. Preferably, shower the night before surgery and the morning of surgery using a half a bottle each time for your whole body shower.    Patient was informed that we will provide up to 6 weeks prescription of pain medication for post-operative pain.    Instructed patient about the following: After your surgery, if you will be staying in-patient, a nursing provider team will be monitoring you closely throughout your stay and communicate your health status to your surgeon and other providers.  You will be seen by Advanced Practice Providers (e.g., nurse practitioners, clinic nurse specialist, and physician assistants) who will check on you regularly to assess the status of your surgery.   Answered all questions to patient satisfaction.  Addendum:  Reviewed results from [facility], WBC 8.5 as of 2/16/24.

## 2024-02-22 ENCOUNTER — ANESTHESIA EVENT (OUTPATIENT)
Dept: SURGERY | Facility: CLINIC | Age: 69
DRG: 454 | End: 2024-02-22
Payer: COMMERCIAL

## 2024-02-23 ENCOUNTER — APPOINTMENT (OUTPATIENT)
Dept: RADIOLOGY | Facility: CLINIC | Age: 69
DRG: 454 | End: 2024-02-23
Attending: NURSE PRACTITIONER
Payer: COMMERCIAL

## 2024-02-23 ENCOUNTER — APPOINTMENT (OUTPATIENT)
Dept: RADIOLOGY | Facility: CLINIC | Age: 69
DRG: 454 | End: 2024-02-23
Attending: SURGERY
Payer: COMMERCIAL

## 2024-02-23 ENCOUNTER — ANESTHESIA (OUTPATIENT)
Dept: SURGERY | Facility: CLINIC | Age: 69
DRG: 454 | End: 2024-02-23
Payer: COMMERCIAL

## 2024-02-23 ENCOUNTER — HOSPITAL ENCOUNTER (INPATIENT)
Facility: CLINIC | Age: 69
LOS: 3 days | Discharge: HOME | DRG: 454 | End: 2024-02-26
Attending: SURGERY | Admitting: SURGERY
Payer: COMMERCIAL

## 2024-02-23 DIAGNOSIS — Z98.1 S/P LUMBAR SPINAL FUSION: Primary | ICD-10-CM

## 2024-02-23 PROBLEM — F32.1 MODERATE MAJOR DEPRESSION (H): Status: ACTIVE | Noted: 2022-03-22

## 2024-02-23 PROBLEM — R33.9 URINARY RETENTION: Status: ACTIVE | Noted: 2022-03-22

## 2024-02-23 PROBLEM — C85.90 NON-HODGKIN'S LYMPHOMA (H): Status: ACTIVE | Noted: 2022-03-22

## 2024-02-23 PROBLEM — Z41.9 SURGERY, ELECTIVE: Status: ACTIVE | Noted: 2024-02-23

## 2024-02-23 PROBLEM — G25.81 RESTLESS LEG SYNDROME: Status: ACTIVE | Noted: 2021-10-26

## 2024-02-23 PROBLEM — M79.7 FIBROMYALGIA: Status: ACTIVE | Noted: 2022-03-22

## 2024-02-23 LAB — GLUCOSE BLDC GLUCOMTR-MCNC: 110 MG/DL (ref 70–99)

## 2024-02-23 PROCEDURE — 99223 1ST HOSP IP/OBS HIGH 75: CPT | Performed by: HOSPITALIST

## 2024-02-23 PROCEDURE — 22840 INSERT SPINE FIXATION DEVICE: CPT | Performed by: SURGERY

## 2024-02-23 PROCEDURE — 250N000009 HC RX 250: Performed by: NURSE PRACTITIONER

## 2024-02-23 PROCEDURE — 999N000141 HC STATISTIC PRE-PROCEDURE NURSING ASSESSMENT: Performed by: SURGERY

## 2024-02-23 PROCEDURE — 250N000025 HC SEVOFLURANE, PER MIN: Performed by: SURGERY

## 2024-02-23 PROCEDURE — 250N000005 HC OR RX SURGIFLO HEMOSTATIC MATRIX 10ML 199102S OPNP: Performed by: SURGERY

## 2024-02-23 PROCEDURE — 0SG00AJ FUSION OF LUMBAR VERTEBRAL JOINT WITH INTERBODY FUSION DEVICE, POSTERIOR APPROACH, ANTERIOR COLUMN, OPEN APPROACH: ICD-10-PCS | Performed by: SURGERY

## 2024-02-23 PROCEDURE — 258N000003 HC RX IP 258 OP 636: Performed by: PHYSICIAN ASSISTANT

## 2024-02-23 PROCEDURE — 250N000011 HC RX IP 250 OP 636: Performed by: ANESTHESIOLOGY

## 2024-02-23 PROCEDURE — 63052 LAM FACETC/FRMT ARTHRD LUM 1: CPT | Mod: AS | Performed by: PHYSICIAN ASSISTANT

## 2024-02-23 PROCEDURE — 250N000011 HC RX IP 250 OP 636: Performed by: PHYSICIAN ASSISTANT

## 2024-02-23 PROCEDURE — 999N000063 XR CROSSTABLE LATERAL LUMBAR SPINE PORTABLE

## 2024-02-23 PROCEDURE — 250N000011 HC RX IP 250 OP 636: Performed by: NURSE PRACTITIONER

## 2024-02-23 PROCEDURE — 20930 SP BONE ALGRFT MORSEL ADD-ON: CPT | Performed by: SURGERY

## 2024-02-23 PROCEDURE — 258N000003 HC RX IP 258 OP 636: Performed by: ANESTHESIOLOGY

## 2024-02-23 PROCEDURE — 250N000013 HC RX MED GY IP 250 OP 250 PS 637: Performed by: SURGERY

## 2024-02-23 PROCEDURE — 0QB00ZZ EXCISION OF LUMBAR VERTEBRA, OPEN APPROACH: ICD-10-PCS | Performed by: SURGERY

## 2024-02-23 PROCEDURE — 250N000011 HC RX IP 250 OP 636: Performed by: NURSE ANESTHETIST, CERTIFIED REGISTERED

## 2024-02-23 PROCEDURE — 01NB0ZZ RELEASE LUMBAR NERVE, OPEN APPROACH: ICD-10-PCS | Performed by: SURGERY

## 2024-02-23 PROCEDURE — 710N000010 HC RECOVERY PHASE 1, LEVEL 2, PER MIN: Performed by: SURGERY

## 2024-02-23 PROCEDURE — 61783 SCAN PROC SPINAL: CPT | Mod: 59 | Performed by: SURGERY

## 2024-02-23 PROCEDURE — 360N000086 HC SURGERY LEVEL 6 W/ FLUORO, PER MIN: Performed by: SURGERY

## 2024-02-23 PROCEDURE — 22853 INSJ BIOMECHANICAL DEVICE: CPT | Performed by: SURGERY

## 2024-02-23 PROCEDURE — 250N000009 HC RX 250: Performed by: NURSE ANESTHETIST, CERTIFIED REGISTERED

## 2024-02-23 PROCEDURE — 0SG0071 FUSION OF LUMBAR VERTEBRAL JOINT WITH AUTOLOGOUS TISSUE SUBSTITUTE, POSTERIOR APPROACH, POSTERIOR COLUMN, OPEN APPROACH: ICD-10-PCS | Performed by: SURGERY

## 2024-02-23 PROCEDURE — 999N000182 XR SURGERY CARM FLUORO GREATER THAN 5 MIN: Mod: TC

## 2024-02-23 PROCEDURE — 120N000001 HC R&B MED SURG/OB

## 2024-02-23 PROCEDURE — 22853 INSJ BIOMECHANICAL DEVICE: CPT | Mod: AS | Performed by: PHYSICIAN ASSISTANT

## 2024-02-23 PROCEDURE — 22840 INSERT SPINE FIXATION DEVICE: CPT | Mod: AS | Performed by: PHYSICIAN ASSISTANT

## 2024-02-23 PROCEDURE — 250N000013 HC RX MED GY IP 250 OP 250 PS 637: Performed by: PHYSICIAN ASSISTANT

## 2024-02-23 PROCEDURE — 20936 SP BONE AGRFT LOCAL ADD-ON: CPT | Performed by: SURGERY

## 2024-02-23 PROCEDURE — 63052 LAM FACETC/FRMT ARTHRD LUM 1: CPT | Performed by: SURGERY

## 2024-02-23 PROCEDURE — 250N000013 HC RX MED GY IP 250 OP 250 PS 637: Performed by: ANESTHESIOLOGY

## 2024-02-23 PROCEDURE — 22633 ARTHRD CMBN 1NTRSPC LUMBAR: CPT | Mod: AS | Performed by: PHYSICIAN ASSISTANT

## 2024-02-23 PROCEDURE — C1762 CONN TISS, HUMAN(INC FASCIA): HCPCS | Performed by: SURGERY

## 2024-02-23 PROCEDURE — 250N000009 HC RX 250: Performed by: SURGERY

## 2024-02-23 PROCEDURE — 370N000017 HC ANESTHESIA TECHNICAL FEE, PER MIN: Performed by: SURGERY

## 2024-02-23 PROCEDURE — 22633 ARTHRD CMBN 1NTRSPC LUMBAR: CPT | Performed by: SURGERY

## 2024-02-23 PROCEDURE — 272N000001 HC OR GENERAL SUPPLY STERILE: Performed by: SURGERY

## 2024-02-23 PROCEDURE — 250N000013 HC RX MED GY IP 250 OP 250 PS 637: Performed by: NURSE PRACTITIONER

## 2024-02-23 PROCEDURE — C1713 ANCHOR/SCREW BN/BN,TIS/BN: HCPCS | Performed by: SURGERY

## 2024-02-23 PROCEDURE — 8E0WXBZ COMPUTER ASSISTED PROCEDURE OF TRUNK REGION: ICD-10-PCS | Performed by: SURGERY

## 2024-02-23 PROCEDURE — 67830 REVISE EYELASHES: CPT | Mod: AS | Performed by: PHYSICIAN ASSISTANT

## 2024-02-23 DEVICE — IMP ROD MEDT 3.5CM 1475501030: Type: IMPLANTABLE DEVICE | Site: BACK | Status: FUNCTIONAL

## 2024-02-23 DEVICE — MAGNETOS EASYPACK PUTTY 2.5CC 1-2MM USA
Type: IMPLANTABLE DEVICE | Status: FUNCTIONAL
Brand: MAGNETOS

## 2024-02-23 DEVICE — IMP SCR MEDT BREAK-OFF SET SOLERA 4.75MM TI 5440030: Type: IMPLANTABLE DEVICE | Site: BACK | Status: FUNCTIONAL

## 2024-02-23 DEVICE — KIT BNGF 6CC DMNR CORT FBR ACCELERATE GRFTN CNN T50206: Type: IMPLANTABLE DEVICE | Status: FUNCTIONAL

## 2024-02-23 DEVICE — IMP SCR MEDT 4.75MM SOLERA 6.5X45MM MA 54840006545: Type: IMPLANTABLE DEVICE | Site: BACK | Status: FUNCTIONAL

## 2024-02-23 DEVICE — SPACER 6068073 CATALYFT PL SHORT 7MM
Type: IMPLANTABLE DEVICE | Site: BACK | Status: FUNCTIONAL
Brand: CATALYFT PL EXPANDABLE INTERBODY SYSTEM

## 2024-02-23 RX ORDER — GINSENG 100 MG
CAPSULE ORAL PRN
Status: DISCONTINUED | OUTPATIENT
Start: 2024-02-23 | End: 2024-02-23 | Stop reason: HOSPADM

## 2024-02-23 RX ORDER — ROPINIROLE 0.25 MG/1
1 TABLET, FILM COATED ORAL 2 TIMES DAILY
Status: DISCONTINUED | OUTPATIENT
Start: 2024-02-23 | End: 2024-02-26 | Stop reason: HOSPADM

## 2024-02-23 RX ORDER — CEFAZOLIN SODIUM/WATER 2 G/20 ML
2 SYRINGE (ML) INTRAVENOUS SEE ADMIN INSTRUCTIONS
Status: DISCONTINUED | OUTPATIENT
Start: 2024-02-23 | End: 2024-02-23 | Stop reason: HOSPADM

## 2024-02-23 RX ORDER — FENTANYL CITRATE 50 UG/ML
50 INJECTION, SOLUTION INTRAMUSCULAR; INTRAVENOUS EVERY 5 MIN PRN
Status: DISCONTINUED | OUTPATIENT
Start: 2024-02-23 | End: 2024-02-23 | Stop reason: HOSPADM

## 2024-02-23 RX ORDER — HYDROMORPHONE HCL IN WATER/PF 6 MG/30 ML
0.4 PATIENT CONTROLLED ANALGESIA SYRINGE INTRAVENOUS
Status: DISCONTINUED | OUTPATIENT
Start: 2024-02-23 | End: 2024-02-25

## 2024-02-23 RX ORDER — AMOXICILLIN 500 MG/1
2000 CAPSULE ORAL SEE ADMIN INSTRUCTIONS
COMMUNITY
End: 2024-04-04

## 2024-02-23 RX ORDER — GLYCOPYRROLATE 0.2 MG/ML
INJECTION, SOLUTION INTRAMUSCULAR; INTRAVENOUS PRN
Status: DISCONTINUED | OUTPATIENT
Start: 2024-02-23 | End: 2024-02-23

## 2024-02-23 RX ORDER — HYDROMORPHONE HCL IN WATER/PF 6 MG/30 ML
0.4 PATIENT CONTROLLED ANALGESIA SYRINGE INTRAVENOUS EVERY 5 MIN PRN
Status: DISCONTINUED | OUTPATIENT
Start: 2024-02-23 | End: 2024-02-23 | Stop reason: HOSPADM

## 2024-02-23 RX ORDER — GABAPENTIN 100 MG/1
100 CAPSULE ORAL
Status: DISCONTINUED | OUTPATIENT
Start: 2024-02-23 | End: 2024-02-26 | Stop reason: HOSPADM

## 2024-02-23 RX ORDER — ONDANSETRON 2 MG/ML
4 INJECTION INTRAMUSCULAR; INTRAVENOUS EVERY 30 MIN PRN
Status: DISCONTINUED | OUTPATIENT
Start: 2024-02-23 | End: 2024-02-23 | Stop reason: HOSPADM

## 2024-02-23 RX ORDER — NALOXONE HYDROCHLORIDE 0.4 MG/ML
0.4 INJECTION, SOLUTION INTRAMUSCULAR; INTRAVENOUS; SUBCUTANEOUS
Status: DISCONTINUED | OUTPATIENT
Start: 2024-02-23 | End: 2024-02-26 | Stop reason: HOSPADM

## 2024-02-23 RX ORDER — SODIUM CHLORIDE, SODIUM LACTATE, POTASSIUM CHLORIDE, CALCIUM CHLORIDE 600; 310; 30; 20 MG/100ML; MG/100ML; MG/100ML; MG/100ML
INJECTION, SOLUTION INTRAVENOUS CONTINUOUS
Status: DISCONTINUED | OUTPATIENT
Start: 2024-02-23 | End: 2024-02-23 | Stop reason: HOSPADM

## 2024-02-23 RX ORDER — LIDOCAINE HYDROCHLORIDE 10 MG/ML
INJECTION, SOLUTION INFILTRATION; PERINEURAL PRN
Status: DISCONTINUED | OUTPATIENT
Start: 2024-02-23 | End: 2024-02-23

## 2024-02-23 RX ORDER — GUAIFENESIN, PSEUDOEPHEDRINE HYDROCHLORIDE 600; 60 MG/1; MG/1
1 TABLET, EXTENDED RELEASE ORAL 2 TIMES DAILY PRN
COMMUNITY
End: 2024-07-23

## 2024-02-23 RX ORDER — KETAMINE HYDROCHLORIDE 10 MG/ML
INJECTION INTRAMUSCULAR; INTRAVENOUS PRN
Status: DISCONTINUED | OUTPATIENT
Start: 2024-02-23 | End: 2024-02-23

## 2024-02-23 RX ORDER — CEFAZOLIN SODIUM 2 G/100ML
2 INJECTION, SOLUTION INTRAVENOUS EVERY 8 HOURS
Qty: 200 ML | Refills: 0 | Status: DISCONTINUED | OUTPATIENT
Start: 2024-02-23 | End: 2024-02-23

## 2024-02-23 RX ORDER — POLYETHYLENE GLYCOL 3350 17 G/17G
17 POWDER, FOR SOLUTION ORAL DAILY
Status: DISCONTINUED | OUTPATIENT
Start: 2024-02-24 | End: 2024-02-25

## 2024-02-23 RX ORDER — HYDROMORPHONE HCL IN WATER/PF 6 MG/30 ML
0.2 PATIENT CONTROLLED ANALGESIA SYRINGE INTRAVENOUS EVERY 5 MIN PRN
Status: DISCONTINUED | OUTPATIENT
Start: 2024-02-23 | End: 2024-02-23 | Stop reason: HOSPADM

## 2024-02-23 RX ORDER — GABAPENTIN 100 MG/1
100 CAPSULE ORAL
COMMUNITY

## 2024-02-23 RX ORDER — ONDANSETRON 4 MG/1
4 TABLET, ORALLY DISINTEGRATING ORAL EVERY 6 HOURS PRN
Status: DISCONTINUED | OUTPATIENT
Start: 2024-02-23 | End: 2024-02-26 | Stop reason: HOSPADM

## 2024-02-23 RX ORDER — SODIUM CHLORIDE 9 MG/ML
INJECTION, SOLUTION INTRAVENOUS CONTINUOUS
Status: DISCONTINUED | OUTPATIENT
Start: 2024-02-23 | End: 2024-02-24

## 2024-02-23 RX ORDER — ENOXAPARIN SODIUM 100 MG/ML
40 INJECTION SUBCUTANEOUS EVERY 24 HOURS
Status: DISCONTINUED | OUTPATIENT
Start: 2024-02-25 | End: 2024-02-26 | Stop reason: HOSPADM

## 2024-02-23 RX ORDER — NALOXONE HYDROCHLORIDE 0.4 MG/ML
0.2 INJECTION, SOLUTION INTRAMUSCULAR; INTRAVENOUS; SUBCUTANEOUS
Status: DISCONTINUED | OUTPATIENT
Start: 2024-02-23 | End: 2024-02-26 | Stop reason: HOSPADM

## 2024-02-23 RX ORDER — THERA TABS 400 MCG
1 TAB ORAL DAILY
Status: DISCONTINUED | OUTPATIENT
Start: 2024-02-24 | End: 2024-02-23

## 2024-02-23 RX ORDER — DEXAMETHASONE SODIUM PHOSPHATE 4 MG/ML
INJECTION, SOLUTION INTRA-ARTICULAR; INTRALESIONAL; INTRAMUSCULAR; INTRAVENOUS; SOFT TISSUE PRN
Status: DISCONTINUED | OUTPATIENT
Start: 2024-02-23 | End: 2024-02-23

## 2024-02-23 RX ORDER — GABAPENTIN 100 MG/1
100 CAPSULE ORAL
Status: COMPLETED | OUTPATIENT
Start: 2024-02-23 | End: 2024-02-23

## 2024-02-23 RX ORDER — ONDANSETRON 2 MG/ML
4 INJECTION INTRAMUSCULAR; INTRAVENOUS EVERY 6 HOURS PRN
Status: DISCONTINUED | OUTPATIENT
Start: 2024-02-23 | End: 2024-02-26 | Stop reason: HOSPADM

## 2024-02-23 RX ORDER — CEFAZOLIN SODIUM/WATER 2 G/20 ML
2 SYRINGE (ML) INTRAVENOUS
Status: COMPLETED | OUTPATIENT
Start: 2024-02-23 | End: 2024-02-23

## 2024-02-23 RX ORDER — GINSENG 100 MG
CAPSULE ORAL
Status: DISCONTINUED
Start: 2024-02-23 | End: 2024-02-23 | Stop reason: HOSPADM

## 2024-02-23 RX ORDER — BUPIVACAINE HCL/EPINEPHRINE 0.25-.0005
VIAL (ML) INJECTION PRN
Status: DISCONTINUED | OUTPATIENT
Start: 2024-02-23 | End: 2024-02-23 | Stop reason: HOSPADM

## 2024-02-23 RX ORDER — PANTOPRAZOLE SODIUM 40 MG/1
40 TABLET, DELAYED RELEASE ORAL
Status: DISCONTINUED | OUTPATIENT
Start: 2024-02-24 | End: 2024-02-26 | Stop reason: HOSPADM

## 2024-02-23 RX ORDER — MAGNESIUM SULFATE 4 G/50ML
4 INJECTION INTRAVENOUS ONCE
Status: COMPLETED | OUTPATIENT
Start: 2024-02-23 | End: 2024-02-23

## 2024-02-23 RX ORDER — HYDROXYZINE HYDROCHLORIDE 10 MG/1
10 TABLET, FILM COATED ORAL EVERY 6 HOURS PRN
Status: DISCONTINUED | OUTPATIENT
Start: 2024-02-23 | End: 2024-02-26 | Stop reason: HOSPADM

## 2024-02-23 RX ORDER — LIDOCAINE 40 MG/G
CREAM TOPICAL
Status: DISCONTINUED | OUTPATIENT
Start: 2024-02-23 | End: 2024-02-23 | Stop reason: HOSPADM

## 2024-02-23 RX ORDER — BISACODYL 10 MG
10 SUPPOSITORY, RECTAL RECTAL DAILY PRN
Status: DISCONTINUED | OUTPATIENT
Start: 2024-02-26 | End: 2024-02-26 | Stop reason: HOSPADM

## 2024-02-23 RX ORDER — OXYCODONE HYDROCHLORIDE 5 MG/1
5 TABLET ORAL EVERY 4 HOURS PRN
Status: DISCONTINUED | OUTPATIENT
Start: 2024-02-23 | End: 2024-02-24

## 2024-02-23 RX ORDER — ONDANSETRON 4 MG/1
4 TABLET, ORALLY DISINTEGRATING ORAL EVERY 30 MIN PRN
Status: DISCONTINUED | OUTPATIENT
Start: 2024-02-23 | End: 2024-02-23 | Stop reason: HOSPADM

## 2024-02-23 RX ORDER — OXYCODONE HYDROCHLORIDE 5 MG/1
10 TABLET ORAL EVERY 4 HOURS PRN
Status: DISCONTINUED | OUTPATIENT
Start: 2024-02-23 | End: 2024-02-24

## 2024-02-23 RX ORDER — MULTIPLE VITAMINS W/ MINERALS TAB 9MG-400MCG
1 TAB ORAL DAILY
Status: DISCONTINUED | OUTPATIENT
Start: 2024-02-23 | End: 2024-02-26 | Stop reason: HOSPADM

## 2024-02-23 RX ORDER — GABAPENTIN 100 MG/1
100 CAPSULE ORAL 3 TIMES DAILY
Status: DISCONTINUED | OUTPATIENT
Start: 2024-02-23 | End: 2024-02-26 | Stop reason: HOSPADM

## 2024-02-23 RX ORDER — ONDANSETRON 2 MG/ML
INJECTION INTRAMUSCULAR; INTRAVENOUS PRN
Status: DISCONTINUED | OUTPATIENT
Start: 2024-02-23 | End: 2024-02-23

## 2024-02-23 RX ORDER — LORATADINE 10 MG/1
10 TABLET ORAL DAILY PRN
Status: DISCONTINUED | OUTPATIENT
Start: 2024-02-23 | End: 2024-02-26 | Stop reason: HOSPADM

## 2024-02-23 RX ORDER — PROCHLORPERAZINE MALEATE 5 MG/1
5 TABLET ORAL EVERY 6 HOURS PRN
Status: DISCONTINUED | OUTPATIENT
Start: 2024-02-23 | End: 2024-02-26 | Stop reason: HOSPADM

## 2024-02-23 RX ORDER — ROPINIROLE 1 MG/1
2 TABLET, FILM COATED ORAL AT BEDTIME
Status: DISCONTINUED | OUTPATIENT
Start: 2024-02-23 | End: 2024-02-26 | Stop reason: HOSPADM

## 2024-02-23 RX ORDER — GABAPENTIN 100 MG/1
100 CAPSULE ORAL 3 TIMES DAILY
COMMUNITY

## 2024-02-23 RX ORDER — ACETAMINOPHEN 325 MG/1
650 TABLET ORAL EVERY 4 HOURS PRN
Status: DISCONTINUED | OUTPATIENT
Start: 2024-02-26 | End: 2024-02-26 | Stop reason: HOSPADM

## 2024-02-23 RX ORDER — FENTANYL CITRATE 50 UG/ML
INJECTION, SOLUTION INTRAMUSCULAR; INTRAVENOUS PRN
Status: DISCONTINUED | OUTPATIENT
Start: 2024-02-23 | End: 2024-02-23

## 2024-02-23 RX ORDER — FENTANYL CITRATE 50 UG/ML
25 INJECTION, SOLUTION INTRAMUSCULAR; INTRAVENOUS EVERY 5 MIN PRN
Status: DISCONTINUED | OUTPATIENT
Start: 2024-02-23 | End: 2024-02-23 | Stop reason: HOSPADM

## 2024-02-23 RX ORDER — ROPINIROLE 0.25 MG/1
1 TABLET, FILM COATED ORAL SEE ADMIN INSTRUCTIONS
Status: DISCONTINUED | OUTPATIENT
Start: 2024-02-23 | End: 2024-02-23

## 2024-02-23 RX ORDER — PROPOFOL 10 MG/ML
INJECTION, EMULSION INTRAVENOUS PRN
Status: DISCONTINUED | OUTPATIENT
Start: 2024-02-23 | End: 2024-02-23

## 2024-02-23 RX ORDER — CEFAZOLIN SODIUM 1 G/3ML
1 INJECTION, POWDER, FOR SOLUTION INTRAMUSCULAR; INTRAVENOUS EVERY 8 HOURS
Qty: 15 ML | Refills: 0 | Status: COMPLETED | OUTPATIENT
Start: 2024-02-23 | End: 2024-02-24

## 2024-02-23 RX ORDER — HYDROMORPHONE HCL IN WATER/PF 6 MG/30 ML
0.2 PATIENT CONTROLLED ANALGESIA SYRINGE INTRAVENOUS
Status: DISCONTINUED | OUTPATIENT
Start: 2024-02-23 | End: 2024-02-25

## 2024-02-23 RX ORDER — AMOXICILLIN 250 MG
1 CAPSULE ORAL 2 TIMES DAILY
Status: DISCONTINUED | OUTPATIENT
Start: 2024-02-23 | End: 2024-02-26 | Stop reason: HOSPADM

## 2024-02-23 RX ORDER — NALOXONE HYDROCHLORIDE 0.4 MG/ML
0.1 INJECTION, SOLUTION INTRAMUSCULAR; INTRAVENOUS; SUBCUTANEOUS
Status: DISCONTINUED | OUTPATIENT
Start: 2024-02-23 | End: 2024-02-23 | Stop reason: HOSPADM

## 2024-02-23 RX ORDER — ACETAMINOPHEN 325 MG/1
975 TABLET ORAL ONCE
Status: COMPLETED | OUTPATIENT
Start: 2024-02-23 | End: 2024-02-23

## 2024-02-23 RX ORDER — ACETAMINOPHEN 325 MG/1
975 TABLET ORAL EVERY 8 HOURS
Status: COMPLETED | OUTPATIENT
Start: 2024-02-23 | End: 2024-02-26

## 2024-02-23 RX ORDER — METHOCARBAMOL 750 MG/1
750 TABLET, FILM COATED ORAL EVERY 6 HOURS
Status: DISCONTINUED | OUTPATIENT
Start: 2024-02-23 | End: 2024-02-26 | Stop reason: HOSPADM

## 2024-02-23 RX ORDER — BUPIVACAINE HYDROCHLORIDE AND EPINEPHRINE 2.5; 5 MG/ML; UG/ML
INJECTION, SOLUTION EPIDURAL; INFILTRATION; INTRACAUDAL; PERINEURAL
Status: DISCONTINUED
Start: 2024-02-23 | End: 2024-02-23 | Stop reason: HOSPADM

## 2024-02-23 RX ADMIN — Medication 1 TABLET: at 20:59

## 2024-02-23 RX ADMIN — ACETAMINOPHEN 975 MG: 325 TABLET ORAL at 16:01

## 2024-02-23 RX ADMIN — KETAMINE HYDROCHLORIDE 25 MG: 10 INJECTION INTRAMUSCULAR; INTRAVENOUS at 07:33

## 2024-02-23 RX ADMIN — MAGNESIUM SULFATE HEPTAHYDRATE 4 G: 4 INJECTION, SOLUTION INTRAVENOUS at 06:30

## 2024-02-23 RX ADMIN — HYDROMORPHONE HYDROCHLORIDE 0.5 MG: 1 INJECTION, SOLUTION INTRAMUSCULAR; INTRAVENOUS; SUBCUTANEOUS at 07:39

## 2024-02-23 RX ADMIN — LIDOCAINE HYDROCHLORIDE 5 ML: 10 INJECTION, SOLUTION INFILTRATION; PERINEURAL at 07:39

## 2024-02-23 RX ADMIN — HYDROMORPHONE HYDROCHLORIDE 0.4 MG: 0.2 INJECTION, SOLUTION INTRAMUSCULAR; INTRAVENOUS; SUBCUTANEOUS at 12:00

## 2024-02-23 RX ADMIN — ROPINIROLE HYDROCHLORIDE 2 MG: 1 TABLET, FILM COATED ORAL at 21:00

## 2024-02-23 RX ADMIN — GABAPENTIN 100 MG: 100 CAPSULE ORAL at 16:10

## 2024-02-23 RX ADMIN — FLUOXETINE 40 MG: 20 CAPSULE ORAL at 16:01

## 2024-02-23 RX ADMIN — FENTANYL CITRATE 50 MCG: 50 INJECTION, SOLUTION INTRAMUSCULAR; INTRAVENOUS at 11:23

## 2024-02-23 RX ADMIN — GABAPENTIN 100 MG: 100 CAPSULE ORAL at 06:19

## 2024-02-23 RX ADMIN — METHOCARBAMOL TABLETS 750 MG: 750 TABLET, COATED ORAL at 16:01

## 2024-02-23 RX ADMIN — FENTANYL CITRATE 50 MCG: 50 INJECTION INTRAMUSCULAR; INTRAVENOUS at 07:39

## 2024-02-23 RX ADMIN — ACETAMINOPHEN 975 MG: 325 TABLET ORAL at 22:57

## 2024-02-23 RX ADMIN — ONDANSETRON 4 MG: 2 INJECTION INTRAMUSCULAR; INTRAVENOUS at 10:57

## 2024-02-23 RX ADMIN — SENNOSIDES AND DOCUSATE SODIUM 1 TABLET: 8.6; 5 TABLET ORAL at 20:59

## 2024-02-23 RX ADMIN — Medication 1 TABLET: at 16:01

## 2024-02-23 RX ADMIN — HYDROMORPHONE HYDROCHLORIDE 0.4 MG: 0.2 INJECTION, SOLUTION INTRAMUSCULAR; INTRAVENOUS; SUBCUTANEOUS at 11:36

## 2024-02-23 RX ADMIN — BENZOCAINE AND MENTHOL 1 LOZENGE: 15; 3.6 LOZENGE ORAL at 23:33

## 2024-02-23 RX ADMIN — ACETAMINOPHEN 975 MG: 325 TABLET ORAL at 06:19

## 2024-02-23 RX ADMIN — KETAMINE HYDROCHLORIDE 25 MG: 10 INJECTION INTRAMUSCULAR; INTRAVENOUS at 07:39

## 2024-02-23 RX ADMIN — CEFAZOLIN 1 G: 1 INJECTION, POWDER, FOR SOLUTION INTRAMUSCULAR; INTRAVENOUS at 18:56

## 2024-02-23 RX ADMIN — GABAPENTIN 100 MG: 100 CAPSULE ORAL at 20:58

## 2024-02-23 RX ADMIN — SUGAMMADEX 200 MG: 100 INJECTION, SOLUTION INTRAVENOUS at 11:03

## 2024-02-23 RX ADMIN — GLYCOPYRROLATE 0.2 MG: 0.2 INJECTION INTRAMUSCULAR; INTRAVENOUS at 07:51

## 2024-02-23 RX ADMIN — HYDROMORPHONE HYDROCHLORIDE 0.4 MG: 0.2 INJECTION, SOLUTION INTRAMUSCULAR; INTRAVENOUS; SUBCUTANEOUS at 12:05

## 2024-02-23 RX ADMIN — DEXAMETHASONE SODIUM PHOSPHATE 4 MG: 4 INJECTION, SOLUTION INTRA-ARTICULAR; INTRALESIONAL; INTRAMUSCULAR; INTRAVENOUS; SOFT TISSUE at 07:51

## 2024-02-23 RX ADMIN — HYDROMORPHONE HYDROCHLORIDE 0.5 MG: 1 INJECTION, SOLUTION INTRAMUSCULAR; INTRAVENOUS; SUBCUTANEOUS at 08:00

## 2024-02-23 RX ADMIN — SODIUM CHLORIDE, POTASSIUM CHLORIDE, SODIUM LACTATE AND CALCIUM CHLORIDE: 600; 310; 30; 20 INJECTION, SOLUTION INTRAVENOUS at 06:29

## 2024-02-23 RX ADMIN — ROPINIROLE 1 MG: 0.25 TABLET, FILM COATED ORAL at 16:10

## 2024-02-23 RX ADMIN — SODIUM CHLORIDE: 9 INJECTION, SOLUTION INTRAVENOUS at 23:35

## 2024-02-23 RX ADMIN — FENTANYL CITRATE 50 MCG: 50 INJECTION INTRAMUSCULAR; INTRAVENOUS at 07:33

## 2024-02-23 RX ADMIN — PROPOFOL 150 MG: 10 INJECTION, EMULSION INTRAVENOUS at 07:39

## 2024-02-23 RX ADMIN — OXYCODONE 10 MG: 5 TABLET ORAL at 20:58

## 2024-02-23 RX ADMIN — HYDROMORPHONE HYDROCHLORIDE 0.4 MG: 0.2 INJECTION, SOLUTION INTRAMUSCULAR; INTRAVENOUS; SUBCUTANEOUS at 11:53

## 2024-02-23 RX ADMIN — Medication 2 G: at 07:46

## 2024-02-23 RX ADMIN — Medication 2 G: at 10:45

## 2024-02-23 RX ADMIN — FENTANYL CITRATE 50 MCG: 50 INJECTION, SOLUTION INTRAMUSCULAR; INTRAVENOUS at 11:28

## 2024-02-23 RX ADMIN — ROCURONIUM BROMIDE 30 MG: 10 INJECTION, SOLUTION INTRAVENOUS at 09:24

## 2024-02-23 RX ADMIN — ROCURONIUM BROMIDE 50 MG: 10 INJECTION, SOLUTION INTRAVENOUS at 07:39

## 2024-02-23 RX ADMIN — METHOCARBAMOL TABLETS 750 MG: 750 TABLET, COATED ORAL at 20:59

## 2024-02-23 ASSESSMENT — ACTIVITIES OF DAILY LIVING (ADL)
ADLS_ACUITY_SCORE: 27
ADLS_ACUITY_SCORE: 23
ADLS_ACUITY_SCORE: 35
ADLS_ACUITY_SCORE: 23
ADLS_ACUITY_SCORE: 27
ADLS_ACUITY_SCORE: 23
ADLS_ACUITY_SCORE: 27
ADLS_ACUITY_SCORE: 23
ADLS_ACUITY_SCORE: 27
ADLS_ACUITY_SCORE: 23

## 2024-02-23 NOTE — BRIEF OP NOTE
[facility]    Brief Operative Note    Pre-operative diagnosis: Lumbar radiculopathy [M54.16]  Cauda equina compression (H) [G83.4]  Post-operative diagnosis Same as pre-operative diagnosis    Procedure: bilateral lumbar 4-lumbar 5 laminectomies, medial facetectomies, foraminotomies and right transforaminal lumbar interbody fusion and bilateral posterior instrumented fusion with posterolateral arthrodesis and use of stealth., Bilateral - Spine    Surgeon: Surgeon(s) and Role:     * Brianne Hudson MD - Primary  Anesthesia: General   Estimated Blood Loss: 150cc    Drains: None  Specimens: * No specimens in log *  Findings:   None.  Complications: None.  Implants:   Implant Name Type Inv. Item Serial No.  Lot No. LRB No. Used Action   BONE GRAFT PUTTY MAGNETOS EASYPACK PUTTY 2.5CC 703-050-US - [id number] Bone/Tissue Synthetic BONE GRAFT PUTTY MAGNETOS EASYPACK PUTTY 2.5CC 703-050-US  [employer]   1 Implanted   BONE GRAFT PUTTY MAGNETOS EASYPACK PUTTY 2.5CC 703-050-US - [id number] Bone/Tissue Synthetic BONE GRAFT PUTTY MAGNETOS EASYPACK PUTTY 2.5CC 703-050-US  [employer]   1 Implanted   KIT BNGF 6CC DMNR GRADY FBR ACCELERATE GRFTN [facility] [id number] - [id number] Bone/Tissue/Biologic KIT BNGF 6CC DMNR GRADY FBR ACCELERATE GRFTN [facility] [id number] [employer]   1 Implanted   IMP SPI INTERBODY MEDT CATALYFT PL SHORT 7MM [id number] - [id number] Metallic Hardware/[place] IMP SPI INTERBODY MEDT CATALYFT PL SHORT 7MM [id number]  [employer] [id number] Right 1 Implanted   IMP SCR MEDT BREAK-OFF SET SOLERA 4.75MM TI [id number] - [id number] Metallic Hardware/[place] IMP SCR MEDT BREAK-OFF SET SOLERA 4.75MM TI [id number]  [employer]ANE[employer] N/A Right 4 Implanted   IMP SCR MEDT 4.75MM SOLERA 6.5X45MM MA [id number] - [id number] Metallic Hardware/[place] IMP SCR MEDT 4.75MM SOLERA 6.5X45MM MA [id number]  [employer] N/A Right 4 Implanted   IMP VANDANA MEDT 3.5CM [id number] -  [id number] Metallic Hardware/[place] IMP VANDANA MEDT 3.5CM [id number]  [employer] [facility] N/A Right 2 Implanted

## 2024-02-23 NOTE — ANESTHESIA PREPROCEDURE EVALUATION
Anesthesia Pre-Procedure Evaluation    Patient: Tessa Edwards   MRN: [id number] : 1955        Procedure : Procedure(s):  bilateral lumbar 4-lumbar 5 laminectomies, medial facetectomies, foraminotomies and right transforaminal lumbar interbody fusion and bilateral posterior instrumented fusion with posterolateral arthrodesis and use of stealth.          Past Medical History:   Diagnosis Date    Abnormal CT of the abdomen 10/26/2021    Allergic rhinitis 2015    Anemia, iron deficiency 10/26/2021    Bilateral carpal tunnel syndrome 2019    Cervical nerve root compression 2017    Cervical spinal stenosis     Chronic low back pain without sciatica, unspecified back pain laterality 10/27/2021    Spondylosis,central  stenosis, foraminal encroachment    Colonic mass 2021    Depression     Depressive disorder     Disease of thyroid gland     Dyspepsia 2022    Fibromyalgia     GERD (gastroesophageal reflux disease)     History of blood transfusion     Hyperlipemia     Lymphoproliferative disorder (H)     Moderate major depression (H) 2022    Morbid obesity (H) 2022    Non-Hodgkin's lymphoma (H) 2022    Obesity     Osteoarthritis     Osteoarthritis of right knee, unspecified osteoarthritis type 2019    Other abnormal glucose 2022    Formatting of this note might be different from the original. Created by Conversion    Prediabetes 3/22/2022    Formatting of this note might be different from the original. Created by Conversion    Primary localized osteoarthrosis, lower leg 2007    Restless legs syndrome 10/26/2021    Rotator cuff tear     Sleep apnea     Spinal stenosis of lumbar region without neurogenic claudication 10/27/2021    Spondylosis,central  stenosis, foraminal encroachment    Splenomegaly 10/27/2021    Thyroid nodule 10/27/2021    Urinary frequency 2022    Urinary retention 2022    Vocal cord dysfunction 2015    Weight loss  10/27/2021      Past Surgical History:   Procedure Laterality Date    ARTHROPLASTY SHOULDER Left     ARTHROSCOPY KNEE      ARTHROSCOPY SHOULDER ROTATOR CUFF REPAIR      BACK SURGERY      BIOPSY      CERVICAL DISC ARTHROPLASTY      CERVICAL DISCECTOMY  2017    C5, C6    COLONOSCOPY N/A 2021    Procedure: COLONOSCOPY;  Surgeon: Efraín Guerrero MD;  Location: [facility] [place]    CRW LT SHOULDER AP AXILLA 2 VW      HYSTERECTOMY      JOINT REPLACEMENT      LAPAROSCOPIC ASSISTED COLECTOMY Right 2021    Procedure: LAPAROSCOPIC RIGHT COLECTOMY;  Surgeon: Efraín Guerrero MD;  Location: [facility] [place]    OOPHORECTOMY      RELEASE CARPAL TUNNEL      RELEASE CARPAL TUNNEL      XR MYELOGRAM CERVICAL  2019    [facility] LAP,CHOLECYSTECTOMY/EXPLORE      Description: Cholecystectomy Laparoscopic;  Recorded: 12/10/2012;    [facility] TOTAL ABDOM HYSTERECTOMY      Description: Total Abdominal Hysterectomy;  Recorded: 12/10/2012;  Comments: 44 Y/O FOR FIBROID TUMORS    [facility] TOTAL KNEE ARTHROPLASTY Right 2019    Procedure: RIGHT TOTAL KNEE ARTHROPLASTY;  Surgeon: Irvin Canas DO;  Location: [facility];  Service: Orthopedics      No Known Allergies   Social History     Tobacco Use    Smoking status: Former     Packs/day: 0.50     Years: 10.00     Additional pack years: 0.00     Total pack years: 5.00     Types: Cigarettes     Quit date: 2000     Years since quittin.0    Smokeless tobacco: Never   Substance Use Topics    Alcohol use: Yes     Comment: social      Wt Readings from Last 1 Encounters:   24 103 kg (227 lb)        Anesthesia Evaluation            ROS/MED HX  ENT/Pulmonary:     (+) sleep apnea,                                       Neurologic: Comment: Fibromyalgia  Chronic pain  Occ'l THC use (gummies)      Cardiovascular:     (+) Dyslipidemia - -   -  - -                                      METS/Exercise Tolerance:     Hematologic: Comments: Lymphoproliferative disorder     "  Musculoskeletal:       GI/Hepatic:     (+) GERD,                   Renal/Genitourinary:  - neg Renal ROS     Endo:     (+)          thyroid problem,     Obesity,       Psychiatric/Substance Use:       Infectious Disease:       Malignancy:       Other:            Physical Exam    Airway        Mallampati: II   TM distance: > 3 FB   Neck ROM: full   Mouth opening: > 3 cm    Respiratory Devices and Support         Dental       (+) Multiple visibly decayed, broken teeth      Cardiovascular          Rhythm and rate: regular and normal     Pulmonary           breath sounds clear to auscultation           OUTSIDE LABS:  CBC:   Lab Results   Component Value Date    WBC 22.6 (H) 01/02/2023    WBC 35.3 (H) 11/18/2021    HGB 12.0 01/02/2023    HGB 10.2 (L) 11/18/2021    HCT 38.4 01/02/2023    HCT 34.4 (L) 11/18/2021     01/02/2023     (L) 11/18/2021     BMP:   Lab Results   Component Value Date     01/02/2023     11/18/2021    POTASSIUM 4.3 01/02/2023    POTASSIUM 3.7 11/18/2021    CHLORIDE 106 01/02/2023    CHLORIDE 106 11/18/2021    CO2 23 01/02/2023    CO2 25 11/18/2021    BUN 26.7 (H) 01/02/2023    BUN 9 11/18/2021    CR 0.70 01/02/2023    CR 0.59 (L) 11/18/2021     (H) 02/23/2024     (H) 01/02/2023     COAGS:   Lab Results   Component Value Date    PTT 23 (L) 09/12/2019    INR 0.99 09/14/2019     POC: No results found for: \"BGM\", \"HCG\", \"HCGS\"  HEPATIC:   Lab Results   Component Value Date    ALBUMIN 3.7 08/27/2020    PROTTOTAL 7.1 08/27/2020    ALT 23 08/27/2020    AST 27 08/27/2020    ALKPHOS 78 08/27/2020    BILITOTAL 0.4 08/27/2020     OTHER:   Lab Results   Component Value Date    A1C 5.4 01/02/2023    KAYLAN 9.8 01/02/2023    TSH 0.97 04/05/2023       Anesthesia Plan    ASA Status:  3    NPO Status:  NPO Appropriate    Anesthesia Type: General.     - Airway: ETT   Induction: Intravenous.           Consents    Anesthesia Plan(s) and associated risks, benefits, and realistic " "alternatives discussed. Questions answered and patient/representative(s) expressed understanding.     - Discussed: Risks, Benefits and Alternatives for the PROCEDURE were discussed     - Discussed with:  Patient            Postoperative Care    Pain management: IV analgesics, Oral pain medications, Multi-modal analgesia.   PONV prophylaxis: Ondansetron (or other 5HT-3), Dexamethasone or Solumedrol     Comments:               Jhon Johnson MD    I have reviewed the pertinent notes and labs in the chart from the past 30 days and (re)examined the patient.  Any updates or changes from those notes are reflected in this note.              # Obesity: Estimated body mass index is 38.96 kg/m  as calculated from the following:    Height as of this encounter: 1.626 m (5' 4\").    Weight as of this encounter: 103 kg (227 lb).      "

## 2024-02-23 NOTE — ANESTHESIA POSTPROCEDURE EVALUATION
Patient: Tessa Edwards    Procedure: Procedure(s):  bilateral lumbar 4-lumbar 5 laminectomies, medial facetectomies, foraminotomies and right transforaminal lumbar interbody fusion and bilateral posterior instrumented fusion with posterolateral arthrodesis and use of stealth.       Anesthesia Type:  General    Note:     Postop Pain Control: Uneventful            Sign Out: Well controlled pain   PONV: No   Neuro/Psych: Uneventful            Sign Out: Acceptable/Baseline neuro status   Airway/Respiratory: Uneventful            Sign Out: Acceptable/Baseline resp. status   CV/Hemodynamics: Uneventful            Sign Out: Acceptable CV status; No obvious hypovolemia; No obvious fluid overload   Other NRE: NONE   DID A NON-ROUTINE EVENT OCCUR? No           Last vitals:  Vitals Value Taken Time   /70 02/23/24 1330   Temp 36.7  C (98  F) 02/23/24 1109   Pulse 83 02/23/24 1334   Resp 12 02/23/24 1232   SpO2 97 % 02/23/24 1334   Vitals shown include unfiled device data.    Electronically Signed By: Jhon Johnson MD  February 23, 2024  1:36 PM

## 2024-02-23 NOTE — INTERVAL H&P NOTE
"I have reviewed the surgical (or preoperative) H&P that is linked to this encounter, and examined the patient. There are no significant changes    Clinical Conditions Present on Arrival:  Clinically Significant Risk Factors Present on Admission                  # Obesity: Estimated body mass index is 38.96 kg/m  as calculated from the following:    Height as of this encounter: 5' 4\" (1.626 m).    Weight as of this encounter: 227 lb (103 kg).       "

## 2024-02-23 NOTE — PHARMACY-ADMISSION MEDICATION HISTORY
Pharmacist completed medication history with the patient while in the [facility].  Prior to admission (PTA) med list completed and updated in the electronic medical record (EMR).  Pharmacy Note - Admission Medication History  Pertinent Provider Information: none   ______________________________________________________________________  Prior To Admission (PTA) med list completed and updated in EMR.     Medications Prior to Admission   Medication Sig Dispense Refill Last Dose    acalabrutinib (CALQUENCE) 100 MG capsule Take 1 capsule (100 mg) by mouth 2 times daily   2/15/2024    acetaminophen (TYLENOL) 500 MG tablet Take 1,000 mg by mouth 3 times daily   2/23/2024 at 0630 in [facility]    amoxicillin (AMOXIL) 500 MG capsule Take 2,000 mg by mouth See Admin Instructions Pre dental   More than a month    calcium-vitamin D (CALCIUM-VITAMIN D) 500 mg(1,250mg) -200 unit per tablet Take 1 tablet by mouth every evening    2/21/2024    FLUoxetine (PROZAC) 40 MG capsule Take 1 capsule by mouth once daily 90 capsule 0 2/22/2024 at am    gabapentin (NEURONTIN) 100 MG capsule Take 100 mg by mouth 3 times daily Plus additional 100mg at HS if needed   2/21/2024    gabapentin (NEURONTIN) 100 MG capsule Take 100 mg by mouth nightly as needed In addition to 100mg TID   2/16/2024    loperamide (IMODIUM) 2 MG capsule Take 2 mg by mouth 4 times daily as needed for diarrhea   2/22/2024    multivitamin w/minerals (THERA-VIT-M) tablet Take 1 tablet by mouth daily   2/21/2024    naproxen sodium (ALEVE) 220 MG tablet Take 440 mg by mouth 3 times daily (with meals)   2/13/2024    omeprazole 20 MG tablet Take 20 mg by mouth daily   2/23/2024 at am    pseudoePHEDrine-guaiFENesin (MUCINEX D)  MG 12 hr tablet Take 1 tablet by mouth 2 times daily as needed for congestion   2/22/2024    rOPINIRole (REQUIP) 1 MG tablet TAKE 1 TABLET BY MOUTH TWICE DAILY AND 2 AT BEDTIME 180 tablet 0 2/22/2024 at          Information source(s): Patient and  CareEverywhere/SureScripts  Patient was asked about OTC/herbal products specifically.  PTA med list reflects this.  Based on the pharmacist s assessment, the PTA med list information appears reliable  Allergies were reviewed, assessed, and updated with the patient.    Medications available for use during hospital stay: NONE  Thank you for the opportunity to participate in the care of this patient.    The patient was asked about OTC/herbal products specifically and the PTA med list reflects the patient's response.    Allergies were reviewed and assessed with the patient, responses were updated in the EMR.    Thank you for the opportunity to participate in the care of this patient.    Francisco Holland RPH 2/23/2024 7:23 AM

## 2024-02-23 NOTE — ANESTHESIA CARE TRANSFER NOTE
Patient: Tessa Edwards    Procedure: Procedure(s):  bilateral lumbar 4-lumbar 5 laminectomies, medial facetectomies, foraminotomies and right transforaminal lumbar interbody fusion and bilateral posterior instrumented fusion with posterolateral arthrodesis and use of stealth.       Diagnosis: Lumbar radiculopathy [M54.16]  Cauda equina compression (H) [G83.4]  Diagnosis Additional Information: No value filed.    Anesthesia Type:   General     Note:    Oropharynx: oropharynx clear of all foreign objects and spontaneously breathing  Level of Consciousness: awake  Oxygen Supplementation: face mask  Level of Supplemental Oxygen (L/min / FiO2): 6  Independent Airway: airway patency satisfactory and stable  Dentition: dentition unchanged  Vital Signs Stable: post-procedure vital signs reviewed and stable  Report to RN Given: handoff report given  Patient transferred to: PACU    Handoff Report: Identifed the Patient, Identified the Reponsible Provider, Reviewed the pertinent medical history, Discussed the surgical course, Reviewed Intra-OP anesthesia mangement and issues during anesthesia, Set expectations for post-procedure period and Allowed opportunity for questions and acknowledgement of understanding      Vitals:  Vitals Value Taken Time   /74 02/23/24 1111   Temp 36.7  C (98  F) 02/23/24 1109   Pulse 77 02/23/24 1118   Resp 18 02/23/24 1118   SpO2 100 % 02/23/24 1118   Vitals shown include unfiled device data.    Electronically Signed By: FELIPE Hernandez CRNA  February 23, 2024  11:19 AM

## 2024-02-23 NOTE — PROGRESS NOTES
S: Pt is a 68 yof who was seen at [facility], room 3116, for delivery of an Apsen quik draw Rap LSO prescribed by Dr. Hudson.  DX: s/p L4-5 fusion  O: 5  4 . 227 lbs.  A: Waist measurement taken of patient and a size XXL Apsen quik draw Rap LSO was provided to the patient (left at edge of bed). Pt was in pain and did not want to don LSO at time of visit. Donning/doffing and wear/care instructions were discussed with the patient. 's written instructions were provided. Pt will don device next time she gets up out of bed.  P: Pt is to wear LSO according to physician's instructions. F/U PRN.  G: Provide hydrostatic compression to stabilize surgical site and relieve lumbar pain.    Electronically signed by Holly Dexter CPO, SHAHNAZ

## 2024-02-23 NOTE — ANESTHESIA PROCEDURE NOTES
Airway       Patient location during procedure: OR       Procedure Start/Stop Times: 2/23/2024 7:40 AM and 2/23/2024 7:41 AM  Staff -        CRNA: Corrine Greenberg APRN CRNA       Performed By: CRNA  Consent for Airway        Urgency: elective  Indications and Patient Condition       Indications for airway management: nadia-procedural and airway protection       Induction type:inhalational       Mask difficulty assessment: 1 - vent by mask    Final Airway Details       Final airway type: endotracheal airway       Successful airway: ETT - single  Endotracheal Airway Details        ETT size (mm): 7.0       Cuffed: yes       Successful intubation technique: direct laryngoscopy       DL Blade Type: Saha 2       Grade View of Cords: 1       Adjucts: stylet       Position: Right       Measured from: gums/teeth       Secured at (cm): 21       Bite block used: None    Post intubation assessment        Placement verified by: capnometry, equal breath sounds and chest rise        Number of attempts at approach: 1       Number of other approaches attempted: 0       Secured with: commercial tube greenberg and tape       Ease of procedure: easy       Dentition: Intact and Unchanged    Medication(s) Administered   Medication Administration Time: 2/23/2024 7:40 AM

## 2024-02-23 NOTE — PROGRESS NOTES
Post op note      POD0 s/p bilateral lumbar 4-lumbar 5 laminectomies, medial facetectomies, foraminotomies and right transforaminal lumbar interbody fusion and bilateral posterior instrumented fusion with posterolateral arthrodesis and use of stealth., Bilateral - Spine with Dr. Hudson    PLAN  -admit  -may get out of bed until brace comes. Once brace comes, brace to only be worn when out of bed ambulating, off with resting and hygiene   -pain management  -ancef x 3 doses  -lovenox to start POD2, ordered  -wound cares  -monitor drain output and record even if 0cc  -upright XR in AM   -PT, OT, Hospitalist consults appreciated     Plans reviewed with Dr. Tristan [facility]  [address] [address]    Pager [phone]

## 2024-02-23 NOTE — CONSULTS
[facility]  Consult Note - Hospitalist Service  Date of Admission:  2/23/2024  Consult Requested by: Orthopedics  Reason for Consult: Postop medical cares    Assessment & Plan   Tessa Edwards is a 68 year old old female with a history of MDD, RLS, fibromyalgia, allergic rhinitis, hx of Non-Hodgkin's lymphoma, spine disease underwent L4-5 spine surgery. [facility] service was asked to evaluate patient for postoperative medical management as follows below. Please resume the home medications as reconciled and further noted below with ordered hold parameters.  Vital signs have been stable post-operatively including hemodynamically stable blood pressure and heart rate. Thank you for this consult; we will continue to follow this patient until discharge.    MDD/Anxiety  -Order home medications.     RLS  -Order home medication Ropinirole.    Fibromyalgia  -Order home medications, and PCP follow-up.     Seasonal Allergies   -Order home medications.     S/p L4-5 Spine Surgery  Spine Disease  Acute Post-Op Pain  -Agree with current pain control regimen; consider acute pain team consultation if pain becomes increasingly difficult to control or proves refractory.   -PT evaluation.   -OT evaluation.  -IS frequently, initially every hour while awake as tolerated. Directly encouraged and discussed.      Post-Op DVT Prophylaxis  -As per primary surgery team.    Procedure(s):  bilateral lumbar 4-lumbar 5 laminectomies, medial facetectomies, foraminotomies and right transforaminal lumbar interbody fusion and bilateral posterior instrumented fusion with posterolateral arthrodesis and use of stealth.  Post-operative Day: Day of Surgery  Code status:Prior     Estimated Blood Loss:  * No values recorded between 2/23/2024  8:08 AM and 2/23/2024 11:08 AM *    Hospital Problem List   No problem-specific Assessment & Plan notes found for this encounter.    Principal Problem:    Surgery, elective  Active Problems:     "Restless leg syndrome    Allergic rhinitis    Fibromyalgia    Moderate major depression (H)    Non-Hodgkin's lymphoma (H)    Urinary retention      -Reviewed the patient's preoperative H and P and updated missing elements.  -Home medication reconciliation has been reviewed.  Medications have been ordered as noted from the home list and changes are documented above        Clinically Significant Risk Factors Present on Admission                       # Obesity: Estimated body mass index is 38.96 kg/m  as calculated from the following:    Height as of this encounter: 1.626 m (5' 4\").    Weight as of this encounter: 103 kg (227 lb).              Je Mcneill MD  Hospitalist Service  Securely message with [employer] (more info)  Text page via [facility] Paging/Directory   ______________________________________________________________________    Chief Complaint   Postop medical cares    History is obtained from the patient and EMR    History of Present Illness   Tessa Edwards is a 68 year old old female with a history of MDD, RLS, fibromyalgia, allergic rhinitis, hx of Non-Hodgkin's lymphoma, spine disease underwent L4-5 spine surgery.    She has been in her usual state of health prior to presenting for this elective surgery.  She denies any associated symptoms prior to coming in today, and also denies any recent travel domestically or internationally, and also denies any recent sick contacts around her including any family or friends who have been sick.  When asked, she denies any fevers, rigors, chest pain or shortness of breath, nausea or vomiting or abdominal pain, changes in bowel movements/pattern, urinary symptoms, focal weakness, or any other new and associated symptoms at this time.  She has been compliant with her medications.  14 point review of systems performed with the patient without any other pertinent positives at this time.    Presently, she states her pain is currently well-tolerated.  She denies any new " complaints or symptoms at this time. She is quite sleepy with anesthesia still in effect but easily awoken.     All questions she had at this time were answered.     Past Medical History    Past Medical History:   Diagnosis Date    Abnormal CT of the abdomen 10/26/2021    Allergic rhinitis 11/05/2015    Anemia, iron deficiency 10/26/2021    Bilateral carpal tunnel syndrome 07/09/2019    Cervical nerve root compression 07/24/2017    Cervical spinal stenosis     Chronic low back pain without sciatica, unspecified back pain laterality 10/27/2021    Spondylosis,central  stenosis, foraminal encroachment    Colonic mass 11/16/2021    Depression     Depressive disorder     Disease of thyroid gland     Dyspepsia 03/22/2022    Fibromyalgia     GERD (gastroesophageal reflux disease)     History of blood transfusion     Hyperlipemia     Lymphoproliferative disorder (H)     Moderate major depression (H) 03/22/2022    Morbid obesity (H) 03/22/2022    Non-Hodgkin's lymphoma (H) 03/22/2022    Obesity     Osteoarthritis     Osteoarthritis of right knee, unspecified osteoarthritis type 09/12/2019    Other abnormal glucose 03/22/2022    Formatting of this note might be different from the original. Created by Conversion    Prediabetes 3/22/2022    Formatting of this note might be different from the original. Created by Conversion    Primary localized osteoarthrosis, lower leg 08/27/2007    Restless legs syndrome 10/26/2021    Rotator cuff tear     Sleep apnea     Spinal stenosis of lumbar region without neurogenic claudication 10/27/2021    Spondylosis,central  stenosis, foraminal encroachment    Splenomegaly 10/27/2021    Thyroid nodule 10/27/2021    Urinary frequency 03/22/2022    Urinary retention 03/22/2022    Vocal cord dysfunction 11/05/2015    Weight loss 10/27/2021       Past Surgical History   Past Surgical History:   Procedure Laterality Date    ARTHROPLASTY SHOULDER Left     ARTHROSCOPY KNEE      ARTHROSCOPY SHOULDER  ROTATOR CUFF REPAIR      BACK SURGERY      BIOPSY      CERVICAL DISC ARTHROPLASTY      CERVICAL DISCECTOMY  08/01/2017    C5, C6    COLONOSCOPY N/A 11/05/2021    Procedure: COLONOSCOPY;  Surgeon: Efraín uGerrero MD;  Location: [facility]    CRW LT SHOULDER AP AXILLA 2 VW      HYSTERECTOMY      JOINT REPLACEMENT      LAPAROSCOPIC ASSISTED COLECTOMY Right 11/16/2021    Procedure: LAPAROSCOPIC RIGHT COLECTOMY;  Surgeon: Efraín Guerrero MD;  Location: [facility]    OOPHORECTOMY      RELEASE CARPAL TUNNEL      RELEASE CARPAL TUNNEL      XR MYELOGRAM CERVICAL  05/14/2019    [facility] LAP,CHOLECYSTECTOMY/EXPLORE      Description: Cholecystectomy Laparoscopic;  Recorded: 12/10/2012;    [facility] TOTAL ABDOM HYSTERECTOMY      Description: Total Abdominal Hysterectomy;  Recorded: 12/10/2012;  Comments: 46 Y/O FOR FIBROID TUMORS    [facility] TOTAL KNEE ARTHROPLASTY Right 09/12/2019    Procedure: RIGHT TOTAL KNEE ARTHROPLASTY;  Surgeon: Irvin Canas DO;  Location: [facility];  Service: Orthopedics       Medications   I have reviewed this patient's current medications  Medications Prior to Admission   Medication Sig Dispense Refill Last Dose    acalabrutinib (CALQUENCE) 100 MG capsule Take 1 capsule (100 mg) by mouth 2 times daily   2/15/2024    acetaminophen (TYLENOL) 500 MG tablet Take 1,000 mg by mouth 3 times daily   2/23/2024 at 0630 in [place]    amoxicillin (AMOXIL) 500 MG capsule Take 2,000 mg by mouth See Admin Instructions Pre dental   More than a month    calcium-vitamin D (CALCIUM-VITAMIN D) 500 mg(1,250mg) -200 unit per tablet Take 1 tablet by mouth every evening    2/21/2024    FLUoxetine (PROZAC) 40 MG capsule Take 1 capsule by mouth once daily 90 capsule 0 2/22/2024 at am    gabapentin (NEURONTIN) 100 MG capsule Take 100 mg by mouth 3 times daily Plus additional 100mg at HS if needed   2/21/2024    gabapentin (NEURONTIN) 100 MG capsule Take 100 mg by mouth nightly as needed In addition to 100mg TID    2/16/2024    loperamide (IMODIUM) 2 MG capsule Take 2 mg by mouth 4 times daily as needed for diarrhea   2/22/2024    multivitamin w/minerals (THERA-VIT-M) tablet Take 1 tablet by mouth daily   2/21/2024    naproxen sodium (ALEVE) 220 MG tablet Take 440 mg by mouth 3 times daily (with meals)   2/13/2024    omeprazole 20 MG tablet Take 20 mg by mouth daily   2/23/2024 at am    pseudoePHEDrine-guaiFENesin (MUCINEX D)  MG 12 hr tablet Take 1 tablet by mouth 2 times daily as needed for congestion   2/22/2024    rOPINIRole (REQUIP) 1 MG tablet TAKE 1 TABLET BY MOUTH TWICE DAILY AND 2 AT BEDTIME 180 tablet 0 2/22/2024 at hs             Physical Exam   Vital Signs: Temp: 98  F (36.7  C) Temp src: Temporal BP: (!) 141/66 Pulse: 83   Resp: 15 SpO2: 94 % O2 Device: Nasal cannula Oxygen Delivery: 4 LPM  Weight: 227 lbs 0 oz    GENERAL:  Alert, appears comfortable, in no acute distress, appears stated age on NC   HEAD:  Normocephalic, without obvious abnormality, atraumatic   EYES:  PERRL, conjunctiva/corneas clear, no scleral icterus, EOM's intact   NOSE: Nares normal, septum midline, mucosa normal, no drainage   THROAT: Lips, mucosa, and tongue normal; teeth and gums normal, mouth moist   NECK: Supple, symmetrical, trachea midline   BACK:   Symmetric, no curvature, ROM normal   LUNGS:   Clear to auscultation bilaterally, no rales, rhonchi, or wheezing, symmetric chest rise on inhalation, respirations unlabored   CHEST WALL:  No tenderness or deformity   HEART:  Regular rate and rhythm, S1 and S2 normal, no murmur, rub, or gallop    ABDOMEN:   Soft, non-tender, bowel sounds active all four quadrants, no masses, no organomegaly, no rebound or guarding   EXTREMITIES: Extremities normal, atraumatic, no cyanosis or edema    SKIN: Dry to touch, no exanthems in the visualized areas   NEURO: Sleepy but easily awoken, oriented x 3, anesthesia still in-effect   PSYCH: Cooperative, behavior is appropriate        Medical Decision  Making     82 MINUTES SPENT BY ME on the date of service doing chart review, history, exam, documentation & further activities per the note.      Data         Imaging results reviewed over the past 24 hrs:   Recent Results (from the past 24 hour(s))   Lateral Lumbar Spine for Surgical Imaging  [XR LUMBAR SPINE PORT 1  VIEW]    Narrative    EXAM: XR CROSSTABLE LATERAL LUMBAR SPINE PORTABLE  LOCATION: [facility]  DATE: 2/23/2024    INDICATION: Intra operative  COMPARISON: None.      Impression    IMPRESSION: Intraoperative crosstable lateral radiograph. Underpenetration of the lumbosacral junction somewhat comprises evaluation. Posterior approach surgical clamp projects over the posterior margin of the L3 posterior spinous process and is directed   at the L4 vertebral body and pedicles.   XR SURGERY CAROLINE FLUORO GREATER THAN 5 MIN    Narrative    This exam was marked as non-reportable because it will not be read by a   radiologist or a [place] non-radiologist provider.         XR Surgery CAROLINE  Fluoro G/T 5 Min    Narrative    This exam was marked as non-reportable because it will not be read by a   radiologist or a [place] non-radiologist provider.

## 2024-02-24 ENCOUNTER — APPOINTMENT (OUTPATIENT)
Dept: PHYSICAL THERAPY | Facility: CLINIC | Age: 69
DRG: 454 | End: 2024-02-24
Attending: PHYSICIAN ASSISTANT
Payer: COMMERCIAL

## 2024-02-24 ENCOUNTER — APPOINTMENT (OUTPATIENT)
Dept: OCCUPATIONAL THERAPY | Facility: CLINIC | Age: 69
DRG: 454 | End: 2024-02-24
Attending: SURGERY
Payer: COMMERCIAL

## 2024-02-24 PROCEDURE — 97166 OT EVAL MOD COMPLEX 45 MIN: CPT | Mod: GO | Performed by: OCCUPATIONAL THERAPIST

## 2024-02-24 PROCEDURE — 99232 SBSQ HOSP IP/OBS MODERATE 35: CPT | Performed by: FAMILY MEDICINE

## 2024-02-24 PROCEDURE — 250N000013 HC RX MED GY IP 250 OP 250 PS 637: Performed by: NURSE PRACTITIONER

## 2024-02-24 PROCEDURE — 97116 GAIT TRAINING THERAPY: CPT | Mod: GP

## 2024-02-24 PROCEDURE — 120N000001 HC R&B MED SURG/OB

## 2024-02-24 PROCEDURE — 250N000011 HC RX IP 250 OP 636: Performed by: PHYSICIAN ASSISTANT

## 2024-02-24 PROCEDURE — 97535 SELF CARE MNGMENT TRAINING: CPT | Mod: GO | Performed by: OCCUPATIONAL THERAPIST

## 2024-02-24 PROCEDURE — 97161 PT EVAL LOW COMPLEX 20 MIN: CPT | Mod: GP

## 2024-02-24 PROCEDURE — 250N000013 HC RX MED GY IP 250 OP 250 PS 637: Performed by: SURGERY

## 2024-02-24 PROCEDURE — 250N000013 HC RX MED GY IP 250 OP 250 PS 637: Performed by: PHYSICIAN ASSISTANT

## 2024-02-24 PROCEDURE — 99024 POSTOP FOLLOW-UP VISIT: CPT | Performed by: NURSE PRACTITIONER

## 2024-02-24 PROCEDURE — 97530 THERAPEUTIC ACTIVITIES: CPT | Mod: GP

## 2024-02-24 RX ORDER — OXYCODONE HYDROCHLORIDE 5 MG/1
10 TABLET ORAL
Status: DISCONTINUED | OUTPATIENT
Start: 2024-02-24 | End: 2024-02-26 | Stop reason: HOSPADM

## 2024-02-24 RX ORDER — LIDOCAINE 4 G/G
2 PATCH TOPICAL
Status: DISCONTINUED | OUTPATIENT
Start: 2024-02-24 | End: 2024-02-26 | Stop reason: HOSPADM

## 2024-02-24 RX ORDER — DIAZEPAM 5 MG/1
5 TABLET ORAL EVERY 6 HOURS PRN
Status: DISCONTINUED | OUTPATIENT
Start: 2024-02-24 | End: 2024-02-26 | Stop reason: HOSPADM

## 2024-02-24 RX ORDER — OXYCODONE HYDROCHLORIDE 5 MG/1
5 TABLET ORAL
Status: DISCONTINUED | OUTPATIENT
Start: 2024-02-24 | End: 2024-02-26 | Stop reason: HOSPADM

## 2024-02-24 RX ADMIN — CEFAZOLIN 1 G: 1 INJECTION, POWDER, FOR SOLUTION INTRAMUSCULAR; INTRAVENOUS at 04:06

## 2024-02-24 RX ADMIN — OXYCODONE 10 MG: 5 TABLET ORAL at 12:13

## 2024-02-24 RX ADMIN — Medication 1 TABLET: at 21:22

## 2024-02-24 RX ADMIN — GABAPENTIN 100 MG: 100 CAPSULE ORAL at 16:06

## 2024-02-24 RX ADMIN — GABAPENTIN 100 MG: 100 CAPSULE ORAL at 21:22

## 2024-02-24 RX ADMIN — OXYCODONE 10 MG: 5 TABLET ORAL at 07:17

## 2024-02-24 RX ADMIN — ROPINIROLE HYDROCHLORIDE 2 MG: 1 TABLET, FILM COATED ORAL at 21:22

## 2024-02-24 RX ADMIN — METHOCARBAMOL TABLETS 750 MG: 750 TABLET, COATED ORAL at 04:07

## 2024-02-24 RX ADMIN — HYDROXYZINE HYDROCHLORIDE 10 MG: 10 TABLET ORAL at 07:18

## 2024-02-24 RX ADMIN — Medication 1 TABLET: at 09:05

## 2024-02-24 RX ADMIN — FLUOXETINE 40 MG: 20 CAPSULE ORAL at 09:06

## 2024-02-24 RX ADMIN — ROPINIROLE 1 MG: 0.25 TABLET, FILM COATED ORAL at 09:05

## 2024-02-24 RX ADMIN — CEFAZOLIN 1 G: 1 INJECTION, POWDER, FOR SOLUTION INTRAMUSCULAR; INTRAVENOUS at 12:32

## 2024-02-24 RX ADMIN — PANTOPRAZOLE SODIUM 40 MG: 40 TABLET, DELAYED RELEASE ORAL at 06:56

## 2024-02-24 RX ADMIN — METHOCARBAMOL TABLETS 750 MG: 750 TABLET, COATED ORAL at 09:05

## 2024-02-24 RX ADMIN — METHOCARBAMOL TABLETS 750 MG: 750 TABLET, COATED ORAL at 16:06

## 2024-02-24 RX ADMIN — SENNOSIDES AND DOCUSATE SODIUM 1 TABLET: 8.6; 5 TABLET ORAL at 09:05

## 2024-02-24 RX ADMIN — POLYETHYLENE GLYCOL 3350 17 G: 17 POWDER, FOR SOLUTION ORAL at 09:05

## 2024-02-24 RX ADMIN — SENNOSIDES AND DOCUSATE SODIUM 1 TABLET: 8.6; 5 TABLET ORAL at 21:23

## 2024-02-24 RX ADMIN — ACETAMINOPHEN 975 MG: 325 TABLET ORAL at 16:06

## 2024-02-24 RX ADMIN — METHOCARBAMOL TABLETS 750 MG: 750 TABLET, COATED ORAL at 21:23

## 2024-02-24 RX ADMIN — GABAPENTIN 100 MG: 100 CAPSULE ORAL at 09:05

## 2024-02-24 RX ADMIN — ACETAMINOPHEN 975 MG: 325 TABLET ORAL at 23:36

## 2024-02-24 RX ADMIN — ACETAMINOPHEN 975 MG: 325 TABLET ORAL at 06:56

## 2024-02-24 RX ADMIN — LIDOCAINE 2 PATCH: 4 PATCH TOPICAL at 12:32

## 2024-02-24 RX ADMIN — ROPINIROLE 1 MG: 0.25 TABLET, FILM COATED ORAL at 16:06

## 2024-02-24 ASSESSMENT — ACTIVITIES OF DAILY LIVING (ADL)
ADLS_ACUITY_SCORE: 28
ADLS_ACUITY_SCORE: 29
ADLS_ACUITY_SCORE: 28
ADLS_ACUITY_SCORE: 24
ADLS_ACUITY_SCORE: 24
ADLS_ACUITY_SCORE: 28
ADLS_ACUITY_SCORE: 24
ADLS_ACUITY_SCORE: 28
ADLS_ACUITY_SCORE: 29
ADLS_ACUITY_SCORE: 29
ADLS_ACUITY_SCORE: 28
PREVIOUS_RESPONSIBILITIES: DRIVING
ADLS_ACUITY_SCORE: 28
ADLS_ACUITY_SCORE: 24
ADLS_ACUITY_SCORE: 29
ADLS_ACUITY_SCORE: 24
ADLS_ACUITY_SCORE: 29
ADLS_ACUITY_SCORE: 28
ADLS_ACUITY_SCORE: 28

## 2024-02-24 NOTE — PROGRESS NOTES
02/24/24 0823   Appointment Info   Signing Clinician's Name / Credentials (OT) Tracy Diaz OTR/L   Living Environment   People in Home child(saad), adult   Current Living Arrangements house  (Split level house, 6 stairs up to 2nd floor bedroom)   Home Accessibility stairs within home;stairs to enter home   Living Environment Comments Tub shower combo. Enters through garage as no rail in the front   Self-Care   Usual Activity Tolerance good   Current Activity Tolerance moderate   Equipment Currently Used at Home cane, straight   Fall history within last six months yes   Number of times patient has fallen within last six months 2   Activity/Exercise/Self-Care Comment Pt IND w/ ADLs and IADLs at baseline   Instrumental Activities of Daily Living (IADL)   Previous Responsibilities driving   IADL Comments Dtr manages IADL   General Information   Onset of Illness/Injury or Date of Surgery 02/23/24   Referring Physician SAVANNAH Hudson   Patient/Family Therapy Goal Statement (OT) go home   Additional Occupational Profile Info/Pertinent History of Current Problem Tessa Edwards is a 68 year old old female with a history of MDD, RLS, fibromyalgia, allergic rhinitis, hx of Non-Hodgkin's lymphoma, spine disease underwent L4-5 spine surgery.   Existing Precautions/Restrictions brace worn when out of bed;spinal   Cognitive Status Examination   Orientation Status orientation to person, place and time   Affect/Mental Status (Cognitive) WNL   Visual Perception   Visual Impairment/Limitations WFL   Sensory   Sensory Quick Adds sensation intact   Pain Assessment   Patient Currently in Pain Yes, see Vital Sign flowsheet   Posture   Posture not impaired   Range of Motion Comprehensive   General Range of Motion no range of motion deficits identified   Strength Comprehensive (MMT)   General Manual Muscle Testing (MMT) Assessment no strength deficits identified   Bed Mobility   Bed Mobility scooting/bridging;supine-sit;sit-supine    Comment (Bed Mobility) CGA with bed elevated, bedrails   Transfers   Transfers bed-chair transfer;sit-stand transfer;toilet transfer;shower transfer   Transfer Comments SBA-CGA   Toilet Transfer   Type (Toilet Transfer) sit-stand;stand-sit   Toilet Transfer Comments CGA with grab bars, RTS   Activities of Daily Living   BADL Assessment/Intervention bathing;lower body dressing;toileting   Bathing Assessment/Intervention   [place] Level (Bathing) minimum assist (75% patient effort)   Lower Body Dressing Assessment/Training   Comment, (Lower Body Dressing) Assistance with all aspects of LB dressing   [place] Level (Lower Body Dressing) maximum assist (25% patient effort)   Toileting   Comment, (Toileting) Assistance with clothing mgmt, hygiene   [place] Level (Toileting) adjust/manage clothing;toileting skills;moderate assist (50% patient effort)   Clinical Impression   Criteria for Skilled Therapeutic Interventions Met (OT) Yes, treatment indicated   OT Diagnosis decreased ADLs   Influenced by the following impairments lumbar fusion   OT Problem List-Impairments impacting ADL activity tolerance impaired;mobility;pain;post-surgical precautions   Assessment of Occupational Performance 3-5 Performance Deficits   Identified Performance Deficits dressing, bed mobility, all transfers, bathing, toileting   Planned Therapy Interventions (OT) ADL retraining;bed mobility training;transfer training   Clinical Decision Making Complexity (OT) detailed assessment/moderate complexity   Risk & Benefits of therapy have been explained evaluation/treatment results reviewed;patient   OT Total Evaluation Time   OT Eval, Moderate Complexity Minutes (08360) 10   OT Goals   OT Goals Lower Body Dressing;Bed Mobility;Transfers;Toilet Transfer/Toileting   OT: Lower Body Dressing Minimal assist;using adaptive equipment;within precautions   OT: Bed Mobility Modified independent;supine to/from sitting;within precautions   OT:  Transfer Modified independent;with assistive device;within precautions   OT: Toilet Transfer/Toileting Minimal assist;cleaning and garment management;within precautions   Interventions   Interventions Quick Adds Self-Care/Home Management   Self-Care/Home Management   Self-Care/Home Mgmt/ADL, Compensatory, Meal Prep Minutes (32166) 27   Symptoms Noted During/After Treatment (Meal Preparation/Planning Training) increased pain   Treatment Detail/Skilled Intervention Pt edu on spinal px - pt verbalized understanding. Pt edu on log roll technique for bed mobility - completed with cues.  Pt STS in A w/ FWW and amb. to from bathroom. Educated on techniques for toilet hygiene. Encouraged to sit in recliner but stated she was too tired and wanted to transfer into bed; then stated she needed to use the restroom again. Left on toilet with nursing assistance.   OT Discharge Planning   OT Plan LB dressing/AE training, toileting, bed mobility   OT Discharge Recommendation (DC Rec) home with assist   OT Rationale for DC Rec Dtr available to help at home   OT Brief overview of current status Assistance with ADL, mobility   Total Session Time   Timed Code Treatment Minutes 27   Total Session Time (sum of timed and untimed services) 37    [facility]  OUTPATIENT OCCUPATIONAL THERAPY  EVALUATION  PLAN OF TREATMENT FOR OUTPATIENT REHABILITATION  (COMPLETE FOR INITIAL CLAIMS ONLY)  Patient's Last Name, First Name, M.I.  YOB: 1955  Tessa Edwards                          Provider's Name  [facility] Medical Record No.  [id number]                             Onset Date:  02/23/24   Start of Care Date:      Type:     ___PT   _X_OT   ___SLP Medical Diagnosis:                       OT Diagnosis:  decreased ADLs Visits from SOC:  1     See note for plan of treatment, functional goals and certification details    I CERTIFY THE NEED FOR THESE SERVICES FURNISHED UNDER         THIS PLAN OF TREATMENT AND WHILE UNDER MY CARE     (Physician co-signature of this document indicates review and certification of the therapy plan).

## 2024-02-24 NOTE — PROGRESS NOTES
"[facility] MEDICINE  PROGRESS NOTE     Code Status: Full Code  Procedure(s):  bilateral lumbar 4-lumbar 5 laminectomies, medial facetectomies, foraminotomies and right transforaminal lumbar interbody fusion and bilateral posterior instrumented fusion with posterolateral arthrodesis and use of stealth.  1 Day Post-Op  Identification/Summary:   Tessa Edwards is a 68 year old female with a history of MDD, RLS, fibromyalgia, allergic rhinitis, Non-Hodgkin's lymphoma, spine disease.  2/23 underwent L4-5 spine surgery. [facility] [place].  Medically doing well.  Pain under good control.  Still having increased drain output.  Discharge per surgery.     Assessment and Plan:    S/p L4-5 Spine Surgery  Spine Disease  Acute Post-Op Pain  -Postoperative management per neurosurgery.  -Drain output volume still increased.    MDD/Anxiety  Fibromyalgia  -Continue home Prozac and gabapentin.     RLS  -Continue home medication Ropinirole.     GERD  -Continue PPI.     Seasonal Allergies   -Noted.  Follow clinically.     History of non-Hodgkin's lymphoma  -Okay to continue Calquence.    Anticoagulation   Routine postoperative risk.  Surgery has ordered Lovenox to initiate noon of 2/25.    COVID-19 PCR not tested    Fluids: Saline lock  Pain meds: Per surgery  Therapy: Per surgery  Camejo:Not present  Lines: None       Current Diet  Orders Placed This Encounter      Regular Diet Adult    Supplements  None    Barriers to Discharge: Postoperative recovery, BARRY drain output    Disposition: Per surgery.  Potential discharge 1 to 2 days    Clinically Significant Risk Factors                         # Obesity: Estimated body mass index is 38.96 kg/m  as calculated from the following:    Height as of this encounter: 1.626 m (5' 4\").    Weight as of this encounter: 103 kg (227 lb)., PRESENT ON ADMISSION            Interval History/Subjective:  Patient doing okay.  Pain under reasonable control.  No chest " pain.  No shortness of breath.  No nausea or vomiting.  No lightheadedness or dizziness.  BARRY drain output still 70 cc over the last shift.  Questions answered to verbalized satisfaction.      Last 24H PRN:     benzocaine-menthol (CEPACOL) 15-3.6 MG lozenge 1 lozenge, 1 lozenge at 02/23/24 2333    hydrOXYzine HCl (ATARAX) tablet 10 mg, 10 mg at 02/24/24 0718    Physical Exam/Objective:  Temp:  [97.2  F (36.2  C)-98.3  F (36.8  C)] 98  F (36.7  C)  Pulse:  [68-93] 68  Resp:  [12-18] 16  BP: (125-168)/(57-86) 125/59  SpO2:  [85 %-100 %] 95 %  Wt Readings from Last 4 Encounters:   02/23/24 103 kg (227 lb)   09/19/23 90.7 kg (200 lb)   07/27/23 90.7 kg (200 lb)   03/22/22 84.5 kg (186 lb 4 oz)     Body mass index is 38.96 kg/m .    Constitutional: awake, alert, cooperative, no apparent distress, and appears stated age  ENT: Normocephalic, without obvious abnormality, atraumatic, external ears without lesions, oral pharynx with moist mucous membranes, tonsils without erythema or exudates, gums normal and good dentition.  Respiratory: No increased work of breathing, good air exchange, clear to auscultation bilaterally, no crackles or wheezing  Cardiovascular: Normal apical impulse, regular rate and rhythm, normal S1 and S2, no S3 or S4, and no murmur noted  GI: No scars, normal bowel sounds, soft, non-distended, non-tender, no masses palpated, no hepatosplenomegally  Skin: normal skin color, texture, turgor, no redness, warmth, or swelling, and no rashes  Musculoskeletal: There is no redness, warmth, or swelling of the joints.  Motor strength is 5 out of 5 all extremities bilaterally.  Tone is normal. no lower extremity pitting edema present  Neurologic: Cranial nerves II-XII are grossly intact. Sensory:  Sensory intact  Neuropsychiatric: General: normal, calm, and normal eye contact Level of consciousness: alert / normal Affect: normal Orientation: oriented to self, place, time and situation Memory and insight: normal,  memory for past and recent events intact, and thought process normal      Medications:   Personally Reviewed.  Medications    sodium chloride 125 mL/hr at 02/23/24 2335      acalabrutinib  100 mg Oral BID    acetaminophen  975 mg Oral Q8H    calcium carbonate-vitamin D  1 tablet Oral QPM    ceFAZolin  1 g Intravenous Q8H    [START ON 2/25/2024] enoxaparin ANTICOAGULANT  40 mg Subcutaneous Q24H    FLUoxetine  40 mg Oral Daily    gabapentin  100 mg Oral TID    lidocaine  2 patch Transdermal Q24H    methocarbamol  750 mg Oral Q6H    multivitamin w/minerals  1 tablet Oral Daily    pantoprazole  40 mg Oral QAM AC    polyethylene glycol  17 g Oral Daily    rOPINIRole  1 mg Oral BID    rOPINIRole  2 mg Oral At Bedtime    senna-docusate  1 tablet Oral BID       Data reviewed today: I personally reviewed all new medications, labs, imaging/diagnostics reports over the past 24 hours. Pertinent findings include:    Imaging:   No results found for this or any previous visit (from the past 24 hour(s)).    Labs:  XR Surgery CAROLINE  Fluoro G/T 5 Min   Final Result      XR SURGERY CAROLINE FLUORO GREATER THAN 5 MIN   Final Result      Lateral Lumbar Spine for Surgical Imaging  [XR LUMBAR SPINE PORT 1  VIEW]   Final Result   IMPRESSION: Intraoperative crosstable lateral radiograph. Underpenetration of the lumbosacral junction somewhat comprises evaluation. Posterior approach surgical clamp projects over the posterior margin of the L3 posterior spinous process and is directed    at the L4 vertebral body and pedicles.      XR Lumbar Spine 2 - 3 VWS Portable    (Results Pending)     No results found for this or any previous visit (from the past 24 hour(s)).    Pending Labs:  Unresulted Labs Ordered in the Past 30 Days of this Admission       No orders found for last 31 day(s).              Parker Staley MD  Hospitalist  [facility] Medicine  [facility]  Phone: #[phone]

## 2024-02-24 NOTE — PROGRESS NOTES
02/24/24 1415   Appointment Info   Signing Clinician's Name / Credentials (PT) Izabel Barger, PT, DPT   Living Environment   People in Home child(saad), adult   Current Living Arrangements house  (split level)   Home Accessibility stairs to enter home;stairs within home   Number of Stairs, Main Entrance 2   Stair Railings, Main Entrance railings safe and in good condition   Number of Stairs, Within Home, Primary six   Stair Railings, Within Home, Primary railings safe and in good condition   Self-Care   Equipment Currently Used at Home cane, straight   Activity/Exercise/Self-Care Comment Pt reports has walker and cane at home   General Information   Onset of Illness/Injury or Date of Surgery 02/23/24   Referring Physician Brianne Hudson MD   Patient/Family Therapy Goals Statement (PT) to get better   Pertinent History of Current Problem (include personal factors and/or comorbidities that impact the POC) Surgery, elective, back pain   Existing Precautions/Restrictions spinal;weight bearing   Weight-Bearing Status - LLE weight-bearing as tolerated   Weight-Bearing Status - RLE weight-bearing as tolerated   Bed Mobility   Bed Mobility supine-sit;sit-supine;scooting/bridging   Scooting/Bridging [place] (Bed Mobility) supervision;verbal cues   Supine-Sit [place] (Bed Mobility) supervision;verbal cues   Sit-Supine [place] (Bed Mobility) supervision;verbal cues   Assistive Device (Bed Mobility) bed rails   Comment, (Bed Mobility) SBA with supine<>sit transfers and bed mobility. Verbal cues for safety and log roll technique.   Transfers   Transfers sit-stand transfer   Comment, (Transfers) CGA with FWW for sit<>stand transfers. Verbal cues for safety and safe hand placement. Assist with donning/doffing brace in sitting   Sit-Stand Transfer   Sit-Stand [place] (Transfers) verbal cues;contact guard   Assistive Device (Sit-Stand Transfers) walker, front-wheeled   Comment, (Sit-Stand Transfer) CGA  with FWW for sit<>stand transfers. Verbal cues for safety and safe hand placement.   Gait/Stairs (Locomotion)   [place] Level (Gait) supervision;verbal cues   Assistive Device (Gait) walker, front-wheeled   Distance in Feet (Gait) 10'   Pattern (Gait) step-through   Deviations/Abnormal Patterns (Gait) base of support, wide;alfie decreased;gait speed decreased   Comment, (Gait/Stairs) Pt ambulates with SBA and FWW. Verbal cues for safety and posture.   Clinical Impression   Criteria for Skilled Therapeutic Intervention Yes, treatment indicated   PT Diagnosis (PT) impaired functional mobility   Influenced by the following impairments weakness, pain   Functional limitations due to impairments transfers, ambulation, stairs   Clinical Presentation (PT Evaluation Complexity) stable   Clinical Presentation Rationale Pt presents as medically diagnosed   Clinical Decision Making (Complexity) low complexity   Planned Therapy Interventions (PT) balance training;bed mobility training;gait training;home exercise program;patient/family education;ROM (range of motion);stair training;strengthening;stretching;transfer training   Risk & Benefits of therapy have been explained evaluation/treatment results reviewed;care plan/treatment goals reviewed;patient   PT Total Evaluation Time   PT Eval, Low Complexity Minutes (04570) 10   Physical Therapy Goals   PT Frequency 2x/day   PT Predicted Duration/Target Date for Goal Attainment 02/29/24   PT Goals Transfers;Gait;Stairs   PT: Transfers Modified independent;Sit to/from stand;Assistive device  (FWW)   PT: Gait Supervision/stand-by assist;Assistive device;Rolling walker;Greater than 200 feet  (FWW)   PT: Stairs Supervision/stand-by assist;6 stairs;Rail on both sides   Interventions   Interventions Quick Adds Gait Training;Therapeutic Activity;Therapeutic Procedure   Therapeutic Procedure/Exercise   Treatment Detail/Skilled Intervention Education on ankle pumps, quad sets and glute  squeezes x 10B   Therapeutic Activity   Therapeutic Activities: dynamic activities to improve functional performance Minutes (26724) 15   Treatment Detail/Skilled Intervention Pt supine in bed at beginning of session. Education on spinal precautions. Education on log roll technique for supine<>sit transfers. CGA with FWW for sit<>stand transfers. Verbal cues for safety and safe hand placement. Assist with donning/doffing brace in sitting. Pt able to scoot self up in bed at end of session. Verbal cues for safety and education on spinal precautions. Pt supine with bed alarm on and call light within reach.   Gait Training   Gait Training Minutes (83164) 15   Treatment Detail/Skilled Intervention Pt ambulates with SBA and FWW. Verbal cues for upright posture and to not lean forward. Verbal cues for safety. Pt reports feels much better once in sitting.   Distance in Feet 200'   [place] Level (Gait Training) stand-by assist   Physical Assistance Level (Gait Training) supervision;verbal cues   Weight Bearing (Gait Training) weight-bearing as tolerated   Assistive Device (Gait Training) rolling walker   Pattern Analysis (Gait Training) swing-to gait   Gait Analysis Deviations decreased alfie;decreased step length   Impairments (Gait Analysis/Training) pain;ROM decreased;strength decreased   PT Discharge Planning   PT Plan assess stairs, progress mobility, therex   PT Discharge Recommendation (DC Rec) (S)  home with assist   PT Rationale for DC Rec Pt reports good home support and good home setup. Pt reports has walker and cane at home.   PT Brief overview of current status SBA with 200' with FWW and sit<>stand transfers   Total Session Time   Timed Code Treatment Minutes 30   Total Session Time (sum of timed and untimed services) 40     Izabel Barger, PT, DPT

## 2024-02-24 NOTE — PROGRESS NOTES
02/24/24 0823   Appointment Info   Signing Clinician's Name / Credentials (OT) Tracy Diaz OTR/L   Living Environment   People in Home child(saad), adult   Current Living Arrangements house  (Split level house, 6 stairs up to 2nd floor bedroom)   Home Accessibility stairs within home;stairs to enter home   Living Environment Comments Tub shower combo. Enters through garage as no rail in the front   Self-Care   Usual Activity Tolerance good   Current Activity Tolerance moderate   Equipment Currently Used at Home cane, straight   Fall history within last six months yes   Number of times patient has fallen within last six months 2   Activity/Exercise/Self-Care Comment Pt IND w/ ADLs and IADLs at baseline   Instrumental Activities of Daily Living (IADL)   Previous Responsibilities driving   IADL Comments Dtr manages IADL   General Information   Onset of Illness/Injury or Date of Surgery 02/23/24   Referring Physician SAVANNAH Hudson   Patient/Family Therapy Goal Statement (OT) go home   Additional Occupational Profile Info/Pertinent History of Current Problem Tessa Edwards is a 68 year old old female with a history of MDD, RLS, fibromyalgia, allergic rhinitis, hx of Non-Hodgkin's lymphoma, spine disease underwent L4-5 spine surgery.   Existing Precautions/Restrictions brace worn when out of bed;spinal   Cognitive Status Examination   Orientation Status orientation to person, place and time   Affect/Mental Status (Cognitive) WNL   Visual Perception   Visual Impairment/Limitations WFL   Sensory   Sensory Quick Adds sensation intact   Pain Assessment   Patient Currently in Pain Yes, see Vital Sign flowsheet   Posture   Posture not impaired   Range of Motion Comprehensive   General Range of Motion no range of motion deficits identified   Strength Comprehensive (MMT)   General Manual Muscle Testing (MMT) Assessment no strength deficits identified   Bed Mobility   Bed Mobility scooting/bridging;supine-sit;sit-supine    Comment (Bed Mobility) CGA with bed elevated, bedrails   Transfers   Transfers bed-chair transfer;sit-stand transfer;toilet transfer;shower transfer   Transfer Comments SBA-CGA   Toilet Transfer   Type (Toilet Transfer) sit-stand;stand-sit   Toilet Transfer Comments CGA with grab bars, RTS   Activities of Daily Living   BADL Assessment/Intervention bathing;lower body dressing;toileting   Bathing Assessment/Intervention   [place] Level (Bathing) minimum assist (75% patient effort)   Lower Body Dressing Assessment/Training   Comment, (Lower Body Dressing) Assistance with all aspects of LB dressing   [place] Level (Lower Body Dressing) maximum assist (25% patient effort)   Toileting   Comment, (Toileting) Assistance with clothing mgmt, hygiene   [place] Level (Toileting) adjust/manage clothing;toileting skills;moderate assist (50% patient effort)   Clinical Impression   Criteria for Skilled Therapeutic Interventions Met (OT) Yes, treatment indicated   OT Diagnosis decreased ADLs   Influenced by the following impairments lumbar fusion   OT Problem List-Impairments impacting ADL activity tolerance impaired;mobility;pain;post-surgical precautions   Assessment of Occupational Performance 3-5 Performance Deficits   Identified Performance Deficits dressing, bed mobility, all transfers, bathing, toileting   Planned Therapy Interventions (OT) ADL retraining;bed mobility training;transfer training   Clinical Decision Making Complexity (OT) detailed assessment/moderate complexity   Risk & Benefits of therapy have been explained evaluation/treatment results reviewed;patient   OT Total Evaluation Time   OT Eval, Moderate Complexity Minutes (20643) 10   OT Goals   Therapy Frequency (OT) 2 times/day   OT Predicted Duration/Target Date for Goal Attainment 02/29/24   OT Goals Lower Body Dressing;Bed Mobility;Transfers;Toilet Transfer/Toileting   OT: Lower Body Dressing Minimal assist;using adaptive equipment;within  precautions   OT: Bed Mobility Modified independent;supine to/from sitting;within precautions   OT: Transfer Modified independent;with assistive device;within precautions   OT: Toilet Transfer/Toileting Minimal assist;cleaning and garment management;within precautions   Interventions   Interventions Quick Adds Self-Care/Home Management   Self-Care/Home Management   Self-Care/Home Mgmt/ADL, Compensatory, Meal Prep Minutes (02505) 27   Symptoms Noted During/After Treatment (Meal Preparation/Planning Training) increased pain   Treatment Detail/Skilled Intervention Pt edu on spinal px - pt verbalized understanding. Pt edu on log roll technique for bed mobility - completed with cues.  Pt STS in A w/ FWW and amb. to from bathroom. Educated on techniques for toilet hygiene. Encouraged to sit in recliner but stated she was too tired and wanted to transfer into bed; then stated she needed to use the restroom again. Left on toilet with nursing assistance.   OT Discharge Planning   OT Plan LB dressing/AE training, toileting, bed mobility   OT Discharge Recommendation (DC Rec) home with assist   OT Rationale for DC Rec Dtr available to help at home   OT Brief overview of current status Assistance with ADL, mobility   Total Session Time   Timed Code Treatment Minutes 27   Total Session Time (sum of timed and untimed services) 37

## 2024-02-24 NOTE — PROGRESS NOTES
"[facility], [place]   Neurosurgery Progress Note:    Date of service: 2/24/2024    Assessment: Tessa Edwards is a 68 year old female s/p bilateral lumbar 4-lumbar 5 laminectomies, medial facetectomies, foraminotomies and right transforaminal lumbar interbody fusion and bilateral posterior instrumented fusion with posterolateral arthrodesis on 2/23/2024 with Dr Brianne Hudson     Clinically Significant Risk Factors Present on Admission            # Obesity: Estimated body mass index is 38.96 kg/m  as calculated from the following:    Height as of this encounter: 1.626 m (5' 4\").    Weight as of this encounter: 103 kg (227 lb).          Plan:  - Neuro checks/vital signs: Q4 hours  - SBP: <160  - Pain control: PRN IV dilaudid, PO oxycodone  - Activity: up with assist  - Restrictions/Bracing: LSO when out of bed  - Diet: regular   - Drain: BARRY in place, please record outputs every 8 hours  -  ABX:  Ancef X 3 doses   - DVT prophylaxis: Lovenox at 48 hours post operatively   - Imaging:  upright x-rays pending  - PT/OT: FELIPE To, RAQUEL  2/24/2024  Department of Neurosurgery  Pager: [phone]        Interval History:  Complains of incisional pain and bilateral, lateral leg pain similar to pre-op.  Has been up with therapies.           Objective:   Temp:  [97.2  F (36.2  C)-98.3  F (36.8  C)] 98  F (36.7  C)  Pulse:  [68-93] 68  Resp:  [11-38] 16  BP: (125-169)/(57-86) 125/59  SpO2:  [85 %-100 %] 95 %  I/O last 3 completed shifts:  In: 300 [I.V.:300]  Out: 835 [Urine:600; Drains:235]    Gen: Appears comfortable, NAD  Wound: Incision, clean, dry, intact without strikethrough  Neurologic:  - Alert & Oriented to person, place, time, and situation  - Follows commands briskly  - Speech fluent, spontaneous. No aphasia or dysarthria.  - No gaze preference. No apparent hemineglect.  - PERRL, EOMI  - Strong eye closure, jaw clench, and cheek puff  - Face symmetric " with sensation intact to light touch  - Palate elevates symmetrically, uvula midline, tongue protrudes midline  - Trapezii and sternocleidomastoid muscles 5/5 bilaterally  - No pronator drift     Del Tr Bi WE WF Gr   R 5 5 5 5 5 5   L 5 5 5 5 5 5    HF KE KF DF PF EHL   R 5 5 5 5 5 5   L 5 5 5 5 5 5     Reflexes 2+ throughout    Sensation intact and symmetric to light touch throughout    LABS  Recent Labs   Lab Test 01/02/23  1418 11/18/21  1013 11/17/21  0729   WBC 22.6* 35.3* 23.6*   HGB 12.0 10.2* 9.7*   MCV 94 89 88    126* 123*       Recent Labs   Lab Test 02/23/24  0637 01/02/23  1418 11/18/21  1013 11/18/21  0813 11/17/21  0729   NA  --  140 139  --  139   POTASSIUM  --  4.3 3.7  --  3.8   CHLORIDE  --  106 106  --  107   CO2  --  23 25  --  26   BUN  --  26.7* 9  --  11   CR  --  0.70 0.59*  --  0.68   ANIONGAP  --  11 8  --  6   KAYLAN  --  9.8 8.8  --  8.4*   * 104* 128*   < > 104    < > = values in this interval not displayed.       IMAGING    No results found for this or any previous visit (from the past 24 hour(s)).

## 2024-02-24 NOTE — PLAN OF CARE
Problem: Adult Inpatient Plan of Care  Goal: Optimal Comfort and Wellbeing  Intervention: Monitor Pain and Promote Comfort     Goal Outcome Evaluation:       Patient vital signs are at baseline: No, pt placed on 2L O2 during sleep  Patient able to ambulate as they were prior to admission or with assist devices provided by therapies during their stay:  Yes, up with assist of 1 with gait belt and walker, was able to walk a few steps in hallway  Patient MUST void prior to discharge:  No,  Reason:  Camejo removed at approx 2330, Due to void and will need 3 PVRs  Patient able to tolerate oral intake:  Yes  Pain has adequate pain control using Oral analgesics:  Yes, PRN oxycodone and ice utilized  Does patient have an identified :  Yes  Has goal D/C date and time been discussed with patient:  No,  Reason:  Discharge pending therapies, drain output/removal

## 2024-02-25 ENCOUNTER — APPOINTMENT (OUTPATIENT)
Dept: OCCUPATIONAL THERAPY | Facility: CLINIC | Age: 69
DRG: 454 | End: 2024-02-25
Attending: SURGERY
Payer: COMMERCIAL

## 2024-02-25 ENCOUNTER — APPOINTMENT (OUTPATIENT)
Dept: PHYSICAL THERAPY | Facility: CLINIC | Age: 69
DRG: 454 | End: 2024-02-25
Attending: SURGERY
Payer: COMMERCIAL

## 2024-02-25 PROCEDURE — 250N000013 HC RX MED GY IP 250 OP 250 PS 637: Performed by: SURGERY

## 2024-02-25 PROCEDURE — 99232 SBSQ HOSP IP/OBS MODERATE 35: CPT | Performed by: FAMILY MEDICINE

## 2024-02-25 PROCEDURE — 250N000013 HC RX MED GY IP 250 OP 250 PS 637: Performed by: PHYSICIAN ASSISTANT

## 2024-02-25 PROCEDURE — 97116 GAIT TRAINING THERAPY: CPT | Mod: GP

## 2024-02-25 PROCEDURE — 250N000011 HC RX IP 250 OP 636: Performed by: PHYSICIAN ASSISTANT

## 2024-02-25 PROCEDURE — 120N000001 HC R&B MED SURG/OB

## 2024-02-25 PROCEDURE — 99024 POSTOP FOLLOW-UP VISIT: CPT | Performed by: NURSE PRACTITIONER

## 2024-02-25 PROCEDURE — 250N000013 HC RX MED GY IP 250 OP 250 PS 637: Performed by: NURSE PRACTITIONER

## 2024-02-25 PROCEDURE — 97535 SELF CARE MNGMENT TRAINING: CPT | Mod: GO | Performed by: OCCUPATIONAL THERAPIST

## 2024-02-25 RX ORDER — POLYETHYLENE GLYCOL 3350 17 G/17G
17 POWDER, FOR SOLUTION ORAL 2 TIMES DAILY
Status: DISCONTINUED | OUTPATIENT
Start: 2024-02-25 | End: 2024-02-26 | Stop reason: HOSPADM

## 2024-02-25 RX ADMIN — ENOXAPARIN SODIUM 40 MG: 100 INJECTION SUBCUTANEOUS at 12:10

## 2024-02-25 RX ADMIN — METHOCARBAMOL TABLETS 750 MG: 750 TABLET, COATED ORAL at 09:11

## 2024-02-25 RX ADMIN — OXYCODONE 10 MG: 5 TABLET ORAL at 21:33

## 2024-02-25 RX ADMIN — GABAPENTIN 100 MG: 100 CAPSULE ORAL at 15:03

## 2024-02-25 RX ADMIN — ACETAMINOPHEN 975 MG: 325 TABLET ORAL at 15:03

## 2024-02-25 RX ADMIN — SENNOSIDES AND DOCUSATE SODIUM 1 TABLET: 8.6; 5 TABLET ORAL at 09:11

## 2024-02-25 RX ADMIN — OXYCODONE 10 MG: 5 TABLET ORAL at 02:30

## 2024-02-25 RX ADMIN — GABAPENTIN 100 MG: 100 CAPSULE ORAL at 09:11

## 2024-02-25 RX ADMIN — METHOCARBAMOL TABLETS 750 MG: 750 TABLET, COATED ORAL at 21:33

## 2024-02-25 RX ADMIN — GABAPENTIN 100 MG: 100 CAPSULE ORAL at 21:34

## 2024-02-25 RX ADMIN — PANTOPRAZOLE SODIUM 40 MG: 40 TABLET, DELAYED RELEASE ORAL at 06:57

## 2024-02-25 RX ADMIN — ACETAMINOPHEN 975 MG: 325 TABLET ORAL at 23:31

## 2024-02-25 RX ADMIN — FLUOXETINE 40 MG: 20 CAPSULE ORAL at 09:11

## 2024-02-25 RX ADMIN — LIDOCAINE 2 PATCH: 4 PATCH TOPICAL at 09:11

## 2024-02-25 RX ADMIN — ROPINIROLE 1 MG: 0.25 TABLET, FILM COATED ORAL at 15:03

## 2024-02-25 RX ADMIN — SENNOSIDES AND DOCUSATE SODIUM 1 TABLET: 8.6; 5 TABLET ORAL at 21:33

## 2024-02-25 RX ADMIN — ACETAMINOPHEN 975 MG: 325 TABLET ORAL at 06:57

## 2024-02-25 RX ADMIN — HYDROXYZINE HYDROCHLORIDE 10 MG: 10 TABLET ORAL at 23:31

## 2024-02-25 RX ADMIN — METHOCARBAMOL TABLETS 750 MG: 750 TABLET, COATED ORAL at 15:03

## 2024-02-25 RX ADMIN — ROPINIROLE HYDROCHLORIDE 2 MG: 1 TABLET, FILM COATED ORAL at 21:34

## 2024-02-25 RX ADMIN — ROPINIROLE 1 MG: 0.25 TABLET, FILM COATED ORAL at 09:11

## 2024-02-25 RX ADMIN — Medication 1 TABLET: at 09:11

## 2024-02-25 RX ADMIN — METHOCARBAMOL TABLETS 750 MG: 750 TABLET, COATED ORAL at 02:31

## 2024-02-25 RX ADMIN — Medication 1 TABLET: at 21:34

## 2024-02-25 ASSESSMENT — ACTIVITIES OF DAILY LIVING (ADL)
ADLS_ACUITY_SCORE: 28
ADLS_ACUITY_SCORE: 24
ADLS_ACUITY_SCORE: 24
ADLS_ACUITY_SCORE: 28
ADLS_ACUITY_SCORE: 24
ADLS_ACUITY_SCORE: 28
ADLS_ACUITY_SCORE: 24
ADLS_ACUITY_SCORE: 28
ADLS_ACUITY_SCORE: 24
ADLS_ACUITY_SCORE: 28
ADLS_ACUITY_SCORE: 24
ADLS_ACUITY_SCORE: 28
ADLS_ACUITY_SCORE: 24
ADLS_ACUITY_SCORE: 28
ADLS_ACUITY_SCORE: 24
ADLS_ACUITY_SCORE: 24
ADLS_ACUITY_SCORE: 28

## 2024-02-25 NOTE — PROGRESS NOTES
"[facility], [place]   Neurosurgery Progress Note:    Date of service: 2/25/2024    Assessment: Tessa Edwards is a 68 year old female s/p bilateral lumbar 4-lumbar 5 laminectomies, medial facetectomies, foraminotomies and right transforaminal lumbar interbody fusion and bilateral posterior instrumented fusion with posterolateral arthrodesis on 2/23/2024 (POD2) with Dr Brianne Hudson     Clinically Significant Risk Factors Present on Admission            # Obesity: Estimated body mass index is 38.96 kg/m  as calculated from the following:    Height as of this encounter: 1.626 m (5' 4\").    Weight as of this encounter: 103 kg (227 lb).          Plan:  - Neuro checks/vital signs: Q4 hours  - SBP: <160  - Pain control: PRN IV dilaudid, PO oxycodone  - Activity: up with assist  - Restrictions/Bracing: LSO when out of bed  - Diet: regular   - Drain: BARRY in place, please record outputs every 8 hours  -  ABX:  Ancef X 3 doses- completed   - DVT prophylaxis: Lovenox at 48 hours post operatively   - Imaging:  upright x-rays pending  - PT/OT: LANNY Beasley, FELIPE, CNP  2/25/2024  Department of Neurosurgery  Pager: [phone]        Interval History:  Ongoing incisional pain controlled with oral analgesia.   Working with therapies, doing stairs, recommending home with assist.   Voiding normally, +flatus, no BM.           Objective:   Temp:  [97.4  F (36.3  C)-98.2  F (36.8  C)] 98.2  F (36.8  C)  Pulse:  [75-82] 77  Resp:  [16] 16  BP: (132-139)/(62-65) 136/63  SpO2:  [92 %-95 %] 93 %  I/O last 3 completed shifts:  In: 240 [P.O.:240]  Out: 1090 [Urine:900; Drains:190]    Gen: Appears comfortable, NAD  Wound: Incision, clean, dry, intact without strikethrough  Neurologic:  - Alert & Oriented to person, place, time, and situation  - Follows commands briskly  - Speech fluent, spontaneous. No aphasia or dysarthria.  - No gaze preference. No apparent hemineglect.  - PERRL, " EOMI  - Strong eye closure, jaw clench, and cheek puff  - Face symmetric with sensation intact to light touch  - Palate elevates symmetrically, uvula midline, tongue protrudes midline  - Trapezii and sternocleidomastoid muscles 5/5 bilaterally  - No pronator drift     Del Tr Bi WE WF Gr   R 5 5 5 5 5 5   L 5 5 5 5 5 5    HF KE KF DF PF EHL   R 5 5 5 5 5 5   L 5 5 5 5 5 5     Reflexes 2+ throughout    Sensation intact and symmetric to light touch throughout    LABS  Recent Labs   Lab Test 01/02/23  1418 11/18/21  1013 11/17/21  0729   WBC 22.6* 35.3* 23.6*   HGB 12.0 10.2* 9.7*   MCV 94 89 88    126* 123*       Recent Labs   Lab Test 02/23/24  0637 01/02/23  1418 11/18/21  1013 11/18/21  0813 11/17/21  0729   NA  --  140 139  --  139   POTASSIUM  --  4.3 3.7  --  3.8   CHLORIDE  --  106 106  --  107   CO2  --  23 25  --  26   BUN  --  26.7* 9  --  11   CR  --  0.70 0.59*  --  0.68   ANIONGAP  --  11 8  --  6   KAYLAN  --  9.8 8.8  --  8.4*   * 104* 128*   < > 104    < > = values in this interval not displayed.       IMAGING    No results found for this or any previous visit (from the past 24 hour(s)).

## 2024-02-25 NOTE — PLAN OF CARE
Patient vital signs are at baseline: Yes  Patient able to ambulate as they were prior to admission or with assist devices provided by therapies during their stay:  Yes  Patient MUST void prior to discharge:  Yes  Patient able to tolerate oral intake:  Yes  Pain has adequate pain control using Oral analgesics:  Yes  Does patient have an identified :  Yes  Has goal D/C date and time been discussed with patient:  Yes    Patient still has her drain in. Output was 60cc on dayshift. Will continue to monitor. Dressing came off back so it was changed. Some erythema noted. Most likely discharge to home tomorrow after drain removal.

## 2024-02-25 NOTE — PLAN OF CARE
Problem: Adult Inpatient Plan of Care  Goal: Optimal Comfort and Wellbeing  Outcome: Progressing  Intervention: Monitor Pain and Promote Comfort    Problem: Spinal Surgery  Goal: Optimal Functional Ability  Outcome: Progressing  Intervention: Optimize Functional Status     Goal Outcome Evaluation:       Patient vital signs are at baseline: Yes  Patient able to ambulate as they were prior to admission or with assist devices provided by therapies during their stay:  Yes  Patient MUST void prior to discharge:  Yes  Patient able to tolerate oral intake:  Yes  Pain has adequate pain control using Oral analgesics:  Yes  Does patient have an identified :  Yes  Has goal D/C date and time been discussed with patient:  Yes, discharge pending drain removal

## 2024-02-25 NOTE — PROGRESS NOTES
Occupational Therapy Discharge Summary    Reason for therapy discharge:    All goals and outcomes met, no further needs identified.    Progress towards therapy goal(s). See goals on Care Plan in [facility] electronic health record for goal details.  Goals met    Therapy recommendation(s):    No further therapy is recommended.

## 2024-02-25 NOTE — PROGRESS NOTES
"[facility] MEDICINE  PROGRESS NOTE     Code Status: Full Code  Procedure(s):  bilateral lumbar 4-lumbar 5 laminectomies, medial facetectomies, foraminotomies and right transforaminal lumbar interbody fusion and bilateral posterior instrumented fusion with posterolateral arthrodesis and use of stealth.  2 Days Post-Op  Identification/Summary:   Tessa Edwards is a 68 year old female with a history of MDD, RLS, fibromyalgia, allergic rhinitis, Non-Hodgkin's lymphoma, spine disease.  2/23 underwent L4-5 spine surgery[place] [facility] [place]  Medically doing well.  Pain under good control.  Still having increased drain output.  Discharge per surgery.      Assessment and Plan:     S/p L4-5 Spine Surgery  Spine Disease  Acute Post-Op Pain  -Postoperative management per neurosurgery.  -Drain output volume still increased.     MDD/Anxiety  Fibromyalgia  -Continue home Prozac and gabapentin.     RLS  -Continue home medication Ropinirole.     GERD  -Continue PPI.     Seasonal Allergies   -Noted.  Follow clinically.     History of non-Hodgkin's lymphoma  -Okay to continue Calquence.     Anticoagulation   Routine postoperative risk.  Surgery has ordered Lovenox to initiate noon of 2/25.     COVID-19 PCR not tested     Fluids: Saline lock  Pain meds: Per surgery  Therapy: Per surgery  Camejo:Not present  Lines: None       Current Diet  Orders Placed This Encounter      Regular Diet Adult    Supplements  None    Barriers to Discharge: BARRY drain output otherwise medically stable    Disposition: To be determined    Clinically Significant Risk Factors                         # Obesity: Estimated body mass index is 38.96 kg/m  as calculated from the following:    Height as of this encounter: 1.626 m (5' 4\").    Weight as of this encounter: 103 kg (227 lb)., PRESENT ON ADMISSION            Interval History/Subjective:  Doing well.  Pain under good control.  No chest pain.  No shortness of breath.  " No nausea or vomiting.  No lightheadedness or dizziness.  BARRY drain output still increased.  Questions answered to verbalized satisfaction.      Last 24H PRN:     oxyCODONE (ROXICODONE) tablet 5 mg **OR** oxyCODONE (ROXICODONE) tablet 10 mg, 10 mg at 02/25/24 0230    Physical Exam/Objective:  Temp:  [97.4  F (36.3  C)-98.2  F (36.8  C)] 98.2  F (36.8  C)  Pulse:  [75-82] 77  Resp:  [16] 16  BP: (132-139)/(62-65) 136/63  SpO2:  [92 %-95 %] 93 %  Wt Readings from Last 4 Encounters:   02/23/24 103 kg (227 lb)   09/19/23 90.7 kg (200 lb)   07/27/23 90.7 kg (200 lb)   03/22/22 84.5 kg (186 lb 4 oz)     Body mass index is 38.96 kg/m .    Constitutional: awake, alert, cooperative, no apparent distress, and appears stated age  ENT: Normocephalic, without obvious abnormality, atraumatic, external ears without lesions, oral pharynx with moist mucous membranes, tonsils without erythema or exudates, gums normal and good dentition.  Respiratory: No increased work of breathing, good air exchange, clear to auscultation bilaterally, no crackles or wheezing  Cardiovascular: Normal apical impulse, regular rate and rhythm, normal S1 and S2, no S3 or S4, and no murmur noted  GI: No scars, normal bowel sounds, soft, non-distended, non-tender, no masses palpated, no hepatosplenomegally  Skin: normal skin color, texture, turgor, no redness, warmth, or swelling, and no rashes  Musculoskeletal: There is no redness, warmth, or swelling of the joints.  Motor strength is 5 out of 5 all extremities bilaterally.  Tone is normal. no lower extremity pitting edema present  Neurologic: Cranial nerves II-XII are grossly intact. Sensory:  Sensory intact  Neuropsychiatric: General: normal, calm, and normal eye contact Level of consciousness: alert / normal Affect: normal Orientation: oriented to self, place, time and situation Memory and insight: normal, memory for past and recent events intact, and thought process normal      Medications:   Personally  Reviewed.  Medications      acalabrutinib  100 mg Oral BID    acetaminophen  975 mg Oral Q8H    calcium carbonate-vitamin D  1 tablet Oral QPM    enoxaparin ANTICOAGULANT  40 mg Subcutaneous Q24H    FLUoxetine  40 mg Oral Daily    gabapentin  100 mg Oral TID    lidocaine  2 patch Transdermal Q24H    methocarbamol  750 mg Oral Q6H    multivitamin w/minerals  1 tablet Oral Daily    pantoprazole  40 mg Oral QAM AC    polyethylene glycol  17 g Oral BID    rOPINIRole  1 mg Oral BID    rOPINIRole  2 mg Oral At Bedtime    senna-docusate  1 tablet Oral BID       Data reviewed today: I personally reviewed all new medications, labs, imaging/diagnostics reports over the past 24 hours. Pertinent findings include:    Imaging:   No results found for this or any previous visit (from the past 24 hour(s)).    Labs:  XR Surgery CAROLINE  Fluoro G/T 5 Min   Final Result      XR SURGERY CAROLINE FLUORO GREATER THAN 5 MIN   Final Result      Lateral Lumbar Spine for Surgical Imaging  [XR LUMBAR SPINE PORT 1  VIEW]   Final Result   IMPRESSION: Intraoperative crosstable lateral radiograph. Underpenetration of the lumbosacral junction somewhat comprises evaluation. Posterior approach surgical clamp projects over the posterior margin of the L3 posterior spinous process and is directed    at the L4 vertebral body and pedicles.      XR Lumbar Spine 2 - 3 VWS Portable    (Results Pending)     No results found for this or any previous visit (from the past 24 hour(s)).    Pending Labs:  Unresulted Labs Ordered in the Past 30 Days of this Admission       No orders found from 1/24/2024 to 2/24/2024.              Parker Staley MD  [facility]  Phone: #[phone]

## 2024-02-25 NOTE — PLAN OF CARE
Patient vital signs are at baseline: Yes  Patient able to ambulate as they were prior to admission or with assist devices provided by therapies during their stay:  Yes  Patient MUST void prior to discharge:  Yes  Patient able to tolerate oral intake:  Yes  Pain has adequate pain control using Oral analgesics:  Yes  Does patient have an identified :  Yes  Has goal D/C date and time been discussed with patient:  Yes Patient still has in her drain and most likely will need to stay until Monday. Will have family support at discharge.

## 2024-02-26 ENCOUNTER — APPOINTMENT (OUTPATIENT)
Dept: RADIOLOGY | Facility: CLINIC | Age: 69
DRG: 454 | End: 2024-02-26
Attending: PHYSICIAN ASSISTANT
Payer: COMMERCIAL

## 2024-02-26 VITALS
RESPIRATION RATE: 16 BRPM | OXYGEN SATURATION: 97 % | WEIGHT: 227 LBS | BODY MASS INDEX: 38.76 KG/M2 | HEART RATE: 81 BPM | DIASTOLIC BLOOD PRESSURE: 63 MMHG | TEMPERATURE: 97.7 F | SYSTOLIC BLOOD PRESSURE: 136 MMHG | HEIGHT: 64 IN

## 2024-02-26 PROCEDURE — 250N000013 HC RX MED GY IP 250 OP 250 PS 637: Performed by: PHYSICIAN ASSISTANT

## 2024-02-26 PROCEDURE — 999N000065 XR LUMBAR SPINE PORT 2/3 VIEWS

## 2024-02-26 PROCEDURE — 250N000013 HC RX MED GY IP 250 OP 250 PS 637: Performed by: SURGERY

## 2024-02-26 PROCEDURE — 250N000011 HC RX IP 250 OP 636: Performed by: PHYSICIAN ASSISTANT

## 2024-02-26 PROCEDURE — 250N000013 HC RX MED GY IP 250 OP 250 PS 637: Performed by: NURSE PRACTITIONER

## 2024-02-26 PROCEDURE — 99232 SBSQ HOSP IP/OBS MODERATE 35: CPT | Performed by: FAMILY MEDICINE

## 2024-02-26 RX ORDER — OXYCODONE HYDROCHLORIDE 5 MG/1
5-10 TABLET ORAL EVERY 4 HOURS PRN
Qty: 40 TABLET | Refills: 0 | Status: ON HOLD | OUTPATIENT
Start: 2024-02-26 | End: 2024-03-13

## 2024-02-26 RX ORDER — AMOXICILLIN 250 MG
1 CAPSULE ORAL 2 TIMES DAILY PRN
Qty: 20 TABLET | Refills: 0 | Status: ON HOLD | OUTPATIENT
Start: 2024-02-26 | End: 2024-03-13

## 2024-02-26 RX ORDER — METHOCARBAMOL 750 MG/1
750 TABLET, FILM COATED ORAL EVERY 6 HOURS
Qty: 40 TABLET | Refills: 0 | Status: ON HOLD | OUTPATIENT
Start: 2024-02-26 | End: 2024-03-13

## 2024-02-26 RX ADMIN — METHOCARBAMOL TABLETS 750 MG: 750 TABLET, COATED ORAL at 03:42

## 2024-02-26 RX ADMIN — PANTOPRAZOLE SODIUM 40 MG: 40 TABLET, DELAYED RELEASE ORAL at 06:48

## 2024-02-26 RX ADMIN — ACETAMINOPHEN 975 MG: 325 TABLET ORAL at 06:48

## 2024-02-26 RX ADMIN — METHOCARBAMOL TABLETS 750 MG: 750 TABLET, COATED ORAL at 16:21

## 2024-02-26 RX ADMIN — ENOXAPARIN SODIUM 40 MG: 100 INJECTION SUBCUTANEOUS at 12:13

## 2024-02-26 RX ADMIN — LIDOCAINE 2 PATCH: 4 PATCH TOPICAL at 12:15

## 2024-02-26 RX ADMIN — GABAPENTIN 100 MG: 100 CAPSULE ORAL at 12:14

## 2024-02-26 RX ADMIN — METHOCARBAMOL TABLETS 750 MG: 750 TABLET, COATED ORAL at 12:19

## 2024-02-26 RX ADMIN — OXYCODONE 10 MG: 5 TABLET ORAL at 06:47

## 2024-02-26 RX ADMIN — ROPINIROLE 1 MG: 0.25 TABLET, FILM COATED ORAL at 12:13

## 2024-02-26 RX ADMIN — ROPINIROLE 1 MG: 0.25 TABLET, FILM COATED ORAL at 16:21

## 2024-02-26 RX ADMIN — GABAPENTIN 100 MG: 100 CAPSULE ORAL at 16:21

## 2024-02-26 RX ADMIN — LORATADINE 10 MG: 10 TABLET ORAL at 12:13

## 2024-02-26 RX ADMIN — FLUOXETINE 40 MG: 20 CAPSULE ORAL at 12:14

## 2024-02-26 RX ADMIN — Medication 1 TABLET: at 12:14

## 2024-02-26 ASSESSMENT — ACTIVITIES OF DAILY LIVING (ADL)
ADLS_ACUITY_SCORE: 28

## 2024-02-26 NOTE — DISCHARGE SUMMARY
[facility]    Discharge Summary  Neurosurgery    Date of Admission:  2/23/2024  Date of Discharge:  2/26/2024  Discharging Provider: Amna Carrera NP  Date of Service (when I saw the patient): 02/26/24    Discharge Diagnoses   Principal Problem:    Surgery, elective  Active Problems:    Restless leg syndrome    Allergic rhinitis    Fibromyalgia    Moderate major depression (H)    Non-Hodgkin's lymphoma (H)    Urinary retention      History of Present Illness   Tessa Edwards is an 68 year old female is s/p medial facetectomies, foraminotomies and right transforaminal lumbar interbody fusion and bilateral posterior instrumented fusion with posterolateral arthrodesis and use of stealth and bilateral lumbar 4- lumbar 5 laminectomies.    Hospital Course   Tessa Edwards was admitted on 2/23/2024.  The following problems were addressed during her hospitalization:    Principal Problem:    Surgery, elective    Assessment: as above    Plan: Discharge to home with routine follow up.    Per hospitalist note 2/26/2024:  MDD/Anxiety  Fibromyalgia  -Continue home Prozac and gabapentin.     RLS  -Continue home medication Ropinirole.     GERD  -Continue PPI.     Seasonal Allergies   -Noted.  Follow clinically.     History of non-Hodgkin's lymphoma  -Okay to continue Calquence.      I have discussed the following assessment and plan with Dr. Hudson who is in agreement with initial plan and will follow up with further consultation recommendations.    Amna Carrera CNP  [facility] Neurosurgery  [address] [address]    Tel [phone]        Significant Results and Procedures   N/a    Pending Results   These results will be followed up by n/a  Unresulted Labs Ordered in the Past 30 Days of this Admission       No orders found from 1/24/2024 to 2/24/2024.            Code Status   Full Code    Primary Care Physician    [facility]    Physical Exam   Temp: 97.7  F (36.5  C) Temp src: Oral BP: 136/63 Pulse: 81   Resp: 16 SpO2: 97 % O2 Device: None (Room air)    Vitals:    02/23/24 0624   Weight: 103 kg (227 lb)     Vital Signs with Ranges  Temp:  [97.7  F (36.5  C)-98.1  F (36.7  C)] 97.7  F (36.5  C)  Pulse:  [77-84] 81  Resp:  [16] 16  BP: (134-144)/(60-63) 136/63  SpO2:  [93 %-97 %] 97 %  I/O last 3 completed shifts:  In: 360 [P.O.:360]  Out: 1058 [Urine:900; Drains:158]    Constitutional: Awake, alert, cooperative, no apparent distress, and appears stated age.  Eyes: Lids and lashes normal, pupils equal, round and reactive to light, extra ocular muscles intact  ENT: Normocephalic, without obvious abnormality, atraumatic  Respiratory: No increased work of breathing  Skin: No bruising or bleeding, normal skin color, texture, turgor, no redness, warmth, or swelling.  Incision with staples intact, minimal drainage, open to air.  Musculoskeletal: There is no redness, warmth, or swelling of the joints.  Full range of motion noted.  Motor strength is 5 out of 5 all extremities bilaterally.  Tone is normal.  Neurologic: Awake, alert, oriented to name, place and time.  Cranial nerves II-XII are grossly intact.  Motor is 5 out of 5 bilaterally.     Neuropsychiatric: Calm, normal eye contact, alert, normal affect, oriented to self, place, time and situation, memory for past and recent events intact and thought process normal.       Time Spent on this Encounter   I, Amna Carrera NP, personally saw the patient today and spent less than or equal to 30 minutes discharging this patient.    Discharge Disposition   Discharged to home  Condition at discharge: Stable    Consultations This Hospital Stay   CARE MANAGEMENT / SOCIAL WORK IP CONSULT  HOSPITALIST IP CONSULT  PHYSICAL THERAPY ADULT IP CONSULT  OCCUPATIONAL THERAPY ADULT IP CONSULT  ORTHOSIS BRACE IP CONSULT    Discharge Orders      Reason for your  hospital stay    Lumbar spine fusion     Follow-up and recommended labs and tests     Please follow up at [facility]surgery in 2 weeks and in 6 weeks.  Please call the clinic at [phone] to schedule your appointment.     Activity    Discharge instructions:  No lifting of more than 10 pounds, no bending, no twisting until follow up visit.    Ok to shampoo hair, shower or bathe, but, do not scrub or submerge incision under water until evaluated post-operatively in clinic.    Ok to walk as tolerated, avoid bed rest and prolonged sitting.    No contact sports until after follow up visit  No high impact activities such as; running/jogging, snowmobile or 4 brito riding or any other recreational vehicles.    Do not take NSAIDS (ibuprofen, advil, aleve, naproxen, etc) for pain control.    Do NOT drive while taking narcotic pain medication.    Call [facility]surgery at [phone] for increasing redness, swelling or pus draining from the incision, increased pain or any other questions and concerns.     Wound care and dressings    Keep your incision clean and dry at all times.  OK to remove dressing on postop day 2.  OK to shower on postop day 3 and allow water to run over incision, pat dry after shower.  No bathing swimming or submerging in water.  Call immediately or come to ED if any drainage occurs, or you develop new pain, redness, or swelling.     Diet    Follow this diet upon discharge: home diet     Discharge Medications   Current Discharge Medication List        START taking these medications    Details   methocarbamol (ROBAXIN) 750 MG tablet Take 1 tablet (750 mg) by mouth every 6 hours  Qty: 40 tablet, Refills: 0    Associated Diagnoses: S/P lumbar spinal fusion      oxyCODONE (ROXICODONE) 5 MG tablet Take 1-2 tablets (5-10 mg) by mouth every 4 hours as needed for moderate pain  Qty: 40 tablet, Refills: 0    Associated Diagnoses: S/P lumbar spinal fusion      senna-docusate  (SENOKOT-S/PERICOLACE) 8.6-50 MG tablet Take 1 tablet by mouth 2 times daily as needed for constipation  Qty: 20 tablet, Refills: 0    Associated Diagnoses: S/P lumbar spinal fusion           CONTINUE these medications which have NOT CHANGED    Details   acalabrutinib (CALQUENCE) 100 MG capsule Take 1 capsule (100 mg) by mouth 2 times daily      acetaminophen (TYLENOL) 500 MG tablet Take 1,000 mg by mouth 3 times daily      amoxicillin (AMOXIL) 500 MG capsule Take 2,000 mg by mouth See Admin Instructions Pre dental      calcium-vitamin D (CALCIUM-VITAMIN D) 500 mg(1,250mg) -200 unit per tablet Take 1 tablet by mouth every evening       FLUoxetine (PROZAC) 40 MG capsule Take 1 capsule by mouth once daily  Qty: 90 capsule, Refills: 0    Associated Diagnoses: Moderate major depression (H)      !! gabapentin (NEURONTIN) 100 MG capsule Take 100 mg by mouth 3 times daily Plus additional 100mg at HS if needed      !! gabapentin (NEURONTIN) 100 MG capsule Take 100 mg by mouth nightly as needed In addition to 100mg TID      loperamide (IMODIUM) 2 MG capsule Take 2 mg by mouth 4 times daily as needed for diarrhea      multivitamin w/minerals (THERA-VIT-M) tablet Take 1 tablet by mouth daily      omeprazole 20 MG tablet Take 20 mg by mouth daily      pseudoePHEDrine-guaiFENesin (MUCINEX D)  MG 12 hr tablet Take 1 tablet by mouth 2 times daily as needed for congestion      rOPINIRole (REQUIP) 1 MG tablet TAKE 1 TABLET BY MOUTH TWICE DAILY AND 2 AT BEDTIME  Qty: 180 tablet, Refills: 0    Associated Diagnoses: Restless leg syndrome       !! - Potential duplicate medications found. Please discuss with provider.        STOP taking these medications       naproxen sodium (ALEVE) 220 MG tablet Comments:   Reason for Stopping:             Allergies   No Known Allergies

## 2024-02-26 NOTE — PLAN OF CARE
Problem: Adult Inpatient Plan of Care  Goal: Optimal Comfort and Wellbeing  Outcome: Progressing  Intervention: Monitor Pain and Promote Comfort     Goal Outcome Evaluation:       Patient vital signs are at baseline: Yes  Patient able to ambulate as they were prior to admission or with assist devices provided by therapies during their stay:  Yes  Patient MUST void prior to discharge:  Yes  Patient able to tolerate oral intake:  Yes  Pain has adequate pain control using Oral analgesics:  Yes  Does patient have an identified :  Yes  Has goal D/C date and time been discussed with patient:  Yes, discharge pending drain output/removal, output of 40ml this shift

## 2024-02-26 NOTE — PROGRESS NOTES
"[facility] MEDICINE  PROGRESS NOTE     Code Status: Full Code  Procedure(s):  medial facetectomies, foraminotomies and right transforaminal lumbar interbody fusion and bilateral posterior instrumented fusion with posterolateral arthrodesis and use of stealth.  bilateral lumbar 4-lumbar 5 laminectomies,  3 Days Post-Op  Identification/Summary:   Tessa Edwards is a 68 year old female with a history of MDD, RLS, fibromyalgia, allergic rhinitis, Non-Hodgkin's lymphoma, spine disease.  2/23 underwent L4-5 spine surgery. [facility] service consulted.  Medically doing well.  Pain under good control.  Slowly decreasing drain output.  Discharge per surgery.      Assessment and Plan:     S/p L4-5 Spine Surgery  Spine Disease  Acute Post-Op Pain  -Postoperative management per neurosurgery.  -Drain output volume slowly improving.     MDD/Anxiety  Fibromyalgia  -Continue home Prozac and gabapentin.     RLS  -Continue home medication Ropinirole.     GERD  -Continue PPI.     Seasonal Allergies   -Noted.  Follow clinically.     History of non-Hodgkin's lymphoma  -Okay to continue Calquence.     Anticoagulation   Routine postoperative risk.  Surgery has ordered Lovenox to initiate noon of 2/25.     COVID-19 PCR not tested     Fluids: Saline lock  Pain meds: Per surgery  Therapy: Per surgery  Camejo:Not present  Lines: None       Current Diet  Orders Placed This Encounter      Regular Diet Adult      Diet    Supplements  None    Barriers to Discharge: Drain output.  Otherwise medically stable.    Disposition: Per surgery    Clinically Significant Risk Factors                         # Obesity: Estimated body mass index is 38.96 kg/m  as calculated from the following:    Height as of this encounter: 1.626 m (5' 4\").    Weight as of this encounter: 103 kg (227 lb)., PRESENT ON ADMISSION            Interval History/Subjective:  Patient doing well.  Pain under good control.  No chest pain.  No shortness of " breath.  No nausea or vomiting.  No lightheadedness or dizziness.  Drain output slowly decreasing.  Questions answered to verbalized satisfaction.      Last 24H PRN:     hydrOXYzine HCl (ATARAX) tablet 10 mg, 10 mg at 02/25/24 2331    oxyCODONE (ROXICODONE) tablet 5 mg **OR** oxyCODONE (ROXICODONE) tablet 10 mg, 10 mg at 02/26/24 0647    Physical Exam/Objective:  Temp:  [97.7  F (36.5  C)-98.1  F (36.7  C)] 97.7  F (36.5  C)  Pulse:  [77-84] 81  Resp:  [16] 16  BP: (134-144)/(60-63) 136/63  SpO2:  [93 %-97 %] 97 %  Wt Readings from Last 4 Encounters:   02/23/24 103 kg (227 lb)   09/19/23 90.7 kg (200 lb)   07/27/23 90.7 kg (200 lb)   03/22/22 84.5 kg (186 lb 4 oz)     Body mass index is 38.96 kg/m .    Constitutional: awake, alert, cooperative, no apparent distress, and appears stated age  ENT: Normocephalic, without obvious abnormality, atraumatic, external ears without lesions, oral pharynx with moist mucous membranes, tonsils without erythema or exudates, gums normal and good dentition.  Respiratory: No increased work of breathing, good air exchange, clear to auscultation bilaterally, no crackles or wheezing  Cardiovascular: Normal apical impulse, regular rate and rhythm, normal S1 and S2, no S3 or S4, and no murmur noted  GI: No scars, normal bowel sounds, soft, non-distended, non-tender, no masses palpated, no hepatosplenomegally  Skin: normal skin color, texture, turgor, no redness, warmth, or swelling, and no rashes  Musculoskeletal: There is no redness, warmth, or swelling of the joints.  Motor strength is 5 out of 5 all extremities bilaterally.  Tone is normal. no lower extremity pitting edema present  Neurologic: Cranial nerves II-XII are grossly intact. Sensory:  Sensory intact  Neuropsychiatric: General: normal, calm, and normal eye contact Level of consciousness: alert / normal Affect: normal Orientation: oriented to self, place, time and situation Memory and insight: normal, memory for past and recent  events intact, and thought process normal      Medications:   Personally Reviewed.  Medications      acalabrutinib  100 mg Oral BID    calcium carbonate-vitamin D  1 tablet Oral QPM    enoxaparin ANTICOAGULANT  40 mg Subcutaneous Q24H    FLUoxetine  40 mg Oral Daily    gabapentin  100 mg Oral TID    lidocaine  2 patch Transdermal Q24H    methocarbamol  750 mg Oral Q6H    multivitamin w/minerals  1 tablet Oral Daily    pantoprazole  40 mg Oral QAM AC    polyethylene glycol  17 g Oral BID    rOPINIRole  1 mg Oral BID    rOPINIRole  2 mg Oral At Bedtime    senna-docusate  1 tablet Oral BID       Data reviewed today: I personally reviewed all new medications, labs, imaging/diagnostics reports over the past 24 hours. Pertinent findings include:    Imaging:   No results found for this or any previous visit (from the past 24 hour(s)).    Labs:  XR Surgery CAROLINE  Fluoro G/T 5 Min   Final Result      XR SURGERY CAROLINE FLUORO GREATER THAN 5 MIN   Final Result      Lateral Lumbar Spine for Surgical Imaging  [XR LUMBAR SPINE PORT 1  VIEW]   Final Result   IMPRESSION: Intraoperative crosstable lateral radiograph. Underpenetration of the lumbosacral junction somewhat comprises evaluation. Posterior approach surgical clamp projects over the posterior margin of the L3 posterior spinous process and is directed    at the L4 vertebral body and pedicles.      XR Lumbar Spine 2 - 3 VWS Portable    (Results Pending)     No results found for this or any previous visit (from the past 24 hour(s)).    Pending Labs:  Unresulted Labs Ordered in the Past 30 Days of this Admission       No orders found from 1/24/2024 to 2/24/2024.              Parker Staley MD  [facility]  Phone: #[phone]

## 2024-02-26 NOTE — PROGRESS NOTES
Care Management Discharge Note    Discharge Date: 02/26/2024     Discharge Disposition:  Home     Discharge Transportation:  Friend or Family transport     Additional Information:  Reviewed at discharge. No CM needs for discharge planning at this time.     ADAM Burkett

## 2024-02-27 NOTE — OP NOTE
NEUROSURGERY OPERATIVE REPORT    DATE OF SERVICE:  2/23/2024    PREOPERATIVE DIAGNOSES:   Lumbar radiculopathy   Cauda equina compression  Lumbar stenosis     POSTOPERATIVE DIAGNOSES:   same    PROCEDURE:   Bilateral lumbar 4-lumbar 5 laminectomies, medial facetectomies, foraminotomies   Lumbar 4-lumbar 5 right transforaminal lumbar interbody fusion and bilateral posterior instrumented fusion with posterolateral arthrodesis   Use of stealth/use of allograft/use of autograft     SURGEON: Brianne Hudson MD    ASSISTANT: Billie Daniel PA-C    INDICATIONS: Tessa Edwards is a 68 year old female who presents with symptoms of cauda equina compression including bowel bladder loss as well as pain and weakness in her low back and lower extremities.Her spine shows severe spinal canal stenosis at lumbar 4-5 with large facet effusions at this level suspicious for segmental instability.  Does appear to be some worsening of her spondylolisthesis from sitting to standing positions. Recommend bilateral lumbar 4-lumbar 5 laminectomies, medial facetectomies, foraminotomies and right transforaminal lumbar interbody fusion and bilateral posterior instrumented fusion with posterolateral arthrodesis and use of stealth.  Risks and benefits were discussed in detail including but not limited to infection, hematoma, nerve damage including paralysis, post op radiculitis, durotomy, lack of a sold bone fusion, hardware malfunction, risks associated with the use of general anesthesia, blood clots in the lungs or legs. She agreed to proceed     PROCEDURE:  After obtaining informed consent, the patient was brought to the operating room with pneumatic stockings in place.  IV antibiotics was administered.  The patient   was intubated under general endotracheal anesthesia. A marrero catheter was placed and the pt was positioned prone on the operating table. The patient was prepped and draped in the usual sterile fashion.        A preincisional  infiltration of local anesthetic was performed along the midline and the incision was opened sharply and extended down to the fascia.  The fascia was opened in one layer with monopolar electrocautery.  A subperiosteal dissection was undertaken to expose the lamina at lumbar 4-5.  We then placed the stealth localization guide along a spinous process. At this point, we brought in intraoperative O-arm for navigation. After confirming navigation of all instruments (registered separately on the navigational star), we proceeded to place the screws using stereotactic localization. We then used the navigational drill to select an entry site, entered using the usual anatomical markers for the entry through the cortex of the pedicle into the vertebral body. We then proceeded to use the navigational tap and place our bilateral screws at lumbar 4 and lumbar 5.      We then proceeded to remove the lamina of lumbar 4 and superior lumbar 5 with [place] Roungers,  Midas drill, and Kerrison rongeurs. We drilled down to the ligamentum elevated the ligamentum from the underlying thecal sac and removed it with Kerrison rongeurs. We next drilled medial facetectomy and foraminotomies opening up the lateral recess and expose the underlying impinged lateral recess and nerve roots.  On the patient's right side we then drilled through the L4 pars and remove the inferior articulating process.  We then trimmed the superior articular process toes flush with the L5 pedicle.  This then uncovered the lumbar 4-5 disc space.  Then performed an annulotomy using a #15 blade.  We prepared the disc space with a combination of raeann, pituitaries, curettes.  We then placed a trial in the interbody space.  We then placed our interbody implant prepacked with allograft and autograft.  We then expanded our interbody implant to breakoff torque.  We then backfilled her implant with autograft and allograft.  Once completed the lateral recess and central canal  appeared well decompressed.  Next we placed our autograft and allograft along the decorticated bone in the posterolateral space after decortication of the bone. We then placed rods bilaterally and locked into place in the top-loaded heads of the screws and secured with set screws tightened to break off torque. We then proceeded to copiously irrigate the incision with antibiotic-containing saline and achieved hemostasis.      Due to the nature and complexity of the case an assistant was required for the duration of the case for positioning, retraction, hemostasis, and closure    A drain was tunneled out of the wound. The incision was closed in layers with interrupted 0 Vicryl to approximate the muscle and fascia, inverted 2-0 Vicryl to approximate the subcutaneous tissues and staples to approximate the skin edges.  Sponge and needle counts were correct prior to closure x2.  Sterile dressings were placed.      Patient tolerated the procedure well, was taken to the recovery room where she was extubated and found to be at her neurological baseline with no new deficits appreciated.    Estimated blood loss: 150 cc    Findings: none    Specimens: none    Drains: BARRY x 1    Implants:   Implant Name Type Inv. Item Serial No.  Lot No. LRB No. Used Action   BONE GRAFT PUTTY MAGNETOS EASYPACK PUTTY 2.5CC 703-050-US - [id number] Bone/Tissue Synthetic BONE GRAFT PUTTY MAGNETOS EASYPACK PUTTY 2.5CC 703-050-US  [employer]   1 Implanted   BONE GRAFT PUTTY MAGNETOS EASYPACK PUTTY 2.5CC 703-050-US - [id number] Bone/Tissue Synthetic BONE GRAFT PUTTY MAGNETOS EASYPACK PUTTY 2.5CC 703-050-US  [employer]   1 Implanted   KIT BNGF 6CC DMNR GRADY FBR ACCELERATE GRFTN [facility] [id number] - [id number] Bone/Tissue/Biologic KIT BNGF 6CC DMNR GRADY FBR ACCELERATE FTN [facility] [id number] [employer]   1 Implanted   IMP SPI INTERBODY MEDT CATALYFT PL SHORT 7MM [id number] - [id number] Metallic Hardware/[place] IMP SPI  INTERBODY MEDT CATALYFT PL SHORT 7MM [id number]  [employer] [id number] Right 1 Implanted   IMP SCR MEDT BREAK-OFF SET SOLERA 4.75MM TI [id number] - [id number] Metallic Hardware/[place] IMP SCR MEDT BREAK-OFF SET SOLERA 4.75MM TI [id number]  [employer]DANE[employer] N/A Right 4 Implanted   IMP SCR MEDT 4.75MM SOLERA 6.5X45MM MA [id number] - [id number] Metallic Hardware/[place] IMP SCR MEDT 4.75MM SOLERA 6.5X45MM MA [id number]  [employer] N/A Right 4 Implanted   IMP VANDANA MEDT 3.5CM [id number] - [id number] Metallic Hardware/[place] IMP VANDANA MEDT 3.5CM [id number]  [employer] N/A Right 2 Implanted         Brianne Hudson MD    [id number] [facility] [id number]

## 2024-02-27 NOTE — PROGRESS NOTES
Discharge AVS reviewed with patient and daughter . Questions answered and teachings acknowledged. Belongings and paperwork sent with.      Lora Workman RN

## 2024-02-27 NOTE — PROGRESS NOTES
Patient vital signs are at baseline: Yes  Patient able to ambulate as they were prior to admission or with assist devices provided by therapies during their stay:  Yes  Patient MUST void prior to discharge:  Yes  Patient able to tolerate oral intake:  Yes  Pain has adequate pain control using Oral analgesics:  Yes  Does patient have an identified :  Yes  Has goal D/C date and time been discussed with patient:  Yes    Pt A/O x 4. VSS on RA. Denies N/V. Denies CP or SOB. Pain 5/10 in low back managing with prn and scheduled meds. CMS intact. Voiding spontaneously. Drain removed this shift per protocol (see flowsheets) Xray obtained afterwards per MD request. Tolerating po intake. Up SBA walker and gait belt. Able to make needs known call light in reach.    Lora Workman RN

## 2024-03-02 ENCOUNTER — TELEPHONE (OUTPATIENT)
Dept: NEUROLOGY | Facility: CLINIC | Age: 69
End: 2024-03-02
Payer: COMMERCIAL

## 2024-03-03 ENCOUNTER — APPOINTMENT (OUTPATIENT)
Dept: RADIOLOGY | Facility: CLINIC | Age: 69
DRG: 857 | End: 2024-03-03
Attending: PHYSICIAN ASSISTANT
Payer: COMMERCIAL

## 2024-03-03 ENCOUNTER — APPOINTMENT (OUTPATIENT)
Dept: MRI IMAGING | Facility: CLINIC | Age: 69
DRG: 857 | End: 2024-03-03
Attending: PHYSICIAN ASSISTANT
Payer: COMMERCIAL

## 2024-03-03 ENCOUNTER — HOSPITAL ENCOUNTER (INPATIENT)
Facility: CLINIC | Age: 69
LOS: 11 days | Discharge: HOME | DRG: 857 | End: 2024-03-14
Attending: EMERGENCY MEDICINE | Admitting: STUDENT IN AN ORGANIZED HEALTH CARE EDUCATION/TRAINING PROGRAM
Payer: COMMERCIAL

## 2024-03-03 DIAGNOSIS — Z41.9 SURGERY, ELECTIVE: ICD-10-CM

## 2024-03-03 DIAGNOSIS — Z98.1 S/P LUMBAR FUSION: Primary | ICD-10-CM

## 2024-03-03 DIAGNOSIS — T81.40XA POSTOPERATIVE INFECTION, UNSPECIFIED TYPE, INITIAL ENCOUNTER: ICD-10-CM

## 2024-03-03 LAB
ALBUMIN SERPL BCG-MCNC: 3.9 G/DL (ref 3.5–5.2)
ALP SERPL-CCNC: 101 U/L (ref 40–150)
ALT SERPL W P-5'-P-CCNC: 19 U/L (ref 0–50)
ANION GAP SERPL CALCULATED.3IONS-SCNC: 13 MMOL/L (ref 7–15)
AST SERPL W P-5'-P-CCNC: 25 U/L (ref 0–45)
BILIRUB SERPL-MCNC: 0.7 MG/DL
BUN SERPL-MCNC: 13.3 MG/DL (ref 8–23)
CALCIUM SERPL-MCNC: 9.2 MG/DL (ref 8.8–10.2)
CHLORIDE SERPL-SCNC: 101 MMOL/L (ref 98–107)
CREAT SERPL-MCNC: 0.81 MG/DL (ref 0.51–0.95)
CRP SERPL-MCNC: 104 MG/L
DEPRECATED HCO3 PLAS-SCNC: 22 MMOL/L (ref 22–29)
EGFRCR SERPLBLD CKD-EPI 2021: 78 ML/MIN/1.73M2
ERYTHROCYTE [DISTWIDTH] IN BLOOD BY AUTOMATED COUNT: 12.8 % (ref 10–15)
ERYTHROCYTE [SEDIMENTATION RATE] IN BLOOD BY WESTERGREN METHOD: 29 MM/HR (ref 0–30)
GLUCOSE SERPL-MCNC: 150 MG/DL (ref 70–99)
HCT VFR BLD AUTO: 34.3 % (ref 35–47)
HGB BLD-MCNC: 11.4 G/DL (ref 11.7–15.7)
LACTATE SERPL-SCNC: 0.9 MMOL/L (ref 0.7–2)
MCH RBC QN AUTO: 27.5 PG (ref 26.5–33)
MCHC RBC AUTO-ENTMCNC: 33.2 G/DL (ref 31.5–36.5)
MCV RBC AUTO: 83 FL (ref 78–100)
MRSA DNA SPEC QL NAA+PROBE: NEGATIVE
NRBC # BLD AUTO: 0 10E3/UL
NRBC BLD AUTO-RTO: 0 /100
PLATELET # BLD AUTO: 297 10E3/UL (ref 150–450)
POTASSIUM SERPL-SCNC: 3.9 MMOL/L (ref 3.4–5.3)
PROT SERPL-MCNC: 6.7 G/DL (ref 6.4–8.3)
RBC # BLD AUTO: 4.14 10E6/UL (ref 3.8–5.2)
SA TARGET DNA: NEGATIVE
SODIUM SERPL-SCNC: 136 MMOL/L (ref 135–145)
WBC # BLD AUTO: 21.1 10E3/UL (ref 4–11)

## 2024-03-03 PROCEDURE — 250N000009 HC RX 250: Performed by: EMERGENCY MEDICINE

## 2024-03-03 PROCEDURE — 72100 X-RAY EXAM L-S SPINE 2/3 VWS: CPT

## 2024-03-03 PROCEDURE — 96376 TX/PRO/DX INJ SAME DRUG ADON: CPT

## 2024-03-03 PROCEDURE — 87040 BLOOD CULTURE FOR BACTERIA: CPT | Performed by: EMERGENCY MEDICINE

## 2024-03-03 PROCEDURE — 86140 C-REACTIVE PROTEIN: CPT | Performed by: EMERGENCY MEDICINE

## 2024-03-03 PROCEDURE — 250N000011 HC RX IP 250 OP 636: Performed by: INTERNAL MEDICINE

## 2024-03-03 PROCEDURE — 0W9L3ZZ DRAINAGE OF LOWER BACK, PERCUTANEOUS APPROACH: ICD-10-PCS | Performed by: PHYSICIAN ASSISTANT

## 2024-03-03 PROCEDURE — 258N000003 HC RX IP 258 OP 636: Performed by: EMERGENCY MEDICINE

## 2024-03-03 PROCEDURE — 96374 THER/PROPH/DIAG INJ IV PUSH: CPT | Mod: 59

## 2024-03-03 PROCEDURE — 99024 POSTOP FOLLOW-UP VISIT: CPT | Performed by: PHYSICIAN ASSISTANT

## 2024-03-03 PROCEDURE — 85007 BL SMEAR W/DIFF WBC COUNT: CPT | Performed by: EMERGENCY MEDICINE

## 2024-03-03 PROCEDURE — 99223 1ST HOSP IP/OBS HIGH 75: CPT | Performed by: INTERNAL MEDICINE

## 2024-03-03 PROCEDURE — 85652 RBC SED RATE AUTOMATED: CPT | Performed by: EMERGENCY MEDICINE

## 2024-03-03 PROCEDURE — 87075 CULTR BACTERIA EXCEPT BLOOD: CPT | Performed by: SURGERY

## 2024-03-03 PROCEDURE — 120N000001 HC R&B MED SURG/OB

## 2024-03-03 PROCEDURE — 87641 MR-STAPH DNA AMP PROBE: CPT | Performed by: INTERNAL MEDICINE

## 2024-03-03 PROCEDURE — 250N000013 HC RX MED GY IP 250 OP 250 PS 637: Performed by: INTERNAL MEDICINE

## 2024-03-03 PROCEDURE — 85025 COMPLETE CBC W/AUTO DIFF WBC: CPT | Performed by: EMERGENCY MEDICINE

## 2024-03-03 PROCEDURE — 87077 CULTURE AEROBIC IDENTIFY: CPT | Performed by: SURGERY

## 2024-03-03 PROCEDURE — 82040 ASSAY OF SERUM ALBUMIN: CPT | Performed by: EMERGENCY MEDICINE

## 2024-03-03 PROCEDURE — 250N000013 HC RX MED GY IP 250 OP 250 PS 637: Performed by: EMERGENCY MEDICINE

## 2024-03-03 PROCEDURE — 83605 ASSAY OF LACTIC ACID: CPT | Performed by: EMERGENCY MEDICINE

## 2024-03-03 PROCEDURE — 250N000011 HC RX IP 250 OP 636: Performed by: EMERGENCY MEDICINE

## 2024-03-03 PROCEDURE — 72148 MRI LUMBAR SPINE W/O DYE: CPT

## 2024-03-03 PROCEDURE — 96375 TX/PRO/DX INJ NEW DRUG ADDON: CPT

## 2024-03-03 PROCEDURE — 99285 EMERGENCY DEPT VISIT HI MDM: CPT | Mod: 25

## 2024-03-03 PROCEDURE — 36415 COLL VENOUS BLD VENIPUNCTURE: CPT | Performed by: EMERGENCY MEDICINE

## 2024-03-03 RX ORDER — ONDANSETRON 4 MG/1
4 TABLET, ORALLY DISINTEGRATING ORAL EVERY 6 HOURS PRN
Status: DISCONTINUED | OUTPATIENT
Start: 2024-03-03 | End: 2024-03-12

## 2024-03-03 RX ORDER — HYDROMORPHONE HYDROCHLORIDE 1 MG/ML
0.5 INJECTION, SOLUTION INTRAMUSCULAR; INTRAVENOUS; SUBCUTANEOUS ONCE
Status: COMPLETED | OUTPATIENT
Start: 2024-03-03 | End: 2024-03-03

## 2024-03-03 RX ORDER — ONDANSETRON 2 MG/ML
4 INJECTION INTRAMUSCULAR; INTRAVENOUS ONCE
Status: COMPLETED | OUTPATIENT
Start: 2024-03-03 | End: 2024-03-03

## 2024-03-03 RX ORDER — PANTOPRAZOLE SODIUM 40 MG/1
40 TABLET, DELAYED RELEASE ORAL DAILY
Status: DISCONTINUED | OUTPATIENT
Start: 2024-03-03 | End: 2024-03-14 | Stop reason: HOSPADM

## 2024-03-03 RX ORDER — GABAPENTIN 100 MG/1
100 CAPSULE ORAL 3 TIMES DAILY
Status: DISCONTINUED | OUTPATIENT
Start: 2024-03-03 | End: 2024-03-14 | Stop reason: HOSPADM

## 2024-03-03 RX ORDER — ACETAMINOPHEN 500 MG
1000 TABLET ORAL
Status: DISCONTINUED | OUTPATIENT
Start: 2024-03-03 | End: 2024-03-07

## 2024-03-03 RX ORDER — CALCIUM CARBONATE 500 MG/1
1000 TABLET, CHEWABLE ORAL 4 TIMES DAILY PRN
Status: DISCONTINUED | OUTPATIENT
Start: 2024-03-03 | End: 2024-03-12

## 2024-03-03 RX ORDER — LIDOCAINE 40 MG/G
CREAM TOPICAL
Status: DISCONTINUED | OUTPATIENT
Start: 2024-03-03 | End: 2024-03-12

## 2024-03-03 RX ORDER — GINSENG 100 MG
CAPSULE ORAL ONCE
Status: COMPLETED | OUTPATIENT
Start: 2024-03-03 | End: 2024-03-03

## 2024-03-03 RX ORDER — PIPERACILLIN SODIUM, TAZOBACTAM SODIUM 3; .375 G/15ML; G/15ML
3.38 INJECTION, POWDER, LYOPHILIZED, FOR SOLUTION INTRAVENOUS EVERY 8 HOURS
Status: COMPLETED | OUTPATIENT
Start: 2024-03-03 | End: 2024-03-10

## 2024-03-03 RX ORDER — GADOBUTROL 604.72 MG/ML
10 INJECTION INTRAVENOUS ONCE
Status: COMPLETED | OUTPATIENT
Start: 2024-03-03 | End: 2024-03-03

## 2024-03-03 RX ORDER — HEPARIN SODIUM 5000 [USP'U]/.5ML
5000 INJECTION, SOLUTION INTRAVENOUS; SUBCUTANEOUS EVERY 8 HOURS
Status: DISCONTINUED | OUTPATIENT
Start: 2024-03-03 | End: 2024-03-03

## 2024-03-03 RX ORDER — PIPERACILLIN SODIUM, TAZOBACTAM SODIUM 3; .375 G/15ML; G/15ML
3.38 INJECTION, POWDER, LYOPHILIZED, FOR SOLUTION INTRAVENOUS ONCE
Status: COMPLETED | OUTPATIENT
Start: 2024-03-03 | End: 2024-03-03

## 2024-03-03 RX ORDER — ONDANSETRON 2 MG/ML
4 INJECTION INTRAMUSCULAR; INTRAVENOUS EVERY 6 HOURS PRN
Status: DISCONTINUED | OUTPATIENT
Start: 2024-03-03 | End: 2024-03-12

## 2024-03-03 RX ORDER — ACETAMINOPHEN 325 MG/1
650 TABLET ORAL EVERY 4 HOURS PRN
Status: DISCONTINUED | OUTPATIENT
Start: 2024-03-03 | End: 2024-03-12

## 2024-03-03 RX ORDER — ROPINIROLE 1 MG/1
1 TABLET, FILM COATED ORAL 2 TIMES DAILY
Status: DISCONTINUED | OUTPATIENT
Start: 2024-03-03 | End: 2024-03-14 | Stop reason: HOSPADM

## 2024-03-03 RX ORDER — GABAPENTIN 100 MG/1
100 CAPSULE ORAL
Status: DISCONTINUED | OUTPATIENT
Start: 2024-03-03 | End: 2024-03-14 | Stop reason: HOSPADM

## 2024-03-03 RX ORDER — PIPERACILLIN SODIUM, TAZOBACTAM SODIUM 3; .375 G/15ML; G/15ML
3.38 INJECTION, POWDER, LYOPHILIZED, FOR SOLUTION INTRAVENOUS EVERY 8 HOURS
Status: DISCONTINUED | OUTPATIENT
Start: 2024-03-03 | End: 2024-03-03

## 2024-03-03 RX ORDER — ACETAMINOPHEN 650 MG/1
650 SUPPOSITORY RECTAL EVERY 4 HOURS PRN
Status: DISCONTINUED | OUTPATIENT
Start: 2024-03-03 | End: 2024-03-12

## 2024-03-03 RX ORDER — METHOCARBAMOL 750 MG/1
750 TABLET, FILM COATED ORAL EVERY 6 HOURS
Status: DISCONTINUED | OUTPATIENT
Start: 2024-03-03 | End: 2024-03-14 | Stop reason: HOSPADM

## 2024-03-03 RX ORDER — ENOXAPARIN SODIUM 100 MG/ML
40 INJECTION SUBCUTANEOUS EVERY 24 HOURS
Status: DISCONTINUED | OUTPATIENT
Start: 2024-03-03 | End: 2024-03-05

## 2024-03-03 RX ORDER — METHOCARBAMOL 750 MG/1
750 TABLET, FILM COATED ORAL ONCE
Status: COMPLETED | OUTPATIENT
Start: 2024-03-03 | End: 2024-03-03

## 2024-03-03 RX ORDER — HYDROMORPHONE HCL IN WATER/PF 6 MG/30 ML
0.2 PATIENT CONTROLLED ANALGESIA SYRINGE INTRAVENOUS
Status: DISCONTINUED | OUTPATIENT
Start: 2024-03-03 | End: 2024-03-12

## 2024-03-03 RX ORDER — HYDROMORPHONE HCL IN WATER/PF 6 MG/30 ML
0.4 PATIENT CONTROLLED ANALGESIA SYRINGE INTRAVENOUS
Status: DISCONTINUED | OUTPATIENT
Start: 2024-03-03 | End: 2024-03-12

## 2024-03-03 RX ORDER — OXYCODONE HYDROCHLORIDE 5 MG/1
5-10 TABLET ORAL EVERY 4 HOURS PRN
Status: DISCONTINUED | OUTPATIENT
Start: 2024-03-03 | End: 2024-03-12

## 2024-03-03 RX ORDER — ROPINIROLE 1 MG/1
2 TABLET, FILM COATED ORAL AT BEDTIME
Status: DISCONTINUED | OUTPATIENT
Start: 2024-03-03 | End: 2024-03-14 | Stop reason: HOSPADM

## 2024-03-03 RX ADMIN — ENOXAPARIN SODIUM 40 MG: 100 INJECTION SUBCUTANEOUS at 20:05

## 2024-03-03 RX ADMIN — OXYCODONE 5 MG: 5 TABLET ORAL at 17:27

## 2024-03-03 RX ADMIN — HYDROMORPHONE HYDROCHLORIDE 0.5 MG: 1 INJECTION, SOLUTION INTRAMUSCULAR; INTRAVENOUS; SUBCUTANEOUS at 09:20

## 2024-03-03 RX ADMIN — PANTOPRAZOLE SODIUM 40 MG: 40 TABLET, DELAYED RELEASE ORAL at 17:37

## 2024-03-03 RX ADMIN — ONDANSETRON 4 MG: 2 INJECTION INTRAMUSCULAR; INTRAVENOUS at 09:19

## 2024-03-03 RX ADMIN — BACITRACIN 1 PACKET: 500 OINTMENT TOPICAL at 14:59

## 2024-03-03 RX ADMIN — METHOCARBAMOL 750 MG: 750 TABLET ORAL at 09:04

## 2024-03-03 RX ADMIN — GABAPENTIN 100 MG: 100 CAPSULE ORAL at 17:37

## 2024-03-03 RX ADMIN — HYDROMORPHONE HYDROCHLORIDE 0.5 MG: 1 INJECTION, SOLUTION INTRAMUSCULAR; INTRAVENOUS; SUBCUTANEOUS at 12:44

## 2024-03-03 RX ADMIN — ACETAMINOPHEN 650 MG: 325 TABLET ORAL at 20:05

## 2024-03-03 RX ADMIN — PIPERACILLIN AND TAZOBACTAM 3.38 G: 3; .375 INJECTION, POWDER, FOR SOLUTION INTRAVENOUS at 17:29

## 2024-03-03 RX ADMIN — ROPINIROLE 1 MG: 0.25 TABLET, FILM COATED ORAL at 17:36

## 2024-03-03 RX ADMIN — METHOCARBAMOL TABLETS 750 MG: 750 TABLET, COATED ORAL at 17:37

## 2024-03-03 RX ADMIN — VANCOMYCIN HYDROCHLORIDE 2000 MG: 5 INJECTION, POWDER, LYOPHILIZED, FOR SOLUTION INTRAVENOUS at 18:14

## 2024-03-03 RX ADMIN — FLUOXETINE 40 MG: 20 CAPSULE ORAL at 17:37

## 2024-03-03 RX ADMIN — HYDROMORPHONE HYDROCHLORIDE 0.4 MG: 0.2 INJECTION, SOLUTION INTRAMUSCULAR; INTRAVENOUS; SUBCUTANEOUS at 16:49

## 2024-03-03 ASSESSMENT — ACTIVITIES OF DAILY LIVING (ADL)
ADLS_ACUITY_SCORE: 44
ADLS_ACUITY_SCORE: 38
ADLS_ACUITY_SCORE: 41
ADLS_ACUITY_SCORE: 38
ADLS_ACUITY_SCORE: 41
ADLS_ACUITY_SCORE: 41
ADLS_ACUITY_SCORE: 38
DEPENDENT_IADLS:: CLEANING;SHOPPING;TRANSPORTATION
ADLS_ACUITY_SCORE: 44
ADLS_ACUITY_SCORE: 44
ADLS_ACUITY_SCORE: 43

## 2024-03-03 ASSESSMENT — COLUMBIA-SUICIDE SEVERITY RATING SCALE - C-SSRS
2. HAVE YOU ACTUALLY HAD ANY THOUGHTS OF KILLING YOURSELF IN THE PAST MONTH?: NO
6. HAVE YOU EVER DONE ANYTHING, STARTED TO DO ANYTHING, OR PREPARED TO DO ANYTHING TO END YOUR LIFE?: NO
1. IN THE PAST MONTH, HAVE YOU WISHED YOU WERE DEAD OR WISHED YOU COULD GO TO SLEEP AND NOT WAKE UP?: NO

## 2024-03-03 NOTE — ED PROVIDER NOTES
EMERGENCY DEPARTMENT ENCOUNTER      NAME: Tessa Edwards  AGE: 68 year old female  YOB: 1955  MRN: [id number]  EVALUATION DATE & TIME: 3/3/2024  7:55 AM    PCP: [facility]    ED PROVIDER: Gaby Mcnulty MD      Chief Complaint   Patient presents with    Post-op Problem    Back Pain         FINAL IMPRESSION:  1. S/P lumbar fusion    2. Postoperative infection, unspecified type, initial encounter          ED COURSE & MEDICAL DECISION MAKING:    Pertinent Labs & Imaging studies reviewed. (See chart for details)    8:17 AM I introduced myself to the patient, obtained patient history, performed a physical exam, and discussed plan for ED workup including potential diagnostic laboratory/imaging studies and interventions.  8:33 AM I spoke with Dr. James, neurosurgery regarding lab and imaging recommendations.  12:34 PM I spoke with neurosurgery again for plan update.     68 year old female with a pertinent history of HLD, prior small lymphocytic B-cell lymphoma and chronic low back pain s/p lumbar spine fusion on 2/26/24 who presents to this ED for evaluation of a post-op problem.  Exam, she does have surrounding erythema around the surgical incision site with staples in place and some slight purulent appearing discharge.  This is worsened over the last day including worsening pain with spasms that have been severe.  The spasms are intermittent.  She does cry out in pain here when they occur.  She does not have any sign of any spinal cord compression with no neurologic deficits and no saddle anesthesia or bowel or bladder incontinence.  Do have concern for potential cellulitis versus deeper surgical site infection.  Also consider just worsening postoperative pain that is poorly controlled.  I contacted the neurosurgery team who ordered lumbar spine x-rays and agreed with our laboratory workup and pain control and came to evaluate the patient.  They recommended MRI of the  lumbar spine with and without contrast to evaluate for signs of deeper infection.  Lumbar spine x-rays were appropriate postoperatively per neurosurgery.  She was given 750 mg of oral Robaxin as well as multiple doses of 0.5 mg of IV Dilaudid and 4 mg of IV Zofran.  Patient's white blood cell count is elevated at 21.1.  Hemoglobin 11.4.  .  CMP largely unremarkable.  Sed rate is 29.  Lactic acid within normal limits.  Blood cultures 2 peripheral were obtained.  MRI of the lumbar spine unfortunately was aborted prior to contrast imaging due to the fact that the patient told the MRI tech that the incision was feeling hot and burning.  Thus they did not want to obtain further imaging and aborted this but did obtain noncontrast images.    MRI without contrast of the lumbar spine reveals L4-L5 fusion with no hardware complications and good positioning of the screws and interbody graft.  Dorsal subcutaneous fluid collection which extends through the surgical approach into the dorsal canal epidural space and most prominent at L4-L5.  Slight extension cephalad to L1-L2.  Fairly homogenous fluid collection without significant surrounding inflammatory signal changes and may represent seroma or resolving hematoma.  In flexion/inflammatory fluid accumulation is possible however and consider further evaluation with contrast-enhanced imaging.  No fractures.  I did discuss this result with the neurosurgery team and they came down and evaluated the patient and the neurosurgery attending drained some fluid from the collection to send for cultures.  They recommended broad-spectrum antibiotics with IV vancomycin and IV Zosyn.  They recommended admission to medicine and will continue to follow the patient.  Patient and her family member were in agreement with this plan.  Patient was accepted by the hospitalist.  She was admitted in stable condition.          At the conclusion of the encounter I discussed the results of all of  the tests and the disposition. The questions were answered. The patient or family acknowledged understanding and was agreeable with the care plan.         Medical Decision Making  Obtained supplemental history:Supplemental history obtained?: Family Member/Significant Other  Reviewed external records: External records reviewed?: Inpatient Record: [facility] admission from 2/23/24-2/26/24  Care impacted by chronic illness:Hyperlipidemia and Other: small lymphocytic B-cell lymphoma, chronic low back pain  Care significantly affected by social determinants of health:N/A  Did you consider but not order tests?: Work up considered but not performed and documented in chart, if applicable  Did you interpret images independently?: Independent interpretation of ECG and images noted in documentation, when applicable.  Consultation discussion with other provider:Did you involve another provider (consultant, , pharmacy, etc.)?: I discussed the care with another health care provider, see documentation for details.  Admit.      MEDICATIONS GIVEN IN THE EMERGENCY:  Medications   gadobutrol (GADAVIST) injection 10 mL (has no administration in time range)   HYDROmorphone (PF) (DILAUDID) injection 0.5 mg (has no administration in time range)   methocarbamol (ROBAXIN) tablet 750 mg (750 mg Oral $Given 3/3/24 0904)   HYDROmorphone (PF) (DILAUDID) injection 0.5 mg (0.5 mg Intravenous $Given 3/3/24 0920)   ondansetron (ZOFRAN) injection 4 mg (4 mg Intravenous $Given 3/3/24 0919)       NEW PRESCRIPTIONS STARTED AT TODAY'S ER VISIT  New Prescriptions    No medications on file          =================================================================    HPI    Patient information was obtained from: Patient and patient's family member    Use of : N/A        Tessa Edwards is a 68 year old female with a pertinent history of HLD, prior small lymphocytic B-cell lymphoma and chronic low back pain s/p lumbar spine fusion on 2/26/24  who presents to this ED for evaluation of a post-op problem.    Per chart review, the patient was admitted to [facility] from 2/23/24 to 2/26/24 after a lumbar spine fusion. Patient was instructed on post-op wound care. Discharged home with robaxin (one 750 mg tablet orally every six hours), oxycodone (one-two tablets 5-10 mg orally every four hours as needed for pain) and senna-docusate (one tablet orally twice a day).    Per the patient, she has had increased pain in her back since yesterday, endorsing episodes of severe spasms of lower back pain. She has been taking her prescribed medications as scheduled with the last dose of robaxin at 9:30 PM yesterday. Patient's family member states that the surgical site has been draining since yesterday and increased in redness. The patient denies numbness in her legs or saddle anesthesia, weakness, loss of bowel or bladder control, abdominal pain, chest pain, shortness of breath, nausea, vomiting, or diarrhea. She spoke with her neurosurgery team yesterday.     REVIEW OF SYSTEMS   Review of Systems   Pertinent positives and negatives are documented in the HPI. All other systems reviewed and are negative.      PAST MEDICAL HISTORY:  Past Medical History:   Diagnosis Date    Abnormal CT of the abdomen 10/26/2021    Allergic rhinitis 11/05/2015    Anemia, iron deficiency 10/26/2021    Bilateral carpal tunnel syndrome 07/09/2019    Cervical nerve root compression 07/24/2017    Cervical spinal stenosis     Chronic low back pain without sciatica, unspecified back pain laterality 10/27/2021    Spondylosis,central  stenosis, foraminal encroachment    Colonic mass 11/16/2021    Depression     Depressive disorder     Disease of thyroid gland     Dyspepsia 03/22/2022    Fibromyalgia     GERD (gastroesophageal reflux disease)     History of blood transfusion     Hyperlipemia     Lymphoproliferative disorder (H)     Moderate major depression (H) 03/22/2022    Morbid obesity (H)  03/22/2022    Non-Hodgkin's lymphoma (H) 03/22/2022    Obesity     Osteoarthritis     Osteoarthritis of right knee, unspecified osteoarthritis type 09/12/2019    Other abnormal glucose 03/22/2022    Formatting of this note might be different from the original. Created by Conversion    Prediabetes 3/22/2022    Formatting of this note might be different from the original. Created by Conversion    Primary localized osteoarthrosis, lower leg 08/27/2007    Restless legs syndrome 10/26/2021    Rotator cuff tear     Sleep apnea     Spinal stenosis of lumbar region without neurogenic claudication 10/27/2021    Spondylosis,central  stenosis, foraminal encroachment    Splenomegaly 10/27/2021    Thyroid nodule 10/27/2021    Urinary frequency 03/22/2022    Urinary retention 03/22/2022    Vocal cord dysfunction 11/05/2015    Weight loss 10/27/2021       PAST SURGICAL HISTORY:  Past Surgical History:   Procedure Laterality Date    ARTHROPLASTY SHOULDER Left     ARTHROSCOPY KNEE      ARTHROSCOPY SHOULDER ROTATOR CUFF REPAIR      BACK SURGERY      BIOPSY      CERVICAL DISC ARTHROPLASTY      CERVICAL DISCECTOMY  08/01/2017    C5, C6    COLONOSCOPY N/A 11/05/2021    Procedure: COLONOSCOPY;  Surgeon: Efraín Guerrero MD;  Location: [facility] OR    CRW LT SHOULDER AP AXILLA 2 VW      FUSION TRANSFORAMINAL LUMBAR INTERBODY, 1 LEVEL, POSTERIOR APPROACH, USING OTS SURG IMAGING GUIDANCE Bilateral 2/23/2024    Procedure: medial facetectomies, foraminotomies and right transforaminal lumbar interbody fusion and bilateral posterior instrumented fusion with posterolateral arthrodesis and use of stealth.;  Surgeon: Brianne Hudson MD;  Location: [facility] OR    HYSTERECTOMY      JOINT REPLACEMENT      LAMINECTOMY LUMBAR ONE LEVEL  2/23/2024    Procedure: bilateral lumbar 4-lumbar 5 laminectomies,;  Surgeon: Brianne Hudson MD;  Location: [facility] OR    LAPAROSCOPIC ASSISTED COLECTOMY Right 11/16/2021    Procedure:  LAPAROSCOPIC RIGHT COLECTOMY;  Surgeon: Efraín Guerrero MD;  Location: [facility] [place]    OOPHORECTOMY      RELEASE CARPAL TUNNEL      RELEASE CARPAL TUNNEL      XR MYELOGRAM CERVICAL  2019    [facility] LAP,CHOLECYSTECTOMY/EXPLORE      Description: Cholecystectomy Laparoscopic;  Recorded: 12/10/2012;    [facility] TOTAL ABDOM HYSTERECTOMY      Description: Total Abdominal Hysterectomy;  Recorded: 12/10/2012;  Comments: 44 Y/O FOR FIBROID TUMORS    [facility] TOTAL KNEE ARTHROPLASTY Right 2019    Procedure: RIGHT TOTAL KNEE ARTHROPLASTY;  Surgeon: Irvin Canas DO;  Location: [facility];  Service: Orthopedics           CURRENT MEDICATIONS:    acalabrutinib (CALQUENCE) 100 MG capsule  acetaminophen (TYLENOL) 500 MG tablet  amoxicillin (AMOXIL) 500 MG capsule  calcium-vitamin D (CALCIUM-VITAMIN D) 500 mg(1,250mg) -200 unit per tablet  FLUoxetine (PROZAC) 40 MG capsule  gabapentin (NEURONTIN) 100 MG capsule  gabapentin (NEURONTIN) 100 MG capsule  loperamide (IMODIUM) 2 MG capsule  methocarbamol (ROBAXIN) 750 MG tablet  multivitamin w/minerals (THERA-VIT-M) tablet  omeprazole 20 MG tablet  oxyCODONE (ROXICODONE) 5 MG tablet  pseudoePHEDrine-guaiFENesin (MUCINEX D)  MG 12 hr tablet  rOPINIRole (REQUIP) 1 MG tablet  senna-docusate (SENOKOT-S/PERICOLACE) 8.6-50 MG tablet        ALLERGIES:  No Known Allergies    FAMILY HISTORY:  Family History   Problem Relation Age of Onset    Heart Disease Mother     Cancer Father     Breast Cancer Paternal Grandmother     Breast Cancer Paternal Aunt     Diabetes Sister     Colon Cancer Brother     Depression Sister     Obesity Brother        SOCIAL HISTORY:   Social History     Socioeconomic History    Marital status: Single   Tobacco Use    Smoking status: Former     Packs/day: 0.50     Years: 10.00     Additional pack years: 0.00     Total pack years: 5.00     Types: Cigarettes     Quit date: 2000     Years since quittin.0    Smokeless tobacco: Never   Substance  "and Sexual Activity    Alcohol use: Yes     Comment: social    Drug use: No    Sexual activity: Not Currently   Other Topics Concern    Parent/sibling w/ CABG, MI or angioplasty before 65F 55M? No   Social History Narrative    , 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 Socially drinks alcohol.     Social Determinants of Health     Financial Resource Strain: Unknown (12/30/2022)    Overall Financial Resource Strain (CARDIA)     Difficulty of Paying Living Expenses: Patient declined   Food Insecurity: Unknown (12/30/2022)    Hunger Vital Sign     Worried About Running Out of Food in the Last Year: Patient declined     Ran Out of Food in the Last Year: Patient declined   Transportation Needs: Unknown (12/30/2022)    PRAPARE - Transportation     Lack of Transportation (Medical): Patient declined     Lack of Transportation (Non-Medical): Patient declined   Physical Activity: Patient Declined (12/30/2022)    Exercise Vital Sign     Days of Exercise per Week: Patient declined     Minutes of Exercise per Session: Patient declined   Stress: Patient Declined (12/30/2022)    [employer] - Occupational Stress Questionnaire     Feeling of Stress : Patient declined   Social Connections: Unknown (12/30/2022)    Social Connection and Isolation Panel [NHANES]     Frequency of Communication with Friends and Family: Patient declined     Frequency of Social Gatherings with Friends and Family: Patient declined     Attends [employer] Services: Patient declined     Active Member of Clubs or Organizations: Patient declined     Marital Status: Patient declined   Housing Stability: Unknown (12/30/2022)    Housing Stability Vital Sign     Unable to Pay for Housing in the Last Year: Patient refused     Unstable Housing in the Last Year: Patient refused       VITALS:  BP (!) 146/65   Pulse 83   Temp 100.1  F (37.8  C) (Oral)   Resp 22   Ht 1.626 m (5' 4\")   Wt 103 kg (227 lb)  "  SpO2 94%   BMI 38.96 kg/m      PHYSICAL EXAM    Physical Exam  Constitutional: Well developed, Well nourished, NAD, GCS 15  HENT: Normocephalic, Atraumatic, Bilateral external ears normal, Oropharynx normal, mucous membranes moist, Nose normal. Neck-  Normal range of motion, No tenderness, Supple, No stridor.    Eyes: PERRL, EOMI, Conjunctiva normal, No discharge.   Respiratory: Normal breath sounds, No respiratory distress, No wheezing or crackles, Speaks in full sentences easily.    Cardiovascular: Normal heart rate, Regular rhythm, No murmurs, No rubs, No gallops. 2+ radial pulses bilaterally  GI: Bowel sounds normal, Soft, No tenderness, No masses, No rebound or guarding. No CVA tenderness bilaterally   Musculoskeletal: 2+ DP pulses. No notable lower extremity edema.Good range of motion in all major joints. No tenderness to palpation of the extremities or major deformities noted. Mild tenderness along the incision site.    Integument: Warm, Dry, See photo of incision site below.   Neurologic: Alert & oriented x 3, 5/5 strength in all 4 extremities bilaterally. Sensation intact to light touch in all 4 extremities and the face bilaterally. No focal deficits noted.   Psychiatric: Affect normal, Judgment normal, Mood normal. Cooperative.          LAB:  All pertinent labs reviewed and interpreted.  Results for orders placed or performed during the hospital encounter of 03/03/24   XR Lumbar Spine 2/3 Views    Impression    IMPRESSION: Five lumbar vertebral bodies. Minimal convex right curvature, unchanged. Dorsal midline skin staples persist. Postoperative changes of interbody and posterior spinal fixation across L4-L5. Grade 1 anterolisthesis of L4 on L5 and L5 on S1,   unchanged. Intact hardware. Severe interspace and endplate degeneration L2-L3. Normal vertebral body heights.     Comprehensive metabolic panel   Result Value Ref Range    Sodium 136 135 - 145 mmol/L    Potassium 3.9 3.4 - 5.3 mmol/L    Carbon  Dioxide (CO2) 22 22 - 29 mmol/L    Anion Gap 13 7 - 15 mmol/L    Urea Nitrogen 13.3 8.0 - 23.0 mg/dL    Creatinine 0.81 0.51 - 0.95 mg/dL    GFR Estimate 78 >60 mL/min/1.73m2    Calcium 9.2 8.8 - 10.2 mg/dL    Chloride 101 98 - 107 mmol/L    Glucose 150 (H) 70 - 99 mg/dL    Alkaline Phosphatase 101 40 - 150 U/L    AST 25 0 - 45 U/L    ALT 19 0 - 50 U/L    Protein Total 6.7 6.4 - 8.3 g/dL    Albumin 3.9 3.5 - 5.2 g/dL    Bilirubin Total 0.7 <=1.2 mg/dL   Result Value Ref Range    CRP Inflammation 104.00 (H) <5.00 mg/L   Erythrocyte sedimentation rate auto   Result Value Ref Range    Erythrocyte Sedimentation Rate 29 0 - 30 mm/hr   Lactic acid whole blood   Result Value Ref Range    Lactic Acid 0.9 0.7 - 2.0 mmol/L   CBC with platelets and differential   Result Value Ref Range    WBC Count 21.1 (H) 4.0 - 11.0 10e3/uL    RBC Count 4.14 3.80 - 5.20 10e6/uL    Hemoglobin 11.4 (L) 11.7 - 15.7 g/dL    Hematocrit 34.3 (L) 35.0 - 47.0 %    MCV 83 78 - 100 fL    MCH 27.5 26.5 - 33.0 pg    MCHC 33.2 31.5 - 36.5 g/dL    RDW 12.8 10.0 - 15.0 %    Platelet Count 297 150 - 450 10e3/uL    NRBCs per 100 WBC 0 <1 /100    Absolute NRBCs 0.0 10e3/uL       RADIOLOGY:  Reviewed all pertinent imaging. Please see official radiology report.  MR Lumbar Spine w/o Contrast   Final Result   IMPRESSION:   1.  L4-L5 fusion. No hardware complications. Good positioning of transpedicular screws and interbody graft.      2.  Dorsal subcutaneous fluid collection as above which extends through the surgical approach into the dorsal canal epidural space and most prominent at L4-L5. Slight extension cephalad to L1-L2. Consider further evaluation with contrast-enhanced    imaging. Fairly homogeneous fluid collection without significant surrounding inflammatory signal changes and may represent seroma or resolving hematoma. Infectious/inflammatory fluid accumulation is possible, however.      3.  Lumbar spondylosis      4.  No fractures.      5.  Findings  discussed with Flor James of the neurosurgery service on 3/3/2024 1255 PM CST      XR Lumbar Spine 2/3 Views   Final Result   IMPRESSION: Five lumbar vertebral bodies. Minimal convex right curvature, unchanged. Dorsal midline skin staples persist. Postoperative changes of interbody and posterior spinal fixation across L4-L5. Grade 1 anterolisthesis of L4 on L5 and L5 on S1,    unchanged. Intact hardware. Severe interspace and endplate degeneration L2-L3. Normal vertebral body heights.         MR Lumbar Spine w/o & w Contrast    (Results Pending)       PROCEDURES:   None           I, Monster Patterson, am serving as a scribe to document services personally performed by Gaby Mcnulty MD based on my observation and the provider's statements to me. I, Gaby Mcnulty MD, attest that Monster Patterson is acting in a scribe capacity, has observed my performance of the services and has documented them in accordance with my direction.    Gaby Mcnulty MD  [facility] EMERGENCY ROOM  [address] [place] [address] [phone]     Gaby Mcnulty MD  03/06/24 4219

## 2024-03-03 NOTE — PHARMACY-VANCOMYCIN DOSING SERVICE
Pharmacy Vancomycin Initial Note  Date of Service March 3, 2024  Patient's  1955  68 year old, female    Indication: Postoperative Infection (spine)    Current estimated CrCl = Estimated Creatinine Clearance: 75.5 mL/min (based on SCr of 0.81 mg/dL).    Creatinine for last 3 days  3/3/2024:  9:16 AM Creatinine 0.81 mg/dL    Recent Vancomycin Level(s) for last 3 days  No results found for requested labs within last 3 days.      Vancomycin IV Administrations (past 72 hours)        No vancomycin orders with administrations in past 72 hours.                    Nephrotoxins and other renal medications (From now, onward)      Start     Dose/Rate Route Frequency Ordered Stop    24 1430  piperacillin-tazobactam (ZOSYN) 3.375 g vial to attach to  mL bag         3.375 g  over 30 Minutes Intravenous ONCE 24 1415              Contrast Orders - past 72 hours (72h ago, onward)      Start     Dose/Rate Route Frequency Stop    24 1200  gadobutrol (GADAVIST) injection 10 mL         10 mL Intravenous ONCE                    Plan:  Start vancomycin  2,000 mg IV once in ED.   2.   If vancomycin therapy to continue while patient admitted, please re-consult pharmacy to dose vancomycin.     Thank you for the consult.   Elvia Saleh, PharmD, BCPS

## 2024-03-03 NOTE — CONSULTS
"[facility]    Neurosurgery Consultation     Date of Admission:  3/3/2024  Date of Consult (When I saw the patient): 03/03/24      ASSESSMENT/PLAN:     # Concern for post-op superficial versus deep infection  # S/p L4-L5 Transforaminal lumbar interbody fusion on 2/23/24 (POD #9)        Neurosurgery plan:   --Lumbar spine MRI W/WO   --Hold off on antibiotics for now   --PRN pain measures       Case reviewed with Dr. Hudson    Thank you for having us be involved in the care of Tessa Edwards.       Flor James PA-C  [facility] Neurosurgery    Please page on-call service with additional questions or concerns.     ______________________________________________________________________    HPI:    Tessa Edwards is a pleasant 68 year old female who presented to the [facility] Emergency Department this morning due to worsening back pain and spasms. She is now post-operative day 9 of L4--L5 transforaminal lumbar interbody fusion with Dr. Rocha on 2/23/24. She was discharged from the hospital on 2/26/24. Reports tolerable incisional back pain over the last week but then worsening back pain described as \"spasms\" starting yesterday. Pain will  her low back near incision site when spasms come on. No radiating leg pain but does endorse intermittent bilateral buttock pain. No numbness or weakness in the lower extremities. Daughter at bedside who has helped with incision care over the last week, incision looks red and is pussy today, which is new. Had occasionally had gauze on this incision this last week, incision was always clean upon removal up until today.       Past Medical History:   Diagnosis Date    Abnormal CT of the abdomen 10/26/2021    Allergic rhinitis 11/05/2015    Anemia, iron deficiency 10/26/2021    Bilateral carpal tunnel syndrome 07/09/2019    Cervical nerve root compression 07/24/2017    Cervical spinal stenosis     Chronic low back pain without sciatica, " unspecified back pain laterality 10/27/2021    Spondylosis,central  stenosis, foraminal encroachment    Colonic mass 11/16/2021    Depression     Depressive disorder     Disease of thyroid gland     Dyspepsia 03/22/2022    Fibromyalgia     GERD (gastroesophageal reflux disease)     History of blood transfusion     Hyperlipemia     Lymphoproliferative disorder (H)     Moderate major depression (H) 03/22/2022    Morbid obesity (H) 03/22/2022    Non-Hodgkin's lymphoma (H) 03/22/2022    Obesity     Osteoarthritis     Osteoarthritis of right knee, unspecified osteoarthritis type 09/12/2019    Other abnormal glucose 03/22/2022    Formatting of this note might be different from the original. Created by Conversion    Prediabetes 3/22/2022    Formatting of this note might be different from the original. Created by Conversion    Primary localized osteoarthrosis, lower leg 08/27/2007    Restless legs syndrome 10/26/2021    Rotator cuff tear     Sleep apnea     Spinal stenosis of lumbar region without neurogenic claudication 10/27/2021    Spondylosis,central  stenosis, foraminal encroachment    Splenomegaly 10/27/2021    Thyroid nodule 10/27/2021    Urinary frequency 03/22/2022    Urinary retention 03/22/2022    Vocal cord dysfunction 11/05/2015    Weight loss 10/27/2021       Past Surgical History:   Procedure Laterality Date    ARTHROPLASTY SHOULDER Left     ARTHROSCOPY KNEE      ARTHROSCOPY SHOULDER ROTATOR CUFF REPAIR      BACK SURGERY      BIOPSY      CERVICAL DISC ARTHROPLASTY      CERVICAL DISCECTOMY  08/01/2017    C5, C6    COLONOSCOPY N/A 11/05/2021    Procedure: COLONOSCOPY;  Surgeon: Efraín Guerrero MD;  Location: [facility] SHOULDER AP AXILLA 2 VW      FUSION TRANSFORAMINAL LUMBAR INTERBODY, 1 LEVEL, POSTERIOR APPROACH, USING OTS SURG IMAGING GUIDANCE Bilateral 2/23/2024    Procedure: medial facetectomies, foraminotomies and right transforaminal lumbar interbody fusion and bilateral posterior  "instrumented fusion with posterolateral arthrodesis and use of stealth.;  Surgeon: Brianne Hudson MD;  Location: [facility] OR    HYSTERECTOMY      JOINT REPLACEMENT      LAMINECTOMY LUMBAR ONE LEVEL  2024    Procedure: bilateral lumbar 4-lumbar 5 laminectomies,;  Surgeon: Brianne Hudson MD;  Location: [facility] OR    LAPAROSCOPIC ASSISTED COLECTOMY Right 2021    Procedure: LAPAROSCOPIC RIGHT COLECTOMY;  Surgeon: Efraín Guerrero MD;  Location[facility] [place]    OOPHORECTOMY      RELEASE CARPAL TUNNEL      RELEASE CARPAL TUNNEL      XR MYELOGRAM CERVICAL  2019    [facility] LAP,CHOLECYSTECTOMY/EXPLORE      Description: Cholecystectomy Laparoscopic;  Recorded: 12/10/2012;    [facility] TOTAL ABDOM HYSTERECTOMY      Description: Total Abdominal Hysterectomy;  Recorded: 12/10/2012;  Comments: 46 Y/O FOR FIBROID TUMORS    [facility] TOTAL KNEE ARTHROPLASTY Right 2019    Procedure: RIGHT TOTAL KNEE ARTHROPLASTY;  Surgeon: Irvin Canas DO;  Location: [facility];  Service: Orthopedics       No Known Allergies    Social History     Tobacco Use    Smoking status: Former     Packs/day: 0.50     Years: 10.00     Additional pack years: 0.00     Total pack years: 5.00     Types: Cigarettes     Quit date: 2000     Years since quittin.0    Smokeless tobacco: Never   Substance Use Topics    Alcohol use: Yes     Comment: social       Family History   Problem Relation Age of Onset    Heart Disease Mother     Cancer Father     Breast Cancer Paternal Grandmother     Breast Cancer Paternal Aunt     Diabetes Sister     Colon Cancer Brother     Depression Sister     Obesity Brother        ROS: 10 point ROS neg other than the symptoms noted above in the HPI.    Vitals:  BP (!) 146/73   Pulse 84   Temp 100.1  F (37.8  C) (Oral)   Resp 22   Ht 1.626 m (5' 4\")   Wt 103 kg (227 lb)   SpO2 97%   BMI 38.96 kg/m    Body mass index is 38.96 kg/m .  No intake/output data recorded.    EXAM: "   Patient appears comfortable, conversational, and in no apparent distress.   Head: Normocephalic, without obvious abnormality, atraumatic, no facial asymmetry.   CN II-XII grossly intact, alert and appropriate with conversation and following commands.    Intact sensation throughout lower extremities.     LE muscle strength  Right  Left    Iliopsoas (hip flexion)  5/5  5/5    Quad (knee extension)  5/5  5/5    Hamstring (knee flexion)  5/5  5/5    Gastrocnemius (PF)  5/5  5/5    Tibialis Ant. (DF)  5/5  5/5    EHL  5/5  5/5      Lumbar incision assessed. Staples in place. There is surrounding erythema, particularly in the proximal-medial aspect of the wound. Some focal oozing at the proximal-middle incision. Focal edema. Gauze roomed with evidence of some oozing.       Available labs at time of consult:   Pending labs

## 2024-03-03 NOTE — PROGRESS NOTES
Order received for patient to use CPAP for SUZIE. Chart reviewed and writer found that patient has never had a sleep study and has no history of SUZIE. Two sleep appointments were scheduled for patient in 2022. Patient was a no show for the first appointment and cancelled the second appointment. Patient stated to person who answered the phone at the sleep clinic that she didn't think she needed to have this test because her symptoms were more related to her new diagnosis, at the time, of Non-Hodgkins Lymphoma. These two appointments were scheduled for patient due to fatigue and restless leg syndrome, not for USZIE.    Order will be removed from chart.    Rosita Eli, BS, RRT, CTTS

## 2024-03-03 NOTE — PHARMACY-VANCOMYCIN DOSING SERVICE
"Pharmacy Vancomycin Initial Note  Date of Service March 3, 2024  Patient's  1955  68 year old, female    Indication: Bone and Joint Infection and Skin and Soft Tissue Infection    Current estimated CrCl = Estimated Creatinine Clearance: 75.5 mL/min (based on SCr of 0.81 mg/dL).    Creatinine for last 3 days  3/3/2024:  9:16 AM Creatinine 0.81 mg/dL    Recent Vancomycin Level(s) for last 3 days  No results found for requested labs within last 3 days.      Vancomycin IV Administrations (past 72 hours)        No vancomycin orders with administrations in past 72 hours.                    Nephrotoxins and other renal medications (From now, onward)      Start     Dose/Rate Route Frequency Ordered Stop    24 1600  piperacillin-tazobactam (ZOSYN) 3.375 g vial to attach to  mL bag        Note to Pharmacy: For [facility], [facility] and [facility]: For Zosyn-naive patients, use the \"Zosyn initial dose + extended infusion\" order panel.    3.375 g  over 240 Minutes Intravenous EVERY 8 HOURS 24 1535      24 1430  piperacillin-tazobactam (ZOSYN) 3.375 g vial to attach to  mL bag         3.375 g  over 30 Minutes Intravenous ONCE 24 1415      24 1430  vancomycin (VANCOCIN) 2,000 mg in 0.9% NaCl 500 mL intermittent infusion         2,000 mg  over 2 Hours Intravenous ONCE 24 1429              Contrast Orders - past 72 hours (72h ago, onward)      Start     Dose/Rate Route Frequency Stop    24 1200  gadobutrol (GADAVIST) injection 10 mL         10 mL Intravenous ONCE 24 1457            [employer] Prediction of Planned Initial Vancomycin Regimen  Loading dose: 2000 mg at 14:30 2024.  Regimen: 1500 mg IV every 18 hours.  Start time: 08:30 on 2024  Exposure target: AUC24 (range)400-600 mg/L.hr   AUC24,ss: 490 mg/L.hr  Probability of AUC24 > 400: 71 %  Ctrough,ss: 14.7 mg/L  Probability of Ctrough,ss > 20: 26 %  Probability of nephrotoxicity (Lodise DANIELE ): 10 %   "      Plan:  Start vancomycin  1500 mg IV q18h.   Vancomycin monitoring method: AUC  Vancomycin therapeutic monitoring goal: 400-600 mg*h/L  Pharmacy will check vancomycin levels as appropriate in 1-3 Days.    Serum creatinine levels will be ordered daily for the first week of therapy and at least twice weekly for subsequent weeks.      Elvia Bailon RPH

## 2024-03-03 NOTE — PHARMACY-ADMISSION MEDICATION HISTORY
Pharmacist Admission Medication History    Admission medication history is complete. The information provided in this note is only as accurate as the sources available at the time of the update.    Information Source(s): Patient, Hospital records, and CareEverywhere/SureScripts via in-person    Pertinent Information: None    Changes made to PTA medication list:  Added: None  Deleted: None  Changed: loperamide from PRN to scheduled three times daily per patient    Allergies reviewed with patient and updates made in EHR: yes    Medication History Completed By: Paige Olson RP 3/3/2024 12:17 PM    PTA Med List   Medication Sig Last Dose    acalabrutinib (CALQUENCE) 100 MG capsule Take 1 capsule (100 mg) by mouth 2 times daily Past Month at has been held for 2 weeks perioperatively    acetaminophen (TYLENOL) 500 MG tablet Take 1,000 mg by mouth 3 times daily 3/2/2024 at PM    amoxicillin (AMOXIL) 500 MG capsule Take 2,000 mg by mouth See Admin Instructions Pre dental More than a month at PRN    calcium-vitamin D (CALCIUM-VITAMIN D) 500 mg(1,250mg) -200 unit per tablet Take 1 tablet by mouth every evening  3/2/2024 at PM    FLUoxetine (PROZAC) 40 MG capsule Take 1 capsule by mouth once daily 3/2/2024 at AM    gabapentin (NEURONTIN) 100 MG capsule Take 100 mg by mouth 3 times daily Plus additional 100mg at HS if needed 3/2/2024 at PM    gabapentin (NEURONTIN) 100 MG capsule Take 100 mg by mouth nightly as needed In addition to 100mg TID 3/2/2024 at PM    loperamide (IMODIUM) 2 MG capsule Take 2 mg by mouth 3 times daily 3/2/2024 at PM    methocarbamol (ROBAXIN) 750 MG tablet Take 1 tablet (750 mg) by mouth every 6 hours 3/2/2024 at PM    multivitamin w/minerals (THERA-VIT-M) tablet Take 1 tablet by mouth daily 3/2/2024 at AM    omeprazole 20 MG tablet Take 20 mg by mouth daily 3/2/2024 at AM    oxyCODONE (ROXICODONE) 5 MG tablet Take 1-2 tablets (5-10 mg) by mouth every 4 hours as needed for moderate pain 3/2/2024 at PM     pseudoePHEDrine-guaiFENesin (MUCINEX D)  MG 12 hr tablet Take 1 tablet by mouth 2 times daily as needed for congestion Past Week at PRN    rOPINIRole (REQUIP) 1 MG tablet TAKE 1 TABLET BY MOUTH TWICE DAILY AND 2 AT BEDTIME 3/2/2024 at PM

## 2024-03-03 NOTE — PROCEDURES
[facility]      Brief Procedure note    Diagnosis: Post-operative seroma, low back pain, recent hx of L4-5 TLIF     Procedure: Bedside evacuation of post-operative fluid collection    Procedure was performed by Dr. Hudson at bedside in ED room WW-03. In sterile fashion and with ultrasound guidance, needle was placed near lumbar spine incision site. Approximately 60 mL of blood tinged seroma fluid was removed through the needle. Fluid was non-purulent in nature, sent for cultures. Bacitracin was placed over the skin. She is to be started on broad spectrum antibiotics until cultures return.     Pt tolerated bedside procedure well. No complications.   See Neurosurgery note for additional recommendations.       Flor James PA-C  3/3/24

## 2024-03-03 NOTE — PROGRESS NOTES
"Neurosurgery Note    Lumbar spine MRI with post-operative seroma, no clear evidence of infection. MRI portion with contrast was aborted due to sensation of incision site \"heating up\" during the scan.     In review of imaging, plan made remove seroma with large bore needle at bedside. Dr. Hudson came in to perform procedure at bedside in ED room 3. Procedure was completed in sterile fashion with US guidance. Pt tolerated the procedure well. Approximately 60 ml of blood-tinged seroma fluid removed. No overt signs of infection. Fluid sent for cultures. Bacitracin administered over incision site.       Neurosurgery plan:   -Admit to hospital (greatly appreciate hospitalist involvement)   -Initiate broad spectrum antibiotics with plan to continue until cultures return. ED staff initiating.   -Await culture results. No need to consult ID at this time.   -PRN pain medication (pt has benefited from Dilaudid while in the ED, was taking oxycodone and robaxin at home)   -Try to keep pt positioned so good air flow at surgical site (primarily lay on side when resting or cushion back with a pillow)   -Okay to keep incision site open to air  -Okay to ambulate, PT/OT    Please reach out to me if any additional questions or concerns. Neurosurgery will continue to follow while patient is admitted.       Flor James PA-C   "

## 2024-03-03 NOTE — TELEPHONE ENCOUNTER
Called pt this afternoon - underwent L4-5 TLIF on 2/23/24 with Dr. Hudson. Has been doing fairly well this week, manageable incisional back pain. Today, started to experience low back spasms, sudden spasm will  her low back (near incision) suddenly and then recede. Occurring every couple of hours.     -She will check to see if still taking Robaxin. Okay to take as outlined on prescription bottle  -Okay to increase use of PRN oxycodone per prescription bottle (she is currently taking oxycodone 5 mg BID)  -Heat/ice     She is scheduled for follow-up appointment on 3/7. She will call back if worsening symptoms.     Flor James PA-C  [facility]   3/2/24

## 2024-03-03 NOTE — ED TRIAGE NOTES
Pt presents about 1 week post op back fusion surgery with increased pain beginning yesterday. Pain radiates into both legs. Denies numbness, tingling, loss of bladder or bowel. Last took pain medication around 2100 yesterday.      Triage Assessment (Adult)       Row Name 03/03/24 0803          Triage Assessment    Airway WDL WDL        Respiratory WDL    Respiratory WDL WDL        Skin Circulation/Temperature WDL    Skin Circulation/Temperature WDL WDL        Cardiac WDL    Cardiac WDL WDL        Peripheral/Neurovascular WDL    Peripheral Neurovascular WDL WDL        Cognitive/Neuro/Behavioral WDL    Cognitive/Neuro/Behavioral WDL WDL

## 2024-03-03 NOTE — H&P
"Admission History & Physical  Tessa Edwards, 1955, [id number]    [facility], [phone]    Assessment and Plan:  68 year old female past medical history significant for recent L4-L5 bilateral fusion on 2/23/2024 discharged home on 2/26 , non-Hodgkin's lymphoma/small B lymphocyte lymphoma, lymphoproliferative disorder on tyrosine kinase inhibitor, sees [place] [facility], obesity, depression anxiety restless leg syndrome presented to the emergency room with complaints of worsening low back pain last couple of days, with postoperative wound erythema, warmth, lumbar MRI concerning for fluid collection extending into the epidural space s/p I&D in ED pending cultures.    Postoperative wound superficial cellulitis versus deep infection with fluid collection status post I&D in ED at bedside  S/p bilateral L4-L5 fusion 2/23/2024  Temp of 100.1, leukocytosis 21  , ESR 29  Lactic acid normal  S/p I&D, 60 mL of bloody fluid sent to the lab  Check MRSA swab  Follow-up on fluid cultures  Appreciate spine surgery input  Request ID consult  Pain control on Tylenol, oxycodone, Robaxin, gabapentin IV Dilaudid as needed    History of non-Hodgkin's lymphoma  Small lymphocytic lymphoma  Sees [place] [facility]   regarding to family hold off on the tyrosine kinase inhibitor for 10 days before and after surgery  Holding for now  Monitor CBC    Depression anxiety  Continue home meds    Restless leg syndrome  On Requip    GERD  On PPI    Obesity    DVT prophylaxis start heparin if appropriate per spine surgery  Code full     Clinically Significant Risk Factors Present on Admission                       # Obesity: Estimated body mass index is 38.96 kg/m  as calculated from the following:    Height as of this encounter: 1.626 m (5' 4\").    Weight as of this encounter: 103 kg (227 lb).                 Expected length of stay : anticipate hospitalization more " than 2 days.     Chief Complaint: Worsening low back pain    HPI:     Tessa Edwards is a 68 year old female past medical history significant for recent L4-L5 bilateral fusion on 2/23/2024 discharged home on 2/26 , non-Hodgkin's lymphoma/small B lymphocyte lymphoma, lymphoproliferative disorder on tyrosine kinase inhibitor, sees [place] [facility], obesity, depression anxiety restless leg syndrome presented to the emergency room with complaints of worsening low back pain last couple of days, she rates it 10 out of 10 intensity, feels like spasms, associated radiation to both legs.  She had trouble ambulating secondary to pain.  Denies any numbness or weakness in legs. Daughter at bedside helping with history.  She reports the incision looked more red today.  They have been doing daily dressing changes.  She otherwise denies any fevers any chills, cough, chest pain, urinary or bowel complaints.  Daughter did report that whenever she is off of cancer treatment her white count does go up.  Most recent WBC count from 2/8/2024 at 12.1    In ED she had low-grade temp of 100.1, blood pressure at 158/71,   Lab workup revealing leukocytosis of 21.1, CRP elevated at 104, ESR 29, lactic acid 0.9.  Lumbar spine MRI concerning for  fluid collection which extends into the dorsal epidural space L4-L5.  No significant surrounding inflammatory changes noted.  Neurosurgery evaluated her in the ED, bedside I&D was done by Dr. Hudson, 60 mL of bloody fluid was drained, sent to lab for cultures.  started on Zosyn, vancomycin after the I&D.    Medical History  Past Medical History:   Diagnosis Date    Abnormal CT of the abdomen 10/26/2021    Allergic rhinitis 11/05/2015    Anemia, iron deficiency 10/26/2021    Bilateral carpal tunnel syndrome 07/09/2019    Cervical nerve root compression 07/24/2017    Cervical spinal stenosis     Chronic low back pain without sciatica, unspecified back pain laterality 10/27/2021     Spondylosis,central  stenosis, foraminal encroachment    Colonic mass 11/16/2021    Depression     Depressive disorder     Disease of thyroid gland     Dyspepsia 03/22/2022    Fibromyalgia     GERD (gastroesophageal reflux disease)     History of blood transfusion     Hyperlipemia     Lymphoproliferative disorder (H)     Moderate major depression (H) 03/22/2022    Morbid obesity (H) 03/22/2022    Non-Hodgkin's lymphoma (H) 03/22/2022    Obesity     Osteoarthritis     Osteoarthritis of right knee, unspecified osteoarthritis type 09/12/2019    Other abnormal glucose 03/22/2022    Formatting of this note might be different from the original. Created by Conversion    Prediabetes 3/22/2022    Formatting of this note might be different from the original. Created by Conversion    Primary localized osteoarthrosis, lower leg 08/27/2007    Restless legs syndrome 10/26/2021    Rotator cuff tear     Sleep apnea     Spinal stenosis of lumbar region without neurogenic claudication 10/27/2021    Spondylosis,central  stenosis, foraminal encroachment    Splenomegaly 10/27/2021    Thyroid nodule 10/27/2021    Urinary frequency 03/22/2022    Urinary retention 03/22/2022    Vocal cord dysfunction 11/05/2015    Weight loss 10/27/2021      Patient Active Problem List    Diagnosis Date Noted    Postoperative infection, unspecified type, initial encounter 03/03/2024     Priority: Medium    Surgery, elective 02/23/2024     Priority: Medium    Screening for viral disease 01/02/2023     Priority: Medium    Lumbar radiculopathy 01/02/2023     Priority: Medium    Encounter for preventive measure 08/08/2022     Priority: Medium    Prediabetes 03/22/2022     Priority: Medium     Formatting of this note might be different from the original.  Created by Conversion      Fibromyalgia 03/22/2022     Priority: Medium     Formatting of this note might be different from the original.  Created by Conversion      Moderate major depression (H) 03/22/2022      Priority: Medium    Non-Hodgkin's lymphoma (H) 03/22/2022     Priority: Medium    Urinary retention 03/22/2022     Priority: Medium    Urinary frequency 03/22/2022     Priority: Medium    Dyspepsia 03/22/2022     Priority: Medium    Spinal stenosis of lumbar region without neurogenic claudication 10/27/2021     Priority: Medium     Spondylosis,central  stenosis, foraminal encroachment      Thyroid nodule 10/27/2021     Priority: Medium    Splenomegaly 10/27/2021     Priority: Medium    Weight loss 10/27/2021     Priority: Medium    Anemia, iron deficiency 10/26/2021     Priority: Medium    Restless leg syndrome 10/26/2021     Priority: Medium    Osteoarthritis of right knee, unspecified osteoarthritis type 09/12/2019     Priority: Medium    Bilateral carpal tunnel syndrome 07/09/2019     Priority: Medium    Cervical nerve root compression 07/24/2017     Priority: Medium    Allergic rhinitis 11/05/2015     Priority: Medium    Vocal cord dysfunction 11/05/2015     Priority: Medium    Rotator cuff tear 02/18/2015     Priority: Medium    Primary localized osteoarthrosis, lower leg 08/27/2007     Priority: Medium        Surgical History  She  has a past surgical history that includes LAP,CHOLECYSTECTOMY/EXPLORE; TOTAL ABDOM HYSTERECTOMY; Hysterectomy; Oophorectomy; Xr Myelogram Cervical (05/14/2019); joint replacement; Arthroscopy knee; Arthroscopy shoulder rotator cuff repair; Cervical Discectomy (08/01/2017); Release carpal tunnel; Cervical Disc Arthroplasty; Release carpal tunnel; TOTAL KNEE ARTHROPLASTY (Right, 09/12/2019); crw lt shoulder ap axilla 2 vw; Arthroplasty shoulder (Left); Colonoscopy (N/A, 11/05/2021); Laparoscopic assisted colectomy (Right, 11/16/2021); biopsy; back surgery; Fusion Transforaminal Lumbar Interbody, 1 Level, Posterior Approach, Using Ots Surg Imaging Guidance (Bilateral, 2/23/2024); and Laminectomy lumbar one level (2/23/2024).     Past Surgical History:   Procedure Laterality Date     ARTHROPLASTY SHOULDER Left     ARTHROSCOPY KNEE      ARTHROSCOPY SHOULDER ROTATOR CUFF REPAIR      BACK SURGERY      BIOPSY      CERVICAL DISC ARTHROPLASTY      CERVICAL DISCECTOMY  08/01/2017    C5, C6    COLONOSCOPY N/A 11/05/2021    Procedure: COLONOSCOPY;  Surgeon: Efraín Guerrero MD;  Location: [facility] [place]    CRW LT SHOULDER AP AXILLA 2 VW      FUSION TRANSFORAMINAL LUMBAR INTERBODY, 1 LEVEL, POSTERIOR APPROACH, USING OTS SURG IMAGING GUIDANCE Bilateral 2/23/2024    Procedure: medial facetectomies, foraminotomies and right transforaminal lumbar interbody fusion and bilateral posterior instrumented fusion with posterolateral arthrodesis and use of stealth.;  Surgeon: Brianne Hudson MD;  Location: [facility] OR    HYSTERECTOMY      JOINT REPLACEMENT      LAMINECTOMY LUMBAR ONE LEVEL  2/23/2024    Procedure: bilateral lumbar 4-lumbar 5 laminectomies,;  Surgeon: Brianne Hudson MD;  Location: [facility] OR    LAPAROSCOPIC ASSISTED COLECTOMY Right 11/16/2021    Procedure: LAPAROSCOPIC RIGHT COLECTOMY;  Surgeon: Efraín Guerrero MD;  Location: [facility] [place]    OOPHORECTOMY      RELEASE CARPAL TUNNEL      RELEASE CARPAL TUNNEL      XR MYELOGRAM CERVICAL  05/14/2019    [facility] LAP,CHOLECYSTECTOMY/EXPLORE      Description: Cholecystectomy Laparoscopic;  Recorded: 12/10/2012;    [facility] TOTAL ABDOM HYSTERECTOMY      Description: Total Abdominal Hysterectomy;  Recorded: 12/10/2012;  Comments: 44 Y/O FOR FIBROID TUMORS    [facility] TOTAL KNEE ARTHROPLASTY Right 09/12/2019    Procedure: RIGHT TOTAL KNEE ARTHROPLASTY;  Surgeon: Irvin Canas DO;  Location: [facility];  Service: Orthopedics       Allergies  No Known Allergies    Prior to Admission Medications   (Not in a hospital admission)      Social History  Reviewed, and she  reports that she quit smoking about 24 years ago. Her smoking use included cigarettes. She has a 5 pack-year smoking history. She has never used smokeless tobacco. She  "reports current alcohol use. She reports that she does not use drugs.  Social History     Tobacco Use    Smoking status: Former     Packs/day: 0.50     Years: 10.00     Additional pack years: 0.00     Total pack years: 5.00     Types: Cigarettes     Quit date: 2000     Years since quittin.0    Smokeless tobacco: Never   Substance Use Topics    Alcohol use: Yes     Comment: social       Family History  Reviewed, and I have reviewed this patient's family history and updated it with pertinent information if needed.  Family History   Problem Relation Age of Onset    Heart Disease Mother     Cancer Father     Breast Cancer Paternal Grandmother     Breast Cancer Paternal Aunt     Diabetes Sister     Colon Cancer Brother     Depression Sister     Obesity Brother           Review Of Systems  Complete As per admission HPI, all other systems reviewed and negative.     Physical Exam:  Vital signs:  Temp: 100.1  F (37.8  C) Temp src: Oral BP: 123/58 Pulse: 88   Resp: 22 SpO2: 97 % O2 Device: None (Room air)   Height: 162.6 cm (5' 4\") Weight: 103 kg (227 lb)  Estimated body mass index is 38.96 kg/m  as calculated from the following:    Height as of this encounter: 1.626 m (5' 4\").    Weight as of this encounter: 103 kg (227 lb).    General Appearance:  Awake Alert, orientedx3, not in any apparent distress   Head:  Normocephalic, without obvious abnormality   Eyes:  PERRL, conjunctiva/corneas clear   Throat: Oral mucosa moist   Neck: Supple,  no JVD   Lungs:   Clear to auscultation bilaterally, respirations unlabored   Chest Wall:  No tenderness   Heart:  Regular rate and rhythm, S1, S2 normal, systolic murmur   Abdomen:   Soft, non-tender, bowel sounds present,  no guarding, rigidity    Extremities:  no edema, no joint swelling   Skin: Lumbar incision with staples, surrounding erythema, warmth area was marked no obvious fluctuance.  Post I&D.   Neurologic: Alert and oriented X 3, no focal deficits     Results:  Labs " Ordered and Resulted from Time of ED Arrival to Time of ED Departure   COMPREHENSIVE METABOLIC PANEL - Abnormal       Result Value    Sodium 136      Potassium 3.9      Carbon Dioxide (CO2) 22      Anion Gap 13      Urea Nitrogen 13.3      Creatinine 0.81      GFR Estimate 78      Calcium 9.2      Chloride 101      Glucose 150 (*)     Alkaline Phosphatase 101      AST 25      ALT 19      Protein Total 6.7      Albumin 3.9      Bilirubin Total 0.7     CRP INFLAMMATION - Abnormal    CRP Inflammation 104.00 (*)    CBC WITH PLATELETS AND DIFFERENTIAL - Abnormal    WBC Count 21.1 (*)     RBC Count 4.14      Hemoglobin 11.4 (*)     Hematocrit 34.3 (*)     MCV 83      MCH 27.5      MCHC 33.2      RDW 12.8      Platelet Count 297      NRBCs per 100 WBC 0      Absolute NRBCs 0.0     ERYTHROCYTE SEDIMENTATION RATE AUTO - Normal    Erythrocyte Sedimentation Rate 29     LACTIC ACID WHOLE BLOOD - Normal    Lactic Acid 0.9     MANUAL DIFFERENTIAL   ANAEROBIC BACTERIAL CULTURE ROUTINE   AEROBIC BACTERIAL CULTURE ROUTINE   BLOOD CULTURE   BLOOD CULTURE   MRSA MSSA PCR, NASAL SWAB       Imaging:   MR Lumbar Spine w/o Contrast    Result Date: 3/3/2024  EXAM: MR LUMBAR SPINE W/O CONTRAST LOCATION: [facility] DATE: 3/3/2024 INDICATION: Lumbar fusion 02/23/2024. Signs and symptoms of infection. Lumbar pain. Drainage at surgical site. COMPARISON: Lumbar radiographs 03/03/2024. Lumbar MRI 11 08/01/2023 TECHNIQUE: Routine Lumbar Spine MRI without IV contrast. FINDINGS: Redemonstration of L4-L5 anterior posterior fusion. Centrally positioned interbody graft and bilateral transpedicular screws with intertransverse rods. 4 mm anterolisthesis of L4 on L5 compared with 5 mm on the preoperative exam. 2.5 mm degenerative anterolisthesis at L5-S1 unchanged. 2.5 mm degenerative retrolisthesis at L2-L3 unchanged. L4-L5 laminotomy. Nomenclature is based on 5 lumbar type vertebral bodies. Normal vertebral body heights.  Negative for fracture. Benign osseous hemangioma at the L4 vertebral body. No suspicious marrow signal change. Type II Modic endplate changes at L2-L3 and L3-L4. Normal distal spinal cord and cauda equina with conus medullaris at L2. Fluid collection in the midline dorsal soft tissues extends from L1-L2 inferiorly to L5-S1 measuring 11 cm cephalocaudad by 6 cm AP by 6 cm mediolateral. A component  of the collection extends into the surgical tract, through the hemilaminotomy and to the dorsal surgical space at L4-L5. This is difficult to evaluate due to susceptibility artifact but the canalicular portion measures approximately 22 x 13 mm transverse extending through the posterior elements. Compression of the right dorsal thecal sac and moderate central canal stenosis is present as seen on axial image 9 of series 6.  Unremarkable visualized bony pelvis. T11-T12: Disc desiccation and ventral osteophyte. Patent central canal and foramen. Mild annular bulge. T12-L1: Disc desiccation , height loss and chronic Schmorl's nodes. Patent central canal and foramen. L1-L2: Disc desiccation. Patent central canal and foramina. There may be a tiny amount of fluid in the dorsal epidural space related to previous surgery. L2-L3: Disc osteophyte complex. Small amount of dorsal canal epidural space fluid may be related to recent surgery. Mild central canal stenosis. Moderate to severe left and mild-to-moderate right foraminal stenosis. L3-L4: Mild disc bulge. Patent central canal. Facet DJD with mild-to-moderate right and mild left foraminal stenosis. L4-L5: Fusion and hemilaminotomy. Moderate central canal stenosis fluid in the dorsal and right dorsal canal appears contiguous with the larger subcutaneous fluid collection and extends to the surgical approach. Signal characteristics are nonspecific but  do not appear particularly complex and this could represent seroma or resolving hematoma. No surrounding inflammatory changes. No  definite evidence for discitis or osteomyelitis. Contrast-enhanced imaging may be useful for further evaluation. Mild-to-moderate right and minimal left foraminal stenosis. L5-S1: Disc desiccation and mild disc bulge. Facet DJD. Moderate right and mild left foraminal stenosis. Patent central canal.     IMPRESSION: 1.  L4-L5 fusion. No hardware complications. Good positioning of transpedicular screws and interbody graft. 2.  Dorsal subcutaneous fluid collection as above which extends through the surgical approach into the dorsal canal epidural space and most prominent at L4-L5. Slight extension cephalad to L1-L2. Consider further evaluation with contrast-enhanced imaging. Fairly homogeneous fluid collection without significant surrounding inflammatory signal changes and may represent seroma or resolving hematoma. Infectious/inflammatory fluid accumulation is possible, however. 3.  Lumbar spondylosis 4.  No fractures. 5.  Findings discussed with Flor James of the neurosurgery service on 3/3/2024 1255 PM CST    XR Lumbar Spine 2/3 Views    Result Date: 3/3/2024  EXAM: XR LUMBAR SPINE 2/3 VIEWS LOCATION: [facility] DATE: 03/03/2024 INDICATION: Lumbar fusion. COMPARISON: 02/26/2024.     IMPRESSION: Five lumbar vertebral bodies. Minimal convex right curvature, unchanged. Dorsal midline skin staples persist. Postoperative changes of interbody and posterior spinal fixation across L4-L5. Grade 1 anterolisthesis of L4 on L5 and L5 on S1, unchanged. Intact hardware. Severe interspace and endplate degeneration L2-L3. Normal vertebral body heights.     XR Lumbar Spine 2 - 3 VWS Portable    Result Date: 2/26/2024  EXAM: XR LUMBAR SPINE PORT 2/3 VIEWS LOCATION: [facility] DATE/TIME: 2/26/2024 4:50 PM CST INDICATION: s p lumbar fusion, upright XR. COMPARISON: None. TECHNIQUE: Radiographs of lumbar spine in routine projections. FINDINGS: Staus post  L4-L5 posterior interbody  fusion. No evidence of hardware failure. Alignment similar to prior without change in minor dextrocurvature, grade 1 anterolisthesis at L4-L5 and L5-S1. Normal height of vertebral bodies. Advanced L2-L3 interbody degeneration similar to prior. Mild multi level facet arthropathy. Unremarkable visualized bony pelvis. Cholecystectomy clips.    XR SURGERY CAROLINE FLUORO GREATER THAN 5 MIN    Result Date: 2/23/2024  This exam was marked as non-reportable because it will not be read by a radiologist or a [place] non-radiologist provider.     XR Surgery CAROLINE  Fluoro G/T 5 Min    Result Date: 2/23/2024  This exam was marked as non-reportable because it will not be read by a radiologist or a [place] non-radiologist provider.     Lateral Lumbar Spine for Surgical Imaging  [XR LUMBAR SPINE PORT 1  VIEW]    Result Date: 2/23/2024  EXAM: XR CROSSTABLE LATERAL LUMBAR SPINE PORTABLE LOCATION: [facility] DATE: 2/23/2024 INDICATION: Intra operative COMPARISON: None.     IMPRESSION: Intraoperative crosstable lateral radiograph. Underpenetration of the lumbosacral junction somewhat comprises evaluation. Posterior approach surgical clamp projects over the posterior margin of the L3 posterior spinous process and is directed  at the L4 vertebral body and pedicles.        > 75 MINUTES SPENT BY ME on the date of service doing chart review, history, exam, documentation & further activities per the note.      Cecily Vallejo M.D  [facility] Service  Internal Medicine    3/3/2024  3:35 PM

## 2024-03-04 ENCOUNTER — APPOINTMENT (OUTPATIENT)
Dept: PHYSICAL THERAPY | Facility: CLINIC | Age: 69
DRG: 857 | End: 2024-03-04
Attending: INTERNAL MEDICINE
Payer: COMMERCIAL

## 2024-03-04 LAB
ANION GAP SERPL CALCULATED.3IONS-SCNC: 9 MMOL/L (ref 7–15)
BASOPHILS # BLD MANUAL: 0 10E3/UL (ref 0–0.2)
BASOPHILS NFR BLD MANUAL: 0 %
BUN SERPL-MCNC: 10.1 MG/DL (ref 8–23)
CALCIUM SERPL-MCNC: 8.8 MG/DL (ref 8.8–10.2)
CHLORIDE SERPL-SCNC: 104 MMOL/L (ref 98–107)
CREAT SERPL-MCNC: 0.72 MG/DL (ref 0.51–0.95)
DEPRECATED HCO3 PLAS-SCNC: 22 MMOL/L (ref 22–29)
EGFRCR SERPLBLD CKD-EPI 2021: 89 ML/MIN/1.73M2
EOSINOPHIL # BLD MANUAL: 0 10E3/UL (ref 0–0.7)
EOSINOPHIL NFR BLD MANUAL: 0 %
ERYTHROCYTE [DISTWIDTH] IN BLOOD BY AUTOMATED COUNT: 13 % (ref 10–15)
GLUCOSE SERPL-MCNC: 130 MG/DL (ref 70–99)
HCT VFR BLD AUTO: 30.1 % (ref 35–47)
HGB BLD-MCNC: 9.6 G/DL (ref 11.7–15.7)
LYMPHOCYTES # BLD MANUAL: 6.3 10E3/UL (ref 0.8–5.3)
LYMPHOCYTES NFR BLD MANUAL: 30 %
MCH RBC QN AUTO: 27.4 PG (ref 26.5–33)
MCHC RBC AUTO-ENTMCNC: 31.9 G/DL (ref 31.5–36.5)
MCV RBC AUTO: 86 FL (ref 78–100)
MONOCYTES # BLD MANUAL: 1.7 10E3/UL (ref 0–1.3)
MONOCYTES NFR BLD MANUAL: 8 %
NEUTROPHILS # BLD MANUAL: 13.1 10E3/UL (ref 1.6–8.3)
NEUTROPHILS NFR BLD MANUAL: 62 %
PATH REV: ABNORMAL
PLAT MORPH BLD: ABNORMAL
PLATELET # BLD AUTO: 195 10E3/UL (ref 150–450)
POTASSIUM SERPL-SCNC: 4.1 MMOL/L (ref 3.4–5.3)
RBC # BLD AUTO: 3.51 10E6/UL (ref 3.8–5.2)
RBC MORPH BLD: ABNORMAL
SODIUM SERPL-SCNC: 135 MMOL/L (ref 135–145)
VARIANT LYMPHS BLD QL SMEAR: ABNORMAL
WBC # BLD AUTO: 10.7 10E3/UL (ref 4–11)

## 2024-03-04 PROCEDURE — 97530 THERAPEUTIC ACTIVITIES: CPT | Mod: GP

## 2024-03-04 PROCEDURE — G0463 HOSPITAL OUTPT CLINIC VISIT: HCPCS

## 2024-03-04 PROCEDURE — 250N000011 HC RX IP 250 OP 636: Performed by: INTERNAL MEDICINE

## 2024-03-04 PROCEDURE — 258N000003 HC RX IP 258 OP 636: Performed by: INTERNAL MEDICINE

## 2024-03-04 PROCEDURE — 120N000001 HC R&B MED SURG/OB

## 2024-03-04 PROCEDURE — 99233 SBSQ HOSP IP/OBS HIGH 50: CPT | Performed by: STUDENT IN AN ORGANIZED HEALTH CARE EDUCATION/TRAINING PROGRAM

## 2024-03-04 PROCEDURE — 36415 COLL VENOUS BLD VENIPUNCTURE: CPT | Performed by: INTERNAL MEDICINE

## 2024-03-04 PROCEDURE — 97535 SELF CARE MNGMENT TRAINING: CPT | Mod: GP

## 2024-03-04 PROCEDURE — 85027 COMPLETE CBC AUTOMATED: CPT | Performed by: INTERNAL MEDICINE

## 2024-03-04 PROCEDURE — 80051 ELECTROLYTE PANEL: CPT | Performed by: INTERNAL MEDICINE

## 2024-03-04 PROCEDURE — 97161 PT EVAL LOW COMPLEX 20 MIN: CPT | Mod: GP

## 2024-03-04 PROCEDURE — 99222 1ST HOSP IP/OBS MODERATE 55: CPT | Performed by: INTERNAL MEDICINE

## 2024-03-04 PROCEDURE — 250N000013 HC RX MED GY IP 250 OP 250 PS 637: Performed by: INTERNAL MEDICINE

## 2024-03-04 RX ORDER — NALOXONE HYDROCHLORIDE 0.4 MG/ML
0.2 INJECTION, SOLUTION INTRAMUSCULAR; INTRAVENOUS; SUBCUTANEOUS
Status: DISCONTINUED | OUTPATIENT
Start: 2024-03-04 | End: 2024-03-14 | Stop reason: HOSPADM

## 2024-03-04 RX ORDER — NALOXONE HYDROCHLORIDE 0.4 MG/ML
0.4 INJECTION, SOLUTION INTRAMUSCULAR; INTRAVENOUS; SUBCUTANEOUS
Status: DISCONTINUED | OUTPATIENT
Start: 2024-03-04 | End: 2024-03-14 | Stop reason: HOSPADM

## 2024-03-04 RX ADMIN — METHOCARBAMOL TABLETS 750 MG: 750 TABLET, COATED ORAL at 21:28

## 2024-03-04 RX ADMIN — OXYCODONE 10 MG: 5 TABLET ORAL at 12:17

## 2024-03-04 RX ADMIN — GABAPENTIN 100 MG: 100 CAPSULE ORAL at 09:48

## 2024-03-04 RX ADMIN — ACETAMINOPHEN 650 MG: 325 TABLET ORAL at 00:38

## 2024-03-04 RX ADMIN — PIPERACILLIN AND TAZOBACTAM 3.38 G: 3; .375 INJECTION, POWDER, FOR SOLUTION INTRAVENOUS at 00:46

## 2024-03-04 RX ADMIN — OXYCODONE 10 MG: 5 TABLET ORAL at 05:37

## 2024-03-04 RX ADMIN — PANTOPRAZOLE SODIUM 40 MG: 40 TABLET, DELAYED RELEASE ORAL at 09:48

## 2024-03-04 RX ADMIN — GABAPENTIN 100 MG: 100 CAPSULE ORAL at 20:10

## 2024-03-04 RX ADMIN — GABAPENTIN 100 MG: 100 CAPSULE ORAL at 14:11

## 2024-03-04 RX ADMIN — OXYCODONE 10 MG: 5 TABLET ORAL at 16:47

## 2024-03-04 RX ADMIN — ENOXAPARIN SODIUM 40 MG: 100 INJECTION SUBCUTANEOUS at 20:10

## 2024-03-04 RX ADMIN — FLUOXETINE 40 MG: 20 CAPSULE ORAL at 09:47

## 2024-03-04 RX ADMIN — METHOCARBAMOL TABLETS 750 MG: 750 TABLET, COATED ORAL at 04:23

## 2024-03-04 RX ADMIN — PIPERACILLIN AND TAZOBACTAM 3.38 G: 3; .375 INJECTION, POWDER, FOR SOLUTION INTRAVENOUS at 18:30

## 2024-03-04 RX ADMIN — ACETAMINOPHEN 1000 MG: 500 TABLET ORAL at 12:17

## 2024-03-04 RX ADMIN — ACETAMINOPHEN 1000 MG: 500 TABLET ORAL at 09:47

## 2024-03-04 RX ADMIN — METHOCARBAMOL TABLETS 750 MG: 750 TABLET, COATED ORAL at 09:48

## 2024-03-04 RX ADMIN — ROPINIROLE 1 MG: 0.25 TABLET, FILM COATED ORAL at 09:48

## 2024-03-04 RX ADMIN — ROPINIROLE HYDROCHLORIDE 2 MG: 1 TABLET, FILM COATED ORAL at 21:28

## 2024-03-04 RX ADMIN — ACETAMINOPHEN 1000 MG: 500 TABLET ORAL at 18:31

## 2024-03-04 RX ADMIN — PIPERACILLIN AND TAZOBACTAM 3.38 G: 3; .375 INJECTION, POWDER, FOR SOLUTION INTRAVENOUS at 10:00

## 2024-03-04 RX ADMIN — ROPINIROLE 1 MG: 0.25 TABLET, FILM COATED ORAL at 16:39

## 2024-03-04 RX ADMIN — METHOCARBAMOL TABLETS 750 MG: 750 TABLET, COATED ORAL at 15:50

## 2024-03-04 RX ADMIN — VANCOMYCIN HYDROCHLORIDE 1500 MG: 5 INJECTION, POWDER, LYOPHILIZED, FOR SOLUTION INTRAVENOUS at 14:11

## 2024-03-04 ASSESSMENT — ACTIVITIES OF DAILY LIVING (ADL)
ADLS_ACUITY_SCORE: 44
ADLS_ACUITY_SCORE: 47
ADLS_ACUITY_SCORE: 43
ADLS_ACUITY_SCORE: 47
ADLS_ACUITY_SCORE: 44
ADLS_ACUITY_SCORE: 47
ADLS_ACUITY_SCORE: 44
ADLS_ACUITY_SCORE: 47
ADLS_ACUITY_SCORE: 44

## 2024-03-04 NOTE — CONSULTS
Consultation - Infectious Disease  Tessa Edwards,  1955, MRN [id number]    Admitting Dx: Postoperative infection, unspecified type, initial encounter [T81.40XA]    PCP: [facility], [phone]   Code status:  Full Code       Extended Emergency Contact Information  Primary Emergency Contact: Kamari Echevarria   [place]  Mobile Phone: [phone]  Relation: Son  Secondary Emergency Contact: GraceHiwot   [facility]  Home Phone: [phone]  Relation: Daughter       ASSESSMENT   Post-operative wound infection. Hardware in place. Fluid collection on MRI subcutaneous, extends to deep structures. Aspirate 3/3 now growing Gram negative rods. Based on MRI findings, suspect deep infection.   S/p L4--L5 transforaminal lumbar interbody fusion on 24.   H/o NHL, maintained on acalabrutinib  Principal Problem:    Postoperative infection, unspecified type, initial encounter       PLAN   Broad spectrum antibiotics pending cultures  Would expect the need for surgical washout, defer to surgery    Tereso Tucker MD  [facility]  [phone] [facility] paging  ______________________________________________________________________        Reason For Consult: Possible post-op infection     HPI    We have been requested by Dr. Vallejo to evaluate Tessa Edwards who is a 68 year old.  She has undergone L4--L5 transforaminal lumbar interbody fusion with Dr. Rocha on 24. She was discharged from the hospital on 24. Presented yesterday with pain and wound drainage. 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 Developed pain in low back/spasms, also wound drainage, fever fever coming on around 3/2. Daughter did report that whenever she is off of cancer treatment her white count does go up.      Temp to 100.1, WBC 21.1, , ESR 29, lactic acid 0.9.  Lumbar  spine MRI concerning for fluid collection which extends into the dorsal epidural space L4-L5.    Neurosurgery evaluated her in the ED 3/3, 60 mL of bloody fluid was aspirated from below the incision. Antibiotics started.     MRI shows dorsal subQ fluid collection that extends into epidural space.     Feels a little better today. Notes that indication for surgery was pain, leg weakness, numbness/tingling.     Culture from aspirate returned with GNR after my visit.      Medical History  Past Medical History:   Diagnosis Date    Abnormal CT of the abdomen 10/26/2021    Allergic rhinitis 11/05/2015    Anemia, iron deficiency 10/26/2021    Bilateral carpal tunnel syndrome 07/09/2019    Cervical nerve root compression 07/24/2017    Cervical spinal stenosis     Chronic low back pain without sciatica, unspecified back pain laterality 10/27/2021    Spondylosis,central  stenosis, foraminal encroachment    Colonic mass 11/16/2021    Depression     Depressive disorder     Disease of thyroid gland     Dyspepsia 03/22/2022    Fibromyalgia     GERD (gastroesophageal reflux disease)     History of blood transfusion     Hyperlipemia     Lymphoproliferative disorder (H)     Moderate major depression (H) 03/22/2022    Morbid obesity (H) 03/22/2022    Non-Hodgkin's lymphoma (H) 03/22/2022    Obesity     Osteoarthritis     Osteoarthritis of right knee, unspecified osteoarthritis type 09/12/2019    Other abnormal glucose 03/22/2022    Formatting of this note might be different from the original. Created by Conversion    Prediabetes 3/22/2022    Formatting of this note might be different from the original. Created by Conversion    Primary localized osteoarthrosis, lower leg 08/27/2007    Restless legs syndrome 10/26/2021    Rotator cuff tear     Sleep apnea     Spinal stenosis of lumbar region without neurogenic claudication 10/27/2021    Spondylosis,central  stenosis, foraminal encroachment    Splenomegaly 10/27/2021    Thyroid nodule  10/27/2021    Urinary frequency 03/22/2022    Urinary retention 03/22/2022    Vocal cord dysfunction 11/05/2015    Weight loss 10/27/2021    Surgical History  She  has a past surgical history that includes LAP,CHOLECYSTECTOMY/EXPLORE; TOTAL ABDOM HYSTERECTOMY; Hysterectomy; Oophorectomy; Xr Myelogram Cervical (05/14/2019); joint replacement; Arthroscopy knee; Arthroscopy shoulder rotator cuff repair; Cervical Discectomy (08/01/2017); Release carpal tunnel; Cervical Disc Arthroplasty; Release carpal tunnel; TOTAL KNEE ARTHROPLASTY (Right, 09/12/2019); crw lt shoulder ap axilla 2 vw; Arthroplasty shoulder (Left); Colonoscopy (N/A, 11/05/2021); Laparoscopic assisted colectomy (Right, 11/16/2021); biopsy; back surgery; Fusion Transforaminal Lumbar Interbody, 1 Level, Posterior Approach, Using Ots Surg Imaging Guidance (Bilateral, 2/23/2024); and Laminectomy lumbar one level (2/23/2024).   Social History  Reviewed, and she  reports that she quit smoking about 24 years ago. Her smoking use included cigarettes. She has a 5 pack-year smoking history. She has never used smokeless tobacco. She reports current alcohol use. She reports that she does not use drugs.   Allergies  No Known Allergies Family History  family history includes Breast Cancer in her paternal aunt and paternal grandmother; Cancer in her father; Colon Cancer in her brother; Depression in her sister; Diabetes in her sister; Heart Disease in her mother; Obesity in her brother.     Psychosocial Needs  Social History     Social History Narrative    , 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 Socially drinks alcohol.     Additional psychosocial needs reviewed per nursing assessment.         Prior to Admission Medications   Medications Prior to Admission   Medication Sig Dispense Refill Last Dose    acalabrutinib (CALQUENCE) 100 MG capsule Take 1 capsule (100 mg) by mouth 2 times daily   Past Month at has been held for 2 weeks  perioperatively    acetaminophen (TYLENOL) 500 MG tablet Take 1,000 mg by mouth 3 times daily   3/2/2024 at PM    amoxicillin (AMOXIL) 500 MG capsule Take 2,000 mg by mouth See Admin Instructions Pre dental   More than a month at PRN    calcium-vitamin D (CALCIUM-VITAMIN D) 500 mg(1,250mg) -200 unit per tablet Take 1 tablet by mouth every evening    3/2/2024 at PM    FLUoxetine (PROZAC) 40 MG capsule Take 1 capsule by mouth once daily 90 capsule 0 3/2/2024 at AM    gabapentin (NEURONTIN) 100 MG capsule Take 100 mg by mouth 3 times daily Plus additional 100mg at HS if needed   3/2/2024 at PM    gabapentin (NEURONTIN) 100 MG capsule Take 100 mg by mouth nightly as needed In addition to 100mg TID   3/2/2024 at PM    loperamide (IMODIUM) 2 MG capsule Take 2 mg by mouth 3 times daily   3/2/2024 at PM    methocarbamol (ROBAXIN) 750 MG tablet Take 1 tablet (750 mg) by mouth every 6 hours 40 tablet 0 3/2/2024 at PM    multivitamin w/minerals (THERA-VIT-M) tablet Take 1 tablet by mouth daily   3/2/2024 at AM    omeprazole 20 MG tablet Take 20 mg by mouth daily   3/2/2024 at AM    oxyCODONE (ROXICODONE) 5 MG tablet Take 1-2 tablets (5-10 mg) by mouth every 4 hours as needed for moderate pain 40 tablet 0 3/2/2024 at PM    pseudoePHEDrine-guaiFENesin (MUCINEX D)  MG 12 hr tablet Take 1 tablet by mouth 2 times daily as needed for congestion   Past Week at PRN    rOPINIRole (REQUIP) 1 MG tablet TAKE 1 TABLET BY MOUTH TWICE DAILY AND 2 AT BEDTIME 180 tablet 0 3/2/2024 at PM    senna-docusate (SENOKOT-S/PERICOLACE) 8.6-50 MG tablet Take 1 tablet by mouth 2 times daily as needed for constipation (Patient not taking: Reported on 3/3/2024) 20 tablet 0 Not Taking at uses loperamide          Anti-infectives: pip/tazo 3/3-  Vancomycin 3/3-  Cultures: 3/3 blood pending  3/3 gram stain PMNs, culture now with GNR  Imaging: reviewed images          Review of Systems:  All other systems negative in detail except what is noted above.  Physical Exam:  Temp:  [98.7  F (37.1  C)-101.2  F (38.4  C)] 99.8  F (37.7  C)  Pulse:  [71-88] 71  Resp:  [16-18] 16  BP: (103-153)/(51-86) 126/61  SpO2:  [89 %-98 %] 97 %    GENERAL:  In no acute distress, is alert and oriented to person, place, time.  EYES: No conjunctival injection, pupils equally reactive to light, extra-ocular movement intact  HEAD, EARS, NOSE, MOUTH, AND THROAT: Nontraumatic, mouth without oral ulcers.   RESPIRATORY: Clear breath sounds to auscultation bilaterally.   CARDIOVASCULAR: Regular rate and rhythm, normal S1 and S2, no murmurs, rubs, or gallops.  ABDOMEN: Soft, nontender, no masses, no organomegaly.  Normal bowel sounds.  MUSCULOSKELETAL: bilateral TKAs  SKIN/HAIR/NAILS: No rashes. Lower spine incision with staples in place, eschar noted at proximal aspect of incision, without active drainage. Mild erythema, minimal tenderness.   NEUROLOGIC: some weakness in legs R>L.       Pertinent Labs         Recent Labs   Lab 03/04/24  0602 03/03/24  0916    136   CO2 22 22   BUN 10.1 13.3       Lab Results   Component Value Date    ALT 19 03/03/2024    AST 25 03/03/2024    ALKPHOS 101 03/03/2024          CRP Inflammation   Date Value Ref Range Status   03/03/2024 104.00 (H) <5.00 mg/L Final        Pertinent Radiology  Radiology Results: Reviewed  MR Lumbar Spine w/o Contrast    Result Date: 3/3/2024  EXAM: MR LUMBAR SPINE W/O CONTRAST LOCATION: [facility] DATE: 3/3/2024 INDICATION: Lumbar fusion 02/23/2024. Signs and symptoms of infection. Lumbar pain. Drainage at surgical site. COMPARISON: Lumbar radiographs 03/03/2024. Lumbar MRI 11 08/01/2023 TECHNIQUE: Routine Lumbar Spine MRI without IV contrast. FINDINGS: Redemonstration of L4-L5 anterior posterior fusion. Centrally positioned interbody graft and bilateral transpedicular screws with intertransverse rods. 4 mm anterolisthesis of L4 on L5 compared with 5 mm on the preoperative exam. 2.5 mm degenerative  anterolisthesis at L5-S1 unchanged. 2.5 mm degenerative retrolisthesis at L2-L3 unchanged. L4-L5 laminotomy. Nomenclature is based on 5 lumbar type vertebral bodies. Normal vertebral body heights. Negative for fracture. Benign osseous hemangioma at the L4 vertebral body. No suspicious marrow signal change. Type II Modic endplate changes at L2-L3 and L3-L4. Normal distal spinal cord and cauda equina with conus medullaris at L2. Fluid collection in the midline dorsal soft tissues extends from L1-L2 inferiorly to L5-S1 measuring 11 cm cephalocaudad by 6 cm AP by 6 cm mediolateral. A component  of the collection extends into the surgical tract, through the hemilaminotomy and to the dorsal surgical space at L4-L5. This is difficult to evaluate due to susceptibility artifact but the canalicular portion measures approximately 22 x 13 mm transverse extending through the posterior elements. Compression of the right dorsal thecal sac and moderate central canal stenosis is present as seen on axial image 9 of series 6.  Unremarkable visualized bony pelvis. T11-T12: Disc desiccation and ventral osteophyte. Patent central canal and foramen. Mild annular bulge. T12-L1: Disc desiccation , height loss and chronic Schmorl's nodes. Patent central canal and foramen. L1-L2: Disc desiccation. Patent central canal and foramina. There may be a tiny amount of fluid in the dorsal epidural space related to previous surgery. L2-L3: Disc osteophyte complex. Small amount of dorsal canal epidural space fluid may be related to recent surgery. Mild central canal stenosis. Moderate to severe left and mild-to-moderate right foraminal stenosis. L3-L4: Mild disc bulge. Patent central canal. Facet DJD with mild-to-moderate right and mild left foraminal stenosis. L4-L5: Fusion and hemilaminotomy. Moderate central canal stenosis fluid in the dorsal and right dorsal canal appears contiguous with the larger subcutaneous fluid collection and extends to the  surgical approach. Signal characteristics are nonspecific but  do not appear particularly complex and this could represent seroma or resolving hematoma. No surrounding inflammatory changes. No definite evidence for discitis or osteomyelitis. Contrast-enhanced imaging may be useful for further evaluation. Mild-to-moderate right and minimal left foraminal stenosis. L5-S1: Disc desiccation and mild disc bulge. Facet DJD. Moderate right and mild left foraminal stenosis. Patent central canal.     IMPRESSION: 1.  L4-L5 fusion. No hardware complications. Good positioning of transpedicular screws and interbody graft. 2.  Dorsal subcutaneous fluid collection as above which extends through the surgical approach into the dorsal canal epidural space and most prominent at L4-L5. Slight extension cephalad to L1-L2. Consider further evaluation with contrast-enhanced imaging. Fairly homogeneous fluid collection without significant surrounding inflammatory signal changes and may represent seroma or resolving hematoma. Infectious/inflammatory fluid accumulation is possible, however. 3.  Lumbar spondylosis 4.  No fractures. 5.  Findings discussed with Flor James of the neurosurgery service on 3/3/2024 1255 PM CST    XR Lumbar Spine 2/3 Views    Result Date: 3/3/2024  EXAM: XR LUMBAR SPINE 2/3 VIEWS LOCATION: [facility] DATE: 03/03/2024 INDICATION: Lumbar fusion. COMPARISON: 02/26/2024.     IMPRESSION: Five lumbar vertebral bodies. Minimal convex right curvature, unchanged. Dorsal midline skin staples persist. Postoperative changes of interbody and posterior spinal fixation across L4-L5. Grade 1 anterolisthesis of L4 on L5 and L5 on S1, unchanged. Intact hardware. Severe interspace and endplate degeneration L2-L3. Normal vertebral body heights.     XR Lumbar Spine 2 - 3 VWS Portable    Result Date: 2/26/2024  EXAM: XR LUMBAR SPINE PORT 2/3 VIEWS LOCATION: [facility] DATE/TIME:  2/26/2024 4:50 PM CST INDICATION: s p lumbar fusion, upright XR. COMPARISON: None. TECHNIQUE: Radiographs of lumbar spine in routine projections. FINDINGS: Staus post  L4-L5 posterior interbody fusion. No evidence of hardware failure. Alignment similar to prior without change in minor dextrocurvature, grade 1 anterolisthesis at L4-L5 and L5-S1. Normal height of vertebral bodies. Advanced L2-L3 interbody degeneration similar to prior. Mild multi level facet arthropathy. Unremarkable visualized bony pelvis. Cholecystectomy clips.    XR SURGERY CAROLINE FLUORO GREATER THAN 5 MIN    Result Date: 2/23/2024  This exam was marked as non-reportable because it will not be read by a radiologist or a [facility] non-radiologist provider.     XR Surgery CAROLINE  Fluoro G/T 5 Min    Result Date: 2/23/2024  This exam was marked as non-reportable because it will not be read by a radiologist or a [facility] non-radiologist provider.     Lateral Lumbar Spine for Surgical Imaging  [XR LUMBAR SPINE PORT 1  VIEW]    Result Date: 2/23/2024  EXAM: XR CROSSTABLE LATERAL LUMBAR SPINE PORTABLE LOCATION: [facility] DATE: 2/23/2024 INDICATION: Intra operative COMPARISON: None.     IMPRESSION: Intraoperative crosstable lateral radiograph. Underpenetration of the lumbosacral junction somewhat comprises evaluation. Posterior approach surgical clamp projects over the posterior margin of the L3 posterior spinous process and is directed  at the L4 vertebral body and pedicles.

## 2024-03-04 NOTE — PLAN OF CARE
Goal Outcome Evaluation:     Problem: Adult Inpatient Plan of Care  Goal: Optimal Comfort and Wellbeing  Intervention: Monitor Pain and Promote Comfort  Patient is alert and oriented X 4. Scheduled Robaxin, PRN Tylenol and Oxycodone administered for pain control. Turned and repositioned as needed. Voided through the pure wick. Tolerated IV antibiotics. Was on oxygen at 2L via NC at night. IV saline locked. VSS. Bed alarms activated for safety.

## 2024-03-04 NOTE — CONSULTS
Care Management Initial Consult    General Information  Assessment completed with: Patient,    Type of CM/SW Visit: Initial Assessment    Primary Care Provider verified and updated as needed: Yes   Readmission within the last 30 days: unable to assess      Reason for Consult: discharge planning  Advance Care Planning: Advance Care Planning Reviewed: no concerns identified, other (see comments) (Given Honoring Choices)     General Information Comments: Lives with daughter    Communication Assessment  Patient's communication style: spoken language (English or Bilingual)    Hearing Difficulty or Deaf: no   Wear Glasses or Blind: yes    Cognitive  Cognitive/Neuro/Behavioral: WDL                      Living Environment:   People in home: child(saad), adult     Current living Arrangements: house      Able to return to prior arrangements: yes  Living Arrangement Comments: Lives with deisy Mi    Family/Social Support:  Care provided by: self, child(saad)  Provides care for: no one  Marital Status: Single  Children          Description of Support System: Supportive, Involved    Support Assessment: Adequate family and caregiver support    Current Resources:   Patient receiving home care services: No     Community Resources: None  Equipment currently used at home: cane, straight, walker, standard  Supplies currently used at home: Wound Care Supplies    Employment/Financial:  Employment Status: retired        Financial Concerns: none         Does the patient's insurance plan have a 3 day qualifying hospital stay waiver?  Yes     Which insurance plan 3 day waiver is available? Alternative insurance waiver    Will the waiver be used for post-acute placement? Undetermined at this time    Lifestyle & Psychosocial Needs:  Social Determinants of Health     Food Insecurity: Unknown (12/30/2022)    Hunger Vital Sign     Worried About Running Out of Food in the Last Year: Patient declined     Ran Out of Food in the Last Year:  Patient declined   Depression: At risk (9/19/2023)    PHQ-2     PHQ-2 Score: 5   Housing Stability: Unknown (12/30/2022)    Housing Stability Vital Sign     Unable to Pay for Housing in the Last Year: Patient refused     Number of Places Lived in the Last Year: Not on file     Unstable Housing in the Last Year: Patient refused   Tobacco Use: Medium Risk (3/3/2024)    Patient History     Smoking Tobacco Use: Former     Smokeless Tobacco Use: Never     Passive Exposure: Not on file   Financial Resource Strain: Unknown (12/30/2022)    Overall Financial Resource Strain (CARDIA)     Difficulty of Paying Living Expenses: Patient declined   Alcohol Use: Patient Declined (12/30/2022)    AUDIT-C     Frequency of Alcohol Consumption: Patient declined     Average Number of Drinks: Patient declined     Frequency of Binge Drinking: Patient declined   Transportation Needs: Unknown (12/30/2022)    PRAPARE - Transportation     Lack of Transportation (Medical): Patient declined     Lack of Transportation (Non-Medical): Patient declined   Physical Activity: Patient Declined (12/30/2022)    Exercise Vital Sign     Days of Exercise per Week: Patient declined     Minutes of Exercise per Session: Patient declined   Interpersonal Safety: Not on file   Stress: Patient Declined (12/30/2022)    [employer] - Occupational Stress Questionnaire     Feeling of Stress : Patient declined   Social Connections: Unknown (12/30/2022)    Social Connection and Isolation Panel [NHANES]     Frequency of Communication with Friends and Family: Patient declined     Frequency of Social Gatherings with Friends and Family: Patient declined     Attends Spiritism Services: Patient declined     Active Member of Clubs or Organizations: Patient declined     Attends Club or Organization Meetings: Not on file     Marital Status: Patient declined       Functional Status:  Prior to admission patient needed assistance:   Dependent ADLs::  "Ambulation-walker, Bathing  Dependent IADLs:: Cleaning, Shopping, Transportation  Assesssment of Functional Status: Not at  functional baseline    Mental Health Status:          Chemical Dependency Status:              Values/Beliefs:  Spiritual, Cultural Beliefs, Church Practices, Values that affect care:                 Additional Information:   68-year-old female who presented to the [facility] Emergency Department this morning due to worsening back pain and spasms. She is now post-operative day 9 of L4--L5 transforaminal lumbar interbody fusion with Dr. Rocha on 2/23/24. She was discharged from the hospital on 2/26/24. Reports tolerable incisional back pain over the last week but then worsening back pain described as \"spasms\" starting yesterday. Pain will  her low back near incision site when spasms come on. No radiating leg pain but does endorse intermittent bilateral buttock pain .    Reports she lives in a house with daughter Hiwot. States at baseline patient is independent with I/ADLs. Now uses a standard walker; no home care services; doesn't drive due to surgery but does drive when she is well. Daughter has been helping patient with I/ADLs after the surgery. Given Honoring Choices. Would consider home care services if recommended. Plans to return home with Hiwot transporting patient on discharge. Has pending Therapy Consult Orders. Other needs pending clinical progress.    GABRIELLE MARTINEZ RN/CM      "

## 2024-03-04 NOTE — PROGRESS NOTES
03/04/24 1100   Appointment Info   Signing Clinician's Name / Credentials (PT) Alireza Scott, PT, DPT   Living Environment   People in Home child(saad), adult   Current Living Arrangements house   Home Accessibility stairs to enter home;stairs within home   Number of Stairs, Main Entrance 2   Number of Stairs, Within Home, Primary six   Self-Care   Usual Activity Tolerance good   Current Activity Tolerance moderate   Equipment Currently Used at Home cane, straight;walker, rolling   Fall history within last six months no   General Information   Onset of Illness/Injury or Date of Surgery 03/03/24   Referring Physician Dr. Alireza Mcneill   Patient/Family Therapy Goals Statement (PT) Decrease pain with mobility   Pertinent History of Current Problem (include personal factors and/or comorbidities that impact the POC) Postoperative infection   Existing Precautions/Restrictions spinal;weight bearing   Weight-Bearing Status - LLE weight-bearing as tolerated   Weight-Bearing Status - RLE weight-bearing as tolerated   Range of Motion (ROM)   ROM Comment WFL, decreased lumbar due to precautions and pain   Strength (Manual Muscle Testing)   Strength Comments WFL   Bed Mobility   Bed Mobility supine-sit   Supine-Sit Stanton (Bed Mobility) modified independence;verbal cues   Impairments Contributing to Impaired Bed Mobility pain   Assistive Device (Bed Mobility) bed rails   Transfers   Transfers sit-stand transfer   Sit-Stand Transfer   Sit-Stand Stanton (Transfers) contact guard   Assistive Device (Sit-Stand Transfers) walker, front-wheeled   Gait/Stairs (Locomotion)   Stanton Level (Gait) contact guard   Assistive Device (Gait) walker, front-wheeled   Distance in Feet (Gait) 10'   Pattern (Gait) step-through   Deviations/Abnormal Patterns (Gait) base of support, wide;gait speed decreased;stride length decreased   Clinical Impression   Criteria for Skilled Therapeutic Intervention Yes, treatment indicated   PT  Diagnosis (PT) impaired functional mobility   Influenced by the following impairments pain, weakness   Functional limitations due to impairments gait, stairs, transfers   Clinical Presentation (PT Evaluation Complexity) stable   Clinical Presentation Rationale pt presents as medically diagnosed   Clinical Decision Making (Complexity) low complexity   Planned Therapy Interventions (PT) gait training;home exercise program;patient/family education;ROM (range of motion);stair training;strengthening;transfer training   Risk & Benefits of therapy have been explained care plan/treatment goals reviewed;patient   PT Total Evaluation Time   PT Eval, Low Complexity Minutes (08398) 10   Physical Therapy Goals   PT Frequency Daily   PT Predicted Duration/Target Date for Goal Attainment 03/08/24   PT Goals Gait;Transfers;Stairs   PT: Transfers Modified independent;Sit to/from stand;Assistive device   PT: Gait Modified independent;Rolling walker;150 feet   PT: Stairs Supervision/stand-by assist;4 stairs;Rail on both sides   Interventions   Interventions Quick Adds Therapeutic Activity;Gait Training;Self-Care/Home Mgmt   Therapeutic Activity   Therapeutic Activities: dynamic activities to improve functional performance Minutes (24361) 15   Symptoms Noted During/After Treatment Increased pain   Treatment Detail/Skilled Intervention Supine to sit Mod I with HOB elevated and use of bedrail, increased time needed to complete with cues for hand placement with transfers. Sit to/from stand x 3 CGA, increased time for set up, encouragement for mobility, role of therapies and POC.   Self-Care/Home Management   Self-Care/Home Mgmt/ADL, Compensatory, Meal Prep Minutes (88804) 10   Symptoms Noted During/After Treatment increased pain   Treatment Detail/Skilled Intervention Pt independent with toileting, hand washing, teeth/hair brushing, using mouth wash all at sink after toileting. No OT involved in care so helped facilitate these activities,  initially wanted NA help for pericares but then able to complete on their own.   Gait Training   Gait Training Minutes (82497) 3   Symptoms Noted During/After Treatment (Gait Training) increased pain   Treatment Detail/Skilled Intervention Pt amb with CGA and FWW around room to recliner, then into bathroom and back. Cues for navigation and safety. Encouraged mobility as able with nursing.   Distance in Feet 15', 8' x 2   Cadwell Level (Gait Training) contact guard   Physical Assistance Level (Gait Training) supervision;verbal cues   Weight Bearing (Gait Training) weight-bearing as tolerated   Assistive Device (Gait Training) rolling walker   Pattern Analysis (Gait Training) swing-through gait   Gait Analysis Deviations decreased alfie;decreased step length   Impairments (Gait Analysis/Training) pain;strength decreased   PT Discharge Planning   PT Plan Increase activity as able   PT Discharge Recommendation (DC Rec) (S)  home with assist   PT Rationale for DC Rec Pt reports good home support/set up, has walker and cane at home, pending further progression with amb and stair trial as well as pain control.   PT Brief overview of current status CGA amb short distances in the room with FWW and CGA transfers   PT Equipment Needed at Discharge   (No needs)

## 2024-03-04 NOTE — PLAN OF CARE
Problem: Infection  Goal: Absence of Infection Signs and Symptoms  Outcome: Progressing   Goal Outcome Evaluation:       Patient is on vancomycin and Zosyn for infection lower back incision. Incision open to air approximated with staple. Small yellow drainage noted from incision site. She has been afebrile    Problem: Pain Acute  Goal: Optimal Pain Control and Function  Outcome: Progressing      Complained of lower back pain 7/10. Pain managed with PRN oxycodone and scheduled tylenol, gabapentin, robaxin and Tylenol.  Krishna Carey RN  3/4/2024  3:03 PM

## 2024-03-04 NOTE — PROGRESS NOTES
[facility]    Neurosurgery  Daily Note    Assessment & Plan    POD 10 L4-L5 TLIF with Dr. Hudson 2/24/23. Presented to ED with worsening low back pain. Lumbar MRI demonstrated a seroma which was drained by Dr. Hudson 3/3/24 at bedside. Broad spectrum antibiotics started and wound cultures pending.     AM Rounds: No events overnight.  Improvement in back pain. No new or worsening radicular symptoms, paresthesias, overt weakness. Exam stable. Incision open to air with staples in place, some surrounding erythema, no expressible drainage.     Per chart review, no signs of infection in the seroma like fluid removed yesterday.     Plan:  - Consult wound care  - Consult ID   - Continue ABX, defer further ABX adjustments to ID   - Wound cultures pending  - PRN pain medication   - Try to keep pt positioned so good air flow at surgery site (primarily lay on side when resting or cushion back with pillow)  - Okay to keep incision open to air  - Okay to ambulate, PT/OT    Plans discussed with Dr. Hudson.    Chelsea Hoffman PA-C  [facility] Neurosurgery  [address] [place] [address]    Interval History   Stable    Physical Exam   Temp: 99.8  F (37.7  C) Temp src: Oral BP: 126/61 Pulse: 71   Resp: 16 SpO2: 97 % O2 Device: None (Room air) Oxygen Delivery: 1 LPM  Vitals:    03/03/24 0802   Weight: 227 lb (103 kg)     Vital Signs with Ranges  Temp:  [98.7  F (37.1  C)-101.2  F (38.4  C)] 99.8  F (37.7  C)  Pulse:  [71-88] 71  Resp:  [16-18] 16  BP: (103-158)/(51-86) 126/61  SpO2:  [89 %-98 %] 97 %  I/O last 3 completed shifts:  In: -   Out: 500 [Urine:500]    Awake, alert, and appropriate. NAD  Moves all extremities equally   Strength 5/5 BLE   Sensation intact to light touch BLE   Clonus negative bilaterally.  Gait Deferred  Incision: Open to air, Staples in place, Some surrounding erythema, no expressible discharge.     Medications       acetaminophen  1,000 mg Oral TID     enoxaparin ANTICOAGULANT  40 mg Subcutaneous Q24H    FLUoxetine  40 mg Oral Daily    gabapentin  100 mg Oral TID    methocarbamol  750 mg Oral Q6H    pantoprazole  40 mg Oral Daily    piperacillin-tazobactam  3.375 g Intravenous Q8H    rOPINIRole  1 mg Oral BID    rOPINIRole  2 mg Oral At Bedtime    sodium chloride (PF)  3 mL Intracatheter Q8H    vancomycin  1,500 mg Intravenous Q18H

## 2024-03-04 NOTE — PROGRESS NOTES
Received patient from 2235-2300. A&Ox4. Voiding utilizing a purewick. Erythema around staples/incision site. Turning patient side to side, pt not supposed to be on her back. 2 PIV in the R arm. Pt receiving IV abx. Redness in her nadia area/ inner thighs, slight redness on her R heel.     Pt previously has lumbar surgery, discharged, readmitted for fever, weakness, and pain. Treating fever with tylenol.

## 2024-03-04 NOTE — PLAN OF CARE
Problem: Pain Acute  Goal: Optimal Pain Control and Function  Intervention: Prevent or Manage Pain  Recent Flowsheet Documentation  Taken 3/3/2024 1812 by Jovon Orozco RN  Medication Review/Management: medications reviewed     Problem: Adult Inpatient Plan of Care  Goal: Absence of Hospital-Acquired Illness or Injury  Intervention: Identify and Manage Fall Risk  Recent Flowsheet Documentation  Taken 3/3/2024 1812 by Jovon Orozco, RN  Safety Promotion/Fall Prevention: room near nurse's station     Goal Outcome Evaluation:       Pt is having back pain, 3-9/10. PRN oxycodone and IV dilaudid given for pain, with relief. Surgical site is MAMADOU, staples intact, bacitracin on wound. IV antibiotics given. Blood cultures and site cultures pending. Vitally stable except febrile. Tylenol given for fever, recheck was WNL.

## 2024-03-04 NOTE — PROGRESS NOTES
[facility]    Medicine Progress Note - Hospitalist Service    Date of Admission:  3/3/2024    Assessment & Plan      68 year old female past medical history significant for recent L4-L5 bilateral fusion on 2/23/2024 discharged home on 2/26 , non-Hodgkin's lymphoma/small B lymphocyte lymphoma, lymphoproliferative disorder on tyrosine kinase inhibitor, typically sees [facility], obesity, depression and anxiety and restless leg syndrome presented to the emergency room with complaints of worsening low back pain last couple of days, with postoperative wound erythema, warmth, lumbar MRI concerning for fluid collection extending into the epidural space s/p I&D in ED pending cultures.     S/p bedside evacuation of post-operative fluid collection by Neurosurgery on day of admit. Infectious Disease consulted/pending. Ongoing Zosyn.       -----  Postoperative wound superficial cellulitis versus deep infection with fluid collection status post I&D in ED at bedside  S/p bilateral L4-L5 fusion 2/23/2024, had an elevated Temp of 100.1, leukocytosis 21, , ESR 29, Lactic acid normal. S/p I&D, 60 mL of bloody fluid sent to the lab  -Check MRSA swab  -Follow-up on fluid cultures  -Appreciate spine surgery input  -ID consult pending  -Pain control on Tylenol, oxycodone, Robaxin, gabapentin IV Dilaudid as needed, would defer modification to surgery     History of non-Hodgkin's lymphoma  Small lymphocytic lymphoma  -Sees [facility], per family members holding off on the tyrosine kinase inhibitor for 10 days before and after surgery  -thus, Holding for now  -Monitor CBC  -monitor clinically      Depression anxiety  -Continue home meds     Restless leg syndrome  -On Requip     GERD  On PPI     Obesity  Encourage weight loss   DVT prophylaxis start heparin if appropriate per spine surgery  - noted pt was on lovenox at last hospital stay  - writer has ordered SCDs/ PCDs early AM on 3/4;  "discuss with RN and page surgery about ppdx          Diet: Combination Diet Regular Diet Adult    DVT Prophylaxis: I added Pneumatic Compression Devices early AM; I also asked RN/MICHELLE to page neurosurgery whether to add an chemical prophylaxis; confirmed Lovenox was started  Camejo Catheter: Not present  Lines: None     Cardiac Monitoring: None  Code Status: Full Code      Clinically Significant Risk Factors Present on Admission                       # Obesity: Estimated body mass index is 38.96 kg/m  as calculated from the following:    Height as of this encounter: 1.626 m (5' 4\").    Weight as of this encounter: 103 kg (227 lb).         # Financial/Environmental Concerns: none         Disposition Plan      Expected Discharge Date: 03/05/2024      Destination: home with family;home with help/services              Alireza Mcneill MD  Hospitalist Service  [facility]  Securely message with [employer] (more info)  Text page via [employer] Paging/Directory   ______________________________________________________________________    Interval History   NAEO  New to writer  This morning pt has no new symptoms; she wants to go home  Discussed pending ID input and pending likely need 1-2 days for cultures to grow  She is understandable  No new issues  We discussed SCDs and PCDs which I started  I then asked HUC and RN to follow up with neurosurgery whether to add further dvt ppdx; lovenox was started    Physical Exam   Vital Signs: Temp: 98.7  F (37.1  C) Temp src: Oral BP: 129/61 Pulse: 71   Resp: 16 SpO2: 96 % O2 Device: None (Room air) Oxygen Delivery: 1 LPM  Weight: 227 lbs 0 oz    General: alert, oriented, and in no acute distress  Pulmonary: clear to auscultation bilaterally, normal respiratory effort, on room air, no rales or wheezes or evidence of accessory muscle use  CVS: regular rate and rhythm; no blatant jugular venous distention; no extremity edema and extremities are warm to the touch  GI: soft, " nontender, BS+, no rebound or guarding, no conspicuous organomegaly   Neuro: nonfocal, moves all extremities of own volition  Psych: appropriate  Skin: stable, s/p I&D      Medical Decision Making       50 MINUTES SPENT BY ME on the date of service doing chart review, history, exam, documentation & further activities per the note.      Data   ------------------------- PAST 24 HR DATA REVIEWED -----------------------------------------------    I have personally reviewed the following data over the past 24 hrs:    10.7  \   9.6 (L)   / 195     135 104 10.1 /  130 (H)   4.1 22 0.72 \       Imaging results reviewed over the past 24 hrs:   Recent Results (from the past 24 hour(s))   MR Lumbar Spine w/o Contrast    Narrative    EXAM: MR LUMBAR SPINE W/O CONTRAST  LOCATION: [facility]  DATE: 3/3/2024    INDICATION: Lumbar fusion 02/23/2024. Signs and symptoms of infection. Lumbar pain. Drainage at surgical site.  COMPARISON: Lumbar radiographs 03/03/2024. Lumbar MRI 11 08/01/2023  TECHNIQUE: Routine Lumbar Spine MRI without IV contrast.    FINDINGS:   Redemonstration of L4-L5 anterior posterior fusion. Centrally positioned interbody graft and bilateral transpedicular screws with intertransverse rods. 4 mm anterolisthesis of L4 on L5 compared with 5 mm on the preoperative exam. 2.5 mm degenerative   anterolisthesis at L5-S1 unchanged. 2.5 mm degenerative retrolisthesis at L2-L3 unchanged.    L4-L5 laminotomy. Nomenclature is based on 5 lumbar type vertebral bodies. Normal vertebral body heights. Negative for fracture. Benign osseous hemangioma at the L4 vertebral body. No suspicious marrow signal change. Type II Modic endplate changes at   L2-L3 and L3-L4. Normal distal spinal cord and cauda equina with conus medullaris at L2. Fluid collection in the midline dorsal soft tissues extends from L1-L2 inferiorly to L5-S1 measuring 11 cm cephalocaudad by 6 cm AP by 6 cm mediolateral. A component   of the  collection extends into the surgical tract, through the hemilaminotomy and to the dorsal surgical space at L4-L5. This is difficult to evaluate due to susceptibility artifact but the canalicular portion measures approximately 22 x 13 mm   transverse extending through the posterior elements. Compression of the right dorsal thecal sac and moderate central canal stenosis is present as seen on axial image 9 of series 6.     Unremarkable visualized bony pelvis.    T11-T12: Disc desiccation and ventral osteophyte. Patent central canal and foramen. Mild annular bulge.    T12-L1: Disc desiccation , height loss and chronic Schmorl's nodes. Patent central canal and foramen.    L1-L2: Disc desiccation. Patent central canal and foramina. There may be a tiny amount of fluid in the dorsal epidural space related to previous surgery.    L2-L3: Disc osteophyte complex. Small amount of dorsal canal epidural space fluid may be related to recent surgery. Mild central canal stenosis. Moderate to severe left and mild-to-moderate right foraminal stenosis.     L3-L4: Mild disc bulge. Patent central canal. Facet DJD with mild-to-moderate right and mild left foraminal stenosis.    L4-L5: Fusion and hemilaminotomy. Moderate central canal stenosis fluid in the dorsal and right dorsal canal appears contiguous with the larger subcutaneous fluid collection and extends to the surgical approach. Signal characteristics are nonspecific but   do not appear particularly complex and this could represent seroma or resolving hematoma. No surrounding inflammatory changes. No definite evidence for discitis or osteomyelitis. Contrast-enhanced imaging may be useful for further evaluation.   Mild-to-moderate right and minimal left foraminal stenosis.    L5-S1: Disc desiccation and mild disc bulge. Facet DJD. Moderate right and mild left foraminal stenosis. Patent central canal.      Impression    IMPRESSION:  1.  L4-L5 fusion. No hardware complications. Good  positioning of transpedicular screws and interbody graft.    2.  Dorsal subcutaneous fluid collection as above which extends through the surgical approach into the dorsal canal epidural space and most prominent at L4-L5. Slight extension cephalad to L1-L2. Consider further evaluation with contrast-enhanced   imaging. Fairly homogeneous fluid collection without significant surrounding inflammatory signal changes and may represent seroma or resolving hematoma. Infectious/inflammatory fluid accumulation is possible, however.    3.  Lumbar spondylosis    4.  No fractures.    5.  Findings discussed with Flor James of the neurosurgery service on 3/3/2024 1255 PM CST

## 2024-03-04 NOTE — CONSULTS
[facility] Nurse Inpatient Assessment     Consulted for: lumbar spine     Summary:     Patient History (according to provider note(s):      Assessment and Plan:  68 year old female past medical history significant for recent L4-L5 bilateral fusion on 2/23/2024 discharged home on 2/26 , non-Hodgkin's lymphoma/small B lymphocyte lymphoma, lymphoproliferative disorder on tyrosine kinase inhibitor, sees [facility], obesity, depression anxiety restless leg syndrome presented to the emergency room with complaints of worsening low back pain last couple of days, with postoperative wound erythema, warmth, lumbar MRI concerning for fluid collection extending into the epidural space s/p I&D in ED pending cultures.     Postoperative wound superficial cellulitis versus deep infection with fluid collection status post I&D in ED at bedside  S/p bilateral L4-L5 fusion 2/23/2024    Assessment:      Wound location: lumbar spine    3/4    Last photo: 3/4  Wound due to: Surgical Wound  Wound history/plan of care: recent L4-L5 bilateral fusion on 2/23/2024 discharged home on 2/26, Postoperative wound superficial cellulitis versus deep infection with fluid collection status post I&D in ED at bedside  Wound base: 100 % slough     Palpation of the wound bed: boggy      Drainage: small     Description of drainage: serous     Measurements (length x width x depth, in cm): 8  x 1.1  x  unk cm      Tunneling: N/A     Undermining: N/A  Periwound skin: Erythema- blanchable      Color: red      Temperature: warm  Odor: none  Pain: mild, aching  Pain interventions prior to dressing change: slow and gentle cares   Treatment goal: Heal , Decrease moisture, and Protection  STATUS: initial assessment  Supplies ordered: gathered      Staples still intact, no deep open wound at this point.      Treatment Plan:     Spine  Soak wound with vashe moistened gauze for 5 minutes, pat dry  Apply cavilon skin prep around  incision and allow to dry  Cut piece of Urgotul Ag (green package, tan gauze looking dressing) and a larger piece of Drawtex (white cotton like dressing) to cover wound, urgotul over incision first  Secure with large tegaderm  Change dressing MWF + PRN if soiled, saturated, falls off    Orders: Written    RECOMMEND PRIMARY TEAM ORDER: None, at this time  Education provided: plan of care, wound progress, Infection prevention , Moisture management, and Off-loading pressure  Discussed plan of care with: Patient and Nurse  [facility] nurse follow-up plan: weekly  Notify WO if wound(s) deteriorate.  Nursing to notify the Provider(s) and re-consult the [facility] Nurse if new skin concern.    DATA:     Current support surface: Standard  Standard gel/foam mattress (IsoFlex, Atmos air, etc)  Containment of urine/stool: Continent of bladder and Continent of bowel  BMI: Body mass index is 38.96 kg/m .   Active diet order: Orders Placed This Encounter      Combination Diet Regular Diet Adult     Output: I/O last 3 completed shifts:  In: -   Out: 500 [Urine:500]     Labs:   Recent Labs   Lab 03/04/24  0602 03/03/24  0916   ALBUMIN  --  3.9   HGB 9.6* 11.4*   WBC 10.7 21.1*     Pressure injury risk assessment:   Sensory Perception: 4-->no impairment  Moisture: 3-->occasionally moist  Activity: 3-->walks occasionally  Mobility: 3-->slightly limited  Nutrition: 3-->adequate  Friction and Shear: 3-->no apparent problem  Edmundo Score: 19    MUKESH Barone RN CWOCN  Pager no longer in use, please contact through [employer] group: [facility] [employer]

## 2024-03-05 ENCOUNTER — APPOINTMENT (OUTPATIENT)
Dept: PHYSICAL THERAPY | Facility: CLINIC | Age: 69
DRG: 857 | End: 2024-03-05
Payer: COMMERCIAL

## 2024-03-05 LAB
CREAT SERPL-MCNC: 0.75 MG/DL (ref 0.51–0.95)
EGFRCR SERPLBLD CKD-EPI 2021: 85 ML/MIN/1.73M2

## 2024-03-05 PROCEDURE — 97110 THERAPEUTIC EXERCISES: CPT | Mod: GP

## 2024-03-05 PROCEDURE — 82565 ASSAY OF CREATININE: CPT | Performed by: INTERNAL MEDICINE

## 2024-03-05 PROCEDURE — 99233 SBSQ HOSP IP/OBS HIGH 50: CPT | Performed by: STUDENT IN AN ORGANIZED HEALTH CARE EDUCATION/TRAINING PROGRAM

## 2024-03-05 PROCEDURE — 250N000011 HC RX IP 250 OP 636: Performed by: INTERNAL MEDICINE

## 2024-03-05 PROCEDURE — 85730 THROMBOPLASTIN TIME PARTIAL: CPT

## 2024-03-05 PROCEDURE — 85049 AUTOMATED PLATELET COUNT: CPT

## 2024-03-05 PROCEDURE — 36415 COLL VENOUS BLD VENIPUNCTURE: CPT

## 2024-03-05 PROCEDURE — 250N000013 HC RX MED GY IP 250 OP 250 PS 637: Performed by: STUDENT IN AN ORGANIZED HEALTH CARE EDUCATION/TRAINING PROGRAM

## 2024-03-05 PROCEDURE — 85610 PROTHROMBIN TIME: CPT

## 2024-03-05 PROCEDURE — 99232 SBSQ HOSP IP/OBS MODERATE 35: CPT | Performed by: INTERNAL MEDICINE

## 2024-03-05 PROCEDURE — 85576 BLOOD PLATELET AGGREGATION: CPT

## 2024-03-05 PROCEDURE — 120N000001 HC R&B MED SURG/OB

## 2024-03-05 PROCEDURE — 250N000013 HC RX MED GY IP 250 OP 250 PS 637: Performed by: INTERNAL MEDICINE

## 2024-03-05 PROCEDURE — 36415 COLL VENOUS BLD VENIPUNCTURE: CPT | Performed by: INTERNAL MEDICINE

## 2024-03-05 RX ORDER — GUAIFENESIN 600 MG/1
600 TABLET, EXTENDED RELEASE ORAL 2 TIMES DAILY
Status: DISCONTINUED | OUTPATIENT
Start: 2024-03-05 | End: 2024-03-06

## 2024-03-05 RX ORDER — AMOXICILLIN 250 MG
1 CAPSULE ORAL 2 TIMES DAILY PRN
Status: DISCONTINUED | OUTPATIENT
Start: 2024-03-05 | End: 2024-03-12

## 2024-03-05 RX ADMIN — METHOCARBAMOL TABLETS 750 MG: 750 TABLET, COATED ORAL at 21:20

## 2024-03-05 RX ADMIN — ROPINIROLE 1 MG: 0.25 TABLET, FILM COATED ORAL at 09:27

## 2024-03-05 RX ADMIN — GABAPENTIN 100 MG: 100 CAPSULE ORAL at 21:19

## 2024-03-05 RX ADMIN — PIPERACILLIN AND TAZOBACTAM 3.38 G: 3; .375 INJECTION, POWDER, FOR SOLUTION INTRAVENOUS at 11:49

## 2024-03-05 RX ADMIN — FLUOXETINE 40 MG: 20 CAPSULE ORAL at 09:25

## 2024-03-05 RX ADMIN — ACETAMINOPHEN 1000 MG: 500 TABLET ORAL at 11:46

## 2024-03-05 RX ADMIN — GUAIFENESIN 600 MG: 600 TABLET ORAL at 09:25

## 2024-03-05 RX ADMIN — ACETAMINOPHEN 1000 MG: 500 TABLET ORAL at 09:25

## 2024-03-05 RX ADMIN — PANTOPRAZOLE SODIUM 40 MG: 40 TABLET, DELAYED RELEASE ORAL at 09:25

## 2024-03-05 RX ADMIN — ROPINIROLE HYDROCHLORIDE 2 MG: 1 TABLET, FILM COATED ORAL at 21:19

## 2024-03-05 RX ADMIN — OXYCODONE 5 MG: 5 TABLET ORAL at 18:39

## 2024-03-05 RX ADMIN — PIPERACILLIN AND TAZOBACTAM 3.38 G: 3; .375 INJECTION, POWDER, FOR SOLUTION INTRAVENOUS at 18:32

## 2024-03-05 RX ADMIN — OXYCODONE 5 MG: 5 TABLET ORAL at 05:52

## 2024-03-05 RX ADMIN — GABAPENTIN 100 MG: 100 CAPSULE ORAL at 09:25

## 2024-03-05 RX ADMIN — METHOCARBAMOL TABLETS 750 MG: 750 TABLET, COATED ORAL at 09:25

## 2024-03-05 RX ADMIN — METHOCARBAMOL TABLETS 750 MG: 750 TABLET, COATED ORAL at 03:56

## 2024-03-05 RX ADMIN — ROPINIROLE 1 MG: 0.25 TABLET, FILM COATED ORAL at 16:34

## 2024-03-05 RX ADMIN — OXYCODONE 10 MG: 5 TABLET ORAL at 12:15

## 2024-03-05 RX ADMIN — GUAIFENESIN 600 MG: 600 TABLET ORAL at 21:20

## 2024-03-05 RX ADMIN — OXYCODONE 5 MG: 5 TABLET ORAL at 01:23

## 2024-03-05 RX ADMIN — PIPERACILLIN AND TAZOBACTAM 3.38 G: 3; .375 INJECTION, POWDER, FOR SOLUTION INTRAVENOUS at 01:10

## 2024-03-05 RX ADMIN — ACETAMINOPHEN 1000 MG: 500 TABLET ORAL at 18:39

## 2024-03-05 RX ADMIN — METHOCARBAMOL TABLETS 750 MG: 750 TABLET, COATED ORAL at 16:34

## 2024-03-05 RX ADMIN — GABAPENTIN 100 MG: 100 CAPSULE ORAL at 16:34

## 2024-03-05 ASSESSMENT — ACTIVITIES OF DAILY LIVING (ADL)
ADLS_ACUITY_SCORE: 47
ADLS_ACUITY_SCORE: 48
ADLS_ACUITY_SCORE: 47

## 2024-03-05 NOTE — PROGRESS NOTES
[facility]    Neurosurgery  Daily Note    Assessment & Plan    POD 10 L4-L5 TLIF with Dr. Hudson 2/24/23. Presented to ED with worsening low back pain. Lumbar MRI demonstrated a seroma which was drained by Dr. Hudson 3/3/24 at bedside. Broad spectrum antibiotics started and wound cultures pending.      AM Rounds: No events overnight. Wound care and ID evaluated patient yesterday; Discussed recs with ID who recommends broad spectrum ABX pending cultures and formal surgical washout. Patient currently on Zosyn and Vancomycin. No fevers overnight.     Patient notes bilateral lateral flank pain today. Denies infectious symptoms, new or worsening radicular symptoms, paresthesias, overt weakness. Exam stable. Incision - staples intact, some surrounding erythema around incision edges, top and bottom of incision closed, no expressible drainage. Discussed wound washout with patient in the OR tentative for tomorrow afternoon, patient requesting time before agreeing to plan.     Labs:  WBC: 10.7(3/4/24), 21.1(3/3/24)  ESR 29 (3/3/24)   (3/3/24)  Preliminary gram stain: No organisms, WBC 4+  Preliminary aerobic culture: Enterobacter cloacae complex  Anaerobic culture: still pending, no growth so far  Blood cultures: still pending, No growth so far    Plan:  - Lumbar MRI w/ contrast Today  - Tentative Surgical wound washout Wednesday 3/6/24 with Dr. Ambrose - discussed with nurse that patient is requesting some time prior to agreeing. Nurse to page NSGY once patient makes decision.   - HOLD Lovenox (last dose 4/3 2010)  - HOLD ASA/ NSAIDS, ALL anticoagulation   - Cultures pending  - PRN pain medication  - okay to ambulate, PT/OT  - Medical clearance per Hospitalist    Addendum: Lumbar wound I&D scheduled for Thursday 3/7/24 with Dr. Ambrose at 0820. Discussed with patient who verbalizes understanding and is in agreement with the plan. Continue to HOLD all anticoagulation. Case request and pre-op  orders placed. Coag labs ordered for tomorrow morning. Pending Lumbar MRI w/ contrast today. NPO 3/7/24 (0001)      Plans discussed with Dr. Hudson and Dr. Ambrose who was in agreement with plans    Chelsea Hoffman PA-C  [facility] Neurosurgery  [address] [place] [address]  [address] [place] [id number]    Interval History   Stable.      Physical Exam   Temp: 98.7  F (37.1  C) Temp src: Oral BP: 115/56 Pulse: 70   Resp: 16 SpO2: 94 % O2 Device: None (Room air) Oxygen Delivery: 1 LPM  Vitals:    03/03/24 0802   Weight: 227 lb (103 kg)     Vital Signs with Ranges  Temp:  [98.4  F (36.9  C)-99.8  F (37.7  C)] 98.7  F (37.1  C)  Pulse:  [70-76] 70  Resp:  [16-18] 16  BP: (115-126)/(56-61) 115/56  SpO2:  [94 %-97 %] 94 %  I/O last 3 completed shifts:  In: -   Out: 500 [Urine:500]    Awake, alert, and appropriate. NAD  Moves all extremities equally   Strength 5/5 BLE   Sensation intact to light touch BLE   Clonus negative bilaterally.  Gait Deferred  Incision: Staples intact. Top and bottom of incision closed. Surrounding erythema around incision edges. No expressible discharge.       Medications       acetaminophen  1,000 mg Oral TID    enoxaparin ANTICOAGULANT  40 mg Subcutaneous Q24H    FLUoxetine  40 mg Oral Daily    gabapentin  100 mg Oral TID    methocarbamol  750 mg Oral Q6H    pantoprazole  40 mg Oral Daily    piperacillin-tazobactam  3.375 g Intravenous Q8H    rOPINIRole  1 mg Oral BID    rOPINIRole  2 mg Oral At Bedtime    sodium chloride (PF)  3 mL Intracatheter Q8H

## 2024-03-05 NOTE — PROGRESS NOTES
"INFECTIOUS DISEASE FOLLOW UP NOTE      ASSESSMENT:  Post-operative wound infection. Hardware in place. Fluid collection on MRI subcutaneous, extends to deep structures. Aspirate 3/3 now growing enterobacter and staph epi. Based on MRI findings, suspect deep infection. In general, spine hardware can be salvaged with extended antibiotic course following source control. There is risk of hardware failure however, so source control at outset of infection treatment is key. Growth of staph epi of uncertain significance, could be true pathogen or skin contaminant. Seek confirmation of this on deep operative cultures.   S/p L4--L5 transforaminal lumbar interbody fusion on 24.   H/o NHL, maintained on acalabrutinib    PLAN:  Continue pip/tazo  Off vancomycin, will restart post-op pending operative cultures  Discussed with neurosurgery PA today    Tereso Tucker MD  [facility]  [phone] [facility]  [employer] paging    ______________________________________________________________________    SUBJECTIVE / INTERVAL HISTORY: pain better. Temps ok. Reviewed results with her and her son.     ROS: All other systems negative except as listed above.        OBJECTIVE:  /63 (BP Location: Left arm)   Pulse 68   Temp 98.4  F (36.9  C) (Oral)   Resp 18   Ht 1.626 m (5' 4\")   Wt 103 kg (227 lb)   SpO2 94%   BMI 38.96 kg/m         Vital Signs  Temp: 98.4  F (36.9  C)  Temp src: Oral  Resp: 18  Pulse: 68  Pulse Rate Source: Monitor  BP: 136/63  BP Location: Left arm    Temp (24hrs), Av.4  F (36.9  C), Min:98  F (36.7  C), Max:98.7  F (37.1  C)      GEN: No acute distress.    RESPIRATORY:  Normal breathing pattern.   CARDIOVASCULAR:  Regular rate and rhythm. Normal S1 and S2. No murmur  ABDOMEN:  deferred  EXTREMITIES: No edema.  SKIN/HAIR/NAILS:  No rashes. Wound not examined today  Strength in legs pretty good  IV: peripheral        Antibiotics:  pip/tazo 3/3-  Vancomycin 3/3-4    Pertinent " labs:    Recent Labs   Lab 03/04/24  0602 03/03/24  0916   WBC 10.7 21.1*   HGB 9.6* 11.4*   HCT 30.1* 34.3*    297        Recent Labs   Lab 03/04/24  0602 03/03/24  0916    136   CO2 22 22   BUN 10.1 13.3         Lab Results   Component Value Date    ALT 19 03/03/2024    AST 25 03/03/2024    ALKPHOS 101 03/03/2024         MICROBIOLOGY DATA:  3/3 blood negative  3/3 aspirate enterobacter, staph epi    RADIOLOGY:  MR Lumbar Spine w/o Contrast    Result Date: 3/3/2024  EXAM: MR LUMBAR SPINE W/O CONTRAST LOCATION: [facility] DATE: 3/3/2024 INDICATION: Lumbar fusion 02/23/2024. Signs and symptoms of infection. Lumbar pain. Drainage at surgical site. COMPARISON: Lumbar radiographs 03/03/2024. Lumbar MRI 11 08/01/2023 TECHNIQUE: Routine Lumbar Spine MRI without IV contrast. FINDINGS: Redemonstration of L4-L5 anterior posterior fusion. Centrally positioned interbody graft and bilateral transpedicular screws with intertransverse rods. 4 mm anterolisthesis of L4 on L5 compared with 5 mm on the preoperative exam. 2.5 mm degenerative anterolisthesis at L5-S1 unchanged. 2.5 mm degenerative retrolisthesis at L2-L3 unchanged. L4-L5 laminotomy. Nomenclature is based on 5 lumbar type vertebral bodies. Normal vertebral body heights. Negative for fracture. Benign osseous hemangioma at the L4 vertebral body. No suspicious marrow signal change. Type II Modic endplate changes at L2-L3 and L3-L4. Normal distal spinal cord and cauda equina with conus medullaris at L2. Fluid collection in the midline dorsal soft tissues extends from L1-L2 inferiorly to L5-S1 measuring 11 cm cephalocaudad by 6 cm AP by 6 cm mediolateral. A component  of the collection extends into the surgical tract, through the hemilaminotomy and to the dorsal surgical space at L4-L5. This is difficult to evaluate due to susceptibility artifact but the canalicular portion measures approximately 22 x 13 mm transverse extending through  the posterior elements. Compression of the right dorsal thecal sac and moderate central canal stenosis is present as seen on axial image 9 of series 6.  Unremarkable visualized bony pelvis. T11-T12: Disc desiccation and ventral osteophyte. Patent central canal and foramen. Mild annular bulge. T12-L1: Disc desiccation , height loss and chronic Schmorl's nodes. Patent central canal and foramen. L1-L2: Disc desiccation. Patent central canal and foramina. There may be a tiny amount of fluid in the dorsal epidural space related to previous surgery. L2-L3: Disc osteophyte complex. Small amount of dorsal canal epidural space fluid may be related to recent surgery. Mild central canal stenosis. Moderate to severe left and mild-to-moderate right foraminal stenosis. L3-L4: Mild disc bulge. Patent central canal. Facet DJD with mild-to-moderate right and mild left foraminal stenosis. L4-L5: Fusion and hemilaminotomy. Moderate central canal stenosis fluid in the dorsal and right dorsal canal appears contiguous with the larger subcutaneous fluid collection and extends to the surgical approach. Signal characteristics are nonspecific but  do not appear particularly complex and this could represent seroma or resolving hematoma. No surrounding inflammatory changes. No definite evidence for discitis or osteomyelitis. Contrast-enhanced imaging may be useful for further evaluation. Mild-to-moderate right and minimal left foraminal stenosis. L5-S1: Disc desiccation and mild disc bulge. Facet DJD. Moderate right and mild left foraminal stenosis. Patent central canal.     IMPRESSION: 1.  L4-L5 fusion. No hardware complications. Good positioning of transpedicular screws and interbody graft. 2.  Dorsal subcutaneous fluid collection as above which extends through the surgical approach into the dorsal canal epidural space and most prominent at L4-L5. Slight extension cephalad to L1-L2. Consider further evaluation with contrast-enhanced imaging.  Fairly homogeneous fluid collection without significant surrounding inflammatory signal changes and may represent seroma or resolving hematoma. Infectious/inflammatory fluid accumulation is possible, however. 3.  Lumbar spondylosis 4.  No fractures. 5.  Findings discussed with Flor James of the neurosurgery service on 3/3/2024 1255 PM CST    XR Lumbar Spine 2/3 Views    Result Date: 3/3/2024  EXAM: XR LUMBAR SPINE 2/3 VIEWS LOCATION: [facility] DATE: 03/03/2024 INDICATION: Lumbar fusion. COMPARISON: 02/26/2024.     IMPRESSION: Five lumbar vertebral bodies. Minimal convex right curvature, unchanged. Dorsal midline skin staples persist. Postoperative changes of interbody and posterior spinal fixation across L4-L5. Grade 1 anterolisthesis of L4 on L5 and L5 on S1, unchanged. Intact hardware. Severe interspace and endplate degeneration L2-L3. Normal vertebral body heights.     XR Lumbar Spine 2 - 3 VWS Portable    Result Date: 2/26/2024  EXAM: XR LUMBAR SPINE PORT 2/3 VIEWS LOCATION: [facility] DATE/TIME: 2/26/2024 4:50 PM CST INDICATION: s p lumbar fusion, upright XR. COMPARISON: None. TECHNIQUE: Radiographs of lumbar spine in routine projections. FINDINGS: Staus post  L4-L5 posterior interbody fusion. No evidence of hardware failure. Alignment similar to prior without change in minor dextrocurvature, grade 1 anterolisthesis at L4-L5 and L5-S1. Normal height of vertebral bodies. Advanced L2-L3 interbody degeneration similar to prior. Mild multi level facet arthropathy. Unremarkable visualized bony pelvis. Cholecystectomy clips.    XR SURGERY CAROLINE FLUORO GREATER THAN 5 MIN    Result Date: 2/23/2024  This exam was marked as non-reportable because it will not be read by a radiologist or a [facility] non-radiologist provider.     XR Surgery CAROLINE  Fluoro G/T 5 Min    Result Date: 2/23/2024  This exam was marked as non-reportable because it will not be read by a  radiologist or a [facility] non-radiologist provider.     Lateral Lumbar Spine for Surgical Imaging  [XR LUMBAR SPINE PORT 1  VIEW]    Result Date: 2/23/2024  EXAM: XR CROSSTABLE LATERAL LUMBAR SPINE PORTABLE LOCATION: [facility] DATE: 2/23/2024 INDICATION: Intra operative COMPARISON: None.     IMPRESSION: Intraoperative crosstable lateral radiograph. Underpenetration of the lumbosacral junction somewhat comprises evaluation. Posterior approach surgical clamp projects over the posterior margin of the L3 posterior spinous process and is directed  at the L4 vertebral body and pedicles.

## 2024-03-05 NOTE — PLAN OF CARE
Problem: Adult Inpatient Plan of Care  Goal: Optimal Comfort and Wellbeing  Outcome: Progressing     Problem: Risk for Delirium  Goal: Optimal Coping  Outcome: Progressing   Goal Outcome Evaluation:    Patient vital signs are at baseline: Yes  Patient able to ambulate as they were prior to admission or with assist devices provided by therapies during their stay:  Yes  Patient MUST void prior to discharge:  Yes  Patient able to tolerate oral intake:  Yes  Pain has adequate pain control using Oral analgesics:  Yes  Does patient have an identified :  Yes  Has goal D/C date and time been discussed with patient:  Yes  Plans to discharge are for 3/8/24.      Dressing scant amount of dried drainage. Reinforced dressing with Tegaderm.

## 2024-03-05 NOTE — PROGRESS NOTES
[facility]    Medicine Progress Note - Hospitalist Service    Date of Admission:  3/3/2024    Assessment & Plan      68 year old female past medical history significant for recent L4-L5 bilateral fusion on 2/23/2024 discharged home on 2/26 , non-Hodgkin's lymphoma/small B lymphocyte lymphoma, lymphoproliferative disorder on tyrosine kinase inhibitor, typically sees [facility], obesity, depression and anxiety and restless leg syndrome presented to the emergency room with complaints of worsening low back pain last couple of days, with postoperative wound erythema, warmth, lumbar MRI concerning for fluid collection extending into the epidural space s/p I&D in ED pending cultures.     S/p bedside evacuation of post-operative fluid collection by Neurosurgery on day of admit. Infectious Disease consulted and recommended formal surgical washout. As such, anticoagulation held, last dose lovenox on 3/4 at 2010hrs. Pt will be npo at midnight. Ongoing antibiotics with zosyn and vancomycin. Enterobacter cloacae and staph epidermidis but no S&R yet. Lumbar MRI today and surgical washout tomorrow.    Addendum, appreciate NS arranging-- surgery is now Thursday.     Dispo: surgery now THURSDAY; barriers: pending cultures, the surgery and postop cares. Suspect will be a few days.    -----  Postoperative wound superficial cellulitis versus deep infection with fluid collection status post I&D in ED at bedside  Assessment: on admit treated for postop infection: S/p bilateral L4-L5 fusion 2/23/2024, had an elevated Temp of 100.1, leukocytosis 21, , ESR 29, Lactic acid normal. S/p I&D, 60 mL of bloody fluid sent to the lab; now with staph epi and enterobacter; ID following. Recd washout so plan for surgery on 3/6.  Plan:  - continue zosyn and vanco  - ID consulted, appreciate; defer modification to them  - follow the in-process cultures, follow speciation of organism(s) and subsequently their  "sensitivities and resistance (S&R) and narrow antibiotics as appropriately   -consult spine surgery, appreciate   - npo at midnight   - surgical washout on 3/6  -Pain control on Tylenol, oxycodone, Robaxin, gabapentin IV Dilaudid as needed, would defer modification to surgery     History of non-Hodgkin's lymphoma  Small lymphocytic lymphoma  -Sees [facility], per family members holding off on the tyrosine kinase inhibitor for 10 days before and after surgery  -thus, Holding for now  -Monitor CBC  -monitor clinically      Depression anxiety  -Continue home meds     Restless leg syndrome  -On Requip     GERD  On PPI     Obesity  Encourage weight loss   - lovenox for dvt ppdx per surgery, now held for surgical washout on 3/6.        Diet: Combination Diet Regular Diet Adult    DVT Prophylaxis: defer to surgery; lovenox now stopped for surgery on 3/6  Camejo Catheter: Not present  Lines: None     Cardiac Monitoring: None  Code Status: Full Code      Clinically Significant Risk Factors                         # Obesity: Estimated body mass index is 38.96 kg/m  as calculated from the following:    Height as of this encounter: 1.626 m (5' 4\").    Weight as of this encounter: 103 kg (227 lb)., PRESENT ON ADMISSION       # Financial/Environmental Concerns: none         Disposition Plan      Expected Discharge Date: 03/06/2024      Destination: home with family;home with help/services  Discharge Comments: may need surgical washout?            Alireza Mcneill MD  Hospitalist Service  [facility]  Securely message with [employer] (more info)  Text page via [email or web address] Paging/Directory   ______________________________________________________________________    Interval History   NAEO  Pt has no new symptoms  Reviewed Micro and ID recommendations with patient  She is hoping to leave soon  Discussed pending cultures sensitivities and resistance  Later neurosurgery also offered washout as rec'd by ID; pt " tentatively agreeing for 3/6; she'll let RN know final decision  Discussed with sw    Physical Exam   Vital Signs: Temp: 98  F (36.7  C) Temp src: Oral BP: 129/61 Pulse: 73   Resp: 18 SpO2: 95 % O2 Device: None (Room air)    Weight: 227 lbs 0 oz    General: alert, oriented, and in no acute distress  Pulmonary: clear to auscultation bilaterally, normal respiratory effort, on room air, no rales or wheezes or evidence of accessory muscle use  CVS: regular rate and rhythm; no blatant jugular venous distention; no extremity edema and extremities are warm to the touch  GI: soft, nontender, BS+, no rebound or guarding, no conspicuous organomegaly   Neuro: nonfocal, moves all extremities of own volition; strength 5/5 in four limbs  Psych: appropriate  Skin: stable, s/p I&D      Medical Decision Making       54 MINUTES SPENT BY ME on the date of service doing chart review, history, exam, documentation & further activities per the note.      Data   ------------------------- PAST 24 HR DATA REVIEWED -----------------------------------------------    I have personally reviewed the following data over the past 24 hrs:    N/A  \   N/A   / N/A     N/A N/A N/A /  N/A   N/A N/A 0.75 \       Imaging results reviewed over the past 24 hrs:   No results found for this or any previous visit (from the past 24 hour(s)).

## 2024-03-05 NOTE — PLAN OF CARE
Problem: Adult Inpatient Plan of Care  Goal: Optimal Comfort and Wellbeing  Outcome: Progressing     Problem: Pain Acute  Goal: Optimal Pain Control and Function  Outcome: Progressing  Intervention: Prevent or Manage Pain  Recent Flowsheet Documentation  Taken 3/4/2024 1630 by Derrick Gupta RN  Bowel Elimination Promotion:   adequate fluid intake promoted   ambulation promoted   privacy promoted  Medication Review/Management: medications reviewed  Intervention: Optimize Psychosocial Wellbeing  Recent Flowsheet Documentation  Taken 3/4/2024 1630 by Derrick Gupta RN  Supportive Measures: active listening utilized      Pt had pain level of 5-7 this shift. Medications given (see MAR). Pt slept between cares this shift as she did not sleep well last evening.         Derrick Gupta RN on 3/4/2024 at 10:25 PM

## 2024-03-06 ENCOUNTER — TELEPHONE (OUTPATIENT)
Dept: INFECTIOUS DISEASES | Facility: CLINIC | Age: 69
End: 2024-03-06
Payer: COMMERCIAL

## 2024-03-06 ENCOUNTER — ANESTHESIA EVENT (OUTPATIENT)
Dept: SURGERY | Facility: CLINIC | Age: 69
DRG: 857 | End: 2024-03-06
Payer: COMMERCIAL

## 2024-03-06 ENCOUNTER — HOME INFUSION (PRE-WILLOW HOME INFUSION) (OUTPATIENT)
Dept: PHARMACY | Facility: CLINIC | Age: 69
End: 2024-03-06
Payer: COMMERCIAL

## 2024-03-06 LAB
ANION GAP SERPL CALCULATED.3IONS-SCNC: 7 MMOL/L (ref 7–15)
APTT PPP: 30 SECONDS (ref 22–38)
BUN SERPL-MCNC: 8.6 MG/DL (ref 8–23)
CALCIUM SERPL-MCNC: 9.2 MG/DL (ref 8.8–10.2)
CHLORIDE SERPL-SCNC: 103 MMOL/L (ref 98–107)
CLOSURE TME COLL+EPINEP BLD: 92 SECONDS
CREAT SERPL-MCNC: 0.7 MG/DL (ref 0.51–0.95)
DEPRECATED HCO3 PLAS-SCNC: 28 MMOL/L (ref 22–29)
EGFRCR SERPLBLD CKD-EPI 2021: >90 ML/MIN/1.73M2
ERYTHROCYTE [DISTWIDTH] IN BLOOD BY AUTOMATED COUNT: 13 % (ref 10–15)
GLUCOSE SERPL-MCNC: 125 MG/DL (ref 70–99)
HCT VFR BLD AUTO: 29.7 % (ref 35–47)
HGB BLD-MCNC: 9.4 G/DL (ref 11.7–15.7)
INR PPP: 0.99 (ref 0.85–1.15)
MCH RBC QN AUTO: 26.9 PG (ref 26.5–33)
MCHC RBC AUTO-ENTMCNC: 31.6 G/DL (ref 31.5–36.5)
MCV RBC AUTO: 85 FL (ref 78–100)
PLATELET # BLD AUTO: 212 10E3/UL (ref 150–450)
PLATELET # BLD AUTO: 265 10E3/UL (ref 150–450)
POTASSIUM SERPL-SCNC: 3.9 MMOL/L (ref 3.4–5.3)
RBC # BLD AUTO: 3.49 10E6/UL (ref 3.8–5.2)
SODIUM SERPL-SCNC: 138 MMOL/L (ref 135–145)
WBC # BLD AUTO: 9.2 10E3/UL (ref 4–11)

## 2024-03-06 PROCEDURE — 85027 COMPLETE CBC AUTOMATED: CPT | Performed by: STUDENT IN AN ORGANIZED HEALTH CARE EDUCATION/TRAINING PROGRAM

## 2024-03-06 PROCEDURE — 250N000013 HC RX MED GY IP 250 OP 250 PS 637: Performed by: INTERNAL MEDICINE

## 2024-03-06 PROCEDURE — 36415 COLL VENOUS BLD VENIPUNCTURE: CPT | Performed by: STUDENT IN AN ORGANIZED HEALTH CARE EDUCATION/TRAINING PROGRAM

## 2024-03-06 PROCEDURE — 99232 SBSQ HOSP IP/OBS MODERATE 35: CPT | Performed by: STUDENT IN AN ORGANIZED HEALTH CARE EDUCATION/TRAINING PROGRAM

## 2024-03-06 PROCEDURE — 120N000001 HC R&B MED SURG/OB

## 2024-03-06 PROCEDURE — 99232 SBSQ HOSP IP/OBS MODERATE 35: CPT | Performed by: INTERNAL MEDICINE

## 2024-03-06 PROCEDURE — 82310 ASSAY OF CALCIUM: CPT | Performed by: STUDENT IN AN ORGANIZED HEALTH CARE EDUCATION/TRAINING PROGRAM

## 2024-03-06 PROCEDURE — 250N000011 HC RX IP 250 OP 636: Performed by: INTERNAL MEDICINE

## 2024-03-06 RX ORDER — FENTANYL CITRATE 50 UG/ML
25 INJECTION, SOLUTION INTRAMUSCULAR; INTRAVENOUS
Status: CANCELLED | OUTPATIENT
Start: 2024-03-06

## 2024-03-06 RX ORDER — ONDANSETRON 2 MG/ML
4 INJECTION INTRAMUSCULAR; INTRAVENOUS EVERY 30 MIN PRN
Status: CANCELLED | OUTPATIENT
Start: 2024-03-06

## 2024-03-06 RX ORDER — ONDANSETRON 4 MG/1
4 TABLET, ORALLY DISINTEGRATING ORAL EVERY 30 MIN PRN
Status: CANCELLED | OUTPATIENT
Start: 2024-03-06

## 2024-03-06 RX ORDER — OXYCODONE HYDROCHLORIDE 5 MG/1
10 TABLET ORAL EVERY 4 HOURS PRN
Status: CANCELLED | OUTPATIENT
Start: 2024-03-06

## 2024-03-06 RX ORDER — NALOXONE HYDROCHLORIDE 0.4 MG/ML
0.1 INJECTION, SOLUTION INTRAMUSCULAR; INTRAVENOUS; SUBCUTANEOUS
Status: CANCELLED | OUTPATIENT
Start: 2024-03-06

## 2024-03-06 RX ORDER — OXYCODONE HYDROCHLORIDE 5 MG/1
5 TABLET ORAL EVERY 4 HOURS PRN
Status: CANCELLED | OUTPATIENT
Start: 2024-03-06

## 2024-03-06 RX ADMIN — FLUOXETINE 40 MG: 20 CAPSULE ORAL at 07:47

## 2024-03-06 RX ADMIN — METHOCARBAMOL TABLETS 750 MG: 750 TABLET, COATED ORAL at 22:29

## 2024-03-06 RX ADMIN — PANTOPRAZOLE SODIUM 40 MG: 40 TABLET, DELAYED RELEASE ORAL at 07:48

## 2024-03-06 RX ADMIN — ROPINIROLE 1 MG: 0.25 TABLET, FILM COATED ORAL at 16:52

## 2024-03-06 RX ADMIN — ACETAMINOPHEN 1000 MG: 500 TABLET ORAL at 07:47

## 2024-03-06 RX ADMIN — OXYCODONE 5 MG: 5 TABLET ORAL at 05:14

## 2024-03-06 RX ADMIN — ROPINIROLE 1 MG: 0.25 TABLET, FILM COATED ORAL at 07:47

## 2024-03-06 RX ADMIN — ONDANSETRON 4 MG: 2 INJECTION INTRAMUSCULAR; INTRAVENOUS at 01:01

## 2024-03-06 RX ADMIN — PIPERACILLIN AND TAZOBACTAM 3.38 G: 3; .375 INJECTION, POWDER, FOR SOLUTION INTRAVENOUS at 18:45

## 2024-03-06 RX ADMIN — HYDROMORPHONE HYDROCHLORIDE 0.4 MG: 0.2 INJECTION, SOLUTION INTRAMUSCULAR; INTRAVENOUS; SUBCUTANEOUS at 07:51

## 2024-03-06 RX ADMIN — METHOCARBAMOL TABLETS 750 MG: 750 TABLET, COATED ORAL at 10:43

## 2024-03-06 RX ADMIN — ACETAMINOPHEN 1000 MG: 500 TABLET ORAL at 18:45

## 2024-03-06 RX ADMIN — GABAPENTIN 100 MG: 100 CAPSULE ORAL at 07:47

## 2024-03-06 RX ADMIN — GABAPENTIN 100 MG: 100 CAPSULE ORAL at 14:11

## 2024-03-06 RX ADMIN — ROPINIROLE HYDROCHLORIDE 2 MG: 1 TABLET, FILM COATED ORAL at 22:29

## 2024-03-06 RX ADMIN — PIPERACILLIN AND TAZOBACTAM 3.38 G: 3; .375 INJECTION, POWDER, FOR SOLUTION INTRAVENOUS at 01:32

## 2024-03-06 RX ADMIN — PIPERACILLIN AND TAZOBACTAM 3.38 G: 3; .375 INJECTION, POWDER, FOR SOLUTION INTRAVENOUS at 10:43

## 2024-03-06 RX ADMIN — METHOCARBAMOL TABLETS 750 MG: 750 TABLET, COATED ORAL at 16:52

## 2024-03-06 RX ADMIN — METHOCARBAMOL TABLETS 750 MG: 750 TABLET, COATED ORAL at 03:06

## 2024-03-06 RX ADMIN — GABAPENTIN 100 MG: 100 CAPSULE ORAL at 20:56

## 2024-03-06 RX ADMIN — ACETAMINOPHEN 1000 MG: 500 TABLET ORAL at 14:11

## 2024-03-06 ASSESSMENT — ACTIVITIES OF DAILY LIVING (ADL)
ADLS_ACUITY_SCORE: 48

## 2024-03-06 NOTE — PROGRESS NOTES
"INFECTIOUS DISEASE FOLLOW UP NOTE      ASSESSMENT:  Post-operative wound infection. Hardware in place. Fluid collection on MRI subcutaneous, extends to deep structures. Aspirate 3/3 now growing enterobacter. Anaerobe, and staph epi. Based on MRI findings, suspect deep infection. In general, spine hardware can be salvaged with extended antibiotic course following source control. There is risk of hardware failure however, so source control at outset of infection treatment is key. Growth of staph epi of uncertain significance, could be true pathogen or skin contaminant. Seek confirmation of this on deep operative cultures.   S/p L4--L5 transforaminal lumbar interbody fusion on 24.   H/o NHL, maintained on acalabrutinib    PLAN:  Continue pip/tazo  Off vancomycin, will restart post-op pending operative cultures  Discussed with Dr. Ambrose today -- plan washout tomorrow, expect to close fascia, then may have irrigation drain in during hospital stay, with potential to close wound next week.     Tereso Tucker MD  [facility]  [phone] [facility] paging    ______________________________________________________________________    SUBJECTIVE / INTERVAL HISTORY: back improved. Pain in legs. Strength doing ok, no numbness or tingling. Reviewed result sna dplan.     Discussed with Dr. Ambrose today.     ROS: All other systems negative except as listed above.        OBJECTIVE:  /55 (BP Location: Right arm, Cuff Size: Adult Large)   Pulse 74   Temp 98.6  F (37  C) (Oral)   Resp 18   Ht 1.626 m (5' 4\")   Wt 103 kg (227 lb)   SpO2 92%   BMI 38.96 kg/m         Vital Signs  Temp: 98.4  F (36.9  C)  Temp src: Oral  Resp: 18  Pulse: 68  Pulse Rate Source: Monitor  BP: 136/63  BP Location: Left arm    Temp (24hrs), Av.4  F (36.9  C), Min:98  F (36.7  C), Max:98.7  F (37.1  C)      GEN: No acute distress.    RESPIRATORY:  Normal breathing pattern.   CARDIOVASCULAR:  Regular rate " and rhythm. Normal S1 and S2. No murmur  ABDOMEN:  deferred  EXTREMITIES: No edema.  SKIN/HAIR/NAILS:  No rashes. Wound with betadine soaked dressing.   Strength in legs pretty good  IV: peripheral        Antibiotics:  pip/tazo 3/3-  Vancomycin 3/3-4    Pertinent labs:    Recent Labs   Lab 03/06/24  0626 03/05/24  2353 03/04/24  0602 03/03/24  0916   WBC 9.2  --  10.7 21.1*   HGB 9.4*  --  9.6* 11.4*   HCT 29.7*  --  30.1* 34.3*    212 195 297        Recent Labs   Lab 03/06/24  0626 03/04/24  0602 03/03/24  0916    135 136   CO2 28 22 22   BUN 8.6 10.1 13.3         Lab Results   Component Value Date    ALT 19 03/03/2024    AST 25 03/03/2024    ALKPHOS 101 03/03/2024         MICROBIOLOGY DATA:  3/3 blood negative  3/3 aspirate enterobacter, staph epi, peptoniphilus    RADIOLOGY:  MR Lumbar Spine w/o Contrast    Result Date: 3/3/2024  EXAM: MR LUMBAR SPINE W/O CONTRAST LOCATION: [facility] DATE: 3/3/2024 INDICATION: Lumbar fusion 02/23/2024. Signs and symptoms of infection. Lumbar pain. Drainage at surgical site. COMPARISON: Lumbar radiographs 03/03/2024. Lumbar MRI 11 08/01/2023 TECHNIQUE: Routine Lumbar Spine MRI without IV contrast. FINDINGS: Redemonstration of L4-L5 anterior posterior fusion. Centrally positioned interbody graft and bilateral transpedicular screws with intertransverse rods. 4 mm anterolisthesis of L4 on L5 compared with 5 mm on the preoperative exam. 2.5 mm degenerative anterolisthesis at L5-S1 unchanged. 2.5 mm degenerative retrolisthesis at L2-L3 unchanged. L4-L5 laminotomy. Nomenclature is based on 5 lumbar type vertebral bodies. Normal vertebral body heights. Negative for fracture. Benign osseous hemangioma at the L4 vertebral body. No suspicious marrow signal change. Type II Modic endplate changes at L2-L3 and L3-L4. Normal distal spinal cord and cauda equina with conus medullaris at L2. Fluid collection in the midline dorsal soft tissues extends from  L1-L2 inferiorly to L5-S1 measuring 11 cm cephalocaudad by 6 cm AP by 6 cm mediolateral. A component  of the collection extends into the surgical tract, through the hemilaminotomy and to the dorsal surgical space at L4-L5. This is difficult to evaluate due to susceptibility artifact but the canalicular portion measures approximately 22 x 13 mm transverse extending through the posterior elements. Compression of the right dorsal thecal sac and moderate central canal stenosis is present as seen on axial image 9 of series 6.  Unremarkable visualized bony pelvis. T11-T12: Disc desiccation and ventral osteophyte. Patent central canal and foramen. Mild annular bulge. T12-L1: Disc desiccation , height loss and chronic Schmorl's nodes. Patent central canal and foramen. L1-L2: Disc desiccation. Patent central canal and foramina. There may be a tiny amount of fluid in the dorsal epidural space related to previous surgery. L2-L3: Disc osteophyte complex. Small amount of dorsal canal epidural space fluid may be related to recent surgery. Mild central canal stenosis. Moderate to severe left and mild-to-moderate right foraminal stenosis. L3-L4: Mild disc bulge. Patent central canal. Facet DJD with mild-to-moderate right and mild left foraminal stenosis. L4-L5: Fusion and hemilaminotomy. Moderate central canal stenosis fluid in the dorsal and right dorsal canal appears contiguous with the larger subcutaneous fluid collection and extends to the surgical approach. Signal characteristics are nonspecific but  do not appear particularly complex and this could represent seroma or resolving hematoma. No surrounding inflammatory changes. No definite evidence for discitis or osteomyelitis. Contrast-enhanced imaging may be useful for further evaluation. Mild-to-moderate right and minimal left foraminal stenosis. L5-S1: Disc desiccation and mild disc bulge. Facet DJD. Moderate right and mild left foraminal stenosis. Patent central canal.      IMPRESSION: 1.  L4-L5 fusion. No hardware complications. Good positioning of transpedicular screws and interbody graft. 2.  Dorsal subcutaneous fluid collection as above which extends through the surgical approach into the dorsal canal epidural space and most prominent at L4-L5. Slight extension cephalad to L1-L2. Consider further evaluation with contrast-enhanced imaging. Fairly homogeneous fluid collection without significant surrounding inflammatory signal changes and may represent seroma or resolving hematoma. Infectious/inflammatory fluid accumulation is possible, however. 3.  Lumbar spondylosis 4.  No fractures. 5.  Findings discussed with Flor James of the neurosurgery service on 3/3/2024 1255 PM CST    XR Lumbar Spine 2/3 Views    Result Date: 3/3/2024  EXAM: XR LUMBAR SPINE 2/3 VIEWS LOCATION: [facility] DATE: 03/03/2024 INDICATION: Lumbar fusion. COMPARISON: 02/26/2024.     IMPRESSION: Five lumbar vertebral bodies. Minimal convex right curvature, unchanged. Dorsal midline skin staples persist. Postoperative changes of interbody and posterior spinal fixation across L4-L5. Grade 1 anterolisthesis of L4 on L5 and L5 on S1, unchanged. Intact hardware. Severe interspace and endplate degeneration L2-L3. Normal vertebral body heights.     XR Lumbar Spine 2 - 3 VWS Portable    Result Date: 2/26/2024  EXAM: XR LUMBAR SPINE PORT 2/3 VIEWS LOCATION: [facility] DATE/TIME: 2/26/2024 4:50 PM CST INDICATION: s p lumbar fusion, upright XR. COMPARISON: None. TECHNIQUE: Radiographs of lumbar spine in routine projections. FINDINGS: Staus post  L4-L5 posterior interbody fusion. No evidence of hardware failure. Alignment similar to prior without change in minor dextrocurvature, grade 1 anterolisthesis at L4-L5 and L5-S1. Normal height of vertebral bodies. Advanced L2-L3 interbody degeneration similar to prior. Mild multi level facet arthropathy. Unremarkable  visualized bony pelvis. Cholecystectomy clips.    XR SURGERY CAROLINE FLUORO GREATER THAN 5 MIN    Result Date: 2/23/2024  This exam was marked as non-reportable because it will not be read by a radiologist or a [place] non-radiologist provider.     XR Surgery CAROLINE  Fluoro G/T 5 Min    Result Date: 2/23/2024  This exam was marked as non-reportable because it will not be read by a radiologist or a [place] non-radiologist provider.     Lateral Lumbar Spine for Surgical Imaging  [XR LUMBAR SPINE PORT 1  VIEW]    Result Date: 2/23/2024  EXAM: XR CROSSTABLE LATERAL LUMBAR SPINE PORTABLE LOCATION: [facility] DATE: 2/23/2024 INDICATION: Intra operative COMPARISON: None.     IMPRESSION: Intraoperative crosstable lateral radiograph. Underpenetration of the lumbosacral junction somewhat comprises evaluation. Posterior approach surgical clamp projects over the posterior margin of the L3 posterior spinous process and is directed  at the L4 vertebral body and pedicles.

## 2024-03-06 NOTE — PLAN OF CARE
"Goal Outcome Evaluation:       No acute changes this shift. Pt remains A/O x 4, able to make needs known. Vitals WNL on room air. She continues to complain of low back pain, rating 6-7 in severity at worst. PRN oxycodone given x 2 with good relief reported. Also on scheduled tylenol and robaxin. Dressing changed on back today per WOC orders because dressing was peeling off- CDI at this time. Scant drainage on old dressing. Pt is up ambulating with standby assistance and GB/walker. Voiding, still no BM since admission.     Pt voiced hesitancy today about plans for surgery on Thursday. She seems to be frustrated. Neurosurgery team notified and pt was able to speak directly on phone with neurosurgery PA and have questions answered.     Pt also refused MRI of spine ordered by neurosurgery today. Approached pt with MRI safety checklist and discussed plans for MRI today- stated \"I am not going to do an MRI today. It was uncomfortable last time and I don't feel like doing it.\" Writer re-enforced importance of the testing and rationale for the neurosurgery team ordering this test. Pt again voiced strong feelings against doing MRI today. Writer re-approached pt later in shift and pt became upset when subject of MRI was brought up. Neurosurgery team notified of pt's refusal today. MRI checklist still not filled out, passed along to oncoming RN.     Overall Patient Progress: no change  "

## 2024-03-06 NOTE — TELEPHONE ENCOUNTER
[facility] Call Center    Phone Message    May a detailed message be left on voicemail: yes     Reason for Call: Other: Provider to provider request   Dr. Ambrose is requesting to be paged at [phone]; so they may discuss the mutual pt with Dr. Tucker. Please follow-up ASAP. Thank you.     Action Taken: Other: ID    Travel Screening: Not Applicable

## 2024-03-06 NOTE — PROGRESS NOTES
[facility]    Neurosurgery  Daily Note    Assessment & Plan   POD 10 L4-L5 TLIF with Dr. Hudson 2/24/23. Presented to ED with worsening low back pain. Lumbar MRI demonstrated a seroma which was drained by Dr. Hudson 3/3/24 at bedside. ID evaluated patient and recommended broad spectrum ABX until cultures resulted and surgical wound washout.    AM Rounds: No events overnight.    Patient is scheduled for lumbar wound washout tomorrow 3/7/24 0820 with Dr. Ambrose. Had extensive discussion about Wound washout tomorrow. Exam stable. Patient would like her son updated about surgery tomorrow.     Addendum: Patient's son updated about surgery tomorrow. Attempted to have phone call between Dr. Ambrose and patient, patient did not answer.  Dr. Ambrose will evaluate patient and answer questions in preop tomorrow prior to surgery.    Labs:  PLT count 212  INR 0.99  PTT 30  PLT function Col/Epi 92  Aerobic culture final results: Enterobacter cloacae complex, Staphylococcus epidermidis  Gram stain: No organisms visualized, 4+ WBC  Anerobic culture and Blood cultures still pending, no growth so far    Plan:  - Lumbar wound washout TOMORROW 3/7/24 0820 with Dr. Ambrose.  - Case request and pre-op orders placed.   - HOLD all Anticoagulation   - NPO MIDNIGHT (0001)  - Medically optimized for surgery tomorrow  - ID following  - Continue ABX, defer to ID  - Wound cultures pending  - PRN pain medication  - Okay to ambulate, PT/OT    - HOLD Pre-op sedation until Dr. Abmrose sees patient in preop and consent is signed.     Addendum: Discussed with ID who recommends Tobramycin and Vancomycin for irrigation tomorrow in OR. Discussed with Pharmacy for 2L of antibiotic mixture and 2L of betadine mixture.     Plans discussed with Dr. Ambrose who was in agreement with plans    Chelsea Hoffman PA-C  [facility] Neurosurgery  [address]  [address] [place] [id number]    Physical Exam   Temp: 98.8  F  (37.1  C) Temp src: Oral BP: 125/69 Pulse: 71   Resp: 18 SpO2: 94 % O2 Device: None (Room air)    Vitals:    03/03/24 0802   Weight: 227 lb (103 kg)     Vital Signs with Ranges  Temp:  [98  F (36.7  C)-99.9  F (37.7  C)] 98.8  F (37.1  C)  Pulse:  [68-73] 71  Resp:  [18] 18  BP: (118-136)/(55-69) 125/69  SpO2:  [94 %-95 %] 94 %  No intake/output data recorded.    Awake, alert, and appropriate. NAD  Moves all extremities equally   Strength 5/5 BLE   Sensation intact to light touch BLE   Clonus negative bilaterally.  Gait Deferred  Incision: Staples intact. Top and bottom of incision closed. Surrounding erythema around incision edges. No expressible discharge.       Medications       acetaminophen  1,000 mg Oral TID    FLUoxetine  40 mg Oral Daily    gabapentin  100 mg Oral TID    guaiFENesin  600 mg Oral BID    methocarbamol  750 mg Oral Q6H    pantoprazole  40 mg Oral Daily    piperacillin-tazobactam  3.375 g Intravenous Q8H    rOPINIRole  1 mg Oral BID    rOPINIRole  2 mg Oral At Bedtime    sodium chloride (PF)  3 mL Intracatheter Q8H

## 2024-03-06 NOTE — PROGRESS NOTES
[facility]    Referral received from Solo Carballo RN CM, for IV antibiotics.    Benefits verified. Pt does not have coverage for IV antibiotics under her [facility] Medicare Advantage plan. The patient would be self-pay. The drug would be billed to the Part D plan and patient would be responsible for the drug co-pay and the supplies.  [facility] is unable to get a drug pricing estimate at this time due to the system being down, so the pricing is based off [facility]'s discounted pricing costs.  For Zosyn 3.375 g q 8 hours, the patient's self pay estimate is a $62.85/day for drug and supplies.    For nursing visits, Pt would have coverage as long as she is homebound.  If she is not homebound, she would have no coverage for home nurse visits.  If she is homebound and agrees to self-pay for the IV antibiotics, [facility] would need to secure a home care agency.    Should patient require IV antibiotics at discharge, writer will speak with pt to review benefits, home infusion and to offer choice of agency/home infusion provider.    Thank you for the referral.    Skylar Otero RN, BSN  [facility] Liaison  [phone] (Mon through Fri, 8:00 am-5:00 pm)  [phone] (Office) en

## 2024-03-06 NOTE — PLAN OF CARE
Problem: Adult Inpatient Plan of Care  Goal: Absence of Hospital-Acquired Illness or Injury  Intervention: Prevent Infection    Problem: Adult Inpatient Plan of Care  Goal: Optimal Comfort and Wellbeing  Intervention: Monitor Pain and Promote Comfort    Patient vital signs are at baseline: Yes  Patient able to ambulate as they were prior to admission or with assist devices provided by therapies during their stay:  Yes  Patient MUST void prior to discharge:  Yes  Patient able to tolerate oral intake:  Yes  Pain has adequate pain control using Oral analgesics:  Yes  Does patient have an identified :  Yes  Has goal D/C date and time been discussed with patient:  pending for patient to agree to an MRI, hopeful for I & D on 3/7.        Patient a/o x4. Pt. IV x2 fluids SL in between IV ABX. Patient tolerated IV ABX and oral medications. Patient was able to ambulate with an assist of x1 with a walker and gait belt and able to urinate adequately. Had x1 BM during shift.  Dressing became saturated, changed dressing (see orders).  Has baseline n/t in all extremities. Pain was controlled with PRN 5 mg Oxycodone, and scheduled medications, please see MAR. Had an episode of nausea, administered PRN IV Zofran, effective. Patient moves independently in bed.  Bed alarm on for safety precautions.

## 2024-03-06 NOTE — PROVIDER NOTIFICATION
Notified MD at 9:33 PM regarding  Temperature and Senna .      Spoke with: Dr. Norberto Trujillo     Orders were obtained.    Comments: Temperature was 99.9, but provider noted new new orders at this time.  As for Senna Rx, provider placed new orders as patient requested to start taking again. Please see MAR.

## 2024-03-06 NOTE — PLAN OF CARE
Problem: Infection  Goal: Absence of Infection Signs and Symptoms  Outcome: Progressing     A/O x 4, VSS on RA. Lumbar dressing changed. L PIV SL and R PIV SL. Tolerating regular diet. NPO at midnight for lumbar washout tomorrow with probable wound vac placement, 3/7.

## 2024-03-06 NOTE — PROGRESS NOTES
[facility]    Medicine Progress Note - Hospitalist Service    Date of Admission:  3/3/2024    Assessment & Plan      68 year old female past medical history significant for recent L4-L5 bilateral fusion on 2/23/2024 discharged home on 2/26 , non-Hodgkin's lymphoma/small B lymphocyte lymphoma, lymphoproliferative disorder on tyrosine kinase inhibitor, typically sees [facility], obesity, depression and anxiety and restless leg syndrome presented to the emergency room with complaints of worsening low back pain last couple of days, with postoperative wound erythema, warmth, lumbar MRI concerning for fluid collection extending into the epidural space s/p I&D in ED pending cultures.     S/p bedside evacuation of post-operative fluid collection by Neurosurgery on day of admit. Infectious Disease consulted and recommended formal surgical washout. As such, anticoagulation held, last dose lovenox on 3/4 at 2010hrs. Pt will be npo at midnight. Ongoing antibiotics with zosyn and vancomycin. Enterobacter cloacae and staph epidermidis, vancomycin paused for now. Lumbar MRI and surgical washout tomorrow.    Dispo: surgery tomorrow; barriers: pending cultures, the surgery and postop cares. Suspect will be a few days.    -----  Postoperative wound superficial cellulitis versus deep infection with fluid collection status post I&D in ED at bedside  Assessment: on admit treated for postop infection: S/p bilateral L4-L5 fusion 2/23/2024, had an elevated Temp of 100.1, leukocytosis 21, , ESR 29, Lactic acid normal. S/p I&D, 60 mL of bloody fluid sent to the lab; now with staph epi and enterobacter; ID following. Recd washout so plan for surgery on 3/6.  Plan:  - continue zosyn   - ID consulted, appreciate; defer modification to them  - follow the in-process cultures, follow speciation of organism(s) and subsequently their sensitivities and resistance (S&R) and narrow antibiotics as appropriately  "  -consult spine surgery, appreciate   - npo at midnight   - surgical washout on 3/7  -Pain control on Tylenol, oxycodone, Robaxin, gabapentin IV Dilaudid as needed, would defer modification to surgery     History of non-Hodgkin's lymphoma  Small lymphocytic lymphoma  -Sees [facility], per family members holding off on the tyrosine kinase inhibitor for 10 days before and after surgery  -thus, Holding for now  -Monitor CBC  -monitor clinically      Depression anxiety  -Continue home meds     Restless leg syndrome  -On Requip     GERD  On PPI     Obesity  Encourage weight loss   - lovenox for dvt ppdx per surgery, now held for surgical washout on 3/7.        Diet: Combination Diet Regular Diet Adult    DVT Prophylaxis: defer to surgery; lovenox now stopped for surgery on 3/7  Camejo Catheter: Not present  Lines: None     Cardiac Monitoring: None  Code Status: Full Code      Clinically Significant Risk Factors                         # Obesity: Estimated body mass index is 38.96 kg/m  as calculated from the following:    Height as of this encounter: 1.626 m (5' 4\").    Weight as of this encounter: 103 kg (227 lb)., PRESENT ON ADMISSION       # Financial/Environmental Concerns: none         Disposition Plan      Expected Discharge Date: 03/08/2024      Destination: home with family;home with help/services  Discharge Comments: may need surgical washout?            Alireza Mcneill MD  Hospitalist Service  [facility]  Securely message with [place] (more info)  Text page via [facility] Paging/Directory   ______________________________________________________________________    Interval History   NAEO  Pt has no new symptoms  Discussed abx and surgery  Pt stated she was not yet 100% decided on surgery and looks forward to speaking w/ surgery  She did not sleep too well but still open to having melatonin  Discussed stopping mucinex  Discussed with RN and sw    Physical Exam   Vital Signs: Temp: 98.6 "  F (37  C) Temp src: Oral BP: 114/55 Pulse: 74   Resp: 18 SpO2: 92 % O2 Device: None (Room air)    Weight: 227 lbs 0 oz    General: alert, oriented, and in no acute distress  Pulmonary: clear to auscultation bilaterally, normal respiratory effort, on room air, no rales or wheezes or evidence of accessory muscle use  CVS: regular rate and rhythm; no blatant jugular venous distention; no extremity edema and extremities are warm to the touch  GI: soft, nontender, BS+, no rebound or guarding, no conspicuous organomegaly   Neuro: nonfocal, moves all extremities of own volition; strength 5/5 in four limbs  Psych: appropriate  Skin: stable, s/p I&D      Medical Decision Making       49 MINUTES SPENT BY ME on the date of service doing chart review, history, exam, documentation & further activities per the note.      Data   ------------------------- PAST 24 HR DATA REVIEWED -----------------------------------------------    I have personally reviewed the following data over the past 24 hrs:    9.2  \   9.4 (L)   / 265     138 103 8.6 /  125 (H)   3.9 28 0.70 \     INR:  0.99 PTT:  30   D-dimer:  N/A Fibrinogen:  N/A       Imaging results reviewed over the past 24 hrs:   No results found for this or any previous visit (from the past 24 hour(s)).

## 2024-03-06 NOTE — PROGRESS NOTES
"Care Management Follow Up    Length of Stay (days): 3    Expected Discharge Date: 03/08/2024     Concerns to be Addressed: discharge planning     Patient plan of care discussed at interdisciplinary rounds: Yes    Anticipated Discharge Disposition: Home, Home Care     Anticipated Discharge Services: None  Anticipated Discharge DME: Other (see comment) (Pending clinical progress)    Patient/family educated on Medicare website which has current facility and service quality ratings: yes  Education Provided on the Discharge Plan: Yes  Patient/Family in Agreement with the Plan: yes    Referrals Placed by CM/SW: Home Infusion  Private pay costs discussed: Not applicable    Additional Information:    Chart reviewed.    Lumbar washout scheduled for 3/7.    ID following, final plan pending op-cultures.    [facility] - is checking benefits for home IV abx.  3:27 PM - Pt does not have coverage for home IV abx.  See Infusion Services Note 3/6.    Social hx per CM consult 3/3: \"Reports she lives in a house with daughter Hiwot. States at baseline patient is independent with I/ADLs. Now uses a standard walker; no home care services; doesn't drive due to surgery but does drive when she is well. Daughter has been helping patient with I/ADLs after the surgery. Given [employer]. Would consider home care services if recommended. \"    Should pt require home IV abx, CM to further discuss discharge plans with pt at a later time.    CM will continue to follow.    Solo Carballo RN    "

## 2024-03-06 NOTE — PROGRESS NOTES
SPIRITUAL HEALTH SERVICES Progress Note    Saw pt Tessa Edwards and offered Zoroastrian sacrament of anointing for the healing of the sick.     Fr. Landen Dallas

## 2024-03-06 NOTE — PROGRESS NOTES
Therapy: IV ABX  Insurance: [employer]     Pt does not have coverage for IV ABX through their [employer] Medicare Adv plan. Pt must agree to self-pay for coverage. We would typically bill part D for drug coverage and pt would be responsible for remaining co-pay and supply costs. We cannot bill for a drug pricing estimate at this time due to our systems having issues so pricing is based off [facility]'s pricing costs. For the Zosyn 3.375g Q8H the pt's SPQ estimate came out to about $62.85 per day for drug and supplies.    [facility] cannot bill for nursing if pt is homebound, if not we can provide nursing if pt agrees to self pay an additional $90 per visit.     In reference to referral from Sadie on pt admitted 03/03/24 to check for IV ABX coverage.    Please contact Intake with any questions, [phone] or In Basket pool, [facility] ([phone]).

## 2024-03-07 ENCOUNTER — ANESTHESIA (OUTPATIENT)
Dept: SURGERY | Facility: CLINIC | Age: 69
DRG: 857 | End: 2024-03-07
Payer: COMMERCIAL

## 2024-03-07 PROBLEM — Z98.1 S/P LUMBAR FUSION: Status: ACTIVE | Noted: 2024-03-07

## 2024-03-07 LAB
BACTERIA ASPIRATE CULT: ABNORMAL
BACTERIA ASPIRATE CULT: ABNORMAL
CREAT SERPL-MCNC: 0.76 MG/DL (ref 0.51–0.95)
EGFRCR SERPLBLD CKD-EPI 2021: 84 ML/MIN/1.73M2
GRAM STAIN RESULT: ABNORMAL
GRAM STAIN RESULT: ABNORMAL
GRAM STAIN RESULT: NORMAL
HOLD SPECIMEN: NORMAL
KOH PREPARATION: NORMAL
KOH PREPARATION: NORMAL

## 2024-03-07 PROCEDURE — 99232 SBSQ HOSP IP/OBS MODERATE 35: CPT | Performed by: STUDENT IN AN ORGANIZED HEALTH CARE EDUCATION/TRAINING PROGRAM

## 2024-03-07 PROCEDURE — 87077 CULTURE AEROBIC IDENTIFY: CPT | Performed by: SURGERY

## 2024-03-07 PROCEDURE — 250N000025 HC SEVOFLURANE, PER MIN: Performed by: SURGERY

## 2024-03-07 PROCEDURE — 360N000075 HC SURGERY LEVEL 2, PER MIN: Performed by: SURGERY

## 2024-03-07 PROCEDURE — 120N000001 HC R&B MED SURG/OB

## 2024-03-07 PROCEDURE — 82565 ASSAY OF CREATININE: CPT | Performed by: INTERNAL MEDICINE

## 2024-03-07 PROCEDURE — 00CU0ZZ EXTIRPATION OF MATTER FROM SPINAL CANAL, OPEN APPROACH: ICD-10-PCS | Performed by: SURGERY

## 2024-03-07 PROCEDURE — 250N000011 HC RX IP 250 OP 636: Performed by: INTERNAL MEDICINE

## 2024-03-07 PROCEDURE — 258N000003 HC RX IP 258 OP 636

## 2024-03-07 PROCEDURE — 999N000141 HC STATISTIC PRE-PROCEDURE NURSING ASSESSMENT: Performed by: SURGERY

## 2024-03-07 PROCEDURE — 258N000003 HC RX IP 258 OP 636: Performed by: SURGERY

## 2024-03-07 PROCEDURE — 250N000013 HC RX MED GY IP 250 OP 250 PS 637: Performed by: STUDENT IN AN ORGANIZED HEALTH CARE EDUCATION/TRAINING PROGRAM

## 2024-03-07 PROCEDURE — 87556 M.TUBERCULO DNA AMP PROBE: CPT | Performed by: SURGERY

## 2024-03-07 PROCEDURE — 87075 CULTR BACTERIA EXCEPT BLOOD: CPT | Performed by: SURGERY

## 2024-03-07 PROCEDURE — 370N000017 HC ANESTHESIA TECHNICAL FEE, PER MIN: Performed by: SURGERY

## 2024-03-07 PROCEDURE — 250N000009 HC RX 250

## 2024-03-07 PROCEDURE — 710N000010 HC RECOVERY PHASE 1, LEVEL 2, PER MIN: Performed by: SURGERY

## 2024-03-07 PROCEDURE — 272N000001 HC OR GENERAL SUPPLY STERILE: Performed by: SURGERY

## 2024-03-07 PROCEDURE — 250N000009 HC RX 250: Performed by: ANESTHESIOLOGY

## 2024-03-07 PROCEDURE — 258N000003 HC RX IP 258 OP 636: Performed by: ANESTHESIOLOGY

## 2024-03-07 PROCEDURE — 250N000011 HC RX IP 250 OP 636: Performed by: SURGERY

## 2024-03-07 PROCEDURE — 36415 COLL VENOUS BLD VENIPUNCTURE: CPT | Performed by: INTERNAL MEDICINE

## 2024-03-07 PROCEDURE — 97606 NEG PRS WND THER DME>50 SQCM: CPT

## 2024-03-07 PROCEDURE — 250N000013 HC RX MED GY IP 250 OP 250 PS 637

## 2024-03-07 PROCEDURE — 250N000009 HC RX 250: Performed by: NURSE ANESTHETIST, CERTIFIED REGISTERED

## 2024-03-07 PROCEDURE — 250N000011 HC RX IP 250 OP 636: Performed by: NURSE ANESTHETIST, CERTIFIED REGISTERED

## 2024-03-07 PROCEDURE — 87102 FUNGUS ISOLATION CULTURE: CPT | Performed by: SURGERY

## 2024-03-07 PROCEDURE — 250N000013 HC RX MED GY IP 250 OP 250 PS 637: Performed by: INTERNAL MEDICINE

## 2024-03-07 PROCEDURE — 87206 SMEAR FLUORESCENT/ACID STAI: CPT | Performed by: SURGERY

## 2024-03-07 PROCEDURE — 22015 I&D ABSCESS P-SPINE L/S/LS: CPT | Mod: 78 | Performed by: SURGERY

## 2024-03-07 PROCEDURE — 87205 SMEAR GRAM STAIN: CPT | Performed by: SURGERY

## 2024-03-07 PROCEDURE — 87210 SMEAR WET MOUNT SALINE/INK: CPT | Performed by: SURGERY

## 2024-03-07 RX ORDER — LIDOCAINE 40 MG/G
CREAM TOPICAL
Status: DISCONTINUED | OUTPATIENT
Start: 2024-03-07 | End: 2024-03-07 | Stop reason: HOSPADM

## 2024-03-07 RX ORDER — ONDANSETRON 4 MG/1
4 TABLET, ORALLY DISINTEGRATING ORAL EVERY 6 HOURS PRN
Status: DISCONTINUED | OUTPATIENT
Start: 2024-03-07 | End: 2024-03-12

## 2024-03-07 RX ORDER — ACETAMINOPHEN 325 MG/1
650 TABLET ORAL EVERY 4 HOURS PRN
Status: DISCONTINUED | OUTPATIENT
Start: 2024-03-10 | End: 2024-03-14 | Stop reason: HOSPADM

## 2024-03-07 RX ORDER — VANCOMYCIN HYDROCHLORIDE 1 G/200ML
1000 INJECTION, SOLUTION INTRAVENOUS EVERY 12 HOURS
Status: COMPLETED | OUTPATIENT
Start: 2024-03-07 | End: 2024-03-14

## 2024-03-07 RX ORDER — GLYCOPYRROLATE 0.2 MG/ML
INJECTION, SOLUTION INTRAMUSCULAR; INTRAVENOUS PRN
Status: DISCONTINUED | OUTPATIENT
Start: 2024-03-07 | End: 2024-03-07

## 2024-03-07 RX ORDER — HYDROMORPHONE HCL IN WATER/PF 6 MG/30 ML
0.2 PATIENT CONTROLLED ANALGESIA SYRINGE INTRAVENOUS EVERY 5 MIN PRN
Status: DISCONTINUED | OUTPATIENT
Start: 2024-03-07 | End: 2024-03-07 | Stop reason: HOSPADM

## 2024-03-07 RX ORDER — BISACODYL 10 MG
10 SUPPOSITORY, RECTAL RECTAL DAILY PRN
Status: DISCONTINUED | OUTPATIENT
Start: 2024-03-10 | End: 2024-03-14 | Stop reason: HOSPADM

## 2024-03-07 RX ORDER — PIPERACILLIN SODIUM, TAZOBACTAM SODIUM 3; .375 G/15ML; G/15ML
3.38 INJECTION, POWDER, LYOPHILIZED, FOR SOLUTION INTRAVENOUS EVERY 8 HOURS
Qty: 60 ML | Refills: 0 | Status: DISCONTINUED | OUTPATIENT
Start: 2024-03-07 | End: 2024-03-07

## 2024-03-07 RX ORDER — HYDROMORPHONE HCL IN WATER/PF 6 MG/30 ML
0.4 PATIENT CONTROLLED ANALGESIA SYRINGE INTRAVENOUS EVERY 5 MIN PRN
Status: DISCONTINUED | OUTPATIENT
Start: 2024-03-07 | End: 2024-03-07 | Stop reason: HOSPADM

## 2024-03-07 RX ORDER — SODIUM CHLORIDE, SODIUM LACTATE, POTASSIUM CHLORIDE, CALCIUM CHLORIDE 600; 310; 30; 20 MG/100ML; MG/100ML; MG/100ML; MG/100ML
INJECTION, SOLUTION INTRAVENOUS CONTINUOUS
Status: DISCONTINUED | OUTPATIENT
Start: 2024-03-07 | End: 2024-03-07 | Stop reason: HOSPADM

## 2024-03-07 RX ORDER — FENTANYL CITRATE 50 UG/ML
50 INJECTION, SOLUTION INTRAMUSCULAR; INTRAVENOUS EVERY 5 MIN PRN
Status: DISCONTINUED | OUTPATIENT
Start: 2024-03-07 | End: 2024-03-07 | Stop reason: HOSPADM

## 2024-03-07 RX ORDER — FENTANYL CITRATE 50 UG/ML
25 INJECTION, SOLUTION INTRAMUSCULAR; INTRAVENOUS EVERY 5 MIN PRN
Status: DISCONTINUED | OUTPATIENT
Start: 2024-03-07 | End: 2024-03-07 | Stop reason: HOSPADM

## 2024-03-07 RX ORDER — DEXAMETHASONE SODIUM PHOSPHATE 10 MG/ML
INJECTION, SOLUTION INTRAMUSCULAR; INTRAVENOUS PRN
Status: DISCONTINUED | OUTPATIENT
Start: 2024-03-07 | End: 2024-03-07

## 2024-03-07 RX ORDER — FENTANYL CITRATE 50 UG/ML
INJECTION, SOLUTION INTRAMUSCULAR; INTRAVENOUS PRN
Status: DISCONTINUED | OUTPATIENT
Start: 2024-03-07 | End: 2024-03-07

## 2024-03-07 RX ORDER — AMOXICILLIN 250 MG
1 CAPSULE ORAL 2 TIMES DAILY
Status: DISCONTINUED | OUTPATIENT
Start: 2024-03-07 | End: 2024-03-14 | Stop reason: HOSPADM

## 2024-03-07 RX ORDER — ONDANSETRON 2 MG/ML
4 INJECTION INTRAMUSCULAR; INTRAVENOUS EVERY 6 HOURS PRN
Status: DISCONTINUED | OUTPATIENT
Start: 2024-03-07 | End: 2024-03-12

## 2024-03-07 RX ORDER — HYDROMORPHONE HCL IN WATER/PF 6 MG/30 ML
0.2 PATIENT CONTROLLED ANALGESIA SYRINGE INTRAVENOUS
Status: DISCONTINUED | OUTPATIENT
Start: 2024-03-07 | End: 2024-03-07

## 2024-03-07 RX ORDER — OXYCODONE HYDROCHLORIDE 5 MG/1
5 TABLET ORAL EVERY 4 HOURS PRN
Status: DISCONTINUED | OUTPATIENT
Start: 2024-03-07 | End: 2024-03-07

## 2024-03-07 RX ORDER — PROPOFOL 10 MG/ML
INJECTION, EMULSION INTRAVENOUS PRN
Status: DISCONTINUED | OUTPATIENT
Start: 2024-03-07 | End: 2024-03-07

## 2024-03-07 RX ORDER — HYDROMORPHONE HCL IN WATER/PF 6 MG/30 ML
0.4 PATIENT CONTROLLED ANALGESIA SYRINGE INTRAVENOUS
Status: DISCONTINUED | OUTPATIENT
Start: 2024-03-07 | End: 2024-03-07

## 2024-03-07 RX ORDER — PROCHLORPERAZINE MALEATE 5 MG/1
5 TABLET ORAL EVERY 6 HOURS PRN
Status: DISCONTINUED | OUTPATIENT
Start: 2024-03-07 | End: 2024-03-14 | Stop reason: HOSPADM

## 2024-03-07 RX ORDER — OXYCODONE HYDROCHLORIDE 5 MG/1
10 TABLET ORAL EVERY 4 HOURS PRN
Status: DISCONTINUED | OUTPATIENT
Start: 2024-03-07 | End: 2024-03-07

## 2024-03-07 RX ORDER — ACETAMINOPHEN 325 MG/1
975 TABLET ORAL EVERY 8 HOURS
Status: COMPLETED | OUTPATIENT
Start: 2024-03-07 | End: 2024-03-10

## 2024-03-07 RX ORDER — ONDANSETRON 2 MG/ML
4 INJECTION INTRAMUSCULAR; INTRAVENOUS EVERY 30 MIN PRN
Status: DISCONTINUED | OUTPATIENT
Start: 2024-03-07 | End: 2024-03-07 | Stop reason: HOSPADM

## 2024-03-07 RX ORDER — ONDANSETRON 4 MG/1
4 TABLET, ORALLY DISINTEGRATING ORAL EVERY 30 MIN PRN
Status: DISCONTINUED | OUTPATIENT
Start: 2024-03-07 | End: 2024-03-07 | Stop reason: HOSPADM

## 2024-03-07 RX ORDER — NALOXONE HYDROCHLORIDE 0.4 MG/ML
0.1 INJECTION, SOLUTION INTRAMUSCULAR; INTRAVENOUS; SUBCUTANEOUS
Status: DISCONTINUED | OUTPATIENT
Start: 2024-03-07 | End: 2024-03-07 | Stop reason: HOSPADM

## 2024-03-07 RX ORDER — POLYETHYLENE GLYCOL 3350 17 G/17G
17 POWDER, FOR SOLUTION ORAL DAILY
Status: DISCONTINUED | OUTPATIENT
Start: 2024-03-08 | End: 2024-03-14 | Stop reason: HOSPADM

## 2024-03-07 RX ORDER — LIDOCAINE HYDROCHLORIDE 20 MG/ML
INJECTION, SOLUTION INFILTRATION; PERINEURAL PRN
Status: DISCONTINUED | OUTPATIENT
Start: 2024-03-07 | End: 2024-03-07

## 2024-03-07 RX ORDER — SODIUM CHLORIDE 9 MG/ML
INJECTION, SOLUTION INTRAVENOUS CONTINUOUS
Status: DISCONTINUED | OUTPATIENT
Start: 2024-03-07 | End: 2024-03-10

## 2024-03-07 RX ADMIN — VANCOMYCIN HYDROCHLORIDE 1000 MG: 1 INJECTION, SOLUTION INTRAVENOUS at 12:20

## 2024-03-07 RX ADMIN — SUGAMMADEX 206 MG: 100 INJECTION, SOLUTION INTRAVENOUS at 10:45

## 2024-03-07 RX ADMIN — OXYCODONE 10 MG: 5 TABLET ORAL at 06:05

## 2024-03-07 RX ADMIN — MIDAZOLAM 2 MG: 1 INJECTION INTRAMUSCULAR; INTRAVENOUS at 09:08

## 2024-03-07 RX ADMIN — DEXAMETHASONE SODIUM PHOSPHATE 10 MG: 10 INJECTION, SOLUTION INTRAMUSCULAR; INTRAVENOUS at 09:10

## 2024-03-07 RX ADMIN — HYDROMORPHONE HYDROCHLORIDE 0.4 MG: 0.2 INJECTION, SOLUTION INTRAMUSCULAR; INTRAVENOUS; SUBCUTANEOUS at 11:55

## 2024-03-07 RX ADMIN — ROCURONIUM BROMIDE 20 MG: 10 INJECTION, SOLUTION INTRAVENOUS at 09:47

## 2024-03-07 RX ADMIN — METHOCARBAMOL TABLETS 750 MG: 750 TABLET, COATED ORAL at 04:46

## 2024-03-07 RX ADMIN — SODIUM CHLORIDE, POTASSIUM CHLORIDE, SODIUM LACTATE AND CALCIUM CHLORIDE: 600; 310; 30; 20 INJECTION, SOLUTION INTRAVENOUS at 07:43

## 2024-03-07 RX ADMIN — GLYCOPYRROLATE 0.1 MG: 0.2 INJECTION INTRAMUSCULAR; INTRAVENOUS at 09:43

## 2024-03-07 RX ADMIN — BENZOCAINE AND MENTHOL 1 LOZENGE: 15; 3.6 LOZENGE ORAL at 18:17

## 2024-03-07 RX ADMIN — PIPERACILLIN AND TAZOBACTAM 3.38 G: 3; .375 INJECTION, POWDER, FOR SOLUTION INTRAVENOUS at 02:31

## 2024-03-07 RX ADMIN — PIPERACILLIN AND TAZOBACTAM 3.38 G: 3; .375 INJECTION, POWDER, FOR SOLUTION INTRAVENOUS at 09:20

## 2024-03-07 RX ADMIN — ROCURONIUM BROMIDE 50 MG: 10 INJECTION, SOLUTION INTRAVENOUS at 09:10

## 2024-03-07 RX ADMIN — FENTANYL CITRATE 100 MCG: 50 INJECTION INTRAMUSCULAR; INTRAVENOUS at 09:10

## 2024-03-07 RX ADMIN — GABAPENTIN 100 MG: 100 CAPSULE ORAL at 22:21

## 2024-03-07 RX ADMIN — ACETAMINOPHEN 975 MG: 325 TABLET ORAL at 22:21

## 2024-03-07 RX ADMIN — ROPINIROLE 1 MG: 0.25 TABLET, FILM COATED ORAL at 15:17

## 2024-03-07 RX ADMIN — GABAPENTIN 100 MG: 100 CAPSULE ORAL at 15:17

## 2024-03-07 RX ADMIN — GLYCOPYRROLATE 0.2 MG: 0.2 INJECTION INTRAMUSCULAR; INTRAVENOUS at 09:41

## 2024-03-07 RX ADMIN — PROPOFOL 100 MG: 10 INJECTION, EMULSION INTRAVENOUS at 09:10

## 2024-03-07 RX ADMIN — Medication 60 ML: at 15:23

## 2024-03-07 RX ADMIN — METHOCARBAMOL TABLETS 750 MG: 750 TABLET, COATED ORAL at 15:17

## 2024-03-07 RX ADMIN — OXYCODONE 5 MG: 5 TABLET ORAL at 00:19

## 2024-03-07 RX ADMIN — ONDANSETRON 4 MG: 2 INJECTION INTRAMUSCULAR; INTRAVENOUS at 09:10

## 2024-03-07 RX ADMIN — ROPINIROLE HYDROCHLORIDE 2 MG: 1 TABLET, FILM COATED ORAL at 22:26

## 2024-03-07 RX ADMIN — LIDOCAINE HYDROCHLORIDE 3 ML: 10 INJECTION, SOLUTION EPIDURAL; INFILTRATION; INTRACAUDAL; PERINEURAL at 09:10

## 2024-03-07 RX ADMIN — PIPERACILLIN AND TAZOBACTAM 3.38 G: 3; .375 INJECTION, POWDER, FOR SOLUTION INTRAVENOUS at 18:17

## 2024-03-07 RX ADMIN — ACETAMINOPHEN 975 MG: 325 TABLET ORAL at 12:45

## 2024-03-07 RX ADMIN — SODIUM CHLORIDE: 9 INJECTION, SOLUTION INTRAVENOUS at 11:59

## 2024-03-07 ASSESSMENT — ACTIVITIES OF DAILY LIVING (ADL)
ADLS_ACUITY_SCORE: 44
ADLS_ACUITY_SCORE: 44
ADLS_ACUITY_SCORE: 40
ADLS_ACUITY_SCORE: 44
ADLS_ACUITY_SCORE: 40
ADLS_ACUITY_SCORE: 40
ADLS_ACUITY_SCORE: 48
ADLS_ACUITY_SCORE: 48
ADLS_ACUITY_SCORE: 44
ADLS_ACUITY_SCORE: 44
ADLS_ACUITY_SCORE: 40
ADLS_ACUITY_SCORE: 44
ADLS_ACUITY_SCORE: 44
ADLS_ACUITY_SCORE: 41
ADLS_ACUITY_SCORE: 44
ADLS_ACUITY_SCORE: 45
ADLS_ACUITY_SCORE: 40
ADLS_ACUITY_SCORE: 44
ADLS_ACUITY_SCORE: 45
ADLS_ACUITY_SCORE: 40
ADLS_ACUITY_SCORE: 44
ADLS_ACUITY_SCORE: 40
ADLS_ACUITY_SCORE: 41

## 2024-03-07 NOTE — PLAN OF CARE
Problem: Pain Acute  Goal: Optimal Pain Control and Function  Intervention: Prevent or Manage Pain   Goal Outcome Evaluation:  Patient vital signs are at baseline: Yes  Patient able to ambulate as they were prior to admission or with assist devices provided by therapies during their stay:  Yes  Patient MUST void prior to discharge:  Yes  Patient able to tolerate oral intake:  Yes  Pain has adequate pain control using Oral analgesics:  Yes  Does patient have an identified :  Yes  Has goal D/C date and time been discussed with patient:  Yes     Patient is alert and oriented X 4. Received scheduled Robaxin and PRN Oxycodone for pain control. IV Zosyn administered. Up to the bathroom with A1 and voided. VSS on RA. NPO status since midnight for a procedure. Report given to PACU, RN.

## 2024-03-07 NOTE — PROGRESS NOTES
[facility] MEDICINE  PROGRESS NOTE       Securely message me with Kingsley (more info)    Code Status: Full Code  Procedure(s):  Lumbar wound irrigation and debridement  with wound vacuum  Day of Surgery  Identification/Summary:   68 year old female past medical history significant for recent L4-L5 bilateral fusion on 2/23/2024 discharged home on 2/26 , non-Hodgkin's lymphoma/small B lymphocyte lymphoma, lymphoproliferative disorder on tyrosine kinase inhibitor, typically sees [facility], obesity, depression and anxiety and restless leg syndrome presented to the emergency room with complaints of worsening low back pain last couple of days, with postoperative wound erythema, warmth, lumbar MRI concerning for fluid collection extending into the epidural space s/p I&D in ED pending cultures.      S/p bedside evacuation of post-operative fluid collection by Neurosurgery on day of admit. Infectious Disease consulted and recommended formal surgical washout. As such, anticoagulation held, last dose lovenox on 3/4 at 2010hrs. Ongoing antibiotics with zosyn per ID. Had surgical washout on 3/7/2024.    Need culture from surgery before she can be discharged on an appropriate abx.     -----  Postoperative wound infection  Assessment: on admit treated for postop infection: S/p bilateral L4-L5 fusion 2/23/2024, had an elevated Temp of 100.1, leukocytosis 21, , ESR 29, Lactic acid normal. S/p I&D, 60 mL of bloody fluid sent to the lab; now with staph epi and enterobacter  Plan:  - continue zosyn   - ID consulted, appreciate; defer modification to them  - s/p debridement 3/7  -Pain control on Tylenol, oxycodone, Robaxin, gabapentin IV Dilaudid as needed     History of non-Hodgkin's lymphoma  Small lymphocytic lymphoma  -Sees [facility], per family members holding off on the tyrosine kinase inhibitor for 10 days before and after surgery  -thus, Holding for now  -monitor  "clinically      Depression anxiety  -Continue home meds     Restless leg syndrome  -On Requip     GERD  On PPI     Obesity  Encourage weight loss   - lovenox for dvt ppdx per surgery, now held for surgical washout on 3/7.    Anticoagulation   Orders (Includes Only Anticoagulants)    None      Therapy: PT, OT  Camejo:Not present  Lines: None       Current Diet  Orders Placed This Encounter      Advance Diet as Tolerated: Regular Diet Adult          Barriers to Discharge: culture operative pending    Disposition: inpatient    Clinically Significant Risk Factors                         # Obesity: Estimated body mass index is 38.96 kg/m  as calculated from the following:    Height as of this encounter: 1.626 m (5' 4\").    Weight as of this encounter: 103 kg (227 lb)., PRESENT ON ADMISSION     # Financial/Environmental Concerns: none         Interval History/Subjective:  She was not confused and disoriented waking up from surgery.  She is doing much better now.  She has mild cramping at this point on her back.      Last 24H PRN:     HYDROmorphone (DILAUDID) injection 0.4 mg, 0.4 mg at 03/07/24 1155    ondansetron (ZOFRAN ODT) ODT tab 4 mg **OR** ondansetron (ZOFRAN) injection 4 mg, 4 mg at 03/07/24 0910    oxyCODONE (ROXICODONE) tablet 5-10 mg, 10 mg at 03/07/24 0605    Physical Exam/Objective:  Temp:  [97.2  F (36.2  C)-98.7  F (37.1  C)] 98.3  F (36.8  C)  Pulse:  [68-76] 75  Resp:  [9-20] 18  BP: (112-175)/(53-78) 135/60  SpO2:  [83 %-98 %] 97 %  Wt Readings from Last 4 Encounters:   03/03/24 103 kg (227 lb)   02/23/24 103 kg (227 lb)   09/19/23 90.7 kg (200 lb)   07/27/23 90.7 kg (200 lb)     Body mass index is 38.96 kg/m .    General Appearance: Alert and wake, not in distress  Respiratory: clear lungs, no crackles or wheezing  Cardiovascular: rhythmic, normal S1 and S2, no murmur  Neurology: oriented x 3  Psych: cooperative and calm, normal affect    Medications:   Personally Reviewed.  Medications    sodium " chloride 75 mL/hr at 03/07/24 1159      acetaminophen  975 mg Oral Q8H    FLUoxetine  40 mg Oral Daily    gabapentin  100 mg Oral TID    methocarbamol  750 mg Oral Q6H    pantoprazole  40 mg Oral Daily    piperacillin-tazobactam  3.375 g Intravenous Q8H    [START ON 3/8/2024] polyethylene glycol  17 g Oral Daily    rOPINIRole  1 mg Oral BID    rOPINIRole  2 mg Oral At Bedtime    senna-docusate  1 tablet Oral BID    sodium chloride (PF)  3 mL Intracatheter Q8H    vancomycin  1,000 mg Intravenous Q12H    wound support modular  60 mL Oral Daily       Data reviewed today: I personally reviewed all new medications, labs, imaging/diagnostics reports over the past 24 hours. Pertinent findings include:    Imaging:   No results found for this or any previous visit (from the past 24 hour(s)).    Labs:  MR Lumbar Spine w/o Contrast   Final Result   IMPRESSION:   1.  L4-L5 fusion. No hardware complications. Good positioning of transpedicular screws and interbody graft.      2.  Dorsal subcutaneous fluid collection as above which extends through the surgical approach into the dorsal canal epidural space and most prominent at L4-L5. Slight extension cephalad to L1-L2. Consider further evaluation with contrast-enhanced    imaging. Fairly homogeneous fluid collection without significant surrounding inflammatory signal changes and may represent seroma or resolving hematoma. Infectious/inflammatory fluid accumulation is possible, however.      3.  Lumbar spondylosis      4.  No fractures.      5.  Findings discussed with Flor James of the neurosurgery service on 3/3/2024 1255 PM CST      XR Lumbar Spine 2/3 Views   Final Result   IMPRESSION: Five lumbar vertebral bodies. Minimal convex right curvature, unchanged. Dorsal midline skin staples persist. Postoperative changes of interbody and posterior spinal fixation across L4-L5. Grade 1 anterolisthesis of L4 on L5 and L5 on S1,    unchanged. Intact hardware. Severe interspace and  endplate degeneration L2-L3. Normal vertebral body heights.           Recent Results (from the past 24 hour(s))   Creatinine    Collection Time: 03/07/24  6:58 AM   Result Value Ref Range    Creatinine 0.76 0.51 - 0.95 mg/dL    GFR Estimate 84 >60 mL/min/1.73m2   Extra Purple Top Tube    Collection Time: 03/07/24  6:58 AM   Result Value Ref Range    Hold Specimen JIC        Pending Labs:  Unresulted Labs Ordered in the Past 30 Days of this Admission       Date and Time Order Name Status Description    3/7/2024 10:16 AM Tissue Aerobic Bacterial Culture Routine In process     3/7/2024 10:16 AM Fungal or Yeast Culture Routine In process     3/7/2024 10:16 AM Gram Stain In process     3/7/2024 10:16 AM Anaerobic Bacterial Culture Routine In process     3/7/2024 10:15 AM Tissue Aerobic Bacterial Culture Routine In process     3/7/2024 10:15 AM Fungal or Yeast Culture Routine In process     3/7/2024 10:15 AM Gram Stain In process     3/7/2024 10:15 AM Anaerobic Bacterial Culture Routine In process     3/7/2024 10:03 AM Acid-Fast Bacilli Culture and Stain In process     3/7/2024 10:03 AM MTB Complex and Resistance In process     3/7/2024 10:03 AM Body fluid, unsp Aerobic Bacterial Culture Routine In process     3/7/2024 10:03 AM Fungal or Yeast Culture Routine In process     3/7/2024 10:03 AM BRYAN Preparation In process     3/7/2024 10:03 AM Gram Stain In process     3/7/2024 10:03 AM Anaerobic Bacterial Culture Routine In process     3/7/2024 10:03 AM Acid-Fast Bacilli Culture and Stain In process     3/3/2024  2:21 PM Blood Culture Peripheral Blood Preliminary     3/3/2024  2:21 PM Blood Culture Peripheral Blood Preliminary     3/3/2024  1:52 PM Anaerobic Bacterial Culture Routine Preliminary             ANGEL CANSECO MD  [facility]  Phone: #[phone]    Securely message me with Kingsley (more info)

## 2024-03-07 NOTE — PLAN OF CARE
Problem: Adult Inpatient Plan of Care  Goal: Absence of Hospital-Acquired Illness or Injury  Intervention: Identify and Manage Fall Risk  Recent Flowsheet Documentation  Taken 3/6/2024 1640 by Delia Ward RN  Safety Promotion/Fall Prevention:   activity supervised   clutter free environment maintained   increased rounding and observation   increase visualization of patient   lighting adjusted   mobility aid in reach   nonskid shoes/slippers when out of bed   patient and family education   room door open   room near nurse's station   room organization consistent   safety round/check completed     Pt A&Ox 4. VSS on RA. CMS intact except baseline neuropathy. Dressing changed x1 this shift. Voiding adequately. Up with assist of 1 gb and walker. Pain managed with scheduled tylenol. IV SL. NPO at midnight for surgical intervention in AM.

## 2024-03-07 NOTE — CONSULTS
CLINICAL NUTRITION SERVICES - ASSESSMENT NOTE     Nutrition Prescription    RECOMMENDATIONS FOR MDs/PROVIDERS TO ORDER:  None    Malnutrition Status:    Patient does not meet two of the established criteria necessary for diagnosing malnutrition    Recommendations already ordered by Registered Dietitian (RD):  Nutrition education for recommended modifications   Medical food supplement therapy - Expedite daily, trial Ensure Enlive and Max Protein    Future/Additional Recommendations:  Monitor po intake, ONS tolerance, wound healing     REASON FOR ASSESSMENT  Tessa Edwards is a/an 68 year old female assessed by the dietitian for Provider Order - Wound healing    NUTRITION HISTORY  Dx: postoperative wound erythema, warmth, lumbar MRI concerning for fluid collection extending into the epidural space s/p I&D in ED pending cultures.   - S/p lumbar wound irrigation and debridement w/ wound vacuum procedure this morning.  PMH: L4-L5 bilateral fusion on 2/23/2024 discharged home on 2/26 , non-Hodgkin's lymphoma/small B lymphocyte lymphoma, lymphoproliferative disorder on tyrosine kinase inhibitor, typically sees [facility], obesity, depression and anxiety and restless leg syndrome     Met with pt in room this afternoon. Pt reports eating poorly since her first lumbar fusion surgery on 2/23, eating ~50% of her usual intake. Since being admitted, she feels she's been eating around the same amount of her meals. Pt and writer discussed pt's high protein needs for healing following surgery, and she's agreeable to taking Expedite, and willing to try protein shakes and see if she likes them. Pt also encouraged to order quality protein with all meals: eggs, meat, yogurt, peanut butter.    CURRENT NUTRITION ORDERS  Diet: Regular  Intake/Tolerance: Pt ordering 1-3 adequate meals/day per [employer]. Intake % not documented in flow sheets    LABS  Labs reviewed    MEDICATIONS  Medications reviewed  Protonix  Senna  NS  "infusion @75 mL/hr  Zofran     GI:  LBM on 3/6, constipated    Skin:  Location: spine  Due to: reopened surgical wound  Drainage: small  Status: initial    ANTHROPOMETRICS  Height: 162.6 cm (5' 4\")  Most Recent Weight: 103 kg (227 lb)    IBW: 54.7 kg  BMI: Obesity Grade II BMI 35-39.9  Weight History:   03/03/24 : 103 kg (227 lb)  02/23/24 : 103 kg (227 lb)  09/19/23 : 90.7 kg (200 lb)  07/27/23 : 90.7 kg (200 lb)  03/22/22 : 84.5 kg (186 lb 4 oz)  02/09/22 : 79.4 kg (175 lb)  11/16/21 : 87 kg (191 lb 14.4 oz)    Dosing Weight: 66.7 kg    ASSESSED NUTRITION NEEDS  Estimated Energy Needs: 9365-7215 kcals/day (25 - 30 kcals/kg)  Justification: Maintenance  Estimated Protein Needs:  grams protein/day (1.3 - 1.5 grams of pro/kg)  Justification: Post-op and Wound healing  Estimated Fluid Needs: 9732-1555 mL/day (1 mL/kcal)   Justification: Maintenance    PHYSICAL FINDINGS  See malnutrition section below.    MALNUTRITION  % Intake: </= 50% for >/= 5 days (severe)  % Weight Loss: None noted  Subcutaneous Fat Loss: None observed  Muscle Loss: None observed  Fluid Accumulation/Edema: +1 Trace in BLE legs, ankles, feet  Malnutrition Diagnosis: Patient does not meet two of the established criteria necessary for diagnosing malnutrition    NUTRITION DIAGNOSIS  Increased protein needs related to lumbar surgery as evidenced by pt not eating adequate protein for wound healing      INTERVENTIONS  Implementation  Nutrition education for recommended modifications   Medical food supplement therapy - Expedite daily, trial Ensure Enlive and Max Protein    Goals  Total avg nutritional intake to meet a minimum of 25 kcal/kg and 1.3 g PRO/kg daily (per dosing wt 66.7 kg).     Monitoring/Evaluation  Progress toward goals will be monitored and evaluated per protocol.    "

## 2024-03-07 NOTE — ANESTHESIA PREPROCEDURE EVALUATION
Anesthesia Pre-Procedure Evaluation    Patient: Tessa Edwards   MRN: [id number] : 1955        Procedure : Procedure(s):  bilateral lumbar 4-lumbar 5 laminectomies, medial facetectomies, foraminotomies and right transforaminal lumbar interbody fusion and bilateral posterior instrumented fusion with posterolateral arthrodesis and use of stealth.          Past Medical History:   Diagnosis Date    Abnormal CT of the abdomen 10/26/2021    Allergic rhinitis 2015    Anemia, iron deficiency 10/26/2021    Bilateral carpal tunnel syndrome 2019    Cervical nerve root compression 2017    Cervical spinal stenosis     Chronic low back pain without sciatica, unspecified back pain laterality 10/27/2021    Spondylosis,central  stenosis, foraminal encroachment    Colonic mass 2021    Depression     Depressive disorder     Disease of thyroid gland     Dyspepsia 2022    Fibromyalgia     GERD (gastroesophageal reflux disease)     History of blood transfusion     Hyperlipemia     Lymphoproliferative disorder (H)     Moderate major depression (H) 2022    Morbid obesity (H) 2022    Non-Hodgkin's lymphoma (H) 2022    Obesity     Osteoarthritis     Osteoarthritis of right knee, unspecified osteoarthritis type 2019    Other abnormal glucose 2022    Formatting of this note might be different from the original. Created by Conversion    Prediabetes 3/22/2022    Formatting of this note might be different from the original. Created by Conversion    Primary localized osteoarthrosis, lower leg 2007    Restless legs syndrome 10/26/2021    Rotator cuff tear     Sleep apnea     Spinal stenosis of lumbar region without neurogenic claudication 10/27/2021    Spondylosis,central  stenosis, foraminal encroachment    Splenomegaly 10/27/2021    Thyroid nodule 10/27/2021    Urinary frequency 2022    Urinary retention 2022    Vocal cord dysfunction 2015    Weight loss  10/27/2021      Past Surgical History:   Procedure Laterality Date    ARTHROPLASTY SHOULDER Left     ARTHROSCOPY KNEE      ARTHROSCOPY SHOULDER ROTATOR CUFF REPAIR      BACK SURGERY      BIOPSY      CERVICAL DISC ARTHROPLASTY      CERVICAL DISCECTOMY  08/01/2017    C5, C6    COLONOSCOPY N/A 11/05/2021    Procedure: COLONOSCOPY;  Surgeon: Efraín Guerrero MD;  Location: [facility]    CRW LT SHOULDER AP AXILLA 2 VW      FUSION TRANSFORAMINAL LUMBAR INTERBODY, 1 LEVEL, POSTERIOR APPROACH, USING OTS SURG IMAGING GUIDANCE Bilateral 2/23/2024    Procedure: medial facetectomies, foraminotomies and right transforaminal lumbar interbody fusion and bilateral posterior instrumented fusion with posterolateral arthrodesis and use of stealth.;  Surgeon: Brianne Hudson MD;  Location: [facility]    HYSTERECTOMY      JOINT REPLACEMENT      LAMINECTOMY LUMBAR ONE LEVEL  2/23/2024    Procedure: bilateral lumbar 4-lumbar 5 laminectomies,;  Surgeon: Brianne Hudson MD;  Location: [facility]    LAPAROSCOPIC ASSISTED COLECTOMY Right 11/16/2021    Procedure: LAPAROSCOPIC RIGHT COLECTOMY;  Surgeon: Efraín Guerrero MD;  Location: [facility]    OOPHORECTOMY      RELEASE CARPAL TUNNEL      RELEASE CARPAL TUNNEL      XR MYELOGRAM CERVICAL  05/14/2019    [facility] LAP,CHOLECYSTECTOMY/EXPLORE      Description: Cholecystectomy Laparoscopic;  Recorded: 12/10/2012;    [facility] TOTAL ABDOM HYSTERECTOMY      Description: Total Abdominal Hysterectomy;  Recorded: 12/10/2012;  Comments: 44 Y/O FOR FIBROID TUMORS    [facility] TOTAL KNEE ARTHROPLASTY Right 09/12/2019    Procedure: RIGHT TOTAL KNEE ARTHROPLASTY;  Surgeon: Irvin Canas DO;  Location: [facility];  Service: Orthopedics      No Known Allergies   Social History     Tobacco Use    Smoking status: Former     Packs/day: 0.50     Years: 10.00     Additional pack years: 0.00     Total pack years: 5.00     Types: Cigarettes     Quit date: 2/18/2000     Years since  "quittin.0    Smokeless tobacco: Never   Substance Use Topics    Alcohol use: Yes     Comment: social      Wt Readings from Last 1 Encounters:   24 103 kg (227 lb)        Anesthesia Evaluation            ROS/MED HX  ENT/Pulmonary:     (+) sleep apnea,                                       Neurologic: Comment: Fibromyalgia  Chronic pain  Occ'l THC use (gummies)      Cardiovascular:     (+) Dyslipidemia - -   -  - -                                      METS/Exercise Tolerance:     Hematologic: Comments: Lymphoproliferative disorder      Musculoskeletal:       GI/Hepatic:     (+) GERD,                   Renal/Genitourinary:  - neg Renal ROS     Endo:     (+)          thyroid problem,     Obesity,       Psychiatric/Substance Use:       Infectious Disease:       Malignancy:       Other:      (+)  , H/O Chronic Pain,         Physical Exam    Airway        Mallampati: II   TM distance: > 3 FB   Neck ROM: full   Mouth opening: > 3 cm    Respiratory Devices and Support         Dental       (+) Multiple visibly decayed, broken teeth      Cardiovascular          Rhythm and rate: regular and normal     Pulmonary           breath sounds clear to auscultation           OUTSIDE LABS:  CBC:   Lab Results   Component Value Date    WBC 9.2 2024    WBC 10.7 2024    HGB 9.4 (L) 2024    HGB 9.6 (L) 2024    HCT 29.7 (L) 2024    HCT 30.1 (L) 2024     2024     2024     BMP:   Lab Results   Component Value Date     2024     2024    POTASSIUM 3.9 2024    POTASSIUM 4.1 2024    CHLORIDE 103 2024    CHLORIDE 104 2024    CO2 28 2024    CO2 22 2024    BUN 8.6 2024    BUN 10.1 2024    CR 0.76 2024    CR 0.70 2024     (H) 2024     (H) 2024     COAGS:   Lab Results   Component Value Date    PTT 30 2024    INR 0.99 2024     POC: No results found for: \"BGM\", " "\"HCG\", \"HCGS\"  HEPATIC:   Lab Results   Component Value Date    ALBUMIN 3.9 03/03/2024    PROTTOTAL 6.7 03/03/2024    ALT 19 03/03/2024    AST 25 03/03/2024    ALKPHOS 101 03/03/2024    BILITOTAL 0.7 03/03/2024     OTHER:   Lab Results   Component Value Date    LACT 0.9 03/03/2024    A1C 5.4 01/02/2023    KAYLAN 9.2 03/06/2024    TSH 0.97 04/05/2023    SED 29 03/03/2024       Anesthesia Plan    ASA Status:  3    NPO Status:  NPO Appropriate    Anesthesia Type: General.     - Airway: ETT   Induction: Intravenous.           Consents    Anesthesia Plan(s) and associated risks, benefits, and realistic alternatives discussed. Questions answered and patient/representative(s) expressed understanding.     - Discussed: Risks, Benefits and Alternatives for the PROCEDURE were discussed     - Discussed with:  Patient            Postoperative Care    Pain management: Multi-modal analgesia.   PONV prophylaxis: Ondansetron (or other 5HT-3), Dexamethasone or Solumedrol     Comments:               JOLIE BENDER MD    I have reviewed the pertinent notes and labs in the chart from the past 30 days and (re)examined the patient.  Any updates or changes from those notes are reflected in this note.              "

## 2024-03-07 NOTE — ANESTHESIA PROCEDURE NOTES
Airway       Patient location during procedure: OR       Procedure Start/Stop Times: 3/7/2024 9:12 AM  Staff -        Performed By: CRNAIndications and Patient Condition       Indications for airway management: nadia-procedural       Induction type:intravenous       Mask difficulty assessment: 1 - vent by mask    Final Airway Details       Final airway type: endotracheal airway       Successful airway: ETT - single  Endotracheal Airway Details        ETT size (mm): 7.0       Cuffed: yes       Successful intubation technique: video laryngoscopy       VL Blade Size: Glidescope 3       Grade View of Cords: 1       Adjucts: stylet       Position: Right       Measured from: lips       Secured at (cm): 21       Bite block used: Soft    Post intubation assessment        Placement verified by: capnometry, equal breath sounds and chest rise        Number of attempts at approach: 1       Number of other approaches attempted: 0       Secured with: tape       Ease of procedure: easy       Dentition: Intact and Unchanged    Medication(s) Administered   Medication Administration Time: 3/7/2024 9:12 AM

## 2024-03-07 NOTE — ANESTHESIA POSTPROCEDURE EVALUATION
Patient: Tessa Edwards    Procedure: Procedure(s):  Lumbar wound irrigation and debridement  with wound vacuum       Anesthesia Type:  General    Note:  Disposition: Inpatient   Postop Pain Control: Uneventful            Sign Out: Well controlled pain   PONV: No   Neuro/Psych: Uneventful            Sign Out: Acceptable/Baseline neuro status   Airway/Respiratory: Uneventful            Sign Out: Acceptable/Baseline resp. status   CV/Hemodynamics: Uneventful            Sign Out: Acceptable CV status; No obvious hypovolemia; No obvious fluid overload   Other NRE: NONE   DID A NON-ROUTINE EVENT OCCUR? No           Last vitals:  Vitals Value Taken Time   /69 03/07/24 1120   Temp 36.6  C (97.9  F) 03/07/24 1120   Pulse 70 03/07/24 1124   Resp 16 03/07/24 1124   SpO2 94 % 03/07/24 1124   Vitals shown include unfiled device data.    Electronically Signed By: JOLIE BENDER MD  March 7, 2024  2:38 PM

## 2024-03-07 NOTE — PROGRESS NOTES
[facility] Nurse Inpatient Assessment     Consulted for: lumbar spine     Summary: 3/7, requested vac placement in OR    Patient History (according to provider note(s):      Assessment and Plan:  68 year old female past medical history significant for recent L4-L5 bilateral fusion on 2/23/2024 discharged home on 2/26 , non-Hodgkin's lymphoma/small B lymphocyte lymphoma, lymphoproliferative disorder on tyrosine kinase inhibitor, [facility], obesity, depression anxiety restless leg syndrome presented to the emergency room with complaints of worsening low back pain last couple of days, with postoperative wound erythema, warmth, lumbar MRI concerning for fluid collection extending into the epidural space s/p I&D in ED pending cultures.     Postoperative wound superficial cellulitis versus deep infection with fluid collection status post I&D in ED at bedside  S/p bilateral L4-L5 fusion 2/23/2024    Assessment:      Wound location: lumbar spine    3/4    Last photo: 3/4  Wound due to: Surgical Wound  Wound history/plan of care: recent L4-L5 bilateral fusion on 2/23/2024 discharged home on 2/26, Postoperative wound superficial cellulitis versus deep infection with fluid collection status post I&D in ED at bedside      Negative pressure wound therapy applied to: Spine   Wound history/plan of care:    Surgical date: 3/7 - reopen surgical wound   Service following: neuro  Date Negative Pressure Wound Therapy initiated: 3/7 - veraflo   Interventions in place: repositioning and offloading  Is patient s nutritional status compromised? no   If yes, what interventions are in place? N/A  Reason for initiating vac therapy? Presence of co-morbidities, High risk of infections, Need for accelerated granulation tissue, and Prior history of delayed wound healing  Which?of?the?following?co-morbidities?apply? Immunocompromised  If diabetic is patient on a diabetic management program? No   Is  osteomyelitis present in wound? no   If yes what treatments are in place? N/A    Wound base: 90 % non-granular tissue and adipose tissue, 10 %  fascia       Palpation of the wound bed: normal       Drainage: small      Volume in cannister: 0     Last cannister change date: 3/7 start     Description of drainage: bloody      Measurements (length x width x depth, in cm) 8  x 3  x  6 cm       Tunneling up to 4 cm from 6 o'clock      Undermining N/A   Periwound skin: Erythema- blanchable and slough to periphery        Color: pink and red       Temperature: warm   Odor: none   Pain: absent and patient in OR ,    Pain intervention prior to dressing change: N/A  Treatment goal: Infection control/prevention, Increase granulation, Remove necrotic tissue, and Soften necrotic tissue  STATUS: initial assessment   Supplies ordered: gathered    Number of foam pieces removed from a wound (excluding foam for bridge) :  0 CleanseChoice Contact layer   Verified this matched the number of foam pieces applied last dressing change: Yes   Number of foam pieces packed into wound (excluding foam for bridge) :  3 CleanseChoice Contact layer     Veraflo: 30cc vashe, 10 minute soak, 4hr cycle, -75mmHg    Treatment Plan:     Negative pressure wound therapy plan:  Wound location: lumbar spine   Change Days:  M/Th  by WOC RN    Supplies (including all accessories) used: medium Veraflo cleanse choice gray foam, Adapt barrier ring, and Cavilon advanced   Cleanse with Vashe prior to replacing NPWT  Suction setting: -75   Methods used: Window paned all periwound skin with vac drape prior to applying sponge and Placed barrier ring into periwound creases to improve seal    Staff RN to assess integrity of dressing and ensure suction is set at appropriate level every shift.   Date canister. Chart canister output every shift. Change cannister weekly and PRN if full/occluded     Remove foam dressing and replace with BID normal saline moist gauze dressing  "if:   -a dressing failure which cannot be repaired within 2 hours   -patient is discharging to home without a home pump   -patient is discharging to a facility outside the local area   -if a dressing is a \"Silver Foam\", remove before Radiation Therapy or MRI     The hospital VAC pump is not to be discharged with the patient.?Ensure to disconnect patient from machine prior to discharge. Then,    - If a home KCI VAC pump has been delivered, connect home cannister to dressing tubing then connect cannister to home pump and turn on machine    - If transferring to a nearby facility with a KCI vac, can disconnect and clamp tubing then cover end with glove so can be reconnected within 2 hours       Orders: Written    RECOMMEND PRIMARY TEAM ORDER: None, at this time  Education provided: plan of care, wound progress, Infection prevention , Moisture management, and Off-loading pressure  Discussed plan of care with: Patient and Nurse  WOC nurse follow-up plan: twice weekly  Notify WOC if wound(s) deteriorate.  Nursing to notify the Provider(s) and re-consult the WOC Nurse if new skin concern.    DATA:     Current support surface: Standard  Standard gel/foam mattress (IsoFlex, Atmos air, etc)  Containment of urine/stool: Continent of bladder and Continent of bowel  BMI: Body mass index is 38.96 kg/m .   Active diet order: None     Output: No intake/output data recorded.     Labs:   Recent Labs   Lab 03/06/24  0626 03/05/24  2353 03/04/24  0602 03/03/24  0916   ALBUMIN  --   --   --  3.9   HGB 9.4*  --    < > 11.4*   INR  --  0.99  --   --    WBC 9.2  --    < > 21.1*    < > = values in this interval not displayed.     Pressure injury risk assessment:   Sensory Perception: 3-->slightly limited  Moisture: 3-->occasionally moist  Activity: 3-->walks occasionally  Mobility: 3-->slightly limited  Nutrition: 3-->adequate  Friction and Shear: 3-->no apparent problem  Edmundo Score: 18    MUKESH Barone RN CWOCN  Pager no longer " in use, please contact through [employer] group: [facility]

## 2024-03-07 NOTE — PLAN OF CARE
Problem: Adult Inpatient Plan of Care  Goal: Plan of Care Review  Description: The Plan of Care Review/Shift note should be completed every shift.  The Outcome Evaluation is a brief statement about your assessment that the patient is improving, declining, or no change.  This information will be displayed automatically on your shift  note.  Outcome: Progressing  Patient vital signs are at baseline: Yes  Patient able to ambulate as they were prior to admission or with assist devices provided by therapies during their stay:  Yes  Patient MUST void prior to discharge:  Yes  Patient able to tolerate oral intake:  Yes  Pain has adequate pain control using Oral analgesics:  No,  Reason:  Required IV Dilaudid x1 for severe breakthrough pain. Now managing pain with tylenol, gabapentin, and robaxin.   Does patient have an identified :  Yes  Has goal D/C date and time been discussed with patient:  Yes    UTV surgical site r/t dressing but it is clean, dry, and intact. Wound vac remains intact with serosang drainage. Remains on IV Zosyn + Vancomycin.

## 2024-03-07 NOTE — ANESTHESIA CARE TRANSFER NOTE
Patient: Tessa Edwards    Procedure: Procedure(s):  Lumbar wound irrigation and debridement  with wound vacuum       Diagnosis: Postoperative infection, unspecified type, initial encounter [T81.40XA]  S/P lumbar fusion [Z98.1]  Diagnosis Additional Information: No value filed.    Anesthesia Type:   General     Note:    Oropharynx: oropharynx clear of all foreign objects  Level of Consciousness: awake  Oxygen Supplementation: face mask  Level of Supplemental Oxygen (L/min / FiO2): 6  Independent Airway: airway patency satisfactory and stable  Dentition: dentition unchanged  Vital Signs Stable: post-procedure vital signs reviewed and stable  Report to RN Given: handoff report given  Patient transferred to: PACU    Handoff Report: Identifed the Patient, Identified the Reponsible Provider, Reviewed the pertinent medical history, Discussed the surgical course, Reviewed Intra-OP anesthesia mangement and issues during anesthesia, Set expectations for post-procedure period and Allowed opportunity for questions and acknowledgement of understanding      Vitals:  Vitals Value Taken Time   /75 03/07/24 1102   Temp 36.7  C (98.1  F) 03/07/24 1101   Pulse 77 03/07/24 1104   Resp 19 03/07/24 1104   SpO2 97 % 03/07/24 1104   Vitals shown include unfiled device data.    Electronically Signed By: FELIPE Grover CRNA  March 7, 2024  11:06 AM

## 2024-03-07 NOTE — OP NOTE
Prior to surgery I discussed the patient's problem with Dr. Hudson and with .  I met with the patient and discussed with her the nature of the problem, nature of the proposed procedure, rationale for doing it, goals, benefits, alternatives, and significant risks and complications.  I answered all her questions.  She said she was satisfied with the explanation and understood.  She requested surgery.  She gave informed consent to go ahead with surgery.  The operation was performed under general endotracheal anesthesia.  The patient had usual precautions like pneumoboots, etc.  She was logrolled from supine to prone.  Her body lay on a padded frame.  The operative site in the lumbar region was prepped and draped in the usual manner.  Old staples were removed.    An operating microscope was used for microsurgical technique.  Old suture was removed.  We entered a large subcutaneous cavity.  It was filled with cloudy brown fluid.  The fluid was suctioned with a Lukey suction and sent for Gram stain, routine culture, anaerobic culture, AFB smear, TB culture, KOH prep, and fungus culture.  Angled cerebellar retractors were used to maintain exposure.  The large subcutaneous cavity did lead down a path to the epidural space.  The epidural space was swabbed and sent for Gram stain and cultures.  Old hematoma was removed with suction.  I did not see any metal or screws or rods or other hardware or bone graft.  They were all covered by soft tissue.  The wound was then irrigated with a Simpulse gun with 2 L of Betadine followed by 2 L of tobramycin/vancomycin.  The fascia was closed with 0 Vicryl.  Rajani Solis RN, wound nurse, CWOCN, applied a Veraflo wound irrigation/suction device.  The patient was then logrolled from prone back to supine.  As far as I can tell she tolerated the procedure well.  As far as I can tell no complications were encountered.

## 2024-03-07 NOTE — PROGRESS NOTES
NSGY Progress Note    POD 0: Lumbar Wound Washout  Dr. Ambrose     Plan:  - Wound vac care per WOC, reconsulted  - Pain control measures  - Advance diet as tolerated  - Advance activity as tolerated, PT/OT  - ID following - rec continued Zosyn and to restart Vancomycin after surgery (placed order for pharmacy to dose Vanco)  - Medicine already following   - Surgical wound cultures pending       Chelsea Hoffman PA-C  [facility] Neurosurgery  [address] [place] [id number]

## 2024-03-07 NOTE — CONSULTS
Care Management Follow Up    Length of Stay (days): 4    Expected Discharge Date: 03/08/2024     Concerns to be Addressed: discharge planning     Patient plan of care discussed at interdisciplinary rounds: Yes    Anticipated Discharge Disposition: Home, Home Care     Anticipated Discharge Services: None  Anticipated Discharge DME: Other (see comment) (Pending clinical progress)    Patient/family educated on Medicare website which has current facility and service quality ratings: yes  Education Provided on the Discharge Plan: Yes  Patient/Family in Agreement with the Plan: yes    Referrals Placed by CM/SW: Home Infusion  Private pay costs discussed: Not applicable    Additional Information:    CM consulted for discharge planning.    Lumbar washout scheduled for today 3/7.    ID following, plan pending op-cultures.    1:25 PM  WOCN placed a Veraflo wound vac.  Per Rajani BALBUENA, anticipate next wound vac change on Monday; final plans for wound vac TBD.    PT, OT evals pending.    CM will continue to follow.    Solo Carballo RN

## 2024-03-07 NOTE — PHARMACY-VANCOMYCIN DOSING SERVICE
"Pharmacy Vancomycin Initial Note  Date of Service 2024  Patient's  1955  68 year old, female    Indication: Postoperative Infection and Skin and Soft Tissue Infection    Current estimated CrCl = Estimated Creatinine Clearance: 80.5 mL/min (based on SCr of 0.76 mg/dL).    Creatinine for last 3 days  3/5/2024:  5:52 AM Creatinine 0.75 mg/dL  3/6/2024:  6:26 AM Creatinine 0.70 mg/dL  3/7/2024:  6:58 AM Creatinine 0.76 mg/dL    Recent Vancomycin Level(s) for last 3 days  No results found for requested labs within last 3 days.      Vancomycin IV Administrations (past 72 hours)                     vancomycin (VANCOCIN) 1,500 mg in 0.9% NaCl 250 mL intermittent infusion (mg) 1,500 mg New Bag 24 1411                    Nephrotoxins and other renal medications (From now, onward)      Start     Dose/Rate Route Frequency Ordered Stop    24 1700  piperacillin-tazobactam (ZOSYN) 3.375 g vial to attach to  mL bag        Note to Pharmacy: For [facility], [facility] and [facility]: For Zosyn-naive patients, use the \"Zosyn initial dose + extended infusion\" order panel.    3.375 g  over 240 Minutes Intravenous EVERY 8 HOURS 24 1148 24 0059    24 1230  vancomycin (VANCOCIN) 1,000 mg in NaCl 0.9% 200 mL intermittent infusion         1,000 mg  over 60 Minutes Intravenous EVERY 12 HOURS 24 1204      24 2300  piperacillin-tazobactam (ZOSYN) 3.375 g vial to attach to  mL bag        Note to Pharmacy: For [facility], [facility] and [facility]: For Zosyn-naive patients, use the \"Zosyn initial dose + extended infusion\" order panel.    3.375 g  over 240 Minutes Intravenous EVERY 8 HOURS 24 1736              Contrast Orders - past 72 hours (72h ago, onward)      None            [employer] Prediction of Planned Initial Vancomycin Regimen  Regimen: 1000 mg IV every 12 hours.  Start time: 22:25 on 2024  Exposure target: AUC24 (range)400-600 mg/L.hr   AUC24,ss: 455 mg/L.hr  Probability of AUC24 > 400: 64 " %  Ctrough,ss: 14.8 mg/L  Probability of Ctrough,ss > 20: 23 %        Plan:  Start vancomycin  1000 mg IV q12h.   Vancomycin monitoring method: AUC  Vancomycin therapeutic monitoring goal: 400-600 mg*h/L  Pharmacy will check vancomycin levels as appropriate in 1-3 Days.    Serum creatinine levels will be ordered daily for the first week of therapy and at least twice weekly for subsequent weeks.      Dominique Read RPH

## 2024-03-07 NOTE — PROGRESS NOTES
ID chart check    OR today. Continue pip/tazo. Add back vancomycin after surgery today. Will follow operative cultures.     Tereso Tucker MD

## 2024-03-08 ENCOUNTER — APPOINTMENT (OUTPATIENT)
Dept: PHYSICAL THERAPY | Facility: CLINIC | Age: 69
DRG: 857 | End: 2024-03-08
Payer: COMMERCIAL

## 2024-03-08 LAB
ANION GAP SERPL CALCULATED.3IONS-SCNC: 8 MMOL/L (ref 7–15)
BACTERIA BLD CULT: NO GROWTH
BACTERIA BLD CULT: NO GROWTH
BUN SERPL-MCNC: 13.1 MG/DL (ref 8–23)
CALCIUM SERPL-MCNC: 8.9 MG/DL (ref 8.8–10.2)
CHLORIDE SERPL-SCNC: 106 MMOL/L (ref 98–107)
CREAT SERPL-MCNC: 0.75 MG/DL (ref 0.51–0.95)
DEPRECATED HCO3 PLAS-SCNC: 26 MMOL/L (ref 22–29)
EGFRCR SERPLBLD CKD-EPI 2021: 85 ML/MIN/1.73M2
ERYTHROCYTE [DISTWIDTH] IN BLOOD BY AUTOMATED COUNT: 13.1 % (ref 10–15)
GLUCOSE SERPL-MCNC: 126 MG/DL (ref 70–99)
HCT VFR BLD AUTO: 28.9 % (ref 35–47)
HGB BLD-MCNC: 9.2 G/DL (ref 11.7–15.7)
MAGNESIUM SERPL-MCNC: 1.6 MG/DL (ref 1.7–2.3)
MCH RBC QN AUTO: 27.3 PG (ref 26.5–33)
MCHC RBC AUTO-ENTMCNC: 31.8 G/DL (ref 31.5–36.5)
MCV RBC AUTO: 86 FL (ref 78–100)
PLATELET # BLD AUTO: 276 10E3/UL (ref 150–450)
POTASSIUM SERPL-SCNC: 3.3 MMOL/L (ref 3.4–5.3)
POTASSIUM SERPL-SCNC: 3.7 MMOL/L (ref 3.4–5.3)
RBC # BLD AUTO: 3.37 10E6/UL (ref 3.8–5.2)
SODIUM SERPL-SCNC: 140 MMOL/L (ref 135–145)
WBC # BLD AUTO: 12.6 10E3/UL (ref 4–11)

## 2024-03-08 PROCEDURE — 36415 COLL VENOUS BLD VENIPUNCTURE: CPT | Performed by: HOSPITALIST

## 2024-03-08 PROCEDURE — 85027 COMPLETE CBC AUTOMATED: CPT | Performed by: STUDENT IN AN ORGANIZED HEALTH CARE EDUCATION/TRAINING PROGRAM

## 2024-03-08 PROCEDURE — 97164 PT RE-EVAL EST PLAN CARE: CPT | Mod: GP

## 2024-03-08 PROCEDURE — 250N000011 HC RX IP 250 OP 636: Performed by: INTERNAL MEDICINE

## 2024-03-08 PROCEDURE — 120N000001 HC R&B MED SURG/OB

## 2024-03-08 PROCEDURE — 250N000013 HC RX MED GY IP 250 OP 250 PS 637: Performed by: STUDENT IN AN ORGANIZED HEALTH CARE EDUCATION/TRAINING PROGRAM

## 2024-03-08 PROCEDURE — 258N000003 HC RX IP 258 OP 636

## 2024-03-08 PROCEDURE — 250N000013 HC RX MED GY IP 250 OP 250 PS 637

## 2024-03-08 PROCEDURE — 83735 ASSAY OF MAGNESIUM: CPT | Performed by: HOSPITALIST

## 2024-03-08 PROCEDURE — 84132 ASSAY OF SERUM POTASSIUM: CPT | Performed by: HOSPITALIST

## 2024-03-08 PROCEDURE — 97116 GAIT TRAINING THERAPY: CPT | Mod: GP

## 2024-03-08 PROCEDURE — 250N000013 HC RX MED GY IP 250 OP 250 PS 637: Performed by: INTERNAL MEDICINE

## 2024-03-08 PROCEDURE — 99232 SBSQ HOSP IP/OBS MODERATE 35: CPT | Performed by: INTERNAL MEDICINE

## 2024-03-08 PROCEDURE — 36415 COLL VENOUS BLD VENIPUNCTURE: CPT | Performed by: STUDENT IN AN ORGANIZED HEALTH CARE EDUCATION/TRAINING PROGRAM

## 2024-03-08 PROCEDURE — 80048 BASIC METABOLIC PNL TOTAL CA: CPT | Performed by: STUDENT IN AN ORGANIZED HEALTH CARE EDUCATION/TRAINING PROGRAM

## 2024-03-08 PROCEDURE — 99232 SBSQ HOSP IP/OBS MODERATE 35: CPT | Performed by: HOSPITALIST

## 2024-03-08 RX ADMIN — VANCOMYCIN HYDROCHLORIDE 1000 MG: 1 INJECTION, SOLUTION INTRAVENOUS at 00:08

## 2024-03-08 RX ADMIN — POLYETHYLENE GLYCOL 3350 17 G: 17 POWDER, FOR SOLUTION ORAL at 09:00

## 2024-03-08 RX ADMIN — VANCOMYCIN HYDROCHLORIDE 1000 MG: 1 INJECTION, SOLUTION INTRAVENOUS at 12:35

## 2024-03-08 RX ADMIN — BENZOCAINE AND MENTHOL 1 LOZENGE: 15; 3.6 LOZENGE ORAL at 00:08

## 2024-03-08 RX ADMIN — ROPINIROLE HYDROCHLORIDE 2 MG: 1 TABLET, FILM COATED ORAL at 22:42

## 2024-03-08 RX ADMIN — METHOCARBAMOL TABLETS 750 MG: 750 TABLET, COATED ORAL at 09:00

## 2024-03-08 RX ADMIN — GABAPENTIN 100 MG: 100 CAPSULE ORAL at 21:08

## 2024-03-08 RX ADMIN — FLUOXETINE 40 MG: 20 CAPSULE ORAL at 08:59

## 2024-03-08 RX ADMIN — PIPERACILLIN AND TAZOBACTAM 3.38 G: 3; .375 INJECTION, POWDER, FOR SOLUTION INTRAVENOUS at 10:21

## 2024-03-08 RX ADMIN — PIPERACILLIN AND TAZOBACTAM 3.38 G: 3; .375 INJECTION, POWDER, FOR SOLUTION INTRAVENOUS at 18:07

## 2024-03-08 RX ADMIN — PIPERACILLIN AND TAZOBACTAM 3.38 G: 3; .375 INJECTION, POWDER, FOR SOLUTION INTRAVENOUS at 03:04

## 2024-03-08 RX ADMIN — METHOCARBAMOL TABLETS 750 MG: 750 TABLET, COATED ORAL at 03:05

## 2024-03-08 RX ADMIN — METHOCARBAMOL TABLETS 750 MG: 750 TABLET, COATED ORAL at 22:42

## 2024-03-08 RX ADMIN — SODIUM CHLORIDE: 9 INJECTION, SOLUTION INTRAVENOUS at 00:08

## 2024-03-08 RX ADMIN — ROPINIROLE 1 MG: 0.25 TABLET, FILM COATED ORAL at 09:02

## 2024-03-08 RX ADMIN — BENZOCAINE AND MENTHOL 1 LOZENGE: 15; 3.6 LOZENGE ORAL at 11:20

## 2024-03-08 RX ADMIN — PANTOPRAZOLE SODIUM 40 MG: 40 TABLET, DELAYED RELEASE ORAL at 08:58

## 2024-03-08 RX ADMIN — ACETAMINOPHEN 975 MG: 325 TABLET ORAL at 12:35

## 2024-03-08 RX ADMIN — ACETAMINOPHEN 975 MG: 325 TABLET ORAL at 21:08

## 2024-03-08 RX ADMIN — ROPINIROLE 1 MG: 0.25 TABLET, FILM COATED ORAL at 17:22

## 2024-03-08 RX ADMIN — ACETAMINOPHEN 975 MG: 325 TABLET ORAL at 03:04

## 2024-03-08 RX ADMIN — OXYCODONE 5 MG: 5 TABLET ORAL at 00:08

## 2024-03-08 RX ADMIN — GABAPENTIN 100 MG: 100 CAPSULE ORAL at 15:20

## 2024-03-08 RX ADMIN — METHOCARBAMOL TABLETS 750 MG: 750 TABLET, COATED ORAL at 00:01

## 2024-03-08 RX ADMIN — GABAPENTIN 100 MG: 100 CAPSULE ORAL at 08:58

## 2024-03-08 ASSESSMENT — ACTIVITIES OF DAILY LIVING (ADL)
ADLS_ACUITY_SCORE: 40
ADLS_ACUITY_SCORE: 40
ADLS_ACUITY_SCORE: 44
ADLS_ACUITY_SCORE: 40
ADLS_ACUITY_SCORE: 44
ADLS_ACUITY_SCORE: 40
ADLS_ACUITY_SCORE: 44
ADLS_ACUITY_SCORE: 40
ADLS_ACUITY_SCORE: 44
ADLS_ACUITY_SCORE: 40
ADLS_ACUITY_SCORE: 40

## 2024-03-08 NOTE — PROGRESS NOTES
[facility] MEDICINE  PROGRESS NOTE       Securely message me with [place]ley (more info)    Code Status: Full Code  Procedure(s):  Lumbar wound irrigation and debridement  with wound vacuum  Day of Surgery  Identification/Summary:   68 year old female past medical history significant for recent L4-L5 bilateral fusion on 2/23/2024 discharged home on 2/26 , non-Hodgkin's lymphoma/small B lymphocyte lymphoma, lymphoproliferative disorder on tyrosine kinase inhibitor, typically sees [facility], obesity, depression and anxiety and restless leg syndrome presented to the emergency room with complaints of worsening low back pain last couple of days, with postoperative wound erythema, warmth, lumbar MRI concerning for fluid collection extending into the epidural space s/p I&D in ED pending cultures.      S/p bedside evacuation of post-operative fluid collection by Neurosurgery on day of admit. Infectious Disease consulted and recommended formal surgical washout. As such, anticoagulation held, last dose lovenox on 3/4 at 2010hrs. Ongoing antibiotics with zosyn per ID. Had surgical washout on 3/7/2024.    Needs finalized culture s/s from surgery before she can be discharged on an appropriate abx. ID following. Continue post-op cares as per routine.      -----  Postoperative wound infection  Assessment: on admit treated for postop infection: S/p bilateral L4-L5 fusion 2/23/2024, had an elevated Temp of 100.1, leukocytosis 21, , ESR 29, Lactic acid normal. S/p I&D, 60 mL of bloody fluid sent to the lab; now with staph epi and enterobacter  Plan:  - continue zosyn   - ID consulted, appreciate; defer modification to them  - s/p debridement 3/7  - Pain control on Tylenol, oxycodone, Robaxin, gabapentin IV Dilaudid as needed     History of non-Hodgkin's lymphoma  Small lymphocytic lymphoma  - Sees [facility], per family members holding off on the tyrosine kinase inhibitor for 10  "days before and after surgery  -thus, Holding for now  -monitor clinically      Depression anxiety  -Continue home meds     Restless leg syndrome  -On Requip     GERD  On PPI     Obesity  Encourage weight loss   - lovenox for dvt ppdx per surgery, now held for surgical washout on 3/7.    Anticoagulation   Orders (Includes Only Anticoagulants)    None      Therapy: PT, OT  Camejo:Not present  Lines: None       Current Diet  Orders Placed This Encounter      Advance Diet as Tolerated: Regular Diet Adult          Barriers to Discharge: culture from intraoperative pending    Disposition: inpatient    Clinically Significant Risk Factors                         # Obesity: Estimated body mass index is 38.96 kg/m  as calculated from the following:    Height as of this encounter: 1.626 m (5' 4\").    Weight as of this encounter: 103 kg (227 lb).        # Financial/Environmental Concerns: none           37 MINUTES SPENT BY ME on the date of service doing chart review, history, exam, documentation & further activities per the note.    Je Mcneill MD  Internal Medicine  Hospitalist  [facility]  Phone: #[phone]  Securely message with the Vocera Web Console (learn more here)  Text page via My [employer] Paging/Directory       Interval History/Subjective:  No acute events overnight.    No report of chest pain, shortness of breath, or other new complaints. Discussed plan of care with patient. Answered all questions to patient's verbalized understanding and satisfaction.      Last 24H PRN:     benzocaine-menthol (CEPACOL) 15-3.6 MG lozenge 1 lozenge, 1 lozenge at 03/08/24 0008    HYDROmorphone (DILAUDID) injection 0.4 mg, 0.4 mg at 03/07/24 1155    ondansetron (ZOFRAN ODT) ODT tab 4 mg **OR** ondansetron (ZOFRAN) injection 4 mg, 4 mg at 03/07/24 0910    oxyCODONE (ROXICODONE) tablet 5-10 mg, 5 mg at 03/08/24 0008    Physical Exam/Objective:  Temp:  [97.2  F (36.2  C)-99.3  F (37.4  C)] 99.3  F (37.4 "  C)  Pulse:  [66-79] 70  Resp:  [9-20] 16  BP: (118-175)/(59-78) 119/73  SpO2:  [83 %-97 %] 94 %  Wt Readings from Last 4 Encounters:   03/03/24 103 kg (227 lb)   02/23/24 103 kg (227 lb)   09/19/23 90.7 kg (200 lb)   07/27/23 90.7 kg (200 lb)     Body mass index is 38.96 kg/m .    GENERAL:  Alert, appears comfortable, in no acute distress, appears stated age   HEAD:  Normocephalic, without obvious abnormality, atraumatic   EYES:  Conjunctiva/corneas clear, no scleral icterus, EOM's appears intact grossly   NOSE: Nares normal, septum midline, mucosa normal, no drainage   THROAT: Lips, mucosa, and tongue appear normal   NECK: Symmetrical, trachea appears midline   BACK:   Symmetric, no curvature noted   LUNGS:   Symmetric chest rise on inhalation, respirations unlabored   CHEST WALL:  No apparent deformity   HEART:  No thrills or heaves visualized   ABDOMEN:   No masses or organomegaly apparent; non-scaphoid   EXTREMITIES: Extremities appear normal, atraumatic, no cyanosis   SKIN: No exanthems in the visualized areas   NEURO: Alert and oriented x3 , moves all four extremities freely and spontaneously   PSYCH: Cooperative, behavior is appropriate     Medications:   Personally Reviewed.  Medications    sodium chloride 75 mL/hr at 03/08/24 0008      acetaminophen  975 mg Oral Q8H    FLUoxetine  40 mg Oral Daily    gabapentin  100 mg Oral TID    methocarbamol  750 mg Oral Q6H    pantoprazole  40 mg Oral Daily    piperacillin-tazobactam  3.375 g Intravenous Q8H    polyethylene glycol  17 g Oral Daily    rOPINIRole  1 mg Oral BID    rOPINIRole  2 mg Oral At Bedtime    senna-docusate  1 tablet Oral BID    sodium chloride (PF)  3 mL Intracatheter Q8H    vancomycin  1,000 mg Intravenous Q12H    wound support modular  60 mL Oral Daily       Data reviewed today: I personally reviewed all new medications, labs, imaging/diagnostics reports over the past 24 hours. Pertinent findings include:    Imaging:   No results found for this  or any previous visit (from the past 24 hour(s)).    Labs:  MR Lumbar Spine w/o Contrast   Final Result   IMPRESSION:   1.  L4-L5 fusion. No hardware complications. Good positioning of transpedicular screws and interbody graft.      2.  Dorsal subcutaneous fluid collection as above which extends through the surgical approach into the dorsal canal epidural space and most prominent at L4-L5. Slight extension cephalad to L1-L2. Consider further evaluation with contrast-enhanced    imaging. Fairly homogeneous fluid collection without significant surrounding inflammatory signal changes and may represent seroma or resolving hematoma. Infectious/inflammatory fluid accumulation is possible, however.      3.  Lumbar spondylosis      4.  No fractures.      5.  Findings discussed with Flor James of the neurosurgery service on 3/3/2024 1255 PM CST      XR Lumbar Spine 2/3 Views   Final Result   IMPRESSION: Five lumbar vertebral bodies. Minimal convex right curvature, unchanged. Dorsal midline skin staples persist. Postoperative changes of interbody and posterior spinal fixation across L4-L5. Grade 1 anterolisthesis of L4 on L5 and L5 on S1,    unchanged. Intact hardware. Severe interspace and endplate degeneration L2-L3. Normal vertebral body heights.           Recent Results (from the past 24 hour(s))   Gram Stain    Collection Time: 03/07/24  9:54 AM    Specimen: Spine, Lumbar; Body fluid, unsp   Result Value Ref Range    Gram Stain Result No organisms seen     Gram Stain Result 3+ WBC seen    KOH Preparation    Collection Time: 03/07/24  9:54 AM    Specimen: Spine, Lumbar; Body fluid, unsp   Result Value Ref Range    KOH Preparation No fungal elements seen     KOH Preparation Reference Range: No fungal elements seen.    Gram Stain    Collection Time: 03/07/24 10:09 AM    Specimen: Spine, Lumbar; Tissue   Result Value Ref Range    Gram Stain Result No organisms seen     Gram Stain Result 1+ WBC seen    Gram Stain     Collection Time: 03/07/24 10:15 AM    Specimen: Spine, Lumbar; Tissue   Result Value Ref Range    Gram Stain Result No organisms seen     Gram Stain Result 3+ WBC seen    CBC with platelets    Collection Time: 03/08/24  6:53 AM   Result Value Ref Range    WBC Count 12.6 (H) 4.0 - 11.0 10e3/uL    RBC Count 3.37 (L) 3.80 - 5.20 10e6/uL    Hemoglobin 9.2 (L) 11.7 - 15.7 g/dL    Hematocrit 28.9 (L) 35.0 - 47.0 %    MCV 86 78 - 100 fL    MCH 27.3 26.5 - 33.0 pg    MCHC 31.8 31.5 - 36.5 g/dL    RDW 13.1 10.0 - 15.0 %    Platelet Count 276 150 - 450 10e3/uL       Pending Labs:  Unresulted Labs Ordered in the Past 30 Days of this Admission       Date and Time Order Name Status Description    3/8/2024 12:02 AM Basic metabolic panel In process     3/7/2024 10:16 AM Tissue Aerobic Bacterial Culture Routine In process     3/7/2024 10:16 AM Fungal or Yeast Culture Routine In process     3/7/2024 10:16 AM Anaerobic Bacterial Culture Routine In process     3/7/2024 10:15 AM Tissue Aerobic Bacterial Culture Routine In process     3/7/2024 10:15 AM Fungal or Yeast Culture Routine In process     3/7/2024 10:15 AM Anaerobic Bacterial Culture Routine In process     3/7/2024 10:03 AM Acid-Fast Bacilli Culture and Stain In process     3/7/2024 10:03 AM MTB Complex and Resistance In process     3/7/2024 10:03 AM Body fluid, unsp Aerobic Bacterial Culture Routine In process     3/7/2024 10:03 AM Fungal or Yeast Culture Routine In process     3/7/2024 10:03 AM Anaerobic Bacterial Culture Routine In process     3/7/2024 10:03 AM Acid-Fast Bacilli Culture and Stain In process     3/3/2024  2:21 PM Blood Culture Peripheral Blood Preliminary     3/3/2024  2:21 PM Blood Culture Peripheral Blood Preliminary     3/3/2024  1:52 PM Anaerobic Bacterial Culture Routine Preliminary

## 2024-03-08 NOTE — PLAN OF CARE
Problem: Adult Inpatient Plan of Care  Goal: Plan of Care Review  Description: The Plan of Care Review/Shift note should be completed every shift.  The Outcome Evaluation is a brief statement about your assessment that the patient is improving, declining, or no change.  This information will be displayed automatically on your shift  note.  Outcome: Progressing     Problem: Adult Inpatient Plan of Care  Goal: Absence of Hospital-Acquired Illness or Injury  Intervention: Prevent Skin Injury  Recent Flowsheet Documentation  Taken 3/8/2024 0300 by Ananya Warner RN  Body Position:   position changed independently   right   turned   side-lying 90 degrees   tilted   weight shifting  Taken 3/8/2024 0000 by Ananya Warner RN  Body Position:   turned   left  Taken 3/7/2024 2200 by Ananya Warner RN  Body Position: turned  Taken 3/7/2024 1930 by Ananya Warner RN  Body Position:   turned   right  Skin Protection:   adhesive use limited   incontinence pads utilized   transparent dressing maintained  Device Skin Pressure Protection: tubing/devices free from skin contact     Problem: Adult Inpatient Plan of Care  Goal: Absence of Hospital-Acquired Illness or Injury  Intervention: Prevent Infection  Recent Flowsheet Documentation  Taken 3/7/2024 2330 by Ananya Warner RN  Infection Prevention:   hand hygiene promoted   personal protective equipment utilized   rest/sleep promoted   single patient room provided  Taken 3/7/2024 1930 by Ananya Warner RN  Infection Prevention:   hand hygiene promoted   personal protective equipment utilized   rest/sleep promoted   single patient room provided     Problem: Adult Inpatient Plan of Care  Goal: Optimal Comfort and Wellbeing  Intervention: Monitor Pain and Promote Comfort  Recent Flowsheet Documentation  Taken 3/8/2024 0053 by Ananya Warner RN  Pain Management Interventions:   care clustered   distraction   pain management plan reviewed with  patient/caregiver   pillow support provided   rest   repositioned  Taken 3/7/2024 2306 by Ananya Warner RN  Pain Management Interventions: medication (see MAR)     Problem: Adult Inpatient Plan of Care  Goal: Readiness for Transition of Care  Outcome: Progressing   Goal Outcome Evaluation:    A&OX 4 and VSS on RA. Pt has wound vac in place and is CDI w/ 150ml output from 1900 on 3/7 to 0700 on 3/8. Pt is walking w/ assist of one w/ walker and GB. Pt is voiding adequately. Pt pain was treated w/ scheduled Tylenol, Gabapentin, and Robaxin. Pain was also treated w/ PRN Oxy 5mg at midnight which seemed to be effective. Pt has 2 PIV. The right PIV is SL. The left PIV ican be SL after the Vanco is finished infusing. Zosyn was also given overnight.  Multiple cultures are pending from pt's I&D on 3/7. Discharge pending on wound vac, cultures, and antibiotics.

## 2024-03-08 NOTE — PROGRESS NOTES
03/08/24 1300   Appointment Info   Signing Clinician's Name / Credentials (PT) Fabienne Mascorro, PT, DPT   Living Environment   People in Home child(saad), adult  (Daughter)   Current Living Arrangements house   Home Accessibility stairs to enter home   Number of Stairs, Main Entrance 2   Stair Railings, Main Entrance railings safe and in good condition   Number of Stairs, Within Home, Primary six   Stair Railings, Within Home, Primary railings safe and in good condition   Self-Care   Equipment Currently Used at Home walker, rolling;cane, straight   General Information   Onset of Illness/Injury or Date of Surgery 03/03/24   Referring Physician Dr. Je Mcneill   Patient/Family Therapy Goals Statement (PT) Move without pain   Pertinent History of Current Problem (include personal factors and/or comorbidities that impact the POC) L4-5 Fusion 2/23/24, Post-op infection with I&D on 3/7/24   Existing Precautions/Restrictions spinal  (Wound Vac)   Weight-Bearing Status - LLE weight-bearing as tolerated   Weight-Bearing Status - RLE weight-bearing as tolerated   Bed Mobility   Bed Mobility supine-sit   Scooting/Bridging [place] (Bed Mobility) supervision;verbal cues   Transfers   Transfers sit-stand transfer   Maintains Weight-bearing Status (Transfers) able to maintain   Sit-Stand Transfer   Sit-Stand [place] (Transfers) supervision;verbal cues   Assistive Device (Sit-Stand Transfers) walker, front-wheeled   Gait/Stairs (Locomotion)   [place] Level (Gait) verbal cues;contact guard   Assistive Device (Gait) walker, front-wheeled   Distance in Feet (Gait) 10   Pattern (Gait) step-through   Deviations/Abnormal Patterns (Gait) base of support, wide;alfie decreased;gait speed decreased   Maintains Weight-bearing Status (Gait) able to maintain   Clinical Impression   Criteria for Skilled Therapeutic Intervention Yes, treatment indicated   PT Diagnosis (PT) Impaired functional mobility   Influenced by the  following impairments weakness, pain   Functional limitations due to impairments transfers, gait, stairs   Clinical Presentation (PT Evaluation Complexity) evolving   Clinical Presentation Rationale Pt presents as medically diagnosed   Clinical Decision Making (Complexity) low complexity   Planned Therapy Interventions (PT) gait training;patient/family education;transfer training;stair training   Risk & Benefits of therapy have been explained evaluation/treatment results reviewed;care plan/treatment goals reviewed;patient   PT Total Evaluation Time   PT Eval, Re-eval Minutes (34641) 10   Physical Therapy Goals   PT Frequency Daily   PT Predicted Duration/Target Date for Goal Attainment 03/11/24   PT Goals Gait;Stairs   PT: Gait Modified independent;Rolling walker;150 feet   PT: Stairs Supervision/stand-by assist;6 stairs;Rail on right   Therapeutic Procedure/Exercise   Treatment Detail/Skilled Intervention Encouraged ankle pumps x 10 reps, independent   Therapeutic Activity   Treatment Detail/Skilled Intervention Supine to sit with CGA, cueing to maintain log roll technique. Sit<>Stand with FWW, SBA, verbal cueing for hand placement. Toilet transfer with FWW, SBA. Independent with pericares. Sit to supine, SBA. patient did not use log roll technique despite cueing to use.   Gait Training   Gait Training Minutes (21985) 15   Symptoms Noted During/After Treatment (Gait Training) increased pain;fatigue   Treatment Detail/Skilled Intervention Safe ambulation, unable to trial stairs at this time due to number of lines and tubes, and patient fatigue. Encouraged patient to continue ambulating with nursing to bathroom and in [place] as able   Distance in Feet 350, 10   [place] Level (Gait Training) stand-by assist   Physical Assistance Level (Gait Training) verbal cues;1 person + 1 person to manage equipment  (Rehab aide to manage 3 IV lines and wound vac)   Weight Bearing (Gait Training) weight-bearing as tolerated    Assistive Device (Gait Training) rolling walker   Pattern Analysis (Gait Training) swing-through gait   Gait Analysis Deviations decreased alfie;decreased step length   Impairments (Gait Analysis/Training) pain;strength decreased   PT Discharge Planning   PT Plan Trial stairs and D/C PT   PT Discharge Recommendation (DC Rec) (S)  home with assist   PT Rationale for DC Rec Patient ambulating safely, has daughter for support at home   PT Brief overview of current status Amb 350ft with FWW, SBA   PT Equipment Needed at Discharge walker, rolling   Total Session Time   Timed Code Treatment Minutes 15   Total Session Time (sum of timed and untimed services) 25

## 2024-03-08 NOTE — PROGRESS NOTES
Care Management Follow Up    Length of Stay (days): 5    Expected Discharge Date: 03/09/2024     Concerns to be Addressed: discharge planning     Patient plan of care discussed at interdisciplinary rounds: Yes    Anticipated Discharge Disposition: Home, Home Care     Anticipated Discharge Services: None  Anticipated Discharge DME: Other (see comment) (Pending clinical progress)    Patient/family educated on Medicare website which has current facility and service quality ratings: yes  Education Provided on the Discharge Plan: Yes  Patient/Family in Agreement with the Plan: yes    Referrals Placed by CM/SW: Home Infusion  Private pay costs discussed: Not applicable    Additional Information:    11:23 AM  CM met with pt to discuss discharge planning.  Pt is aware of potential need for IV abx and wound vac at time of discharge; aware plans are TBD.    Should pt need home IV abx/wound vac at discharge:  Pt made aware home infusion would be self-pay.  Pt would prefer [facility].  Pt would prefer to avoid TCU placement.    Pt would be agreeable to homecare for wound vac needs.    Pt lives with her daughter and will further discuss discharge options with her daughter; she is aware final plans are still TBD.    Pt currently has a Veraflo wound vac in place.  Yesterday, per Rajani BALBUENA, anticipate next wound vac change on Monday when pt's primary neurosurgeon returns; final plans for wound vac are TBD.    CM will continue to follow.    Solo Carballo RN

## 2024-03-08 NOTE — PROGRESS NOTES
"INFECTIOUS DISEASE FOLLOW UP NOTE      ASSESSMENT:  Post-operative wound infection. Hardware in place. Fluid collection on MRI subcutaneous, extends to deep structures. Aspirate 3/3 now growing enterobacter. Anaerobe, and staph epi. Deep infection. Now s/p I+D 3/7 -- large subQ fluid collection that tracked down to epidural space. Old hematoma noted. Hardware had covered over with granulation tissue. Growth of staph epi from aspirate of uncertain significance, could be true pathogen or skin contaminant. Seek confirmation of this on deep operative cultures. So far enterobacter on surgical cultures.   S/p L4--L5 transforaminal lumbar interbody fusion on 24.   H/o NHL, maintained on acalabrutinib    PLAN:  Continue pip/tazo  Vancomycin for now to cover possible staph epi. If surgical cultures negative for staph epi, can likely stop.   To stay in hospital into next week, for possible wound closure.   Expect PICC -- single or double lumen depending on 1 or 2 antibiotics    Tereso Tucker MD  [facility]  [phone] [facility] p[facility]    ______________________________________________________________________    SUBJECTIVE / INTERVAL HISTORY: surgery yesterday -- dark fluid large collection subQ, tracked deep to epidural space, hardware had covered over with granulation tissue. Pain controlled. Tolerating antibiotics.  Has ambulated and this feels pretty good. Strength maintained.       ROS: All other systems negative except as listed above.        OBJECTIVE:  /74 (BP Location: Left arm)   Pulse 67   Temp 98.2  F (36.8  C) (Oral)   Resp 18   Ht 1.626 m (5' 4\")   Wt 103 kg (227 lb)   SpO2 94%   BMI 38.96 kg/m         Vital Signs  Temp: 98.4  F (36.9  C)  Temp src: Oral  Resp: 18  Pulse: 68  Pulse Rate Source: Monitor  BP: 136/63  BP Location: Left arm    Temp (24hrs), Av.4  F (36.9  C), Min:98  F (36.7  C), Max:98.7  F (37.1  C)      GEN: No acute distress.  "   RESPIRATORY:  Normal breathing pattern.   CARDIOVASCULAR:  Regular rate and rhythm. Normal S1 and S2. No murmur  ABDOMEN:  deferred  EXTREMITIES: No edema.  SKIN/HAIR/NAILS:  No rashes. Wound with vac/irrigation device.   Strength in legs pretty good  IV: peripheral        Antibiotics:  pip/tazo 3/3-  Vancomycin 3/3-4    Pertinent labs:    Recent Labs   Lab 03/08/24  0653 03/06/24  0626 03/05/24  2353 03/04/24  0602   WBC 12.6* 9.2  --  10.7   HGB 9.2* 9.4*  --  9.6*   HCT 28.9* 29.7*  --  30.1*    265 212 195        Recent Labs   Lab 03/08/24  0653 03/06/24  0626 03/04/24  0602    138 135   CO2 26 28 22   BUN 13.1 8.6 10.1         Lab Results   Component Value Date    ALT 19 03/03/2024    AST 25 03/03/2024    ALKPHOS 101 03/03/2024         MICROBIOLOGY DATA:  3/3 blood negative  3/3 aspirate enterobacter, staph epi, peptoniphilus  3/7 surgical -- enterobacter    RADIOLOGY:  MR Lumbar Spine w/o Contrast    Result Date: 3/3/2024  EXAM: MR LUMBAR SPINE W/O CONTRAST LOCATION: [facility] DATE: 3/3/2024 INDICATION: Lumbar fusion 02/23/2024. Signs and symptoms of infection. Lumbar pain. Drainage at surgical site. COMPARISON: Lumbar radiographs 03/03/2024. Lumbar MRI 11 08/01/2023 TECHNIQUE: Routine Lumbar Spine MRI without IV contrast. FINDINGS: Redemonstration of L4-L5 anterior posterior fusion. Centrally positioned interbody graft and bilateral transpedicular screws with intertransverse rods. 4 mm anterolisthesis of L4 on L5 compared with 5 mm on the preoperative exam. 2.5 mm degenerative anterolisthesis at L5-S1 unchanged. 2.5 mm degenerative retrolisthesis at L2-L3 unchanged. L4-L5 laminotomy. Nomenclature is based on 5 lumbar type vertebral bodies. Normal vertebral body heights. Negative for fracture. Benign osseous hemangioma at the L4 vertebral body. No suspicious marrow signal change. Type II Modic endplate changes at L2-L3 and L3-L4. Normal distal spinal cord and cauda  equina with conus medullaris at L2. Fluid collection in the midline dorsal soft tissues extends from L1-L2 inferiorly to L5-S1 measuring 11 cm cephalocaudad by 6 cm AP by 6 cm mediolateral. A component  of the collection extends into the surgical tract, through the hemilaminotomy and to the dorsal surgical space at L4-L5. This is difficult to evaluate due to susceptibility artifact but the canalicular portion measures approximately 22 x 13 mm transverse extending through the posterior elements. Compression of the right dorsal thecal sac and moderate central canal stenosis is present as seen on axial image 9 of series 6.  Unremarkable visualized bony pelvis. T11-T12: Disc desiccation and ventral osteophyte. Patent central canal and foramen. Mild annular bulge. T12-L1: Disc desiccation , height loss and chronic Schmorl's nodes. Patent central canal and foramen. L1-L2: Disc desiccation. Patent central canal and foramina. There may be a tiny amount of fluid in the dorsal epidural space related to previous surgery. L2-L3: Disc osteophyte complex. Small amount of dorsal canal epidural space fluid may be related to recent surgery. Mild central canal stenosis. Moderate to severe left and mild-to-moderate right foraminal stenosis. L3-L4: Mild disc bulge. Patent central canal. Facet DJD with mild-to-moderate right and mild left foraminal stenosis. L4-L5: Fusion and hemilaminotomy. Moderate central canal stenosis fluid in the dorsal and right dorsal canal appears contiguous with the larger subcutaneous fluid collection and extends to the surgical approach. Signal characteristics are nonspecific but  do not appear particularly complex and this could represent seroma or resolving hematoma. No surrounding inflammatory changes. No definite evidence for discitis or osteomyelitis. Contrast-enhanced imaging may be useful for further evaluation. Mild-to-moderate right and minimal left foraminal stenosis. L5-S1: Disc desiccation and  mild disc bulge. Facet DJD. Moderate right and mild left foraminal stenosis. Patent central canal.     IMPRESSION: 1.  L4-L5 fusion. No hardware complications. Good positioning of transpedicular screws and interbody graft. 2.  Dorsal subcutaneous fluid collection as above which extends through the surgical approach into the dorsal canal epidural space and most prominent at L4-L5. Slight extension cephalad to L1-L2. Consider further evaluation with contrast-enhanced imaging. Fairly homogeneous fluid collection without significant surrounding inflammatory signal changes and may represent seroma or resolving hematoma. Infectious/inflammatory fluid accumulation is possible, however. 3.  Lumbar spondylosis 4.  No fractures. 5.  Findings discussed with Flor James of the neurosurgery service on 3/3/2024 1255 PM CST    XR Lumbar Spine 2/3 Views    Result Date: 3/3/2024  EXAM: XR LUMBAR SPINE 2/3 VIEWS LOCATION: [facility] DATE: 03/03/2024 INDICATION: Lumbar fusion. COMPARISON: 02/26/2024.     IMPRESSION: Five lumbar vertebral bodies. Minimal convex right curvature, unchanged. Dorsal midline skin staples persist. Postoperative changes of interbody and posterior spinal fixation across L4-L5. Grade 1 anterolisthesis of L4 on L5 and L5 on S1, unchanged. Intact hardware. Severe interspace and endplate degeneration L2-L3. Normal vertebral body heights.     XR Lumbar Spine 2 - 3 VWS Portable    Result Date: 2/26/2024  EXAM: XR LUMBAR SPINE PORT 2/3 VIEWS LOCATION: [facility] DATE/TIME: 2/26/2024 4:50 PM CST INDICATION: s p lumbar fusion, upright XR. COMPARISON: None. TECHNIQUE: Radiographs of lumbar spine in routine projections. FINDINGS: Staus post  L4-L5 posterior interbody fusion. No evidence of hardware failure. Alignment similar to prior without change in minor dextrocurvature, grade 1 anterolisthesis at L4-L5 and L5-S1. Normal height of vertebral bodies. Advanced L2-L3  interbody degeneration similar to prior. Mild multi level facet arthropathy. Unremarkable visualized bony pelvis. Cholecystectomy clips.    XR SURGERY CAROLINE FLUORO GREATER THAN 5 MIN    Result Date: 2/23/2024  This exam was marked as non-reportable because it will not be read by a radiologist or a [facility] non-radiologist provider.     XR Surgery CAROLINE  Fluoro G/T 5 Min    Result Date: 2/23/2024  This exam was marked as non-reportable because it will not be read by a radiologist or a [facility] non-radiologist provider.     Lateral Lumbar Spine for Surgical Imaging  [XR LUMBAR SPINE PORT 1  VIEW]    Result Date: 2/23/2024  EXAM: XR CROSSTABLE LATERAL LUMBAR SPINE PORTABLE LOCATION: [facility] DATE: 2/23/2024 INDICATION: Intra operative COMPARISON: None.     IMPRESSION: Intraoperative crosstable lateral radiograph. Underpenetration of the lumbosacral junction somewhat comprises evaluation. Posterior approach surgical clamp projects over the posterior margin of the L3 posterior spinous process and is directed  at the L4 vertebral body and pedicles.

## 2024-03-08 NOTE — PLAN OF CARE
Goal Outcome Evaluation:      Plan of Care Reviewed With: patient    Overall Patient Progress: improvingOverall Patient Progress: improving    Outcome Evaluation: Pt alert and oriented x 4. Minimal incisional pain. Wound vac plugged in and functioning throughout the shift. CMS intact. Up to the bathroom with assist of 1 gb  walker. Getting IV zoysn and vanco. Plan to stay in the hospital through the weekend.      Problem: Pain Acute  Goal: Optimal Pain Control and Function  Outcome: Progressing  Intervention: Develop Pain Management Plan  Recent Flowsheet Documentation  Taken 3/8/2024 1235 by Russell Luz RN  Pain Management Interventions:   distraction   rest   repositioned   declines   medication (see MAR)  Taken 3/8/2024 0920 by Russell Luz RN  Pain Management Interventions:   medication (see MAR)   distraction   emotional support   repositioned   rest  Intervention: Prevent or Manage Pain  Recent Flowsheet Documentation  Taken 3/8/2024 0920 by Russell Luz RN  Medication Review/Management:   medications reviewed   high-risk medications identified     Problem: Infection  Goal: Absence of Infection Signs and Symptoms  Outcome: Progressing     Problem: Spinal Surgery  Goal: Optimal Functional Ability  Outcome: Progressing  Intervention: Optimize Functional Status  Recent Flowsheet Documentation  Taken 3/8/2024 1235 by Russell Luz RN  Activity Management: back to bed  Positioning/Transfer Devices:   pillows   in use  Taken 3/8/2024 1000 by Russell Luz RN  Positioning/Transfer Devices:   pillows   in use  Taken 3/8/2024 0920 by Russell Luz RN  Activity Management: activity adjusted per tolerance  Positioning/Transfer Devices:   pillows   in use  Taken 3/8/2024 0800 by Russell Luz RN  Positioning/Transfer Devices:   pillows   in use     Russell Luz RN, ONC

## 2024-03-08 NOTE — PROGRESS NOTES
[facility]  Neurosurgery Daily Progress Note    Assessment & Plan   Procedure(s):  LUMBAR WOUND IRRIGATION AND DEBRIDEMENT  WITH WOUND VACUUM   1 Day Post-Op    -Cultures in progress, ID following for IV antibiotic management   -Continue wound vac and wound care, appreciate assistance from WOC team   -Continue pain control measures as needed   -PT/OT  -Dr. Hudson plans to see patient on Monday to further discuss next steps  -Appreciate assistance from specialties      Discussed with Dr. Tristan Shah, CNP  [facility]  Tel [phone]  Pager [phone]    Interval History   Patient was seen sitting in the chair. Reports 4/10 low back pain. Denies any radicular pain or new symptoms. Denies any fevers, body aches, or chills. Neuro exam stable. Incision CDI with dressing. Wound vac in place.    Physical Exam   Temp: 98.2  F (36.8  C) Temp src: Oral BP: 126/74 Pulse: 67   Resp: 18 SpO2: 94 % O2 Device: None (Room air)    Vitals:    03/03/24 0802   Weight: 103 kg (227 lb)       Mental status:  Alert and oriented x 3, speech is fluent.  Motor:   Iliopsoas  (hip flexion)               Right: 5/5  Left:  5/5  Quadriceps  (knee extension)       Right:  5/5  Left:  5/5  Hamstrings  (knee flexion)            Right:  5/5  Left:  5/5  Gastroc Soleus  (PF)                          Right:  5/5  Left:  5/5  Tibialis Ant  (DF)                          Right:  5/5  Left:  5/5  EHL                          Right:  5/5  Left:  5/5    Sensation:  Intact to light touch in BLE  Incision: CDI with dresssing   Wound vac: Intact

## 2024-03-09 ENCOUNTER — APPOINTMENT (OUTPATIENT)
Dept: PHYSICAL THERAPY | Facility: CLINIC | Age: 69
DRG: 857 | End: 2024-03-09
Payer: COMMERCIAL

## 2024-03-09 LAB
ANNOTATION COMMENT IMP: NOT DETECTED
BACTERIA FLD CULT: ABNORMAL
BACTERIA FLD CULT: ABNORMAL
BACTERIA TISS BX CULT: ABNORMAL
BACTERIA TISS BX CULT: ABNORMAL
CREAT SERPL-MCNC: 0.82 MG/DL (ref 0.51–0.95)
DEPRECATED M TB RPOB XXX QL NAA+PROBE: NORMAL
EGFRCR SERPLBLD CKD-EPI 2021: 77 ML/MIN/1.73M2
M TB DNA SPEC QL NAA+PROBE: NOT DETECTED
MAGNESIUM SERPL-MCNC: 1.6 MG/DL (ref 1.7–2.3)
PLATELET # BLD AUTO: 271 10E3/UL (ref 150–450)
POTASSIUM SERPL-SCNC: 3.3 MMOL/L (ref 3.4–5.3)
POTASSIUM SERPL-SCNC: 4 MMOL/L (ref 3.4–5.3)
VANCOMYCIN SERPL-MCNC: 16.4 UG/ML

## 2024-03-09 PROCEDURE — 82565 ASSAY OF CREATININE: CPT | Performed by: INTERNAL MEDICINE

## 2024-03-09 PROCEDURE — 97530 THERAPEUTIC ACTIVITIES: CPT | Mod: GP

## 2024-03-09 PROCEDURE — 84132 ASSAY OF SERUM POTASSIUM: CPT | Performed by: HOSPITALIST

## 2024-03-09 PROCEDURE — 120N000001 HC R&B MED SURG/OB

## 2024-03-09 PROCEDURE — 85049 AUTOMATED PLATELET COUNT: CPT | Performed by: INTERNAL MEDICINE

## 2024-03-09 PROCEDURE — 250N000013 HC RX MED GY IP 250 OP 250 PS 637

## 2024-03-09 PROCEDURE — 250N000013 HC RX MED GY IP 250 OP 250 PS 637: Performed by: INTERNAL MEDICINE

## 2024-03-09 PROCEDURE — 99232 SBSQ HOSP IP/OBS MODERATE 35: CPT | Performed by: INTERNAL MEDICINE

## 2024-03-09 PROCEDURE — 99232 SBSQ HOSP IP/OBS MODERATE 35: CPT | Performed by: HOSPITALIST

## 2024-03-09 PROCEDURE — 250N000013 HC RX MED GY IP 250 OP 250 PS 637: Performed by: HOSPITALIST

## 2024-03-09 PROCEDURE — 250N000011 HC RX IP 250 OP 636: Performed by: INTERNAL MEDICINE

## 2024-03-09 PROCEDURE — 999N000111 HC STATISTIC OT IP EVAL DEFER

## 2024-03-09 PROCEDURE — 36415 COLL VENOUS BLD VENIPUNCTURE: CPT | Performed by: INTERNAL MEDICINE

## 2024-03-09 PROCEDURE — 83735 ASSAY OF MAGNESIUM: CPT | Performed by: HOSPITALIST

## 2024-03-09 PROCEDURE — 36415 COLL VENOUS BLD VENIPUNCTURE: CPT | Performed by: HOSPITALIST

## 2024-03-09 PROCEDURE — 97116 GAIT TRAINING THERAPY: CPT | Mod: GP

## 2024-03-09 PROCEDURE — 80202 ASSAY OF VANCOMYCIN: CPT | Performed by: HOSPITALIST

## 2024-03-09 RX ORDER — LOPERAMIDE HYDROCHLORIDE 2 MG/1
2 CAPSULE ORAL 4 TIMES DAILY PRN
Status: DISCONTINUED | OUTPATIENT
Start: 2024-03-09 | End: 2024-03-14 | Stop reason: HOSPADM

## 2024-03-09 RX ORDER — POTASSIUM CHLORIDE 1500 MG/1
40 TABLET, EXTENDED RELEASE ORAL ONCE
Status: COMPLETED | OUTPATIENT
Start: 2024-03-09 | End: 2024-03-09

## 2024-03-09 RX ORDER — ERTAPENEM 1 G/1
1 INJECTION, POWDER, LYOPHILIZED, FOR SOLUTION INTRAMUSCULAR; INTRAVENOUS EVERY 24 HOURS
Status: DISCONTINUED | OUTPATIENT
Start: 2024-03-10 | End: 2024-03-14 | Stop reason: HOSPADM

## 2024-03-09 RX ADMIN — PIPERACILLIN AND TAZOBACTAM 3.38 G: 3; .375 INJECTION, POWDER, FOR SOLUTION INTRAVENOUS at 02:19

## 2024-03-09 RX ADMIN — PIPERACILLIN AND TAZOBACTAM 3.38 G: 3; .375 INJECTION, POWDER, FOR SOLUTION INTRAVENOUS at 10:25

## 2024-03-09 RX ADMIN — FLUOXETINE 40 MG: 20 CAPSULE ORAL at 08:16

## 2024-03-09 RX ADMIN — PANTOPRAZOLE SODIUM 40 MG: 40 TABLET, DELAYED RELEASE ORAL at 08:16

## 2024-03-09 RX ADMIN — METHOCARBAMOL TABLETS 750 MG: 750 TABLET, COATED ORAL at 10:25

## 2024-03-09 RX ADMIN — METHOCARBAMOL TABLETS 750 MG: 750 TABLET, COATED ORAL at 16:47

## 2024-03-09 RX ADMIN — GABAPENTIN 100 MG: 100 CAPSULE ORAL at 20:28

## 2024-03-09 RX ADMIN — ROPINIROLE 1 MG: 0.25 TABLET, FILM COATED ORAL at 08:16

## 2024-03-09 RX ADMIN — POTASSIUM CHLORIDE 40 MEQ: 1500 TABLET, EXTENDED RELEASE ORAL at 07:36

## 2024-03-09 RX ADMIN — PIPERACILLIN AND TAZOBACTAM 3.38 G: 3; .375 INJECTION, POWDER, FOR SOLUTION INTRAVENOUS at 18:58

## 2024-03-09 RX ADMIN — GABAPENTIN 100 MG: 100 CAPSULE ORAL at 13:59

## 2024-03-09 RX ADMIN — VANCOMYCIN HYDROCHLORIDE 1000 MG: 1 INJECTION, SOLUTION INTRAVENOUS at 12:55

## 2024-03-09 RX ADMIN — METHOCARBAMOL TABLETS 750 MG: 750 TABLET, COATED ORAL at 04:26

## 2024-03-09 RX ADMIN — METHOCARBAMOL TABLETS 750 MG: 750 TABLET, COATED ORAL at 22:19

## 2024-03-09 RX ADMIN — ACETAMINOPHEN 975 MG: 325 TABLET ORAL at 20:28

## 2024-03-09 RX ADMIN — ROPINIROLE 1 MG: 0.25 TABLET, FILM COATED ORAL at 16:47

## 2024-03-09 RX ADMIN — VANCOMYCIN HYDROCHLORIDE 1000 MG: 1 INJECTION, SOLUTION INTRAVENOUS at 01:29

## 2024-03-09 RX ADMIN — GABAPENTIN 100 MG: 100 CAPSULE ORAL at 08:16

## 2024-03-09 RX ADMIN — ACETAMINOPHEN 975 MG: 325 TABLET ORAL at 04:26

## 2024-03-09 RX ADMIN — ROPINIROLE HYDROCHLORIDE 2 MG: 1 TABLET, FILM COATED ORAL at 22:19

## 2024-03-09 RX ADMIN — ACETAMINOPHEN 975 MG: 325 TABLET ORAL at 12:54

## 2024-03-09 RX ADMIN — LOPERAMIDE HYDROCHLORIDE 2 MG: 2 CAPSULE ORAL at 12:54

## 2024-03-09 ASSESSMENT — ACTIVITIES OF DAILY LIVING (ADL)
ADLS_ACUITY_SCORE: 40
ADLS_ACUITY_SCORE: 40
ADLS_ACUITY_SCORE: 38
ADLS_ACUITY_SCORE: 39
ADLS_ACUITY_SCORE: 40
ADLS_ACUITY_SCORE: 40
ADLS_ACUITY_SCORE: 39
ADLS_ACUITY_SCORE: 40
ADLS_ACUITY_SCORE: 44
ADLS_ACUITY_SCORE: 40
ADLS_ACUITY_SCORE: 38
ADLS_ACUITY_SCORE: 38
ADLS_ACUITY_SCORE: 39
ADLS_ACUITY_SCORE: 38
ADLS_ACUITY_SCORE: 40
ADLS_ACUITY_SCORE: 38
ADLS_ACUITY_SCORE: 44
ADLS_ACUITY_SCORE: 40

## 2024-03-09 NOTE — PLAN OF CARE
Goal Outcome Evaluation:    Patient is alert and oriented x4, forgetful at times. VSS on RA. Afebrile. Complains of lower back pain that sometimes radiate to legs. Pain managed with scheduled tylenol. Ambulates assist of 1 with gait belt and walker. Wound vac intact on continuous 75mmHg with 375 ml output and canister changed. Voided multiple times throughout the night. New PIV intact and saline locked. Call light within reach and patient able to make needs known.     Problem: Adult Inpatient Plan of Care  Goal: Absence of Hospital-Acquired Illness or Injury  Intervention: Identify and Manage Fall Risk  Recent Flowsheet Documentation  Taken 3/9/2024 0030 by Liliana Cotto RN  Safety Promotion/Fall Prevention:   safety round/check completed   room near nurse's station  Taken 3/8/2024 2105 by Liliana Cotto RN  Safety Promotion/Fall Prevention:   safety round/check completed   room near nurse's station  Intervention: Prevent Skin Injury  Recent Flowsheet Documentation  Taken 3/9/2024 0030 by Liliana Cotto RN  Body Position: position changed independently  Taken 3/8/2024 2105 by Liliana Cotto RN  Body Position: position changed independently  Intervention: Prevent and Manage VTE (Venous Thromboembolism) Risk  Recent Flowsheet Documentation  Taken 3/9/2024 0030 by Liliana Cotto RN  VTE Prevention/Management:   SCDs (sequential compression devices) off   patient refused intervention  Taken 3/8/2024 2105 by Liliana Cotto RN  VTE Prevention/Management:   SCDs (sequential compression devices) off   patient refused intervention  Intervention: Prevent Infection  Recent Flowsheet Documentation  Taken 3/9/2024 0030 by Liliana Cotto RN  Infection Prevention:   hand hygiene promoted   single patient room provided  Taken 3/8/2024 2105 by Liliana Cotto RN  Infection Prevention:   hand hygiene promoted   single patient room provided  Goal: Optimal Comfort and Wellbeing  Outcome: Progressing  Intervention: Monitor Pain and Promote  Comfort  Recent Flowsheet Documentation  Taken 3/9/2024 0030 by Liliana Cotto, RN  Pain Management Interventions:   medication offered but refused   relaxation techniques promoted   pillow support provided   repositioned   rest   care clustered  Taken 3/8/2024 2105 by Liliana Cotto, RN  Pain Management Interventions: medication (see MAR)

## 2024-03-09 NOTE — PLAN OF CARE
Goal Outcome Evaluation:      Plan of Care Reviewed With: patient    Overall Patient Progress: improvingOverall Patient Progress: improving    Outcome Evaluation: Pt alert and oriented x 4. Can be forgetful and anxious at times. Pt reporting little back pain, more itching under the dressing. Wound vac functioning. IV ABT continued. Pt having loose stools, stool softners held and imodiom was added. Pt to likely stay through the weekend and Dr. Hudson with then decide next steps.       Problem: Pain Acute  Goal: Optimal Pain Control and Function  Outcome: Progressing  Intervention: Develop Pain Management Plan  Recent Flowsheet Documentation  Taken 3/9/2024 0828 by Russell Luz, RN  Pain Management Interventions:   medication offered but refused   relaxation techniques promoted   pillow support provided   repositioned   rest   care clustered  Intervention: Prevent or Manage Pain  Recent Flowsheet Documentation  Taken 3/9/2024 0828 by Russell Luz, RN  Medication Review/Management: medications reviewed     Problem: Infection  Goal: Absence of Infection Signs and Symptoms  Outcome: Progressing     Russell Luz RN, ONC

## 2024-03-09 NOTE — PHARMACY-VANCOMYCIN DOSING SERVICE
"Pharmacy Vancomycin Note  Date of Service 2024  Patient's  1955   68 year old, female    Indication: Postoperative Infection and Skin and Soft Tissue Infection  Day of Therapy: 4  Current vancomycin regimen:  1000 mg IV q12h  Current vancomycin monitoring method: AUC  Current vancomycin therapeutic monitoring goal: 400-600 mg*h/L    Insight[employer] Prediction of Current Vancomycin Regimen  Loading dose: N/A  Regimen: 1000 mg IV every 12 hours.  Start time: 13:29 on 2024  Exposure target: AUC24 (range)400-600 mg/L.hr   AUC24,ss: 547 mg/L.hr  Probability of AUC24 > 400: 99 %  Ctrough,ss: 18.4 mg/L  Probability of Ctrough,ss > 20: 33 %  Probability of nephrotoxicity (Lodise DANIELE ): 15 %    Current estimated CrCl = Estimated Creatinine Clearance: 74.6 mL/min (based on SCr of 0.82 mg/dL).    Creatinine for last 3 days  3/7/2024:  6:58 AM Creatinine 0.76 mg/dL  3/8/2024:  6:53 AM Creatinine 0.75 mg/dL  3/9/2024:  6:12 AM Creatinine 0.82 mg/dL    Recent Vancomycin Levels (past 3 days)  3/9/2024: 10:14 AM Vancomycin 16.4 ug/mL    Vancomycin IV Administrations (past 72 hours)                     vancomycin (VANCOCIN) 1,000 mg in NaCl 0.9% 200 mL intermittent infusion (mg) 1,000 mg Given 24 0129     1,000 mg Given 24 1235     1,000 mg Given  0008     1,000 mg Given 24 1220                    Nephrotoxins and other renal medications (From now, onward)      Start     Dose/Rate Route Frequency Ordered Stop    24 1230  vancomycin (VANCOCIN) 1,000 mg in NaCl 0.9% 200 mL intermittent infusion         1,000 mg  over 60 Minutes Intravenous EVERY 12 HOURS 24 1204      24 2300  piperacillin-tazobactam (ZOSYN) 3.375 g vial to attach to  mL bag        Note to Pharmacy: For [facility], [facility] and [facility]: For Zosyn-naive patients, use the \"Zosyn initial dose + extended infusion\" order panel.    3.375 g  over 240 Minutes Intravenous EVERY 8 HOURS 24 1736                 Contrast Orders - " past 72 hours (72h ago, onward)      None            Interpretation of levels and current regimen:  Vancomycin level is reflective of -600    Has serum creatinine changed greater than 50% in last 72 hours: No    Urine output:  unable to determine    Renal Function: Stable      Plan:  Continue Current Dose  Vancomycin monitoring method: AUC  Vancomycin therapeutic monitoring goal: 400-600 mg*h/L  Pharmacy will check vancomycin levels as appropriate in 1-3 Days.  Serum creatinine levels will be ordered daily for the first week of therapy and at least twice weekly for subsequent weeks.    Angelica Larry, [facility]

## 2024-03-09 NOTE — PROGRESS NOTES
03/09/24 1200   Appointment Info   Signing Clinician's Name / Credentials (OT) Lucy Aguirre OTR/L   Appointment Canceled Reason (OT) Other (see Cancel Comments row)   Appointment Cancel Comments (OT) PT spoke with pt regarding OT services. PT deferred OT eval d/t pt declining any need for OT at this time. Pt met OT goals during initial hospitalization following spine surgery.

## 2024-03-09 NOTE — PROGRESS NOTES
[facility]  Neurosurgery Daily Progress Note    Assessment & Plan   Procedure(s):  LUMBAR WOUND IRRIGATION AND DEBRIDEMENT  WITH WOUND VACUUM   2 Days Post-Op    Culture results:   1+ Enterobacter cloacae complex Abnormal    1+ Staphylococcus epidermidis Abnormal      -ID following for IV antibiotic management, appreciate assistance   -Continue wound vac and wound care, appreciate assistance from WOC team   -Continue pain control measures as needed   -PT/OT  -Dr. Hudson plans to see patient on Monday to further discuss next steps    Faby Shah CNP  [facility] Neurosurgery  Tel [phone]  Pager [phone]    Interval History   Patient was seen resting in bed. Reports feeling well today, 3/10 low back pain. Denies any radicular pain or new symptoms. Denies any fevers, body aches, or chills. Neuro exam stable. Incision CDI with dressing. Wound vac in place. Patient is planning to work with PT this afternoon.     Physical Exam   Temp: 98.2  F (36.8  C) Temp src: Oral BP: (!) 172/72 Pulse: 63   Resp: 16 SpO2: 97 % O2 Device: None (Room air)    Vitals:    03/03/24 0802   Weight: 103 kg (227 lb)       Mental status:  Alert and oriented x 3, speech is fluent.  Motor:   Iliopsoas  (hip flexion)               Right: 5/5  Left:  5/5  Quadriceps  (knee extension)       Right:  5/5  Left:  5/5  Hamstrings  (knee flexion)            Right:  5/5  Left:  5/5  Gastroc Soleus  (PF)                          Right:  5/5  Left:  5/5  Tibialis Ant  (DF)                          Right:  5/5  Left:  5/5  EHL                          Right:  5/5  Left:  5/5    Sensation:  Intact to light touch in BLE  Incision: CDI with dressing   Wound vac: Intact

## 2024-03-09 NOTE — PROGRESS NOTES
[facility] MEDICINE  PROGRESS NOTE       Securely message me with [employer] (more info)    Code Status: Full Code  Procedure(s):  LUMBAR WOUND IRRIGATION AND DEBRIDEMENT  WITH WOUND VACUUM  Day of Surgery  Identification/Summary:   68 year old female past medical history significant for recent L4-L5 bilateral fusion on 2/23/2024 discharged home on 2/26 , non-Hodgkin's lymphoma/small B lymphocyte lymphoma, lymphoproliferative disorder on tyrosine kinase inhibitor, typically sees [facility], obesity, depression and anxiety and restless leg syndrome presented to the emergency room with complaints of worsening low back pain last couple of days, with postoperative wound erythema, warmth, lumbar MRI concerning for fluid collection extending into the epidural space s/p I&D in ED pending cultures.      S/p bedside evacuation of post-operative fluid collection by Neurosurgery on day of admit. Infectious Disease consulted and recommended formal surgical washout. As such, anticoagulation held, last dose lovenox on 3/4 at 2010hrs. Ongoing antibiotics with zosyn per ID. Had surgical washout on 3/7/2024.    Needs finalized culture s/s from surgery before she can be discharged on an appropriate abx. ID following. Continue post-op cares as per routine. Current regimen per ID is Zosyn as well as Vancomycin and follow-up surgical cultures for staph epi (if negative, can then stop vancomycin), and to stay in the hospital until next week with possibility of wound closure procedure. Repleting K and Mag per protocol.      -----  Postoperative wound infection  Assessment: on admit treated for postop infection: S/p bilateral L4-L5 fusion 2/23/2024, had an elevated Temp of 100.1, leukocytosis 21, , ESR 29, Lactic acid normal. S/p I&D, 60 mL of bloody fluid sent to the lab; now with staph epi and enterobacter  Plan:  - continue zosyn   - ID consulted, appreciate; defer modification to them  -  "s/p debridement 3/7  - Pain control on Tylenol, oxycodone, Robaxin, gabapentin IV Dilaudid as needed     History of non-Hodgkin's lymphoma  Small lymphocytic lymphoma  - Sees [facility], per family members holding off on the tyrosine kinase inhibitor for 10 days before and after surgery  -thus, Holding for now  -monitor clinically     Hypokalemia  Hypomagnesemia  -On replacement protocols for both K and mag     Depression anxiety  -Continue home meds     Restless leg syndrome  -On Requip     GERD  On PPI     Obesity  Encourage weight loss   - lovenox for dvt ppdx per surgery, now held for surgical washout on 3/7.    Anticoagulation   Orders (Includes Only Anticoagulants)    None      Therapy: PT, OT  Camejo:Not present  Lines: None       Current Diet  Orders Placed This Encounter      Advance Diet as Tolerated: Regular Diet Adult          Barriers to Discharge: culture from intraoperative pending    Disposition: inpatient    Clinically Significant Risk Factors        # Hypokalemia: Lowest K = 3.3 mmol/L in last 2 days, will replace as needed     # Hypomagnesemia: Lowest Mg = 1.6 mg/dL in last 2 days, will replace as needed              # Obesity: Estimated body mass index is 38.96 kg/m  as calculated from the following:    Height as of this encounter: 1.626 m (5' 4\").    Weight as of this encounter: 103 kg (227 lb).        # Financial/Environmental Concerns: none         38 MINUTES SPENT BY ME on the date of service doing chart review, history, exam, documentation & further activities per the note.      Je Mcneill MD  Internal Medicine  [place] Hospitalist  [facility]  Phone: #[phone]  Securely message with the Vocera Web Console (learn more here)  Text page via [employer] Paging/Directory       Interval History/Subjective:  No acute events overnight.    No report of chest pain, shortness of breath, or other new complaints. Discussed plan of care with patient. Answered all questions to " patient's verbalized understanding and satisfaction.    Discussed with RN at bedside.       Last 24H PRN:     benzocaine-menthol (CEPACOL) 15-3.6 MG lozenge 1 lozenge, 1 lozenge at 03/08/24 1120    Physical Exam/Objective:  Temp:  [98  F (36.7  C)-98.5  F (36.9  C)] 98.5  F (36.9  C)  Pulse:  [65-71] 71  Resp:  [15-18] 16  BP: (126-167)/(64-74) 167/73  SpO2:  [94 %-96 %] 94 %  Wt Readings from Last 4 Encounters:   03/03/24 103 kg (227 lb)   02/23/24 103 kg (227 lb)   09/19/23 90.7 kg (200 lb)   07/27/23 90.7 kg (200 lb)     Body mass index is 38.96 kg/m .    GENERAL:  Alert, appears comfortable, in no acute distress, appears stated age   HEAD:  Normocephalic, without obvious abnormality, atraumatic   EYES:  Conjunctiva/corneas clear, no scleral icterus, EOM's appears intact grossly   NOSE: Nares normal, septum midline, mucosa normal, no drainage   THROAT: Lips, mucosa, and tongue appear normal   NECK: Symmetrical, trachea appears midline   BACK:   Symmetric, no curvature noted   LUNGS:   Symmetric chest rise on inhalation, respirations unlabored   CHEST WALL:  No apparent deformity   HEART:  No thrills or heaves visualized   ABDOMEN:   No masses or organomegaly apparent; non-scaphoid   EXTREMITIES: Extremities appear normal, atraumatic, no cyanosis   SKIN: No exanthems in the visualized areas   NEURO: Alert and oriented x3 , moves all four extremities freely and spontaneously   PSYCH: Cooperative, behavior is appropriate     Medications:   Personally Reviewed.  Medications    sodium chloride 75 mL/hr at 03/08/24 0008      acetaminophen  975 mg Oral Q8H    FLUoxetine  40 mg Oral Daily    gabapentin  100 mg Oral TID    methocarbamol  750 mg Oral Q6H    pantoprazole  40 mg Oral Daily    piperacillin-tazobactam  3.375 g Intravenous Q8H    polyethylene glycol  17 g Oral Daily    rOPINIRole  1 mg Oral BID    rOPINIRole  2 mg Oral At Bedtime    senna-docusate  1 tablet Oral BID    sodium chloride (PF)  3 mL Intracatheter  Q8H    vancomycin  1,000 mg Intravenous Q12H    wound support modular  60 mL Oral Daily       Data reviewed today: I personally reviewed all new medications, labs, imaging/diagnostics reports over the past 24 hours. Pertinent findings include:    Imaging:   No results found for this or any previous visit (from the past 24 hour(s)).    Labs:  MR Lumbar Spine w/o Contrast   Final Result   IMPRESSION:   1.  L4-L5 fusion. No hardware complications. Good positioning of transpedicular screws and interbody graft.      2.  Dorsal subcutaneous fluid collection as above which extends through the surgical approach into the dorsal canal epidural space and most prominent at L4-L5. Slight extension cephalad to L1-L2. Consider further evaluation with contrast-enhanced    imaging. Fairly homogeneous fluid collection without significant surrounding inflammatory signal changes and may represent seroma or resolving hematoma. Infectious/inflammatory fluid accumulation is possible, however.      3.  Lumbar spondylosis      4.  No fractures.      5.  Findings discussed with Flor James of the neurosurgery service on 3/3/2024 1255 PM CST      XR Lumbar Spine 2/3 Views   Final Result   IMPRESSION: Five lumbar vertebral bodies. Minimal convex right curvature, unchanged. Dorsal midline skin staples persist. Postoperative changes of interbody and posterior spinal fixation across L4-L5. Grade 1 anterolisthesis of L4 on L5 and L5 on S1,    unchanged. Intact hardware. Severe interspace and endplate degeneration L2-L3. Normal vertebral body heights.           Recent Results (from the past 24 hour(s))   Magnesium Lab    Collection Time: 03/08/24  7:56 PM   Result Value Ref Range    Magnesium 1.6 (L) 1.7 - 2.3 mg/dL   Potassium Lab    Collection Time: 03/08/24  7:56 PM   Result Value Ref Range    Potassium 3.7 3.4 - 5.3 mmol/L   Platelet count    Collection Time: 03/09/24  6:12 AM   Result Value Ref Range    Platelet Count 271 150 - 450 10e3/uL    Creatinine    Collection Time: 03/09/24  6:12 AM   Result Value Ref Range    Creatinine 0.82 0.51 - 0.95 mg/dL    GFR Estimate 77 >60 mL/min/1.73m2   Potassium    Collection Time: 03/09/24  6:12 AM   Result Value Ref Range    Potassium 3.3 (L) 3.4 - 5.3 mmol/L   Magnesium    Collection Time: 03/09/24  6:12 AM   Result Value Ref Range    Magnesium 1.6 (L) 1.7 - 2.3 mg/dL       Pending Labs:  Unresulted Labs Ordered in the Past 30 Days of this Admission       Date and Time Order Name Status Description    3/7/2024 10:16 AM Tissue Aerobic Bacterial Culture Routine Preliminary     3/7/2024 10:16 AM Fungal or Yeast Culture Routine Preliminary     3/7/2024 10:16 AM Anaerobic Bacterial Culture Routine Preliminary     3/7/2024 10:15 AM Tissue Aerobic Bacterial Culture Routine Preliminary     3/7/2024 10:15 AM Fungal or Yeast Culture Routine Preliminary     3/7/2024 10:15 AM Anaerobic Bacterial Culture Routine Preliminary     3/7/2024 10:03 AM MTB Complex and Resistance In process     3/7/2024 10:03 AM Body fluid, unsp Aerobic Bacterial Culture Routine Preliminary     3/7/2024 10:03 AM Fungal or Yeast Culture Routine Preliminary     3/7/2024 10:03 AM Anaerobic Bacterial Culture Routine Preliminary     3/7/2024 10:03 AM Acid-Fast Bacilli Culture and Stain In process     3/3/2024  1:52 PM Anaerobic Bacterial Culture Routine Preliminary

## 2024-03-10 LAB
BACTERIA ASPIRATE CULT: ABNORMAL
CREAT SERPL-MCNC: 0.86 MG/DL (ref 0.51–0.95)
EGFRCR SERPLBLD CKD-EPI 2021: 72 ML/MIN/1.73M2
MAGNESIUM SERPL-MCNC: 1.6 MG/DL (ref 1.7–2.3)
POTASSIUM SERPL-SCNC: 3.5 MMOL/L (ref 3.4–5.3)
WBC # BLD AUTO: 8.8 10E3/UL (ref 4–11)

## 2024-03-10 PROCEDURE — 83735 ASSAY OF MAGNESIUM: CPT | Performed by: HOSPITALIST

## 2024-03-10 PROCEDURE — 120N000001 HC R&B MED SURG/OB

## 2024-03-10 PROCEDURE — 99232 SBSQ HOSP IP/OBS MODERATE 35: CPT | Performed by: INTERNAL MEDICINE

## 2024-03-10 PROCEDURE — 250N000011 HC RX IP 250 OP 636: Performed by: INTERNAL MEDICINE

## 2024-03-10 PROCEDURE — 250N000013 HC RX MED GY IP 250 OP 250 PS 637

## 2024-03-10 PROCEDURE — 250N000013 HC RX MED GY IP 250 OP 250 PS 637: Performed by: INTERNAL MEDICINE

## 2024-03-10 PROCEDURE — 250N000011 HC RX IP 250 OP 636: Mod: JZ | Performed by: INTERNAL MEDICINE

## 2024-03-10 PROCEDURE — 84132 ASSAY OF SERUM POTASSIUM: CPT | Performed by: HOSPITALIST

## 2024-03-10 PROCEDURE — 36415 COLL VENOUS BLD VENIPUNCTURE: CPT | Performed by: HOSPITALIST

## 2024-03-10 PROCEDURE — 82565 ASSAY OF CREATININE: CPT | Performed by: INTERNAL MEDICINE

## 2024-03-10 PROCEDURE — 250N000013 HC RX MED GY IP 250 OP 250 PS 637: Performed by: HOSPITALIST

## 2024-03-10 PROCEDURE — 85048 AUTOMATED LEUKOCYTE COUNT: CPT | Performed by: INTERNAL MEDICINE

## 2024-03-10 PROCEDURE — 250N000013 HC RX MED GY IP 250 OP 250 PS 637: Performed by: STUDENT IN AN ORGANIZED HEALTH CARE EDUCATION/TRAINING PROGRAM

## 2024-03-10 RX ORDER — MAGNESIUM SULFATE HEPTAHYDRATE 40 MG/ML
2 INJECTION, SOLUTION INTRAVENOUS ONCE
Status: COMPLETED | OUTPATIENT
Start: 2024-03-10 | End: 2024-03-10

## 2024-03-10 RX ADMIN — LOPERAMIDE HYDROCHLORIDE 2 MG: 2 CAPSULE ORAL at 09:33

## 2024-03-10 RX ADMIN — ROPINIROLE 1 MG: 0.25 TABLET, FILM COATED ORAL at 09:33

## 2024-03-10 RX ADMIN — GABAPENTIN 100 MG: 100 CAPSULE ORAL at 13:56

## 2024-03-10 RX ADMIN — MAGNESIUM SULFATE HEPTAHYDRATE 2 G: 40 INJECTION, SOLUTION INTRAVENOUS at 09:35

## 2024-03-10 RX ADMIN — VANCOMYCIN HYDROCHLORIDE 1000 MG: 1 INJECTION, SOLUTION INTRAVENOUS at 13:42

## 2024-03-10 RX ADMIN — ROPINIROLE HYDROCHLORIDE 2 MG: 1 TABLET, FILM COATED ORAL at 21:57

## 2024-03-10 RX ADMIN — METHOCARBAMOL TABLETS 750 MG: 750 TABLET, COATED ORAL at 09:34

## 2024-03-10 RX ADMIN — ACETAMINOPHEN 975 MG: 325 TABLET ORAL at 03:54

## 2024-03-10 RX ADMIN — METHOCARBAMOL TABLETS 750 MG: 750 TABLET, COATED ORAL at 03:55

## 2024-03-10 RX ADMIN — METHOCARBAMOL TABLETS 750 MG: 750 TABLET, COATED ORAL at 16:38

## 2024-03-10 RX ADMIN — CALCIUM CARBONATE (ANTACID) CHEW TAB 500 MG 1000 MG: 500 CHEW TAB at 13:56

## 2024-03-10 RX ADMIN — OXYCODONE 5 MG: 5 TABLET ORAL at 20:47

## 2024-03-10 RX ADMIN — BENZOCAINE AND MENTHOL 1 LOZENGE: 15; 3.6 LOZENGE ORAL at 09:35

## 2024-03-10 RX ADMIN — METHOCARBAMOL TABLETS 750 MG: 750 TABLET, COATED ORAL at 21:58

## 2024-03-10 RX ADMIN — ROPINIROLE 1 MG: 0.25 TABLET, FILM COATED ORAL at 16:38

## 2024-03-10 RX ADMIN — PANTOPRAZOLE SODIUM 40 MG: 40 TABLET, DELAYED RELEASE ORAL at 09:34

## 2024-03-10 RX ADMIN — VANCOMYCIN HYDROCHLORIDE 1000 MG: 1 INJECTION, SOLUTION INTRAVENOUS at 00:29

## 2024-03-10 RX ADMIN — FLUOXETINE 40 MG: 20 CAPSULE ORAL at 09:34

## 2024-03-10 RX ADMIN — LOPERAMIDE HYDROCHLORIDE 2 MG: 2 CAPSULE ORAL at 20:47

## 2024-03-10 RX ADMIN — PIPERACILLIN AND TAZOBACTAM 3.38 G: 3; .375 INJECTION, POWDER, FOR SOLUTION INTRAVENOUS at 03:54

## 2024-03-10 RX ADMIN — ERTAPENEM SODIUM 1 G: 1 INJECTION, POWDER, LYOPHILIZED, FOR SOLUTION INTRAMUSCULAR; INTRAVENOUS at 11:40

## 2024-03-10 RX ADMIN — GABAPENTIN 100 MG: 100 CAPSULE ORAL at 20:49

## 2024-03-10 RX ADMIN — GABAPENTIN 100 MG: 100 CAPSULE ORAL at 09:33

## 2024-03-10 ASSESSMENT — ACTIVITIES OF DAILY LIVING (ADL)
ADLS_ACUITY_SCORE: 40
ADLS_ACUITY_SCORE: 39
ADLS_ACUITY_SCORE: 38
ADLS_ACUITY_SCORE: 39
ADLS_ACUITY_SCORE: 39
ADLS_ACUITY_SCORE: 38
ADLS_ACUITY_SCORE: 39
ADLS_ACUITY_SCORE: 39
ADLS_ACUITY_SCORE: 38
ADLS_ACUITY_SCORE: 39
ADLS_ACUITY_SCORE: 40
ADLS_ACUITY_SCORE: 39
ADLS_ACUITY_SCORE: 39
ADLS_ACUITY_SCORE: 38
ADLS_ACUITY_SCORE: 39
ADLS_ACUITY_SCORE: 39

## 2024-03-10 NOTE — PROGRESS NOTES
Care Management Follow Up    Length of Stay (days): 7    Expected Discharge Date: 03/12/2024     Concerns to be Addressed: discharge planning       Patient plan of care discussed at interdisciplinary rounds: Yes    Anticipated Discharge Disposition: Home     Anticipated Discharge Services: None    Anticipated Discharge DME: None    Patient/family educated on Medicare website which has current facility and service quality ratings: yes    Education Provided on the Discharge Plan: Yes (AVS per bedside RN)    Patient/Family in Agreement with the Plan: unable to assess    Referrals Placed by CM/SW: Outpatient Infusion        Additional Information:  CM reviewed chart. CM reviewed chart. CM attempted to meet with patient to answer any questions that she may have. Patient was sleeping. Anticipate discharge home with IV antibiotics and wound VAC. Anticipate discharge home with family. Family to provide transportation home at discharge. RN CM will follow.     Pt does not have coverage for IV antibiotics under her [employer] Advantage plan     Referral   [facility]     Jessica Bains RN

## 2024-03-10 NOTE — PLAN OF CARE
Problem: Adult Inpatient Plan of Care  Goal: Plan of Care Review  Description: The Plan of Care Review/Shift note should be completed every shift.  The Outcome Evaluation is a brief statement about your assessment that the patient is improving, declining, or no change.  This information will be displayed automatically on your shift  note.  Outcome: Progressing     Problem: Adult Inpatient Plan of Care  Goal: Absence of Hospital-Acquired Illness or Injury  Intervention: Prevent Skin Injury  Recent Flowsheet Documentation  Taken 3/10/2024 0400 by Ananya Warner RN  Body Position:   position changed independently   side-lying 90 degrees   right   turned   weight shifting   tilted  Taken 3/10/2024 0030 by Ananya Warner RN  Body Position: position changed independently     Problem: Adult Inpatient Plan of Care  Goal: Absence of Hospital-Acquired Illness or Injury  Intervention: Prevent Infection  Recent Flowsheet Documentation  Taken 3/10/2024 0030 by Ananya Warner RN  Infection Prevention:   hand hygiene promoted   rest/sleep promoted   single patient room provided     Problem: Spinal Surgery  Goal: Fluid and Electrolyte Balance  Outcome: Progressing     Problem: Spinal Surgery  Goal: Optimal Pain Control and Function  Outcome: Progressing   Goal Outcome Evaluation:    A&OX4 but occasionally forgetful. Pt VSS except for slightly elevated BP. Pt pain treated effectively w/ scheduled Robaxin and Tylenol. Pt CMS intact except for baseline neuropathy. Pt tolerating reg diet. Assist of one w/ walker and GB. Pt voiding adequately. Pt wound vac in place w/ cont. Suction and Vashe connected. Back dressing CDI. Cultures pending. Vanco and Zosyn given overnight. Discharge home w/ IV antibiotics pending PICC placement and cultures.

## 2024-03-10 NOTE — PLAN OF CARE
Problem: Adult Inpatient Plan of Care  Goal: Absence of Hospital-Acquired Illness or Injury  Intervention: Identify and Manage Fall Risk  Recent Flowsheet Documentation  Taken 3/9/2024 1620 by Delia Ward, RN  Safety Promotion/Fall Prevention:   activity supervised   clutter free environment maintained   increased rounding and observation   increase visualization of patient   lighting adjusted   mobility aid in reach   nonskid shoes/slippers when out of bed   room door open   patient and family education   room near nurse's station   room organization consistent   safety round/check completed     Pt A&Ox 4. VSS on RA. CMS intact except baseline neuropathy. Dressing to back c/d/i. Wound vac in place with vashe cleanse. Voiding adequately. Up with assist of 1 gb and walker while ambulating. Pain managed with scheduled tylenol and robaxin. IV sl with zosyn and vancomycin this evening. Potassium and magnesium recheck in AM.

## 2024-03-10 NOTE — PROGRESS NOTES
[facility]    Medicine Progress Note - Hospitalist Service    Date of Admission:  3/3/2024    Assessment & Plan   68 year old female past medical history significant for recent L4-L5 bilateral fusion on 2/23/2024 discharged home on 2/26 , non-Hodgkin's lymphoma/small B lymphocyte lymphoma, lymphoproliferative disorder on tyrosine kinase inhibitor, typically sees [facility], obesity, depression and anxiety and restless leg syndrome presented to the emergency room with complaints of worsening low back pain last couple of days, with postoperative wound erythema, warmth, lumbar MRI concerning for fluid collection extending into the epidural space s/p I&D in ED pending cultures.      S/p bedside evacuation of post-operative fluid collection by Neurosurgery on day of admit. Infectious Disease consulted and recommended formal surgical washout. As such, anticoagulation held, last dose lovenox on 3/4 at 2010hrs. Ongoing antibiotics with zosyn per ID. Had surgical washout on 3/7/2024.     Needs finalized culture s/s from surgery before she can be discharged on an appropriate abx. ID following. Continue post-op cares as per routine. Current regimen per ID is Zosyn as well as Vancomycin and follow-up surgical cultures for staph epi (if negative, can then stop vancomycin), and to stay in the hospital until next week with possibility of wound closure procedure. Repleting K and Mag per protocol.      Postoperative lumbar wound infection s/p debridement 3/7  S/p bilateral L4-L5 fusion 2/23/2024, had an elevated Temp of 100.1, leukocytosis 21, , ESR 29. S/p I&D 3/7 with deep culture positive for staph epi and enterobacter.   - Spine surgeon Dr. Hudson will see patient 3/11 to discuss next step  - ID is following, abx switched to erta and vanco, plan for 6w and then transition to PO   - WOC following   - Pain control on Tylenol, oxycodone, Robaxin, gabapentin IV Dilaudid as needed     History  "of non-Hodgkin's lymphoma  Small lymphocytic lymphoma  - Sees [facility], per family members holding off on the tyrosine kinase inhibitor (acalabrutinib) for 10 days before and after surgery    Hypokalemia  Hypomagnesemia  -On replacement protocols for both K and mag     Depression anxiety  -Continue home meds     Restless leg syndrome  -On Requip     GERD  - On PPI          Diet: Advance Diet as Tolerated: Regular Diet Adult    DVT Prophylaxis: Pneumatic Compression Devices  Camejo Catheter: Not present  Lines: None     Cardiac Monitoring: None  Code Status: Full Code      Clinically Significant Risk Factors        # Hypokalemia: Lowest K = 3.3 mmol/L in last 2 days, will replace as needed     # Hypomagnesemia: Lowest Mg = 1.6 mg/dL in last 2 days, will replace as needed              # Obesity: Estimated body mass index is 39.73 kg/m  as calculated from the following:    Height as of this encounter: 1.626 m (5' 4.02\").    Weight as of this encounter: 105.1 kg (231 lb 9.6 oz).        # Financial/Environmental Concerns: none         Disposition Plan      Expected Discharge Date: 03/12/2024    Discharge Delays: IV Medication - consider oral or Home Infusion  Destination: home with family;home with help/services  Discharge Comments: ID plans, cultures, wound vac plans, updated therapy evals. Neuro to see Monday 3/11          Tamanna Mary MD  Hospitalist Service  [facility]  Securely message with [employer] (more info)  Text page via [employer] Paging/Directory   ______________________________________________________________________    Interval History   Pain is under good control    Physical Exam   Vital Signs: Temp: 98.2  F (36.8  C) Temp src: Oral BP: 135/66 Pulse: 68   Resp: 20 SpO2: 98 % O2 Device: None (Room air)    Weight: 231 lbs 9.6 oz    GENERAL:  Alert, appears comfortable, in no acute distress, appears stated age   HEAD:  Normocephalic, without obvious abnormality, atraumatic   EYES: "  Conjunctiva/corneas clear, no scleral icterus, EOM's appears intact grossly   NOSE: Nares normal, septum midline, mucosa normal, no drainage   THROAT: Lips, mucosa, and tongue appear normal   NECK: Symmetrical, trachea appears midline   BACK:   Symmetric, no curvature noted   LUNGS:   Symmetric chest rise on inhalation, respirations unlabored   CHEST WALL:  No apparent deformity   HEART:  No thrills or heaves visualized   ABDOMEN:   No masses or organomegaly apparent; non-scaphoid   EXTREMITIES: Extremities appear normal, atraumatic, no cyanosis   SKIN: No exanthems in the visualized areas   NEURO: Alert and oriented x3 , moves all four extremities freely and spontaneously   PSYCH: Cooperative, behavior is appropriate        Medical Decision Making       35 MINUTES SPENT BY ME on the date of service doing chart review, history, exam, documentation & further activities per the note.      Data

## 2024-03-10 NOTE — PLAN OF CARE
Problem: Adult Inpatient Plan of Care  Goal: Absence of Hospital-Acquired Illness or Injury  Intervention: Identify and Manage Fall Risk  Recent Flowsheet Documentation  Taken 3/10/2024 1600 by Delia Ward RN  Safety Promotion/Fall Prevention:   safety round/check completed   room organization consistent   room near nurse's station   nonskid shoes/slippers when out of bed   clutter free environment maintained   assistive device/personal items within reach   lighting adjusted     Problem: Adult Inpatient Plan of Care  Goal: Absence of Hospital-Acquired Illness or Injury  Intervention: Identify and Manage Fall Risk  Recent Flowsheet Documentation  Taken 3/10/2024 1600 by Delia Ward, RN  Safety Promotion/Fall Prevention:   safety round/check completed   room organization consistent   room near nurse's station   nonskid shoes/slippers when out of bed   clutter free environment maintained   assistive device/personal items within reach   lighting adjusted     Problem: Adult Inpatient Plan of Care  Goal: Absence of Hospital-Acquired Illness or Injury  Intervention: Identify and Manage Fall Risk  Recent Flowsheet Documentation  Taken 3/10/2024 1600 by Delia Ward RN  Safety Promotion/Fall Prevention:   safety round/check completed   room organization consistent   room near nurse's station   nonskid shoes/slippers when out of bed   clutter free environment maintained   assistive device/personal items within reach   lighting adjusted     Problem: Adult Inpatient Plan of Care  Goal: Absence of Hospital-Acquired Illness or Injury  Intervention: Identify and Manage Fall Risk  Recent Flowsheet Documentation  Taken 3/10/2024 1600 by Delia Ward, RN  Safety Promotion/Fall Prevention:   safety round/check completed   room organization consistent   room near nurse's station   nonskid shoes/slippers when out of bed   clutter free environment maintained   assistive device/personal items within reach    lighting adjusted  Pt A&Ox4. VSS on RA. CMS intact except baseline neuropathy. Dressing to back c/d/i. Wound vac in place with vashe cleanse. Voiding adequately. Up with assist of 1 gb and walker while ambulating to the bathroom. Pain managed with scheduled tylenol and robaxin. IV SL in between antibiotics. Discharge home following wound vac removal.

## 2024-03-10 NOTE — PLAN OF CARE
Goal Outcome Evaluation:      Plan of Care Reviewed With: patient    Overall Patient Progress: improvingOverall Patient Progress: improving    Outcome Evaluation: Pt alert and oriented x 4. Pt in a good mood today. Stool softners continue to be held, pt reports she is still having loose stools. (The loose stools have been an issue prior to surgery as well per pt.) She requested PRN imodium. Wound vac functioning. IV ABT continued . Plan for Dr. Hudson to see her tomorrow and determine pain for wound closure. Pt will be going home with IV ABT.      Problem: Infection  Goal: Absence of Infection Signs and Symptoms  Outcome: Progressing     Problem: Spinal Surgery  Goal: Optimal Functional Ability  Outcome: Progressing     Problem: Spinal Surgery  Goal: Optimal Pain Control and Function  Outcome: Progressing  Intervention: Prevent or Manage Pain  Recent Flowsheet Documentation  Taken 3/10/2024 0900 by Russell Luz, RN  Pain Management Interventions:   medication (see MAR)   distraction   emotional support   repositioned   rest   Russell Luz RN,ONC

## 2024-03-10 NOTE — PROGRESS NOTES
[facility]  Neurosurgery Daily Progress Note    Assessment & Plan   Procedure(s):  LUMBAR WOUND IRRIGATION AND DEBRIDEMENT  WITH WOUND VACUUM   3 Days Post-Op    Culture results:   Enterobacter cloacae complex  Staphylococcus epidermidis     -ID following for IV antibiotic management, appreciate assistance   -Continue wound vac and wound care, appreciate assistance from WOC team   -Continue pain control measures as needed   -PT/OT  -Dr. Hudson plans to see patient on Monday to further discuss next steps    Faby Shah CNP  [facility]  Tel [phone]  Pager [phone]    Interval History   Patient was seen sitting in the chair. Reports feeling well today, 2/10 low back pain. Denies any radicular pain or new symptoms. Denies any fevers, body aches, or chills. Neuro exam stable. Incision CDI with dressing. Wound vac in place. Working with therapies.     Physical Exam   Temp: 98.2  F (36.8  C) Temp src: Oral BP: 135/66 Pulse: 68   Resp: 20 SpO2: 98 % O2 Device: None (Room air)    Vitals:    03/03/24 0802 03/10/24 0900   Weight: 103 kg (227 lb) 105.1 kg (231 lb 9.6 oz)       Mental status:  Alert and oriented x 3, speech is fluent.  Motor:   Iliopsoas  (hip flexion)               Right: 5/5  Left:  5/5  Quadriceps  (knee extension)       Right:  5/5  Left:  5/5  Hamstrings  (knee flexion)            Right:  5/5  Left:  5/5  Gastroc Soleus  (PF)                          Right:  5/5  Left:  5/5  Tibialis Ant  (DF)                          Right:  5/5  Left:  5/5  EHL                          Right:  5/5  Left:  5/5    Sensation:  Intact to light touch in BLE  Incision: CDI with dressing   Wound vac: Intact         room air

## 2024-03-11 ENCOUNTER — ANESTHESIA EVENT (OUTPATIENT)
Dept: SURGERY | Facility: CLINIC | Age: 69
DRG: 857 | End: 2024-03-11
Payer: COMMERCIAL

## 2024-03-11 ENCOUNTER — ANESTHESIA (OUTPATIENT)
Dept: SURGERY | Facility: CLINIC | Age: 69
DRG: 857 | End: 2024-03-11
Payer: COMMERCIAL

## 2024-03-11 LAB
ANION GAP SERPL CALCULATED.3IONS-SCNC: 9 MMOL/L (ref 7–15)
BASOPHILS # BLD MANUAL: 0.1 10E3/UL (ref 0–0.2)
BASOPHILS NFR BLD MANUAL: 1 %
BUN SERPL-MCNC: 8.3 MG/DL (ref 8–23)
CALCIUM SERPL-MCNC: 9.4 MG/DL (ref 8.8–10.2)
CHLORIDE SERPL-SCNC: 105 MMOL/L (ref 98–107)
CREAT SERPL-MCNC: 0.78 MG/DL (ref 0.51–0.95)
DEPRECATED HCO3 PLAS-SCNC: 26 MMOL/L (ref 22–29)
EGFRCR SERPLBLD CKD-EPI 2021: 81 ML/MIN/1.73M2
EOSINOPHIL # BLD MANUAL: 0.4 10E3/UL (ref 0–0.7)
EOSINOPHIL NFR BLD MANUAL: 4 %
ERYTHROCYTE [DISTWIDTH] IN BLOOD BY AUTOMATED COUNT: 13.3 % (ref 10–15)
GLUCOSE SERPL-MCNC: 126 MG/DL (ref 70–99)
HCT VFR BLD AUTO: 31.6 % (ref 35–47)
HGB BLD-MCNC: 10 G/DL (ref 11.7–15.7)
LYMPHOCYTES # BLD MANUAL: 4.5 10E3/UL (ref 0.8–5.3)
LYMPHOCYTES NFR BLD MANUAL: 42 %
MAGNESIUM SERPL-MCNC: 2 MG/DL (ref 1.7–2.3)
MCH RBC QN AUTO: 26.5 PG (ref 26.5–33)
MCHC RBC AUTO-ENTMCNC: 31.6 G/DL (ref 31.5–36.5)
MCV RBC AUTO: 84 FL (ref 78–100)
MONOCYTES # BLD MANUAL: 0.9 10E3/UL (ref 0–1.3)
MONOCYTES NFR BLD MANUAL: 8 %
NEUTROPHILS # BLD MANUAL: 4.8 10E3/UL (ref 1.6–8.3)
NEUTROPHILS NFR BLD MANUAL: 45 %
NRBC # BLD AUTO: 0 10E3/UL
NRBC BLD AUTO-RTO: 0 /100
PLAT MORPH BLD: ABNORMAL
PLATELET # BLD AUTO: 325 10E3/UL (ref 150–450)
POTASSIUM SERPL-SCNC: 4.3 MMOL/L (ref 3.4–5.3)
RBC # BLD AUTO: 3.77 10E6/UL (ref 3.8–5.2)
RBC MORPH BLD: ABNORMAL
SODIUM SERPL-SCNC: 140 MMOL/L (ref 135–145)
VARIANT LYMPHS BLD QL SMEAR: PRESENT
WBC # BLD AUTO: 10.7 10E3/UL (ref 4–11)

## 2024-03-11 PROCEDURE — 250N000011 HC RX IP 250 OP 636

## 2024-03-11 PROCEDURE — 360N000075 HC SURGERY LEVEL 2, PER MIN: Performed by: SURGERY

## 2024-03-11 PROCEDURE — 250N000011 HC RX IP 250 OP 636: Performed by: PHYSICIAN ASSISTANT

## 2024-03-11 PROCEDURE — 85007 BL SMEAR W/DIFF WBC COUNT: CPT | Performed by: INTERNAL MEDICINE

## 2024-03-11 PROCEDURE — 250N000011 HC RX IP 250 OP 636: Mod: JZ | Performed by: INTERNAL MEDICINE

## 2024-03-11 PROCEDURE — 36415 COLL VENOUS BLD VENIPUNCTURE: CPT | Performed by: INTERNAL MEDICINE

## 2024-03-11 PROCEDURE — 710N000010 HC RECOVERY PHASE 1, LEVEL 2, PER MIN: Performed by: SURGERY

## 2024-03-11 PROCEDURE — 83735 ASSAY OF MAGNESIUM: CPT | Performed by: INTERNAL MEDICINE

## 2024-03-11 PROCEDURE — 250N000009 HC RX 250: Performed by: NURSE ANESTHETIST, CERTIFIED REGISTERED

## 2024-03-11 PROCEDURE — 0JQ70ZZ REPAIR BACK SUBCUTANEOUS TISSUE AND FASCIA, OPEN APPROACH: ICD-10-PCS | Performed by: SURGERY

## 2024-03-11 PROCEDURE — 250N000011 HC RX IP 250 OP 636: Performed by: ANESTHESIOLOGY

## 2024-03-11 PROCEDURE — 80048 BASIC METABOLIC PNL TOTAL CA: CPT | Performed by: INTERNAL MEDICINE

## 2024-03-11 PROCEDURE — 250N000013 HC RX MED GY IP 250 OP 250 PS 637: Performed by: HOSPITALIST

## 2024-03-11 PROCEDURE — 272N000001 HC OR GENERAL SUPPLY STERILE: Performed by: SURGERY

## 2024-03-11 PROCEDURE — 250N000025 HC SEVOFLURANE, PER MIN: Performed by: SURGERY

## 2024-03-11 PROCEDURE — 85025 COMPLETE CBC W/AUTO DIFF WBC: CPT | Performed by: INTERNAL MEDICINE

## 2024-03-11 PROCEDURE — 99232 SBSQ HOSP IP/OBS MODERATE 35: CPT | Performed by: INTERNAL MEDICINE

## 2024-03-11 PROCEDURE — 250N000011 HC RX IP 250 OP 636: Performed by: NURSE ANESTHETIST, CERTIFIED REGISTERED

## 2024-03-11 PROCEDURE — 250N000013 HC RX MED GY IP 250 OP 250 PS 637: Performed by: PHYSICIAN ASSISTANT

## 2024-03-11 PROCEDURE — 99024 POSTOP FOLLOW-UP VISIT: CPT

## 2024-03-11 PROCEDURE — 999N000141 HC STATISTIC PRE-PROCEDURE NURSING ASSESSMENT: Performed by: SURGERY

## 2024-03-11 PROCEDURE — 120N000001 HC R&B MED SURG/OB

## 2024-03-11 PROCEDURE — 12034 INTMD RPR S/TR/EXT 7.6-12.5: CPT | Mod: 78 | Performed by: SURGERY

## 2024-03-11 PROCEDURE — 370N000017 HC ANESTHESIA TECHNICAL FEE, PER MIN: Performed by: SURGERY

## 2024-03-11 PROCEDURE — 250N000013 HC RX MED GY IP 250 OP 250 PS 637: Performed by: INTERNAL MEDICINE

## 2024-03-11 PROCEDURE — 99232 SBSQ HOSP IP/OBS MODERATE 35: CPT | Performed by: STUDENT IN AN ORGANIZED HEALTH CARE EDUCATION/TRAINING PROGRAM

## 2024-03-11 PROCEDURE — 258N000003 HC RX IP 258 OP 636: Performed by: NURSE ANESTHETIST, CERTIFIED REGISTERED

## 2024-03-11 PROCEDURE — 258N000003 HC RX IP 258 OP 636

## 2024-03-11 RX ORDER — ONDANSETRON 4 MG/1
4 TABLET, ORALLY DISINTEGRATING ORAL EVERY 6 HOURS PRN
Status: DISCONTINUED | OUTPATIENT
Start: 2024-03-11 | End: 2024-03-14 | Stop reason: HOSPADM

## 2024-03-11 RX ORDER — LIDOCAINE HYDROCHLORIDE 10 MG/ML
INJECTION, SOLUTION INFILTRATION; PERINEURAL PRN
Status: DISCONTINUED | OUTPATIENT
Start: 2024-03-11 | End: 2024-03-11

## 2024-03-11 RX ORDER — OXYCODONE HYDROCHLORIDE 5 MG/1
10 TABLET ORAL EVERY 4 HOURS PRN
Status: DISCONTINUED | OUTPATIENT
Start: 2024-03-11 | End: 2024-03-14 | Stop reason: HOSPADM

## 2024-03-11 RX ORDER — HYDROMORPHONE HCL IN WATER/PF 6 MG/30 ML
0.2 PATIENT CONTROLLED ANALGESIA SYRINGE INTRAVENOUS EVERY 5 MIN PRN
Status: DISCONTINUED | OUTPATIENT
Start: 2024-03-11 | End: 2024-03-11 | Stop reason: HOSPADM

## 2024-03-11 RX ORDER — FENTANYL CITRATE 50 UG/ML
25 INJECTION, SOLUTION INTRAMUSCULAR; INTRAVENOUS EVERY 5 MIN PRN
Status: DISCONTINUED | OUTPATIENT
Start: 2024-03-11 | End: 2024-03-11 | Stop reason: HOSPADM

## 2024-03-11 RX ORDER — ONDANSETRON 4 MG/1
4 TABLET, ORALLY DISINTEGRATING ORAL EVERY 30 MIN PRN
Status: CANCELLED | OUTPATIENT
Start: 2024-03-11

## 2024-03-11 RX ORDER — NALOXONE HYDROCHLORIDE 0.4 MG/ML
0.1 INJECTION, SOLUTION INTRAMUSCULAR; INTRAVENOUS; SUBCUTANEOUS
Status: CANCELLED | OUTPATIENT
Start: 2024-03-11

## 2024-03-11 RX ORDER — PROPOFOL 10 MG/ML
INJECTION, EMULSION INTRAVENOUS PRN
Status: DISCONTINUED | OUTPATIENT
Start: 2024-03-11 | End: 2024-03-11

## 2024-03-11 RX ORDER — HYDROMORPHONE HCL IN WATER/PF 6 MG/30 ML
0.2 PATIENT CONTROLLED ANALGESIA SYRINGE INTRAVENOUS
Status: DISCONTINUED | OUTPATIENT
Start: 2024-03-11 | End: 2024-03-14 | Stop reason: HOSPADM

## 2024-03-11 RX ORDER — SODIUM CHLORIDE 9 MG/ML
INJECTION, SOLUTION INTRAVENOUS CONTINUOUS
Status: DISCONTINUED | OUTPATIENT
Start: 2024-03-11 | End: 2024-03-13

## 2024-03-11 RX ORDER — BUPIVACAINE HYDROCHLORIDE 2.5 MG/ML
INJECTION, SOLUTION INFILTRATION; PERINEURAL
Status: DISCONTINUED
Start: 2024-03-11 | End: 2024-03-11 | Stop reason: WASHOUT

## 2024-03-11 RX ORDER — SODIUM CHLORIDE, SODIUM LACTATE, POTASSIUM CHLORIDE, CALCIUM CHLORIDE 600; 310; 30; 20 MG/100ML; MG/100ML; MG/100ML; MG/100ML
INJECTION, SOLUTION INTRAVENOUS CONTINUOUS
Status: DISCONTINUED | OUTPATIENT
Start: 2024-03-11 | End: 2024-03-11 | Stop reason: HOSPADM

## 2024-03-11 RX ORDER — NALOXONE HYDROCHLORIDE 0.4 MG/ML
0.1 INJECTION, SOLUTION INTRAMUSCULAR; INTRAVENOUS; SUBCUTANEOUS
Status: DISCONTINUED | OUTPATIENT
Start: 2024-03-11 | End: 2024-03-11 | Stop reason: HOSPADM

## 2024-03-11 RX ORDER — ONDANSETRON 4 MG/1
4 TABLET, ORALLY DISINTEGRATING ORAL EVERY 30 MIN PRN
Status: DISCONTINUED | OUTPATIENT
Start: 2024-03-11 | End: 2024-03-11 | Stop reason: HOSPADM

## 2024-03-11 RX ORDER — OXYCODONE HYDROCHLORIDE 5 MG/1
5 TABLET ORAL EVERY 4 HOURS PRN
Status: DISCONTINUED | OUTPATIENT
Start: 2024-03-11 | End: 2024-03-14 | Stop reason: HOSPADM

## 2024-03-11 RX ORDER — HYDROMORPHONE HCL IN WATER/PF 6 MG/30 ML
0.4 PATIENT CONTROLLED ANALGESIA SYRINGE INTRAVENOUS
Status: DISCONTINUED | OUTPATIENT
Start: 2024-03-11 | End: 2024-03-14 | Stop reason: HOSPADM

## 2024-03-11 RX ORDER — DEXAMETHASONE SODIUM PHOSPHATE 10 MG/ML
INJECTION, SOLUTION INTRAMUSCULAR; INTRAVENOUS PRN
Status: DISCONTINUED | OUTPATIENT
Start: 2024-03-11 | End: 2024-03-11

## 2024-03-11 RX ORDER — ONDANSETRON 2 MG/ML
INJECTION INTRAMUSCULAR; INTRAVENOUS PRN
Status: DISCONTINUED | OUTPATIENT
Start: 2024-03-11 | End: 2024-03-11

## 2024-03-11 RX ORDER — ONDANSETRON 2 MG/ML
4 INJECTION INTRAMUSCULAR; INTRAVENOUS EVERY 30 MIN PRN
Status: DISCONTINUED | OUTPATIENT
Start: 2024-03-11 | End: 2024-03-11 | Stop reason: HOSPADM

## 2024-03-11 RX ORDER — FENTANYL CITRATE 50 UG/ML
50 INJECTION, SOLUTION INTRAMUSCULAR; INTRAVENOUS EVERY 5 MIN PRN
Status: DISCONTINUED | OUTPATIENT
Start: 2024-03-11 | End: 2024-03-11 | Stop reason: HOSPADM

## 2024-03-11 RX ORDER — ONDANSETRON 2 MG/ML
4 INJECTION INTRAMUSCULAR; INTRAVENOUS EVERY 6 HOURS PRN
Status: DISCONTINUED | OUTPATIENT
Start: 2024-03-11 | End: 2024-03-14 | Stop reason: HOSPADM

## 2024-03-11 RX ORDER — LIDOCAINE 40 MG/G
CREAM TOPICAL
Status: DISCONTINUED | OUTPATIENT
Start: 2024-03-11 | End: 2024-03-14 | Stop reason: HOSPADM

## 2024-03-11 RX ORDER — SODIUM CHLORIDE, SODIUM LACTATE, POTASSIUM CHLORIDE, CALCIUM CHLORIDE 600; 310; 30; 20 MG/100ML; MG/100ML; MG/100ML; MG/100ML
INJECTION, SOLUTION INTRAVENOUS CONTINUOUS PRN
Status: DISCONTINUED | OUTPATIENT
Start: 2024-03-11 | End: 2024-03-11

## 2024-03-11 RX ORDER — HYDROXYZINE HYDROCHLORIDE 25 MG/1
25 TABLET, FILM COATED ORAL EVERY 6 HOURS PRN
Status: DISCONTINUED | OUTPATIENT
Start: 2024-03-11 | End: 2024-03-14 | Stop reason: HOSPADM

## 2024-03-11 RX ORDER — ENOXAPARIN SODIUM 100 MG/ML
40 INJECTION SUBCUTANEOUS EVERY 24 HOURS
Status: DISCONTINUED | OUTPATIENT
Start: 2024-03-11 | End: 2024-03-14 | Stop reason: HOSPADM

## 2024-03-11 RX ORDER — HYDROMORPHONE HCL IN WATER/PF 6 MG/30 ML
0.4 PATIENT CONTROLLED ANALGESIA SYRINGE INTRAVENOUS EVERY 5 MIN PRN
Status: DISCONTINUED | OUTPATIENT
Start: 2024-03-11 | End: 2024-03-11 | Stop reason: HOSPADM

## 2024-03-11 RX ORDER — FENTANYL CITRATE 50 UG/ML
INJECTION, SOLUTION INTRAMUSCULAR; INTRAVENOUS PRN
Status: DISCONTINUED | OUTPATIENT
Start: 2024-03-11 | End: 2024-03-11

## 2024-03-11 RX ORDER — OXYCODONE HYDROCHLORIDE 5 MG/1
5 TABLET ORAL
Status: CANCELLED | OUTPATIENT
Start: 2024-03-11

## 2024-03-11 RX ORDER — OXYCODONE HYDROCHLORIDE 5 MG/1
10 TABLET ORAL
Status: CANCELLED | OUTPATIENT
Start: 2024-03-11

## 2024-03-11 RX ORDER — CALCIUM CARBONATE 500 MG/1
500 TABLET, CHEWABLE ORAL 4 TIMES DAILY PRN
Status: DISCONTINUED | OUTPATIENT
Start: 2024-03-11 | End: 2024-03-14 | Stop reason: HOSPADM

## 2024-03-11 RX ORDER — CEFAZOLIN SODIUM/WATER 2 G/20 ML
2 SYRINGE (ML) INTRAVENOUS SEE ADMIN INSTRUCTIONS
Status: DISCONTINUED | OUTPATIENT
Start: 2024-03-11 | End: 2024-03-11 | Stop reason: HOSPADM

## 2024-03-11 RX ORDER — CEFAZOLIN SODIUM/WATER 2 G/20 ML
2 SYRINGE (ML) INTRAVENOUS
Status: DISCONTINUED | OUTPATIENT
Start: 2024-03-11 | End: 2024-03-11 | Stop reason: HOSPADM

## 2024-03-11 RX ORDER — ONDANSETRON 2 MG/ML
4 INJECTION INTRAMUSCULAR; INTRAVENOUS EVERY 30 MIN PRN
Status: CANCELLED | OUTPATIENT
Start: 2024-03-11

## 2024-03-11 RX ADMIN — HYDROMORPHONE HYDROCHLORIDE 0.4 MG: 0.2 INJECTION, SOLUTION INTRAMUSCULAR; INTRAVENOUS; SUBCUTANEOUS at 19:09

## 2024-03-11 RX ADMIN — ONDANSETRON 4 MG: 2 INJECTION INTRAMUSCULAR; INTRAVENOUS at 17:05

## 2024-03-11 RX ADMIN — SODIUM CHLORIDE: 9 INJECTION, SOLUTION INTRAVENOUS at 09:18

## 2024-03-11 RX ADMIN — FENTANYL CITRATE 50 MCG: 50 INJECTION INTRAMUSCULAR; INTRAVENOUS at 17:41

## 2024-03-11 RX ADMIN — ROPINIROLE 1 MG: 0.25 TABLET, FILM COATED ORAL at 09:17

## 2024-03-11 RX ADMIN — HYDROMORPHONE HYDROCHLORIDE 0.4 MG: 0.2 INJECTION, SOLUTION INTRAMUSCULAR; INTRAVENOUS; SUBCUTANEOUS at 18:42

## 2024-03-11 RX ADMIN — ROPINIROLE HYDROCHLORIDE 2 MG: 1 TABLET, FILM COATED ORAL at 22:11

## 2024-03-11 RX ADMIN — VANCOMYCIN HYDROCHLORIDE 1000 MG: 1 INJECTION, SOLUTION INTRAVENOUS at 12:16

## 2024-03-11 RX ADMIN — LIDOCAINE HYDROCHLORIDE 50 MG: 10 INJECTION, SOLUTION INFILTRATION; PERINEURAL at 17:05

## 2024-03-11 RX ADMIN — FENTANYL CITRATE 100 MCG: 50 INJECTION INTRAMUSCULAR; INTRAVENOUS at 18:09

## 2024-03-11 RX ADMIN — OXYCODONE 10 MG: 5 TABLET ORAL at 22:11

## 2024-03-11 RX ADMIN — METHOCARBAMOL TABLETS 750 MG: 750 TABLET, COATED ORAL at 09:17

## 2024-03-11 RX ADMIN — DEXAMETHASONE SODIUM PHOSPHATE 10 MG: 10 INJECTION, SOLUTION INTRAMUSCULAR; INTRAVENOUS at 17:05

## 2024-03-11 RX ADMIN — CALCIUM CARBONATE (ANTACID) CHEW TAB 500 MG 1000 MG: 500 CHEW TAB at 10:40

## 2024-03-11 RX ADMIN — VANCOMYCIN HYDROCHLORIDE 1000 MG: 1 INJECTION, SOLUTION INTRAVENOUS at 01:07

## 2024-03-11 RX ADMIN — METHOCARBAMOL TABLETS 750 MG: 750 TABLET, COATED ORAL at 22:11

## 2024-03-11 RX ADMIN — GABAPENTIN 100 MG: 100 CAPSULE ORAL at 19:48

## 2024-03-11 RX ADMIN — SUGAMMADEX 200 MG: 100 INJECTION, SOLUTION INTRAVENOUS at 17:58

## 2024-03-11 RX ADMIN — PROPOFOL 200 MG: 10 INJECTION, EMULSION INTRAVENOUS at 17:05

## 2024-03-11 RX ADMIN — GABAPENTIN 100 MG: 100 CAPSULE ORAL at 09:17

## 2024-03-11 RX ADMIN — LOPERAMIDE HYDROCHLORIDE 2 MG: 2 CAPSULE ORAL at 09:17

## 2024-03-11 RX ADMIN — ROCURONIUM BROMIDE 50 MG: 10 INJECTION, SOLUTION INTRAVENOUS at 17:05

## 2024-03-11 RX ADMIN — ENOXAPARIN SODIUM 40 MG: 100 INJECTION SUBCUTANEOUS at 19:48

## 2024-03-11 RX ADMIN — ONDANSETRON 4 MG: 4 TABLET, ORALLY DISINTEGRATING ORAL at 12:16

## 2024-03-11 RX ADMIN — PANTOPRAZOLE SODIUM 40 MG: 40 TABLET, DELAYED RELEASE ORAL at 09:18

## 2024-03-11 RX ADMIN — FENTANYL CITRATE 50 MCG: 50 INJECTION INTRAMUSCULAR; INTRAVENOUS at 17:46

## 2024-03-11 RX ADMIN — HYDROMORPHONE HYDROCHLORIDE 0.4 MG: 0.2 INJECTION, SOLUTION INTRAMUSCULAR; INTRAVENOUS; SUBCUTANEOUS at 18:53

## 2024-03-11 RX ADMIN — SODIUM CHLORIDE, POTASSIUM CHLORIDE, SODIUM LACTATE AND CALCIUM CHLORIDE: 600; 310; 30; 20 INJECTION, SOLUTION INTRAVENOUS at 16:59

## 2024-03-11 RX ADMIN — FENTANYL CITRATE 100 MCG: 50 INJECTION INTRAMUSCULAR; INTRAVENOUS at 17:05

## 2024-03-11 RX ADMIN — HYDROMORPHONE HYDROCHLORIDE 0.4 MG: 0.2 INJECTION, SOLUTION INTRAMUSCULAR; INTRAVENOUS; SUBCUTANEOUS at 18:27

## 2024-03-11 RX ADMIN — FLUOXETINE 40 MG: 20 CAPSULE ORAL at 09:17

## 2024-03-11 RX ADMIN — ERTAPENEM SODIUM 1 G: 1 INJECTION, POWDER, LYOPHILIZED, FOR SOLUTION INTRAMUSCULAR; INTRAVENOUS at 09:17

## 2024-03-11 RX ADMIN — METHOCARBAMOL TABLETS 750 MG: 750 TABLET, COATED ORAL at 03:57

## 2024-03-11 ASSESSMENT — ACTIVITIES OF DAILY LIVING (ADL)
ADLS_ACUITY_SCORE: 40
ADLS_ACUITY_SCORE: 44
ADLS_ACUITY_SCORE: 38
ADLS_ACUITY_SCORE: 44
ADLS_ACUITY_SCORE: 38
ADLS_ACUITY_SCORE: 44
ADLS_ACUITY_SCORE: 38
ADLS_ACUITY_SCORE: 44
ADLS_ACUITY_SCORE: 38
ADLS_ACUITY_SCORE: 38
ADLS_ACUITY_SCORE: 44
ADLS_ACUITY_SCORE: 40
ADLS_ACUITY_SCORE: 44
ADLS_ACUITY_SCORE: 44
ADLS_ACUITY_SCORE: 38
ADLS_ACUITY_SCORE: 38
ADLS_ACUITY_SCORE: 40
ADLS_ACUITY_SCORE: 38

## 2024-03-11 NOTE — PROGRESS NOTES
"INFECTIOUS DISEASE FOLLOW UP NOTE      ASSESSMENT:  Post-operative wound infection. Hardware in place. Fluid collection on MRI subcutaneous, extends to deep structures. Aspirate 3/3 now growing enterobacter. Anaerobe, and staph epi. Deep infection. Now s/p I+D 3/7 -- large subQ fluid collection that tracked down to epidural space. Old hematoma noted. Hardware had covered over with granulation tissue. Growth of staph epi from aspirate of uncertain significance, but this is now confirmed on deep operative cultures, as is Enterobacter. Staph epi raises question of hardware involvement, despite it being covered with granulation tissue as noted at surgery. Repeat OR 3/11. Active issue.   S/p L4--L5 transforaminal lumbar interbody fusion on 24.   H/o NHL, maintained on acalabrutinib    PLAN:  continue ertapenem and vancomycin, expect 6 weeks IV abx.    Then potential oral antibiotic after that. Discussed with micro lab and have added on susceptibilities.   To stay in hospital into next week, for possible wound closure planned 3/11.  Expect PICC -- single or double lumen depending on 1 or 2 antibiotics  Follow up with Dr Tucker in ID clinic in ~4 weeks  ID will follow, thanks    Gaby Johnson MD  [facility]  [phone] clinic  Am[employer] paging    ______________________________________________________________________    SUBJECTIVE / INTERVAL HISTORY: first visit by me. Plan for OR today. Discussed micro. Tolerating antibiotics.       ROS: All other systems negative except as listed above.        OBJECTIVE:  BP (!) 168/72 (BP Location: Right arm)   Pulse 67   Temp 98.8  F (37.1  C) (Oral)   Resp 18   Ht 1.626 m (5' 4.02\")   Wt 105.1 kg (231 lb 9.6 oz)   SpO2 96%   BMI 39.73 kg/m         Vital Signs  Temp: 98.4  F (36.9  C)  Temp src: Oral  Resp: 18  Pulse: 68  Pulse Rate Source: Monitor  BP: 136/63  BP Location: Left arm    Temp (24hrs), Av.4  F (36.9  C), Min:98  F (36.7  C), " Max:98.7  F (37.1  C)      GEN: No acute distress.  In bed.   RESPIRATORY:  Normal breathing pattern.   EXTREMITIES: No edema.  SKIN/HAIR/NAILS:  No rashes. Wound with vac/irrigation device.   Strength in legs pretty good  IV: peripheral        Antibiotics:  pip/tazo 3/3-3/10  Vancomycin 3/3-4, 3/7-  Ertapenem IV 3/10-    Pertinent labs:    Recent Labs   Lab 03/11/24  0602 03/10/24  0535 03/09/24  0612 03/08/24  0653 03/06/24  0626   WBC 10.7 8.8  --  12.6* 9.2   HGB 10.0*  --   --  9.2* 9.4*   HCT 31.6*  --   --  28.9* 29.7*     --  271 276 265        Recent Labs   Lab 03/11/24  0602 03/08/24  0653 03/06/24  0626    140 138   CO2 26 26 28   BUN 8.3 13.1 8.6         Lab Results   Component Value Date    ALT 19 03/03/2024    AST 25 03/03/2024    ALKPHOS 101 03/03/2024         MICROBIOLOGY DATA:  3/3 blood negative  3/3 aspirate enterobacter, staph epi, peptoniphilus  3/7 surgical -- enterobacter, staph epi    RADIOLOGY:  MR Lumbar Spine w/o Contrast    Result Date: 3/3/2024  EXAM: MR LUMBAR SPINE W/O CONTRAST LOCATION: [facility] DATE: 3/3/2024 INDICATION: Lumbar fusion 02/23/2024. Signs and symptoms of infection. Lumbar pain. Drainage at surgical site. COMPARISON: Lumbar radiographs 03/03/2024. Lumbar MRI 11 08/01/2023 TECHNIQUE: Routine Lumbar Spine MRI without IV contrast. FINDINGS: Redemonstration of L4-L5 anterior posterior fusion. Centrally positioned interbody graft and bilateral transpedicular screws with intertransverse rods. 4 mm anterolisthesis of L4 on L5 compared with 5 mm on the preoperative exam. 2.5 mm degenerative anterolisthesis at L5-S1 unchanged. 2.5 mm degenerative retrolisthesis at L2-L3 unchanged. L4-L5 laminotomy. Nomenclature is based on 5 lumbar type vertebral bodies. Normal vertebral body heights. Negative for fracture. Benign osseous hemangioma at the L4 vertebral body. No suspicious marrow signal change. Type II Modic endplate changes at L2-L3 and  L3-L4. Normal distal spinal cord and cauda equina with conus medullaris at L2. Fluid collection in the midline dorsal soft tissues extends from L1-L2 inferiorly to L5-S1 measuring 11 cm cephalocaudad by 6 cm AP by 6 cm mediolateral. A component  of the collection extends into the surgical tract, through the hemilaminotomy and to the dorsal surgical space at L4-L5. This is difficult to evaluate due to susceptibility artifact but the canalicular portion measures approximately 22 x 13 mm transverse extending through the posterior elements. Compression of the right dorsal thecal sac and moderate central canal stenosis is present as seen on axial image 9 of series 6.  Unremarkable visualized bony pelvis. T11-T12: Disc desiccation and ventral osteophyte. Patent central canal and foramen. Mild annular bulge. T12-L1: Disc desiccation , height loss and chronic Schmorl's nodes. Patent central canal and foramen. L1-L2: Disc desiccation. Patent central canal and foramina. There may be a tiny amount of fluid in the dorsal epidural space related to previous surgery. L2-L3: Disc osteophyte complex. Small amount of dorsal canal epidural space fluid may be related to recent surgery. Mild central canal stenosis. Moderate to severe left and mild-to-moderate right foraminal stenosis. L3-L4: Mild disc bulge. Patent central canal. Facet DJD with mild-to-moderate right and mild left foraminal stenosis. L4-L5: Fusion and hemilaminotomy. Moderate central canal stenosis fluid in the dorsal and right dorsal canal appears contiguous with the larger subcutaneous fluid collection and extends to the surgical approach. Signal characteristics are nonspecific but  do not appear particularly complex and this could represent seroma or resolving hematoma. No surrounding inflammatory changes. No definite evidence for discitis or osteomyelitis. Contrast-enhanced imaging may be useful for further evaluation. Mild-to-moderate right and minimal left  foraminal stenosis. L5-S1: Disc desiccation and mild disc bulge. Facet DJD. Moderate right and mild left foraminal stenosis. Patent central canal.     IMPRESSION: 1.  L4-L5 fusion. No hardware complications. Good positioning of transpedicular screws and interbody graft. 2.  Dorsal subcutaneous fluid collection as above which extends through the surgical approach into the dorsal canal epidural space and most prominent at L4-L5. Slight extension cephalad to L1-L2. Consider further evaluation with contrast-enhanced imaging. Fairly homogeneous fluid collection without significant surrounding inflammatory signal changes and may represent seroma or resolving hematoma. Infectious/inflammatory fluid accumulation is possible, however. 3.  Lumbar spondylosis 4.  No fractures. 5.  Findings discussed with Flor James of the neurosurgery service on 3/3/2024 1255 PM CST    XR Lumbar Spine 2/3 Views    Result Date: 3/3/2024  EXAM: XR LUMBAR SPINE 2/3 VIEWS LOCATION: [facility] DATE: 03/03/2024 INDICATION: Lumbar fusion. COMPARISON: 02/26/2024.     IMPRESSION: Five lumbar vertebral bodies. Minimal convex right curvature, unchanged. Dorsal midline skin staples persist. Postoperative changes of interbody and posterior spinal fixation across L4-L5. Grade 1 anterolisthesis of L4 on L5 and L5 on S1, unchanged. Intact hardware. Severe interspace and endplate degeneration L2-L3. Normal vertebral body heights.     XR Lumbar Spine 2 - 3 VWS Portable    Result Date: 2/26/2024  EXAM: XR LUMBAR SPINE PORT 2/3 VIEWS LOCATION: [facility] DATE/TIME: 2/26/2024 4:50 PM CST INDICATION: s p lumbar fusion, upright XR. COMPARISON: None. TECHNIQUE: Radiographs of lumbar spine in routine projections. FINDINGS: Staus post  L4-L5 posterior interbody fusion. No evidence of hardware failure. Alignment similar to prior without change in minor dextrocurvature, grade 1 anterolisthesis at L4-L5 and L5-S1.  Normal height of vertebral bodies. Advanced L2-L3 interbody degeneration similar to prior. Mild multi level facet arthropathy. Unremarkable visualized bony pelvis. Cholecystectomy clips.    XR SURGERY CAROLINE FLUORO GREATER THAN 5 MIN    Result Date: 2/23/2024  This exam was marked as non-reportable because it will not be read by a radiologist or a [place] non-radiologist provider.     XR Surgery CAROLINE  Fluoro G/T 5 Min    Result Date: 2/23/2024  This exam was marked as non-reportable because it will not be read by a radiologist or a [place] non-radiologist provider.     Lateral Lumbar Spine for Surgical Imaging  [XR LUMBAR SPINE PORT 1  VIEW]    Result Date: 2/23/2024  EXAM: XR CROSSTABLE LATERAL LUMBAR SPINE PORTABLE LOCATION: [facility] DATE: 2/23/2024 INDICATION: Intra operative COMPARISON: None.     IMPRESSION: Intraoperative crosstable lateral radiograph. Underpenetration of the lumbosacral junction somewhat comprises evaluation. Posterior approach surgical clamp projects over the posterior margin of the L3 posterior spinous process and is directed  at the L4 vertebral body and pedicles.

## 2024-03-11 NOTE — PROGRESS NOTES
Report given to [facility], RN. Patient to be transported to [facility] at 1500 via bed with peripheral IV saline locked.

## 2024-03-11 NOTE — PROGRESS NOTES
[facility]    Medicine Progress Note - Hospitalist Service    Date of Admission:  3/3/2024    Assessment & Plan   68 year old female past medical history significant for recent L4-L5 bilateral fusion on 2/23/2024 discharged home on 2/26 , non-Hodgkin's lymphoma/small B lymphocyte lymphoma, lymphoproliferative disorder on tyrosine kinase inhibitor, typically sees [facility], obesity, depression and anxiety and restless leg syndrome presented to the emergency room with complaints of worsening low back pain last couple of days, with postoperative wound erythema, warmth, lumbar MRI concerning for fluid collection extending into the epidural space s/p I&D in ED pending cultures.      S/p bedside evacuation of post-operative fluid collection by Neurosurgery on day of admit. Infectious Disease consulted and recommended formal surgical washout. As such, anticoagulation held, last dose lovenox on 3/4 at 2010hrs. Ongoing antibiotics with zosyn per ID. Had surgical washout on 3/7/2024.     Needs finalized culture s/s from surgery before she can be discharged on an appropriate abx. ID following. Continue post-op cares as per routine. Abx regimen per ID was Zosyn as well as Vancomycin then transitioned to ertapenem and vancomycin, follow-up surgical cultures for staph epi; Neurosurgeon Dr. Hudson will see patient 3/11/2024 regarding next steps, ie possibility of wound closure procedure. Repleting K and Mag per protocol.     New provider today, though known from previous care during same hospital stay. Pt is stable.     Postoperative lumbar wound infection s/p debridement 3/7  S/p bilateral L4-L5 fusion 2/23/2024, had an elevated Temp of 100.1, leukocytosis 21, , ESR 29. S/p I&D 3/7 with deep culture positive for staph epi and enterobacter.   - Spine surgeon Dr. Hudson will see patient 3/11/2024 to discuss next step, ?further intervention  - ID is following, abx switched to erta and  "vanco, plan for 6w and then transition to PO   - WOC following   - Pain control on Tylenol, oxycodone, Robaxin, gabapentin IV Dilaudid as needed     History of non-Hodgkin's lymphoma  Small lymphocytic lymphoma  - Sees [facility], per family members holding off on the tyrosine kinase inhibitor (acalabrutinib) for 10 days before and after surgery    Hypokalemia  Hypomagnesemia  -On replacement protocols for both K and mag     Depression anxiety  -Continue home meds     Restless leg syndrome  -On Requip     GERD  - On PPI          Diet: Advance Diet as Tolerated: Regular Diet Adult    DVT Prophylaxis: Pneumatic Compression Devices  Camejo Catheter: Not present  Lines: None     Cardiac Monitoring: None  Code Status: Full Code      Clinically Significant Risk Factors            # Hypomagnesemia: Lowest Mg = 1.6 mg/dL in last 2 days, will replace as needed              # Obesity: Estimated body mass index is 39.73 kg/m  as calculated from the following:    Height as of this encounter: 1.626 m (5' 4.02\").    Weight as of this encounter: 105.1 kg (231 lb 9.6 oz).        # Financial/Environmental Concerns: none         Disposition Plan     Expected Discharge Date: 03/12/2024    Discharge Delays: IV Medication - consider oral or Home Infusion  Destination: home with family (outpatient infusion)  Discharge Comments: ID plans, cultures, wound vac plans, updated therapy evals. Neuro to see Monday 3/11          Alireza Mcneill MD  Hospitalist Service  [facility]  Securely message with [employer] (more info)  Text page via [employer] Paging/Directory   ______________________________________________________________________    Interval History   NAEO  Pain remains controlled   Discussed NS team following up   Pt has no new pain or symptoms or further questions   Discussed with bedside RN on rounds     Physical Exam   Vital Signs: Temp: 98.6  F (37  C) Temp src: Oral BP: (!) 153/67 Pulse: 77   Resp: 17 SpO2: " 91 % O2 Device: None (Room air)    Weight: 231 lbs 9.6 oz    GENERAL:  Alert, appears comfortable, in no acute distress, appears stated age, resting in bed   HEAD:  Normocephalic, without obvious abnormality, atraumatic   EYES:  Conjunctiva/corneas clear, no scleral icterus, EOM's appears intact grossly   NOSE: Nares normal, septum midline, mucosa normal, no drainage   THROAT: Lips, mucosa, and tongue appear normal   NECK: Symmetrical, trachea appears midline   BACK:   Symmetric, no curvature noted   LUNGS:   Symmetric chest rise on inhalation, respirations unlabored   CHEST WALL:  No apparent deformity   HEART:  No thrills or heaves visualized   ABDOMEN:   No masses or organomegaly apparent; non-scaphoid   EXTREMITIES: Extremities appear normal, atraumatic, no cyanosis   SKIN: No exanthems in the visualized areas   NEURO: Alert and oriented x3 , moves all four extremities freely and spontaneously   PSYCH: Cooperative, behavior is appropriate       Medical Decision Making       36 MINUTES SPENT BY ME on the date of service doing chart review, history, exam, documentation & further activities per the note.      Data

## 2024-03-11 NOTE — BRIEF OP NOTE
[facility]    Brief Operative Note    Pre-operative diagnosis: Postoperative infection, unspecified type, initial encounter [T81.40XA]  Post-operative diagnosis Same as pre-operative diagnosis    Procedure: removal of lumbar wound vac with complex wound closure., Bilateral - Spine    Surgeon: Surgeon(s) and Role:     * Brianne Hudson MD - Primary  Anesthesia: General   Estimated Blood Loss: 15 cc    Drains: None  Specimens: * No specimens in log *  Findings:   Wound appeared clean with some granulation tissue. Closed with prolene  . Prevenna placed   Complications: None.  Implants: * No implants in log *

## 2024-03-11 NOTE — PROGRESS NOTES
[facility]    Neurosurgery  Daily Note    Assessment & Plan   Procedure(s):  LUMBAR WOUND IRRIGATION AND DEBRIDEMENT  WITH WOUND VACUUM   4 Days Post-Op    Culture results:   Enterobacter cloacae complex  Staphylococcus epidermidis    Plan:  -OR TODAY with Dr. Hudson for removal of lumbar wound vac with complex wound closure. Patient should be NPO, hold all blood thinning medications. IVF ordered. Pre-op orders placed, surgery scheduled for 4pm.  -Continue antibiotics per infectious disease.  -Continue wound vac and wound cares, C team involved.  -Pain control  -PT/OT    RN updated bedside this am of plan and verbalized understanding.    JAIR BAUMAN PA-C    Interval History   Patient lying in bed. Denies any new radicular symptoms, weakness, fevers or chills. Wound vac in place.     Physical Exam   Temp: 98.8  F (37.1  C) Temp src: Oral BP: (!) 168/72 Pulse: 67   Resp: 18 SpO2: 96 % O2 Device: None (Room air)    Vitals:    03/03/24 0802 03/10/24 0900   Weight: 103 kg (227 lb) 105.1 kg (231 lb 9.6 oz)     Vital Signs with Ranges  Temp:  [98.3  F (36.8  C)-98.8  F (37.1  C)] 98.8  F (37.1  C)  Pulse:  [67-77] 67  Resp:  [17-18] 18  BP: (135-168)/(66-73) 168/72  SpO2:  [91 %-98 %] 96 %  I/O last 3 completed shifts:  In: 400 [P.O.:400]  Out: 425 [Drains:425]    Alert and oriented.  Moves all extremities equally.  Strength 5/5 BLE  Sensation intact to light touch  Wound vac and dressing intact        Medications    sodium chloride         ertapenem  1 g Intravenous Q24H    FLUoxetine  40 mg Oral Daily    gabapentin  100 mg Oral TID    methocarbamol  750 mg Oral Q6H    pantoprazole  40 mg Oral Daily    polyethylene glycol  17 g Oral Daily    rOPINIRole  1 mg Oral BID    rOPINIRole  2 mg Oral At Bedtime    senna-docusate  1 tablet Oral BID    sodium chloride (PF)  3 mL Intracatheter Q8H    vancomycin  1,000 mg Intravenous Q12H    wound support modular  60 mL Oral Daily       Plans  discussed with Dr. Hudson who was in agreement with plans    Lora MCLEOD [facility] Neurosurgery  [facility]  [address] [place] [id number]

## 2024-03-11 NOTE — ANESTHESIA CARE TRANSFER NOTE
Patient: Tessa Edwards    Procedure: Procedure(s):  removal of lumbar wound vac with complex wound closure.       Diagnosis: Postoperative infection, unspecified type, initial encounter [T81.40XA]  Diagnosis Additional Information: No value filed.    Anesthesia Type:   General     Note:    Oropharynx: oropharynx clear of all foreign objects  Level of Consciousness: awake  Oxygen Supplementation: face mask  Level of Supplemental Oxygen (L/min / FiO2): 6    Dentition: dentition unchanged  Vital Signs Stable: post-procedure vital signs reviewed and stable  Report to RN Given: handoff report given  Patient transferred to: PACU    Handoff Report: Identifed the Patient, Identified the Reponsible Provider, Reviewed the pertinent medical history, Discussed the surgical course, Reviewed Intra-OP anesthesia mangement and issues during anesthesia, Set expectations for post-procedure period and Allowed opportunity for questions and acknowledgement of understanding      Vitals:  Vitals Value Taken Time   /83 03/11/24 1806   Temp     Pulse 71 03/11/24 1808   Resp 21 03/11/24 1808   SpO2 100 % 03/11/24 1808   Vitals shown include unfiled device data.    Electronically Signed By: FELIPE Carlos CRNA  March 11, 2024  6:09 PM

## 2024-03-11 NOTE — CONSULTS
Care Management Follow Up    Length of Stay (days): 8    Expected Discharge Date: 03/12/2024     Concerns to be Addressed: discharge planning     Patient plan of care discussed at interdisciplinary rounds: Yes    Anticipated Discharge Disposition: Home     Anticipated Discharge Services: None  Anticipated Discharge DME: None    Patient/family educated on Medicare website which has current facility and service quality ratings: yes  Education Provided on the Discharge Plan: Yes (AVS per bedside RN)  Patient/Family in Agreement with the Plan: unable to assess    Referrals Placed by CM/SW: Outpatient Infusion  Private pay costs discussed: Not applicable    Additional Information:  Consult was done 3/7. Pt is currently in surgery. Will wait until after surgery to see what needs pt has for discharge planning.     Nic Fraser RN

## 2024-03-11 NOTE — ANESTHESIA PREPROCEDURE EVALUATION
Anesthesia Pre-Procedure Evaluation    Patient: Tessa Edwards   MRN: [id number] : 1955        Procedure : Procedure(s):  bilateral lumbar 4-lumbar 5 laminectomies, medial facetectomies, foraminotomies and right transforaminal lumbar interbody fusion and bilateral posterior instrumented fusion with posterolateral arthrodesis and use of stealth.          Past Medical History:   Diagnosis Date    Abnormal CT of the abdomen 10/26/2021    Allergic rhinitis 2015    Anemia, iron deficiency 10/26/2021    Bilateral carpal tunnel syndrome 2019    Cervical nerve root compression 2017    Cervical spinal stenosis     Chronic low back pain without sciatica, unspecified back pain laterality 10/27/2021    Spondylosis,central  stenosis, foraminal encroachment    Colonic mass 2021    Depression     Depressive disorder     Disease of thyroid gland     Dyspepsia 2022    Fibromyalgia     GERD (gastroesophageal reflux disease)     History of blood transfusion     Hyperlipemia     Lymphoproliferative disorder (H)     Moderate major depression (H) 2022    Morbid obesity (H) 2022    Non-Hodgkin's lymphoma (H) 2022    Obesity     Osteoarthritis     Osteoarthritis of right knee, unspecified osteoarthritis type 2019    Other abnormal glucose 2022    Formatting of this note might be different from the original. Created by Conversion    Prediabetes 3/22/2022    Formatting of this note might be different from the original. Created by Conversion    Primary localized osteoarthrosis, lower leg 2007    Restless legs syndrome 10/26/2021    Rotator cuff tear     Sleep apnea     Spinal stenosis of lumbar region without neurogenic claudication 10/27/2021    Spondylosis,central  stenosis, foraminal encroachment    Splenomegaly 10/27/2021    Thyroid nodule 10/27/2021    Urinary frequency 2022    Urinary retention 2022    Vocal cord dysfunction 2015    Weight loss  10/27/2021      Past Surgical History:   Procedure Laterality Date    APPLY WOUND VAC N/A 3/7/2024    Procedure: WITH WOUND VACUUM;  Surgeon: Gerardo Ambrose MD;  Location: [facility] OR    ARTHROPLASTY SHOULDER Left     ARTHROSCOPY KNEE      ARTHROSCOPY SHOULDER ROTATOR CUFF REPAIR      BACK SURGERY      BIOPSY      CERVICAL DISC ARTHROPLASTY      CERVICAL DISCECTOMY  08/01/2017    C5, C6    COLONOSCOPY N/A 11/05/2021    Procedure: COLONOSCOPY;  Surgeon: Efraín Guerrero MD;  Location: [facility] OR    CRW LT SHOULDER AP AXILLA 2 VW      FUSION TRANSFORAMINAL LUMBAR INTERBODY, 1 LEVEL, POSTERIOR APPROACH, USING OTS SURG IMAGING GUIDANCE Bilateral 2/23/2024    Procedure: medial facetectomies, foraminotomies and right transforaminal lumbar interbody fusion and bilateral posterior instrumented fusion with posterolateral arthrodesis and use of stealth.;  Surgeon: Brianne Hudson MD;  Location: [facility] OR    HYSTERECTOMY      IRRIGATION AND DEBRIDEMENT SPINE N/A 3/7/2024    Procedure: LUMBAR WOUND IRRIGATION AND DEBRIDEMENT;  Surgeon: Gerardo Ambrose MD;  Location: [facility] OR    JOINT REPLACEMENT      LAMINECTOMY LUMBAR ONE LEVEL  2/23/2024    Procedure: bilateral lumbar 4-lumbar 5 laminectomies,;  Surgeon: Brianne Hudson MD;  Location: [facility] OR    LAPAROSCOPIC ASSISTED COLECTOMY Right 11/16/2021    Procedure: LAPAROSCOPIC RIGHT COLECTOMY;  Surgeon: Efraín Guerrero MD;  Location: [facility] OR    OOPHORECTOMY      RELEASE CARPAL TUNNEL      RELEASE CARPAL TUNNEL      XR MYELOGRAM CERVICAL  05/14/2019    [facility] LAP,CHOLECYSTECTOMY/EXPLORE      Description: Cholecystectomy Laparoscopic;  Recorded: 12/10/2012;    [facility] TOTAL ABDOM HYSTERECTOMY      Description: Total Abdominal Hysterectomy;  Recorded: 12/10/2012;  Comments: 44 Y/O FOR FIBROID TUMORS    [facility] TOTAL KNEE ARTHROPLASTY Right 09/12/2019    Procedure: RIGHT TOTAL KNEE ARTHROPLASTY;  Surgeon: Irvin Canas DO;   Location: [facility] OR;  Service: Orthopedics      No Known Allergies   Social History     Tobacco Use    Smoking status: Former     Packs/day: 0.50     Years: 10.00     Additional pack years: 0.00     Total pack years: 5.00     Types: Cigarettes     Quit date: 2000     Years since quittin.0    Smokeless tobacco: Never   Substance Use Topics    Alcohol use: Yes     Comment: social      Wt Readings from Last 1 Encounters:   03/10/24 105.1 kg (231 lb 9.6 oz)        Anesthesia Evaluation            ROS/MED HX  ENT/Pulmonary:     (+) sleep apnea,                                       Neurologic: Comment: Fibromyalgia  Chronic pain  Occ'l THC use (gummies)      Cardiovascular:     (+) Dyslipidemia - -   -  - -                                      METS/Exercise Tolerance:     Hematologic: Comments: Lymphoproliferative disorder      Musculoskeletal:       GI/Hepatic:     (+) GERD,                   Renal/Genitourinary:  - neg Renal ROS     Endo:     (+)          thyroid problem,     Obesity,       Psychiatric/Substance Use:       Infectious Disease:       Malignancy:       Other:            Physical Exam    Airway        Mallampati: II   TM distance: > 3 FB   Neck ROM: full   Mouth opening: > 3 cm    Respiratory Devices and Support         Dental       (+) Multiple visibly decayed, broken teeth      Cardiovascular          Rhythm and rate: regular and normal     Pulmonary           breath sounds clear to auscultation           OUTSIDE LABS:  CBC:   Lab Results   Component Value Date    WBC 10.7 2024    WBC 8.8 03/10/2024    HGB 10.0 (L) 2024    HGB 9.2 (L) 2024    HCT 31.6 (L) 2024    HCT 28.9 (L) 2024     2024     2024     BMP:   Lab Results   Component Value Date     2024     2024    POTASSIUM 4.3 2024    POTASSIUM 3.5 03/10/2024    CHLORIDE 105 2024    CHLORIDE 106 2024    CO2 26 2024    CO2 26  "03/08/2024    BUN 8.3 03/11/2024    BUN 13.1 03/08/2024    CR 0.78 03/11/2024    CR 0.86 03/10/2024     (H) 03/11/2024     (H) 03/08/2024     COAGS:   Lab Results   Component Value Date    PTT 30 03/05/2024    INR 0.99 03/05/2024     POC: No results found for: \"BGM\", \"HCG\", \"HCGS\"  HEPATIC:   Lab Results   Component Value Date    ALBUMIN 3.9 03/03/2024    PROTTOTAL 6.7 03/03/2024    ALT 19 03/03/2024    AST 25 03/03/2024    ALKPHOS 101 03/03/2024    BILITOTAL 0.7 03/03/2024     OTHER:   Lab Results   Component Value Date    LACT 0.9 03/03/2024    A1C 5.4 01/02/2023    KAYLAN 9.4 03/11/2024    MAG 2.0 03/11/2024    TSH 0.97 04/05/2023    SED 29 03/03/2024       Anesthesia Plan    ASA Status:  3    NPO Status:  NPO Appropriate    Anesthesia Type: General.     - Airway: ETT   Induction: Intravenous.           Consents    Anesthesia Plan(s) and associated risks, benefits, and realistic alternatives discussed. Questions answered and patient/representative(s) expressed understanding.     - Discussed: Risks, Benefits and Alternatives for the PROCEDURE were discussed     - Discussed with:  Patient            Postoperative Care    Pain management: IV analgesics, Oral pain medications, Multi-modal analgesia.   PONV prophylaxis: Ondansetron (or other 5HT-3), Dexamethasone or Solumedrol     Comments:               Ray Clayton MD    I have reviewed the pertinent notes and labs in the chart from the past 30 days and (re)examined the patient.  Any updates or changes from those notes are reflected in this note.              "

## 2024-03-11 NOTE — PLAN OF CARE
Goal Outcome Evaluation:      Plan of Care Reviewed With: patient    Overall Patient Progress: improvingOverall Patient Progress: improving    Alert and oriented x4, able to make needs known, CMS intact, wound vac in place, wound vac began notifying error of blockage, changed canister, did not resolve the problem, assessed dressing and appeared intact, SWAT came to assess/assist, patient laying on pillows and applying pressure to the site helped with the blockage, serosanguinous drainage, PIV SL, denied chills, held stool softener and gave PRN Imodium per patient's request for diarrhea, up with stand by assist with walker and gb, on K and Mg protocols.

## 2024-03-11 NOTE — ANESTHESIA PROCEDURE NOTES
Airway       Patient location during procedure: OR       Procedure Start/Stop Times: 3/11/2024 5:05 PM  Staff -        CRNA: Agustín Cifuentes APRN CRNA       Performed By: CRNA  Consent for Airway        Urgency: elective  Indications and Patient Condition       Indications for airway management: nadia-procedural       Induction type:intravenous       Mask difficulty assessment: 1 - vent by mask    Final Airway Details       Final airway type: endotracheal airway       Successful airway: ETT - single  Endotracheal Airway Details        ETT size (mm): 7.5       Cuffed: yes       Successful intubation technique: direct laryngoscopy       DL Blade Type: Saha 2       Grade View of Cords: 1       Adjucts: stylet       Position: Right       Measured from: lips       Secured at (cm): 23       Bite block used: None    Post intubation assessment        Placement verified by: capnometry, equal breath sounds and chest rise        Number of attempts at approach: 1       Number of other approaches attempted: 0       Secured with: tape       Ease of procedure: easy       Dentition: Intact and Unchanged    Medication(s) Administered   Medication Administration Time: 3/11/2024 5:05 PM

## 2024-03-11 NOTE — PROGRESS NOTES
[facility]  WOC Nurse Inpatient Assessment     Consulted for: lumbar spine     Summary: 3/11 - patient going back to the OR for vac removal and wound closure per Dr Hudson, will await further instruction from surgical team if post op woc assistance is needed.     3/7, requested vac placement in OR    Patient History (according to provider note(s):      Assessment and Plan:  68 year old female past medical history significant for recent L4-L5 bilateral fusion on 2/23/2024 discharged home on 2/26 , non-Hodgkin's lymphoma/small B lymphocyte lymphoma, lymphoproliferative disorder on tyrosine kinase inhibitor, sees [place] [facility], obesity, depression anxiety restless leg syndrome presented to the emergency room with complaints of worsening low back pain last couple of days, with postoperative wound erythema, warmth, lumbar MRI concerning for fluid collection extending into the epidural space s/p I&D in ED pending cultures.     Postoperative wound superficial cellulitis versus deep infection with fluid collection status post I&D in ED at bedside  S/p bilateral L4-L5 fusion 2/23/2024    Assessment:      Wound location: lumbar spine    3/4    Last photo: 3/4  Wound due to: Surgical Wound  Wound history/plan of care: recent L4-L5 bilateral fusion on 2/23/2024 discharged home on 2/26, Postoperative wound superficial cellulitis versus deep infection with fluid collection status post I&D in ED at bedside      Negative pressure wound therapy applied to: Spine   Wound history/plan of care:    Surgical date: 3/7 - reopen surgical wound   Service following: neuro  Date Negative Pressure Wound Therapy initiated: 3/7 - veraflo   Interventions in place: repositioning and offloading  Is patient s nutritional status compromised? no   If yes, what interventions are in place? N/A  Reason for initiating vac therapy? Presence of co-morbidities, High risk of infections, Need for accelerated granulation tissue,  and Prior history of delayed wound healing  Which?of?the?following?co-morbidities?apply? Immunocompromised  If diabetic is patient on a diabetic management program? No   Is osteomyelitis present in wound? no   If yes what treatments are in place? N/A    Wound base: 90 % non-granular tissue and adipose tissue, 10 %  fascia       Palpation of the wound bed: normal       Drainage: small      Volume in cannister: 0     Last cannister change date: 3/7 start     Description of drainage: bloody      Measurements (length x width x depth, in cm) 8  x 3  x  6 cm       Tunneling up to 4 cm from 6 o'clock      Undermining N/A   Periwound skin: Erythema- blanchable and slough to periphery        Color: pink and red       Temperature: warm   Odor: none   Pain: absent and patient in OR ,    Pain intervention prior to dressing change: N/A  Treatment goal: Infection control/prevention, Increase granulation, Remove necrotic tissue, and Soften necrotic tissue  STATUS: initial assessment   Supplies ordered: gathered    Number of foam pieces removed from a wound (excluding foam for bridge) :  0 CleanseChoice Contact layer   Verified this matched the number of foam pieces applied last dressing change: Yes   Number of foam pieces packed into wound (excluding foam for bridge) :  3 CleanseChoice Contact layer     Veraflo: 30cc vashe, 10 minute soak, 4hr cycle, -75mmHg    Treatment Plan:     Negative pressure wound therapy plan:  Wound location: lumbar spine   Change Days:  M/Th  by WOC RN    Supplies (including all accessories) used: medium Veraflo cleanse choice gray foam, Adapt barrier ring, and Cavilon advanced   Cleanse with Vashe prior to replacing NPWT  Suction setting: -75   Methods used: Window paned all periwound skin with vac drape prior to applying sponge and Placed barrier ring into periwound creases to improve seal    Staff RN to assess integrity of dressing and ensure suction is set at appropriate level every shift.   Date  "canister. Chart canister output every shift. Change cannister weekly and PRN if full/occluded     Remove foam dressing and replace with BID normal saline moist gauze dressing if:   -a dressing failure which cannot be repaired within 2 hours   -patient is discharging to home without a home pump   -patient is discharging to a facility outside the local area   -if a dressing is a \"Silver Foam\", remove before Radiation Therapy or MRI     The hospital VAC pump is not to be discharged with the patient.?Ensure to disconnect patient from machine prior to discharge. Then,    - If a home KCI VAC pump has been delivered, connect home cannister to dressing tubing then connect cannister to home pump and turn on machine    - If transferring to a nearby facility with a KCI vac, can disconnect and clamp tubing then cover end with glove so can be reconnected within 2 hours       Orders: Written    RECOMMEND PRIMARY TEAM ORDER: None, at this time  Education provided: plan of care, wound progress, Infection prevention , Moisture management, and Off-loading pressure  Discussed plan of care with: Patient and Nurse  WOC nurse follow-up plan: twice weekly  Notify WOC if wound(s) deteriorate.  Nursing to notify the Provider(s) and re-consult the WOC Nurse if new skin concern.    DATA:     Current support surface: Standard  Standard gel/foam mattress (IsoFlex, Atmos air, etc)  Containment of urine/stool: Continent of bladder and Continent of bowel  BMI: Body mass index is 39.73 kg/m .   Active diet order: Orders Placed This Encounter      NPO per Anesthesia Guidelines for Procedure/Surgery Except for: Meds     Output: I/O last 3 completed shifts:  In: 400 [P.O.:400]  Out: 425 [Drains:425]     Labs:   Recent Labs   Lab 03/11/24  0602 03/06/24  0626 03/05/24  2353   HGB 10.0*   < >  --    INR  --   --  0.99   WBC 10.7   < >  --     < > = values in this interval not displayed.     Pressure injury risk assessment:   Sensory Perception: " 3-->slightly limited  Moisture: 3-->occasionally moist  Activity: 3-->walks occasionally  Mobility: 3-->slightly limited  Nutrition: 3-->adequate  Friction and Shear: 3-->no apparent problem  Edmundo Score: 18    OLIVA BaroneN RN CWOCN  Pager no longer in use, please contact through [employer] group: [facility] [employer]

## 2024-03-12 ENCOUNTER — APPOINTMENT (OUTPATIENT)
Dept: PHYSICAL THERAPY | Facility: CLINIC | Age: 69
DRG: 857 | End: 2024-03-12
Payer: COMMERCIAL

## 2024-03-12 LAB
ANION GAP SERPL CALCULATED.3IONS-SCNC: 7 MMOL/L (ref 7–15)
BUN SERPL-MCNC: 11.7 MG/DL (ref 8–23)
CALCIUM SERPL-MCNC: 9.1 MG/DL (ref 8.8–10.2)
CHLORIDE SERPL-SCNC: 103 MMOL/L (ref 98–107)
CREAT SERPL-MCNC: 0.78 MG/DL (ref 0.51–0.95)
DEPRECATED HCO3 PLAS-SCNC: 27 MMOL/L (ref 22–29)
EGFRCR SERPLBLD CKD-EPI 2021: 81 ML/MIN/1.73M2
ERYTHROCYTE [DISTWIDTH] IN BLOOD BY AUTOMATED COUNT: 13.1 % (ref 10–15)
GLUCOSE SERPL-MCNC: 124 MG/DL (ref 70–99)
HCT VFR BLD AUTO: 30.2 % (ref 35–47)
HGB BLD-MCNC: 9.4 G/DL (ref 11.7–15.7)
MAGNESIUM SERPL-MCNC: 1.9 MG/DL (ref 1.7–2.3)
MCH RBC QN AUTO: 26.6 PG (ref 26.5–33)
MCHC RBC AUTO-ENTMCNC: 31.1 G/DL (ref 31.5–36.5)
MCV RBC AUTO: 85 FL (ref 78–100)
PLATELET # BLD AUTO: 292 10E3/UL (ref 150–450)
POTASSIUM SERPL-SCNC: 4.2 MMOL/L (ref 3.4–5.3)
RBC # BLD AUTO: 3.54 10E6/UL (ref 3.8–5.2)
SODIUM SERPL-SCNC: 137 MMOL/L (ref 135–145)
WBC # BLD AUTO: 10.2 10E3/UL (ref 4–11)

## 2024-03-12 PROCEDURE — 250N000013 HC RX MED GY IP 250 OP 250 PS 637: Performed by: HOSPITALIST

## 2024-03-12 PROCEDURE — 85027 COMPLETE CBC AUTOMATED: CPT | Performed by: STUDENT IN AN ORGANIZED HEALTH CARE EDUCATION/TRAINING PROGRAM

## 2024-03-12 PROCEDURE — 97530 THERAPEUTIC ACTIVITIES: CPT | Mod: GP

## 2024-03-12 PROCEDURE — 99232 SBSQ HOSP IP/OBS MODERATE 35: CPT | Performed by: STUDENT IN AN ORGANIZED HEALTH CARE EDUCATION/TRAINING PROGRAM

## 2024-03-12 PROCEDURE — 83735 ASSAY OF MAGNESIUM: CPT | Performed by: HOSPITALIST

## 2024-03-12 PROCEDURE — 80048 BASIC METABOLIC PNL TOTAL CA: CPT | Performed by: STUDENT IN AN ORGANIZED HEALTH CARE EDUCATION/TRAINING PROGRAM

## 2024-03-12 PROCEDURE — 97116 GAIT TRAINING THERAPY: CPT | Mod: GP

## 2024-03-12 PROCEDURE — 99232 SBSQ HOSP IP/OBS MODERATE 35: CPT | Performed by: INTERNAL MEDICINE

## 2024-03-12 PROCEDURE — 250N000013 HC RX MED GY IP 250 OP 250 PS 637: Performed by: PHYSICIAN ASSISTANT

## 2024-03-12 PROCEDURE — 250N000013 HC RX MED GY IP 250 OP 250 PS 637: Performed by: INTERNAL MEDICINE

## 2024-03-12 PROCEDURE — 36415 COLL VENOUS BLD VENIPUNCTURE: CPT | Performed by: STUDENT IN AN ORGANIZED HEALTH CARE EDUCATION/TRAINING PROGRAM

## 2024-03-12 PROCEDURE — 120N000001 HC R&B MED SURG/OB

## 2024-03-12 PROCEDURE — 250N000011 HC RX IP 250 OP 636: Performed by: PHYSICIAN ASSISTANT

## 2024-03-12 PROCEDURE — 99024 POSTOP FOLLOW-UP VISIT: CPT

## 2024-03-12 PROCEDURE — 250N000011 HC RX IP 250 OP 636: Mod: JZ | Performed by: INTERNAL MEDICINE

## 2024-03-12 PROCEDURE — 999N000197 HC STATISTIC WOC PT EDUCATION, 0-15 MIN

## 2024-03-12 RX ORDER — VANCOMYCIN HYDROCHLORIDE 1 G/200ML
1000 INJECTION, SOLUTION INTRAVENOUS EVERY 12 HOURS
Status: SHIPPED
Start: 2024-03-12 | End: 2024-03-13

## 2024-03-12 RX ORDER — ACETAMINOPHEN 325 MG/1
650 TABLET ORAL EVERY 4 HOURS PRN
Qty: 100 TABLET | Refills: 0 | Status: SHIPPED | OUTPATIENT
Start: 2024-03-12 | End: 2024-07-23

## 2024-03-12 RX ORDER — ERTAPENEM 1 G/1
1 INJECTION, POWDER, LYOPHILIZED, FOR SOLUTION INTRAMUSCULAR; INTRAVENOUS EVERY 24 HOURS
Status: SHIPPED
Start: 2024-03-13 | End: 2024-04-22

## 2024-03-12 RX ORDER — VANCOMYCIN HYDROCHLORIDE 1 G/200ML
1000 INJECTION, SOLUTION INTRAVENOUS EVERY 12 HOURS
Status: SHIPPED
Start: 2024-03-12 | End: 2024-03-12

## 2024-03-12 RX ADMIN — GABAPENTIN 100 MG: 100 CAPSULE ORAL at 20:59

## 2024-03-12 RX ADMIN — FLUOXETINE 40 MG: 20 CAPSULE ORAL at 08:54

## 2024-03-12 RX ADMIN — VANCOMYCIN HYDROCHLORIDE 1000 MG: 1 INJECTION, SOLUTION INTRAVENOUS at 00:27

## 2024-03-12 RX ADMIN — GABAPENTIN 100 MG: 100 CAPSULE ORAL at 13:39

## 2024-03-12 RX ADMIN — ENOXAPARIN SODIUM 40 MG: 100 INJECTION SUBCUTANEOUS at 20:58

## 2024-03-12 RX ADMIN — Medication 60 ML: at 09:32

## 2024-03-12 RX ADMIN — METHOCARBAMOL TABLETS 750 MG: 750 TABLET, COATED ORAL at 21:01

## 2024-03-12 RX ADMIN — VANCOMYCIN HYDROCHLORIDE 1000 MG: 1 INJECTION, SOLUTION INTRAVENOUS at 12:19

## 2024-03-12 RX ADMIN — METHOCARBAMOL TABLETS 750 MG: 750 TABLET, COATED ORAL at 09:28

## 2024-03-12 RX ADMIN — LOPERAMIDE HYDROCHLORIDE 2 MG: 2 CAPSULE ORAL at 09:28

## 2024-03-12 RX ADMIN — OXYCODONE 5 MG: 5 TABLET ORAL at 02:37

## 2024-03-12 RX ADMIN — GABAPENTIN 100 MG: 100 CAPSULE ORAL at 08:55

## 2024-03-12 RX ADMIN — ROPINIROLE 1 MG: 0.25 TABLET, FILM COATED ORAL at 08:55

## 2024-03-12 RX ADMIN — ROPINIROLE 1 MG: 0.25 TABLET, FILM COATED ORAL at 17:08

## 2024-03-12 RX ADMIN — ROPINIROLE HYDROCHLORIDE 2 MG: 1 TABLET, FILM COATED ORAL at 21:01

## 2024-03-12 RX ADMIN — METHOCARBAMOL TABLETS 750 MG: 750 TABLET, COATED ORAL at 17:08

## 2024-03-12 RX ADMIN — OXYCODONE 5 MG: 5 TABLET ORAL at 09:27

## 2024-03-12 RX ADMIN — ERTAPENEM SODIUM 1 G: 1 INJECTION, POWDER, LYOPHILIZED, FOR SOLUTION INTRAMUSCULAR; INTRAVENOUS at 09:28

## 2024-03-12 RX ADMIN — PANTOPRAZOLE SODIUM 40 MG: 40 TABLET, DELAYED RELEASE ORAL at 08:55

## 2024-03-12 RX ADMIN — METHOCARBAMOL TABLETS 750 MG: 750 TABLET, COATED ORAL at 03:43

## 2024-03-12 RX ADMIN — OXYCODONE 5 MG: 5 TABLET ORAL at 20:59

## 2024-03-12 ASSESSMENT — ACTIVITIES OF DAILY LIVING (ADL)
ADLS_ACUITY_SCORE: 42
ADLS_ACUITY_SCORE: 44
ADLS_ACUITY_SCORE: 42
ADLS_ACUITY_SCORE: 44
ADLS_ACUITY_SCORE: 42
ADLS_ACUITY_SCORE: 44
ADLS_ACUITY_SCORE: 42
ADLS_ACUITY_SCORE: 44
ADLS_ACUITY_SCORE: 42
ADLS_ACUITY_SCORE: 44
ADLS_ACUITY_SCORE: 44
ADLS_ACUITY_SCORE: 42
ADLS_ACUITY_SCORE: 44

## 2024-03-12 NOTE — ANESTHESIA POSTPROCEDURE EVALUATION
Patient: Tessa Edwards    Procedure: Procedure(s):  removal of lumbar wound vac with complex wound closure.       Anesthesia Type:  General    Note:     Postop Pain Control: Uneventful            Sign Out: Well controlled pain   PONV: No   Neuro/Psych: Uneventful            Sign Out: Acceptable/Baseline neuro status   Airway/Respiratory: Uneventful            Sign Out: Acceptable/Baseline resp. status   CV/Hemodynamics: Uneventful            Sign Out: Acceptable CV status; No obvious hypovolemia; No obvious fluid overload   Other NRE: NONE   DID A NON-ROUTINE EVENT OCCUR? No           Last vitals:  Vitals Value Taken Time   /67 03/11/24 1920   Temp 36.7  C (98  F) 03/11/24 1920   Pulse 74 03/11/24 1926   Resp 24 03/11/24 1926   SpO2 93 % 03/11/24 1926   Vitals shown include unfiled device data.    Electronically Signed By: Jhon Johnson MD

## 2024-03-12 NOTE — PROGRESS NOTES
[facility] Nurse Inpatient Assessment     Consulted for: drain care    Summary: 3/12, not assessed, previous assessment below.  noted Op note from 3/11 states wound closed and prevena placed. [facility] obdulia sign off. Please reconsult for any new concerns,  Malathi Magallon, OLIVAN, RN, PHN, HNB-BC, CWOCN  Pager no longer is use, please contact through [facility] group: [facility] [employer] [facility]     Patient History (according to provider note(s):      Assessment and Plan:  68 year old female past medical history significant for recent L4-L5 bilateral fusion on 2/23/2024 discharged home on 2/26 , non-Hodgkin's lymphoma/small B lymphocyte lymphoma, lymphoproliferative disorder on tyrosine kinase inhibitor, sees [place] [facility], obesity, depression anxiety restless leg syndrome presented to the emergency room with complaints of worsening low back pain last couple of days, with postoperative wound erythema, warmth, lumbar MRI concerning for fluid collection extending into the epidural space s/p I&D in ED pending cultures.     Postoperative wound superficial cellulitis versus deep infection with fluid collection status post I&D in ED at bedside  S/p bilateral L4-L5 fusion 2/23/2024    Assessment:      Wound location: lumbar spine    3/4    Last photo: 3/4  Wound due to: Surgical Wound  Wound history/plan of care: recent L4-L5 bilateral fusion on 2/23/2024 discharged home on 2/26, Postoperative wound superficial cellulitis versus deep infection with fluid collection status post I&D in ED at bedside      Negative pressure wound therapy applied to: Spine   Wound history/plan of care:    Surgical date: 3/7 - reopen surgical wound   Service following: neuro  Date Negative Pressure Wound Therapy initiated: 3/7 - veraflo   Interventions in place: repositioning and offloading  Is patient s nutritional status compromised? no   If yes, what interventions are in place? N/A  Reason for  initiating vac therapy? Presence of co-morbidities, High risk of infections, Need for accelerated granulation tissue, and Prior history of delayed wound healing  Which?of?the?following?co-morbidities?apply? Immunocompromised  If diabetic is patient on a diabetic management program? No   Is osteomyelitis present in wound? no   If yes what treatments are in place? N/A    Wound base: 90 % non-granular tissue and adipose tissue, 10 %  fascia       Palpation of the wound bed: normal       Drainage: small      Volume in cannister: 0     Last cannister change date: 3/7 start     Description of drainage: bloody      Measurements (length x width x depth, in cm) 8  x 3  x  6 cm       Tunneling up to 4 cm from 6 o'clock      Undermining N/A   Periwound skin: Erythema- blanchable and slough to periphery        Color: pink and red       Temperature: warm   Odor: none   Pain: absent and patient in OR ,    Pain intervention prior to dressing change: N/A  Treatment goal: Infection control/prevention, Increase granulation, Remove necrotic tissue, and Soften necrotic tissue  STATUS: initial assessment   Supplies ordered: gathered    Number of foam pieces removed from a wound (excluding foam for bridge) :  0 CleanseChoice Contact layer   Verified this matched the number of foam pieces applied last dressing change: Yes   Number of foam pieces packed into wound (excluding foam for bridge) :  3 CleanseChoice Contact layer     Veraflo: 30cc vashe, 10 minute soak, 4hr cycle, -75mmHg    Treatment Plan:     Negative pressure wound therapy plan:  Wound location: lumbar spine   Change Days:  M/Th  by WOC RN    Supplies (including all accessories) used: medium Veraflo cleanse choice gray foam, Adapt barrier ring, and Cavilon advanced   Cleanse with Vashe prior to replacing NPWT  Suction setting: -75   Methods used: Window paned all periwound skin with vac drape prior to applying sponge and Placed barrier ring into periwound creases to improve  "seal    Staff RN to assess integrity of dressing and ensure suction is set at appropriate level every shift.   Date canister. Chart canister output every shift. Change cannister weekly and PRN if full/occluded     Remove foam dressing and replace with BID normal saline moist gauze dressing if:   -a dressing failure which cannot be repaired within 2 hours   -patient is discharging to home without a home pump   -patient is discharging to a facility outside the local area   -if a dressing is a \"Silver Foam\", remove before Radiation Therapy or MRI     The hospital VAC pump is not to be discharged with the patient.?Ensure to disconnect patient from machine prior to discharge. Then,    - If a home KCI VAC pump has been delivered, connect home cannister to dressing tubing then connect cannister to home pump and turn on machine    - If transferring to a nearby facility with a KCI vac, can disconnect and clamp tubing then cover end with glove so can be reconnected within 2 hours       Orders: Written    RECOMMEND PRIMARY TEAM ORDER: None, at this time  Education provided: plan of care, wound progress, Infection prevention , Moisture management, and Off-loading pressure  Discussed plan of care with: Patient and Nurse  WOC nurse follow-up plan: twice weekly  Notify WOC if wound(s) deteriorate.  Nursing to notify the Provider(s) and re-consult the WOC Nurse if new skin concern.    DATA:     Current support surface: Standard  Standard gel/foam mattress (IsoFlex, Atmos air, etc)  Containment of urine/stool: Continent of bladder and Continent of bowel  BMI: Body mass index is 39.73 kg/m .   Active diet order: Orders Placed This Encounter      Advance Diet as Tolerated: Regular Diet Adult     Output: I/O last 3 completed shifts:  In: 613.75 [I.V.:613.75]  Out: 750 [Urine:750]     Labs:   Recent Labs   Lab 03/12/24  0618 03/06/24  0626 03/05/24  2353   HGB 9.4*   < >  --    INR  --   --  0.99   WBC 10.2   < >  --     < > = values in " this interval not displayed.     Pressure injury risk assessment:   Sensory Perception: 4-->no impairment  Moisture: 4-->rarely moist  Activity: 3-->walks occasionally  Mobility: 3-->slightly limited  Nutrition: 3-->adequate  Friction and Shear: 3-->no apparent problem  Edmundo Score: 20    OLIVA BaroneN RN CWOCN  Pager no longer in use, please contact through [employer] group: [facility] [employer]

## 2024-03-12 NOTE — PLAN OF CARE
Note from [id number]. Pt rated pain 6-7/10 during shift thus far. No new skin issues noted. Prevena to back is CDI. Full sensation per pt. SBA with walker for transferring. Saline locked between abx. Voiding adequately. Education on medication administration and use of call-light to reduce risk for falls and injury. Alarms in place. No further issues noted. VSS, on RA, denies shortness of breath.    Taylor R Schoenecker, RN

## 2024-03-12 NOTE — PROGRESS NOTES
[facility]    Neurosurgery  Daily Note    Assessment & Plan   Procedure(s):  REMOVAL OF LUMBAR WOUND VAC WITH COMPLEX WOUND CLOSURE   1 Day Post-Op  Doing well. Reports back pain rated 7/10, but is due for morning medications. Also has some mild right hip pain. No new radicular symptoms, weakness, fevers/chills.     Plan:  -Appreciate continued assistance from [facility] for plan regarding Prevena.   ADDENDUM: Discussed with C RN Malathi and [facility] does not follow once Prevena placed. Did recommend sending patient home with canisters to change, at least 4-5, and then with close clinic follow up. Prevena can be in placed for 1-2 week. Patient will follow up with NSGY 3/19/24 for wound check. We will assist her in scheduling this.  -Appreciate continued assistance from infectious disease  -Continue routine pain control  -PT/OT, incentive spirometry    JAIR BAUMAN PA-C    Interval History   Stable    Physical Exam   Temp: 98.6  F (37  C) Temp src: Oral BP: 129/58 Pulse: 80   Resp: 16 SpO2: 94 % O2 Device: None (Room air) Oxygen Delivery: 2 LPM  Vitals:    03/03/24 0802 03/10/24 0900   Weight: 103 kg (227 lb) 105.1 kg (231 lb 9.6 oz)     Vital Signs with Ranges  Temp:  [98  F (36.7  C)-99.5  F (37.5  C)] 98.6  F (37  C)  Pulse:  [65-80] 80  Resp:  [14-23] 16  BP: (129-201)/(58-86) 129/58  SpO2:  [93 %-99 %] 94 %  I/O last 3 completed shifts:  In: 613.75 [I.V.:613.75]  Out: 750 [Urine:750]    Alert and oriented.  Moves all extremities equally.  Strength 5/5 BLE   Sensation intact    Incision: Prevena intact      Medications    sodium chloride Stopped (03/11/24 1449)       enoxaparin ANTICOAGULANT  40 mg Subcutaneous Q24H    ertapenem  1 g Intravenous Q24H    FLUoxetine  40 mg Oral Daily    gabapentin  100 mg Oral TID    methocarbamol  750 mg Oral Q6H    pantoprazole  40 mg Oral Daily    polyethylene glycol  17 g Oral Daily    rOPINIRole  1 mg Oral BID    rOPINIRole  2 mg Oral At Bedtime     senna-docusate  1 tablet Oral BID    sodium chloride (PF)  3 mL Intracatheter Q8H    sodium chloride (PF)  3 mL Intracatheter Q8H    vancomycin  1,000 mg Intravenous Q12H    wound support modular  60 mL Oral Daily       Plans discussed with Dr. Hudson who was in agreement with plans    Lora MCLEOD [facility] Neurosurgery  [address] [address]

## 2024-03-12 NOTE — CONSULTS
"Tessa was sitting up in bed, smiling and welcoming spiritual care. 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  She reports that she is likely to return home tomorrow. It has been a long ordeal returning to the hospital with an infection. Her time here, however, has \"been a spiritual experience.\" She has appreciated learning about vulnerability and being dependent on others. She has met staff that do their work with a sense of  ministry. Tessa says she had lost touch with her [employer] spirit. God has been present through a , Eucharistic , , aids and nurses.    Spiritual Care is available 24/7 as needed or requested.    SHANI [facility]    "

## 2024-03-12 NOTE — PROGRESS NOTES
CLINICAL NUTRITION SERVICES - REASSESSMENT NOTE     Nutrition Prescription    RECOMMENDATIONS FOR MDs/PROVIDERS TO ORDER:  none    Malnutrition Status:    Does not meet 2 criteria     Recommendations already ordered by Registered Dietitian (RD):  none    Future/Additional Recommendations:  none     EVALUATION OF THE PROGRESS TOWARD GOALS   Diet: Regular with Expedite daily   Intake: Pt only ordered 1 meal via room service on 3/12 due to surgery worth 693kcal and 28g protein with 100% intake documented.      NEW FINDINGS   Pt feels her appetite is fair and not back to baseline yet, but she is hoping to also lose a little weight. She also feels like the antibiotics reduce her appetite. She does not want any nutrition supplements at this time.     ANTHROPOMETRICS  03/10/24 0900 105.1 kg (231 lb 9.6 oz) Standing scale   03/03/24 0802 103 kg (227 lb) --         PHYSICAL FINDINGS  Per flowsheet:   Edema- +1 BLE (stable)  GI- WDL  Skin- Negative pressure wound on back due to a post-op wound infection.     LABS  Reviewed Glucose-124    MEDICATIONS  Reviewed Protonix    MALNUTRITION:  % Weight Loss:  None noted  % Intake:  </= 50% for >/= 5 days (severe malnutrition)  Subcutaneous Fat Loss:  None observed  Muscle Loss:  None observed  Fluid Retention:  Mild +1 BLE      Malnutrition Diagnosis: Potential risk for malnutrition, not yet meeting criteria     NUTRITION DIAGNOSIS  Increased protein needs related to lumbar surgery as evidenced by pt not eating adequate protein for wound healing     Evaluation: ongoing    Goals:  Total avg nutritional intake to meet a minimum of 25 kcal/kg and 1.3 g PRO/kg daily (per dosing wt 66.7 kg). -not met    INTERVENTIONS  Implementation  Encourage po  Pt not wanting supplements yet at this time       Monitoring/Evaluation  Progress toward goals will be monitored and evaluated per protocol. Will monitor intake .

## 2024-03-12 NOTE — PLAN OF CARE
Patient vital signs are at baseline: No,  Reason:  on 2L O2 while sleeping   Patient able to ambulate as they were prior to admission or with assist devices provided by therapies during their stay:  Yes  Patient MUST void prior to discharge:  Yes  Patient able to tolerate oral intake:  Yes  Pain has adequate pain control using Oral analgesics:  Yes  Does patient have an identified :  Yes  Has goal D/C date and time been discussed with patient:  Yes      Pt is A&Ox4, VSS on 2L O2. Pt is anxious at times. A1 with walker and gait belt when ambulating. Voiding adequately. CMS intact besides baseline neuropathy. Wound vac in place. Pt is reporting moderate to severe pain during shift. Utilized scheduled and PRN medications along with non-pharm therapies for pain relief. Pt on K and Mg protocol.

## 2024-03-12 NOTE — OP NOTE
NEUROSURGERY OPERATIVE REPORT     DATE OF SERVICE: 3/11/2024    PREOPERATIVE DIAGNOSES:  Postoperative infection, unspecified type     POSTOPERATIVE DIAGNOSES:  Postoperative infection, unspecified type      PROCEDURES:  1.  Removal of lumbar wound vac with complex wound closure     SURGEON:  Brianne Hudson MD      INDICATIONS:  Tessa Edwards is a 68 year old female who presented to the ER on March 3 with worsening erythema and swelling around her incision.  Her incision had some purulence at the time of her initial evaluation.  Found to have a large seroma that tracked down to the epidural space on MRI imaging.  This was sampled at the bedside and approximately 60 cc was drained.  The fluid did not have purulence at that time and appeared to have chronic hematoma/seroma.  Gram stain and cultures came back positive. Patient was placed on antibiotics per ID.  Patient was subsequently taken to the OR for wound washout and debridement and placement of a veraflow VAC on 3/7/2024.  Has been in place since that time and continued vashe wash.  We discussed now removal of her vera flow VAC and primary closure of her skin with placement of a Prevena VAC.  Patient agreed to proceed.    PROCEDURE:  After obtaining informed consent, the patient was brought to the operating room with pneumatic stockings in place.  IV antibiotics was administered.  She was intubated under general endotracheal anesthesia.  She was turned into the radiolucent laminectomy frame with all pressure points well padded. Her VAC was removed and she was prepped and draped in the usual sterile fashion.  Her wound was again irrigated with antibiotic irrigation.  Additional fascial sutures were placed with 0 Vicryl.  Her skin was then closed in interrupted vertical mattress fashion with Prolene suture.  A Prevena VAC was then placed over the incision.  Sponge and needle counts were correct prior to closure x2.  Sterile dressings were placed.   Patient tolerated the procedure well, was taken to the recovery room where she was extubated and found to be at her neurological baseline with no new deficits appreciated.    Wound length  8  cm     ESTIMATED BLOOD LOSS: 15 ml    SPECIMENS: none    FINDINGS: Wound appeared clean with some granulation tissue. Closed with prolene  . Prevenna placed      IMPLANTS: none    DRAIN: none    Brianne Hudson MD    Cc: Clinic - [facility]

## 2024-03-12 NOTE — PROGRESS NOTES
"INFECTIOUS DISEASE FOLLOW UP NOTE      ASSESSMENT:  Post-operative wound infection. Hardware in place. Fluid collection on MRI subcutaneous, extends to deep structures. Aspirate 3/3 now growing enterobacter. Anaerobe, and staph epi. Deep infection. Now s/p I+D 3/7 -- large subQ fluid collection that tracked down to epidural space. Old hematoma noted. Hardware had covered over with granulation tissue. Growth of staph epi from aspirate of uncertain significance, but this is now confirmed on deep operative cultures, as is Enterobacter. Staph epi raises question of hardware involvement, despite it being covered with granulation tissue as noted at surgery. Repeat OR 3/11 no purulence. Active issue.   S/p L4--L5 transforaminal lumbar interbody fusion on 2/23/24.   H/o NHL, maintained on acalabrutinib    PLAN:  continue ertapenem and vancomycin, expect 6 weeks IV abx. Ertapenem is once daily. Vancomycin currently q12 so could switch to daptomycin for daily abx if needs to go to infusion center.    Then potential oral antibiotic after that. Discussed with micro lab and have added on susceptibilities.   Expect PICC -- single or double lumen depending on 1 or 2 antibiotics-will need home infusion to weigh in on recommendations  Needs OPAT set up  Follow up with Dr Tucker in ID clinic in ~4 weeks  ID plan in place    Gaby Johnson MD  [facility]  [phone] clinic  [employer] paging    ______________________________________________________________________    SUBJECTIVE / INTERVAL HISTORY: OR yesterday, brief op note without purulence and granulation tissue noted. Tolerating antibiotics.       ROS: All other systems negative except as listed above.        OBJECTIVE:  /58 (BP Location: Right arm)   Pulse 80   Temp 98.6  F (37  C) (Oral)   Resp 16   Ht 1.626 m (5' 4.02\")   Wt 105.1 kg (231 lb 9.6 oz)   SpO2 94%   BMI 39.73 kg/m         Vital Signs  Temp: 98.4  F (36.9  C)  Temp src: " Oral  Resp: 18  Pulse: 68  Pulse Rate Source: Monitor  BP: 136/63  BP Location: Left arm    Temp (24hrs), Av.4  F (36.9  C), Min:98  F (36.7  C), Max:98.7  F (37.1  C)      GEN: No acute distress.  In bed.   RESPIRATORY:  Normal breathing pattern.   EXTREMITIES: No edema.  SKIN/HAIR/NAILS:  No rashes. Wound with vac  Strength in legs pretty good  IV: peripheral        Antibiotics:  pip/tazo 3/3-3/10  Vancomycin 3/3-, 3/7-  Ertapenem IV 3/10-    Pertinent labs:    Recent Labs   Lab 24  0624  0602 03/10/24  0535 24  0612 24  0653   WBC 10.2 10.7 8.8  --  12.6*   HGB 9.4* 10.0*  --   --  9.2*   HCT 30.2* 31.6*  --   --  28.9*    325  --  271 276        Recent Labs   Lab 24  0602 24  0653    140 140   CO2 27 26 26   BUN 11.7 8.3 13.1         Lab Results   Component Value Date    ALT 19 2024    AST 25 2024    ALKPHOS 101 2024         MICROBIOLOGY DATA:  3/3 blood negative  3/3 aspirate enterobacter, staph epi, peptoniphilus  3/7 surgical -- enterobacter, staph epi    RADIOLOGY:  MR Lumbar Spine w/o Contrast    Result Date: 3/3/2024  EXAM: MR LUMBAR SPINE W/O CONTRAST LOCATION: [facility] DATE: 3/3/2024 INDICATION: Lumbar fusion 2024. Signs and symptoms of infection. Lumbar pain. Drainage at surgical site. COMPARISON: Lumbar radiographs 2024. Lumbar MRI 11 2023 TECHNIQUE: Routine Lumbar Spine MRI without IV contrast. FINDINGS: Redemonstration of L4-L5 anterior posterior fusion. Centrally positioned interbody graft and bilateral transpedicular screws with intertransverse rods. 4 mm anterolisthesis of L4 on L5 compared with 5 mm on the preoperative exam. 2.5 mm degenerative anterolisthesis at L5-S1 unchanged. 2.5 mm degenerative retrolisthesis at L2-L3 unchanged. L4-L5 laminotomy. Nomenclature is based on 5 lumbar type vertebral bodies. Normal vertebral body heights. Negative for fracture.  Benign osseous hemangioma at the L4 vertebral body. No suspicious marrow signal change. Type II Modic endplate changes at L2-L3 and L3-L4. Normal distal spinal cord and cauda equina with conus medullaris at L2. Fluid collection in the midline dorsal soft tissues extends from L1-L2 inferiorly to L5-S1 measuring 11 cm cephalocaudad by 6 cm AP by 6 cm mediolateral. A component  of the collection extends into the surgical tract, through the hemilaminotomy and to the dorsal surgical space at L4-L5. This is difficult to evaluate due to susceptibility artifact but the canalicular portion measures approximately 22 x 13 mm transverse extending through the posterior elements. Compression of the right dorsal thecal sac and moderate central canal stenosis is present as seen on axial image 9 of series 6.  Unremarkable visualized bony pelvis. T11-T12: Disc desiccation and ventral osteophyte. Patent central canal and foramen. Mild annular bulge. T12-L1: Disc desiccation , height loss and chronic Schmorl's nodes. Patent central canal and foramen. L1-L2: Disc desiccation. Patent central canal and foramina. There may be a tiny amount of fluid in the dorsal epidural space related to previous surgery. L2-L3: Disc osteophyte complex. Small amount of dorsal canal epidural space fluid may be related to recent surgery. Mild central canal stenosis. Moderate to severe left and mild-to-moderate right foraminal stenosis. L3-L4: Mild disc bulge. Patent central canal. Facet DJD with mild-to-moderate right and mild left foraminal stenosis. L4-L5: Fusion and hemilaminotomy. Moderate central canal stenosis fluid in the dorsal and right dorsal canal appears contiguous with the larger subcutaneous fluid collection and extends to the surgical approach. Signal characteristics are nonspecific but  do not appear particularly complex and this could represent seroma or resolving hematoma. No surrounding inflammatory changes. No definite evidence for  discitis or osteomyelitis. Contrast-enhanced imaging may be useful for further evaluation. Mild-to-moderate right and minimal left foraminal stenosis. L5-S1: Disc desiccation and mild disc bulge. Facet DJD. Moderate right and mild left foraminal stenosis. Patent central canal.     IMPRESSION: 1.  L4-L5 fusion. No hardware complications. Good positioning of transpedicular screws and interbody graft. 2.  Dorsal subcutaneous fluid collection as above which extends through the surgical approach into the dorsal canal epidural space and most prominent at L4-L5. Slight extension cephalad to L1-L2. Consider further evaluation with contrast-enhanced imaging. Fairly homogeneous fluid collection without significant surrounding inflammatory signal changes and may represent seroma or resolving hematoma. Infectious/inflammatory fluid accumulation is possible, however. 3.  Lumbar spondylosis 4.  No fractures. 5.  Findings discussed with Flor James of the neurosurgery service on 3/3/2024 1255 PM CST    XR Lumbar Spine 2/3 Views    Result Date: 3/3/2024  EXAM: XR LUMBAR SPINE 2/3 VIEWS LOCATION: [facility] DATE: 03/03/2024 INDICATION: Lumbar fusion. COMPARISON: 02/26/2024.     IMPRESSION: Five lumbar vertebral bodies. Minimal convex right curvature, unchanged. Dorsal midline skin staples persist. Postoperative changes of interbody and posterior spinal fixation across L4-L5. Grade 1 anterolisthesis of L4 on L5 and L5 on S1, unchanged. Intact hardware. Severe interspace and endplate degeneration L2-L3. Normal vertebral body heights.     XR Lumbar Spine 2 - 3 VWS Portable    Result Date: 2/26/2024  EXAM: XR LUMBAR SPINE PORT 2/3 VIEWS LOCATION: [facility] DATE/TIME: 2/26/2024 4:50 PM CST INDICATION: s p lumbar fusion, upright XR. COMPARISON: None. TECHNIQUE: Radiographs of lumbar spine in routine projections. FINDINGS: Staus post  L4-L5 posterior interbody fusion. No evidence of  hardware failure. Alignment similar to prior without change in minor dextrocurvature, grade 1 anterolisthesis at L4-L5 and L5-S1. Normal height of vertebral bodies. Advanced L2-L3 interbody degeneration similar to prior. Mild multi level facet arthropathy. Unremarkable visualized bony pelvis. Cholecystectomy clips.    XR SURGERY CAROLINE FLUORO GREATER THAN 5 MIN    Result Date: 2/23/2024  This exam was marked as non-reportable because it will not be read by a radiologist or a [place] non-radiologist provider.     XR Surgery CAROLINE  Fluoro G/T 5 Min    Result Date: 2/23/2024  This exam was marked as non-reportable because it will not be read by a radiologist or a [place] non-radiologist provider.     Lateral Lumbar Spine for Surgical Imaging  [XR LUMBAR SPINE PORT 1  VIEW]    Result Date: 2/23/2024  EXAM: XR CROSSTABLE LATERAL LUMBAR SPINE PORTABLE LOCATION: [facility] DATE: 2/23/2024 INDICATION: Intra operative COMPARISON: None.     IMPRESSION: Intraoperative crosstable lateral radiograph. Underpenetration of the lumbosacral junction somewhat comprises evaluation. Posterior approach surgical clamp projects over the posterior margin of the L3 posterior spinous process and is directed  at the L4 vertebral body and pedicles.

## 2024-03-12 NOTE — PROGRESS NOTES
[facility]    Medicine Progress Note - Hospitalist Service    Date of Admission:  3/3/2024    Assessment & Plan   68 year old female past medical history significant for recent L4-L5 bilateral fusion on 2/23/2024 discharged home on 2/26 , non-Hodgkin's lymphoma/small B lymphocyte lymphoma, lymphoproliferative disorder on tyrosine kinase inhibitor, typically sees [facility], obesity, depression and anxiety and restless leg syndrome presented to the emergency room with complaints of worsening low back pain last couple of days, with postoperative wound erythema, warmth, lumbar MRI concerning for fluid collection extending into the epidural space s/p I&D in ED pending cultures.      S/p bedside evacuation of post-operative fluid collection by Neurosurgery on day of admit. Infectious Disease consulted and recommended formal surgical washout. As such, anticoagulation held, last dose lovenox on 3/4 at 2010hrs. Ongoing antibiotics with zosyn per ID. Had surgical washout on 3/7/2024.     Needs finalized culture s/s from surgery before she can be discharged on an appropriate abx. ID following. Continue post-op cares as per routine. Abx regimen per ID was Zosyn as well as Vancomycin then transitioned to ertapenem and vancomycin, follow-up surgical cultures for staph epi; Neurosurgeon Dr. Hudson will see patient 3/11/2024 regarding next steps, ie possibility of wound closure procedure. Repleting K and Mag per protocol.     Pt returned to OR on 3/11 for removal of lumbar wound vac with complex wound closure by Dr. Hudson with EBL of 15ccs without immediate complications. On 3/12/2024 stable.     Disposition: pending final surgical and Infectious Disease plans   ------------    Postoperative lumbar wound infection s/p debridement 3/7  A: S/p bilateral L4-L5 fusion 2/23/2024, had an elevated Temp of 100.1, leukocytosis 21, , ESR 29. S/p I&D 3/7 with deep culture positive for staph epi and  "enterobacter. S/p wound closure on 3/11.   P:  - Spine following, appreciate  - ID is following, abx switched to erta and vanco, plan for 6w and then transition to PO   - WOC following   - Pain control on Tylenol, oxycodone, Robaxin, gabapentin IV Dilaudid as needed      History of non-Hodgkin's lymphoma  Small lymphocytic lymphoma  - Sees [facility], per family members holding off on the tyrosine kinase inhibitor (acalabrutinib) for 10 days before and after surgery; recommending follow up    Hypokalemia  Hypomagnesemia  -On replacement protocols for both K and mag     Depression anxiety  -Continue home meds     Restless leg syndrome  -On Requip     GERD  - On PPI          Diet: Advance Diet as Tolerated: Regular Diet Adult    DVT Prophylaxis: Pneumatic Compression Devices  Camejo Catheter: Not present  Lines: None     Cardiac Monitoring: None  Code Status: Full Code      Clinically Significant Risk Factors                         # Obesity: Estimated body mass index is 39.73 kg/m  as calculated from the following:    Height as of this encounter: 1.626 m (5' 4.02\").    Weight as of this encounter: 105.1 kg (231 lb 9.6 oz).        # Financial/Environmental Concerns: none         Disposition Plan      Expected Discharge Date: 03/12/2024    Discharge Delays: IV Medication - consider oral or Home Infusion  Destination: home with family (outpatient infusion)  Discharge Comments: ID plans, cultures, wound vac plans, updated therapy evals. Neuro to see Monday 3/11          Alireza Mcneill MD  Hospitalist Service  [facility]  Securely message with [employer] (more info)  Text page via [employer] Paging/Directory   ______________________________________________________________________    Interval History   S/p return to OR as in summary  NAEO  Pt had some transient nausea but improved  Pain remains controlled   Discussed spine and ID to see and follow ups  Discussed w/ RN   Pt has no new pain or " symptoms or further questions        Physical Exam   Vital Signs: Temp: 98.2  F (36.8  C) Temp src: Oral BP: 133/63 Pulse: 67   Resp: 16 SpO2: 98 % O2 Device: Nasal cannula Oxygen Delivery: 2 LPM  Weight: 231 lbs 9.6 oz    GENERAL:  Alert, appears comfortable, in no acute distress, appears stated age, resting in bed   HEAD:  Normocephalic, without obvious abnormality, atraumatic   EYES:  Conjunctiva/corneas clear, no scleral icterus, EOM's appears intact grossly   NOSE: Nares normal, septum midline, mucosa normal, no drainage   THROAT: Lips, mucosa, and tongue appear normal   NECK: Symmetrical, trachea appears midline   BACK:   Symmetric, no curvature noted, tubing and dressing is c/d/I, no new swelling or erythema; device is stable, no leaking from tubing   LUNGS:   Symmetric chest rise on inhalation, respirations unlabored   CHEST WALL:  No apparent deformity   HEART:  No thrills or heaves visualized   ABDOMEN:   No masses or organomegaly apparent; non-scaphoid   EXTREMITIES: Extremities appear normal, atraumatic, no cyanosis   SKIN: No exanthems in the visualized areas   NEURO: Alert and oriented x3 , moves all four extremities freely and spontaneously   PSYCH: Cooperative, behavior is appropriate        Medical Decision Making       39 MINUTES SPENT BY ME on the date of service doing chart review, history, exam, documentation & further activities per the note.      Data

## 2024-03-13 ENCOUNTER — APPOINTMENT (OUTPATIENT)
Dept: RADIOLOGY | Facility: CLINIC | Age: 69
DRG: 857 | End: 2024-03-13
Attending: FAMILY MEDICINE
Payer: COMMERCIAL

## 2024-03-13 DIAGNOSIS — Z98.1 S/P LUMBAR FUSION: ICD-10-CM

## 2024-03-13 DIAGNOSIS — T81.40XA POSTOPERATIVE INFECTION, UNSPECIFIED TYPE, INITIAL ENCOUNTER: Primary | ICD-10-CM

## 2024-03-13 LAB
BACTERIA TISS BX CULT: ABNORMAL
BACTERIA TISS BX CULT: ABNORMAL
CK SERPL-CCNC: 20 U/L (ref 26–192)
GLUCOSE SERPL-MCNC: 100 MG/DL (ref 70–99)
HOLD SPECIMEN: NORMAL
MAGNESIUM SERPL-MCNC: 1.9 MG/DL (ref 1.7–2.3)
POTASSIUM SERPL-SCNC: 4 MMOL/L (ref 3.4–5.3)
VANCOMYCIN SERPL-MCNC: 23.3 UG/ML

## 2024-03-13 PROCEDURE — 80202 ASSAY OF VANCOMYCIN: CPT | Performed by: STUDENT IN AN ORGANIZED HEALTH CARE EDUCATION/TRAINING PROGRAM

## 2024-03-13 PROCEDURE — 272N000451 HC KIT SHRLOCK 5FR POWER PICC DOUBLE LUMEN

## 2024-03-13 PROCEDURE — 99232 SBSQ HOSP IP/OBS MODERATE 35: CPT | Performed by: FAMILY MEDICINE

## 2024-03-13 PROCEDURE — 120N000001 HC R&B MED SURG/OB

## 2024-03-13 PROCEDURE — 71045 X-RAY EXAM CHEST 1 VIEW: CPT

## 2024-03-13 PROCEDURE — 250N000013 HC RX MED GY IP 250 OP 250 PS 637: Performed by: HOSPITALIST

## 2024-03-13 PROCEDURE — 82947 ASSAY GLUCOSE BLOOD QUANT: CPT | Performed by: STUDENT IN AN ORGANIZED HEALTH CARE EDUCATION/TRAINING PROGRAM

## 2024-03-13 PROCEDURE — 250N000013 HC RX MED GY IP 250 OP 250 PS 637: Performed by: PHYSICIAN ASSISTANT

## 2024-03-13 PROCEDURE — 250N000011 HC RX IP 250 OP 636: Performed by: PHYSICIAN ASSISTANT

## 2024-03-13 PROCEDURE — 36415 COLL VENOUS BLD VENIPUNCTURE: CPT | Performed by: STUDENT IN AN ORGANIZED HEALTH CARE EDUCATION/TRAINING PROGRAM

## 2024-03-13 PROCEDURE — 36569 INSJ PICC 5 YR+ W/O IMAGING: CPT

## 2024-03-13 PROCEDURE — 250N000011 HC RX IP 250 OP 636: Performed by: INTERNAL MEDICINE

## 2024-03-13 PROCEDURE — 83735 ASSAY OF MAGNESIUM: CPT | Performed by: STUDENT IN AN ORGANIZED HEALTH CARE EDUCATION/TRAINING PROGRAM

## 2024-03-13 PROCEDURE — 82550 ASSAY OF CK (CPK): CPT | Performed by: INTERNAL MEDICINE

## 2024-03-13 PROCEDURE — 84132 ASSAY OF SERUM POTASSIUM: CPT | Performed by: STUDENT IN AN ORGANIZED HEALTH CARE EDUCATION/TRAINING PROGRAM

## 2024-03-13 PROCEDURE — 99024 POSTOP FOLLOW-UP VISIT: CPT

## 2024-03-13 PROCEDURE — 250N000013 HC RX MED GY IP 250 OP 250 PS 637: Performed by: INTERNAL MEDICINE

## 2024-03-13 PROCEDURE — 250N000009 HC RX 250: Performed by: FAMILY MEDICINE

## 2024-03-13 RX ORDER — HEPARIN SODIUM,PORCINE 10 UNIT/ML
5-20 VIAL (ML) INTRAVENOUS DAILY PRN
Status: CANCELLED | OUTPATIENT
Start: 2024-03-14

## 2024-03-13 RX ORDER — EPINEPHRINE 1 MG/ML
0.3 INJECTION, SOLUTION, CONCENTRATE INTRAVENOUS EVERY 5 MIN PRN
Status: CANCELLED | OUTPATIENT
Start: 2024-03-14

## 2024-03-13 RX ORDER — METHYLPREDNISOLONE SODIUM SUCCINATE 125 MG/2ML
125 INJECTION, POWDER, LYOPHILIZED, FOR SOLUTION INTRAMUSCULAR; INTRAVENOUS
Status: CANCELLED
Start: 2024-03-14

## 2024-03-13 RX ORDER — MEPERIDINE HYDROCHLORIDE 25 MG/ML
25 INJECTION INTRAMUSCULAR; INTRAVENOUS; SUBCUTANEOUS EVERY 30 MIN PRN
Status: CANCELLED | OUTPATIENT
Start: 2024-03-14

## 2024-03-13 RX ORDER — DAPTOMYCIN 50 MG/ML
8 INJECTION, POWDER, LYOPHILIZED, FOR SOLUTION INTRAVENOUS ONCE
Status: CANCELLED | OUTPATIENT
Start: 2024-03-15 | End: 2024-03-14

## 2024-03-13 RX ORDER — ALBUTEROL SULFATE 0.83 MG/ML
2.5 SOLUTION RESPIRATORY (INHALATION)
Status: CANCELLED | OUTPATIENT
Start: 2024-03-14

## 2024-03-13 RX ORDER — AMOXICILLIN 250 MG
1 CAPSULE ORAL 2 TIMES DAILY
Qty: 40 TABLET | Refills: 0 | Status: SHIPPED | OUTPATIENT
Start: 2024-03-13 | End: 2024-04-04

## 2024-03-13 RX ORDER — OXYCODONE HYDROCHLORIDE 5 MG/1
5 TABLET ORAL EVERY 6 HOURS PRN
Qty: 40 TABLET | Refills: 0 | Status: SHIPPED | OUTPATIENT
Start: 2024-03-13

## 2024-03-13 RX ORDER — DIPHENHYDRAMINE HYDROCHLORIDE 50 MG/ML
50 INJECTION INTRAMUSCULAR; INTRAVENOUS
Status: CANCELLED
Start: 2024-03-14

## 2024-03-13 RX ORDER — LIDOCAINE 40 MG/G
CREAM TOPICAL
Status: DISCONTINUED | OUTPATIENT
Start: 2024-03-13 | End: 2024-03-13 | Stop reason: ALTCHOICE

## 2024-03-13 RX ORDER — ALBUTEROL SULFATE 90 UG/1
1-2 AEROSOL, METERED RESPIRATORY (INHALATION)
Status: CANCELLED
Start: 2024-03-14

## 2024-03-13 RX ORDER — HEPARIN SODIUM (PORCINE) LOCK FLUSH IV SOLN 100 UNIT/ML 100 UNIT/ML
5 SOLUTION INTRAVENOUS
Status: CANCELLED | OUTPATIENT
Start: 2024-03-14

## 2024-03-13 RX ORDER — METHOCARBAMOL 750 MG/1
750 TABLET, FILM COATED ORAL EVERY 6 HOURS
Qty: 40 TABLET | Refills: 0 | Status: SHIPPED | OUTPATIENT
Start: 2024-03-13 | End: 2024-04-23

## 2024-03-13 RX ADMIN — VANCOMYCIN HYDROCHLORIDE 1000 MG: 1 INJECTION, SOLUTION INTRAVENOUS at 12:43

## 2024-03-13 RX ADMIN — OXYCODONE 5 MG: 5 TABLET ORAL at 21:31

## 2024-03-13 RX ADMIN — METHOCARBAMOL TABLETS 750 MG: 750 TABLET, COATED ORAL at 16:45

## 2024-03-13 RX ADMIN — GABAPENTIN 100 MG: 100 CAPSULE ORAL at 09:29

## 2024-03-13 RX ADMIN — METHOCARBAMOL TABLETS 750 MG: 750 TABLET, COATED ORAL at 09:29

## 2024-03-13 RX ADMIN — OXYCODONE 5 MG: 5 TABLET ORAL at 09:30

## 2024-03-13 RX ADMIN — ROPINIROLE HYDROCHLORIDE 2 MG: 1 TABLET, FILM COATED ORAL at 21:31

## 2024-03-13 RX ADMIN — LIDOCAINE HYDROCHLORIDE 2 ML: 10 INJECTION, SOLUTION EPIDURAL; INFILTRATION; INTRACAUDAL; PERINEURAL at 15:30

## 2024-03-13 RX ADMIN — GABAPENTIN 100 MG: 100 CAPSULE ORAL at 14:50

## 2024-03-13 RX ADMIN — FLUOXETINE 40 MG: 20 CAPSULE ORAL at 09:29

## 2024-03-13 RX ADMIN — ENOXAPARIN SODIUM 40 MG: 100 INJECTION SUBCUTANEOUS at 21:32

## 2024-03-13 RX ADMIN — GABAPENTIN 100 MG: 100 CAPSULE ORAL at 21:31

## 2024-03-13 RX ADMIN — ROPINIROLE 1 MG: 0.25 TABLET, FILM COATED ORAL at 09:30

## 2024-03-13 RX ADMIN — LOPERAMIDE HYDROCHLORIDE 2 MG: 2 CAPSULE ORAL at 10:43

## 2024-03-13 RX ADMIN — ERTAPENEM SODIUM 1 G: 1 INJECTION, POWDER, LYOPHILIZED, FOR SOLUTION INTRAMUSCULAR; INTRAVENOUS at 09:31

## 2024-03-13 RX ADMIN — METHOCARBAMOL TABLETS 750 MG: 750 TABLET, COATED ORAL at 21:31

## 2024-03-13 RX ADMIN — VANCOMYCIN HYDROCHLORIDE 1000 MG: 1 INJECTION, SOLUTION INTRAVENOUS at 01:14

## 2024-03-13 RX ADMIN — PANTOPRAZOLE SODIUM 40 MG: 40 TABLET, DELAYED RELEASE ORAL at 09:30

## 2024-03-13 RX ADMIN — OXYCODONE 5 MG: 5 TABLET ORAL at 14:50

## 2024-03-13 RX ADMIN — OXYCODONE 10 MG: 5 TABLET ORAL at 02:24

## 2024-03-13 RX ADMIN — ROPINIROLE 1 MG: 0.25 TABLET, FILM COATED ORAL at 16:45

## 2024-03-13 RX ADMIN — METHOCARBAMOL TABLETS 750 MG: 750 TABLET, COATED ORAL at 04:38

## 2024-03-13 ASSESSMENT — ACTIVITIES OF DAILY LIVING (ADL)
ADLS_ACUITY_SCORE: 38
ADLS_ACUITY_SCORE: 40
ADLS_ACUITY_SCORE: 44
ADLS_ACUITY_SCORE: 38
ADLS_ACUITY_SCORE: 42
ADLS_ACUITY_SCORE: 38
ADLS_ACUITY_SCORE: 38
ADLS_ACUITY_SCORE: 40
ADLS_ACUITY_SCORE: 38
ADLS_ACUITY_SCORE: 40
ADLS_ACUITY_SCORE: 44
ADLS_ACUITY_SCORE: 40
ADLS_ACUITY_SCORE: 38
ADLS_ACUITY_SCORE: 44
ADLS_ACUITY_SCORE: 40
ADLS_ACUITY_SCORE: 40
ADLS_ACUITY_SCORE: 38
ADLS_ACUITY_SCORE: 42
ADLS_ACUITY_SCORE: 38

## 2024-03-13 NOTE — PROCEDURES
"PICC Line Insertion Procedure Note  Pt. Name: Tessa Edwards  MRN:        [id number]  Procedure: Insertion of a  dual Lumen  5 fr  Bard SOLO (valved) Power PICC, Lot number [id number]    Indications: antibiotics    Contraindications : none    Procedure Details   Patient identified with 2 identifiers and \"Time Out\" conducted.  .     Central line insertion bundle followed: hand hygeine performed prior to procedure, site cleansed with cholraprep, hat, mask, sterile gloves,sterile gown worn, patient draped with maximum barrier head to toe drape, sterile field maintained.    The vein was assessed and found to be compressible and of adequate size. 2 ml 1% Lidocaine administered sq to the insertion site. A 5 Fr PICC was inserted into the BASILIC vein of the right arm with ultrasound guidance. 1 attempt(s) required to access vein.   Catheter threaded without difficulty. Good blood return noted.    Modified Seldinger Technique used for insertion.    The 8 sharps that are included in the PICC insertion kit were accounted for and disposed of in the sharps container prior to breakdown of the sterile field.    Catheter secured with Statlock, biopatch and Tegaderm dressing applied.    Findings:  Total catheter length  40 cm, with 0 cm exposed. Mid upper arm circumference is 41 cm. Catheter was flushed with 20 cc NS. Patient  tolerated procedure well.    Tip placement verified by xray. Xray read by Norberto Bonilla MD  . Tip placement in the MID SVC.    CLABSI prevention brochure left at bedside.    Patient's primary RN notified PICC is ready for use.    Comments:        Shayla Mcfadden RN [facility]    "

## 2024-03-13 NOTE — PHARMACY-VANCOMYCIN DOSING SERVICE
Pharmacy Vancomycin Note  Date of Service 2024  Patient's  1955   68 year old, female      Current estimated CrCl = Estimated Creatinine Clearance: 79.4 mL/min (based on SCr of 0.78 mg/dL).    Creatinine for last 3 days  3/11/2024:  6:02 AM Creatinine 0.78 mg/dL  3/12/2024:  6:18 AM Creatinine 0.78 mg/dL    Recent Vancomycin Levels (past 3 days)  3/13/2024:  6:34 AM Vancomycin 23.3 ug/mL    Vancomycin IV Administrations (past 72 hours)                     vancomycin (VANCOCIN) 1,000 mg in NaCl 0.9% 200 mL intermittent infusion (mg) 1,000 mg Given 24 0114     1,000 mg Given 24 1219     1,000 mg Given  0027     1,000 mg Given 24 1216     1,000 mg Given  0107     1,000 mg Given 03/10/24 1342                    Nephrotoxins and other renal medications (From now, onward)      Start     Dose/Rate Route Frequency Ordered Stop    24 0000  vancomycin (VANCOCIN) 1000 mg/200 mL IVPB        Note to Pharmacy: Schedule follow up with Dr. Tucker, [facility] in 4 weeks at [phone].   Weekly Labs: CBC, bmp.   Twice weekly creatinine, vanc trough. Pharmacy to dose vanc    1,000 mg Intravenous EVERY 12 HOURS 24 1144 24 2359    24 1230  vancomycin (VANCOCIN) 1,000 mg in NaCl 0.9% 200 mL intermittent infusion         1,000 mg  over 60 Minutes Intravenous EVERY 12 HOURS 24 1204                 Contrast Orders - past 72 hours (72h ago, onward)      None            Interpretation of levels and current regimen:  Regimen: 1000 mg IV every 12 hours.  Start time: 13:14 on 2024  Exposure target: AUC24 (range)400-600 mg/L.hr   AUC24,ss: 530 mg/L.hr  Probability of AUC24 > 400: 99 %  Ctrough,ss: 17.7 mg/L  Probability of Ctrough,ss > 20: 25 %  Probability of nephrotoxicity (Lodise DANIELE ): 14 %    Plan:  As above ..... Moving forward will try to tailor regimen to provider lower P tox value,  currently only 18hr regimens look better which are less than  ideal in non hospital care setting  Vancomycin monitoring method: AUC  Vancomycin therapeutic monitoring goal: 400-600 mg*h/L  Pharmacy will check vancomycin levels as appropriate in 1-3 Days.  Serum creatinine levels will be ordered a minimum of twice weekly.    Arnol Thornton RPH

## 2024-03-13 NOTE — PROGRESS NOTES
[facility]    Neurosurgery  Daily Note    Assessment & Plan   Procedure(s):  REMOVAL OF LUMBAR WOUND VAC WITH COMPLEX WOUND CLOSURE   2 Days Post-Op    Plan:  -OK from NSGY perspective for patient to discharge.   -Wound cares: Maintain Prevena, patient being sent with canisters to replace as well as education on how to change (RN confirmed), and then patient will follow up with NSGY on 3/19/24 for wound check - we will facilitate this appointment.  -Infectious disease following, appreciate assistance with antibiotic management  -Continue PT/OT, routine pain control  -Follow neuro exam    JAIR BAUMAN PA-C    Interval History   Stable, no new radicular complaints, weakness, numbness. Continued low back discomfort, manageable with medications.     Physical Exam   Temp: 98.5  F (36.9  C) Temp src: Oral BP: (!) 154/68 Pulse: 68   Resp: 18 SpO2: 92 % O2 Device: None (Room air)    Vitals:    03/03/24 0802 03/10/24 0900   Weight: 103 kg (227 lb) 105.1 kg (231 lb 9.6 oz)     Vital Signs with Ranges  Temp:  [98.1  F (36.7  C)-98.6  F (37  C)] 98.5  F (36.9  C)  Pulse:  [65-80] 68  Resp:  [16-18] 18  BP: (125-154)/(58-84) 154/68  SpO2:  [92 %-97 %] 92 %  I/O last 3 completed shifts:  In: 740 [P.O.:740]  Out: 0     Alert and oriented.  Moves all extremities equally.  Strength 5/5 BLE  Sensation intact to light touch    Prevena intact, dressing CDI      Medications    sodium chloride Stopped (03/11/24 1449)       enoxaparin ANTICOAGULANT  40 mg Subcutaneous Q24H    ertapenem  1 g Intravenous Q24H    FLUoxetine  40 mg Oral Daily    gabapentin  100 mg Oral TID    methocarbamol  750 mg Oral Q6H    pantoprazole  40 mg Oral Daily    polyethylene glycol  17 g Oral Daily    rOPINIRole  1 mg Oral BID    rOPINIRole  2 mg Oral At Bedtime    senna-docusate  1 tablet Oral BID    sodium chloride (PF)  3 mL Intracatheter Q8H    sodium chloride (PF)  3 mL Intracatheter Q8H    vancomycin  1,000 mg Intravenous  Q12H    wound support modular  60 mL Oral Daily       Plans discussed with Dr. Hudson who was in agreement with plans    Lora Johnson PA-C  [facility] Neurosurgery  [address] [address]

## 2024-03-13 NOTE — PLAN OF CARE
Problem: Adult Inpatient Plan of Care  Goal: Optimal Comfort and Wellbeing  Outcome: Progressing     Problem: Pain Acute  Goal: Optimal Pain Control and Function  Outcome: Progressing  Intervention: Prevent or Manage Pain  Recent Flowsheet Documentation  Taken 3/12/2024 1723 by Derrick Gupta RN  Bowel Elimination Promotion:   adequate fluid intake promoted   ambulation promoted   privacy promoted  Medication Review/Management: medications reviewed  Intervention: Optimize Psychosocial Wellbeing  Recent Flowsheet Documentation  Taken 3/12/2024 1723 by Derrick Gupta RN  Supportive Measures: active listening utilized    Pravena wound vac in place. Air leak alarm on wound vac alarmed this shift. Dressing was reinforced. Iv saline locked. Voiding spontaneously. Regular diet continued. Stand by assist.     Derrick Gupat RN on 3/12/2024 at 10:11 PM

## 2024-03-13 NOTE — PLAN OF CARE
A/O x 4, VSS on RA. Pt c/o 6/10 back pain; prn Oxycodone administered as well as Robaxin. Prevena wound vac in place at 125, pt will discharge home with Prevena. Continued IV Vanco and Ertanem. NS infusing at 75 ml/hr . Discharge home pending ID and hospitalist clearance.     Cleared for discharge form neurosurgery standpoint.     Problem: Spinal Surgery  Goal: Optimal Functional Ability  Intervention: Optimize Functional Status  Recent Flowsheet Documentation  Taken 3/13/2024 0921 by Lora Inman, RN  Activity Management: activity adjusted per tolerance  Positioning/Transfer Devices:   pillows   in use  Goal: Absence of Infection Signs and Symptoms  Intervention: Prevent or Manage Infection  Recent Flowsheet Documentation  Taken 3/13/2024 0921 by Lora Inman, RN  Infection Prevention:   single patient room provided   rest/sleep promoted  Goal: Optimal Neurologic Function  Intervention: Optimize Neurologic Function  Recent Flowsheet Documentation  Taken 3/13/2024 0921 by Lora Inman, RN  Body Position:   position changed independently   weight shifting  Goal: Anesthesia/Sedation Recovery  Intervention: Optimize Anesthesia Recovery  Recent Flowsheet Documentation  Taken 3/13/2024 0921 by Lora Inman, RN  Safety Promotion/Fall Prevention:   assistive device/personal items within reach   clutter free environment maintained   increased rounding and observation   increase visualization of patient   lighting adjusted   mobility aid in reach   nonskid shoes/slippers when out of bed   patient and family education   room door open   room near nurse's station   room organization consistent   safety round/check completed

## 2024-03-13 NOTE — PROGRESS NOTES
[facility] MEDICINE  PROGRESS NOTE     Code Status: Full Code  Procedure(s):  REMOVAL OF LUMBAR WOUND VAC WITH COMPLEX WOUND CLOSURE  2 Days Post-Op  Identification/Summary:   68 year old female past medical history significant for recent L4-L5 bilateral fusion on 2/23/2024 discharged home on 2/26 , non-Hodgkin's lymphoma/small B lymphocyte lymphoma, lymphoproliferative disorder on tyrosine kinase inhibitor, follows with [facility], depression / anxiety and restless leg syndrome.  3/3 presented to the emergency room with complaints of worsening low back pain, with postoperative wound erythema, warmth.  Lumbar MRI concerning for fluid collection extending into the epidural space.  S/p bedside evacuation of post-operative fluid collection by Neurosurgery on day of admit.  Given intravenous Zosyn.  Infectious Disease consulted and recommended formal surgical washout.  Completed on 3/7/2024.  IV vancomycin added.  Subsequent cultures growing Staph epidermidis and Enterobacter cloacae.  Sensitivities returned.  Antibiotics transition to ertapenem and vancomycin.  3/11 underwent removal of lumbar wound VAC and complex wound closure.  Medically stable.  Unfortunately does not have insurance coverage for outpatient antibiotics.  Antibiotic plan transitioned to daily ertapenem and daptomycin.  PICC line ordered.  Anticipate discharge 3/14.    Assessment and Plan:     Status post bilateral L4-5 fusion 2/23/2024  Postoperative lumbar wound infection   Status post bedside fluid evacuation 3/3/2024  S/p formal debridement/washout 3/7/24   Status post wound VAC removal with complex wound closure 3/11/2024  Procedures as outlined above.  Pain control on Tylenol, oxycodone, Robaxin, gabapentin IV Dilaudid as needed  Appreciate surgery and infectious disease consultation.  Negative pressure wound drain in place.  Initially on Zosyn and then vancomycin was added.  Surgical cultures growing  "Staph epidermidis and Enterobacter cloacae.  Sensitivities now available.  Antibiotics transitioned to ertapenem and vancomycin.  Unfortunately patient does not have home IV antibiotic coverage.  Discussed with Dr. Stokes from ID and recommend transitioning to daily ertapenem and daptomycin x 6 weeks.  Care management to coordinate outpatient infusion once formal orders are placed.  PICC line double-lumen placement ordered.    History of non-Hodgkin's lymphoma  Small lymphocytic lymphoma  - Sees [facility], per family members holding off on the tyrosine kinase inhibitor (acalabrutinib) for 10 days before and after surgery  Follow-up with [facility] as an outpatient.     Hypokalemia  Hypomagnesemia  -On replacement protocols for both K and mag     Depression anxiety  -Continue home meds     Restless leg syndrome  -On Requip     GERD  - On PPI    Anticoagulation   Pneumatic Compression Devices    COVID-19 PCR not tested    Fluids: Saline lock  Pain meds: Gabapentin, Robaxin, as needed Tylenol and Dilaudid  Therapy: PT OT recommending home with assist  Camejo:Not present  Lines: None       Current Diet  Orders Placed This Encounter      Advance Diet as Tolerated: Regular Diet Adult    Supplements  None    Barriers to Discharge: PICC line placement, formal antibiotic orders and outpatient infusion center coordination    Disposition: Hopeful discharge 3/14    Clinically Significant Risk Factors                         # Obesity: Estimated body mass index is 39.73 kg/m  as calculated from the following:    Height as of this encounter: 1.626 m (5' 4.02\").    Weight as of this encounter: 105.1 kg (231 lb 9.6 oz).      # Financial/Environmental Concerns: none         Interval History/Subjective:  Patient stable.  Pain under reasonable control.  Hoping to discharge today.  No chest pain.  No shortness of breath.  Back pain under reasonable control.  No nausea or vomiting.  Patient would not be able to afford " home IV antibiotics.  Would be agreeable to going to outpatient infusion center on a daily basis for the next 6 weeks.  Questions answered to verbalized satisfaction.      Last 24H PRN:     loperamide (IMODIUM) capsule 2 mg, 2 mg at 03/13/24 1043    oxyCODONE (ROXICODONE) tablet 5 mg, 5 mg at 03/13/24 0930 **OR** oxyCODONE (ROXICODONE) tablet 10 mg, 10 mg at 03/13/24 0224    Physical Exam/Objective:  Temp:  [98.1  F (36.7  C)-98.5  F (36.9  C)] 98.5  F (36.9  C)  Pulse:  [65-77] 68  Resp:  [16-18] 18  BP: (125-154)/(65-84) 154/68  SpO2:  [92 %-97 %] 92 %  Wt Readings from Last 4 Encounters:   03/10/24 105.1 kg (231 lb 9.6 oz)   02/23/24 103 kg (227 lb)   09/19/23 90.7 kg (200 lb)   07/27/23 90.7 kg (200 lb)     Body mass index is 39.73 kg/m .    Constitutional: awake, alert, cooperative, no apparent distress, and appears stated age and fatigued  ENT: Normocephalic, without obvious abnormality, atraumatic, external ears without lesions, oral pharynx with moist mucous membranes, tonsils without erythema or exudates, gums normal and good dentition.  Respiratory: No increased work of breathing, good air exchange, clear to auscultation bilaterally, no crackles or wheezing  Cardiovascular: Normal apical impulse, regular rate and rhythm, normal S1 and S2, no S3 or S4, and no murmur noted  GI: No scars, normal bowel sounds, soft, non-distended, non-tender, no masses palpated, no hepatosplenomegally  Skin: normal skin color, texture, turgor, no redness, warmth, or swelling, and no rashes  Musculoskeletal: There is no redness, warmth, or swelling of the joints.  Motor strength is 5 out of 5 all extremities bilaterally.  Tone is normal. no lower extremity pitting edema present  Neurologic: Cranial nerves II-XII are grossly intact. Sensory:  Sensory intact  Neuropsychiatric: General: normal, calm, and normal eye contact Level of consciousness: alert / normal Affect: normal Orientation: oriented to self, place, time and  situation Memory and insight: normal, memory for past and recent events intact, and thought process normal      Medications:   Personally Reviewed.  Medications    sodium chloride 75 mL/hr at 03/13/24 1012      enoxaparin ANTICOAGULANT  40 mg Subcutaneous Q24H    ertapenem  1 g Intravenous Q24H    FLUoxetine  40 mg Oral Daily    gabapentin  100 mg Oral TID    methocarbamol  750 mg Oral Q6H    pantoprazole  40 mg Oral Daily    polyethylene glycol  17 g Oral Daily    rOPINIRole  1 mg Oral BID    rOPINIRole  2 mg Oral At Bedtime    senna-docusate  1 tablet Oral BID    sodium chloride (PF)  3 mL Intracatheter Q8H    sodium chloride (PF)  3 mL Intracatheter Q8H    vancomycin  1,000 mg Intravenous Q12H    wound support modular  60 mL Oral Daily       Data reviewed today: I personally reviewed all new medications, labs, imaging/diagnostics reports over the past 24 hours. Pertinent findings include:    Imaging:   No results found for this or any previous visit (from the past 24 hour(s)).    Labs:  MR Lumbar Spine w/o Contrast   Final Result   IMPRESSION:   1.  L4-L5 fusion. No hardware complications. Good positioning of transpedicular screws and interbody graft.      2.  Dorsal subcutaneous fluid collection as above which extends through the surgical approach into the dorsal canal epidural space and most prominent at L4-L5. Slight extension cephalad to L1-L2. Consider further evaluation with contrast-enhanced    imaging. Fairly homogeneous fluid collection without significant surrounding inflammatory signal changes and may represent seroma or resolving hematoma. Infectious/inflammatory fluid accumulation is possible, however.      3.  Lumbar spondylosis      4.  No fractures.      5.  Findings discussed with Flor James of the neurosurgery service on 3/3/2024 1255 PM CST      XR Lumbar Spine 2/3 Views   Final Result   IMPRESSION: Five lumbar vertebral bodies. Minimal convex right curvature, unchanged. Dorsal midline skin  staples persist. Postoperative changes of interbody and posterior spinal fixation across L4-L5. Grade 1 anterolisthesis of L4 on L5 and L5 on S1,    unchanged. Intact hardware. Severe interspace and endplate degeneration L2-L3. Normal vertebral body heights.           Recent Results (from the past 24 hour(s))   Magnesium    Collection Time: 03/13/24  6:34 AM   Result Value Ref Range    Magnesium 1.9 1.7 - 2.3 mg/dL   Potassium    Collection Time: 03/13/24  6:34 AM   Result Value Ref Range    Potassium 4.0 3.4 - 5.3 mmol/L   Vancomycin level    Collection Time: 03/13/24  6:34 AM   Result Value Ref Range    Vancomycin 23.3   ug/mL   Glucose    Collection Time: 03/13/24  6:34 AM   Result Value Ref Range    Glucose 100 (H) 70 - 99 mg/dL   Extra Purple Top Tube    Collection Time: 03/13/24  6:34 AM   Result Value Ref Range    Hold Specimen JIC        Pending Labs:  Unresulted Labs Ordered in the Past 30 Days of this Admission       Date and Time Order Name Status Description    3/7/2024 10:16 AM Fungal or Yeast Culture Routine Preliminary     3/7/2024 10:16 AM Anaerobic Bacterial Culture Routine Preliminary     3/7/2024 10:15 AM Fungal or Yeast Culture Routine Preliminary     3/7/2024 10:15 AM Anaerobic Bacterial Culture Routine Preliminary     3/7/2024 10:03 AM Fungal or Yeast Culture Routine Preliminary     3/7/2024 10:03 AM Anaerobic Bacterial Culture Routine Preliminary     3/7/2024 10:03 AM Acid-Fast Bacilli Culture and Stain In process               Parker Staley MD  [facility]  Phone: #[phone]

## 2024-03-13 NOTE — PROGRESS NOTES
Care Management Follow Up    Length of Stay (days): 10    Expected Discharge Date: 03/14/2024     Concerns to be Addressed: discharge planning     Patient plan of care discussed at interdisciplinary rounds: Yes    Anticipated Discharge Disposition: Home     Anticipated Discharge Services: None  Anticipated Discharge DME: None    Patient/family educated on Medicare website which has current facility and service quality ratings: yes  Education Provided on the Discharge Plan: Yes (AVS per bedside RN)  Patient/Family in Agreement with the Plan: unable to assess    Referrals Placed by CM/SW: Outpatient Infusion  Private pay costs discussed: Not applicable    Additional Information:  Chart reviewed. Pt has a Prevena wound vac. Per ID note 3/12 pt will need 6 weeks of IV abx.    Pt doesn't have coverage for IV abx, see 3/6 note from Skylar Otero.     CM will continue to follow       Nic Fraser RN

## 2024-03-13 NOTE — PLAN OF CARE
Goal Outcome Evaluation:  Patient vital signs are at baseline: Yes  Patient able to ambulate as they were prior to admission or with assist devices provided by therapies during their stay:  Yes  Patient MUST void prior to discharge:  Yes  Patient able to tolerate oral intake:  Yes  Pain has adequate pain control using Oral analgesics:  Yes. Scheduled and PRN medications administered for pain control.   Does patient have an identified :  Yes  Has goal D/C date and time been discussed with patient:  Yes

## 2024-03-14 VITALS
HEART RATE: 71 BPM | BODY MASS INDEX: 39.54 KG/M2 | WEIGHT: 231.6 LBS | OXYGEN SATURATION: 96 % | DIASTOLIC BLOOD PRESSURE: 63 MMHG | HEIGHT: 64 IN | TEMPERATURE: 98 F | SYSTOLIC BLOOD PRESSURE: 135 MMHG | RESPIRATION RATE: 18 BRPM

## 2024-03-14 LAB
BACTERIA FLD CULT: NORMAL
BACTERIA TISS BX CULT: NORMAL
BACTERIA TISS BX CULT: NORMAL
CREAT SERPL-MCNC: 0.78 MG/DL (ref 0.51–0.95)
EGFRCR SERPLBLD CKD-EPI 2021: 81 ML/MIN/1.73M2
MAGNESIUM SERPL-MCNC: 1.7 MG/DL (ref 1.7–2.3)
POTASSIUM SERPL-SCNC: 3.5 MMOL/L (ref 3.4–5.3)

## 2024-03-14 PROCEDURE — 250N000011 HC RX IP 250 OP 636: Mod: JZ | Performed by: INTERNAL MEDICINE

## 2024-03-14 PROCEDURE — 250N000013 HC RX MED GY IP 250 OP 250 PS 637

## 2024-03-14 PROCEDURE — 250N000011 HC RX IP 250 OP 636: Performed by: INTERNAL MEDICINE

## 2024-03-14 PROCEDURE — 250N000013 HC RX MED GY IP 250 OP 250 PS 637: Performed by: HOSPITALIST

## 2024-03-14 PROCEDURE — 250N000013 HC RX MED GY IP 250 OP 250 PS 637: Performed by: INTERNAL MEDICINE

## 2024-03-14 PROCEDURE — 250N000013 HC RX MED GY IP 250 OP 250 PS 637: Performed by: PHYSICIAN ASSISTANT

## 2024-03-14 PROCEDURE — 99239 HOSP IP/OBS DSCHRG MGMT >30: CPT | Performed by: FAMILY MEDICINE

## 2024-03-14 PROCEDURE — 82565 ASSAY OF CREATININE: CPT | Performed by: FAMILY MEDICINE

## 2024-03-14 PROCEDURE — 258N000003 HC RX IP 258 OP 636: Performed by: INTERNAL MEDICINE

## 2024-03-14 PROCEDURE — 99232 SBSQ HOSP IP/OBS MODERATE 35: CPT | Performed by: INTERNAL MEDICINE

## 2024-03-14 PROCEDURE — 84132 ASSAY OF SERUM POTASSIUM: CPT | Performed by: FAMILY MEDICINE

## 2024-03-14 PROCEDURE — 99024 POSTOP FOLLOW-UP VISIT: CPT

## 2024-03-14 PROCEDURE — 83735 ASSAY OF MAGNESIUM: CPT | Performed by: FAMILY MEDICINE

## 2024-03-14 RX ADMIN — SENNOSIDES AND DOCUSATE SODIUM 1 TABLET: 8.6; 5 TABLET ORAL at 10:06

## 2024-03-14 RX ADMIN — LOPERAMIDE HYDROCHLORIDE 2 MG: 2 CAPSULE ORAL at 00:02

## 2024-03-14 RX ADMIN — OXYCODONE 5 MG: 5 TABLET ORAL at 10:14

## 2024-03-14 RX ADMIN — ERTAPENEM SODIUM 1 G: 1 INJECTION, POWDER, LYOPHILIZED, FOR SOLUTION INTRAMUSCULAR; INTRAVENOUS at 11:43

## 2024-03-14 RX ADMIN — METHOCARBAMOL TABLETS 750 MG: 750 TABLET, COATED ORAL at 03:12

## 2024-03-14 RX ADMIN — PANTOPRAZOLE SODIUM 40 MG: 40 TABLET, DELAYED RELEASE ORAL at 10:07

## 2024-03-14 RX ADMIN — ACETAMINOPHEN 650 MG: 325 TABLET ORAL at 00:02

## 2024-03-14 RX ADMIN — VANCOMYCIN HYDROCHLORIDE 1000 MG: 1 INJECTION, SOLUTION INTRAVENOUS at 00:02

## 2024-03-14 RX ADMIN — FLUOXETINE 40 MG: 20 CAPSULE ORAL at 10:07

## 2024-03-14 RX ADMIN — GABAPENTIN 100 MG: 100 CAPSULE ORAL at 10:06

## 2024-03-14 RX ADMIN — ROPINIROLE 1 MG: 0.25 TABLET, FILM COATED ORAL at 10:08

## 2024-03-14 RX ADMIN — DAPTOMYCIN 600 MG: 500 INJECTION, POWDER, LYOPHILIZED, FOR SOLUTION INTRAVENOUS at 09:55

## 2024-03-14 RX ADMIN — METHOCARBAMOL TABLETS 750 MG: 750 TABLET, COATED ORAL at 10:05

## 2024-03-14 ASSESSMENT — ACTIVITIES OF DAILY LIVING (ADL)
ADLS_ACUITY_SCORE: 40

## 2024-03-14 NOTE — PLAN OF CARE
Goal Outcome Evaluation:  Problem: Pain Acute  Goal: Optimal Pain Control and Function  Outcome: Progressing  Pt is alert and oriented x4. Pt's vital signs are stable, see flowsheet for details. Pt is voiding without difficulty and adequately. Pt denies flank and bladder pain. Pt's pain is controlled with oral pain medications. Ice applied. Pt's dressing is CDI. Wound Vac in place and charged. No output noted. IS encouraged, but refused. Baseline BUE numbness noted, otherwise CMS intact. Up x1 with gaitbelt and FWW. Pt is tolerating diet. Calls appropriately and can make needs known. Pt's alarms on. Nurse will continue to monitor. Pt requested imodium for loose stools. Pt stated she normally has loose stools at home, and she takes imodium. PRN imodium administered, per orders.

## 2024-03-14 NOTE — PROGRESS NOTES
Care Management Discharge Note    Discharge Date: 03/14/2024       Discharge Disposition: Home, Outpatient Infusion Services    Discharge Services: Outpatient Infusion Services    Discharge DME: None    Discharge Transportation: family or friend will provide    Education Provided on the Discharge Plan: Yes (AVS per bedside RN)    Persons Notified of Discharge Plans: patient    Patient/Family in Agreement with the Plan: yes    Handoff Referral Completed: Yes    Additional Information:  CM reviewed chart. CM was able to arrange outpatient infusions for Fri, Sat and Sun at [facility]. 7:35 AM     CM was able to schedule M-F next week at [facility]. 8:18 AM     CM called back to [facility] to schedule future weekends. [facility]  will schedule all the weekends until the infusions are completed. 8:25 AM     CM confirmed with Outpatient Infusion Center in [place] that the appointments have been scheduled through mid April. Only weekday appointments will be in [place] and the weekends will be at [facility]. Outpatient Infusion Center stated they have all the correct orders. Patient will work with outpatient infusion center to schedule the appointments going forward.     CM unable to see all of the outpatient infusion appointments on discharge AVS. CM called again to confirm that patient does have  outpatient infusion appointments scheduled through mid April 21. [facility] during the week (Monday-Friday) and [facility] on the weekends. 11:39 AM     CM updated patient and she is agreeable to the outpatient infusion center plan. Some days she will go to the infusion center at [facility] and other days to the outpatient infusion center in [place]. Appointment information has been added to her AVS by infusion center schedulers.       Family will provide transportation home at discharge.       [facility] referral sent per protocol.        Jessica  ILDA Bains

## 2024-03-14 NOTE — PROGRESS NOTES
"INFECTIOUS DISEASE FOLLOW UP NOTE      ASSESSMENT:  1. Post-operative wound infection. Hardware in place. Fluid collection on MRI subcutaneous, extends to deep structures. Aspirate 3/3 now growing enterobacter. Anaerobe, and staph epi. Deep infection. Now s/p I+D 3/7 -- large subQ fluid collection that tracked down to epidural space. Old hematoma noted. Hardware had covered over with granulation tissue. Growth of staph epi from aspirate of uncertain significance, but this is now confirmed on deep operative cultures, as is Enterobacter. Staph epi raises question of hardware involvement, despite it being covered with granulation tissue as noted at surgery. Repeat OR 3/11 no purulence. Active issue.   2. S/p L4--L5 transforaminal lumbar interbody fusion on 2/23/24.   3. H/o NHL, maintained on acalabrutinib    PLAN:  continue ertapenem and daptomycin expect 6 weeks IV abx.  Therapy plans written, discussed with care management   Then potential oral antibiotic after that. Discussed with micro lab and have added on susceptibilities.   Expect PICC -- single or double lumen depending on 1 or 2 antibiotics-will need home infusion to weigh in on recommendations  Needs OPAT set up  Follow up with Dr Tucker in ID clinic in ~4 weeks  ID plan in place    Gaby Johnson MD  [facility]  [phone] clinic  [facility] [place] paging    ______________________________________________________________________    SUBJECTIVE / INTERVAL HISTORY: doing ok. Working on infusion center. Tolerating antibiotics.       ROS: All other systems negative except as listed above.        OBJECTIVE:  /63 (BP Location: Left arm)   Pulse 71   Temp 98  F (36.7  C) (Oral)   Resp 18   Ht 1.626 m (5' 4.02\")   Wt 105.1 kg (231 lb 9.6 oz)   SpO2 96%   BMI 39.73 kg/m         Vital Signs  Temp: 98.4  F (36.9  C)  Temp src: Oral  Resp: 18  Pulse: 68  Pulse Rate Source: Monitor  BP: 136/63  BP Location: Left arm    Temp (24hrs), " Av.4  F (36.9  C), Min:98  F (36.7  C), Max:98.7  F (37.1  C)      GEN: No acute distress.  In chair  RESPIRATORY:  Normal breathing pattern.   EXTREMITIES: No edema.  SKIN/HAIR/NAILS:  No rashes. Wound with vac  Strength in legs pretty good  IV: peripheral        Antibiotics:  pip/tazo 3/3-3/10  Vancomycin 3/3-, 3/7-  Ertapenem IV 3/10-    Pertinent labs:    Recent Labs   Lab 2418 24  0602 03/10/24  0535 24  0612 24  0653   WBC 10.2 10.7 8.8  --  12.6*   HGB 9.4* 10.0*  --   --  9.2*   HCT 30.2* 31.6*  --   --  28.9*    325  --  271 276        Recent Labs   Lab 24  0602 24  0653    140 140   CO2 27 26 26   BUN 11.7 8.3 13.1         Lab Results   Component Value Date    ALT 19 2024    AST 25 2024    ALKPHOS 101 2024         MICROBIOLOGY DATA:  3/3 blood negative  3/3 aspirate enterobacter, staph epi, peptoniphilus  3/7 surgical -- enterobacter, staph epi    RADIOLOGY:  XR Chest Port 1 View    Result Date: 3/13/2024  EXAM: XR CHEST PORT 1 VIEW LOCATION: [facility] DATE: 3/13/2024 INDICATION: RN placed PICC   verify tip placement COMPARISON: None.     IMPRESSION: Right PICC tip in the mid SVC. Heart size mildly enlarged. Minimal atelectasis left lung base. Lungs otherwise clear.    MR Lumbar Spine w/o Contrast    Result Date: 3/3/2024  EXAM: MR LUMBAR SPINE W/O CONTRAST LOCATION: [facility] DATE: 3/3/2024 INDICATION: Lumbar fusion 2024. Signs and symptoms of infection. Lumbar pain. Drainage at surgical site. COMPARISON: Lumbar radiographs 2024. Lumbar MRI 11 2023 TECHNIQUE: Routine Lumbar Spine MRI without IV contrast. FINDINGS: Redemonstration of L4-L5 anterior posterior fusion. Centrally positioned interbody graft and bilateral transpedicular screws with intertransverse rods. 4 mm anterolisthesis of L4 on L5 compared with 5 mm on the preoperative exam. 2.5  mm degenerative anterolisthesis at L5-S1 unchanged. 2.5 mm degenerative retrolisthesis at L2-L3 unchanged. L4-L5 laminotomy. Nomenclature is based on 5 lumbar type vertebral bodies. Normal vertebral body heights. Negative for fracture. Benign osseous hemangioma at the L4 vertebral body. No suspicious marrow signal change. Type II Modic endplate changes at L2-L3 and L3-L4. Normal distal spinal cord and cauda equina with conus medullaris at L2. Fluid collection in the midline dorsal soft tissues extends from L1-L2 inferiorly to L5-S1 measuring 11 cm cephalocaudad by 6 cm AP by 6 cm mediolateral. A component  of the collection extends into the surgical tract, through the hemilaminotomy and to the dorsal surgical space at L4-L5. This is difficult to evaluate due to susceptibility artifact but the canalicular portion measures approximately 22 x 13 mm transverse extending through the posterior elements. Compression of the right dorsal thecal sac and moderate central canal stenosis is present as seen on axial image 9 of series 6.  Unremarkable visualized bony pelvis. T11-T12: Disc desiccation and ventral osteophyte. Patent central canal and foramen. Mild annular bulge. T12-L1: Disc desiccation , height loss and chronic Schmorl's nodes. Patent central canal and foramen. L1-L2: Disc desiccation. Patent central canal and foramina. There may be a tiny amount of fluid in the dorsal epidural space related to previous surgery. L2-L3: Disc osteophyte complex. Small amount of dorsal canal epidural space fluid may be related to recent surgery. Mild central canal stenosis. Moderate to severe left and mild-to-moderate right foraminal stenosis. L3-L4: Mild disc bulge. Patent central canal. Facet DJD with mild-to-moderate right and mild left foraminal stenosis. L4-L5: Fusion and hemilaminotomy. Moderate central canal stenosis fluid in the dorsal and right dorsal canal appears contiguous with the larger subcutaneous fluid collection and  extends to the surgical approach. Signal characteristics are nonspecific but  do not appear particularly complex and this could represent seroma or resolving hematoma. No surrounding inflammatory changes. No definite evidence for discitis or osteomyelitis. Contrast-enhanced imaging may be useful for further evaluation. Mild-to-moderate right and minimal left foraminal stenosis. L5-S1: Disc desiccation and mild disc bulge. Facet DJD. Moderate right and mild left foraminal stenosis. Patent central canal.     IMPRESSION: 1.  L4-L5 fusion. No hardware complications. Good positioning of transpedicular screws and interbody graft. 2.  Dorsal subcutaneous fluid collection as above which extends through the surgical approach into the dorsal canal epidural space and most prominent at L4-L5. Slight extension cephalad to L1-L2. Consider further evaluation with contrast-enhanced imaging. Fairly homogeneous fluid collection without significant surrounding inflammatory signal changes and may represent seroma or resolving hematoma. Infectious/inflammatory fluid accumulation is possible, however. 3.  Lumbar spondylosis 4.  No fractures. 5.  Findings discussed with Flor James of the neurosurgery service on 3/3/2024 1255 PM CST    XR Lumbar Spine 2/3 Views    Result Date: 3/3/2024  EXAM: XR LUMBAR SPINE 2/3 VIEWS LOCATION: [facility] DATE: 03/03/2024 INDICATION: Lumbar fusion. COMPARISON: 02/26/2024.     IMPRESSION: Five lumbar vertebral bodies. Minimal convex right curvature, unchanged. Dorsal midline skin staples persist. Postoperative changes of interbody and posterior spinal fixation across L4-L5. Grade 1 anterolisthesis of L4 on L5 and L5 on S1, unchanged. Intact hardware. Severe interspace and endplate degeneration L2-L3. Normal vertebral body heights.     XR Lumbar Spine 2 - 3 VWS Portable    Result Date: 2/26/2024  EXAM: XR LUMBAR SPINE PORT 2/3 VIEWS LOCATION: [facility]  DATE/TIME: 2/26/2024 4:50 PM CST INDICATION: s p lumbar fusion, upright XR. COMPARISON: None. TECHNIQUE: Radiographs of lumbar spine in routine projections. FINDINGS: Staus post  L4-L5 posterior interbody fusion. No evidence of hardware failure. Alignment similar to prior without change in minor dextrocurvature, grade 1 anterolisthesis at L4-L5 and L5-S1. Normal height of vertebral bodies. Advanced L2-L3 interbody degeneration similar to prior. Mild multi level facet arthropathy. Unremarkable visualized bony pelvis. Cholecystectomy clips.    XR SURGERY CAROLINE FLUORO GREATER THAN 5 MIN    Result Date: 2/23/2024  This exam was marked as non-reportable because it will not be read by a radiologist or a [place] non-radiologist provider.     XR Surgery CAROLINE  Fluoro G/T 5 Min    Result Date: 2/23/2024  This exam was marked as non-reportable because it will not be read by a radiologist or a [place] non-radiologist provider.     Lateral Lumbar Spine for Surgical Imaging  [XR LUMBAR SPINE PORT 1  VIEW]    Result Date: 2/23/2024  EXAM: XR CROSSTABLE LATERAL LUMBAR SPINE PORTABLE LOCATION: [facility] DATE: 2/23/2024 INDICATION: Intra operative COMPARISON: None.     IMPRESSION: Intraoperative crosstable lateral radiograph. Underpenetration of the lumbosacral junction somewhat comprises evaluation. Posterior approach surgical clamp projects over the posterior margin of the L3 posterior spinous process and is directed  at the L4 vertebral body and pedicles.

## 2024-03-14 NOTE — DISCHARGE SUMMARY
"[facility] MEDICINE  DISCHARGE SUMMARY     Primary Care Physician: Clinic - [place], [facility]  Admission Date: 3/3/2024   Discharge Provider: Parker Staley MD Discharge Date: 3/14/2024    Diet:   Active Diet and Nourishment Order   Procedures    Advance Diet as Tolerated: Regular Diet Adult    Diet     Code Status: Full Code   Activity: DCACTIVITY: Activity as tolerated  Follow activity recommendations from spine surgery      Condition at Discharge: Stable     REASON FOR PRESENTATION(See Admission Note for Details)   Worsening low back pain    PRINCIPAL & ACTIVE DISCHARGE DIAGNOSES     Principal Problem:    Postoperative infection, unspecified type, initial encounter  Active Problems:    S/P lumbar fusion  Status post bedside fluid evacuation 3/3/2024  Status post debridement and washout 3/7/2024  Status post wound VAC removal with complex wound closure 3/11/2024  Small lymphocytic lymphoma  History of non-Hodgkin's lymphoma  Hypokalemia  Hypomagnesemia  Restless leg syndrome  Depression anxiety  GERD    Clinically Significant Risk Factors                         # Obesity: Estimated body mass index is 39.73 kg/m  as calculated from the following:    Height as of this encounter: 1.626 m (5' 4.02\").    Weight as of this encounter: 105.1 kg (231 lb 9.6 oz).        # Financial/Environmental Concerns: none         PENDING LABS     Unresulted Labs Ordered in the Past 30 Days of this Admission       Date and Time Order Name Status Description    3/7/2024 10:16 AM Fungal or Yeast Culture Routine Preliminary     3/7/2024 10:15 AM Fungal or Yeast Culture Routine Preliminary     3/7/2024 10:03 AM Fungal or Yeast Culture Routine Preliminary     3/7/2024 10:03 AM Acid-Fast Bacilli Culture and Stain In process           PROCEDURES ( this hospitalization only)    Procedure(s):  REMOVAL OF LUMBAR WOUND VAC WITH COMPLEX WOUND CLOSURE      RECOMMENDATIONS TO OUTPATIENT PROVIDER " FOR F/U VISIT   Follow-up Appointments     Follow-up and recommended labs and tests       Please follow up with neurosurgery on 3/19/24 for wound check. Our office   will call you to schedule. Our phone number is [phone] for   assistance.        Follow-up and recommended labs and tests       1.  Follow-up with infusion center daily x 40 days.  Give specific   information and phone number to patient/family.  2.  Follow-up with spine surgeon per their recommendations.  3.  Follow-up with Dr. Clemente [place] infectious disease in 1 month.    Give office phone number to patient.  4.  Follow-up with your oncologist per their recommendations.  5.  Follow-up with primary care provider in 1 to 2 weeks.              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  3/3 presented to the emergency room with complaints of worsening low back pain, with postoperative wound erythema, warmth.  Lumbar MRI concerning for fluid collection extending into the epidural space.  Admitted.      S/p bedside evacuation of post-operative fluid collection by Neurosurgery on day of admit.  Given intravenous Zosyn.  Infectious Disease consulted and recommended formal surgical washout.  Completed on 3/7/2024.  IV vancomycin added.  Subsequent cultures growing Staph epidermidis and Enterobacter cloacae.  Sensitivities returned.  Antibiotics transition to ertapenem and vancomycin.  3/11 underwent removal of lumbar wound VAC and complex wound closure.  Negative pressure wound drain in place.  Medically stable.  Unfortunately does not have insurance coverage for outpatient antibiotics.  Antibiotic plan transitioned to daily  ertapenem and daptomycin x 40 days at outpatient infusion center.  PICC line placed.  Medically stable and discharged.    Patient's acalabrutinib was held throughout her stay and at this time we will keep her off while she is completing antibiotic therapy.  Future management per [facility].    Otherwise did well.    Discharge Medications with Med changes:     Current Discharge Medication List        START taking these medications    Details   DAPTOmycin 600 mg Inject 600 mg into the vein every 24 hours for 40 days    Associated Diagnoses: Postoperative infection, unspecified type, initial encounter      ertapenem (INVANZ) 1 GM vial Inject 1 g into the vein every 24 hours for 40 days    Associated Diagnoses: S/P lumbar fusion           CONTINUE these medications which have CHANGED    Details   acetaminophen (TYLENOL) 325 MG tablet Take 2 tablets (650 mg) by mouth every 4 hours as needed for other (mild pain)  Qty: 100 tablet, Refills: 0    Associated Diagnoses: Postoperative infection, unspecified type, initial encounter; S/P lumbar fusion      methocarbamol (ROBAXIN) 750 MG tablet Take 1 tablet (750 mg) by mouth every 6 hours  Qty: 40 tablet, Refills: 0    Associated Diagnoses: Surgery, elective      oxyCODONE (ROXICODONE) 5 MG tablet Take 1 tablet (5 mg) by mouth every 6 hours as needed for moderate pain  Qty: 40 tablet, Refills: 0    Associated Diagnoses: Surgery, elective      senna-docusate (SENOKOT-S/PERICOLACE) 8.6-50 MG tablet Take 1 tablet by mouth 2 times daily  Qty: 40 tablet, Refills: 0    Associated Diagnoses: Surgery, elective           CONTINUE these medications which have NOT CHANGED    Details   amoxicillin (AMOXIL) 500 MG capsule Take 2,000 mg by mouth See Admin Instructions Pre dental      calcium-vitamin D (CALCIUM-VITAMIN D) 500 mg(1,250mg) -200 unit per tablet Take 1 tablet by mouth every evening       FLUoxetine (PROZAC) 40 MG capsule Take 1 capsule by mouth once daily  Qty: 90 capsule,  Refills: 0    Associated Diagnoses: Moderate major depression (H)      !! gabapentin (NEURONTIN) 100 MG capsule Take 100 mg by mouth 3 times daily Plus additional 100mg at HS if needed      !! gabapentin (NEURONTIN) 100 MG capsule Take 100 mg by mouth nightly as needed In addition to 100mg TID      loperamide (IMODIUM) 2 MG capsule Take 2 mg by mouth 3 times daily      multivitamin w/minerals (THERA-VIT-M) tablet Take 1 tablet by mouth daily      omeprazole 20 MG tablet Take 20 mg by mouth daily      pseudoePHEDrine-guaiFENesin (MUCINEX D)  MG 12 hr tablet Take 1 tablet by mouth 2 times daily as needed for congestion      rOPINIRole (REQUIP) 1 MG tablet TAKE 1 TABLET BY MOUTH TWICE DAILY AND 2 AT BEDTIME  Qty: 180 tablet, Refills: 0    Associated Diagnoses: Restless leg syndrome       !! - Potential duplicate medications found. Please discuss with provider.        STOP taking these medications       acalabrutinib (CALQUENCE) 100 MG capsule Comments:   Reason for Stopping:                 Rationale for medication changes:      Antibiotics for epidural infection.      Consults     PHARMACY TO DOSE VANCO  PHARMACY TO DOSE VANCO  INFECTIOUS DISEASES IP CONSULT  PHYSICAL THERAPY ADULT IP CONSULT  OCCUPATIONAL THERAPY ADULT IP CONSULT  CARE MANAGEMENT / SOCIAL WORK IP CONSULT  WOUND OSTOMY CONTINENCE NURSE  IP CONSULT  INFECTIOUS DISEASES IP CONSULT  CARE MANAGEMENT / SOCIAL WORK IP CONSULT  PHARMACY TO DOSE VANCO  PHYSICAL THERAPY ADULT IP CONSULT  OCCUPATIONAL THERAPY ADULT IP CONSULT  WOUND OSTOMY CONTINENCE NURSE  IP CONSULT  PHARMACY TO DOSE VANCO  NUTRITION SERVICES ADULT IP CONSULT  CARE MANAGEMENT / SOCIAL WORK IP CONSULT  WOUND OSTOMY CONTINENCE NURSE  IP CONSULT  VASCULAR ACCESS ADULT IP CONSULT      Immunizations given this encounter     Most Recent Immunizations   Administered Date(s) Administered    COVID-19 12+ (2023-24) (MODERNA) 12/12/2023    COVID-19 Bivalent 12+ (Pfizer) 01/06/2023    COVID-19  "Monovalent 18+ (Moderna) 05/04/2022    Flu, Unspecified 10/04/2018    Influenza (IIV3) PF 10/06/2015    Influenza Vaccine 65+ (Fluzone HD) 10/27/2023    Influenza Vaccine >6 months,quad, PF 10/04/2021    Influenza Vaccine, 6+MO IM (QUADRIVALENT W/PRESERVATIVES) 10/13/2020    Influenza, seasonal, injectable, PF 09/27/2017    Pneumo Conj 13-V (2010&after) 10/18/2018    Pneumococcal 23 valent 03/22/2022    RSV Vaccine (Abrysvo) 12/12/2023    TDAP (Adacel,Boostrix) 01/06/2023    Td (Adult), Adsorbed 01/19/2004    Td,adult,historic,unspecified 01/19/2004    Zoster recombinant adjuvanted (SHINGRIX) 05/24/2022           Anticoagulation Information      Recent INR results: No results for input(s): \"INR\" in the last 168 hours.  Warfarin doses (if applicable) or name of other anticoagulant: N/A      SIGNIFICANT IMAGING FINDINGS     Results for orders placed or performed during the hospital encounter of 03/03/24   XR Lumbar Spine 2/3 Views    Impression    IMPRESSION: Five lumbar vertebral bodies. Minimal convex right curvature, unchanged. Dorsal midline skin staples persist. Postoperative changes of interbody and posterior spinal fixation across L4-L5. Grade 1 anterolisthesis of L4 on L5 and L5 on S1,   unchanged. Intact hardware. Severe interspace and endplate degeneration L2-L3. Normal vertebral body heights.     MR Lumbar Spine w/o Contrast    Impression    IMPRESSION:  1.  L4-L5 fusion. No hardware complications. Good positioning of transpedicular screws and interbody graft.    2.  Dorsal subcutaneous fluid collection as above which extends through the surgical approach into the dorsal canal epidural space and most prominent at L4-L5. Slight extension cephalad to L1-L2. Consider further evaluation with contrast-enhanced   imaging. Fairly homogeneous fluid collection without significant surrounding inflammatory signal changes and may represent seroma or resolving hematoma. Infectious/inflammatory fluid accumulation is " possible, however.    3.  Lumbar spondylosis    4.  No fractures.    5.  Findings discussed with Flor James of the neurosurgery service on 3/3/2024 1255 PM CST   XR Chest Port 1 View    Impression    IMPRESSION: Right PICC tip in the mid SVC. Heart size mildly enlarged. Minimal atelectasis left lung base. Lungs otherwise clear.       No results found for this or any previous visit (from the past 4320 hour(s)).    SIGNIFICANT LABORATORY FINDINGS     Most Recent 3 CBC's:  Recent Labs   Lab Test 03/12/24 0618 03/11/24  0602 03/10/24  0535 03/09/24  0612 03/08/24  0653   WBC 10.2 10.7 8.8  --  12.6*   HGB 9.4* 10.0*  --   --  9.2*   MCV 85 84  --   --  86    325  --  271 276     Most Recent 3 BMP's:  Recent Labs   Lab Test 03/14/24  0511 03/13/24  0634 03/12/24  0618 03/11/24  0602 03/08/24 1956 03/08/24  0653   NA  --   --  137 140  --  140   POTASSIUM 3.5 4.0 4.2 4.3   < > 3.3*   CHLORIDE  --   --  103 105  --  106   CO2  --   --  27 26  --  26   BUN  --   --  11.7 8.3  --  13.1   CR 0.78  --  0.78 0.78   < > 0.75   ANIONGAP  --   --  7 9  --  8   KAYLAN  --   --  9.1 9.4  --  8.9   GLC  --  100* 124* 126*  --  126*    < > = values in this interval not displayed.     Most Recent 2 LFT's:  Recent Labs   Lab Test 03/03/24  0916 08/27/20  1423   AST 25 27   ALT 19 23   ALKPHOS 101 78   BILITOTAL 0.7 0.4     Most Recent 3 INR's:  Recent Labs   Lab Test 03/05/24  2353 09/14/19  0641 09/13/19  0627   INR 0.99 0.99 1.05     Most Recent TSH and T4:  Recent Labs   Lab Test 04/05/23  1250   TSH 0.97     Most Recent Hemoglobin A1c:  Recent Labs   Lab Test 01/02/23  1418   A1C 5.4     Most Recent 6 glucoses:  Recent Labs   Lab Test 03/13/24  0634 03/12/24  0618 03/11/24  0602 03/08/24  0653 03/06/24  0626 03/04/24  0602   * 124* 126* 126* 125* 130*     Most Recent Urinalysis:  Recent Labs   Lab Test 03/22/22  1132   COLOR Yellow   APPEARANCE Clear   URINEGLC Negative   URINEBILI Negative   URINEKETONE Negative  "  SG 1.025   UBLD Negative   URINEPH 5.0   PROTEIN Negative   UROBILINOGEN 0.2   NITRITE Negative   LEUKEST Negative     Most Recent ESR & CRP:  Recent Labs   Lab Test 03/03/24  0916   SED 29   CRPI 104.00*     No results found for: \"CTROPT\"   7-Day Micro Results       No results found for the last 168 hours.              Discharge Orders        Follow-up and recommended labs and tests     Please follow up with neurosurgery on 3/19/24 for wound check. Our office will call you to schedule. Our phone number is [phone] for assistance.     Activity    Please limit your lifting to no more that ten pounds and avoid excessive bending, twisting and turning at the lumbar spine. You should also avoid excessive jostling and jarring activities.     Wound care and dressings    Continue with Prevena wound vac. Change canisters as indicated and instructed. Please contact with any changes and concerns to wound and dressing including increasing drainage, pain, fevers, chills.     Reason for your hospital stay    Infected spine postoperative     Follow-up and recommended labs and tests     1.  Follow-up with infusion center daily x 40 days.  Give specific information and phone number to patient/family.  2.  Follow-up with spine surgeon per their recommendations.  3.  Follow-up with Dr. Clemente [place] infectious disease in 1 month.  Give office phone number to patient.  4.  Follow-up with your oncologist per their recommendations.  5.  Follow-up with primary care provider in 1 to 2 weeks.     Activity    Your activity upon discharge: activity as tolerated and follow recommendations from spine team.     When to contact your care team    Call spine surgery if you have any of the following: temperature greater than 100.5,  increased shortness of breath, increased drainage, increased swelling, or increased pain.     Monitor and record    Drain output     Tubes and drains    You are going home with the following tubes or drains: " Spinal drain.  Tube cares per hospital or home care instructions     Wound Vac Order    DME Documentation:   Describe the reason for need to support medical necessity: Lumbar wound washout.     I, the undersigned, certify that the above prescribed supplies are medically necessary for this patient and is both reasonable and necessary in reference to accepted standards of medical and necessary in reference to accepted standards of medical practice in the treatment of this patient's condition and is not prescribed as a convenience.     Diet    Follow this diet upon discharge: Orders Placed This Encounter      Advance Diet as Tolerated: Regular Diet Adult       Examination   Physical Exam   Temp:  [98  F (36.7  C)-99.1  F (37.3  C)] 98  F (36.7  C)  Pulse:  [69-77] 71  Resp:  [18] 18  BP: (135-143)/(63-65) 135/63  SpO2:  [94 %-96 %] 96 %  Wt Readings from Last 4 Encounters:   03/10/24 105.1 kg (231 lb 9.6 oz)   02/23/24 103 kg (227 lb)   09/19/23 90.7 kg (200 lb)   07/27/23 90.7 kg (200 lb)       Constitutional: awake, alert, cooperative, no apparent distress, and appears stated age  Eyes: Lids and lashes normal, pupils equal, round and reactive to light, extra ocular muscles intact, sclera clear, conjunctiva normal  ENT: Normocephalic, without obvious abnormality, atraumatic, sinuses nontender on palpation, external ears without lesions, oral pharynx with moist mucous membranes, tonsils without erythema or exudates, gums normal and good dentition.  Respiratory: No increased work of breathing, good air exchange, clear to auscultation bilaterally, no crackles or wheezing  Cardiovascular: Normal apical impulse, regular rate and rhythm, normal S1 and S2, no S3 or S4, and no murmur noted  GI: No scars, normal bowel sounds, soft, non-distended, non-tender, no masses palpated, no hepatosplenomegally  Skin: normal skin color, texture, turgor, no redness, warmth, or swelling, and no rashes  Musculoskeletal: There is no redness,  warmth, or swelling of the joints.  Full range of motion noted.  Motor strength is 5 out of 5 all extremities bilaterally.  Tone is normal. no lower extremity pitting edema present  Neurologic: Cranial nerves II-XII are grossly intact. Sensory:  Sensory intact  Neuropsychiatric: General: normal, calm, and normal eye contact Level of consciousness: alert / normal Affect: normal Orientation: oriented to self, place, time and situation Memory and insight: normal, memory for past and recent events intact, and thought process normal      Please see EMR for more detailed significant labs, imaging, consultant notes etc.    IParker MD, personally saw the patient today and spent greater than 30 minutes discharging this patient.    Parker Staley MD  [facility]    CC:Clinic - [place], [facility]

## 2024-03-14 NOTE — PLAN OF CARE
Goal Outcome Evaluation:  Pt is A&Ox4. VSS. Pain controlled by PRN pain medication and non-pharm interventions. Pt is SBA x1 with activity. Pt tolerated PO intake. Pt expressed frustration with discharge delays. Active listening utilized and education provided to pt regarding the safety of having a treatment plan in place at discharged. PICC placed today. Discharge plan for 3/14/24.

## 2024-03-14 NOTE — PROGRESS NOTES
[facility]    Neurosurgery  Daily Note    Assessment & Plan   Procedure(s):  REMOVAL OF LUMBAR WOUND VAC WITH COMPLEX WOUND CLOSURE   3 Days Post-Op    Plan:  -OK from NSGY perspective for patient to discharge.   -Wound cares: Maintain Prevena, patient being sent with canisters to replace as well as education on how to change (RN confirmed), and then patient will follow up with NSGY on 3/19/24 for wound check - we will facilitate this appointment.  -Infectious disease following, appreciate assistance with antibiotic management  -Continue PT/OT, routine pain control. NSGY prescriptions in.  -Follow neuro exam    JAIR BAUMAN PA-C    Interval History   Stable, no new radicular complaints, weakness, numbness. Continued low back discomfort, manageable with medications.  No fevers, chills.     Physical Exam   Temp: 98  F (36.7  C) Temp src: Oral BP: 135/63 Pulse: 71   Resp: 18 SpO2: 96 % O2 Device: None (Room air)    Vitals:    03/03/24 0802 03/10/24 0900   Weight: 103 kg (227 lb) 105.1 kg (231 lb 9.6 oz)     Vital Signs with Ranges  Temp:  [98  F (36.7  C)-99.1  F (37.3  C)] 98  F (36.7  C)  Pulse:  [69-77] 71  Resp:  [18] 18  BP: (135-143)/(63-65) 135/63  SpO2:  [94 %-96 %] 96 %  I/O last 3 completed shifts:  In: 990 [P.O.:990]  Out: 0     Alert and oriented.  Moves all extremities equally.  Strength 5/5 BLE  Sensation intact to light touch     Prevena intact, dressing CDI      Medications       DAPTOmycin (CUBICIN) 600 mg in sodium chloride 0.9 % 100 mL intermittent infusion  8 mg/kg (Adjusted) Intravenous Q24H    enoxaparin ANTICOAGULANT  40 mg Subcutaneous Q24H    ertapenem  1 g Intravenous Q24H    FLUoxetine  40 mg Oral Daily    gabapentin  100 mg Oral TID    methocarbamol  750 mg Oral Q6H    pantoprazole  40 mg Oral Daily    polyethylene glycol  17 g Oral Daily    rOPINIRole  1 mg Oral BID    rOPINIRole  2 mg Oral At Bedtime    senna-docusate  1 tablet Oral BID    sodium chloride  (PF)  10-40 mL Intracatheter Q7 Days    sodium chloride (PF)  3 mL Intracatheter Q8H    sodium chloride (PF)  3 mL Intracatheter Q8H    wound support modular  60 mL Oral Daily       Plans discussed with Dr. Hudson who was in agreement with plans    Lora Johnson PA-C  [facility] Neurosurgery  [address] [address]

## 2024-03-14 NOTE — PLAN OF CARE
Patient vital signs are at baseline: Yes  Patient able to ambulate as they were prior to admission or with assist devices provided by therapies during their stay:  Yes  Patient MUST void prior to discharge:  Yes  Patient able to tolerate oral intake:  Yes  Pain has adequate pain control using Oral analgesics:  Yes  Does patient have an identified :  Yes  Has goal D/C date and time been discussed with patient:  Yes    Patient ready for discharge. Completed discharge paperwork with patient and family. Patient and family stated understanding and questions were answered. All belongings were sent with the patient. Discharged safely.  Corrine Jimenes RN

## 2024-03-15 ENCOUNTER — TELEPHONE (OUTPATIENT)
Dept: INFECTIOUS DISEASES | Facility: CLINIC | Age: 69
End: 2024-03-15

## 2024-03-15 ENCOUNTER — INFUSION THERAPY VISIT (OUTPATIENT)
Dept: INFUSION THERAPY | Facility: CLINIC | Age: 69
End: 2024-03-15
Attending: INTERNAL MEDICINE
Payer: COMMERCIAL

## 2024-03-15 ENCOUNTER — TELEPHONE (OUTPATIENT)
Dept: NEUROSURGERY | Facility: CLINIC | Age: 69
End: 2024-03-15

## 2024-03-15 VITALS
DIASTOLIC BLOOD PRESSURE: 59 MMHG | SYSTOLIC BLOOD PRESSURE: 126 MMHG | RESPIRATION RATE: 16 BRPM | OXYGEN SATURATION: 97 % | HEART RATE: 74 BPM | TEMPERATURE: 98.2 F

## 2024-03-15 DIAGNOSIS — T81.40XA POSTOPERATIVE INFECTION, UNSPECIFIED TYPE, INITIAL ENCOUNTER: Primary | ICD-10-CM

## 2024-03-15 DIAGNOSIS — Z98.1 S/P LUMBAR FUSION: ICD-10-CM

## 2024-03-15 LAB
ALBUMIN SERPL BCG-MCNC: 3.7 G/DL (ref 3.5–5.2)
ALP SERPL-CCNC: 98 U/L (ref 40–150)
ALT SERPL W P-5'-P-CCNC: 19 U/L (ref 0–50)
ANION GAP SERPL CALCULATED.3IONS-SCNC: 12 MMOL/L (ref 7–15)
AST SERPL W P-5'-P-CCNC: 19 U/L (ref 0–45)
BASOPHILS # BLD AUTO: ABNORMAL 10*3/UL
BASOPHILS # BLD MANUAL: 0 10E3/UL (ref 0–0.2)
BASOPHILS NFR BLD AUTO: ABNORMAL %
BASOPHILS NFR BLD MANUAL: 0 %
BILIRUB SERPL-MCNC: 0.3 MG/DL
BUN SERPL-MCNC: 9.5 MG/DL (ref 8–23)
CALCIUM SERPL-MCNC: 9.1 MG/DL (ref 8.8–10.2)
CHLORIDE SERPL-SCNC: 105 MMOL/L (ref 98–107)
CK SERPL-CCNC: 33 U/L (ref 26–192)
CREAT SERPL-MCNC: 0.71 MG/DL (ref 0.51–0.95)
DEPRECATED HCO3 PLAS-SCNC: 24 MMOL/L (ref 22–29)
EGFRCR SERPLBLD CKD-EPI 2021: >90 ML/MIN/1.73M2
EOSINOPHIL # BLD AUTO: ABNORMAL 10*3/UL
EOSINOPHIL # BLD MANUAL: 0.6 10E3/UL (ref 0–0.7)
EOSINOPHIL NFR BLD AUTO: ABNORMAL %
EOSINOPHIL NFR BLD MANUAL: 5 %
ERYTHROCYTE [DISTWIDTH] IN BLOOD BY AUTOMATED COUNT: 13.2 % (ref 10–15)
GLUCOSE SERPL-MCNC: 125 MG/DL (ref 70–99)
HCT VFR BLD AUTO: 33 % (ref 35–47)
HGB BLD-MCNC: 10.4 G/DL (ref 11.7–15.7)
IMM GRANULOCYTES # BLD: ABNORMAL 10*3/UL
IMM GRANULOCYTES NFR BLD: ABNORMAL %
LYMPHOCYTES # BLD AUTO: ABNORMAL 10*3/UL
LYMPHOCYTES # BLD MANUAL: 4.7 10E3/UL (ref 0.8–5.3)
LYMPHOCYTES NFR BLD AUTO: ABNORMAL %
LYMPHOCYTES NFR BLD MANUAL: 41 %
MCH RBC QN AUTO: 26.9 PG (ref 26.5–33)
MCHC RBC AUTO-ENTMCNC: 31.5 G/DL (ref 31.5–36.5)
MCV RBC AUTO: 86 FL (ref 78–100)
MONOCYTES # BLD AUTO: ABNORMAL 10*3/UL
MONOCYTES # BLD MANUAL: 1.5 10E3/UL (ref 0–1.3)
MONOCYTES NFR BLD AUTO: ABNORMAL %
MONOCYTES NFR BLD MANUAL: 13 %
NEUTROPHILS # BLD AUTO: ABNORMAL 10*3/UL
NEUTROPHILS # BLD MANUAL: 4.7 10E3/UL (ref 1.6–8.3)
NEUTROPHILS NFR BLD AUTO: ABNORMAL %
NEUTROPHILS NFR BLD MANUAL: 41 %
NRBC # BLD AUTO: 0 10E3/UL
NRBC BLD AUTO-RTO: 0 /100
PLAT MORPH BLD: ABNORMAL
PLATELET # BLD AUTO: 276 10E3/UL (ref 150–450)
POLYCHROMASIA BLD QL SMEAR: SLIGHT
POTASSIUM SERPL-SCNC: 3.7 MMOL/L (ref 3.4–5.3)
PROT SERPL-MCNC: 6.1 G/DL (ref 6.4–8.3)
RBC # BLD AUTO: 3.86 10E6/UL (ref 3.8–5.2)
RBC MORPH BLD: ABNORMAL
SMUDGE CELLS BLD QL SMEAR: PRESENT
SODIUM SERPL-SCNC: 141 MMOL/L (ref 135–145)
WBC # BLD AUTO: 11.4 10E3/UL (ref 4–11)

## 2024-03-15 PROCEDURE — 82550 ASSAY OF CK (CPK): CPT | Performed by: INTERNAL MEDICINE

## 2024-03-15 PROCEDURE — 96375 TX/PRO/DX INJ NEW DRUG ADDON: CPT

## 2024-03-15 PROCEDURE — 96374 THER/PROPH/DIAG INJ IV PUSH: CPT

## 2024-03-15 PROCEDURE — 250N000009 HC RX 250: Performed by: INTERNAL MEDICINE

## 2024-03-15 PROCEDURE — 250N000011 HC RX IP 250 OP 636: Mod: JZ | Performed by: INTERNAL MEDICINE

## 2024-03-15 PROCEDURE — 80053 COMPREHEN METABOLIC PANEL: CPT | Performed by: INTERNAL MEDICINE

## 2024-03-15 PROCEDURE — 85007 BL SMEAR W/DIFF WBC COUNT: CPT | Performed by: INTERNAL MEDICINE

## 2024-03-15 PROCEDURE — 85014 HEMATOCRIT: CPT | Performed by: INTERNAL MEDICINE

## 2024-03-15 RX ORDER — DAPTOMYCIN 50 MG/ML
8 INJECTION, POWDER, LYOPHILIZED, FOR SOLUTION INTRAVENOUS ONCE
Status: COMPLETED | OUTPATIENT
Start: 2024-03-15 | End: 2024-03-15

## 2024-03-15 RX ORDER — MEPERIDINE HYDROCHLORIDE 25 MG/ML
25 INJECTION INTRAMUSCULAR; INTRAVENOUS; SUBCUTANEOUS EVERY 30 MIN PRN
Status: CANCELLED | OUTPATIENT
Start: 2024-03-16

## 2024-03-15 RX ORDER — ALBUTEROL SULFATE 90 UG/1
1-2 AEROSOL, METERED RESPIRATORY (INHALATION)
Status: CANCELLED
Start: 2024-03-16

## 2024-03-15 RX ORDER — EPINEPHRINE 1 MG/ML
0.3 INJECTION, SOLUTION INTRAMUSCULAR; SUBCUTANEOUS EVERY 5 MIN PRN
Status: CANCELLED | OUTPATIENT
Start: 2024-03-16

## 2024-03-15 RX ORDER — HEPARIN SODIUM (PORCINE) LOCK FLUSH IV SOLN 100 UNIT/ML 100 UNIT/ML
5 SOLUTION INTRAVENOUS
Status: CANCELLED | OUTPATIENT
Start: 2024-03-22

## 2024-03-15 RX ORDER — HEPARIN SODIUM (PORCINE) LOCK FLUSH IV SOLN 100 UNIT/ML 100 UNIT/ML
5 SOLUTION INTRAVENOUS
Status: CANCELLED | OUTPATIENT
Start: 2024-03-16

## 2024-03-15 RX ORDER — METHYLPREDNISOLONE SODIUM SUCCINATE 125 MG/2ML
125 INJECTION, POWDER, LYOPHILIZED, FOR SOLUTION INTRAMUSCULAR; INTRAVENOUS
Status: CANCELLED
Start: 2024-03-16

## 2024-03-15 RX ORDER — HEPARIN SODIUM,PORCINE 10 UNIT/ML
5-20 VIAL (ML) INTRAVENOUS DAILY PRN
Status: CANCELLED | OUTPATIENT
Start: 2024-03-22

## 2024-03-15 RX ORDER — DAPTOMYCIN 50 MG/ML
8 INJECTION, POWDER, LYOPHILIZED, FOR SOLUTION INTRAVENOUS ONCE
Status: CANCELLED | OUTPATIENT
Start: 2024-03-22 | End: 2024-03-22

## 2024-03-15 RX ORDER — ALBUTEROL SULFATE 0.83 MG/ML
2.5 SOLUTION RESPIRATORY (INHALATION)
Status: CANCELLED | OUTPATIENT
Start: 2024-03-16

## 2024-03-15 RX ORDER — DIPHENHYDRAMINE HYDROCHLORIDE 50 MG/ML
50 INJECTION INTRAMUSCULAR; INTRAVENOUS
Status: CANCELLED
Start: 2024-03-16

## 2024-03-15 RX ORDER — HEPARIN SODIUM,PORCINE 10 UNIT/ML
5-20 VIAL (ML) INTRAVENOUS DAILY PRN
Status: CANCELLED | OUTPATIENT
Start: 2024-03-16

## 2024-03-15 RX ADMIN — DAPTOMYCIN 800 MG: 500 INJECTION, POWDER, LYOPHILIZED, FOR SOLUTION INTRAVENOUS at 15:05

## 2024-03-15 RX ADMIN — ERTAPENEM SODIUM 1 G: 1 INJECTION, POWDER, LYOPHILIZED, FOR SOLUTION INTRAMUSCULAR; INTRAVENOUS at 14:58

## 2024-03-15 ASSESSMENT — PAIN SCALES - GENERAL: PAINLEVEL: MODERATE PAIN (4)

## 2024-03-15 NOTE — PROGRESS NOTES
Infusion Nursing Note:  Tessa ALONSO Edwards presents today for Daptomycin/Invanz.    Patient seen by provider today: No   present during visit today: Not Applicable.    Note: Patient released from the hospital yesterday.  Feels weak with back pain at site of wound vac.  Rating pain at 4/10.  Using Tylenol for help with pain control. PICC labs drawn today.       Intravenous Access:  PICC.    Treatment Conditions:  Not Applicable.      Post Infusion Assessment:  Patient tolerated infusion without incident.  Blood return noted pre and post infusion.  Site patent and intact, free from redness, edema or discomfort.  No evidence of extravasations.       Discharge Plan:   Patient declined prescription refills.  Discharge instructions reviewed with: Patient.  Patient and/or family verbalized understanding of discharge instructions and all questions answered.  AVS to patient via [facility].  Patient will return 3/16/24 for next appointment.   Patient discharged in stable condition accompanied by: self.  Departure Mode: Ambulatory with walker.      Paige Lima RN

## 2024-03-15 NOTE — TELEPHONE ENCOUNTER
----- Message from Meagan Wallace RN sent at 3/14/2024 11:44 AM CDT -----    ----- Message -----  From: Gaby Johnson MD  Sent: 3/14/2024  10:47 AM CDT  To: [facility] Infectious Disease Support Pool    Please schedule follow up with either me or Dr Tucker in ~4 weeks, thanks!

## 2024-03-15 NOTE — TELEPHONE ENCOUNTER
----- Message from Lora Johnson PA-C sent at 3/12/2024  4:11 PM CDT -----  Hi,  This patient will need RN wound check on 3/16/24 w/ Dr. Hudson to see wound too while she's in clinic.  Thanks all  Lora

## 2024-03-15 NOTE — TELEPHONE ENCOUNTER
03.15- [facility] x1 to schedule hospital follow up with Dr. Johnson or Dr Tucker in about 4 weeks.

## 2024-03-15 NOTE — TELEPHONE ENCOUNTER
Date: March 15, 2024  (Provide the date when patient was called)  Provider: OTHER     Provider/Other: nurse visit  (with whom  should patient jr with)  Reason for out-going call: Other  (Reason patient was contacted)    Detailed message: ldvm for patient to call and jr her wound check with RN on a Dr. Hudson clinic day ( Tuesday or Thursday)

## 2024-03-16 ENCOUNTER — INFUSION THERAPY VISIT (OUTPATIENT)
Dept: INFUSION THERAPY | Facility: CLINIC | Age: 69
End: 2024-03-16
Attending: INTERNAL MEDICINE
Payer: COMMERCIAL

## 2024-03-16 VITALS
RESPIRATION RATE: 18 BRPM | TEMPERATURE: 97.6 F | OXYGEN SATURATION: 99 % | SYSTOLIC BLOOD PRESSURE: 173 MMHG | HEART RATE: 80 BPM | DIASTOLIC BLOOD PRESSURE: 79 MMHG

## 2024-03-16 DIAGNOSIS — T81.40XA POSTOPERATIVE INFECTION, UNSPECIFIED TYPE, INITIAL ENCOUNTER: Primary | ICD-10-CM

## 2024-03-16 DIAGNOSIS — Z98.1 S/P LUMBAR FUSION: ICD-10-CM

## 2024-03-16 PROCEDURE — 96375 TX/PRO/DX INJ NEW DRUG ADDON: CPT

## 2024-03-16 PROCEDURE — 250N000011 HC RX IP 250 OP 636: Performed by: INTERNAL MEDICINE

## 2024-03-16 PROCEDURE — 250N000009 HC RX 250: Performed by: INTERNAL MEDICINE

## 2024-03-16 PROCEDURE — 96374 THER/PROPH/DIAG INJ IV PUSH: CPT

## 2024-03-16 RX ORDER — MEPERIDINE HYDROCHLORIDE 25 MG/ML
25 INJECTION INTRAMUSCULAR; INTRAVENOUS; SUBCUTANEOUS EVERY 30 MIN PRN
Status: CANCELLED | OUTPATIENT
Start: 2024-03-17

## 2024-03-16 RX ORDER — ALBUTEROL SULFATE 90 UG/1
1-2 AEROSOL, METERED RESPIRATORY (INHALATION)
Status: CANCELLED
Start: 2024-03-17

## 2024-03-16 RX ORDER — HEPARIN SODIUM,PORCINE 10 UNIT/ML
5-20 VIAL (ML) INTRAVENOUS DAILY PRN
Status: CANCELLED | OUTPATIENT
Start: 2024-03-17

## 2024-03-16 RX ORDER — EPINEPHRINE 1 MG/ML
0.3 INJECTION, SOLUTION INTRAMUSCULAR; SUBCUTANEOUS EVERY 5 MIN PRN
Status: CANCELLED | OUTPATIENT
Start: 2024-03-17

## 2024-03-16 RX ORDER — DIPHENHYDRAMINE HYDROCHLORIDE 50 MG/ML
50 INJECTION INTRAMUSCULAR; INTRAVENOUS
Status: CANCELLED
Start: 2024-03-17

## 2024-03-16 RX ORDER — ALBUTEROL SULFATE 0.83 MG/ML
2.5 SOLUTION RESPIRATORY (INHALATION)
Status: CANCELLED | OUTPATIENT
Start: 2024-03-17

## 2024-03-16 RX ORDER — HEPARIN SODIUM (PORCINE) LOCK FLUSH IV SOLN 100 UNIT/ML 100 UNIT/ML
5 SOLUTION INTRAVENOUS
Status: CANCELLED | OUTPATIENT
Start: 2024-03-22

## 2024-03-16 RX ORDER — DAPTOMYCIN 50 MG/ML
8 INJECTION, POWDER, LYOPHILIZED, FOR SOLUTION INTRAVENOUS ONCE
Status: CANCELLED | OUTPATIENT
Start: 2024-03-18 | End: 2024-03-22

## 2024-03-16 RX ORDER — HEPARIN SODIUM (PORCINE) LOCK FLUSH IV SOLN 100 UNIT/ML 100 UNIT/ML
5 SOLUTION INTRAVENOUS
Status: CANCELLED | OUTPATIENT
Start: 2024-03-17

## 2024-03-16 RX ORDER — DAPTOMYCIN 50 MG/ML
8 INJECTION, POWDER, LYOPHILIZED, FOR SOLUTION INTRAVENOUS ONCE
Status: COMPLETED | OUTPATIENT
Start: 2024-03-16 | End: 2024-03-16

## 2024-03-16 RX ORDER — HEPARIN SODIUM,PORCINE 10 UNIT/ML
5-20 VIAL (ML) INTRAVENOUS DAILY PRN
Status: CANCELLED | OUTPATIENT
Start: 2024-03-22

## 2024-03-16 RX ORDER — METHYLPREDNISOLONE SODIUM SUCCINATE 125 MG/2ML
125 INJECTION, POWDER, LYOPHILIZED, FOR SOLUTION INTRAMUSCULAR; INTRAVENOUS
Status: CANCELLED
Start: 2024-03-17

## 2024-03-16 RX ADMIN — ERTAPENEM SODIUM 1 G: 1 INJECTION, POWDER, LYOPHILIZED, FOR SOLUTION INTRAMUSCULAR; INTRAVENOUS at 13:11

## 2024-03-16 RX ADMIN — DAPTOMYCIN 800 MG: 500 INJECTION, POWDER, LYOPHILIZED, FOR SOLUTION INTRAVENOUS at 13:11

## 2024-03-16 NOTE — PROGRESS NOTES
Infusion Nursing Note:  Tessa Edwards presents today for Invanz/Dapto.    Patient seen by provider today: No   present during visit today: Not Applicable.    Note: Wound Vac dressing not adhered to skin, patient does not have home care or home infusion to help with dressing changes, paged on-call neurosurgery team to advise.     Discussed with PA over secure chat:     Corrine: Patient was discharged with a wound vac and no instructions for home care or who to contact if she has issues. Her dressing has completely fallen off, so the sponge for the wound vac remains in place, but there is no suction because the dressing is no longer adhered to her skin.  We do not have any wound vac supplies in our clinic, so we can't replace that dressing. We could take it off and do a wet to dry dressing, but I'm not sure if that is what your clinic would want  She refuses to go to the ER, but I think theres a huge infection risk with the currrent state of the dressing  GT  Elier Cross PA-C  It can come off. Tell her to keep it clean, dry and covered. She should come to our clinic next week.  Corrine: Ok, so I should just put a gauze pad and ABD over it, with no wet to dry?  GT  Elier Cross PA-C  sure    Dressing was removed, Patient has incision with sutures underneath. Incision was cleaned with wound cleanser, and ABD was placed over incision and secured with tegaderm. Patient instructed to keep incision clean, dry, and covered.    Intravenous Access:  PICC.    Treatment Conditions:  Not Applicable.      Post Infusion Assessment:  Patient tolerated infusion without incident.  Blood return noted pre and post infusion.  Site patent and intact, free from redness, edema or discomfort.  No evidence of extravasations.       Discharge Plan:   Patient declined prescription refills.  Discharge instructions reviewed with: Patient.  Patient and/or family verbalized understanding of discharge  instructions and all questions answered.  AVS to patient via Paktor.  Patient will return 3/17 for next appointment.   Patient discharged in stable condition accompanied by: self.  Departure Mode: Ambulatory.      ILDA Cassidy RN

## 2024-03-18 ENCOUNTER — INFUSION THERAPY VISIT (OUTPATIENT)
Dept: INFUSION THERAPY | Facility: CLINIC | Age: 69
End: 2024-03-18
Attending: INTERNAL MEDICINE
Payer: COMMERCIAL

## 2024-03-18 ENCOUNTER — PATIENT OUTREACH (OUTPATIENT)
Dept: CARE COORDINATION | Facility: CLINIC | Age: 69
End: 2024-03-18

## 2024-03-18 VITALS
HEART RATE: 80 BPM | SYSTOLIC BLOOD PRESSURE: 120 MMHG | TEMPERATURE: 98.5 F | DIASTOLIC BLOOD PRESSURE: 70 MMHG | OXYGEN SATURATION: 96 % | RESPIRATION RATE: 18 BRPM

## 2024-03-18 DIAGNOSIS — Z98.1 S/P LUMBAR FUSION: ICD-10-CM

## 2024-03-18 DIAGNOSIS — T81.40XA POSTOPERATIVE INFECTION, UNSPECIFIED TYPE, INITIAL ENCOUNTER: Primary | ICD-10-CM

## 2024-03-18 PROCEDURE — 250N000011 HC RX IP 250 OP 636: Performed by: INTERNAL MEDICINE

## 2024-03-18 PROCEDURE — 96374 THER/PROPH/DIAG INJ IV PUSH: CPT

## 2024-03-18 PROCEDURE — 96375 TX/PRO/DX INJ NEW DRUG ADDON: CPT

## 2024-03-18 PROCEDURE — 250N000009 HC RX 250: Performed by: INTERNAL MEDICINE

## 2024-03-18 RX ORDER — HEPARIN SODIUM (PORCINE) LOCK FLUSH IV SOLN 100 UNIT/ML 100 UNIT/ML
5 SOLUTION INTRAVENOUS
Status: CANCELLED | OUTPATIENT
Start: 2024-03-22

## 2024-03-18 RX ORDER — DAPTOMYCIN 50 MG/ML
8 INJECTION, POWDER, LYOPHILIZED, FOR SOLUTION INTRAVENOUS ONCE
Status: COMPLETED | OUTPATIENT
Start: 2024-03-18 | End: 2024-03-18

## 2024-03-18 RX ORDER — METHYLPREDNISOLONE SODIUM SUCCINATE 125 MG/2ML
125 INJECTION, POWDER, LYOPHILIZED, FOR SOLUTION INTRAMUSCULAR; INTRAVENOUS
Status: CANCELLED
Start: 2024-03-19

## 2024-03-18 RX ORDER — HEPARIN SODIUM,PORCINE 10 UNIT/ML
5-20 VIAL (ML) INTRAVENOUS DAILY PRN
Status: CANCELLED | OUTPATIENT
Start: 2024-03-22

## 2024-03-18 RX ORDER — HEPARIN SODIUM,PORCINE 10 UNIT/ML
5-20 VIAL (ML) INTRAVENOUS DAILY PRN
Status: CANCELLED | OUTPATIENT
Start: 2024-03-19

## 2024-03-18 RX ORDER — EPINEPHRINE 1 MG/ML
0.3 INJECTION, SOLUTION INTRAMUSCULAR; SUBCUTANEOUS EVERY 5 MIN PRN
Status: CANCELLED | OUTPATIENT
Start: 2024-03-19

## 2024-03-18 RX ORDER — HEPARIN SODIUM (PORCINE) LOCK FLUSH IV SOLN 100 UNIT/ML 100 UNIT/ML
5 SOLUTION INTRAVENOUS
Status: CANCELLED | OUTPATIENT
Start: 2024-03-19

## 2024-03-18 RX ORDER — DIPHENHYDRAMINE HYDROCHLORIDE 50 MG/ML
50 INJECTION INTRAMUSCULAR; INTRAVENOUS
Status: CANCELLED
Start: 2024-03-19

## 2024-03-18 RX ORDER — DAPTOMYCIN 50 MG/ML
8 INJECTION, POWDER, LYOPHILIZED, FOR SOLUTION INTRAVENOUS ONCE
Status: CANCELLED | OUTPATIENT
Start: 2024-03-19 | End: 2024-03-22

## 2024-03-18 RX ORDER — ALBUTEROL SULFATE 0.83 MG/ML
2.5 SOLUTION RESPIRATORY (INHALATION)
Status: CANCELLED | OUTPATIENT
Start: 2024-03-19

## 2024-03-18 RX ORDER — MEPERIDINE HYDROCHLORIDE 25 MG/ML
25 INJECTION INTRAMUSCULAR; INTRAVENOUS; SUBCUTANEOUS EVERY 30 MIN PRN
Status: CANCELLED | OUTPATIENT
Start: 2024-03-19

## 2024-03-18 RX ORDER — ALBUTEROL SULFATE 90 UG/1
1-2 AEROSOL, METERED RESPIRATORY (INHALATION)
Status: CANCELLED
Start: 2024-03-19

## 2024-03-18 RX ADMIN — DAPTOMYCIN 800 MG: 500 INJECTION, POWDER, LYOPHILIZED, FOR SOLUTION INTRAVENOUS at 15:21

## 2024-03-18 RX ADMIN — ERTAPENEM SODIUM 1 G: 1 INJECTION, POWDER, LYOPHILIZED, FOR SOLUTION INTRAMUSCULAR; INTRAVENOUS at 15:21

## 2024-03-18 NOTE — PROGRESS NOTES
Clinic Care Coordination Contact  [facility]/Voicemail    Clinical Data: Care Coordinator Outreach    Outreach Documentation Number of Outreach Attempt   3/18/2024   9:58 AM 1     Left message on patient's voicemail with call back information and requested return call.    Plan: Care Coordinator will try to reach patient again in 1-2 business days.    Gaby Russo, RN Care Coordinator  [facility] Rad[facility] [place]  Email: [email or web address]  Phone: [phone]

## 2024-03-18 NOTE — LETTER
March 20, 2024      TESSA ANDREWS  [address] [address] [facility] [address]      Dear Tessa:    Juan David, my name is Gaby Russo. As your [facility] , I want to work with you to help improve your health and address any health care needs you have.     Unfortunately, I am unable to reach you by phone.      Please call me at [phone]. If you are hearing impaired, please call the [place] Relay at 624 or [phone] (elystp-qy-smygba relay service).    I look forward to talking with you.    Sincerely,    Gaby Russo, RN Care Coordinator  [facility]  Email: [email or web address]  Phone: [phone]                         [facility] is an HMO-POS plan with a Medicare contract. Enrollment in [facility] depends on contract renewal.                [address], [place] [address]  [phone]  fax [phone]  [facility].org    [id number]                                    [id number] (11/2018)

## 2024-03-18 NOTE — LETTER
[facility] CARE COORDINATION  [id number]  [facility] [address]     March 20, 2024    Tessa Edwards  [address] [address]      Dear Tessa,    I am a clinic care coordinator who works with [facility] with the [facility]. I recently tried to call and was unable to reach you. Below is a description of clinic care coordination and how I can further assist you.       The clinic care coordination team is made up of a registered nurse, , financial resource worker and community health worker who understand the health care system. The goal of clinic care coordination is to help you manage your health and improve access to the health care system. Our team works alongside your provider to assist you in determining your health and social needs. We can help you obtain health care and community resources, providing you with necessary information and education. We can work with you through any barriers and develop a care plan that helps coordinate and strengthen the communication between you and your care team.  Our services are voluntary and are offered without charge to you personally.    Please feel free to contact me with any questions or concerns regarding care coordination and what we can offer.      We are focused on providing you with the highest-quality healthcare experience possible.    Sincerely,     Gaby Russo RN Care Coordinator  [facility]  Email: [email or web address]  Phone: [phone]

## 2024-03-18 NOTE — PROGRESS NOTES
Infusion Nursing Note:  Tessa ALONSO Edwards presents today for Dapto and Invanz.    Patient seen by provider today: No   present during visit today: Not Applicable.    Note: N/A.    Intravenous Access:  PICC.    Treatment Conditions:  Not Applicable.      Post Infusion Assessment:  Patient tolerated infusion without incident.  Blood return noted pre and post infusion.  Site patent and intact, free from redness, edema or discomfort.  No evidence of extravasations.       Discharge Plan:   AVS to patient via MYCHART.  Patient will return 3/19 for next appointment.   Patient discharged in stable condition accompanied by: self.  Departure Mode: Ambulatory cane.      Neil Esparza RN

## 2024-03-19 ENCOUNTER — TELEPHONE (OUTPATIENT)
Dept: NEUROSURGERY | Facility: CLINIC | Age: 69
End: 2024-03-19

## 2024-03-19 ENCOUNTER — TELEPHONE (OUTPATIENT)
Dept: NEUROSURGERY | Facility: CLINIC | Age: 69
End: 2024-03-19
Payer: COMMERCIAL

## 2024-03-19 ENCOUNTER — INFUSION THERAPY VISIT (OUTPATIENT)
Dept: INFUSION THERAPY | Facility: CLINIC | Age: 69
End: 2024-03-19
Attending: INTERNAL MEDICINE
Payer: COMMERCIAL

## 2024-03-19 VITALS
DIASTOLIC BLOOD PRESSURE: 76 MMHG | OXYGEN SATURATION: 96 % | HEART RATE: 78 BPM | TEMPERATURE: 98.3 F | SYSTOLIC BLOOD PRESSURE: 116 MMHG

## 2024-03-19 DIAGNOSIS — T81.40XA POSTOPERATIVE INFECTION, UNSPECIFIED TYPE, INITIAL ENCOUNTER: Primary | ICD-10-CM

## 2024-03-19 DIAGNOSIS — Z98.1 S/P LUMBAR FUSION: ICD-10-CM

## 2024-03-19 PROCEDURE — 250N000009 HC RX 250: Performed by: INTERNAL MEDICINE

## 2024-03-19 PROCEDURE — 96374 THER/PROPH/DIAG INJ IV PUSH: CPT

## 2024-03-19 PROCEDURE — 250N000011 HC RX IP 250 OP 636: Mod: JZ | Performed by: INTERNAL MEDICINE

## 2024-03-19 PROCEDURE — 96375 TX/PRO/DX INJ NEW DRUG ADDON: CPT

## 2024-03-19 RX ORDER — HEPARIN SODIUM (PORCINE) LOCK FLUSH IV SOLN 100 UNIT/ML 100 UNIT/ML
5 SOLUTION INTRAVENOUS
Status: CANCELLED | OUTPATIENT
Start: 2024-03-22

## 2024-03-19 RX ORDER — MEPERIDINE HYDROCHLORIDE 25 MG/ML
25 INJECTION INTRAMUSCULAR; INTRAVENOUS; SUBCUTANEOUS EVERY 30 MIN PRN
Status: CANCELLED | OUTPATIENT
Start: 2024-03-20

## 2024-03-19 RX ORDER — HEPARIN SODIUM,PORCINE 10 UNIT/ML
5-20 VIAL (ML) INTRAVENOUS DAILY PRN
Status: CANCELLED | OUTPATIENT
Start: 2024-03-20

## 2024-03-19 RX ORDER — DIPHENHYDRAMINE HYDROCHLORIDE 50 MG/ML
50 INJECTION INTRAMUSCULAR; INTRAVENOUS
Status: CANCELLED
Start: 2024-03-20

## 2024-03-19 RX ORDER — DAPTOMYCIN 50 MG/ML
8 INJECTION, POWDER, LYOPHILIZED, FOR SOLUTION INTRAVENOUS ONCE
Status: CANCELLED | OUTPATIENT
Start: 2024-03-20 | End: 2024-03-22

## 2024-03-19 RX ORDER — HEPARIN SODIUM (PORCINE) LOCK FLUSH IV SOLN 100 UNIT/ML 100 UNIT/ML
5 SOLUTION INTRAVENOUS
Status: CANCELLED | OUTPATIENT
Start: 2024-03-20

## 2024-03-19 RX ORDER — DAPTOMYCIN 50 MG/ML
8 INJECTION, POWDER, LYOPHILIZED, FOR SOLUTION INTRAVENOUS ONCE
Status: COMPLETED | OUTPATIENT
Start: 2024-03-19 | End: 2024-03-19

## 2024-03-19 RX ORDER — HEPARIN SODIUM,PORCINE 10 UNIT/ML
5-20 VIAL (ML) INTRAVENOUS DAILY PRN
Status: CANCELLED | OUTPATIENT
Start: 2024-03-22

## 2024-03-19 RX ORDER — ALBUTEROL SULFATE 90 UG/1
1-2 AEROSOL, METERED RESPIRATORY (INHALATION)
Status: CANCELLED
Start: 2024-03-20

## 2024-03-19 RX ORDER — ALBUTEROL SULFATE 0.83 MG/ML
2.5 SOLUTION RESPIRATORY (INHALATION)
Status: CANCELLED | OUTPATIENT
Start: 2024-03-20

## 2024-03-19 RX ORDER — EPINEPHRINE 1 MG/ML
0.3 INJECTION, SOLUTION INTRAMUSCULAR; SUBCUTANEOUS EVERY 5 MIN PRN
Status: CANCELLED | OUTPATIENT
Start: 2024-03-20

## 2024-03-19 RX ORDER — METHYLPREDNISOLONE SODIUM SUCCINATE 125 MG/2ML
125 INJECTION, POWDER, LYOPHILIZED, FOR SOLUTION INTRAMUSCULAR; INTRAVENOUS
Status: CANCELLED
Start: 2024-03-20

## 2024-03-19 RX ADMIN — ERTAPENEM SODIUM 1 G: 1 INJECTION, POWDER, LYOPHILIZED, FOR SOLUTION INTRAMUSCULAR; INTRAVENOUS at 15:12

## 2024-03-19 RX ADMIN — DAPTOMYCIN 800 MG: 500 INJECTION, POWDER, LYOPHILIZED, FOR SOLUTION INTRAVENOUS at 15:18

## 2024-03-19 NOTE — TELEPHONE ENCOUNTER
A message was left for patient to call and schedule a wound check with a nurse at [facility]. Patient needs to be seen in clinic on Thursday, March 21 when Dr. Rocha is having her clinic in the event she wants to see patient as well.    Patient was advised to call [phone] to schedule the nurse visit.

## 2024-03-19 NOTE — PROGRESS NOTES
Infusion Nursing Note:  Tessa GUPTA Grace presents today for Invanz/Daptomycin.    Patient seen by provider today: No   present during visit today: Not Applicable.    Note: N/A.      Intravenous Access:  PICC.    Treatment Conditions:  Not Applicable.      Post Infusion Assessment:  Patient tolerated infusion without incident.  Blood return noted pre and post infusion.  Site patent and intact, free from redness, edema or discomfort.  No evidence of extravasations.       Discharge Plan:   AVS to patient via MYCHART.  Patient will return 3/20/24 for next dose of antibiotics for next appointment.   Patient discharged in stable condition accompanied by: self.  Departure Mode: Ambulatory with cane.      Carly Scott RN

## 2024-03-19 NOTE — TELEPHONE ENCOUNTER
----- Message from Nakul Umanzor RN sent at 3/18/2024  8:55 AM CDT -----  This patient discharged 3/14/24, please reach out to schedule wound check this week. Thanks.  ----- Message -----  From: Lora Johnson PA-C  Sent: 3/12/2024   4:12 PM CDT  To: Neema Cotto; Nakul Umanzor RN; #    Hi,  This patient will need RN wound check on 3/16/24 w/ Dr. Hudson to see wound too while she's in clinic.  Thanks all  Lora

## 2024-03-19 NOTE — TELEPHONE ENCOUNTER
Date: March 19, 2024  (Provide the date when patient was called)  Provider: OTHER     Provider/Other: NURSE   (with whom  should patient jr with)  Reason for out-going call: Hospital F/U   (Reason patient was contacted)    Detailed message: Left message for patient to call back and confirm appointment held for patient on 3/21/24 @ 12:45 arrival time / 1:00 nurse time at the [facility] location. This will be a wound check appointment with the nurse when Dr. Hudson is in clinic per message below.

## 2024-03-20 ENCOUNTER — INFUSION THERAPY VISIT (OUTPATIENT)
Dept: INFUSION THERAPY | Facility: CLINIC | Age: 69
End: 2024-03-20
Attending: INTERNAL MEDICINE
Payer: COMMERCIAL

## 2024-03-20 VITALS
SYSTOLIC BLOOD PRESSURE: 142 MMHG | RESPIRATION RATE: 16 BRPM | DIASTOLIC BLOOD PRESSURE: 76 MMHG | OXYGEN SATURATION: 97 % | TEMPERATURE: 98.6 F | HEART RATE: 83 BPM

## 2024-03-20 DIAGNOSIS — T81.40XA POSTOPERATIVE INFECTION, UNSPECIFIED TYPE, INITIAL ENCOUNTER: Primary | ICD-10-CM

## 2024-03-20 DIAGNOSIS — Z98.1 S/P LUMBAR FUSION: ICD-10-CM

## 2024-03-20 PROCEDURE — 250N000009 HC RX 250: Performed by: INTERNAL MEDICINE

## 2024-03-20 PROCEDURE — 96375 TX/PRO/DX INJ NEW DRUG ADDON: CPT

## 2024-03-20 PROCEDURE — 96374 THER/PROPH/DIAG INJ IV PUSH: CPT

## 2024-03-20 PROCEDURE — 250N000011 HC RX IP 250 OP 636: Mod: JZ | Performed by: INTERNAL MEDICINE

## 2024-03-20 RX ORDER — METHYLPREDNISOLONE SODIUM SUCCINATE 125 MG/2ML
125 INJECTION, POWDER, LYOPHILIZED, FOR SOLUTION INTRAMUSCULAR; INTRAVENOUS
Status: CANCELLED
Start: 2024-03-21

## 2024-03-20 RX ORDER — HEPARIN SODIUM (PORCINE) LOCK FLUSH IV SOLN 100 UNIT/ML 100 UNIT/ML
5 SOLUTION INTRAVENOUS
Status: CANCELLED | OUTPATIENT
Start: 2024-03-22

## 2024-03-20 RX ORDER — HEPARIN SODIUM,PORCINE 10 UNIT/ML
5-20 VIAL (ML) INTRAVENOUS DAILY PRN
Status: CANCELLED | OUTPATIENT
Start: 2024-03-22

## 2024-03-20 RX ORDER — ALBUTEROL SULFATE 90 UG/1
1-2 AEROSOL, METERED RESPIRATORY (INHALATION)
Status: CANCELLED
Start: 2024-03-21

## 2024-03-20 RX ORDER — HEPARIN SODIUM (PORCINE) LOCK FLUSH IV SOLN 100 UNIT/ML 100 UNIT/ML
5 SOLUTION INTRAVENOUS
Status: CANCELLED | OUTPATIENT
Start: 2024-03-21

## 2024-03-20 RX ORDER — DIPHENHYDRAMINE HYDROCHLORIDE 50 MG/ML
50 INJECTION INTRAMUSCULAR; INTRAVENOUS
Status: CANCELLED
Start: 2024-03-21

## 2024-03-20 RX ORDER — DAPTOMYCIN 50 MG/ML
8 INJECTION, POWDER, LYOPHILIZED, FOR SOLUTION INTRAVENOUS ONCE
Status: COMPLETED | OUTPATIENT
Start: 2024-03-20 | End: 2024-03-20

## 2024-03-20 RX ORDER — DAPTOMYCIN 50 MG/ML
8 INJECTION, POWDER, LYOPHILIZED, FOR SOLUTION INTRAVENOUS ONCE
Status: CANCELLED | OUTPATIENT
Start: 2024-03-21 | End: 2024-03-22

## 2024-03-20 RX ORDER — ALBUTEROL SULFATE 0.83 MG/ML
2.5 SOLUTION RESPIRATORY (INHALATION)
Status: CANCELLED | OUTPATIENT
Start: 2024-03-21

## 2024-03-20 RX ORDER — EPINEPHRINE 1 MG/ML
0.3 INJECTION, SOLUTION INTRAMUSCULAR; SUBCUTANEOUS EVERY 5 MIN PRN
Status: CANCELLED | OUTPATIENT
Start: 2024-03-21

## 2024-03-20 RX ORDER — HEPARIN SODIUM,PORCINE 10 UNIT/ML
5-20 VIAL (ML) INTRAVENOUS DAILY PRN
Status: CANCELLED | OUTPATIENT
Start: 2024-03-21

## 2024-03-20 RX ORDER — MEPERIDINE HYDROCHLORIDE 25 MG/ML
25 INJECTION INTRAMUSCULAR; INTRAVENOUS; SUBCUTANEOUS EVERY 30 MIN PRN
Status: CANCELLED | OUTPATIENT
Start: 2024-03-21

## 2024-03-20 RX ADMIN — DAPTOMYCIN 800 MG: 500 INJECTION, POWDER, LYOPHILIZED, FOR SOLUTION INTRAVENOUS at 14:17

## 2024-03-20 RX ADMIN — ERTAPENEM SODIUM 1 G: 1 INJECTION, POWDER, LYOPHILIZED, FOR SOLUTION INTRAMUSCULAR; INTRAVENOUS at 14:25

## 2024-03-20 NOTE — PROGRESS NOTES
Infusion Nursing Note:  Tessa Edwards presents today for Dapto and Invanz.    Patient seen by provider today: No   present during visit today: Not Applicable.    Note: Dressing/cap change due today. Per patient, dressing to be changed at neurosurgeon's office tomorrow.    Intravenous Access:  PICC.    Treatment Conditions:  Not Applicable.      Post Infusion Assessment:  Patient tolerated infusion without incident.  Blood return noted pre and post infusion.  Site patent and intact, free from redness, edema or discomfort.  No evidence of extravasations.       Discharge Plan:   Discharge instructions reviewed with: Family.  Patient and/or family verbalized understanding of discharge instructions and all questions answered.  AVS to patient via Rotten TomatoesT.  Patient will return 3/21/24 for next appointment.   Patient discharged in stable condition accompanied by: son.  Departure Mode: Ambulatory with cane.      Elvia Boyle RN

## 2024-03-20 NOTE — PROGRESS NOTES
Clinic Care Coordination Contact  [facility]/Voicemail    Clinical Data: Care Coordinator Outreach    Outreach Documentation Number of Outreach Attempt   3/18/2024   9:58 AM 1   3/20/2024   1:02 PM 2       Left message on patient's voicemail with call back information and requested return call.    Plan: Care Coordinator will send care coordination introduction letter with care coordinator contact information and explanation of care coordination services via Mysafeplacehart. Care Coordinator will do no further outreaches at this time.    Gaby Russo, RN Care Coordinator  [facility]  Email: [email or web address]  Phone: [phone]

## 2024-03-21 ENCOUNTER — INFUSION THERAPY VISIT (OUTPATIENT)
Dept: INFUSION THERAPY | Facility: CLINIC | Age: 69
End: 2024-03-21
Attending: INTERNAL MEDICINE
Payer: COMMERCIAL

## 2024-03-21 ENCOUNTER — ALLIED HEALTH/NURSE VISIT (OUTPATIENT)
Dept: NEUROSURGERY | Facility: CLINIC | Age: 69
End: 2024-03-21
Payer: COMMERCIAL

## 2024-03-21 VITALS
DIASTOLIC BLOOD PRESSURE: 80 MMHG | OXYGEN SATURATION: 95 % | HEART RATE: 82 BPM | RESPIRATION RATE: 16 BRPM | SYSTOLIC BLOOD PRESSURE: 145 MMHG | TEMPERATURE: 97.5 F

## 2024-03-21 VITALS — HEART RATE: 72 BPM | DIASTOLIC BLOOD PRESSURE: 80 MMHG | RESPIRATION RATE: 18 BRPM | SYSTOLIC BLOOD PRESSURE: 141 MMHG

## 2024-03-21 DIAGNOSIS — T81.40XA POSTOPERATIVE INFECTION, UNSPECIFIED TYPE, INITIAL ENCOUNTER: Primary | ICD-10-CM

## 2024-03-21 DIAGNOSIS — L30.9 DERMATITIS: Primary | ICD-10-CM

## 2024-03-21 DIAGNOSIS — Z98.1 S/P LUMBAR FUSION: ICD-10-CM

## 2024-03-21 LAB
ALBUMIN SERPL BCG-MCNC: 3.7 G/DL (ref 3.5–5.2)
ALP SERPL-CCNC: 101 U/L (ref 40–150)
ALT SERPL W P-5'-P-CCNC: 18 U/L (ref 0–50)
ANION GAP SERPL CALCULATED.3IONS-SCNC: 11 MMOL/L (ref 7–15)
AST SERPL W P-5'-P-CCNC: 16 U/L (ref 0–45)
BASOPHILS # BLD AUTO: ABNORMAL 10*3/UL
BASOPHILS # BLD MANUAL: 0 10E3/UL (ref 0–0.2)
BASOPHILS NFR BLD AUTO: ABNORMAL %
BASOPHILS NFR BLD MANUAL: 0 %
BILIRUB SERPL-MCNC: 0.4 MG/DL
BUN SERPL-MCNC: 13.6 MG/DL (ref 8–23)
CALCIUM SERPL-MCNC: 9.3 MG/DL (ref 8.8–10.2)
CHLORIDE SERPL-SCNC: 101 MMOL/L (ref 98–107)
CK SERPL-CCNC: 34 U/L (ref 26–192)
CREAT SERPL-MCNC: 0.7 MG/DL (ref 0.51–0.95)
DEPRECATED HCO3 PLAS-SCNC: 26 MMOL/L (ref 22–29)
EGFRCR SERPLBLD CKD-EPI 2021: >90 ML/MIN/1.73M2
EOSINOPHIL # BLD AUTO: ABNORMAL 10*3/UL
EOSINOPHIL # BLD MANUAL: 0.1 10E3/UL (ref 0–0.7)
EOSINOPHIL NFR BLD AUTO: ABNORMAL %
EOSINOPHIL NFR BLD MANUAL: 1 %
ERYTHROCYTE [DISTWIDTH] IN BLOOD BY AUTOMATED COUNT: 13.2 % (ref 10–15)
GLUCOSE SERPL-MCNC: 140 MG/DL (ref 70–99)
HCT VFR BLD AUTO: 35.7 % (ref 35–47)
HGB BLD-MCNC: 11 G/DL (ref 11.7–15.7)
IMM GRANULOCYTES # BLD: ABNORMAL 10*3/UL
IMM GRANULOCYTES NFR BLD: ABNORMAL %
LYMPHOCYTES # BLD AUTO: ABNORMAL 10*3/UL
LYMPHOCYTES # BLD MANUAL: 4.5 10E3/UL (ref 0.8–5.3)
LYMPHOCYTES NFR BLD AUTO: ABNORMAL %
LYMPHOCYTES NFR BLD MANUAL: 37 %
MCH RBC QN AUTO: 26.2 PG (ref 26.5–33)
MCHC RBC AUTO-ENTMCNC: 30.8 G/DL (ref 31.5–36.5)
MCV RBC AUTO: 85 FL (ref 78–100)
MONOCYTES # BLD AUTO: ABNORMAL 10*3/UL
MONOCYTES # BLD MANUAL: 0.9 10E3/UL (ref 0–1.3)
MONOCYTES NFR BLD AUTO: ABNORMAL %
MONOCYTES NFR BLD MANUAL: 7 %
NEUTROPHILS # BLD AUTO: ABNORMAL 10*3/UL
NEUTROPHILS # BLD MANUAL: 6.7 10E3/UL (ref 1.6–8.3)
NEUTROPHILS NFR BLD AUTO: ABNORMAL %
NEUTROPHILS NFR BLD MANUAL: 55 %
NRBC # BLD AUTO: 0 10E3/UL
NRBC BLD AUTO-RTO: 0 /100
PLAT MORPH BLD: NORMAL
PLATELET # BLD AUTO: 177 10E3/UL (ref 150–450)
POTASSIUM SERPL-SCNC: 3.6 MMOL/L (ref 3.4–5.3)
PROT SERPL-MCNC: 6.4 G/DL (ref 6.4–8.3)
RBC # BLD AUTO: 4.2 10E6/UL (ref 3.8–5.2)
RBC MORPH BLD: NORMAL
SODIUM SERPL-SCNC: 138 MMOL/L (ref 135–145)
WBC # BLD AUTO: 12.2 10E3/UL (ref 4–11)

## 2024-03-21 PROCEDURE — 99207 PR NO CHARGE NURSE ONLY: CPT

## 2024-03-21 PROCEDURE — 96375 TX/PRO/DX INJ NEW DRUG ADDON: CPT

## 2024-03-21 PROCEDURE — 85007 BL SMEAR W/DIFF WBC COUNT: CPT | Performed by: INTERNAL MEDICINE

## 2024-03-21 PROCEDURE — 250N000011 HC RX IP 250 OP 636: Mod: JW | Performed by: INTERNAL MEDICINE

## 2024-03-21 PROCEDURE — 96374 THER/PROPH/DIAG INJ IV PUSH: CPT

## 2024-03-21 PROCEDURE — 84520 ASSAY OF UREA NITROGEN: CPT | Performed by: INTERNAL MEDICINE

## 2024-03-21 PROCEDURE — 250N000009 HC RX 250: Performed by: INTERNAL MEDICINE

## 2024-03-21 PROCEDURE — 85027 COMPLETE CBC AUTOMATED: CPT | Performed by: INTERNAL MEDICINE

## 2024-03-21 PROCEDURE — 82550 ASSAY OF CK (CPK): CPT | Performed by: INTERNAL MEDICINE

## 2024-03-21 RX ORDER — ALBUTEROL SULFATE 90 UG/1
1-2 AEROSOL, METERED RESPIRATORY (INHALATION)
Status: CANCELLED
Start: 2024-03-22

## 2024-03-21 RX ORDER — HEPARIN SODIUM,PORCINE 10 UNIT/ML
5-20 VIAL (ML) INTRAVENOUS DAILY PRN
Status: CANCELLED | OUTPATIENT
Start: 2024-03-22

## 2024-03-21 RX ORDER — ALBUTEROL SULFATE 0.83 MG/ML
2.5 SOLUTION RESPIRATORY (INHALATION)
Status: CANCELLED | OUTPATIENT
Start: 2024-03-22

## 2024-03-21 RX ORDER — HEPARIN SODIUM (PORCINE) LOCK FLUSH IV SOLN 100 UNIT/ML 100 UNIT/ML
5 SOLUTION INTRAVENOUS
Status: CANCELLED | OUTPATIENT
Start: 2024-03-22

## 2024-03-21 RX ORDER — MEPERIDINE HYDROCHLORIDE 25 MG/ML
25 INJECTION INTRAMUSCULAR; INTRAVENOUS; SUBCUTANEOUS EVERY 30 MIN PRN
Status: CANCELLED | OUTPATIENT
Start: 2024-03-22

## 2024-03-21 RX ORDER — EPINEPHRINE 1 MG/ML
0.3 INJECTION, SOLUTION INTRAMUSCULAR; SUBCUTANEOUS EVERY 5 MIN PRN
Status: CANCELLED | OUTPATIENT
Start: 2024-03-22

## 2024-03-21 RX ORDER — DAPTOMYCIN 50 MG/ML
8 INJECTION, POWDER, LYOPHILIZED, FOR SOLUTION INTRAVENOUS ONCE
Status: COMPLETED | OUTPATIENT
Start: 2024-03-21 | End: 2024-03-21

## 2024-03-21 RX ORDER — METHYLPREDNISOLONE SODIUM SUCCINATE 125 MG/2ML
125 INJECTION, POWDER, LYOPHILIZED, FOR SOLUTION INTRAMUSCULAR; INTRAVENOUS
Status: CANCELLED
Start: 2024-03-22

## 2024-03-21 RX ORDER — DIPHENHYDRAMINE HYDROCHLORIDE 50 MG/ML
50 INJECTION INTRAMUSCULAR; INTRAVENOUS
Status: CANCELLED
Start: 2024-03-22

## 2024-03-21 RX ORDER — DAPTOMYCIN 50 MG/ML
8 INJECTION, POWDER, LYOPHILIZED, FOR SOLUTION INTRAVENOUS ONCE
Status: CANCELLED | OUTPATIENT
Start: 2024-03-22 | End: 2024-03-22

## 2024-03-21 RX ADMIN — DAPTOMYCIN 800 MG: 500 INJECTION, POWDER, LYOPHILIZED, FOR SOLUTION INTRAVENOUS at 10:53

## 2024-03-21 RX ADMIN — ERTAPENEM SODIUM 1 G: 1 INJECTION, POWDER, LYOPHILIZED, FOR SOLUTION INTRAMUSCULAR; INTRAVENOUS at 10:56

## 2024-03-21 NOTE — PROGRESS NOTES
Tessa Edwards is status post Bilateral lumbar 4-lumbar 5 laminectomies, medial facetectomies, foraminotomies; Lumbar 4-lumbar 5 right transforaminal lumbar interbody fusion and bilateral posterior instrumented fusion with posterolateral arthrodesis on 02/23/2024 with Dr. Hudson, followed by I&D with wound vacuum on 03/07/2024 with Dr. Ambrose, followed by removal of lumbar wound vac with complex wound closure on 03/11/2024 with Dr. Hudson.    Today she returns in follow up for wound check. She presents with a chief complaint of generalized weakness. She reports much improved back pain, denies numbness or tingling in her legs. Endorses some weakness in BLE, uses a cane for safety. Gait and balance are stable.   Following with ID for infusion therapy, PICC line in place.     Wound with sutures in place looks benign, healing by secondary intension. There is no drainage. Skin edges not completely approximated. A moderate skin erythema is on the top of wound and consistent with dermatitis. Patient is afebrile.     Wound was evaluated by Dr. Hudson.  Cleaned with chlor prep.  Primapore was applied.  A STAT referral to wound care clinc was placed.  Follow up in clinic on 03/28/2024.    Briseyda Acharya RN, CNRN

## 2024-03-21 NOTE — PATIENT INSTRUCTIONS
A dressing is required.    Keep the wound clean.    Wash your hands before touching the wound.  Ensure that anyone assisting you in the care of your wound washes her/his hands before touching the wound. Good handwashing can decrease the risk of serious infection.    If you are unable to see your wound, have someone check the wound daily for redness, swelling,or drainage. A small amount of drainage is normal.    You may shower.  Pat the wound dry. Do not rub.    No tub baths until the wound is well healed.  Usually 5-6 weeks.     If you develop redness, swelling, drainage, or temp 101 or greater, call our clinic.        * No lifting, pushing or pulling greater than 5-10 pound (this is about a gallon of milk) for the first 6 weeks after surgery .  *No repetitive bending, twisting, or jarring activities for 6 weeks.  *No overhead work  *No aerobic or strenuous activity  *No activities with increased risk of falls  *You may move about your home as tolerated  *You may walk up and down stairs as tolerated  *You may increase your activity slowly over the next 6 weeks    WALKING PROGRAM: As you can tolerate, walk daily-start with 5-10 minutes of continuous walking. This is in addition to the walking that you do as part of your daily activities. Increase the time that you walk by 5 minutes every couple of days. Do not exceed 30-45 minutes of continuous walking until seen in follow-up. Walking is the best exercise after surgery.  **Listen to your body, if you find that you are more painful or fatigued, you may need to proceed more slowly.    **Do not smoke or expose yourself to second hand smoke. Cigarette smoke can delay healing and cause complications.     DRIVING:  We recommend that you do not drive while taking medications for pain or muscle spasms. Always read and follow the advice on your prescription bottle. If you have questions, speak with your pharmacist.  We recommend that you do not drive while wearing a brace, as  it could limit your range of motion.    WORK: If you plan to return to work before your 6 week post-op appointment, call and discuss with one of the nurses in the neurosurgery office.

## 2024-03-21 NOTE — PROGRESS NOTES
Infusion Nursing Note:  Tessa Edwards presents today for Labs + Invanz + Daptomycin + PICC dressing change.    Patient seen by provider today: No   present during visit today: Not Applicable.    Note: Tessa reports she is feeling ok today.  She denies any new or concerning symptoms.  She states she has an appointment with her surgeon today for dressing change on  her lower back surgical site.    Intravenous Access:  PICC, dressing and cap change completed today, next due 3/28/2024.    Treatment Conditions:  Weekly labs drawn, next due 3/28/2024.    Post Infusion Assessment:  Patient tolerated infusion without incident.  Blood return noted pre and post infusion.  Site patent and intact, free from redness, edema or discomfort.  No evidence of extravasations.  Access discontinued per protocol.     Discharge Plan:   Discharge instructions reviewed with: Patient.  Patient and/or family verbalized understanding of discharge instructions and all questions answered.  AVS to patient via OpenTextT.  Patient will return 3/22 for next appointment.   Patient discharged in stable condition accompanied by: self.  Departure Mode: Ambulatory with cane.      Dl Hoffman RN

## 2024-03-22 ENCOUNTER — INFUSION THERAPY VISIT (OUTPATIENT)
Dept: INFUSION THERAPY | Facility: CLINIC | Age: 69
End: 2024-03-22
Attending: INTERNAL MEDICINE
Payer: COMMERCIAL

## 2024-03-22 ENCOUNTER — TELEPHONE (OUTPATIENT)
Dept: INFECTIOUS DISEASES | Facility: CLINIC | Age: 69
End: 2024-03-22
Payer: COMMERCIAL

## 2024-03-22 VITALS
TEMPERATURE: 97.8 F | DIASTOLIC BLOOD PRESSURE: 80 MMHG | OXYGEN SATURATION: 98 % | RESPIRATION RATE: 16 BRPM | HEART RATE: 87 BPM | SYSTOLIC BLOOD PRESSURE: 128 MMHG

## 2024-03-22 DIAGNOSIS — T81.40XA POSTOPERATIVE INFECTION, UNSPECIFIED TYPE, INITIAL ENCOUNTER: Primary | ICD-10-CM

## 2024-03-22 DIAGNOSIS — Z98.1 S/P LUMBAR FUSION: ICD-10-CM

## 2024-03-22 PROCEDURE — 96374 THER/PROPH/DIAG INJ IV PUSH: CPT

## 2024-03-22 PROCEDURE — 96375 TX/PRO/DX INJ NEW DRUG ADDON: CPT

## 2024-03-22 PROCEDURE — 250N000011 HC RX IP 250 OP 636: Mod: JW | Performed by: INTERNAL MEDICINE

## 2024-03-22 PROCEDURE — 250N000009 HC RX 250: Performed by: INTERNAL MEDICINE

## 2024-03-22 RX ORDER — HEPARIN SODIUM,PORCINE 10 UNIT/ML
5-20 VIAL (ML) INTRAVENOUS DAILY PRN
Status: CANCELLED | OUTPATIENT
Start: 2024-03-23

## 2024-03-22 RX ORDER — ALBUTEROL SULFATE 90 UG/1
1-2 AEROSOL, METERED RESPIRATORY (INHALATION)
Status: CANCELLED
Start: 2024-03-23

## 2024-03-22 RX ORDER — HEPARIN SODIUM,PORCINE 10 UNIT/ML
5-20 VIAL (ML) INTRAVENOUS DAILY PRN
Status: CANCELLED | OUTPATIENT
Start: 2024-03-29

## 2024-03-22 RX ORDER — HEPARIN SODIUM (PORCINE) LOCK FLUSH IV SOLN 100 UNIT/ML 100 UNIT/ML
5 SOLUTION INTRAVENOUS
Status: CANCELLED | OUTPATIENT
Start: 2024-03-23

## 2024-03-22 RX ORDER — ALBUTEROL SULFATE 0.83 MG/ML
2.5 SOLUTION RESPIRATORY (INHALATION)
Status: CANCELLED | OUTPATIENT
Start: 2024-03-23

## 2024-03-22 RX ORDER — DAPTOMYCIN 50 MG/ML
8 INJECTION, POWDER, LYOPHILIZED, FOR SOLUTION INTRAVENOUS ONCE
Status: COMPLETED | OUTPATIENT
Start: 2024-03-22 | End: 2024-03-22

## 2024-03-22 RX ORDER — DAPTOMYCIN 50 MG/ML
8 INJECTION, POWDER, LYOPHILIZED, FOR SOLUTION INTRAVENOUS ONCE
Status: CANCELLED | OUTPATIENT
Start: 2024-03-29 | End: 2024-03-29

## 2024-03-22 RX ORDER — DIPHENHYDRAMINE HYDROCHLORIDE 50 MG/ML
50 INJECTION INTRAMUSCULAR; INTRAVENOUS
Status: CANCELLED
Start: 2024-03-23

## 2024-03-22 RX ORDER — EPINEPHRINE 1 MG/ML
0.3 INJECTION, SOLUTION INTRAMUSCULAR; SUBCUTANEOUS EVERY 5 MIN PRN
Status: CANCELLED | OUTPATIENT
Start: 2024-03-23

## 2024-03-22 RX ORDER — METHYLPREDNISOLONE SODIUM SUCCINATE 125 MG/2ML
125 INJECTION, POWDER, LYOPHILIZED, FOR SOLUTION INTRAMUSCULAR; INTRAVENOUS
Status: CANCELLED
Start: 2024-03-23

## 2024-03-22 RX ORDER — MEPERIDINE HYDROCHLORIDE 25 MG/ML
25 INJECTION INTRAMUSCULAR; INTRAVENOUS; SUBCUTANEOUS EVERY 30 MIN PRN
Status: CANCELLED | OUTPATIENT
Start: 2024-03-23

## 2024-03-22 RX ORDER — HEPARIN SODIUM (PORCINE) LOCK FLUSH IV SOLN 100 UNIT/ML 100 UNIT/ML
5 SOLUTION INTRAVENOUS
Status: CANCELLED | OUTPATIENT
Start: 2024-03-29

## 2024-03-22 RX ADMIN — DAPTOMYCIN 800 MG: 500 INJECTION, POWDER, LYOPHILIZED, FOR SOLUTION INTRAVENOUS at 14:47

## 2024-03-22 RX ADMIN — ERTAPENEM SODIUM 1 G: 1 INJECTION, POWDER, LYOPHILIZED, FOR SOLUTION INTRAMUSCULAR; INTRAVENOUS at 14:53

## 2024-03-22 NOTE — PROGRESS NOTES
Infusion Nursing Note:  Tessa Edwards presents today for Invanz and Dapto.    Patient seen by provider today: No   present during visit today: Not Applicable.    Note: Right knee pain today and legs gave out last night, per patient. Using a cane for walking and feels steady today. She is going to follow up with her surgeon about knee pain and leg weakness.    Intravenous Access:  PICC.    Treatment Conditions:  Not Applicable.    Post Infusion Assessment:  Patient tolerated infusion without incident.  Blood return noted pre and post infusion.  Site patent and intact, free from redness, edema or discomfort.  No evidence of extravasations.  Access discontinued per protocol.     Discharge Plan:   Discharge instructions reviewed with: Patient.  Patient and/or family verbalized understanding of discharge instructions and all questions answered.  AVS to patient via Wattio.  Patient will return 3/25/24 for next appointment.   Patient discharged in stable condition accompanied by: self.  Departure Mode: Ambulatory with cane.      Elvia Boyle RN

## 2024-03-22 NOTE — TELEPHONE ENCOUNTER
[facility] Call Center    Phone Message    May a detailed message be left on voicemail: yes     Reason for Call: Pt was recently admitted 3/3/24-3/14/24 @ [facility], had consult done by Dr. Johnson. Pt was instructed to follow-up with ID 3-4 weeks after discharge, has appt scheduled 4/16/24.     Pt wanted provider to be aware that she met with Dr. Hudson yesterday, who then placed an emergency referral for her to be seen by the wound clinic as soon as possible due to concerns over increasing redness around her wound site. Routing to care team as an FYI in case appt needs to be moved (if it DOES need to be moved, please try to keep in mind that pt is scheduled to have hour-long infusions around 12:30 PM at the [facility] every day for the next 40 days).    Action Taken: Other: Infectious Disease    Travel Screening: Not Applicable

## 2024-03-23 ENCOUNTER — INFUSION THERAPY VISIT (OUTPATIENT)
Dept: INFUSION THERAPY | Facility: CLINIC | Age: 69
End: 2024-03-23
Attending: INTERNAL MEDICINE
Payer: COMMERCIAL

## 2024-03-23 VITALS
SYSTOLIC BLOOD PRESSURE: 110 MMHG | RESPIRATION RATE: 18 BRPM | OXYGEN SATURATION: 98 % | DIASTOLIC BLOOD PRESSURE: 65 MMHG | HEART RATE: 85 BPM | TEMPERATURE: 98.6 F

## 2024-03-23 DIAGNOSIS — Z98.1 S/P LUMBAR FUSION: ICD-10-CM

## 2024-03-23 DIAGNOSIS — T81.40XA POSTOPERATIVE INFECTION, UNSPECIFIED TYPE, INITIAL ENCOUNTER: Primary | ICD-10-CM

## 2024-03-23 PROCEDURE — 250N000011 HC RX IP 250 OP 636: Performed by: INTERNAL MEDICINE

## 2024-03-23 PROCEDURE — 250N000009 HC RX 250: Performed by: INTERNAL MEDICINE

## 2024-03-23 PROCEDURE — 96374 THER/PROPH/DIAG INJ IV PUSH: CPT

## 2024-03-23 PROCEDURE — 96375 TX/PRO/DX INJ NEW DRUG ADDON: CPT

## 2024-03-23 RX ORDER — DIPHENHYDRAMINE HYDROCHLORIDE 50 MG/ML
50 INJECTION INTRAMUSCULAR; INTRAVENOUS
Status: CANCELLED
Start: 2024-03-24

## 2024-03-23 RX ORDER — ALBUTEROL SULFATE 0.83 MG/ML
2.5 SOLUTION RESPIRATORY (INHALATION)
Status: CANCELLED | OUTPATIENT
Start: 2024-03-24

## 2024-03-23 RX ORDER — METHYLPREDNISOLONE SODIUM SUCCINATE 125 MG/2ML
125 INJECTION, POWDER, LYOPHILIZED, FOR SOLUTION INTRAMUSCULAR; INTRAVENOUS
Status: CANCELLED
Start: 2024-03-24

## 2024-03-23 RX ORDER — MEPERIDINE HYDROCHLORIDE 25 MG/ML
25 INJECTION INTRAMUSCULAR; INTRAVENOUS; SUBCUTANEOUS EVERY 30 MIN PRN
Status: CANCELLED | OUTPATIENT
Start: 2024-03-24

## 2024-03-23 RX ORDER — DAPTOMYCIN 50 MG/ML
8 INJECTION, POWDER, LYOPHILIZED, FOR SOLUTION INTRAVENOUS ONCE
Status: CANCELLED | OUTPATIENT
Start: 2024-03-29 | End: 2024-03-29

## 2024-03-23 RX ORDER — HEPARIN SODIUM (PORCINE) LOCK FLUSH IV SOLN 100 UNIT/ML 100 UNIT/ML
5 SOLUTION INTRAVENOUS
Status: CANCELLED | OUTPATIENT
Start: 2024-03-29

## 2024-03-23 RX ORDER — DAPTOMYCIN 50 MG/ML
8 INJECTION, POWDER, LYOPHILIZED, FOR SOLUTION INTRAVENOUS ONCE
Status: COMPLETED | OUTPATIENT
Start: 2024-03-23 | End: 2024-03-23

## 2024-03-23 RX ORDER — HEPARIN SODIUM,PORCINE 10 UNIT/ML
5-20 VIAL (ML) INTRAVENOUS DAILY PRN
Status: CANCELLED | OUTPATIENT
Start: 2024-03-24

## 2024-03-23 RX ORDER — ALBUTEROL SULFATE 90 UG/1
1-2 AEROSOL, METERED RESPIRATORY (INHALATION)
Status: CANCELLED
Start: 2024-03-24

## 2024-03-23 RX ORDER — EPINEPHRINE 1 MG/ML
0.3 INJECTION, SOLUTION INTRAMUSCULAR; SUBCUTANEOUS EVERY 5 MIN PRN
Status: CANCELLED | OUTPATIENT
Start: 2024-03-24

## 2024-03-23 RX ORDER — HEPARIN SODIUM (PORCINE) LOCK FLUSH IV SOLN 100 UNIT/ML 100 UNIT/ML
5 SOLUTION INTRAVENOUS
Status: CANCELLED | OUTPATIENT
Start: 2024-03-24

## 2024-03-23 RX ORDER — HEPARIN SODIUM,PORCINE 10 UNIT/ML
5-20 VIAL (ML) INTRAVENOUS DAILY PRN
Status: CANCELLED | OUTPATIENT
Start: 2024-03-29

## 2024-03-23 RX ADMIN — ERTAPENEM SODIUM 1 G: 1 INJECTION, POWDER, LYOPHILIZED, FOR SOLUTION INTRAMUSCULAR; INTRAVENOUS at 12:48

## 2024-03-23 RX ADMIN — DAPTOMYCIN 800 MG: 500 INJECTION, POWDER, LYOPHILIZED, FOR SOLUTION INTRAVENOUS at 12:54

## 2024-03-23 NOTE — PROGRESS NOTES
Infusion Nursing Note:  Tessa Edwards presents today for Invanz/Dapto.    Patient seen by provider today: No   present during visit today: Not Applicable.    Note: Patient reports to feeling weak and fatigued today. Reports some issue with appetite but denying nausea.      Intravenous Access:  PICC.    Treatment Conditions:  Not Applicable.      Post Infusion Assessment:  Patient tolerated infusion without incident.  Blood return noted pre and post infusion.  Site patent and intact, free from redness, edema or discomfort.  No evidence of extravasations.       Discharge Plan:   Patient declined prescription refills.  Discharge instructions reviewed with: Patient and Family.  Patient and/or family verbalized understanding of discharge instructions and all questions answered.  AVS to patient via RiseHealthHART.  Patient will return 3/24/24 for next appointment.   Patient discharged in stable condition accompanied by: son.  Departure Mode: Ambulatory.      Paige Lima RN

## 2024-03-24 ENCOUNTER — INFUSION THERAPY VISIT (OUTPATIENT)
Dept: INFUSION THERAPY | Facility: CLINIC | Age: 69
End: 2024-03-24
Attending: INTERNAL MEDICINE
Payer: COMMERCIAL

## 2024-03-24 VITALS
TEMPERATURE: 98.5 F | RESPIRATION RATE: 16 BRPM | OXYGEN SATURATION: 96 % | HEART RATE: 85 BPM | DIASTOLIC BLOOD PRESSURE: 75 MMHG | SYSTOLIC BLOOD PRESSURE: 145 MMHG

## 2024-03-24 DIAGNOSIS — Z98.1 S/P LUMBAR FUSION: ICD-10-CM

## 2024-03-24 DIAGNOSIS — T81.40XA POSTOPERATIVE INFECTION, UNSPECIFIED TYPE, INITIAL ENCOUNTER: Primary | ICD-10-CM

## 2024-03-24 PROCEDURE — 250N000009 HC RX 250: Performed by: INTERNAL MEDICINE

## 2024-03-24 PROCEDURE — 96375 TX/PRO/DX INJ NEW DRUG ADDON: CPT

## 2024-03-24 PROCEDURE — 250N000011 HC RX IP 250 OP 636: Mod: JZ | Performed by: INTERNAL MEDICINE

## 2024-03-24 PROCEDURE — 96374 THER/PROPH/DIAG INJ IV PUSH: CPT

## 2024-03-24 RX ORDER — HEPARIN SODIUM,PORCINE 10 UNIT/ML
5-20 VIAL (ML) INTRAVENOUS DAILY PRN
Status: CANCELLED | OUTPATIENT
Start: 2024-03-29

## 2024-03-24 RX ORDER — ALBUTEROL SULFATE 90 UG/1
1-2 AEROSOL, METERED RESPIRATORY (INHALATION)
Status: CANCELLED
Start: 2024-03-25

## 2024-03-24 RX ORDER — METHYLPREDNISOLONE SODIUM SUCCINATE 125 MG/2ML
125 INJECTION, POWDER, LYOPHILIZED, FOR SOLUTION INTRAMUSCULAR; INTRAVENOUS
Status: CANCELLED
Start: 2024-03-25

## 2024-03-24 RX ORDER — HEPARIN SODIUM,PORCINE 10 UNIT/ML
5-20 VIAL (ML) INTRAVENOUS DAILY PRN
Status: CANCELLED | OUTPATIENT
Start: 2024-03-25

## 2024-03-24 RX ORDER — DAPTOMYCIN 50 MG/ML
8 INJECTION, POWDER, LYOPHILIZED, FOR SOLUTION INTRAVENOUS ONCE
Status: CANCELLED | OUTPATIENT
Start: 2024-03-26 | End: 2024-03-29

## 2024-03-24 RX ORDER — DAPTOMYCIN 50 MG/ML
8 INJECTION, POWDER, LYOPHILIZED, FOR SOLUTION INTRAVENOUS ONCE
Status: COMPLETED | OUTPATIENT
Start: 2024-03-24 | End: 2024-03-24

## 2024-03-24 RX ORDER — HEPARIN SODIUM (PORCINE) LOCK FLUSH IV SOLN 100 UNIT/ML 100 UNIT/ML
5 SOLUTION INTRAVENOUS
Status: CANCELLED | OUTPATIENT
Start: 2024-03-25

## 2024-03-24 RX ORDER — DIPHENHYDRAMINE HYDROCHLORIDE 50 MG/ML
50 INJECTION INTRAMUSCULAR; INTRAVENOUS
Status: CANCELLED
Start: 2024-03-25

## 2024-03-24 RX ORDER — MEPERIDINE HYDROCHLORIDE 25 MG/ML
25 INJECTION INTRAMUSCULAR; INTRAVENOUS; SUBCUTANEOUS EVERY 30 MIN PRN
Status: CANCELLED | OUTPATIENT
Start: 2024-03-25

## 2024-03-24 RX ORDER — HEPARIN SODIUM (PORCINE) LOCK FLUSH IV SOLN 100 UNIT/ML 100 UNIT/ML
5 SOLUTION INTRAVENOUS
Status: CANCELLED | OUTPATIENT
Start: 2024-03-29

## 2024-03-24 RX ORDER — EPINEPHRINE 1 MG/ML
0.3 INJECTION, SOLUTION INTRAMUSCULAR; SUBCUTANEOUS EVERY 5 MIN PRN
Status: CANCELLED | OUTPATIENT
Start: 2024-03-25

## 2024-03-24 RX ORDER — ALBUTEROL SULFATE 0.83 MG/ML
2.5 SOLUTION RESPIRATORY (INHALATION)
Status: CANCELLED | OUTPATIENT
Start: 2024-03-25

## 2024-03-24 RX ADMIN — ERTAPENEM SODIUM 1 G: 1 INJECTION, POWDER, LYOPHILIZED, FOR SOLUTION INTRAMUSCULAR; INTRAVENOUS at 12:43

## 2024-03-24 RX ADMIN — DAPTOMYCIN 800 MG: 500 INJECTION, POWDER, LYOPHILIZED, FOR SOLUTION INTRAVENOUS at 12:49

## 2024-03-24 ASSESSMENT — PAIN SCALES - GENERAL: PAINLEVEL: NO PAIN (0)

## 2024-03-24 NOTE — PROGRESS NOTES
Infusion Nursing Note:  Tessa GUPTA Grace presents today for Invanz, Daptomycin.    Patient seen by provider today: No   present during visit today: Not Applicable.    Note: Patient reports no new concerns since infusion visit yesterday.      Intravenous Access:  PICC.    Treatment Conditions:  Not Applicable.      Post Infusion Assessment:  Patient tolerated infusion without incident.  Blood return noted pre and post infusion.  Site patent and intact, free from redness, edema or discomfort.  No evidence of extravasations.       Discharge Plan:   Discharge instructions reviewed with: Patient.  Patient and/or family verbalized understanding of discharge instructions and all questions answered.  AVS to patient via Parenthoods.  Patient will return 3/25/24 to [place] for next appointment.   Patient discharged in stable condition accompanied by: son.  Departure Mode: Ambulatory.      Danii Riley RN

## 2024-03-25 ENCOUNTER — PATIENT OUTREACH (OUTPATIENT)
Dept: FAMILY MEDICINE | Facility: CLINIC | Age: 69
End: 2024-03-25
Payer: COMMERCIAL

## 2024-03-25 NOTE — TELEPHONE ENCOUNTER
Patient Quality Outreach    Patient is due for the following:   Physical Annual Wellness Visit    Next Steps:   Schedule a Annual Wellness Visit    Type of outreach:    Sent [employer] message.    Next Steps:  Reach out within 90 days via Letter.    Max number of attempts reached: No. Will try again in 90 days if patient still on fail list.    Questions for provider review:    None           Dang Patiño, [facility]

## 2024-03-26 ENCOUNTER — TELEPHONE (OUTPATIENT)
Dept: NEUROSURGERY | Facility: CLINIC | Age: 69
End: 2024-03-26

## 2024-03-26 ENCOUNTER — INFUSION THERAPY VISIT (OUTPATIENT)
Dept: INFUSION THERAPY | Facility: CLINIC | Age: 69
End: 2024-03-26
Attending: INTERNAL MEDICINE
Payer: COMMERCIAL

## 2024-03-26 VITALS
SYSTOLIC BLOOD PRESSURE: 139 MMHG | HEART RATE: 81 BPM | DIASTOLIC BLOOD PRESSURE: 81 MMHG | TEMPERATURE: 97.2 F | OXYGEN SATURATION: 97 %

## 2024-03-26 DIAGNOSIS — Z98.1 S/P LUMBAR FUSION: ICD-10-CM

## 2024-03-26 DIAGNOSIS — T81.40XA POSTOPERATIVE INFECTION, UNSPECIFIED TYPE, INITIAL ENCOUNTER: Primary | ICD-10-CM

## 2024-03-26 PROCEDURE — 96374 THER/PROPH/DIAG INJ IV PUSH: CPT

## 2024-03-26 PROCEDURE — 250N000009 HC RX 250: Performed by: INTERNAL MEDICINE

## 2024-03-26 PROCEDURE — 96375 TX/PRO/DX INJ NEW DRUG ADDON: CPT

## 2024-03-26 PROCEDURE — 250N000011 HC RX IP 250 OP 636: Mod: JZ | Performed by: INTERNAL MEDICINE

## 2024-03-26 RX ORDER — METHYLPREDNISOLONE SODIUM SUCCINATE 125 MG/2ML
125 INJECTION, POWDER, LYOPHILIZED, FOR SOLUTION INTRAMUSCULAR; INTRAVENOUS
Status: CANCELLED
Start: 2024-03-27

## 2024-03-26 RX ORDER — DIPHENHYDRAMINE HYDROCHLORIDE 50 MG/ML
50 INJECTION INTRAMUSCULAR; INTRAVENOUS
Status: CANCELLED
Start: 2024-03-27

## 2024-03-26 RX ORDER — HEPARIN SODIUM (PORCINE) LOCK FLUSH IV SOLN 100 UNIT/ML 100 UNIT/ML
5 SOLUTION INTRAVENOUS
Status: CANCELLED | OUTPATIENT
Start: 2024-03-29

## 2024-03-26 RX ORDER — DAPTOMYCIN 50 MG/ML
8 INJECTION, POWDER, LYOPHILIZED, FOR SOLUTION INTRAVENOUS ONCE
Status: CANCELLED | OUTPATIENT
Start: 2024-03-27 | End: 2024-03-29

## 2024-03-26 RX ORDER — MEPERIDINE HYDROCHLORIDE 25 MG/ML
25 INJECTION INTRAMUSCULAR; INTRAVENOUS; SUBCUTANEOUS EVERY 30 MIN PRN
Status: CANCELLED | OUTPATIENT
Start: 2024-03-27

## 2024-03-26 RX ORDER — HEPARIN SODIUM,PORCINE 10 UNIT/ML
5-20 VIAL (ML) INTRAVENOUS DAILY PRN
Status: CANCELLED | OUTPATIENT
Start: 2024-03-27

## 2024-03-26 RX ORDER — DAPTOMYCIN 50 MG/ML
8 INJECTION, POWDER, LYOPHILIZED, FOR SOLUTION INTRAVENOUS ONCE
Status: COMPLETED | OUTPATIENT
Start: 2024-03-26 | End: 2024-03-26

## 2024-03-26 RX ORDER — EPINEPHRINE 1 MG/ML
0.3 INJECTION, SOLUTION INTRAMUSCULAR; SUBCUTANEOUS EVERY 5 MIN PRN
Status: CANCELLED | OUTPATIENT
Start: 2024-03-27

## 2024-03-26 RX ORDER — HEPARIN SODIUM,PORCINE 10 UNIT/ML
5-20 VIAL (ML) INTRAVENOUS DAILY PRN
Status: CANCELLED | OUTPATIENT
Start: 2024-03-29

## 2024-03-26 RX ORDER — ALBUTEROL SULFATE 0.83 MG/ML
2.5 SOLUTION RESPIRATORY (INHALATION)
Status: CANCELLED | OUTPATIENT
Start: 2024-03-27

## 2024-03-26 RX ORDER — HEPARIN SODIUM (PORCINE) LOCK FLUSH IV SOLN 100 UNIT/ML 100 UNIT/ML
5 SOLUTION INTRAVENOUS
Status: CANCELLED | OUTPATIENT
Start: 2024-03-27

## 2024-03-26 RX ORDER — ALBUTEROL SULFATE 90 UG/1
1-2 AEROSOL, METERED RESPIRATORY (INHALATION)
Status: CANCELLED
Start: 2024-03-27

## 2024-03-26 RX ADMIN — DAPTOMYCIN 800 MG: 500 INJECTION, POWDER, LYOPHILIZED, FOR SOLUTION INTRAVENOUS at 08:45

## 2024-03-26 RX ADMIN — ERTAPENEM SODIUM 1 G: 1 INJECTION, POWDER, LYOPHILIZED, FOR SOLUTION INTRAMUSCULAR; INTRAVENOUS at 08:49

## 2024-03-26 NOTE — PROGRESS NOTES
Infusion Nursing Note:  Tessa Edwards presents today for Daptomycin and Invanz.    Patient seen by provider today: No   present during visit today: Not Applicable.    Note: Pt missed appt yesterday due to weather. High priority chart message sent to Dr Johnson to see if we need to schedule an additional day at the end or if we just skip those doses - Will update when writer receives recommendation    Addendum:   Received message from Dr Johnson that pt will not need to make up missed doses from yesterday. Will keep original end date    Pt states lower back wound continues to heal and denies pain. States daughter changes dressing daily. Pt saw neurosurgery last week and a wound care referral was placed, as slight dermatitis was noted along wound edges. Wound clinic has not yet reached out to pt. Secure chat sent to Dr Brianne Rocha, with neurosurgery, to update. She states she will have her clinic contact pt and arrange      Intravenous Access:  PICC  Purple lumen patent; Red lumen occluded- cap changed but still unable to flush  Will reassess tomorrow     Treatment Conditions:  Not Applicable.      Post Infusion Assessment:  Patient tolerated infusion without incident.  Blood return noted pre and post infusion.  Site patent and intact, free from redness, edema or discomfort.  No evidence of extravasations.       Discharge Plan:   AVS to patient via MYCHART.  Patient will return 3/27/24 for next dose of antibiotics for next appointment.   Patient discharged in stable condition accompanied by: self.  Departure Mode: Ambulatory with cane.      Carly Scott RN

## 2024-03-26 NOTE — TELEPHONE ENCOUNTER
OK to see LK/JR. Please confirm with Dr. Hudson that we will be taking over wound care. Pt can't see us and Dr. Hudson for wound care orders.

## 2024-03-26 NOTE — TELEPHONE ENCOUNTER
Please review wound referral for dermatis. Dr. Hudson called asking for patient to be seen ASAP. Please advise.

## 2024-03-27 ENCOUNTER — INFUSION THERAPY VISIT (OUTPATIENT)
Dept: INFUSION THERAPY | Facility: CLINIC | Age: 69
End: 2024-03-27
Attending: INTERNAL MEDICINE
Payer: COMMERCIAL

## 2024-03-27 VITALS
SYSTOLIC BLOOD PRESSURE: 135 MMHG | DIASTOLIC BLOOD PRESSURE: 69 MMHG | TEMPERATURE: 98.3 F | RESPIRATION RATE: 16 BRPM | OXYGEN SATURATION: 96 % | HEART RATE: 73 BPM

## 2024-03-27 DIAGNOSIS — T81.40XA POSTOPERATIVE INFECTION, UNSPECIFIED TYPE, INITIAL ENCOUNTER: Primary | ICD-10-CM

## 2024-03-27 DIAGNOSIS — Z98.1 S/P LUMBAR FUSION: ICD-10-CM

## 2024-03-27 DIAGNOSIS — G25.81 RESTLESS LEG SYNDROME: ICD-10-CM

## 2024-03-27 PROCEDURE — 96374 THER/PROPH/DIAG INJ IV PUSH: CPT

## 2024-03-27 PROCEDURE — 250N000009 HC RX 250: Performed by: INTERNAL MEDICINE

## 2024-03-27 PROCEDURE — 250N000011 HC RX IP 250 OP 636: Mod: JZ | Performed by: INTERNAL MEDICINE

## 2024-03-27 PROCEDURE — 96375 TX/PRO/DX INJ NEW DRUG ADDON: CPT

## 2024-03-27 RX ORDER — ALBUTEROL SULFATE 90 UG/1
1-2 AEROSOL, METERED RESPIRATORY (INHALATION)
Status: CANCELLED
Start: 2024-03-28

## 2024-03-27 RX ORDER — ROPINIROLE 1 MG/1
TABLET, FILM COATED ORAL
Qty: 180 TABLET | Refills: 0 | Status: SHIPPED | OUTPATIENT
Start: 2024-03-27 | End: 2024-05-14

## 2024-03-27 RX ORDER — HEPARIN SODIUM,PORCINE 10 UNIT/ML
5-20 VIAL (ML) INTRAVENOUS DAILY PRN
Status: CANCELLED | OUTPATIENT
Start: 2024-03-28

## 2024-03-27 RX ORDER — HEPARIN SODIUM (PORCINE) LOCK FLUSH IV SOLN 100 UNIT/ML 100 UNIT/ML
5 SOLUTION INTRAVENOUS
Status: CANCELLED | OUTPATIENT
Start: 2024-03-28

## 2024-03-27 RX ORDER — MEPERIDINE HYDROCHLORIDE 25 MG/ML
25 INJECTION INTRAMUSCULAR; INTRAVENOUS; SUBCUTANEOUS EVERY 30 MIN PRN
Status: CANCELLED | OUTPATIENT
Start: 2024-03-28

## 2024-03-27 RX ORDER — METHYLPREDNISOLONE SODIUM SUCCINATE 125 MG/2ML
125 INJECTION, POWDER, LYOPHILIZED, FOR SOLUTION INTRAMUSCULAR; INTRAVENOUS
Status: CANCELLED
Start: 2024-03-28

## 2024-03-27 RX ORDER — DAPTOMYCIN 50 MG/ML
8 INJECTION, POWDER, LYOPHILIZED, FOR SOLUTION INTRAVENOUS ONCE
Status: CANCELLED | OUTPATIENT
Start: 2024-03-28 | End: 2024-03-29

## 2024-03-27 RX ORDER — DAPTOMYCIN 50 MG/ML
8 INJECTION, POWDER, LYOPHILIZED, FOR SOLUTION INTRAVENOUS ONCE
Status: COMPLETED | OUTPATIENT
Start: 2024-03-27 | End: 2024-03-27

## 2024-03-27 RX ORDER — ALBUTEROL SULFATE 0.83 MG/ML
2.5 SOLUTION RESPIRATORY (INHALATION)
Status: CANCELLED | OUTPATIENT
Start: 2024-03-28

## 2024-03-27 RX ORDER — DIPHENHYDRAMINE HYDROCHLORIDE 50 MG/ML
50 INJECTION INTRAMUSCULAR; INTRAVENOUS
Status: CANCELLED
Start: 2024-03-28

## 2024-03-27 RX ORDER — EPINEPHRINE 1 MG/ML
0.3 INJECTION, SOLUTION INTRAMUSCULAR; SUBCUTANEOUS EVERY 5 MIN PRN
Status: CANCELLED | OUTPATIENT
Start: 2024-03-28

## 2024-03-27 RX ORDER — HEPARIN SODIUM,PORCINE 10 UNIT/ML
5-20 VIAL (ML) INTRAVENOUS DAILY PRN
Status: CANCELLED | OUTPATIENT
Start: 2024-03-29

## 2024-03-27 RX ORDER — HEPARIN SODIUM (PORCINE) LOCK FLUSH IV SOLN 100 UNIT/ML 100 UNIT/ML
5 SOLUTION INTRAVENOUS
Status: CANCELLED | OUTPATIENT
Start: 2024-03-29

## 2024-03-27 RX ADMIN — ERTAPENEM SODIUM 1 G: 1 INJECTION, POWDER, LYOPHILIZED, FOR SOLUTION INTRAMUSCULAR; INTRAVENOUS at 08:54

## 2024-03-27 RX ADMIN — DAPTOMYCIN 800 MG: 500 INJECTION, POWDER, LYOPHILIZED, FOR SOLUTION INTRAVENOUS at 08:49

## 2024-03-27 NOTE — TELEPHONE ENCOUNTER
Refill request for: rOPINIRole (REQUIP) 1 MG tablet    Directions: TAKE 1 TABLET BY MOUTH TWICE DAILY AND 2 AT BEDTIME     LOV: 7/11/23  NOV: Not scheduled- 7/11/23 note says follow up PRN. Do you want her to schedule since you are prescribing ropinirole?    90 day supply with 0 refills Medication T'd for review and signature  Celena Argueta CMA on 3/27/2024 at 11:32 AM  [facility]

## 2024-03-27 NOTE — PROGRESS NOTES
Infusion Nursing Note:  Tessa ALONSO Edwards presents today for Dapto and Invanz.    Patient seen by provider today: No   present during visit today: Not Applicable.    Note: Both lumens of PICC with good blood return.    Intravenous Access:  PICC.    Treatment Conditions:  Not Applicable.    Post Infusion Assessment:  Patient tolerated infusion without incident.  Blood return noted pre and post infusion.  Site patent and intact, free from redness, edema or discomfort.  No evidence of extravasations.       Discharge Plan:   AVS to patient via MYCHART.  Patient will return 3/28 (labs and dressing PICC dressing change due) for next appointment.   Patient discharged in stable condition accompanied by: self and son.  Departure Mode: Ambulatory.      Neil Esparza RN

## 2024-03-28 ENCOUNTER — INFUSION THERAPY VISIT (OUTPATIENT)
Dept: INFUSION THERAPY | Facility: CLINIC | Age: 69
End: 2024-03-28
Attending: INTERNAL MEDICINE
Payer: COMMERCIAL

## 2024-03-28 VITALS
DIASTOLIC BLOOD PRESSURE: 56 MMHG | HEART RATE: 78 BPM | SYSTOLIC BLOOD PRESSURE: 118 MMHG | OXYGEN SATURATION: 97 % | TEMPERATURE: 98.5 F | RESPIRATION RATE: 18 BRPM

## 2024-03-28 DIAGNOSIS — T81.40XA POSTOPERATIVE INFECTION, UNSPECIFIED TYPE, INITIAL ENCOUNTER: Primary | ICD-10-CM

## 2024-03-28 DIAGNOSIS — Z98.1 S/P LUMBAR FUSION: ICD-10-CM

## 2024-03-28 LAB
ALBUMIN SERPL BCG-MCNC: 3.7 G/DL (ref 3.5–5.2)
ALP SERPL-CCNC: 96 U/L (ref 40–150)
ALT SERPL W P-5'-P-CCNC: 16 U/L (ref 0–50)
ANION GAP SERPL CALCULATED.3IONS-SCNC: 10 MMOL/L (ref 7–15)
AST SERPL W P-5'-P-CCNC: 17 U/L (ref 0–45)
BASOPHILS # BLD AUTO: ABNORMAL 10*3/UL
BASOPHILS # BLD MANUAL: 0 10E3/UL (ref 0–0.2)
BASOPHILS NFR BLD AUTO: ABNORMAL %
BASOPHILS NFR BLD MANUAL: 0 %
BILIRUB SERPL-MCNC: 0.3 MG/DL
BUN SERPL-MCNC: 14.3 MG/DL (ref 8–23)
CALCIUM SERPL-MCNC: 9 MG/DL (ref 8.8–10.2)
CHLORIDE SERPL-SCNC: 104 MMOL/L (ref 98–107)
CK SERPL-CCNC: 168 U/L (ref 26–192)
CREAT SERPL-MCNC: 0.83 MG/DL (ref 0.51–0.95)
DEPRECATED HCO3 PLAS-SCNC: 24 MMOL/L (ref 22–29)
EGFRCR SERPLBLD CKD-EPI 2021: 75 ML/MIN/1.73M2
EOSINOPHIL # BLD AUTO: ABNORMAL 10*3/UL
EOSINOPHIL # BLD MANUAL: 0 10E3/UL (ref 0–0.7)
EOSINOPHIL NFR BLD AUTO: ABNORMAL %
EOSINOPHIL NFR BLD MANUAL: 0 %
ERYTHROCYTE [DISTWIDTH] IN BLOOD BY AUTOMATED COUNT: 13.4 % (ref 10–15)
GLUCOSE SERPL-MCNC: 102 MG/DL (ref 70–99)
HCT VFR BLD AUTO: 34.9 % (ref 35–47)
HGB BLD-MCNC: 10.5 G/DL (ref 11.7–15.7)
IMM GRANULOCYTES # BLD: ABNORMAL 10*3/UL
IMM GRANULOCYTES NFR BLD: ABNORMAL %
LYMPHOCYTES # BLD AUTO: ABNORMAL 10*3/UL
LYMPHOCYTES # BLD MANUAL: 12.5 10E3/UL (ref 0.8–5.3)
LYMPHOCYTES NFR BLD AUTO: ABNORMAL %
LYMPHOCYTES NFR BLD MANUAL: 63 %
MCH RBC QN AUTO: 25.5 PG (ref 26.5–33)
MCHC RBC AUTO-ENTMCNC: 30.1 G/DL (ref 31.5–36.5)
MCV RBC AUTO: 85 FL (ref 78–100)
MONOCYTES # BLD AUTO: ABNORMAL 10*3/UL
MONOCYTES # BLD MANUAL: 0.6 10E3/UL (ref 0–1.3)
MONOCYTES NFR BLD AUTO: ABNORMAL %
MONOCYTES NFR BLD MANUAL: 3 %
NEUTROPHILS # BLD AUTO: ABNORMAL 10*3/UL
NEUTROPHILS # BLD MANUAL: 6.8 10E3/UL (ref 1.6–8.3)
NEUTROPHILS NFR BLD AUTO: ABNORMAL %
NEUTROPHILS NFR BLD MANUAL: 34 %
NRBC # BLD AUTO: 0 10E3/UL
NRBC BLD AUTO-RTO: 0 /100
PLAT MORPH BLD: ABNORMAL
PLATELET # BLD AUTO: 232 10E3/UL (ref 150–450)
POTASSIUM SERPL-SCNC: 3.8 MMOL/L (ref 3.4–5.3)
PROT SERPL-MCNC: 6.1 G/DL (ref 6.4–8.3)
RBC # BLD AUTO: 4.12 10E6/UL (ref 3.8–5.2)
RBC MORPH BLD: ABNORMAL
SODIUM SERPL-SCNC: 138 MMOL/L (ref 135–145)
WBC # BLD AUTO: 19.9 10E3/UL (ref 4–11)

## 2024-03-28 PROCEDURE — 96375 TX/PRO/DX INJ NEW DRUG ADDON: CPT

## 2024-03-28 PROCEDURE — 82550 ASSAY OF CK (CPK): CPT | Performed by: INTERNAL MEDICINE

## 2024-03-28 PROCEDURE — 82565 ASSAY OF CREATININE: CPT | Performed by: INTERNAL MEDICINE

## 2024-03-28 PROCEDURE — 85027 COMPLETE CBC AUTOMATED: CPT | Performed by: INTERNAL MEDICINE

## 2024-03-28 PROCEDURE — 85007 BL SMEAR W/DIFF WBC COUNT: CPT | Performed by: INTERNAL MEDICINE

## 2024-03-28 PROCEDURE — 96374 THER/PROPH/DIAG INJ IV PUSH: CPT

## 2024-03-28 PROCEDURE — 250N000011 HC RX IP 250 OP 636: Mod: JW | Performed by: INTERNAL MEDICINE

## 2024-03-28 PROCEDURE — 250N000009 HC RX 250: Performed by: INTERNAL MEDICINE

## 2024-03-28 RX ORDER — HEPARIN SODIUM,PORCINE 10 UNIT/ML
5-20 VIAL (ML) INTRAVENOUS DAILY PRN
Status: CANCELLED | OUTPATIENT
Start: 2024-03-29

## 2024-03-28 RX ORDER — ALBUTEROL SULFATE 90 UG/1
1-2 AEROSOL, METERED RESPIRATORY (INHALATION)
Status: CANCELLED
Start: 2024-03-29

## 2024-03-28 RX ORDER — MEPERIDINE HYDROCHLORIDE 25 MG/ML
25 INJECTION INTRAMUSCULAR; INTRAVENOUS; SUBCUTANEOUS EVERY 30 MIN PRN
Status: CANCELLED | OUTPATIENT
Start: 2024-03-29

## 2024-03-28 RX ORDER — HEPARIN SODIUM (PORCINE) LOCK FLUSH IV SOLN 100 UNIT/ML 100 UNIT/ML
5 SOLUTION INTRAVENOUS
Status: CANCELLED | OUTPATIENT
Start: 2024-03-29

## 2024-03-28 RX ORDER — METHYLPREDNISOLONE SODIUM SUCCINATE 125 MG/2ML
125 INJECTION, POWDER, LYOPHILIZED, FOR SOLUTION INTRAMUSCULAR; INTRAVENOUS
Status: CANCELLED
Start: 2024-03-29

## 2024-03-28 RX ORDER — DIPHENHYDRAMINE HYDROCHLORIDE 50 MG/ML
50 INJECTION INTRAMUSCULAR; INTRAVENOUS
Status: CANCELLED
Start: 2024-03-29

## 2024-03-28 RX ORDER — EPINEPHRINE 1 MG/ML
0.3 INJECTION, SOLUTION INTRAMUSCULAR; SUBCUTANEOUS EVERY 5 MIN PRN
Status: CANCELLED | OUTPATIENT
Start: 2024-03-29

## 2024-03-28 RX ORDER — ALBUTEROL SULFATE 0.83 MG/ML
2.5 SOLUTION RESPIRATORY (INHALATION)
Status: CANCELLED | OUTPATIENT
Start: 2024-03-29

## 2024-03-28 RX ORDER — DAPTOMYCIN 50 MG/ML
8 INJECTION, POWDER, LYOPHILIZED, FOR SOLUTION INTRAVENOUS ONCE
Status: COMPLETED | OUTPATIENT
Start: 2024-03-28 | End: 2024-03-28

## 2024-03-28 RX ORDER — HEPARIN SODIUM,PORCINE 10 UNIT/ML
5-20 VIAL (ML) INTRAVENOUS DAILY PRN
Status: DISCONTINUED | OUTPATIENT
Start: 2024-03-28 | End: 2024-03-28 | Stop reason: HOSPADM

## 2024-03-28 RX ORDER — DAPTOMYCIN 50 MG/ML
8 INJECTION, POWDER, LYOPHILIZED, FOR SOLUTION INTRAVENOUS ONCE
Status: CANCELLED | OUTPATIENT
Start: 2024-03-29 | End: 2024-03-29

## 2024-03-28 RX ADMIN — ERTAPENEM SODIUM 1 G: 1 INJECTION, POWDER, LYOPHILIZED, FOR SOLUTION INTRAMUSCULAR; INTRAVENOUS at 14:33

## 2024-03-28 RX ADMIN — DAPTOMYCIN 800 MG: 500 INJECTION, POWDER, LYOPHILIZED, FOR SOLUTION INTRAVENOUS at 14:28

## 2024-03-28 NOTE — PROGRESS NOTES
Infusion Nursing Note:  Tessa Edwards presents today for Labs + PICC dressing change + Daptomycin + Ertapenem.    Patient seen by provider today: No   present during visit today: Not Applicable.    Note: Tessa reports she is feeling well today.  She denies any new or concerning symptoms.    Intravenous Access:  Labs drawn without difficulty.  PICC, dressing and cap change completed today.    Labs and PICC dressing/cap change next due on 4/4/2024.    Treatment Conditions:  Not Applicable.    Post Infusion Assessment:  Patient tolerated infusion without incident.  Blood return noted pre and post infusion.  Site patent and intact, free from redness, edema or discomfort.  No evidence of extravasations.     Discharge Plan:   Discharge instructions reviewed with: Patient.  Patient and/or family verbalized understanding of discharge instructions and all questions answered.  AVS to patient via Think-Now.  Patient will return 3/29 for next appointment.   Patient discharged in stable condition accompanied by: self.  Departure Mode: Ambulatory with cane.      Dl Hoffman RN

## 2024-03-29 ENCOUNTER — INFUSION THERAPY VISIT (OUTPATIENT)
Dept: INFUSION THERAPY | Facility: CLINIC | Age: 69
End: 2024-03-29
Attending: INTERNAL MEDICINE
Payer: COMMERCIAL

## 2024-03-29 ENCOUNTER — TELEPHONE (OUTPATIENT)
Dept: NEUROSURGERY | Facility: CLINIC | Age: 69
End: 2024-03-29

## 2024-03-29 VITALS
RESPIRATION RATE: 16 BRPM | DIASTOLIC BLOOD PRESSURE: 67 MMHG | HEART RATE: 79 BPM | OXYGEN SATURATION: 96 % | TEMPERATURE: 98.4 F | SYSTOLIC BLOOD PRESSURE: 118 MMHG

## 2024-03-29 DIAGNOSIS — T81.40XA POSTOPERATIVE INFECTION, UNSPECIFIED TYPE, INITIAL ENCOUNTER: Primary | ICD-10-CM

## 2024-03-29 DIAGNOSIS — Z98.1 S/P LUMBAR FUSION: ICD-10-CM

## 2024-03-29 DIAGNOSIS — M48.062 SPINAL STENOSIS OF LUMBAR REGION WITH NEUROGENIC CLAUDICATION: Primary | ICD-10-CM

## 2024-03-29 PROCEDURE — 250N000011 HC RX IP 250 OP 636: Mod: JZ | Performed by: INTERNAL MEDICINE

## 2024-03-29 PROCEDURE — 96375 TX/PRO/DX INJ NEW DRUG ADDON: CPT

## 2024-03-29 PROCEDURE — 96374 THER/PROPH/DIAG INJ IV PUSH: CPT

## 2024-03-29 PROCEDURE — 250N000009 HC RX 250: Performed by: INTERNAL MEDICINE

## 2024-03-29 RX ORDER — ALBUTEROL SULFATE 90 UG/1
1-2 AEROSOL, METERED RESPIRATORY (INHALATION)
Status: CANCELLED
Start: 2024-03-30

## 2024-03-29 RX ORDER — DAPTOMYCIN 50 MG/ML
8 INJECTION, POWDER, LYOPHILIZED, FOR SOLUTION INTRAVENOUS ONCE
Status: CANCELLED | OUTPATIENT
Start: 2024-03-30 | End: 2024-04-05

## 2024-03-29 RX ORDER — DIPHENHYDRAMINE HYDROCHLORIDE 50 MG/ML
50 INJECTION INTRAMUSCULAR; INTRAVENOUS
Status: CANCELLED
Start: 2024-03-30

## 2024-03-29 RX ORDER — MEPERIDINE HYDROCHLORIDE 25 MG/ML
25 INJECTION INTRAMUSCULAR; INTRAVENOUS; SUBCUTANEOUS EVERY 30 MIN PRN
Status: CANCELLED | OUTPATIENT
Start: 2024-03-30

## 2024-03-29 RX ORDER — HEPARIN SODIUM,PORCINE 10 UNIT/ML
5-20 VIAL (ML) INTRAVENOUS DAILY PRN
Status: CANCELLED | OUTPATIENT
Start: 2024-04-05

## 2024-03-29 RX ORDER — EPINEPHRINE 1 MG/ML
0.3 INJECTION, SOLUTION INTRAMUSCULAR; SUBCUTANEOUS EVERY 5 MIN PRN
Status: CANCELLED | OUTPATIENT
Start: 2024-03-30

## 2024-03-29 RX ORDER — HEPARIN SODIUM,PORCINE 10 UNIT/ML
5-20 VIAL (ML) INTRAVENOUS DAILY PRN
Status: CANCELLED | OUTPATIENT
Start: 2024-03-30

## 2024-03-29 RX ORDER — DAPTOMYCIN 50 MG/ML
8 INJECTION, POWDER, LYOPHILIZED, FOR SOLUTION INTRAVENOUS ONCE
Status: COMPLETED | OUTPATIENT
Start: 2024-03-29 | End: 2024-03-29

## 2024-03-29 RX ORDER — ALBUTEROL SULFATE 0.83 MG/ML
2.5 SOLUTION RESPIRATORY (INHALATION)
Status: CANCELLED | OUTPATIENT
Start: 2024-03-30

## 2024-03-29 RX ORDER — METHYLPREDNISOLONE SODIUM SUCCINATE 125 MG/2ML
125 INJECTION, POWDER, LYOPHILIZED, FOR SOLUTION INTRAMUSCULAR; INTRAVENOUS
Status: CANCELLED
Start: 2024-03-30

## 2024-03-29 RX ORDER — HEPARIN SODIUM (PORCINE) LOCK FLUSH IV SOLN 100 UNIT/ML 100 UNIT/ML
5 SOLUTION INTRAVENOUS
Status: CANCELLED | OUTPATIENT
Start: 2024-03-30

## 2024-03-29 RX ORDER — HEPARIN SODIUM (PORCINE) LOCK FLUSH IV SOLN 100 UNIT/ML 100 UNIT/ML
5 SOLUTION INTRAVENOUS
Status: CANCELLED | OUTPATIENT
Start: 2024-04-05

## 2024-03-29 RX ADMIN — DAPTOMYCIN 800 MG: 500 INJECTION, POWDER, LYOPHILIZED, FOR SOLUTION INTRAVENOUS at 13:10

## 2024-03-29 RX ADMIN — ERTAPENEM SODIUM 1 G: 1 INJECTION, POWDER, LYOPHILIZED, FOR SOLUTION INTRAMUSCULAR; INTRAVENOUS at 13:15

## 2024-03-29 ASSESSMENT — PAIN SCALES - GENERAL: PAINLEVEL: NO PAIN (0)

## 2024-03-29 NOTE — TELEPHONE ENCOUNTER
Left message for patient to call and schedule XR prior to upcoming appointment with Sofya Izaguirre on 4/4.

## 2024-03-29 NOTE — PROGRESS NOTES
Infusion Nursing Note:  Tessa Edwards presents today for Daptomycin/ertapenem.    Patient seen by provider today: No   present during visit today: Not Applicable.    Note: N/A.      Intravenous Access:  PICC.    Treatment Conditions:  Not Applicable.      Post Infusion Assessment:  Patient tolerated infusion without incident.  Blood return noted pre and post infusion.  Site patent and intact, free from redness, edema or discomfort.  No evidence of extravasations.       Discharge Plan:   Discharge instructions reviewed with: Patient and Family.  Patient and/or family verbalized understanding of discharge instructions and all questions answered.  AVS to patient via [employer].  Patient will return 3/30 in [place] for next appointment.   Patient discharged in stable condition accompanied by: self and son.  Departure Mode: Ambulatory.      Elvia Reyes RN

## 2024-03-31 ENCOUNTER — INFUSION THERAPY VISIT (OUTPATIENT)
Dept: INFUSION THERAPY | Facility: CLINIC | Age: 69
End: 2024-03-31
Attending: INTERNAL MEDICINE
Payer: COMMERCIAL

## 2024-03-31 VITALS
DIASTOLIC BLOOD PRESSURE: 65 MMHG | OXYGEN SATURATION: 99 % | TEMPERATURE: 98.1 F | SYSTOLIC BLOOD PRESSURE: 130 MMHG | HEART RATE: 79 BPM | RESPIRATION RATE: 18 BRPM

## 2024-03-31 DIAGNOSIS — Z98.1 S/P LUMBAR FUSION: ICD-10-CM

## 2024-03-31 DIAGNOSIS — T81.40XA POSTOPERATIVE INFECTION, UNSPECIFIED TYPE, INITIAL ENCOUNTER: Primary | ICD-10-CM

## 2024-03-31 PROCEDURE — 96375 TX/PRO/DX INJ NEW DRUG ADDON: CPT

## 2024-03-31 PROCEDURE — 250N000009 HC RX 250: Performed by: INTERNAL MEDICINE

## 2024-03-31 PROCEDURE — 250N000011 HC RX IP 250 OP 636: Performed by: INTERNAL MEDICINE

## 2024-03-31 PROCEDURE — 96374 THER/PROPH/DIAG INJ IV PUSH: CPT

## 2024-03-31 RX ORDER — HEPARIN SODIUM (PORCINE) LOCK FLUSH IV SOLN 100 UNIT/ML 100 UNIT/ML
5 SOLUTION INTRAVENOUS
Status: CANCELLED | OUTPATIENT
Start: 2024-04-01

## 2024-03-31 RX ORDER — DAPTOMYCIN 50 MG/ML
8 INJECTION, POWDER, LYOPHILIZED, FOR SOLUTION INTRAVENOUS ONCE
Status: CANCELLED | OUTPATIENT
Start: 2024-04-01 | End: 2024-04-05

## 2024-03-31 RX ORDER — HEPARIN SODIUM (PORCINE) LOCK FLUSH IV SOLN 100 UNIT/ML 100 UNIT/ML
5 SOLUTION INTRAVENOUS
Status: CANCELLED | OUTPATIENT
Start: 2024-04-05

## 2024-03-31 RX ORDER — DIPHENHYDRAMINE HYDROCHLORIDE 50 MG/ML
50 INJECTION INTRAMUSCULAR; INTRAVENOUS
Status: CANCELLED
Start: 2024-04-01

## 2024-03-31 RX ORDER — ALBUTEROL SULFATE 0.83 MG/ML
2.5 SOLUTION RESPIRATORY (INHALATION)
Status: CANCELLED | OUTPATIENT
Start: 2024-04-01

## 2024-03-31 RX ORDER — HEPARIN SODIUM,PORCINE 10 UNIT/ML
5-20 VIAL (ML) INTRAVENOUS DAILY PRN
Status: CANCELLED | OUTPATIENT
Start: 2024-04-05

## 2024-03-31 RX ORDER — EPINEPHRINE 1 MG/ML
0.3 INJECTION, SOLUTION INTRAMUSCULAR; SUBCUTANEOUS EVERY 5 MIN PRN
Status: CANCELLED | OUTPATIENT
Start: 2024-04-01

## 2024-03-31 RX ORDER — HEPARIN SODIUM,PORCINE 10 UNIT/ML
5-20 VIAL (ML) INTRAVENOUS DAILY PRN
Status: CANCELLED | OUTPATIENT
Start: 2024-04-01

## 2024-03-31 RX ORDER — ALBUTEROL SULFATE 90 UG/1
1-2 AEROSOL, METERED RESPIRATORY (INHALATION)
Status: CANCELLED
Start: 2024-04-01

## 2024-03-31 RX ORDER — METHYLPREDNISOLONE SODIUM SUCCINATE 125 MG/2ML
125 INJECTION, POWDER, LYOPHILIZED, FOR SOLUTION INTRAMUSCULAR; INTRAVENOUS
Status: CANCELLED
Start: 2024-04-01

## 2024-03-31 RX ORDER — MEPERIDINE HYDROCHLORIDE 25 MG/ML
25 INJECTION INTRAMUSCULAR; INTRAVENOUS; SUBCUTANEOUS EVERY 30 MIN PRN
Status: CANCELLED | OUTPATIENT
Start: 2024-04-01

## 2024-03-31 RX ORDER — DAPTOMYCIN 50 MG/ML
8 INJECTION, POWDER, LYOPHILIZED, FOR SOLUTION INTRAVENOUS ONCE
Status: COMPLETED | OUTPATIENT
Start: 2024-03-31 | End: 2024-03-31

## 2024-03-31 RX ADMIN — DAPTOMYCIN 800 MG: 500 INJECTION, POWDER, LYOPHILIZED, FOR SOLUTION INTRAVENOUS at 09:38

## 2024-03-31 RX ADMIN — ERTAPENEM SODIUM 1 G: 1 INJECTION, POWDER, LYOPHILIZED, FOR SOLUTION INTRAMUSCULAR; INTRAVENOUS at 09:38

## 2024-03-31 NOTE — PROGRESS NOTES
Infusion Nursing Note:  Tessa Edwards presents today for Daptomycin/Invanz.    Patient seen by provider today: No   present during visit today: Not Applicable.    Note: N/A.      Intravenous Access:  PICC.    Treatment Conditions:  Not Applicable.      Post Infusion Assessment:  Patient tolerated infusion without incident.  Blood return noted pre and post infusion.  Site patent and intact, free from redness, edema or discomfort.  No evidence of extravasations.       Discharge Plan:   Patient declined prescription refills.  Discharge instructions reviewed with: Patient.  Patient and/or family verbalized understanding of discharge instructions and all questions answered.  AVS to patient via DrivrHART.    Patient discharged in stable condition accompanied by: self.  Departure Mode: Ambulatory.      Rj Barr RN

## 2024-04-01 ENCOUNTER — INFUSION THERAPY VISIT (OUTPATIENT)
Dept: INFUSION THERAPY | Facility: CLINIC | Age: 69
End: 2024-04-01
Attending: INTERNAL MEDICINE
Payer: COMMERCIAL

## 2024-04-01 VITALS
SYSTOLIC BLOOD PRESSURE: 137 MMHG | OXYGEN SATURATION: 97 % | DIASTOLIC BLOOD PRESSURE: 66 MMHG | HEART RATE: 76 BPM | TEMPERATURE: 98.5 F

## 2024-04-01 DIAGNOSIS — T81.40XA POSTOPERATIVE INFECTION, UNSPECIFIED TYPE, INITIAL ENCOUNTER: Primary | ICD-10-CM

## 2024-04-01 DIAGNOSIS — Z98.1 S/P LUMBAR FUSION: ICD-10-CM

## 2024-04-01 PROCEDURE — 96375 TX/PRO/DX INJ NEW DRUG ADDON: CPT

## 2024-04-01 PROCEDURE — 250N000009 HC RX 250: Performed by: INTERNAL MEDICINE

## 2024-04-01 PROCEDURE — 96374 THER/PROPH/DIAG INJ IV PUSH: CPT

## 2024-04-01 PROCEDURE — 250N000011 HC RX IP 250 OP 636: Performed by: INTERNAL MEDICINE

## 2024-04-01 RX ORDER — HEPARIN SODIUM,PORCINE 10 UNIT/ML
5-20 VIAL (ML) INTRAVENOUS DAILY PRN
Status: CANCELLED | OUTPATIENT
Start: 2024-04-05

## 2024-04-01 RX ORDER — DAPTOMYCIN 50 MG/ML
8 INJECTION, POWDER, LYOPHILIZED, FOR SOLUTION INTRAVENOUS ONCE
Status: COMPLETED | OUTPATIENT
Start: 2024-04-01 | End: 2024-04-01

## 2024-04-01 RX ORDER — HEPARIN SODIUM (PORCINE) LOCK FLUSH IV SOLN 100 UNIT/ML 100 UNIT/ML
5 SOLUTION INTRAVENOUS
Status: CANCELLED | OUTPATIENT
Start: 2024-04-02

## 2024-04-01 RX ORDER — MEPERIDINE HYDROCHLORIDE 25 MG/ML
25 INJECTION INTRAMUSCULAR; INTRAVENOUS; SUBCUTANEOUS EVERY 30 MIN PRN
Status: CANCELLED | OUTPATIENT
Start: 2024-04-02

## 2024-04-01 RX ORDER — METHYLPREDNISOLONE SODIUM SUCCINATE 125 MG/2ML
125 INJECTION, POWDER, LYOPHILIZED, FOR SOLUTION INTRAMUSCULAR; INTRAVENOUS
Status: CANCELLED
Start: 2024-04-02

## 2024-04-01 RX ORDER — ALBUTEROL SULFATE 0.83 MG/ML
2.5 SOLUTION RESPIRATORY (INHALATION)
Status: CANCELLED | OUTPATIENT
Start: 2024-04-02

## 2024-04-01 RX ORDER — DAPTOMYCIN 50 MG/ML
8 INJECTION, POWDER, LYOPHILIZED, FOR SOLUTION INTRAVENOUS ONCE
Status: CANCELLED | OUTPATIENT
Start: 2024-04-02 | End: 2024-04-05

## 2024-04-01 RX ORDER — HEPARIN SODIUM,PORCINE 10 UNIT/ML
5-20 VIAL (ML) INTRAVENOUS DAILY PRN
Status: CANCELLED | OUTPATIENT
Start: 2024-04-02

## 2024-04-01 RX ORDER — ALBUTEROL SULFATE 90 UG/1
1-2 AEROSOL, METERED RESPIRATORY (INHALATION)
Status: CANCELLED
Start: 2024-04-02

## 2024-04-01 RX ORDER — EPINEPHRINE 1 MG/ML
0.3 INJECTION, SOLUTION INTRAMUSCULAR; SUBCUTANEOUS EVERY 5 MIN PRN
Status: CANCELLED | OUTPATIENT
Start: 2024-04-02

## 2024-04-01 RX ORDER — DIPHENHYDRAMINE HYDROCHLORIDE 50 MG/ML
50 INJECTION INTRAMUSCULAR; INTRAVENOUS
Status: CANCELLED
Start: 2024-04-02

## 2024-04-01 RX ORDER — HEPARIN SODIUM (PORCINE) LOCK FLUSH IV SOLN 100 UNIT/ML 100 UNIT/ML
5 SOLUTION INTRAVENOUS
Status: CANCELLED | OUTPATIENT
Start: 2024-04-05

## 2024-04-01 RX ADMIN — ERTAPENEM SODIUM 1 G: 1 INJECTION, POWDER, LYOPHILIZED, FOR SOLUTION INTRAMUSCULAR; INTRAVENOUS at 14:39

## 2024-04-01 RX ADMIN — DAPTOMYCIN 800 MG: 500 INJECTION, POWDER, LYOPHILIZED, FOR SOLUTION INTRAVENOUS at 14:39

## 2024-04-01 NOTE — PROGRESS NOTES
Infusion Nursing Note:  Tessa GUPTA Grace presents today for Daptomycin + Invanz    Patient seen by provider today: No   present during visit today: Not Applicable.    Note: N/A.      Intravenous Access:  PICC.    Treatment Conditions:  Not Applicable.      Post Infusion Assessment:  Patient tolerated infusion without incident.  Blood return noted pre and post infusion.  Site patent and intact, free from redness, edema or discomfort.  No evidence of extravasations.       Discharge Plan:   Discharge instructions reviewed with: Patient.  Patient and/or family verbalized understanding of discharge instructions and all questions answered.  AVS to patient via TRELYST.  Patient will return 4/2 for next appointment.   Patient discharged in stable condition accompanied by: son.  Departure Mode: Ambulatory.      Maria Del Rosario Foley RN

## 2024-04-01 NOTE — TELEPHONE ENCOUNTER
Left 2nd message for patient to call back and schedule XR prior to appointment with Sofya Izaguirre.

## 2024-04-02 ENCOUNTER — INFUSION THERAPY VISIT (OUTPATIENT)
Dept: INFUSION THERAPY | Facility: CLINIC | Age: 69
End: 2024-04-02
Attending: INTERNAL MEDICINE
Payer: COMMERCIAL

## 2024-04-02 VITALS
DIASTOLIC BLOOD PRESSURE: 74 MMHG | TEMPERATURE: 98.6 F | OXYGEN SATURATION: 95 % | SYSTOLIC BLOOD PRESSURE: 118 MMHG | HEART RATE: 76 BPM

## 2024-04-02 DIAGNOSIS — Z98.1 S/P LUMBAR FUSION: ICD-10-CM

## 2024-04-02 DIAGNOSIS — T81.40XA POSTOPERATIVE INFECTION, UNSPECIFIED TYPE, INITIAL ENCOUNTER: Primary | ICD-10-CM

## 2024-04-02 PROCEDURE — 250N000011 HC RX IP 250 OP 636: Mod: JZ | Performed by: INTERNAL MEDICINE

## 2024-04-02 PROCEDURE — 250N000009 HC RX 250: Performed by: INTERNAL MEDICINE

## 2024-04-02 PROCEDURE — 96374 THER/PROPH/DIAG INJ IV PUSH: CPT

## 2024-04-02 PROCEDURE — 96375 TX/PRO/DX INJ NEW DRUG ADDON: CPT

## 2024-04-02 RX ORDER — ALBUTEROL SULFATE 0.83 MG/ML
2.5 SOLUTION RESPIRATORY (INHALATION)
Status: CANCELLED | OUTPATIENT
Start: 2024-04-03

## 2024-04-02 RX ORDER — HEPARIN SODIUM,PORCINE 10 UNIT/ML
5-20 VIAL (ML) INTRAVENOUS DAILY PRN
Status: CANCELLED | OUTPATIENT
Start: 2024-04-05

## 2024-04-02 RX ORDER — EPINEPHRINE 1 MG/ML
0.3 INJECTION, SOLUTION INTRAMUSCULAR; SUBCUTANEOUS EVERY 5 MIN PRN
Status: CANCELLED | OUTPATIENT
Start: 2024-04-03

## 2024-04-02 RX ORDER — MEPERIDINE HYDROCHLORIDE 25 MG/ML
25 INJECTION INTRAMUSCULAR; INTRAVENOUS; SUBCUTANEOUS EVERY 30 MIN PRN
Status: CANCELLED | OUTPATIENT
Start: 2024-04-03

## 2024-04-02 RX ORDER — HEPARIN SODIUM,PORCINE 10 UNIT/ML
5-20 VIAL (ML) INTRAVENOUS DAILY PRN
Status: CANCELLED | OUTPATIENT
Start: 2024-04-03

## 2024-04-02 RX ORDER — DIPHENHYDRAMINE HYDROCHLORIDE 50 MG/ML
50 INJECTION INTRAMUSCULAR; INTRAVENOUS
Status: CANCELLED
Start: 2024-04-03

## 2024-04-02 RX ORDER — DAPTOMYCIN 50 MG/ML
8 INJECTION, POWDER, LYOPHILIZED, FOR SOLUTION INTRAVENOUS ONCE
Status: COMPLETED | OUTPATIENT
Start: 2024-04-02 | End: 2024-04-02

## 2024-04-02 RX ORDER — DAPTOMYCIN 50 MG/ML
8 INJECTION, POWDER, LYOPHILIZED, FOR SOLUTION INTRAVENOUS ONCE
Status: CANCELLED | OUTPATIENT
Start: 2024-04-03 | End: 2024-04-05

## 2024-04-02 RX ORDER — HEPARIN SODIUM (PORCINE) LOCK FLUSH IV SOLN 100 UNIT/ML 100 UNIT/ML
5 SOLUTION INTRAVENOUS
Status: CANCELLED | OUTPATIENT
Start: 2024-04-05

## 2024-04-02 RX ORDER — ALBUTEROL SULFATE 90 UG/1
1-2 AEROSOL, METERED RESPIRATORY (INHALATION)
Status: CANCELLED
Start: 2024-04-03

## 2024-04-02 RX ORDER — HEPARIN SODIUM (PORCINE) LOCK FLUSH IV SOLN 100 UNIT/ML 100 UNIT/ML
5 SOLUTION INTRAVENOUS
Status: CANCELLED | OUTPATIENT
Start: 2024-04-03

## 2024-04-02 RX ORDER — METHYLPREDNISOLONE SODIUM SUCCINATE 125 MG/2ML
125 INJECTION, POWDER, LYOPHILIZED, FOR SOLUTION INTRAMUSCULAR; INTRAVENOUS
Status: CANCELLED
Start: 2024-04-03

## 2024-04-02 RX ADMIN — ERTAPENEM SODIUM 1 G: 1 INJECTION, POWDER, LYOPHILIZED, FOR SOLUTION INTRAMUSCULAR; INTRAVENOUS at 14:13

## 2024-04-02 RX ADMIN — DAPTOMYCIN 800 MG: 500 INJECTION, POWDER, LYOPHILIZED, FOR SOLUTION INTRAVENOUS at 14:13

## 2024-04-02 NOTE — PROGRESS NOTES
Infusion Nursing Note:  Tessa ALONSO Edwards presents today for Daptomycin and Invanz.    Patient seen by provider today: No   present during visit today: Not Applicable.    Note: N/A.      Intravenous Access:  PICC.    Treatment Conditions:  Not Applicable.      Post Infusion Assessment:  Patient tolerated infusion without incident.  Blood return noted pre and post infusion.  Site patent and intact, free from redness, edema or discomfort.  No evidence of extravasations.       Discharge Plan:   Discharge instructions reviewed with: Patient.  Patient and/or family verbalized understanding of discharge instructions and all questions answered.  AVS to patient via OkeoT.  Patient will return 4/3 for next appointment.   Patient discharged in stable condition accompanied by: son.  Departure Mode: Ambulatory.      Maria Del Rosario Foley RN

## 2024-04-03 ENCOUNTER — INFUSION THERAPY VISIT (OUTPATIENT)
Dept: INFUSION THERAPY | Facility: CLINIC | Age: 69
End: 2024-04-03
Attending: INTERNAL MEDICINE
Payer: COMMERCIAL

## 2024-04-03 VITALS
SYSTOLIC BLOOD PRESSURE: 137 MMHG | OXYGEN SATURATION: 95 % | HEART RATE: 80 BPM | RESPIRATION RATE: 16 BRPM | TEMPERATURE: 98.5 F | DIASTOLIC BLOOD PRESSURE: 58 MMHG

## 2024-04-03 DIAGNOSIS — T81.40XA POSTOPERATIVE INFECTION, UNSPECIFIED TYPE, INITIAL ENCOUNTER: Primary | ICD-10-CM

## 2024-04-03 DIAGNOSIS — Z98.1 S/P LUMBAR FUSION: ICD-10-CM

## 2024-04-03 PROCEDURE — 250N000009 HC RX 250: Performed by: INTERNAL MEDICINE

## 2024-04-03 PROCEDURE — 250N000011 HC RX IP 250 OP 636: Mod: JZ | Performed by: INTERNAL MEDICINE

## 2024-04-03 PROCEDURE — 96374 THER/PROPH/DIAG INJ IV PUSH: CPT

## 2024-04-03 PROCEDURE — 96375 TX/PRO/DX INJ NEW DRUG ADDON: CPT

## 2024-04-03 RX ORDER — DIPHENHYDRAMINE HYDROCHLORIDE 50 MG/ML
50 INJECTION INTRAMUSCULAR; INTRAVENOUS
Status: CANCELLED
Start: 2024-04-04

## 2024-04-03 RX ORDER — HEPARIN SODIUM (PORCINE) LOCK FLUSH IV SOLN 100 UNIT/ML 100 UNIT/ML
5 SOLUTION INTRAVENOUS
Status: CANCELLED | OUTPATIENT
Start: 2024-04-05

## 2024-04-03 RX ORDER — HEPARIN SODIUM,PORCINE 10 UNIT/ML
5-20 VIAL (ML) INTRAVENOUS DAILY PRN
Status: CANCELLED | OUTPATIENT
Start: 2024-04-05

## 2024-04-03 RX ORDER — HEPARIN SODIUM (PORCINE) LOCK FLUSH IV SOLN 100 UNIT/ML 100 UNIT/ML
5 SOLUTION INTRAVENOUS
Status: CANCELLED | OUTPATIENT
Start: 2024-04-04

## 2024-04-03 RX ORDER — EPINEPHRINE 1 MG/ML
0.3 INJECTION, SOLUTION INTRAMUSCULAR; SUBCUTANEOUS EVERY 5 MIN PRN
Status: CANCELLED | OUTPATIENT
Start: 2024-04-04

## 2024-04-03 RX ORDER — ALBUTEROL SULFATE 0.83 MG/ML
2.5 SOLUTION RESPIRATORY (INHALATION)
Status: CANCELLED | OUTPATIENT
Start: 2024-04-04

## 2024-04-03 RX ORDER — METHYLPREDNISOLONE SODIUM SUCCINATE 125 MG/2ML
125 INJECTION, POWDER, LYOPHILIZED, FOR SOLUTION INTRAMUSCULAR; INTRAVENOUS
Status: CANCELLED
Start: 2024-04-04

## 2024-04-03 RX ORDER — ALBUTEROL SULFATE 90 UG/1
1-2 AEROSOL, METERED RESPIRATORY (INHALATION)
Status: CANCELLED
Start: 2024-04-04

## 2024-04-03 RX ORDER — MEPERIDINE HYDROCHLORIDE 25 MG/ML
25 INJECTION INTRAMUSCULAR; INTRAVENOUS; SUBCUTANEOUS EVERY 30 MIN PRN
Status: CANCELLED | OUTPATIENT
Start: 2024-04-04

## 2024-04-03 RX ORDER — DAPTOMYCIN 50 MG/ML
8 INJECTION, POWDER, LYOPHILIZED, FOR SOLUTION INTRAVENOUS ONCE
Status: CANCELLED | OUTPATIENT
Start: 2024-04-04 | End: 2024-04-05

## 2024-04-03 RX ORDER — DAPTOMYCIN 50 MG/ML
8 INJECTION, POWDER, LYOPHILIZED, FOR SOLUTION INTRAVENOUS ONCE
Status: COMPLETED | OUTPATIENT
Start: 2024-04-03 | End: 2024-04-03

## 2024-04-03 RX ORDER — HEPARIN SODIUM,PORCINE 10 UNIT/ML
5-20 VIAL (ML) INTRAVENOUS DAILY PRN
Status: CANCELLED | OUTPATIENT
Start: 2024-04-04

## 2024-04-03 RX ADMIN — DAPTOMYCIN 800 MG: 500 INJECTION, POWDER, LYOPHILIZED, FOR SOLUTION INTRAVENOUS at 15:22

## 2024-04-03 RX ADMIN — ERTAPENEM SODIUM 1 G: 1 INJECTION, POWDER, LYOPHILIZED, FOR SOLUTION INTRAMUSCULAR; INTRAVENOUS at 15:15

## 2024-04-03 NOTE — PROGRESS NOTES
CHIEF COMPLAINT / REASON FOR VISIT  post operative visit.    ASSESSMENT/PLAN:  #1 s/p lumbar fusion for cauda equina, stenosis, and radiculopathy   #2 wound infection  #3 IV antibiotics     Patient seen in conjunction with Dr Hudson. The incision is still somewhat open on the top half with visible granulation tissue a large scab in the middle portion and good healing on the lower portion of the incision.Scab is removed and sutures were removed except over the portion of the incision that is not completely healed. We will have her see wound clinic for care next week as ordered, and come back to see us in 2 weeks.  We will hold ff on PT until she is done with her daily antibiotic infusions.       HISTORY OF PRESENT ILLNESS  Tessa Edwards is a 68 year old female who underwent medial facetectomies, right transforaminal lumbar interbody fusion and bilateral posterior instrumented fusion with bilateral L4-5 laminectomies by Dr Hudson on 2/23/24. Procedure was to treat lumbar radiculopathy, cauda equina compression and lumbar stenosis.  She then underwent wound irrigation and debridement on 3/7/24  by Dr Ambrose and removal of wound vac with wound closure on 3/11/24 by Dr Hudson. She is 6 weeks from initial surgery, and 3 weeks since her last wound revision. She has been receiving IV Daptomycin and Ertapenem daily via PICC line to treat enterobacter and staph epi infection treatment planned for 6 weeks. She has been  doing well overall, walking in and out of her appts with a walker and to and from bathroom. States that she has some imbalance  at times. She has so many doctors appointments she is quite tired at times.   WBC Count   Date Value Ref Range Status   03/28/2024 19.9 (H) 4.0 - 11.0 10e3/uL Final   3/21/2024 was 12.2       Current Outpatient Medications   Medication Sig Dispense Refill    acetaminophen (TYLENOL) 325 MG tablet Take 2 tablets (650 mg) by mouth every 4 hours as needed for other (mild pain) 100  tablet 0    amoxicillin (AMOXIL) 500 MG capsule Take 2,000 mg by mouth See Admin Instructions Pre dental (Patient not taking: Reported on 3/21/2024)      calcium-vitamin D (CALCIUM-VITAMIN D) 500 mg(1,250mg) -200 unit per tablet Take 1 tablet by mouth every evening       DAPTOmycin 600 mg Inject 600 mg into the vein every 24 hours for 40 days      ertapenem (INVANZ) 1 GM vial Inject 1 g into the vein every 24 hours for 40 days      FLUoxetine (PROZAC) 40 MG capsule Take 1 capsule by mouth once daily 90 capsule 0    gabapentin (NEURONTIN) 100 MG capsule Take 100 mg by mouth 3 times daily Plus additional 100mg at HS if needed      gabapentin (NEURONTIN) 100 MG capsule Take 100 mg by mouth nightly as needed In addition to 100mg TID      loperamide (IMODIUM) 2 MG capsule Take 2 mg by mouth 3 times daily      methocarbamol (ROBAXIN) 750 MG tablet Take 1 tablet (750 mg) by mouth every 6 hours 40 tablet 0    multivitamin w/minerals (THERA-VIT-M) tablet Take 1 tablet by mouth daily      omeprazole 20 MG tablet Take 20 mg by mouth daily      oxyCODONE (ROXICODONE) 5 MG tablet Take 1 tablet (5 mg) by mouth every 6 hours as needed for moderate pain 40 tablet 0    pseudoePHEDrine-guaiFENesin (MUCINEX D)  MG 12 hr tablet Take 1 tablet by mouth 2 times daily as needed for congestion      rOPINIRole (REQUIP) 1 MG tablet TAKE 1 TABLET BY MOUTH TWICE DAILY AND 2 AT BEDTIME 180 tablet 0    senna-docusate (SENOKOT-S/PERICOLACE) 8.6-50 MG tablet Take 1 tablet by mouth 2 times daily (Patient not taking: Reported on 3/21/2024) 40 tablet 0     No current facility-administered medications for this visit.       PHYSICAL EXAMINATION     General:  Well-appearing, non-distressed.  HEENT: Face is symmetric, hearing intact in general conversation.  Integument: Incision is healing, with a portion that remains approximated but still open with granulation tissues evident.  Musculoskeletal/Neurological: good bulk tone and strength bilateral  lower extremities, with 4/5 bilateral hip flexion and otherwise 5/5 in all other lower extremity dermatomes. Symmetric sensation to light touch.    Patient is in agreement with this plan.  There were no barriers to this discussion.

## 2024-04-03 NOTE — PROGRESS NOTES
Infusion Nursing Note:  Tessa GUPTA Grace presents today for Daptomycin and Ertapenem.    Patient seen by provider today: No   present during visit today: Not Applicable.    Note: Due for labs and dressing change tomorrow.      Intravenous Access:  PICC.    Treatment Conditions:  Not Applicable.      Post Infusion Assessment:  Patient tolerated infusion without incident.       Discharge Plan:   AVS to patient via MYCHART.  Patient will return 4/4/24 for next appointment.   Patient discharged in stable condition accompanied by: son.  Departure Mode: Ambulatory.      Rola Senior RN

## 2024-04-04 ENCOUNTER — HOSPITAL ENCOUNTER (OUTPATIENT)
Dept: GENERAL RADIOLOGY | Facility: HOSPITAL | Age: 69
Discharge: HOME | End: 2024-04-04
Attending: NURSE PRACTITIONER | Admitting: NURSE PRACTITIONER
Payer: COMMERCIAL

## 2024-04-04 ENCOUNTER — OFFICE VISIT (OUTPATIENT)
Dept: NEUROSURGERY | Facility: CLINIC | Age: 69
End: 2024-04-04
Payer: COMMERCIAL

## 2024-04-04 ENCOUNTER — INFUSION THERAPY VISIT (OUTPATIENT)
Dept: INFUSION THERAPY | Facility: CLINIC | Age: 69
End: 2024-04-04
Attending: INTERNAL MEDICINE
Payer: COMMERCIAL

## 2024-04-04 VITALS
SYSTOLIC BLOOD PRESSURE: 153 MMHG | RESPIRATION RATE: 16 BRPM | TEMPERATURE: 98.2 F | OXYGEN SATURATION: 98 % | HEART RATE: 82 BPM | DIASTOLIC BLOOD PRESSURE: 76 MMHG

## 2024-04-04 VITALS
BODY MASS INDEX: 38.93 KG/M2 | DIASTOLIC BLOOD PRESSURE: 65 MMHG | HEART RATE: 86 BPM | HEIGHT: 64 IN | SYSTOLIC BLOOD PRESSURE: 145 MMHG | WEIGHT: 228 LBS | OXYGEN SATURATION: 95 %

## 2024-04-04 DIAGNOSIS — M48.062 SPINAL STENOSIS OF LUMBAR REGION WITH NEUROGENIC CLAUDICATION: ICD-10-CM

## 2024-04-04 DIAGNOSIS — Z98.1 S/P LUMBAR FUSION: ICD-10-CM

## 2024-04-04 DIAGNOSIS — M46.26 INFECTION OF LUMBAR SPINE (H): ICD-10-CM

## 2024-04-04 DIAGNOSIS — G83.4 CAUDA EQUINA COMPRESSION (H): Primary | ICD-10-CM

## 2024-04-04 DIAGNOSIS — T81.40XA POSTOPERATIVE INFECTION, UNSPECIFIED TYPE, INITIAL ENCOUNTER: Primary | ICD-10-CM

## 2024-04-04 LAB
ALBUMIN SERPL BCG-MCNC: 3.9 G/DL (ref 3.5–5.2)
ALP SERPL-CCNC: 103 U/L (ref 40–150)
ALT SERPL W P-5'-P-CCNC: 17 U/L (ref 0–50)
ANION GAP SERPL CALCULATED.3IONS-SCNC: 12 MMOL/L (ref 7–15)
AST SERPL W P-5'-P-CCNC: 15 U/L (ref 0–45)
BACTERIA FLD CULT: NO GROWTH
BACTERIA TISS BX CULT: NO GROWTH
BACTERIA TISS BX CULT: NO GROWTH
BASOPHILS # BLD AUTO: ABNORMAL 10*3/UL
BASOPHILS # BLD MANUAL: 0 10E3/UL (ref 0–0.2)
BASOPHILS NFR BLD AUTO: ABNORMAL %
BASOPHILS NFR BLD MANUAL: 0 %
BILIRUB SERPL-MCNC: 0.4 MG/DL
BUN SERPL-MCNC: 17.8 MG/DL (ref 8–23)
CALCIUM SERPL-MCNC: 9.6 MG/DL (ref 8.8–10.2)
CHLORIDE SERPL-SCNC: 104 MMOL/L (ref 98–107)
CK SERPL-CCNC: 56 U/L (ref 26–192)
CREAT SERPL-MCNC: 0.76 MG/DL (ref 0.51–0.95)
DEPRECATED HCO3 PLAS-SCNC: 24 MMOL/L (ref 22–29)
EGFRCR SERPLBLD CKD-EPI 2021: 84 ML/MIN/1.73M2
EOSINOPHIL # BLD AUTO: ABNORMAL 10*3/UL
EOSINOPHIL # BLD MANUAL: 2.1 10E3/UL (ref 0–0.7)
EOSINOPHIL NFR BLD AUTO: ABNORMAL %
EOSINOPHIL NFR BLD MANUAL: 11 %
ERYTHROCYTE [DISTWIDTH] IN BLOOD BY AUTOMATED COUNT: 13.4 % (ref 10–15)
GLUCOSE SERPL-MCNC: 151 MG/DL (ref 70–99)
HCT VFR BLD AUTO: 36.2 % (ref 35–47)
HGB BLD-MCNC: 11.1 G/DL (ref 11.7–15.7)
IMM GRANULOCYTES # BLD: ABNORMAL 10*3/UL
IMM GRANULOCYTES NFR BLD: ABNORMAL %
LYMPHOCYTES # BLD AUTO: ABNORMAL 10*3/UL
LYMPHOCYTES # BLD MANUAL: 9.4 10E3/UL (ref 0.8–5.3)
LYMPHOCYTES NFR BLD AUTO: ABNORMAL %
LYMPHOCYTES NFR BLD MANUAL: 49 %
MCH RBC QN AUTO: 25.7 PG (ref 26.5–33)
MCHC RBC AUTO-ENTMCNC: 30.7 G/DL (ref 31.5–36.5)
MCV RBC AUTO: 84 FL (ref 78–100)
MONOCYTES # BLD AUTO: ABNORMAL 10*3/UL
MONOCYTES # BLD MANUAL: 0 10E3/UL (ref 0–1.3)
MONOCYTES NFR BLD AUTO: ABNORMAL %
MONOCYTES NFR BLD MANUAL: 0 %
NEUTROPHILS # BLD AUTO: ABNORMAL 10*3/UL
NEUTROPHILS # BLD MANUAL: 7.7 10E3/UL (ref 1.6–8.3)
NEUTROPHILS NFR BLD AUTO: ABNORMAL %
NEUTROPHILS NFR BLD MANUAL: 40 %
NRBC # BLD AUTO: 0 10E3/UL
NRBC BLD AUTO-RTO: 0 /100
PLAT MORPH BLD: ABNORMAL
PLATELET # BLD AUTO: 270 10E3/UL (ref 150–450)
POTASSIUM SERPL-SCNC: 4 MMOL/L (ref 3.4–5.3)
PROT SERPL-MCNC: 6.5 G/DL (ref 6.4–8.3)
RBC # BLD AUTO: 4.32 10E6/UL (ref 3.8–5.2)
RBC MORPH BLD: ABNORMAL
SODIUM SERPL-SCNC: 140 MMOL/L (ref 135–145)
WBC # BLD AUTO: 19.2 10E3/UL (ref 4–11)

## 2024-04-04 PROCEDURE — 85027 COMPLETE CBC AUTOMATED: CPT | Performed by: INTERNAL MEDICINE

## 2024-04-04 PROCEDURE — 36415 COLL VENOUS BLD VENIPUNCTURE: CPT | Performed by: INTERNAL MEDICINE

## 2024-04-04 PROCEDURE — 72100 X-RAY EXAM L-S SPINE 2/3 VWS: CPT

## 2024-04-04 PROCEDURE — 86140 C-REACTIVE PROTEIN: CPT | Performed by: INTERNAL MEDICINE

## 2024-04-04 PROCEDURE — 96375 TX/PRO/DX INJ NEW DRUG ADDON: CPT

## 2024-04-04 PROCEDURE — 96374 THER/PROPH/DIAG INJ IV PUSH: CPT

## 2024-04-04 PROCEDURE — 99024 POSTOP FOLLOW-UP VISIT: CPT | Performed by: NURSE PRACTITIONER

## 2024-04-04 PROCEDURE — 85007 BL SMEAR W/DIFF WBC COUNT: CPT | Performed by: INTERNAL MEDICINE

## 2024-04-04 PROCEDURE — 250N000009 HC RX 250: Performed by: INTERNAL MEDICINE

## 2024-04-04 PROCEDURE — 80053 COMPREHEN METABOLIC PANEL: CPT | Performed by: INTERNAL MEDICINE

## 2024-04-04 PROCEDURE — 82550 ASSAY OF CK (CPK): CPT | Performed by: INTERNAL MEDICINE

## 2024-04-04 PROCEDURE — 250N000011 HC RX IP 250 OP 636: Mod: JZ | Performed by: INTERNAL MEDICINE

## 2024-04-04 RX ORDER — HEPARIN SODIUM (PORCINE) LOCK FLUSH IV SOLN 100 UNIT/ML 100 UNIT/ML
5 SOLUTION INTRAVENOUS
Status: CANCELLED | OUTPATIENT
Start: 2024-04-05

## 2024-04-04 RX ORDER — HEPARIN SODIUM,PORCINE 10 UNIT/ML
5-20 VIAL (ML) INTRAVENOUS DAILY PRN
Status: CANCELLED | OUTPATIENT
Start: 2024-04-05

## 2024-04-04 RX ORDER — METHYLPREDNISOLONE SODIUM SUCCINATE 125 MG/2ML
125 INJECTION, POWDER, LYOPHILIZED, FOR SOLUTION INTRAMUSCULAR; INTRAVENOUS
Status: CANCELLED
Start: 2024-04-05

## 2024-04-04 RX ORDER — DAPTOMYCIN 50 MG/ML
8 INJECTION, POWDER, LYOPHILIZED, FOR SOLUTION INTRAVENOUS ONCE
Status: COMPLETED | OUTPATIENT
Start: 2024-04-04 | End: 2024-04-04

## 2024-04-04 RX ORDER — DIPHENHYDRAMINE HYDROCHLORIDE 50 MG/ML
50 INJECTION INTRAMUSCULAR; INTRAVENOUS
Status: CANCELLED
Start: 2024-04-05

## 2024-04-04 RX ORDER — EPINEPHRINE 1 MG/ML
0.3 INJECTION, SOLUTION INTRAMUSCULAR; SUBCUTANEOUS EVERY 5 MIN PRN
Status: CANCELLED | OUTPATIENT
Start: 2024-04-05

## 2024-04-04 RX ORDER — ALBUTEROL SULFATE 90 UG/1
1-2 AEROSOL, METERED RESPIRATORY (INHALATION)
Status: CANCELLED
Start: 2024-04-05

## 2024-04-04 RX ORDER — ALBUTEROL SULFATE 0.83 MG/ML
2.5 SOLUTION RESPIRATORY (INHALATION)
Status: CANCELLED | OUTPATIENT
Start: 2024-04-05

## 2024-04-04 RX ORDER — MEPERIDINE HYDROCHLORIDE 25 MG/ML
25 INJECTION INTRAMUSCULAR; INTRAVENOUS; SUBCUTANEOUS EVERY 30 MIN PRN
Status: CANCELLED | OUTPATIENT
Start: 2024-04-05

## 2024-04-04 RX ORDER — DAPTOMYCIN 50 MG/ML
8 INJECTION, POWDER, LYOPHILIZED, FOR SOLUTION INTRAVENOUS ONCE
Status: CANCELLED | OUTPATIENT
Start: 2024-04-05 | End: 2024-04-05

## 2024-04-04 RX ADMIN — ERTAPENEM SODIUM 1 G: 1 INJECTION, POWDER, LYOPHILIZED, FOR SOLUTION INTRAMUSCULAR; INTRAVENOUS at 15:03

## 2024-04-04 RX ADMIN — DAPTOMYCIN 800 MG: 500 INJECTION, POWDER, LYOPHILIZED, FOR SOLUTION INTRAVENOUS at 14:58

## 2024-04-04 ASSESSMENT — PAIN SCALES - GENERAL: PAINLEVEL: MILD PAIN (2)

## 2024-04-04 NOTE — PROGRESS NOTES
Infusion Nursing Note:  Tessa Edwards presents today for IV antibotics, labs and central line dressing change.    Patient seen by provider today: No   present during visit today: Not Applicable.    Note: N/A.      Intravenous Access:  PICC.    Treatment Conditions:  Not Applicable.      Post Infusion Assessment:  Patient tolerated injection without incident.  Patient observed for 15 minutes post antibiotic  per protocol.  Blood return noted pre and post infusion.  Site patent and intact, free from redness, edema or discomfort.  No evidence of extravasations.       Discharge Plan:   Discharge instructions reviewed with: Patient.  Patient and/or family verbalized understanding of discharge instructions and all questions answered.  AVS to patient via [employer] Kh[employer].  Patient will return 4/5/24 for next appointment.   Patient discharged in stable condition accompanied by: self.  Departure Mode: Ambulatory.      Kari Schoen, RN

## 2024-04-04 NOTE — LETTER
4/4/2024         RE: Tessa Edwards  [address] [facility] [address]        Dear Colleague,    Thank you for referring your patient, Tessa Edwards, to the [facility]. Please see a copy of my visit note below.    CHIEF COMPLAINT / REASON FOR VISIT  post operative visit.    ASSESSMENT/PLAN:  #1 s/p lumbar fusion for cauda equina, stenosis, and radiculopathy   #2 wound infection  #3 IV antibiotics     Patient seen in conjunction with Dr Hudson. The incision is still somewhat open on the top half with visible granulation tissue a large scab in the middle portion and good healing on the lower portion of the incision.Scab is removed and sutures were removed except over the portion of the incision that is not completely healed. We will have her see wound clinic for care next week as ordered, and come back to see us in 2 weeks.  We will hold ff on PT until she is done with her daily antibiotic infusions.       HISTORY OF PRESENT ILLNESS  Tessa Edwards is a 68 year old female who underwent medial facetectomies, right transforaminal lumbar interbody fusion and bilateral posterior instrumented fusion with bilateral L4-5 laminectomies by Dr Hudson on 2/23/24. Procedure was to treat lumbar radiculopathy, cauda equina compression and lumbar stenosis.  She then underwent wound irrigation and debridement on 3/7/24  by Dr Ambrose and removal of wound vac with wound closure on 3/11/24 by Dr Hudson. She is 6 weeks from initial surgery, and 3 weeks since her last wound revision. She has been receiving IV Daptomycin and Ertapenem daily via PICC line to treat enterobacter and staph epi infection treatment planned for 6 weeks. She has been  doing well overall, walking in and out of her appts with a walker and to and from bathroom. States that she has some imbalance  at times. She has so many doctors appointments she is quite tired at times.   WBC Count   Date Value Ref Range  Status   03/28/2024 19.9 (H) 4.0 - 11.0 10e3/uL Final   3/21/2024 was 12.2       Current Outpatient Medications   Medication Sig Dispense Refill     acetaminophen (TYLENOL) 325 MG tablet Take 2 tablets (650 mg) by mouth every 4 hours as needed for other (mild pain) 100 tablet 0     amoxicillin (AMOXIL) 500 MG capsule Take 2,000 mg by mouth See Admin Instructions Pre dental (Patient not taking: Reported on 3/21/2024)       calcium-vitamin D (CALCIUM-VITAMIN D) 500 mg(1,250mg) -200 unit per tablet Take 1 tablet by mouth every evening        DAPTOmycin 600 mg Inject 600 mg into the vein every 24 hours for 40 days       ertapenem (INVANZ) 1 GM vial Inject 1 g into the vein every 24 hours for 40 days       FLUoxetine (PROZAC) 40 MG capsule Take 1 capsule by mouth once daily 90 capsule 0     gabapentin (NEURONTIN) 100 MG capsule Take 100 mg by mouth 3 times daily Plus additional 100mg at HS if needed       gabapentin (NEURONTIN) 100 MG capsule Take 100 mg by mouth nightly as needed In addition to 100mg TID       loperamide (IMODIUM) 2 MG capsule Take 2 mg by mouth 3 times daily       methocarbamol (ROBAXIN) 750 MG tablet Take 1 tablet (750 mg) by mouth every 6 hours 40 tablet 0     multivitamin w/minerals (THERA-VIT-M) tablet Take 1 tablet by mouth daily       omeprazole 20 MG tablet Take 20 mg by mouth daily       oxyCODONE (ROXICODONE) 5 MG tablet Take 1 tablet (5 mg) by mouth every 6 hours as needed for moderate pain 40 tablet 0     pseudoePHEDrine-guaiFENesin (MUCINEX D)  MG 12 hr tablet Take 1 tablet by mouth 2 times daily as needed for congestion       rOPINIRole (REQUIP) 1 MG tablet TAKE 1 TABLET BY MOUTH TWICE DAILY AND 2 AT BEDTIME 180 tablet 0     senna-docusate (SENOKOT-S/PERICOLACE) 8.6-50 MG tablet Take 1 tablet by mouth 2 times daily (Patient not taking: Reported on 3/21/2024) 40 tablet 0     No current facility-administered medications for this visit.       PHYSICAL EXAMINATION     General:   Well-appearing, non-distressed.  HEENT: Face is symmetric, hearing intact in general conversation.  Integument: Incision is healing, with a portion that remains approximated but still open with granulation tissues evident.  Musculoskeletal/Neurological: good bulk tone and strength bilateral lower extremities, with 4/5 bilateral hip flexion and otherwise 5/5 in all other lower extremity dermatomes. Symmetric sensation to light touch.    Patient is in agreement with this plan.  There were no barriers to this discussion.      Again, thank you for allowing me to participate in the care of your patient.        Sincerely,        FELIPE Kaplan CNP

## 2024-04-04 NOTE — NURSING NOTE
"Tessa Edwards is a 68 year old female who presents for:  Chief Complaint   Patient presents with    RECHECK     Lumbar fusion, 6 week post op. Patient states no pain usually, but today is up to a 2.        Initial Vitals:  BP (!) 145/65   Pulse 86   Ht 5' 4\" (1.626 m)   Wt 228 lb (103.4 kg)   SpO2 95%   BMI 39.14 kg/m   Estimated body mass index is 39.14 kg/m  as calculated from the following:    Height as of this encounter: 5' 4\" (1.626 m).    Weight as of this encounter: 228 lb (103.4 kg).. Body surface area is 2.16 meters squared. BP completed using cuff size: regular  Mild Pain (2)        Delroy Foley    "

## 2024-04-05 ENCOUNTER — INFUSION THERAPY VISIT (OUTPATIENT)
Dept: INFUSION THERAPY | Facility: CLINIC | Age: 69
End: 2024-04-05
Attending: INTERNAL MEDICINE
Payer: COMMERCIAL

## 2024-04-05 VITALS
OXYGEN SATURATION: 95 % | HEART RATE: 86 BPM | SYSTOLIC BLOOD PRESSURE: 100 MMHG | TEMPERATURE: 99.1 F | RESPIRATION RATE: 16 BRPM | DIASTOLIC BLOOD PRESSURE: 50 MMHG

## 2024-04-05 DIAGNOSIS — Z98.1 S/P LUMBAR FUSION: ICD-10-CM

## 2024-04-05 DIAGNOSIS — T81.40XA POSTOPERATIVE INFECTION, UNSPECIFIED TYPE, INITIAL ENCOUNTER: Primary | ICD-10-CM

## 2024-04-05 DIAGNOSIS — M46.26 INFECTION OF LUMBAR SPINE (H): Primary | ICD-10-CM

## 2024-04-05 LAB — CRP SERPL-MCNC: 25.88 MG/L

## 2024-04-05 PROCEDURE — 250N000009 HC RX 250: Performed by: INTERNAL MEDICINE

## 2024-04-05 PROCEDURE — 96374 THER/PROPH/DIAG INJ IV PUSH: CPT

## 2024-04-05 PROCEDURE — 250N000011 HC RX IP 250 OP 636: Performed by: INTERNAL MEDICINE

## 2024-04-05 PROCEDURE — 96375 TX/PRO/DX INJ NEW DRUG ADDON: CPT

## 2024-04-05 RX ORDER — ALBUTEROL SULFATE 90 UG/1
1-2 AEROSOL, METERED RESPIRATORY (INHALATION)
Status: CANCELLED
Start: 2024-04-06

## 2024-04-05 RX ORDER — METHYLPREDNISOLONE SODIUM SUCCINATE 125 MG/2ML
125 INJECTION, POWDER, LYOPHILIZED, FOR SOLUTION INTRAMUSCULAR; INTRAVENOUS
Status: CANCELLED
Start: 2024-04-06

## 2024-04-05 RX ORDER — DAPTOMYCIN 50 MG/ML
8 INJECTION, POWDER, LYOPHILIZED, FOR SOLUTION INTRAVENOUS ONCE
Status: CANCELLED | OUTPATIENT
Start: 2024-04-12 | End: 2024-04-12

## 2024-04-05 RX ORDER — EPINEPHRINE 1 MG/ML
0.3 INJECTION, SOLUTION INTRAMUSCULAR; SUBCUTANEOUS EVERY 5 MIN PRN
Status: CANCELLED | OUTPATIENT
Start: 2024-04-06

## 2024-04-05 RX ORDER — HEPARIN SODIUM (PORCINE) LOCK FLUSH IV SOLN 100 UNIT/ML 100 UNIT/ML
5 SOLUTION INTRAVENOUS
Status: CANCELLED | OUTPATIENT
Start: 2024-04-12

## 2024-04-05 RX ORDER — ALBUTEROL SULFATE 0.83 MG/ML
2.5 SOLUTION RESPIRATORY (INHALATION)
Status: CANCELLED | OUTPATIENT
Start: 2024-04-06

## 2024-04-05 RX ORDER — HEPARIN SODIUM (PORCINE) LOCK FLUSH IV SOLN 100 UNIT/ML 100 UNIT/ML
5 SOLUTION INTRAVENOUS
Status: CANCELLED | OUTPATIENT
Start: 2024-04-06

## 2024-04-05 RX ORDER — DAPTOMYCIN 50 MG/ML
8 INJECTION, POWDER, LYOPHILIZED, FOR SOLUTION INTRAVENOUS ONCE
Status: COMPLETED | OUTPATIENT
Start: 2024-04-05 | End: 2024-04-05

## 2024-04-05 RX ORDER — HEPARIN SODIUM,PORCINE 10 UNIT/ML
5-20 VIAL (ML) INTRAVENOUS DAILY PRN
Status: CANCELLED | OUTPATIENT
Start: 2024-04-06

## 2024-04-05 RX ORDER — DIPHENHYDRAMINE HYDROCHLORIDE 50 MG/ML
50 INJECTION INTRAMUSCULAR; INTRAVENOUS
Status: CANCELLED
Start: 2024-04-06

## 2024-04-05 RX ORDER — HEPARIN SODIUM,PORCINE 10 UNIT/ML
5-20 VIAL (ML) INTRAVENOUS DAILY PRN
Status: CANCELLED | OUTPATIENT
Start: 2024-04-12

## 2024-04-05 RX ORDER — MEPERIDINE HYDROCHLORIDE 25 MG/ML
25 INJECTION INTRAMUSCULAR; INTRAVENOUS; SUBCUTANEOUS EVERY 30 MIN PRN
Status: CANCELLED | OUTPATIENT
Start: 2024-04-06

## 2024-04-05 RX ADMIN — ERTAPENEM SODIUM 1 G: 1 INJECTION, POWDER, LYOPHILIZED, FOR SOLUTION INTRAMUSCULAR; INTRAVENOUS at 14:13

## 2024-04-05 RX ADMIN — DAPTOMYCIN 800 MG: 500 INJECTION, POWDER, LYOPHILIZED, FOR SOLUTION INTRAVENOUS at 14:20

## 2024-04-05 NOTE — PROGRESS NOTES
Infusion Nursing Note:  Tessa Edwards presents today for IV Daptomycin/Invanz.    Patient seen by provider today: No   present during visit today: Not Applicable.    Note: N/A.      Intravenous Access:  PICC.    Treatment Conditions:  Not Applicable.      Post Infusion Assessment:  Patient tolerated infusion without incident.  Blood return noted pre and post infusion.  Site patent and intact, free from redness, edema or discomfort.  No evidence of extravasations.       Discharge Plan:   Discharge instructions reviewed with: Patient.  Patient and/or family verbalized understanding of discharge instructions and all questions answered.  AVS to patient via [employer].  Patient will return 4/6/24 for next appointment at [place].   Patient discharged in stable condition accompanied by: self.  Departure Mode: Ambulatory with cane.      Libia Shankar RN

## 2024-04-06 ENCOUNTER — INFUSION THERAPY VISIT (OUTPATIENT)
Dept: INFUSION THERAPY | Facility: CLINIC | Age: 69
End: 2024-04-06
Attending: INTERNAL MEDICINE
Payer: COMMERCIAL

## 2024-04-06 VITALS
RESPIRATION RATE: 16 BRPM | OXYGEN SATURATION: 98 % | SYSTOLIC BLOOD PRESSURE: 116 MMHG | DIASTOLIC BLOOD PRESSURE: 59 MMHG | TEMPERATURE: 98.2 F | HEART RATE: 85 BPM

## 2024-04-06 DIAGNOSIS — Z98.1 S/P LUMBAR FUSION: ICD-10-CM

## 2024-04-06 DIAGNOSIS — T81.40XA POSTOPERATIVE INFECTION, UNSPECIFIED TYPE, INITIAL ENCOUNTER: Primary | ICD-10-CM

## 2024-04-06 PROCEDURE — 250N000011 HC RX IP 250 OP 636: Performed by: INTERNAL MEDICINE

## 2024-04-06 PROCEDURE — 250N000009 HC RX 250: Performed by: INTERNAL MEDICINE

## 2024-04-06 PROCEDURE — 96375 TX/PRO/DX INJ NEW DRUG ADDON: CPT

## 2024-04-06 PROCEDURE — 96374 THER/PROPH/DIAG INJ IV PUSH: CPT

## 2024-04-06 RX ORDER — HEPARIN SODIUM (PORCINE) LOCK FLUSH IV SOLN 100 UNIT/ML 100 UNIT/ML
5 SOLUTION INTRAVENOUS
Status: CANCELLED | OUTPATIENT
Start: 2024-04-07

## 2024-04-06 RX ORDER — HEPARIN SODIUM (PORCINE) LOCK FLUSH IV SOLN 100 UNIT/ML 100 UNIT/ML
5 SOLUTION INTRAVENOUS
Status: DISCONTINUED | OUTPATIENT
Start: 2024-04-06 | End: 2024-04-06 | Stop reason: HOSPADM

## 2024-04-06 RX ORDER — HEPARIN SODIUM,PORCINE 10 UNIT/ML
5-20 VIAL (ML) INTRAVENOUS DAILY PRN
Status: CANCELLED | OUTPATIENT
Start: 2024-04-07

## 2024-04-06 RX ORDER — DAPTOMYCIN 50 MG/ML
8 INJECTION, POWDER, LYOPHILIZED, FOR SOLUTION INTRAVENOUS ONCE
Status: COMPLETED | OUTPATIENT
Start: 2024-04-06 | End: 2024-04-06

## 2024-04-06 RX ORDER — EPINEPHRINE 1 MG/ML
0.3 INJECTION, SOLUTION INTRAMUSCULAR; SUBCUTANEOUS EVERY 5 MIN PRN
Status: CANCELLED | OUTPATIENT
Start: 2024-04-07

## 2024-04-06 RX ORDER — ALBUTEROL SULFATE 0.83 MG/ML
2.5 SOLUTION RESPIRATORY (INHALATION)
Status: CANCELLED | OUTPATIENT
Start: 2024-04-07

## 2024-04-06 RX ORDER — HEPARIN SODIUM (PORCINE) LOCK FLUSH IV SOLN 100 UNIT/ML 100 UNIT/ML
5 SOLUTION INTRAVENOUS
Status: CANCELLED | OUTPATIENT
Start: 2024-04-12

## 2024-04-06 RX ORDER — METHYLPREDNISOLONE SODIUM SUCCINATE 125 MG/2ML
125 INJECTION, POWDER, LYOPHILIZED, FOR SOLUTION INTRAMUSCULAR; INTRAVENOUS
Status: CANCELLED
Start: 2024-04-07

## 2024-04-06 RX ORDER — ALBUTEROL SULFATE 90 UG/1
1-2 AEROSOL, METERED RESPIRATORY (INHALATION)
Status: CANCELLED
Start: 2024-04-07

## 2024-04-06 RX ORDER — MEPERIDINE HYDROCHLORIDE 25 MG/ML
25 INJECTION INTRAMUSCULAR; INTRAVENOUS; SUBCUTANEOUS EVERY 30 MIN PRN
Status: CANCELLED | OUTPATIENT
Start: 2024-04-07

## 2024-04-06 RX ORDER — HEPARIN SODIUM,PORCINE 10 UNIT/ML
5-20 VIAL (ML) INTRAVENOUS DAILY PRN
Status: CANCELLED | OUTPATIENT
Start: 2024-04-12

## 2024-04-06 RX ORDER — DIPHENHYDRAMINE HYDROCHLORIDE 50 MG/ML
50 INJECTION INTRAMUSCULAR; INTRAVENOUS
Status: CANCELLED
Start: 2024-04-07

## 2024-04-06 RX ORDER — HEPARIN SODIUM,PORCINE 10 UNIT/ML
5-20 VIAL (ML) INTRAVENOUS DAILY PRN
Status: DISCONTINUED | OUTPATIENT
Start: 2024-04-06 | End: 2024-04-06 | Stop reason: HOSPADM

## 2024-04-06 RX ORDER — DAPTOMYCIN 50 MG/ML
8 INJECTION, POWDER, LYOPHILIZED, FOR SOLUTION INTRAVENOUS ONCE
Status: CANCELLED | OUTPATIENT
Start: 2024-04-12 | End: 2024-04-12

## 2024-04-06 RX ADMIN — DAPTOMYCIN 800 MG: 500 INJECTION, POWDER, LYOPHILIZED, FOR SOLUTION INTRAVENOUS at 13:07

## 2024-04-06 RX ADMIN — ERTAPENEM SODIUM 1 G: 1 INJECTION, POWDER, LYOPHILIZED, FOR SOLUTION INTRAMUSCULAR; INTRAVENOUS at 12:39

## 2024-04-06 NOTE — PROGRESS NOTES
Infusion Nursing Note:  Tessa Edwards presents today for Daptomycin+Invanz.    Patient seen by provider today: No   present during visit today: Not Applicable.    Note: Pt reports no new health changes or concerns.      Intravenous Access:  PICC.    Treatment Conditions:  Not Applicable.      Post Infusion Assessment:  Patient tolerated infusions without incident.  Blood return noted pre and post infusion.  Site patent and intact, free from redness, edema or discomfort.  No evidence of extravasations.  Access discontinued per protocol.       Discharge Plan:   Patient declined prescription refills.  Discharge instructions reviewed with: Patient and Family.  Patient and/or family verbalized understanding of discharge instructions and all questions answered.  AVS to patient via Mine[employer]T.  Patient will return 4/7/24 for next appointment.   Patient discharged in stable condition accompanied by: son.  Departure Mode: Ambulatory.      Sofie Reyes RN

## 2024-04-07 ENCOUNTER — INFUSION THERAPY VISIT (OUTPATIENT)
Dept: INFUSION THERAPY | Facility: CLINIC | Age: 69
End: 2024-04-07
Attending: INTERNAL MEDICINE
Payer: COMMERCIAL

## 2024-04-07 VITALS
HEART RATE: 79 BPM | RESPIRATION RATE: 16 BRPM | TEMPERATURE: 98.2 F | SYSTOLIC BLOOD PRESSURE: 139 MMHG | DIASTOLIC BLOOD PRESSURE: 84 MMHG

## 2024-04-07 DIAGNOSIS — T81.40XA POSTOPERATIVE INFECTION, UNSPECIFIED TYPE, INITIAL ENCOUNTER: Primary | ICD-10-CM

## 2024-04-07 DIAGNOSIS — Z98.1 S/P LUMBAR FUSION: ICD-10-CM

## 2024-04-07 PROCEDURE — 250N000009 HC RX 250: Performed by: INTERNAL MEDICINE

## 2024-04-07 PROCEDURE — 250N000011 HC RX IP 250 OP 636: Mod: JW | Performed by: INTERNAL MEDICINE

## 2024-04-07 PROCEDURE — 96374 THER/PROPH/DIAG INJ IV PUSH: CPT

## 2024-04-07 PROCEDURE — 96375 TX/PRO/DX INJ NEW DRUG ADDON: CPT

## 2024-04-07 RX ORDER — MEPERIDINE HYDROCHLORIDE 25 MG/ML
25 INJECTION INTRAMUSCULAR; INTRAVENOUS; SUBCUTANEOUS EVERY 30 MIN PRN
Status: CANCELLED | OUTPATIENT
Start: 2024-04-08

## 2024-04-07 RX ORDER — HEPARIN SODIUM (PORCINE) LOCK FLUSH IV SOLN 100 UNIT/ML 100 UNIT/ML
5 SOLUTION INTRAVENOUS
Status: CANCELLED | OUTPATIENT
Start: 2024-04-12

## 2024-04-07 RX ORDER — HEPARIN SODIUM (PORCINE) LOCK FLUSH IV SOLN 100 UNIT/ML 100 UNIT/ML
5 SOLUTION INTRAVENOUS
Status: CANCELLED | OUTPATIENT
Start: 2024-04-08

## 2024-04-07 RX ORDER — HEPARIN SODIUM,PORCINE 10 UNIT/ML
5-20 VIAL (ML) INTRAVENOUS DAILY PRN
Status: DISCONTINUED | OUTPATIENT
Start: 2024-04-07 | End: 2024-04-07 | Stop reason: HOSPADM

## 2024-04-07 RX ORDER — HEPARIN SODIUM,PORCINE 10 UNIT/ML
5-20 VIAL (ML) INTRAVENOUS DAILY PRN
Status: CANCELLED | OUTPATIENT
Start: 2024-04-12

## 2024-04-07 RX ORDER — ALBUTEROL SULFATE 90 UG/1
1-2 AEROSOL, METERED RESPIRATORY (INHALATION)
Status: CANCELLED
Start: 2024-04-08

## 2024-04-07 RX ORDER — EPINEPHRINE 1 MG/ML
0.3 INJECTION, SOLUTION INTRAMUSCULAR; SUBCUTANEOUS EVERY 5 MIN PRN
Status: CANCELLED | OUTPATIENT
Start: 2024-04-08

## 2024-04-07 RX ORDER — DAPTOMYCIN 50 MG/ML
8 INJECTION, POWDER, LYOPHILIZED, FOR SOLUTION INTRAVENOUS ONCE
Status: CANCELLED | OUTPATIENT
Start: 2024-04-08 | End: 2024-04-12

## 2024-04-07 RX ORDER — ALBUTEROL SULFATE 0.83 MG/ML
2.5 SOLUTION RESPIRATORY (INHALATION)
Status: CANCELLED | OUTPATIENT
Start: 2024-04-08

## 2024-04-07 RX ORDER — DAPTOMYCIN 50 MG/ML
8 INJECTION, POWDER, LYOPHILIZED, FOR SOLUTION INTRAVENOUS ONCE
Status: COMPLETED | OUTPATIENT
Start: 2024-04-07 | End: 2024-04-07

## 2024-04-07 RX ORDER — HEPARIN SODIUM,PORCINE 10 UNIT/ML
5-20 VIAL (ML) INTRAVENOUS DAILY PRN
Status: CANCELLED | OUTPATIENT
Start: 2024-04-08

## 2024-04-07 RX ORDER — METHYLPREDNISOLONE SODIUM SUCCINATE 125 MG/2ML
125 INJECTION, POWDER, LYOPHILIZED, FOR SOLUTION INTRAMUSCULAR; INTRAVENOUS
Status: CANCELLED
Start: 2024-04-08

## 2024-04-07 RX ORDER — DIPHENHYDRAMINE HYDROCHLORIDE 50 MG/ML
50 INJECTION INTRAMUSCULAR; INTRAVENOUS
Status: CANCELLED
Start: 2024-04-08

## 2024-04-07 RX ORDER — HEPARIN SODIUM (PORCINE) LOCK FLUSH IV SOLN 100 UNIT/ML 100 UNIT/ML
5 SOLUTION INTRAVENOUS
Status: DISCONTINUED | OUTPATIENT
Start: 2024-04-07 | End: 2024-04-07 | Stop reason: HOSPADM

## 2024-04-07 RX ADMIN — ERTAPENEM SODIUM 1 G: 1 INJECTION, POWDER, LYOPHILIZED, FOR SOLUTION INTRAMUSCULAR; INTRAVENOUS at 12:43

## 2024-04-07 RX ADMIN — DAPTOMYCIN 800 MG: 500 INJECTION, POWDER, LYOPHILIZED, FOR SOLUTION INTRAVENOUS at 12:43

## 2024-04-07 NOTE — PROGRESS NOTES
Infusion Nursing Note:  Tessa Edwards presents today for Daptomycin+Invanz.    Patient seen by provider today: No   present during visit today: Not Applicable.     Note: Pt reports no new health changes or concerns.        Intravenous Access:  PICC.     Treatment Conditions:  Not Applicable.        Post Infusion Assessment:  Patient tolerated infusions without incident.  Blood return noted pre and post infusion.  Site patent and intact, free from redness, edema or discomfort.  No evidence of extravasations.  Access discontinued per protocol.         Discharge Plan:   Patient declined prescription refills.  Discharge instructions reviewed with: Patient and Family.  Patient and/or family verbalized understanding of discharge instructions and all questions answered.  AVS to patient via [employer]air[employer]T.  Patient will return 4/13/24 for next appointment.   Patient discharged in stable condition accompanied by: son.  Departure Mode: Ambulatory.        Sofie Reyes RN

## 2024-04-08 ENCOUNTER — INFUSION THERAPY VISIT (OUTPATIENT)
Dept: INFUSION THERAPY | Facility: CLINIC | Age: 69
End: 2024-04-08
Attending: INTERNAL MEDICINE
Payer: COMMERCIAL

## 2024-04-08 VITALS
HEART RATE: 86 BPM | TEMPERATURE: 98.6 F | OXYGEN SATURATION: 95 % | DIASTOLIC BLOOD PRESSURE: 77 MMHG | SYSTOLIC BLOOD PRESSURE: 119 MMHG

## 2024-04-08 DIAGNOSIS — T81.40XA POSTOPERATIVE INFECTION, UNSPECIFIED TYPE, INITIAL ENCOUNTER: Primary | ICD-10-CM

## 2024-04-08 DIAGNOSIS — Z98.1 S/P LUMBAR FUSION: ICD-10-CM

## 2024-04-08 DIAGNOSIS — M46.26 INFECTION OF LUMBAR SPINE (H): Primary | ICD-10-CM

## 2024-04-08 DIAGNOSIS — M46.26 INFECTION OF LUMBAR SPINE (H): ICD-10-CM

## 2024-04-08 LAB — CRP SERPL-MCNC: 10.93 MG/L

## 2024-04-08 PROCEDURE — 86140 C-REACTIVE PROTEIN: CPT | Performed by: INTERNAL MEDICINE

## 2024-04-08 PROCEDURE — 250N000009 HC RX 250: Performed by: INTERNAL MEDICINE

## 2024-04-08 PROCEDURE — 250N000011 HC RX IP 250 OP 636: Mod: JZ | Performed by: INTERNAL MEDICINE

## 2024-04-08 PROCEDURE — 36592 COLLECT BLOOD FROM PICC: CPT

## 2024-04-08 PROCEDURE — 96374 THER/PROPH/DIAG INJ IV PUSH: CPT

## 2024-04-08 PROCEDURE — 96375 TX/PRO/DX INJ NEW DRUG ADDON: CPT

## 2024-04-08 RX ORDER — HEPARIN SODIUM,PORCINE 10 UNIT/ML
5-20 VIAL (ML) INTRAVENOUS DAILY PRN
Status: CANCELLED | OUTPATIENT
Start: 2024-04-12

## 2024-04-08 RX ORDER — HEPARIN SODIUM,PORCINE 10 UNIT/ML
5-20 VIAL (ML) INTRAVENOUS DAILY PRN
Status: CANCELLED | OUTPATIENT
Start: 2024-04-09

## 2024-04-08 RX ORDER — HEPARIN SODIUM (PORCINE) LOCK FLUSH IV SOLN 100 UNIT/ML 100 UNIT/ML
5 SOLUTION INTRAVENOUS
Status: CANCELLED | OUTPATIENT
Start: 2024-04-09

## 2024-04-08 RX ORDER — DAPTOMYCIN 50 MG/ML
8 INJECTION, POWDER, LYOPHILIZED, FOR SOLUTION INTRAVENOUS ONCE
Status: COMPLETED | OUTPATIENT
Start: 2024-04-08 | End: 2024-04-08

## 2024-04-08 RX ORDER — ALBUTEROL SULFATE 90 UG/1
1-2 AEROSOL, METERED RESPIRATORY (INHALATION)
Status: CANCELLED
Start: 2024-04-09

## 2024-04-08 RX ORDER — MEPERIDINE HYDROCHLORIDE 25 MG/ML
25 INJECTION INTRAMUSCULAR; INTRAVENOUS; SUBCUTANEOUS EVERY 30 MIN PRN
Status: CANCELLED | OUTPATIENT
Start: 2024-04-09

## 2024-04-08 RX ORDER — METHYLPREDNISOLONE SODIUM SUCCINATE 125 MG/2ML
125 INJECTION, POWDER, LYOPHILIZED, FOR SOLUTION INTRAMUSCULAR; INTRAVENOUS
Status: CANCELLED
Start: 2024-04-09

## 2024-04-08 RX ORDER — ALBUTEROL SULFATE 0.83 MG/ML
2.5 SOLUTION RESPIRATORY (INHALATION)
Status: CANCELLED | OUTPATIENT
Start: 2024-04-09

## 2024-04-08 RX ORDER — DAPTOMYCIN 50 MG/ML
8 INJECTION, POWDER, LYOPHILIZED, FOR SOLUTION INTRAVENOUS ONCE
Status: CANCELLED | OUTPATIENT
Start: 2024-04-09 | End: 2024-04-12

## 2024-04-08 RX ORDER — DIPHENHYDRAMINE HYDROCHLORIDE 50 MG/ML
50 INJECTION INTRAMUSCULAR; INTRAVENOUS
Status: CANCELLED
Start: 2024-04-09

## 2024-04-08 RX ORDER — EPINEPHRINE 1 MG/ML
0.3 INJECTION, SOLUTION INTRAMUSCULAR; SUBCUTANEOUS EVERY 5 MIN PRN
Status: CANCELLED | OUTPATIENT
Start: 2024-04-09

## 2024-04-08 RX ORDER — HEPARIN SODIUM (PORCINE) LOCK FLUSH IV SOLN 100 UNIT/ML 100 UNIT/ML
5 SOLUTION INTRAVENOUS
Status: CANCELLED | OUTPATIENT
Start: 2024-04-12

## 2024-04-08 RX ADMIN — ERTAPENEM SODIUM 1 G: 1 INJECTION, POWDER, LYOPHILIZED, FOR SOLUTION INTRAMUSCULAR; INTRAVENOUS at 13:11

## 2024-04-08 RX ADMIN — DAPTOMYCIN 800 MG: 500 INJECTION, POWDER, LYOPHILIZED, FOR SOLUTION INTRAVENOUS at 13:24

## 2024-04-08 NOTE — PROGRESS NOTES
Infusion Nursing Note:  Tessa ALONSO Edwards presents today for Daptomycin and Invanz.    Patient seen by provider today: No   present during visit today: Not Applicable.    Note: CRP drawn today per appointment note, result improved from 4/4/24, added to therapy plan to draw CRP weekly.      Intravenous Access:  PICC.    Treatment Conditions:  Not Applicable.      Post Infusion Assessment:  Patient tolerated infusion without incident.  Blood return noted pre and post infusion.  Site patent and intact, free from redness, edema or discomfort.  No evidence of extravasations.       Discharge Plan:   AVS to patient via MYCHART.  Patient will return 4/9/24 for next appointment.   Patient discharged in stable condition accompanied by: self.  Departure Mode: Ambulatory.      Rola Senior RN

## 2024-04-09 ENCOUNTER — INFUSION THERAPY VISIT (OUTPATIENT)
Dept: INFUSION THERAPY | Facility: CLINIC | Age: 69
End: 2024-04-09
Attending: INTERNAL MEDICINE
Payer: COMMERCIAL

## 2024-04-09 VITALS
OXYGEN SATURATION: 97 % | SYSTOLIC BLOOD PRESSURE: 114 MMHG | RESPIRATION RATE: 16 BRPM | DIASTOLIC BLOOD PRESSURE: 69 MMHG | HEART RATE: 87 BPM | TEMPERATURE: 98.5 F

## 2024-04-09 DIAGNOSIS — Z98.1 S/P LUMBAR FUSION: ICD-10-CM

## 2024-04-09 DIAGNOSIS — T81.40XA POSTOPERATIVE INFECTION, UNSPECIFIED TYPE, INITIAL ENCOUNTER: Primary | ICD-10-CM

## 2024-04-09 PROCEDURE — 250N000009 HC RX 250: Performed by: INTERNAL MEDICINE

## 2024-04-09 PROCEDURE — 250N000011 HC RX IP 250 OP 636: Mod: JZ | Performed by: INTERNAL MEDICINE

## 2024-04-09 PROCEDURE — 96374 THER/PROPH/DIAG INJ IV PUSH: CPT

## 2024-04-09 PROCEDURE — 96375 TX/PRO/DX INJ NEW DRUG ADDON: CPT

## 2024-04-09 RX ORDER — METHYLPREDNISOLONE SODIUM SUCCINATE 125 MG/2ML
125 INJECTION, POWDER, LYOPHILIZED, FOR SOLUTION INTRAMUSCULAR; INTRAVENOUS
Status: CANCELLED
Start: 2024-04-10

## 2024-04-09 RX ORDER — ALBUTEROL SULFATE 0.83 MG/ML
2.5 SOLUTION RESPIRATORY (INHALATION)
Status: CANCELLED | OUTPATIENT
Start: 2024-04-10

## 2024-04-09 RX ORDER — EPINEPHRINE 1 MG/ML
0.3 INJECTION, SOLUTION INTRAMUSCULAR; SUBCUTANEOUS EVERY 5 MIN PRN
Status: CANCELLED | OUTPATIENT
Start: 2024-04-10

## 2024-04-09 RX ORDER — DAPTOMYCIN 50 MG/ML
8 INJECTION, POWDER, LYOPHILIZED, FOR SOLUTION INTRAVENOUS ONCE
Status: COMPLETED | OUTPATIENT
Start: 2024-04-09 | End: 2024-04-09

## 2024-04-09 RX ORDER — HEPARIN SODIUM,PORCINE 10 UNIT/ML
5-20 VIAL (ML) INTRAVENOUS DAILY PRN
Status: CANCELLED | OUTPATIENT
Start: 2024-04-12

## 2024-04-09 RX ORDER — DAPTOMYCIN 50 MG/ML
8 INJECTION, POWDER, LYOPHILIZED, FOR SOLUTION INTRAVENOUS ONCE
Status: CANCELLED | OUTPATIENT
Start: 2024-04-10 | End: 2024-04-12

## 2024-04-09 RX ORDER — ALBUTEROL SULFATE 90 UG/1
1-2 AEROSOL, METERED RESPIRATORY (INHALATION)
Status: CANCELLED
Start: 2024-04-10

## 2024-04-09 RX ORDER — HEPARIN SODIUM (PORCINE) LOCK FLUSH IV SOLN 100 UNIT/ML 100 UNIT/ML
5 SOLUTION INTRAVENOUS
Status: CANCELLED | OUTPATIENT
Start: 2024-04-12

## 2024-04-09 RX ORDER — DIPHENHYDRAMINE HYDROCHLORIDE 50 MG/ML
50 INJECTION INTRAMUSCULAR; INTRAVENOUS
Status: CANCELLED
Start: 2024-04-10

## 2024-04-09 RX ORDER — HEPARIN SODIUM,PORCINE 10 UNIT/ML
5-20 VIAL (ML) INTRAVENOUS DAILY PRN
Status: CANCELLED | OUTPATIENT
Start: 2024-04-10

## 2024-04-09 RX ORDER — MEPERIDINE HYDROCHLORIDE 25 MG/ML
25 INJECTION INTRAMUSCULAR; INTRAVENOUS; SUBCUTANEOUS EVERY 30 MIN PRN
Status: CANCELLED | OUTPATIENT
Start: 2024-04-10

## 2024-04-09 RX ORDER — HEPARIN SODIUM (PORCINE) LOCK FLUSH IV SOLN 100 UNIT/ML 100 UNIT/ML
5 SOLUTION INTRAVENOUS
Status: CANCELLED | OUTPATIENT
Start: 2024-04-10

## 2024-04-09 RX ADMIN — DAPTOMYCIN 800 MG: 500 INJECTION, POWDER, LYOPHILIZED, FOR SOLUTION INTRAVENOUS at 12:41

## 2024-04-09 RX ADMIN — ERTAPENEM SODIUM 1 G: 1 INJECTION, POWDER, LYOPHILIZED, FOR SOLUTION INTRAMUSCULAR; INTRAVENOUS at 12:34

## 2024-04-09 NOTE — PROGRESS NOTES
Infusion Nursing Note:  Tessa GUPTA Grace presents today for Invanz/Daptomycin.    Patient seen by provider today: No   present during visit today: Not Applicable.    Note: PICC line dressing loose towards insertion site, dressing changed today.      Intravenous Access:  PICC.    Treatment Conditions:  Not Applicable.      Post Infusion Assessment:  Patient tolerated infusion without incident.  Blood return noted pre and post infusion.  Site patent and intact, free from redness, edema or discomfort.  No evidence of extravasations.  Access discontinued per protocol.       Discharge Plan:   Discharge instructions reviewed with: Patient.  Patient and/or family verbalized understanding of discharge instructions and all questions answered.  AVS to patient via SET.  Patient will return 4/10/24 for next appointment.   Patient discharged in stable condition accompanied by: self.  Departure Mode: Ambulatory with cane.      Libia Shankar RN

## 2024-04-10 ENCOUNTER — INFUSION THERAPY VISIT (OUTPATIENT)
Dept: INFUSION THERAPY | Facility: CLINIC | Age: 69
End: 2024-04-10
Attending: PHYSICIAN ASSISTANT
Payer: COMMERCIAL

## 2024-04-10 VITALS
OXYGEN SATURATION: 95 % | SYSTOLIC BLOOD PRESSURE: 126 MMHG | TEMPERATURE: 98.3 F | DIASTOLIC BLOOD PRESSURE: 61 MMHG | HEART RATE: 81 BPM

## 2024-04-10 DIAGNOSIS — T81.40XA POSTOPERATIVE INFECTION, UNSPECIFIED TYPE, INITIAL ENCOUNTER: Primary | ICD-10-CM

## 2024-04-10 DIAGNOSIS — Z98.1 S/P LUMBAR FUSION: ICD-10-CM

## 2024-04-10 PROCEDURE — 96374 THER/PROPH/DIAG INJ IV PUSH: CPT

## 2024-04-10 PROCEDURE — 250N000011 HC RX IP 250 OP 636: Mod: JZ | Performed by: INTERNAL MEDICINE

## 2024-04-10 PROCEDURE — 96375 TX/PRO/DX INJ NEW DRUG ADDON: CPT

## 2024-04-10 PROCEDURE — 250N000009 HC RX 250: Performed by: INTERNAL MEDICINE

## 2024-04-10 RX ORDER — MEPERIDINE HYDROCHLORIDE 25 MG/ML
25 INJECTION INTRAMUSCULAR; INTRAVENOUS; SUBCUTANEOUS EVERY 30 MIN PRN
Status: CANCELLED | OUTPATIENT
Start: 2024-04-11

## 2024-04-10 RX ORDER — HEPARIN SODIUM,PORCINE 10 UNIT/ML
5-20 VIAL (ML) INTRAVENOUS DAILY PRN
Status: CANCELLED | OUTPATIENT
Start: 2024-04-12

## 2024-04-10 RX ORDER — HEPARIN SODIUM (PORCINE) LOCK FLUSH IV SOLN 100 UNIT/ML 100 UNIT/ML
5 SOLUTION INTRAVENOUS
Status: CANCELLED | OUTPATIENT
Start: 2024-04-11

## 2024-04-10 RX ORDER — EPINEPHRINE 1 MG/ML
0.3 INJECTION, SOLUTION INTRAMUSCULAR; SUBCUTANEOUS EVERY 5 MIN PRN
Status: CANCELLED | OUTPATIENT
Start: 2024-04-11

## 2024-04-10 RX ORDER — HEPARIN SODIUM (PORCINE) LOCK FLUSH IV SOLN 100 UNIT/ML 100 UNIT/ML
5 SOLUTION INTRAVENOUS
Status: CANCELLED | OUTPATIENT
Start: 2024-04-12

## 2024-04-10 RX ORDER — ALBUTEROL SULFATE 90 UG/1
1-2 AEROSOL, METERED RESPIRATORY (INHALATION)
Status: CANCELLED
Start: 2024-04-11

## 2024-04-10 RX ORDER — ALBUTEROL SULFATE 0.83 MG/ML
2.5 SOLUTION RESPIRATORY (INHALATION)
Status: CANCELLED | OUTPATIENT
Start: 2024-04-11

## 2024-04-10 RX ORDER — DIPHENHYDRAMINE HYDROCHLORIDE 50 MG/ML
50 INJECTION INTRAMUSCULAR; INTRAVENOUS
Status: CANCELLED
Start: 2024-04-11

## 2024-04-10 RX ORDER — DAPTOMYCIN 50 MG/ML
8 INJECTION, POWDER, LYOPHILIZED, FOR SOLUTION INTRAVENOUS ONCE
Status: COMPLETED | OUTPATIENT
Start: 2024-04-10 | End: 2024-04-10

## 2024-04-10 RX ORDER — DAPTOMYCIN 50 MG/ML
8 INJECTION, POWDER, LYOPHILIZED, FOR SOLUTION INTRAVENOUS ONCE
Status: CANCELLED | OUTPATIENT
Start: 2024-04-11 | End: 2024-04-12

## 2024-04-10 RX ORDER — METHYLPREDNISOLONE SODIUM SUCCINATE 125 MG/2ML
125 INJECTION, POWDER, LYOPHILIZED, FOR SOLUTION INTRAMUSCULAR; INTRAVENOUS
Status: CANCELLED
Start: 2024-04-11

## 2024-04-10 RX ORDER — HEPARIN SODIUM,PORCINE 10 UNIT/ML
5-20 VIAL (ML) INTRAVENOUS DAILY PRN
Status: CANCELLED | OUTPATIENT
Start: 2024-04-11

## 2024-04-10 RX ADMIN — ERTAPENEM SODIUM 1 G: 1 INJECTION, POWDER, LYOPHILIZED, FOR SOLUTION INTRAMUSCULAR; INTRAVENOUS at 12:40

## 2024-04-10 RX ADMIN — DAPTOMYCIN 800 MG: 500 INJECTION, POWDER, LYOPHILIZED, FOR SOLUTION INTRAVENOUS at 12:36

## 2024-04-10 NOTE — PROGRESS NOTES
Infusion Nursing Note:  Tessa GUPTA Grace presents today for Invanz/Dapto.    Patient seen by provider today: No   present during visit today: Not Applicable.    Note: N/A.      Intravenous Access:  PICC.    Treatment Conditions:  Not Applicable.      Post Infusion Assessment:  Patient tolerated infusion without incident.  Blood return noted pre and post infusion.  Site patent and intact, free from redness, edema or discomfort.  No evidence of extravasations.       Discharge Plan:   AVS to patient via MYCHART.  Patient will return 4/11/24 for next dose of antibiotics for next appointment.   Patient discharged in stable condition accompanied by: self.  Departure Mode: Ambulatory.      Carly Scott RN

## 2024-04-11 ENCOUNTER — INFUSION THERAPY VISIT (OUTPATIENT)
Dept: INFUSION THERAPY | Facility: CLINIC | Age: 69
End: 2024-04-11
Attending: INTERNAL MEDICINE
Payer: COMMERCIAL

## 2024-04-11 VITALS
DIASTOLIC BLOOD PRESSURE: 66 MMHG | HEART RATE: 75 BPM | RESPIRATION RATE: 18 BRPM | SYSTOLIC BLOOD PRESSURE: 110 MMHG | TEMPERATURE: 98.2 F | OXYGEN SATURATION: 96 %

## 2024-04-11 DIAGNOSIS — T81.40XA POSTOPERATIVE INFECTION, UNSPECIFIED TYPE, INITIAL ENCOUNTER: Primary | ICD-10-CM

## 2024-04-11 DIAGNOSIS — Z98.1 S/P LUMBAR FUSION: ICD-10-CM

## 2024-04-11 LAB
ALBUMIN SERPL BCG-MCNC: 3.7 G/DL (ref 3.5–5.2)
ALP SERPL-CCNC: 94 U/L (ref 40–150)
ALT SERPL W P-5'-P-CCNC: 13 U/L (ref 0–50)
ANION GAP SERPL CALCULATED.3IONS-SCNC: 13 MMOL/L (ref 7–15)
AST SERPL W P-5'-P-CCNC: 18 U/L (ref 0–45)
BASOPHILS # BLD AUTO: ABNORMAL 10*3/UL
BASOPHILS # BLD MANUAL: 0 10E3/UL (ref 0–0.2)
BASOPHILS NFR BLD AUTO: ABNORMAL %
BASOPHILS NFR BLD MANUAL: 0 %
BILIRUB SERPL-MCNC: 0.3 MG/DL
BUN SERPL-MCNC: 15.8 MG/DL (ref 8–23)
CALCIUM SERPL-MCNC: 9.1 MG/DL (ref 8.8–10.2)
CHLORIDE SERPL-SCNC: 105 MMOL/L (ref 98–107)
CK SERPL-CCNC: 63 U/L (ref 26–192)
CREAT SERPL-MCNC: 0.74 MG/DL (ref 0.51–0.95)
DEPRECATED HCO3 PLAS-SCNC: 22 MMOL/L (ref 22–29)
EGFRCR SERPLBLD CKD-EPI 2021: 87 ML/MIN/1.73M2
EOSINOPHIL # BLD AUTO: ABNORMAL 10*3/UL
EOSINOPHIL # BLD MANUAL: 0.5 10E3/UL (ref 0–0.7)
EOSINOPHIL NFR BLD AUTO: ABNORMAL %
EOSINOPHIL NFR BLD MANUAL: 4 %
ERYTHROCYTE [DISTWIDTH] IN BLOOD BY AUTOMATED COUNT: 13.6 % (ref 10–15)
GLUCOSE SERPL-MCNC: 191 MG/DL (ref 70–99)
HCT VFR BLD AUTO: 35.2 % (ref 35–47)
HGB BLD-MCNC: 10.7 G/DL (ref 11.7–15.7)
IMM GRANULOCYTES # BLD: ABNORMAL 10*3/UL
IMM GRANULOCYTES NFR BLD: ABNORMAL %
LYMPHOCYTES # BLD AUTO: ABNORMAL 10*3/UL
LYMPHOCYTES # BLD MANUAL: 6.2 10E3/UL (ref 0.8–5.3)
LYMPHOCYTES NFR BLD AUTO: ABNORMAL %
LYMPHOCYTES NFR BLD MANUAL: 47 %
MCH RBC QN AUTO: 25.2 PG (ref 26.5–33)
MCHC RBC AUTO-ENTMCNC: 30.4 G/DL (ref 31.5–36.5)
MCV RBC AUTO: 83 FL (ref 78–100)
MONOCYTES # BLD AUTO: ABNORMAL 10*3/UL
MONOCYTES # BLD MANUAL: 0.7 10E3/UL (ref 0–1.3)
MONOCYTES NFR BLD AUTO: ABNORMAL %
MONOCYTES NFR BLD MANUAL: 5 %
NEUTROPHILS # BLD AUTO: ABNORMAL 10*3/UL
NEUTROPHILS # BLD MANUAL: 5.8 10E3/UL (ref 1.6–8.3)
NEUTROPHILS NFR BLD AUTO: ABNORMAL %
NEUTROPHILS NFR BLD MANUAL: 44 %
NRBC # BLD AUTO: 0 10E3/UL
NRBC BLD AUTO-RTO: 0 /100
PLAT MORPH BLD: ABNORMAL
PLATELET # BLD AUTO: 214 10E3/UL (ref 150–450)
POTASSIUM SERPL-SCNC: 3.7 MMOL/L (ref 3.4–5.3)
PROT SERPL-MCNC: 6.1 G/DL (ref 6.4–8.3)
RBC # BLD AUTO: 4.25 10E6/UL (ref 3.8–5.2)
RBC MORPH BLD: ABNORMAL
SODIUM SERPL-SCNC: 140 MMOL/L (ref 135–145)
VARIANT LYMPHS BLD QL SMEAR: PRESENT
WBC # BLD AUTO: 13.1 10E3/UL (ref 4–11)

## 2024-04-11 PROCEDURE — 85027 COMPLETE CBC AUTOMATED: CPT | Performed by: INTERNAL MEDICINE

## 2024-04-11 PROCEDURE — 96374 THER/PROPH/DIAG INJ IV PUSH: CPT

## 2024-04-11 PROCEDURE — 250N000009 HC RX 250: Performed by: INTERNAL MEDICINE

## 2024-04-11 PROCEDURE — 85007 BL SMEAR W/DIFF WBC COUNT: CPT | Performed by: INTERNAL MEDICINE

## 2024-04-11 PROCEDURE — 82040 ASSAY OF SERUM ALBUMIN: CPT | Performed by: INTERNAL MEDICINE

## 2024-04-11 PROCEDURE — 82550 ASSAY OF CK (CPK): CPT | Performed by: INTERNAL MEDICINE

## 2024-04-11 PROCEDURE — 250N000011 HC RX IP 250 OP 636: Performed by: INTERNAL MEDICINE

## 2024-04-11 PROCEDURE — 96375 TX/PRO/DX INJ NEW DRUG ADDON: CPT

## 2024-04-11 RX ORDER — HEPARIN SODIUM (PORCINE) LOCK FLUSH IV SOLN 100 UNIT/ML 100 UNIT/ML
5 SOLUTION INTRAVENOUS
Status: CANCELLED | OUTPATIENT
Start: 2024-04-12

## 2024-04-11 RX ORDER — EPINEPHRINE 1 MG/ML
0.3 INJECTION, SOLUTION INTRAMUSCULAR; SUBCUTANEOUS EVERY 5 MIN PRN
Status: CANCELLED | OUTPATIENT
Start: 2024-04-12

## 2024-04-11 RX ORDER — DAPTOMYCIN 50 MG/ML
8 INJECTION, POWDER, LYOPHILIZED, FOR SOLUTION INTRAVENOUS ONCE
Status: CANCELLED | OUTPATIENT
Start: 2024-04-12 | End: 2024-04-12

## 2024-04-11 RX ORDER — ALBUTEROL SULFATE 90 UG/1
1-2 AEROSOL, METERED RESPIRATORY (INHALATION)
Status: CANCELLED
Start: 2024-04-12

## 2024-04-11 RX ORDER — HEPARIN SODIUM,PORCINE 10 UNIT/ML
5-20 VIAL (ML) INTRAVENOUS DAILY PRN
Status: CANCELLED | OUTPATIENT
Start: 2024-04-12

## 2024-04-11 RX ORDER — MEPERIDINE HYDROCHLORIDE 25 MG/ML
25 INJECTION INTRAMUSCULAR; INTRAVENOUS; SUBCUTANEOUS EVERY 30 MIN PRN
Status: CANCELLED | OUTPATIENT
Start: 2024-04-12

## 2024-04-11 RX ORDER — DAPTOMYCIN 50 MG/ML
8 INJECTION, POWDER, LYOPHILIZED, FOR SOLUTION INTRAVENOUS ONCE
Status: COMPLETED | OUTPATIENT
Start: 2024-04-11 | End: 2024-04-11

## 2024-04-11 RX ORDER — ALBUTEROL SULFATE 0.83 MG/ML
2.5 SOLUTION RESPIRATORY (INHALATION)
Status: CANCELLED | OUTPATIENT
Start: 2024-04-12

## 2024-04-11 RX ORDER — METHYLPREDNISOLONE SODIUM SUCCINATE 125 MG/2ML
125 INJECTION, POWDER, LYOPHILIZED, FOR SOLUTION INTRAMUSCULAR; INTRAVENOUS
Status: CANCELLED
Start: 2024-04-12

## 2024-04-11 RX ORDER — DIPHENHYDRAMINE HYDROCHLORIDE 50 MG/ML
50 INJECTION INTRAMUSCULAR; INTRAVENOUS
Status: CANCELLED
Start: 2024-04-12

## 2024-04-11 RX ADMIN — ERTAPENEM SODIUM 1 G: 1 INJECTION, POWDER, LYOPHILIZED, FOR SOLUTION INTRAMUSCULAR; INTRAVENOUS at 12:42

## 2024-04-11 RX ADMIN — DAPTOMYCIN 800 MG: 500 INJECTION, POWDER, LYOPHILIZED, FOR SOLUTION INTRAVENOUS at 12:42

## 2024-04-11 NOTE — PROGRESS NOTES
Infusion Nursing Note:  Tessa Edwards presents today for Antibiotics and labs.    Patient seen by provider today: No   present during visit today: Not Applicable.    Note: dressing changed earlier this week. Will be done again on 4/16/24.  CRP inflammation drawn on 4/8/24- not drawn again today as its listed weekly.      Intravenous Access:  Labs drawn without difficulty.  PICC.    Treatment Conditions:  Not Applicable.      Post Infusion Assessment:  Patient tolerated infusion without incident.  Blood return noted pre and post infusion.  Site patent and intact, free from redness, edema or discomfort.  No evidence of extravasations.       Discharge Plan:   Discharge instructions reviewed with: Patient.  Patient and/or family verbalized understanding of discharge instructions and all questions answered.  AVS to patient via iwiT.  Patient will return 4/12/24 for next appointment.   Patient discharged in stable condition accompanied by: self.  Departure Mode: Ambulatory.      Kari Schoen, RN

## 2024-04-12 ENCOUNTER — INFUSION THERAPY VISIT (OUTPATIENT)
Dept: INFUSION THERAPY | Facility: CLINIC | Age: 69
End: 2024-04-12
Attending: INTERNAL MEDICINE
Payer: COMMERCIAL

## 2024-04-12 VITALS
DIASTOLIC BLOOD PRESSURE: 72 MMHG | OXYGEN SATURATION: 95 % | HEART RATE: 84 BPM | TEMPERATURE: 98.6 F | SYSTOLIC BLOOD PRESSURE: 116 MMHG | RESPIRATION RATE: 16 BRPM

## 2024-04-12 DIAGNOSIS — T81.40XA POSTOPERATIVE INFECTION, UNSPECIFIED TYPE, INITIAL ENCOUNTER: Primary | ICD-10-CM

## 2024-04-12 DIAGNOSIS — Z98.1 S/P LUMBAR FUSION: ICD-10-CM

## 2024-04-12 PROCEDURE — 96375 TX/PRO/DX INJ NEW DRUG ADDON: CPT

## 2024-04-12 PROCEDURE — 250N000011 HC RX IP 250 OP 636: Performed by: INTERNAL MEDICINE

## 2024-04-12 PROCEDURE — 250N000009 HC RX 250: Performed by: INTERNAL MEDICINE

## 2024-04-12 PROCEDURE — 96374 THER/PROPH/DIAG INJ IV PUSH: CPT

## 2024-04-12 RX ORDER — EPINEPHRINE 1 MG/ML
0.3 INJECTION, SOLUTION INTRAMUSCULAR; SUBCUTANEOUS EVERY 5 MIN PRN
Status: CANCELLED | OUTPATIENT
Start: 2024-04-13

## 2024-04-12 RX ORDER — ALBUTEROL SULFATE 0.83 MG/ML
2.5 SOLUTION RESPIRATORY (INHALATION)
Status: CANCELLED | OUTPATIENT
Start: 2024-04-13

## 2024-04-12 RX ORDER — ALBUTEROL SULFATE 90 UG/1
1-2 AEROSOL, METERED RESPIRATORY (INHALATION)
Status: CANCELLED
Start: 2024-04-13

## 2024-04-12 RX ORDER — HEPARIN SODIUM,PORCINE 10 UNIT/ML
5-20 VIAL (ML) INTRAVENOUS DAILY PRN
Status: CANCELLED | OUTPATIENT
Start: 2024-04-13

## 2024-04-12 RX ORDER — HEPARIN SODIUM,PORCINE 10 UNIT/ML
5-20 VIAL (ML) INTRAVENOUS DAILY PRN
Status: CANCELLED | OUTPATIENT
Start: 2024-04-19

## 2024-04-12 RX ORDER — HEPARIN SODIUM (PORCINE) LOCK FLUSH IV SOLN 100 UNIT/ML 100 UNIT/ML
5 SOLUTION INTRAVENOUS
Status: CANCELLED | OUTPATIENT
Start: 2024-04-19

## 2024-04-12 RX ORDER — DAPTOMYCIN 50 MG/ML
8 INJECTION, POWDER, LYOPHILIZED, FOR SOLUTION INTRAVENOUS ONCE
Status: CANCELLED | OUTPATIENT
Start: 2024-04-13 | End: 2024-04-19

## 2024-04-12 RX ORDER — MEPERIDINE HYDROCHLORIDE 25 MG/ML
25 INJECTION INTRAMUSCULAR; INTRAVENOUS; SUBCUTANEOUS EVERY 30 MIN PRN
Status: CANCELLED | OUTPATIENT
Start: 2024-04-13

## 2024-04-12 RX ORDER — DIPHENHYDRAMINE HYDROCHLORIDE 50 MG/ML
50 INJECTION INTRAMUSCULAR; INTRAVENOUS
Status: CANCELLED
Start: 2024-04-13

## 2024-04-12 RX ORDER — HEPARIN SODIUM (PORCINE) LOCK FLUSH IV SOLN 100 UNIT/ML 100 UNIT/ML
5 SOLUTION INTRAVENOUS
Status: CANCELLED | OUTPATIENT
Start: 2024-04-13

## 2024-04-12 RX ORDER — DAPTOMYCIN 50 MG/ML
8 INJECTION, POWDER, LYOPHILIZED, FOR SOLUTION INTRAVENOUS ONCE
Status: COMPLETED | OUTPATIENT
Start: 2024-04-12 | End: 2024-04-12

## 2024-04-12 RX ORDER — METHYLPREDNISOLONE SODIUM SUCCINATE 125 MG/2ML
125 INJECTION, POWDER, LYOPHILIZED, FOR SOLUTION INTRAMUSCULAR; INTRAVENOUS
Status: CANCELLED
Start: 2024-04-13

## 2024-04-12 RX ADMIN — DAPTOMYCIN 800 MG: 500 INJECTION, POWDER, LYOPHILIZED, FOR SOLUTION INTRAVENOUS at 12:53

## 2024-04-12 RX ADMIN — ERTAPENEM SODIUM 1 G: 1 INJECTION, POWDER, LYOPHILIZED, FOR SOLUTION INTRAMUSCULAR; INTRAVENOUS at 12:47

## 2024-04-12 NOTE — PROGRESS NOTES
Infusion Nursing Note:  Tessa Edwards presents today for Ertapenem + Daptomycin.    Patient seen by provider today: No   present during visit today: Not Applicable.    Note: N/A.    Intravenous Access:  PICC.    Treatment Conditions:  Not Applicable.    Post Infusion Assessment:  Patient tolerated infusion without incident.  Blood return noted pre and post infusion.  Site patent and intact, free from redness, edema or discomfort.  No evidence of extravasations.  Access discontinued per protocol.     Discharge Plan:   Discharge instructions reviewed with: Patient.  Patient and/or family verbalized understanding of discharge instructions and all questions answered.  AVS to patient via [employer].  Patient will return 4/13 in [place] for next appointment.   Patient discharged in stable condition accompanied by: self.  Departure Mode: Ambulatory.      Dl Hoffman RN

## 2024-04-13 ENCOUNTER — INFUSION THERAPY VISIT (OUTPATIENT)
Dept: INFUSION THERAPY | Facility: CLINIC | Age: 69
End: 2024-04-13
Attending: INTERNAL MEDICINE
Payer: COMMERCIAL

## 2024-04-13 VITALS
SYSTOLIC BLOOD PRESSURE: 143 MMHG | TEMPERATURE: 98 F | RESPIRATION RATE: 18 BRPM | DIASTOLIC BLOOD PRESSURE: 69 MMHG | OXYGEN SATURATION: 98 % | HEART RATE: 70 BPM

## 2024-04-13 DIAGNOSIS — T81.40XA POSTOPERATIVE INFECTION, UNSPECIFIED TYPE, INITIAL ENCOUNTER: Primary | ICD-10-CM

## 2024-04-13 DIAGNOSIS — Z98.1 S/P LUMBAR FUSION: ICD-10-CM

## 2024-04-13 PROCEDURE — 96374 THER/PROPH/DIAG INJ IV PUSH: CPT

## 2024-04-13 PROCEDURE — 250N000009 HC RX 250: Performed by: INTERNAL MEDICINE

## 2024-04-13 PROCEDURE — 250N000011 HC RX IP 250 OP 636: Mod: JZ | Performed by: INTERNAL MEDICINE

## 2024-04-13 PROCEDURE — 96375 TX/PRO/DX INJ NEW DRUG ADDON: CPT

## 2024-04-13 RX ORDER — ALBUTEROL SULFATE 90 UG/1
1-2 AEROSOL, METERED RESPIRATORY (INHALATION)
Status: CANCELLED
Start: 2024-04-14

## 2024-04-13 RX ORDER — ALBUTEROL SULFATE 0.83 MG/ML
2.5 SOLUTION RESPIRATORY (INHALATION)
Status: CANCELLED | OUTPATIENT
Start: 2024-04-14

## 2024-04-13 RX ORDER — METHYLPREDNISOLONE SODIUM SUCCINATE 125 MG/2ML
125 INJECTION, POWDER, LYOPHILIZED, FOR SOLUTION INTRAMUSCULAR; INTRAVENOUS
Status: CANCELLED
Start: 2024-04-14

## 2024-04-13 RX ORDER — HEPARIN SODIUM,PORCINE 10 UNIT/ML
5-20 VIAL (ML) INTRAVENOUS DAILY PRN
Status: CANCELLED | OUTPATIENT
Start: 2024-04-19

## 2024-04-13 RX ORDER — EPINEPHRINE 1 MG/ML
0.3 INJECTION, SOLUTION INTRAMUSCULAR; SUBCUTANEOUS EVERY 5 MIN PRN
Status: CANCELLED | OUTPATIENT
Start: 2024-04-14

## 2024-04-13 RX ORDER — HEPARIN SODIUM,PORCINE 10 UNIT/ML
5-20 VIAL (ML) INTRAVENOUS DAILY PRN
Status: CANCELLED | OUTPATIENT
Start: 2024-04-14

## 2024-04-13 RX ORDER — DIPHENHYDRAMINE HYDROCHLORIDE 50 MG/ML
50 INJECTION INTRAMUSCULAR; INTRAVENOUS
Status: CANCELLED
Start: 2024-04-14

## 2024-04-13 RX ORDER — MEPERIDINE HYDROCHLORIDE 25 MG/ML
25 INJECTION INTRAMUSCULAR; INTRAVENOUS; SUBCUTANEOUS EVERY 30 MIN PRN
Status: CANCELLED | OUTPATIENT
Start: 2024-04-14

## 2024-04-13 RX ORDER — DAPTOMYCIN 50 MG/ML
8 INJECTION, POWDER, LYOPHILIZED, FOR SOLUTION INTRAVENOUS ONCE
Status: COMPLETED | OUTPATIENT
Start: 2024-04-13 | End: 2024-04-13

## 2024-04-13 RX ORDER — HEPARIN SODIUM (PORCINE) LOCK FLUSH IV SOLN 100 UNIT/ML 100 UNIT/ML
5 SOLUTION INTRAVENOUS
Status: CANCELLED | OUTPATIENT
Start: 2024-04-14

## 2024-04-13 RX ORDER — DAPTOMYCIN 50 MG/ML
8 INJECTION, POWDER, LYOPHILIZED, FOR SOLUTION INTRAVENOUS ONCE
Status: CANCELLED | OUTPATIENT
Start: 2024-04-14 | End: 2024-04-19

## 2024-04-13 RX ORDER — HEPARIN SODIUM (PORCINE) LOCK FLUSH IV SOLN 100 UNIT/ML 100 UNIT/ML
5 SOLUTION INTRAVENOUS
Status: CANCELLED | OUTPATIENT
Start: 2024-04-19

## 2024-04-13 RX ADMIN — ERTAPENEM SODIUM 1 G: 1 INJECTION, POWDER, LYOPHILIZED, FOR SOLUTION INTRAMUSCULAR; INTRAVENOUS at 12:35

## 2024-04-13 RX ADMIN — DAPTOMYCIN 800 MG: 500 INJECTION, POWDER, LYOPHILIZED, FOR SOLUTION INTRAVENOUS at 12:35

## 2024-04-13 ASSESSMENT — PAIN SCALES - GENERAL: PAINLEVEL: NO PAIN (0)

## 2024-04-13 NOTE — PROGRESS NOTES
Infusion Nursing Note:  Tessa Edwards presents today for Dapto/Invanz.    Patient seen by provider today: No   present during visit today: Not Applicable.    Note: N/A.      Intravenous Access:  PICC.    Treatment Conditions:  Not Applicable.      Post Infusion Assessment:  Patient tolerated infusion without incident.  Blood return noted pre and post infusion.  Site patent and intact, free from redness, edema or discomfort.  No evidence of extravasations.       Discharge Plan:   Discharge instructions reviewed with: Patient.  Patient and/or family verbalized understanding of discharge instructions and all questions answered.  Patient discharged in stable condition accompanied by: self.  Departure Mode: Ambulatory.      Chelsea Sandoval RN

## 2024-04-14 ENCOUNTER — INFUSION THERAPY VISIT (OUTPATIENT)
Dept: INFUSION THERAPY | Facility: CLINIC | Age: 69
End: 2024-04-14
Attending: INTERNAL MEDICINE
Payer: COMMERCIAL

## 2024-04-14 VITALS
TEMPERATURE: 98 F | OXYGEN SATURATION: 96 % | DIASTOLIC BLOOD PRESSURE: 50 MMHG | SYSTOLIC BLOOD PRESSURE: 129 MMHG | RESPIRATION RATE: 18 BRPM | HEART RATE: 86 BPM

## 2024-04-14 DIAGNOSIS — Z98.1 S/P LUMBAR FUSION: ICD-10-CM

## 2024-04-14 DIAGNOSIS — T81.40XA POSTOPERATIVE INFECTION, UNSPECIFIED TYPE, INITIAL ENCOUNTER: Primary | ICD-10-CM

## 2024-04-14 PROCEDURE — 96374 THER/PROPH/DIAG INJ IV PUSH: CPT

## 2024-04-14 PROCEDURE — 250N000009 HC RX 250: Performed by: INTERNAL MEDICINE

## 2024-04-14 PROCEDURE — 250N000011 HC RX IP 250 OP 636: Mod: JZ | Performed by: INTERNAL MEDICINE

## 2024-04-14 PROCEDURE — 96375 TX/PRO/DX INJ NEW DRUG ADDON: CPT

## 2024-04-14 RX ORDER — HEPARIN SODIUM (PORCINE) LOCK FLUSH IV SOLN 100 UNIT/ML 100 UNIT/ML
5 SOLUTION INTRAVENOUS
Status: CANCELLED | OUTPATIENT
Start: 2024-04-15

## 2024-04-14 RX ORDER — HEPARIN SODIUM,PORCINE 10 UNIT/ML
5-20 VIAL (ML) INTRAVENOUS DAILY PRN
Status: CANCELLED | OUTPATIENT
Start: 2024-04-15

## 2024-04-14 RX ORDER — DIPHENHYDRAMINE HYDROCHLORIDE 50 MG/ML
50 INJECTION INTRAMUSCULAR; INTRAVENOUS
Status: CANCELLED
Start: 2024-04-15

## 2024-04-14 RX ORDER — METHYLPREDNISOLONE SODIUM SUCCINATE 125 MG/2ML
125 INJECTION, POWDER, LYOPHILIZED, FOR SOLUTION INTRAMUSCULAR; INTRAVENOUS
Status: CANCELLED
Start: 2024-04-15

## 2024-04-14 RX ORDER — HEPARIN SODIUM,PORCINE 10 UNIT/ML
5-20 VIAL (ML) INTRAVENOUS DAILY PRN
Status: CANCELLED | OUTPATIENT
Start: 2024-04-19

## 2024-04-14 RX ORDER — DAPTOMYCIN 50 MG/ML
8 INJECTION, POWDER, LYOPHILIZED, FOR SOLUTION INTRAVENOUS ONCE
Status: COMPLETED | OUTPATIENT
Start: 2024-04-14 | End: 2024-04-14

## 2024-04-14 RX ORDER — ALBUTEROL SULFATE 0.83 MG/ML
2.5 SOLUTION RESPIRATORY (INHALATION)
Status: CANCELLED | OUTPATIENT
Start: 2024-04-15

## 2024-04-14 RX ORDER — EPINEPHRINE 1 MG/ML
0.3 INJECTION, SOLUTION INTRAMUSCULAR; SUBCUTANEOUS EVERY 5 MIN PRN
Status: CANCELLED | OUTPATIENT
Start: 2024-04-15

## 2024-04-14 RX ORDER — HEPARIN SODIUM (PORCINE) LOCK FLUSH IV SOLN 100 UNIT/ML 100 UNIT/ML
5 SOLUTION INTRAVENOUS
Status: CANCELLED | OUTPATIENT
Start: 2024-04-19

## 2024-04-14 RX ORDER — DAPTOMYCIN 50 MG/ML
8 INJECTION, POWDER, LYOPHILIZED, FOR SOLUTION INTRAVENOUS ONCE
Status: CANCELLED | OUTPATIENT
Start: 2024-04-15 | End: 2024-04-19

## 2024-04-14 RX ORDER — MEPERIDINE HYDROCHLORIDE 25 MG/ML
25 INJECTION INTRAMUSCULAR; INTRAVENOUS; SUBCUTANEOUS EVERY 30 MIN PRN
Status: CANCELLED | OUTPATIENT
Start: 2024-04-15

## 2024-04-14 RX ORDER — ALBUTEROL SULFATE 90 UG/1
1-2 AEROSOL, METERED RESPIRATORY (INHALATION)
Status: CANCELLED
Start: 2024-04-15

## 2024-04-14 RX ADMIN — DAPTOMYCIN 800 MG: 500 INJECTION, POWDER, LYOPHILIZED, FOR SOLUTION INTRAVENOUS at 13:02

## 2024-04-14 RX ADMIN — ERTAPENEM SODIUM 1 G: 1 INJECTION, POWDER, LYOPHILIZED, FOR SOLUTION INTRAMUSCULAR; INTRAVENOUS at 13:02

## 2024-04-14 NOTE — PROGRESS NOTES
Infusion Nursing Note:  Tessa Edwards presents today for Invanz/Dapto.    Patient seen by provider today: No   present during visit today: Not Applicable.    Note: N/A.      Intravenous Access:  PICC.    Treatment Conditions:  Not Applicable.      Post Infusion Assessment:  Patient tolerated infusion without incident.  Blood return noted pre and post infusion.  Site patent and intact, free from redness, edema or discomfort.  No evidence of extravasations.       Discharge Plan:   Patient declined prescription refills.  Discharge instructions reviewed with: Patient.  Patient and/or family verbalized understanding of discharge instructions and all questions answered.  AVS to patient via [employer].  Patient will return 4/20 for next appointment.   Patient discharged in stable condition accompanied by: self.  Departure Mode: Ambulatory.      Rj Barr RN

## 2024-04-15 ENCOUNTER — INFUSION THERAPY VISIT (OUTPATIENT)
Dept: INFUSION THERAPY | Facility: CLINIC | Age: 69
End: 2024-04-15
Attending: INTERNAL MEDICINE
Payer: COMMERCIAL

## 2024-04-15 VITALS
SYSTOLIC BLOOD PRESSURE: 120 MMHG | HEART RATE: 89 BPM | RESPIRATION RATE: 16 BRPM | DIASTOLIC BLOOD PRESSURE: 58 MMHG | TEMPERATURE: 98.7 F | OXYGEN SATURATION: 96 %

## 2024-04-15 DIAGNOSIS — T81.40XA POSTOPERATIVE INFECTION, UNSPECIFIED TYPE, INITIAL ENCOUNTER: Primary | ICD-10-CM

## 2024-04-15 DIAGNOSIS — Z98.1 S/P LUMBAR FUSION: ICD-10-CM

## 2024-04-15 PROCEDURE — 96374 THER/PROPH/DIAG INJ IV PUSH: CPT

## 2024-04-15 PROCEDURE — 96375 TX/PRO/DX INJ NEW DRUG ADDON: CPT

## 2024-04-15 PROCEDURE — 250N000011 HC RX IP 250 OP 636: Mod: JZ | Performed by: INTERNAL MEDICINE

## 2024-04-15 PROCEDURE — 250N000009 HC RX 250: Performed by: INTERNAL MEDICINE

## 2024-04-15 RX ORDER — DAPTOMYCIN 50 MG/ML
8 INJECTION, POWDER, LYOPHILIZED, FOR SOLUTION INTRAVENOUS ONCE
Status: CANCELLED | OUTPATIENT
Start: 2024-04-16 | End: 2024-04-19

## 2024-04-15 RX ORDER — ALBUTEROL SULFATE 90 UG/1
1-2 AEROSOL, METERED RESPIRATORY (INHALATION)
Status: CANCELLED
Start: 2024-04-16

## 2024-04-15 RX ORDER — HEPARIN SODIUM,PORCINE 10 UNIT/ML
5-20 VIAL (ML) INTRAVENOUS DAILY PRN
Status: CANCELLED | OUTPATIENT
Start: 2024-04-16

## 2024-04-15 RX ORDER — HEPARIN SODIUM (PORCINE) LOCK FLUSH IV SOLN 100 UNIT/ML 100 UNIT/ML
5 SOLUTION INTRAVENOUS
Status: CANCELLED | OUTPATIENT
Start: 2024-04-19

## 2024-04-15 RX ORDER — HEPARIN SODIUM,PORCINE 10 UNIT/ML
5-20 VIAL (ML) INTRAVENOUS DAILY PRN
Status: CANCELLED | OUTPATIENT
Start: 2024-04-19

## 2024-04-15 RX ORDER — ALBUTEROL SULFATE 0.83 MG/ML
2.5 SOLUTION RESPIRATORY (INHALATION)
Status: CANCELLED | OUTPATIENT
Start: 2024-04-16

## 2024-04-15 RX ORDER — DIPHENHYDRAMINE HYDROCHLORIDE 50 MG/ML
50 INJECTION INTRAMUSCULAR; INTRAVENOUS
Status: CANCELLED
Start: 2024-04-16

## 2024-04-15 RX ORDER — DAPTOMYCIN 50 MG/ML
8 INJECTION, POWDER, LYOPHILIZED, FOR SOLUTION INTRAVENOUS ONCE
Status: COMPLETED | OUTPATIENT
Start: 2024-04-15 | End: 2024-04-15

## 2024-04-15 RX ORDER — HEPARIN SODIUM (PORCINE) LOCK FLUSH IV SOLN 100 UNIT/ML 100 UNIT/ML
5 SOLUTION INTRAVENOUS
Status: CANCELLED | OUTPATIENT
Start: 2024-04-16

## 2024-04-15 RX ORDER — EPINEPHRINE 1 MG/ML
0.3 INJECTION, SOLUTION INTRAMUSCULAR; SUBCUTANEOUS EVERY 5 MIN PRN
Status: CANCELLED | OUTPATIENT
Start: 2024-04-16

## 2024-04-15 RX ORDER — METHYLPREDNISOLONE SODIUM SUCCINATE 125 MG/2ML
125 INJECTION, POWDER, LYOPHILIZED, FOR SOLUTION INTRAMUSCULAR; INTRAVENOUS
Status: CANCELLED
Start: 2024-04-16

## 2024-04-15 RX ORDER — MEPERIDINE HYDROCHLORIDE 25 MG/ML
25 INJECTION INTRAMUSCULAR; INTRAVENOUS; SUBCUTANEOUS EVERY 30 MIN PRN
Status: CANCELLED | OUTPATIENT
Start: 2024-04-16

## 2024-04-15 RX ADMIN — ERTAPENEM SODIUM 1 G: 1 INJECTION, POWDER, LYOPHILIZED, FOR SOLUTION INTRAMUSCULAR; INTRAVENOUS at 12:32

## 2024-04-15 RX ADMIN — DAPTOMYCIN 800 MG: 500 INJECTION, POWDER, LYOPHILIZED, FOR SOLUTION INTRAVENOUS at 12:32

## 2024-04-15 NOTE — PROGRESS NOTES
Infusion Nursing Note:  Tessa Edwards presents today for IV push antibiotics .    Patient seen by provider today: No   present during visit today: Not Applicable.    Note: N/A.      Intravenous Access:  PICC.    Treatment Conditions:  Not Applicable.      Post Infusion Assessment:  Patient tolerated infusion without incident.  Blood return noted pre and post infusion.  Site patent and intact, free from redness, edema or discomfort.  No evidence of extravasations.       Discharge Plan:   Discharge instructions reviewed with: Patient.  Patient and/or family verbalized understanding of discharge instructions and all questions answered.  AVS to patient via Tello.  Patient will return 4/16/24 for next appointment.   Patient discharged in stable condition accompanied by: self.  Departure Mode: Ambulatory with cane.      Kari Schoen, RN

## 2024-04-16 ENCOUNTER — INFUSION THERAPY VISIT (OUTPATIENT)
Dept: INFUSION THERAPY | Facility: CLINIC | Age: 69
End: 2024-04-16
Attending: INTERNAL MEDICINE
Payer: COMMERCIAL

## 2024-04-16 VITALS
OXYGEN SATURATION: 97 % | HEART RATE: 82 BPM | SYSTOLIC BLOOD PRESSURE: 109 MMHG | TEMPERATURE: 97.7 F | DIASTOLIC BLOOD PRESSURE: 62 MMHG

## 2024-04-16 DIAGNOSIS — Z98.1 S/P LUMBAR FUSION: ICD-10-CM

## 2024-04-16 DIAGNOSIS — T81.40XA POSTOPERATIVE INFECTION, UNSPECIFIED TYPE, INITIAL ENCOUNTER: Primary | ICD-10-CM

## 2024-04-16 LAB — CRP SERPL-MCNC: 5.71 MG/L

## 2024-04-16 PROCEDURE — 250N000011 HC RX IP 250 OP 636: Mod: JW | Performed by: INTERNAL MEDICINE

## 2024-04-16 PROCEDURE — 96375 TX/PRO/DX INJ NEW DRUG ADDON: CPT

## 2024-04-16 PROCEDURE — 250N000009 HC RX 250: Performed by: INTERNAL MEDICINE

## 2024-04-16 PROCEDURE — 86140 C-REACTIVE PROTEIN: CPT | Performed by: INTERNAL MEDICINE

## 2024-04-16 PROCEDURE — 96374 THER/PROPH/DIAG INJ IV PUSH: CPT

## 2024-04-16 RX ORDER — DAPTOMYCIN 50 MG/ML
8 INJECTION, POWDER, LYOPHILIZED, FOR SOLUTION INTRAVENOUS ONCE
Status: COMPLETED | OUTPATIENT
Start: 2024-04-16 | End: 2024-04-16

## 2024-04-16 RX ORDER — MEPERIDINE HYDROCHLORIDE 25 MG/ML
25 INJECTION INTRAMUSCULAR; INTRAVENOUS; SUBCUTANEOUS EVERY 30 MIN PRN
Status: CANCELLED | OUTPATIENT
Start: 2024-04-17

## 2024-04-16 RX ORDER — HEPARIN SODIUM,PORCINE 10 UNIT/ML
5-20 VIAL (ML) INTRAVENOUS DAILY PRN
Status: CANCELLED | OUTPATIENT
Start: 2024-04-17

## 2024-04-16 RX ORDER — HEPARIN SODIUM (PORCINE) LOCK FLUSH IV SOLN 100 UNIT/ML 100 UNIT/ML
5 SOLUTION INTRAVENOUS
Status: CANCELLED | OUTPATIENT
Start: 2024-04-17

## 2024-04-16 RX ORDER — ALBUTEROL SULFATE 0.83 MG/ML
2.5 SOLUTION RESPIRATORY (INHALATION)
Status: CANCELLED | OUTPATIENT
Start: 2024-04-17

## 2024-04-16 RX ORDER — METHYLPREDNISOLONE SODIUM SUCCINATE 125 MG/2ML
125 INJECTION, POWDER, LYOPHILIZED, FOR SOLUTION INTRAMUSCULAR; INTRAVENOUS
Status: CANCELLED
Start: 2024-04-17

## 2024-04-16 RX ORDER — DIPHENHYDRAMINE HYDROCHLORIDE 50 MG/ML
50 INJECTION INTRAMUSCULAR; INTRAVENOUS
Status: CANCELLED
Start: 2024-04-17

## 2024-04-16 RX ORDER — ALBUTEROL SULFATE 90 UG/1
1-2 AEROSOL, METERED RESPIRATORY (INHALATION)
Status: CANCELLED
Start: 2024-04-17

## 2024-04-16 RX ORDER — DAPTOMYCIN 50 MG/ML
8 INJECTION, POWDER, LYOPHILIZED, FOR SOLUTION INTRAVENOUS ONCE
Status: CANCELLED | OUTPATIENT
Start: 2024-04-17 | End: 2024-04-19

## 2024-04-16 RX ORDER — EPINEPHRINE 1 MG/ML
0.3 INJECTION, SOLUTION INTRAMUSCULAR; SUBCUTANEOUS EVERY 5 MIN PRN
Status: CANCELLED | OUTPATIENT
Start: 2024-04-17

## 2024-04-16 RX ORDER — HEPARIN SODIUM (PORCINE) LOCK FLUSH IV SOLN 100 UNIT/ML 100 UNIT/ML
5 SOLUTION INTRAVENOUS
Status: CANCELLED | OUTPATIENT
Start: 2024-04-19

## 2024-04-16 RX ORDER — HEPARIN SODIUM,PORCINE 10 UNIT/ML
5-20 VIAL (ML) INTRAVENOUS DAILY PRN
Status: CANCELLED | OUTPATIENT
Start: 2024-04-19

## 2024-04-16 RX ADMIN — ERTAPENEM SODIUM 1 G: 1 INJECTION, POWDER, LYOPHILIZED, FOR SOLUTION INTRAMUSCULAR; INTRAVENOUS at 13:18

## 2024-04-16 RX ADMIN — DAPTOMYCIN 800 MG: 500 INJECTION, POWDER, LYOPHILIZED, FOR SOLUTION INTRAVENOUS at 13:15

## 2024-04-16 NOTE — PROGRESS NOTES
Infusion Nursing Note:  Tessa Edwards presents today for labs/Dapto/Invanz/PICC dressing change.    Patient seen by provider today: No   present during visit today: Not Applicable.    Note:Classic yeast rash under breasts and pt states the same rash in her groin area; she reports the groin rash is not improving with antifungal cream; instructed pt to keep area dry vs moist-will try OTC antifungal powder     Noted a new rash on lower right forearm yesterday; some parts of rash appear to be raised pustules; also a few small scattered dry/scaly/raised lesions; two on upper back, one on left upper forearm, one on right lower shin; denies itching/pain. Does not look like typical drug rash. Had pharmacy assess as well. Picture taken of rash by Marty Meese, [facility]. He will send to Dr Johnson for recommendations    Pt due to complete antibiotics on 4/23/24. Message sent to scheduling to add two more days to schedule next week. Message sent to Dr Johnson for order to pull PICC line upon completion of antibiotics    CRP drawn today; continues to decline; pt has follow up with ID next week    Incision to lumbar spine intact with staples. Was supposed to have follow with neurosurgery two weeks from last visit, which was on 4/4/24. Pt does not think she has this follow up scheduled. She will call the clinic today to make appt- reports she has number at home    Intravenous Access:  PICC - dressing changed per protocol; area under dressing irritated - applied IV 3000 today    Treatment Conditions:  Not Applicable.      Post Infusion Assessment:  Patient tolerated infusion without incident.  Blood return noted pre and post infusion.  Site patent and intact, free from redness, edema or discomfort.  No evidence of extravasations.       Discharge Plan:   AVS to patient via [employer].  Patient will return 4/17/24 for next dose of antibiotics  Patient discharged in stable condition accompanied by: self.  Departure Mode:  Ambulatory with cane.      Carly Scott RN

## 2024-04-17 ENCOUNTER — INFUSION THERAPY VISIT (OUTPATIENT)
Dept: INFUSION THERAPY | Facility: CLINIC | Age: 69
End: 2024-04-17
Attending: INTERNAL MEDICINE
Payer: COMMERCIAL

## 2024-04-17 VITALS
OXYGEN SATURATION: 96 % | DIASTOLIC BLOOD PRESSURE: 85 MMHG | HEART RATE: 78 BPM | TEMPERATURE: 97.9 F | SYSTOLIC BLOOD PRESSURE: 125 MMHG

## 2024-04-17 DIAGNOSIS — Z98.1 S/P LUMBAR FUSION: ICD-10-CM

## 2024-04-17 DIAGNOSIS — T81.40XA POSTOPERATIVE INFECTION, UNSPECIFIED TYPE, INITIAL ENCOUNTER: Primary | ICD-10-CM

## 2024-04-17 PROCEDURE — 96375 TX/PRO/DX INJ NEW DRUG ADDON: CPT

## 2024-04-17 PROCEDURE — 250N000009 HC RX 250: Performed by: INTERNAL MEDICINE

## 2024-04-17 PROCEDURE — 96374 THER/PROPH/DIAG INJ IV PUSH: CPT

## 2024-04-17 PROCEDURE — 250N000011 HC RX IP 250 OP 636: Mod: JZ | Performed by: INTERNAL MEDICINE

## 2024-04-17 RX ORDER — HEPARIN SODIUM (PORCINE) LOCK FLUSH IV SOLN 100 UNIT/ML 100 UNIT/ML
5 SOLUTION INTRAVENOUS
Status: CANCELLED | OUTPATIENT
Start: 2024-04-18

## 2024-04-17 RX ORDER — HEPARIN SODIUM,PORCINE 10 UNIT/ML
5-20 VIAL (ML) INTRAVENOUS DAILY PRN
Status: CANCELLED | OUTPATIENT
Start: 2024-04-18

## 2024-04-17 RX ORDER — MEPERIDINE HYDROCHLORIDE 25 MG/ML
25 INJECTION INTRAMUSCULAR; INTRAVENOUS; SUBCUTANEOUS EVERY 30 MIN PRN
Status: CANCELLED | OUTPATIENT
Start: 2024-04-18

## 2024-04-17 RX ORDER — DIPHENHYDRAMINE HYDROCHLORIDE 50 MG/ML
50 INJECTION INTRAMUSCULAR; INTRAVENOUS
Status: CANCELLED
Start: 2024-04-18

## 2024-04-17 RX ORDER — DAPTOMYCIN 50 MG/ML
8 INJECTION, POWDER, LYOPHILIZED, FOR SOLUTION INTRAVENOUS ONCE
Status: COMPLETED | OUTPATIENT
Start: 2024-04-17 | End: 2024-04-17

## 2024-04-17 RX ORDER — ALBUTEROL SULFATE 90 UG/1
1-2 AEROSOL, METERED RESPIRATORY (INHALATION)
Status: CANCELLED
Start: 2024-04-18

## 2024-04-17 RX ORDER — HEPARIN SODIUM (PORCINE) LOCK FLUSH IV SOLN 100 UNIT/ML 100 UNIT/ML
5 SOLUTION INTRAVENOUS
Status: CANCELLED | OUTPATIENT
Start: 2024-04-19

## 2024-04-17 RX ORDER — EPINEPHRINE 1 MG/ML
0.3 INJECTION, SOLUTION INTRAMUSCULAR; SUBCUTANEOUS EVERY 5 MIN PRN
Status: CANCELLED | OUTPATIENT
Start: 2024-04-18

## 2024-04-17 RX ORDER — DAPTOMYCIN 50 MG/ML
8 INJECTION, POWDER, LYOPHILIZED, FOR SOLUTION INTRAVENOUS ONCE
Status: CANCELLED | OUTPATIENT
Start: 2024-04-18 | End: 2024-04-19

## 2024-04-17 RX ORDER — METHYLPREDNISOLONE SODIUM SUCCINATE 125 MG/2ML
125 INJECTION, POWDER, LYOPHILIZED, FOR SOLUTION INTRAMUSCULAR; INTRAVENOUS
Status: CANCELLED
Start: 2024-04-18

## 2024-04-17 RX ORDER — ALBUTEROL SULFATE 0.83 MG/ML
2.5 SOLUTION RESPIRATORY (INHALATION)
Status: CANCELLED | OUTPATIENT
Start: 2024-04-18

## 2024-04-17 RX ORDER — HEPARIN SODIUM,PORCINE 10 UNIT/ML
5-20 VIAL (ML) INTRAVENOUS DAILY PRN
Status: CANCELLED | OUTPATIENT
Start: 2024-04-19

## 2024-04-17 RX ADMIN — ERTAPENEM SODIUM 1 G: 1 INJECTION, POWDER, LYOPHILIZED, FOR SOLUTION INTRAMUSCULAR; INTRAVENOUS at 12:48

## 2024-04-17 RX ADMIN — DAPTOMYCIN 800 MG: 500 INJECTION, POWDER, LYOPHILIZED, FOR SOLUTION INTRAVENOUS at 12:45

## 2024-04-17 NOTE — PROGRESS NOTES
Infusion Nursing Note:  Tessa Edwards presents today for Daptomycin/Invanz.    Patient seen by provider today: No   present during visit today: Not Applicable.    Note: Pt reports a significant increase in her fatigue over the past 24 hours. Reports taking a 3 hour nap after her infusion yesterday, had dinner and watched a little TV, then fell asleep by 8 or 9m and slept all through the night until this morning when the phone rang. Took another nap prior to coming to clinic today.    Reports no changes to rash described yesterday. Per Elliott MCLEOD [facility], who spoke with Dr Johnson regarding pictures of rash that were sent yesterday, we are just continuing to monitor as long as she is asymptomatic with it. Pt has follow up with Dr Johnson next week, Tuesday. Per Dr Johnson, she will discuss further need for antibiotics at that appt with pt and does not want to give order to pull PICC line until she's had the discussion with pt    Pt reports she has not yet picked up OTC antifungal powder, but will stop by pharmacy on her way home today. Also reports she did not follow up with neurosurgery clinic yesterday due to her fatigue, but will also do this today      Intravenous Access:  PICC.    Treatment Conditions:  Not Applicable.      Post Infusion Assessment:  Patient tolerated infusion without incident.  Blood return noted pre and post infusion.  Site patent and intact, free from redness, edema or discomfort.  No evidence of extravasations.       Discharge Plan:   AVS to patient via MYCHART.  Patient will return 4/18/24 for next doses of antibiotics for next appointment.   Patient discharged in stable condition accompanied by: self.  Departure Mode: Ambulatory.      Carly Scott RN

## 2024-04-18 ENCOUNTER — TELEPHONE (OUTPATIENT)
Dept: NEUROSURGERY | Facility: CLINIC | Age: 69
End: 2024-04-18
Payer: COMMERCIAL

## 2024-04-18 ENCOUNTER — INFUSION THERAPY VISIT (OUTPATIENT)
Dept: INFUSION THERAPY | Facility: CLINIC | Age: 69
End: 2024-04-18
Attending: INTERNAL MEDICINE
Payer: COMMERCIAL

## 2024-04-18 VITALS
OXYGEN SATURATION: 96 % | SYSTOLIC BLOOD PRESSURE: 106 MMHG | RESPIRATION RATE: 16 BRPM | DIASTOLIC BLOOD PRESSURE: 57 MMHG | TEMPERATURE: 98.2 F | HEART RATE: 85 BPM

## 2024-04-18 DIAGNOSIS — Z98.1 S/P LUMBAR FUSION: ICD-10-CM

## 2024-04-18 DIAGNOSIS — T81.40XA POSTOPERATIVE INFECTION, UNSPECIFIED TYPE, INITIAL ENCOUNTER: Primary | ICD-10-CM

## 2024-04-18 LAB
ALBUMIN SERPL BCG-MCNC: 3.7 G/DL (ref 3.5–5.2)
ALP SERPL-CCNC: 96 U/L (ref 40–150)
ALT SERPL W P-5'-P-CCNC: 16 U/L (ref 0–50)
ANION GAP SERPL CALCULATED.3IONS-SCNC: 10 MMOL/L (ref 7–15)
AST SERPL W P-5'-P-CCNC: 17 U/L (ref 0–45)
BASOPHILS # BLD AUTO: ABNORMAL 10*3/UL
BASOPHILS # BLD MANUAL: 0 10E3/UL (ref 0–0.2)
BASOPHILS NFR BLD AUTO: ABNORMAL %
BASOPHILS NFR BLD MANUAL: 0 %
BILIRUB SERPL-MCNC: 0.3 MG/DL
BUN SERPL-MCNC: 16.2 MG/DL (ref 8–23)
CALCIUM SERPL-MCNC: 9.3 MG/DL (ref 8.8–10.2)
CHLORIDE SERPL-SCNC: 104 MMOL/L (ref 98–107)
CREAT SERPL-MCNC: 0.84 MG/DL (ref 0.51–0.95)
DEPRECATED HCO3 PLAS-SCNC: 25 MMOL/L (ref 22–29)
EGFRCR SERPLBLD CKD-EPI 2021: 74 ML/MIN/1.73M2
EOSINOPHIL # BLD AUTO: ABNORMAL 10*3/UL
EOSINOPHIL # BLD MANUAL: 0.9 10E3/UL (ref 0–0.7)
EOSINOPHIL NFR BLD AUTO: ABNORMAL %
EOSINOPHIL NFR BLD MANUAL: 6 %
ERYTHROCYTE [DISTWIDTH] IN BLOOD BY AUTOMATED COUNT: 13.8 % (ref 10–15)
GLUCOSE SERPL-MCNC: 117 MG/DL (ref 70–99)
HCT VFR BLD AUTO: 35.6 % (ref 35–47)
HGB BLD-MCNC: 10.7 G/DL (ref 11.7–15.7)
IMM GRANULOCYTES # BLD: ABNORMAL 10*3/UL
IMM GRANULOCYTES NFR BLD: ABNORMAL %
LYMPHOCYTES # BLD AUTO: ABNORMAL 10*3/UL
LYMPHOCYTES # BLD MANUAL: 7.5 10E3/UL (ref 0.8–5.3)
LYMPHOCYTES NFR BLD AUTO: ABNORMAL %
LYMPHOCYTES NFR BLD MANUAL: 52 %
MCH RBC QN AUTO: 24.8 PG (ref 26.5–33)
MCHC RBC AUTO-ENTMCNC: 30.1 G/DL (ref 31.5–36.5)
MCV RBC AUTO: 82 FL (ref 78–100)
MONOCYTES # BLD AUTO: ABNORMAL 10*3/UL
MONOCYTES # BLD MANUAL: 0.6 10E3/UL (ref 0–1.3)
MONOCYTES NFR BLD AUTO: ABNORMAL %
MONOCYTES NFR BLD MANUAL: 4 %
NEUTROPHILS # BLD AUTO: ABNORMAL 10*3/UL
NEUTROPHILS # BLD MANUAL: 5.5 10E3/UL (ref 1.6–8.3)
NEUTROPHILS NFR BLD AUTO: ABNORMAL %
NEUTROPHILS NFR BLD MANUAL: 38 %
NRBC # BLD AUTO: 0 10E3/UL
NRBC BLD AUTO-RTO: 0 /100
PLAT MORPH BLD: ABNORMAL
PLATELET # BLD AUTO: 202 10E3/UL (ref 150–450)
POTASSIUM SERPL-SCNC: 4 MMOL/L (ref 3.4–5.3)
PROT SERPL-MCNC: 6.1 G/DL (ref 6.4–8.3)
RBC # BLD AUTO: 4.32 10E6/UL (ref 3.8–5.2)
RBC MORPH BLD: ABNORMAL
SODIUM SERPL-SCNC: 139 MMOL/L (ref 135–145)
VARIANT LYMPHS BLD QL SMEAR: PRESENT
WBC # BLD AUTO: 14.5 10E3/UL (ref 4–11)

## 2024-04-18 PROCEDURE — 96375 TX/PRO/DX INJ NEW DRUG ADDON: CPT

## 2024-04-18 PROCEDURE — 250N000009 HC RX 250: Performed by: INTERNAL MEDICINE

## 2024-04-18 PROCEDURE — 82247 BILIRUBIN TOTAL: CPT | Performed by: INTERNAL MEDICINE

## 2024-04-18 PROCEDURE — 85027 COMPLETE CBC AUTOMATED: CPT | Performed by: INTERNAL MEDICINE

## 2024-04-18 PROCEDURE — 250N000011 HC RX IP 250 OP 636: Performed by: INTERNAL MEDICINE

## 2024-04-18 PROCEDURE — 96374 THER/PROPH/DIAG INJ IV PUSH: CPT

## 2024-04-18 PROCEDURE — 85007 BL SMEAR W/DIFF WBC COUNT: CPT | Performed by: INTERNAL MEDICINE

## 2024-04-18 RX ORDER — EPINEPHRINE 1 MG/ML
0.3 INJECTION, SOLUTION INTRAMUSCULAR; SUBCUTANEOUS EVERY 5 MIN PRN
Status: CANCELLED | OUTPATIENT
Start: 2024-04-19

## 2024-04-18 RX ORDER — HEPARIN SODIUM (PORCINE) LOCK FLUSH IV SOLN 100 UNIT/ML 100 UNIT/ML
5 SOLUTION INTRAVENOUS
Status: CANCELLED | OUTPATIENT
Start: 2024-04-19

## 2024-04-18 RX ORDER — HEPARIN SODIUM,PORCINE 10 UNIT/ML
5-20 VIAL (ML) INTRAVENOUS DAILY PRN
Status: CANCELLED | OUTPATIENT
Start: 2024-04-19

## 2024-04-18 RX ORDER — DIPHENHYDRAMINE HYDROCHLORIDE 50 MG/ML
50 INJECTION INTRAMUSCULAR; INTRAVENOUS
Status: CANCELLED
Start: 2024-04-19

## 2024-04-18 RX ORDER — DAPTOMYCIN 50 MG/ML
8 INJECTION, POWDER, LYOPHILIZED, FOR SOLUTION INTRAVENOUS ONCE
Status: CANCELLED | OUTPATIENT
Start: 2024-04-19 | End: 2024-04-19

## 2024-04-18 RX ORDER — MEPERIDINE HYDROCHLORIDE 25 MG/ML
25 INJECTION INTRAMUSCULAR; INTRAVENOUS; SUBCUTANEOUS EVERY 30 MIN PRN
Status: CANCELLED | OUTPATIENT
Start: 2024-04-19

## 2024-04-18 RX ORDER — ALBUTEROL SULFATE 90 UG/1
1-2 AEROSOL, METERED RESPIRATORY (INHALATION)
Status: CANCELLED
Start: 2024-04-19

## 2024-04-18 RX ORDER — METHYLPREDNISOLONE SODIUM SUCCINATE 125 MG/2ML
125 INJECTION, POWDER, LYOPHILIZED, FOR SOLUTION INTRAMUSCULAR; INTRAVENOUS
Status: CANCELLED
Start: 2024-04-19

## 2024-04-18 RX ORDER — ALBUTEROL SULFATE 0.83 MG/ML
2.5 SOLUTION RESPIRATORY (INHALATION)
Status: CANCELLED | OUTPATIENT
Start: 2024-04-19

## 2024-04-18 RX ORDER — DAPTOMYCIN 50 MG/ML
8 INJECTION, POWDER, LYOPHILIZED, FOR SOLUTION INTRAVENOUS ONCE
Status: COMPLETED | OUTPATIENT
Start: 2024-04-18 | End: 2024-04-18

## 2024-04-18 RX ADMIN — ERTAPENEM SODIUM 1 G: 1 INJECTION, POWDER, LYOPHILIZED, FOR SOLUTION INTRAMUSCULAR; INTRAVENOUS at 12:39

## 2024-04-18 RX ADMIN — DAPTOMYCIN 800 MG: 500 INJECTION, POWDER, LYOPHILIZED, FOR SOLUTION INTRAVENOUS at 12:39

## 2024-04-18 NOTE — PROGRESS NOTES
Infusion Nursing Note:  Tessa GUPTA Grace presents today for Daptomycin/Ertapenem.    Patient seen by provider today: No   present during visit today: Not Applicable.    Note: N/A.      Intravenous Access:  PICC.    Treatment Conditions:  Not Applicable.      Post Infusion Assessment:  Patient tolerated infusion without incident.  Blood return noted pre and post infusion.  Site patent and intact, free from redness, edema or discomfort.  No evidence of extravasations.       Discharge Plan:   Discharge instructions reviewed with: Patient.  Patient and/or family verbalized understanding of discharge instructions and all questions answered.  AVS to patient via [employer].  Patient will return 4/19 for next appointment.   Patient discharged in stable condition accompanied by: self.  Departure Mode: Ambulatory.      Elvia Reyes RN

## 2024-04-18 NOTE — TELEPHONE ENCOUNTER
Called Kamari and provided the phone number for wound clinic where a wound vac can be returned to.  Briseyda Acharya RN, CNRN

## 2024-04-18 NOTE — TELEPHONE ENCOUNTER
[facility] Call Center    Phone Message    May a detailed message be left on voicemail: yes     Reason for Call: Other: Patients son Kamari called and stated that they were given a wound vac for his mother surgery. He said they are done with it and not sure what to do with it. Please review and call back.     Action Taken: Message routed to:  Other: [facility]    Travel Screening: Not Applicable

## 2024-04-19 ENCOUNTER — INFUSION THERAPY VISIT (OUTPATIENT)
Dept: INFUSION THERAPY | Facility: CLINIC | Age: 69
End: 2024-04-19
Attending: INTERNAL MEDICINE
Payer: COMMERCIAL

## 2024-04-19 VITALS
TEMPERATURE: 97.9 F | OXYGEN SATURATION: 97 % | HEART RATE: 76 BPM | RESPIRATION RATE: 20 BRPM | SYSTOLIC BLOOD PRESSURE: 133 MMHG | DIASTOLIC BLOOD PRESSURE: 72 MMHG

## 2024-04-19 DIAGNOSIS — T81.40XA POSTOPERATIVE INFECTION, UNSPECIFIED TYPE, INITIAL ENCOUNTER: Primary | ICD-10-CM

## 2024-04-19 DIAGNOSIS — Z98.1 S/P LUMBAR FUSION: ICD-10-CM

## 2024-04-19 PROCEDURE — 250N000009 HC RX 250: Performed by: INTERNAL MEDICINE

## 2024-04-19 PROCEDURE — 96374 THER/PROPH/DIAG INJ IV PUSH: CPT

## 2024-04-19 PROCEDURE — 250N000011 HC RX IP 250 OP 636: Mod: JZ | Performed by: INTERNAL MEDICINE

## 2024-04-19 PROCEDURE — 96375 TX/PRO/DX INJ NEW DRUG ADDON: CPT

## 2024-04-19 RX ORDER — EPINEPHRINE 1 MG/ML
0.3 INJECTION, SOLUTION INTRAMUSCULAR; SUBCUTANEOUS EVERY 5 MIN PRN
Status: CANCELLED | OUTPATIENT
Start: 2024-04-20

## 2024-04-19 RX ORDER — DIPHENHYDRAMINE HYDROCHLORIDE 50 MG/ML
50 INJECTION INTRAMUSCULAR; INTRAVENOUS
Status: CANCELLED
Start: 2024-04-20

## 2024-04-19 RX ORDER — HEPARIN SODIUM (PORCINE) LOCK FLUSH IV SOLN 100 UNIT/ML 100 UNIT/ML
5 SOLUTION INTRAVENOUS
Status: CANCELLED | OUTPATIENT
Start: 2024-04-20

## 2024-04-19 RX ORDER — ALBUTEROL SULFATE 90 UG/1
1-2 AEROSOL, METERED RESPIRATORY (INHALATION)
Status: CANCELLED
Start: 2024-04-20

## 2024-04-19 RX ORDER — METHYLPREDNISOLONE SODIUM SUCCINATE 125 MG/2ML
125 INJECTION, POWDER, LYOPHILIZED, FOR SOLUTION INTRAMUSCULAR; INTRAVENOUS
Status: CANCELLED
Start: 2024-04-20

## 2024-04-19 RX ORDER — MEPERIDINE HYDROCHLORIDE 25 MG/ML
25 INJECTION INTRAMUSCULAR; INTRAVENOUS; SUBCUTANEOUS EVERY 30 MIN PRN
Status: CANCELLED | OUTPATIENT
Start: 2024-04-20

## 2024-04-19 RX ORDER — HEPARIN SODIUM (PORCINE) LOCK FLUSH IV SOLN 100 UNIT/ML 100 UNIT/ML
5 SOLUTION INTRAVENOUS
Status: CANCELLED | OUTPATIENT
Start: 2024-04-26

## 2024-04-19 RX ORDER — DAPTOMYCIN 50 MG/ML
8 INJECTION, POWDER, LYOPHILIZED, FOR SOLUTION INTRAVENOUS ONCE
Status: CANCELLED | OUTPATIENT
Start: 2024-04-20 | End: 2024-04-26

## 2024-04-19 RX ORDER — HEPARIN SODIUM,PORCINE 10 UNIT/ML
5-20 VIAL (ML) INTRAVENOUS DAILY PRN
Status: CANCELLED | OUTPATIENT
Start: 2024-04-20

## 2024-04-19 RX ORDER — ALBUTEROL SULFATE 0.83 MG/ML
2.5 SOLUTION RESPIRATORY (INHALATION)
Status: CANCELLED | OUTPATIENT
Start: 2024-04-20

## 2024-04-19 RX ORDER — HEPARIN SODIUM,PORCINE 10 UNIT/ML
5-20 VIAL (ML) INTRAVENOUS DAILY PRN
Status: CANCELLED | OUTPATIENT
Start: 2024-04-26

## 2024-04-19 RX ORDER — DAPTOMYCIN 50 MG/ML
8 INJECTION, POWDER, LYOPHILIZED, FOR SOLUTION INTRAVENOUS ONCE
Status: COMPLETED | OUTPATIENT
Start: 2024-04-19 | End: 2024-04-19

## 2024-04-19 RX ADMIN — ERTAPENEM SODIUM 1 G: 1 INJECTION, POWDER, LYOPHILIZED, FOR SOLUTION INTRAMUSCULAR; INTRAVENOUS at 15:17

## 2024-04-19 RX ADMIN — DAPTOMYCIN 800 MG: 500 INJECTION, POWDER, LYOPHILIZED, FOR SOLUTION INTRAVENOUS at 15:25

## 2024-04-19 NOTE — TELEPHONE ENCOUNTER
Patient's son called the vascular & wound clinic to inquire how/where to return patient's wound vac. Vac was not ordered by wound clinic, patient has not been seen here, and clinic does not provide wound vac.   Writer provided son with [facility] phone number: [phone]

## 2024-04-19 NOTE — PROGRESS NOTES
Infusion Nursing Note:  Tessa Edwards presents today for Daptomycin and Ertapenem.    Patient seen by provider today: No   present during visit today: Not Applicable.    Note: N/A.      Intravenous Access:  PICC.    Treatment Conditions:  Not Applicable.      Post Infusion Assessment:  Patient tolerated infusion without incident.  Blood return noted pre and post infusion.  Site patent and intact, free from redness, edema or discomfort.  No evidence of extravasations.       Discharge Plan:   AVS to patient via MYCHART.  Patient will return 4/22/24 for next appointment.   Patient discharged in stable condition accompanied by: self.  Departure Mode: Ambulatory with cane.      Elvia Boyle RN

## 2024-04-20 ENCOUNTER — INFUSION THERAPY VISIT (OUTPATIENT)
Dept: INFUSION THERAPY | Facility: CLINIC | Age: 69
End: 2024-04-20
Attending: INTERNAL MEDICINE
Payer: COMMERCIAL

## 2024-04-20 VITALS
DIASTOLIC BLOOD PRESSURE: 56 MMHG | TEMPERATURE: 98 F | OXYGEN SATURATION: 98 % | RESPIRATION RATE: 18 BRPM | SYSTOLIC BLOOD PRESSURE: 115 MMHG | HEART RATE: 86 BPM

## 2024-04-20 DIAGNOSIS — Z98.1 S/P LUMBAR FUSION: ICD-10-CM

## 2024-04-20 DIAGNOSIS — T81.40XA POSTOPERATIVE INFECTION, UNSPECIFIED TYPE, INITIAL ENCOUNTER: Primary | ICD-10-CM

## 2024-04-20 PROCEDURE — 96374 THER/PROPH/DIAG INJ IV PUSH: CPT

## 2024-04-20 PROCEDURE — 250N000009 HC RX 250: Performed by: INTERNAL MEDICINE

## 2024-04-20 PROCEDURE — 250N000011 HC RX IP 250 OP 636: Mod: JZ | Performed by: INTERNAL MEDICINE

## 2024-04-20 PROCEDURE — 96375 TX/PRO/DX INJ NEW DRUG ADDON: CPT

## 2024-04-20 RX ORDER — HEPARIN SODIUM,PORCINE 10 UNIT/ML
5-20 VIAL (ML) INTRAVENOUS DAILY PRN
Status: CANCELLED | OUTPATIENT
Start: 2024-04-26

## 2024-04-20 RX ORDER — MEPERIDINE HYDROCHLORIDE 25 MG/ML
25 INJECTION INTRAMUSCULAR; INTRAVENOUS; SUBCUTANEOUS EVERY 30 MIN PRN
Status: CANCELLED | OUTPATIENT
Start: 2024-04-21

## 2024-04-20 RX ORDER — DAPTOMYCIN 50 MG/ML
8 INJECTION, POWDER, LYOPHILIZED, FOR SOLUTION INTRAVENOUS ONCE
Status: CANCELLED | OUTPATIENT
Start: 2024-04-21 | End: 2024-04-26

## 2024-04-20 RX ORDER — EPINEPHRINE 1 MG/ML
0.3 INJECTION, SOLUTION INTRAMUSCULAR; SUBCUTANEOUS EVERY 5 MIN PRN
Status: CANCELLED | OUTPATIENT
Start: 2024-04-21

## 2024-04-20 RX ORDER — DAPTOMYCIN 50 MG/ML
8 INJECTION, POWDER, LYOPHILIZED, FOR SOLUTION INTRAVENOUS ONCE
Status: COMPLETED | OUTPATIENT
Start: 2024-04-20 | End: 2024-04-20

## 2024-04-20 RX ORDER — ALBUTEROL SULFATE 90 UG/1
1-2 AEROSOL, METERED RESPIRATORY (INHALATION)
Status: CANCELLED
Start: 2024-04-21

## 2024-04-20 RX ORDER — METHYLPREDNISOLONE SODIUM SUCCINATE 125 MG/2ML
125 INJECTION, POWDER, LYOPHILIZED, FOR SOLUTION INTRAMUSCULAR; INTRAVENOUS
Status: CANCELLED
Start: 2024-04-21

## 2024-04-20 RX ORDER — DIPHENHYDRAMINE HYDROCHLORIDE 50 MG/ML
50 INJECTION INTRAMUSCULAR; INTRAVENOUS
Status: CANCELLED
Start: 2024-04-21

## 2024-04-20 RX ORDER — HEPARIN SODIUM (PORCINE) LOCK FLUSH IV SOLN 100 UNIT/ML 100 UNIT/ML
5 SOLUTION INTRAVENOUS
Status: CANCELLED | OUTPATIENT
Start: 2024-04-21

## 2024-04-20 RX ORDER — ALBUTEROL SULFATE 0.83 MG/ML
2.5 SOLUTION RESPIRATORY (INHALATION)
Status: CANCELLED | OUTPATIENT
Start: 2024-04-21

## 2024-04-20 RX ORDER — HEPARIN SODIUM,PORCINE 10 UNIT/ML
5-20 VIAL (ML) INTRAVENOUS DAILY PRN
Status: CANCELLED | OUTPATIENT
Start: 2024-04-21

## 2024-04-20 RX ORDER — HEPARIN SODIUM (PORCINE) LOCK FLUSH IV SOLN 100 UNIT/ML 100 UNIT/ML
5 SOLUTION INTRAVENOUS
Status: CANCELLED | OUTPATIENT
Start: 2024-04-26

## 2024-04-20 RX ADMIN — DAPTOMYCIN 800 MG: 500 INJECTION, POWDER, LYOPHILIZED, FOR SOLUTION INTRAVENOUS at 12:57

## 2024-04-20 RX ADMIN — ERTAPENEM SODIUM 1 G: 1 INJECTION, POWDER, LYOPHILIZED, FOR SOLUTION INTRAMUSCULAR; INTRAVENOUS at 12:52

## 2024-04-20 ASSESSMENT — PAIN SCALES - GENERAL: PAINLEVEL: NO PAIN (0)

## 2024-04-20 NOTE — PROGRESS NOTES
Infusion Nursing Note:  Tessa ALONSO Edwards presents today for Dapto/Invanz.    Patient seen by provider today: No   present during visit today: Not Applicable.    Note: N/A.      Intravenous Access:  PICC.    Treatment Conditions:  Not Applicable.      Post Infusion Assessment:  Patient tolerated infusion without incident.  Site patent and intact, free from redness, edema or discomfort.  No evidence of extravasations.       Discharge Plan:   Discharge instructions reviewed with: Patient.  Patient and/or family verbalized understanding of discharge instructions and all questions answered.  AVS to patient via [employer].  Patient will return 4/20/24 for next appointment.   Patient discharged in stable condition accompanied by: son.  Departure Mode: Ambulatory.      Chayo Durán RN

## 2024-04-21 ENCOUNTER — INFUSION THERAPY VISIT (OUTPATIENT)
Dept: INFUSION THERAPY | Facility: CLINIC | Age: 69
End: 2024-04-21
Attending: INTERNAL MEDICINE
Payer: COMMERCIAL

## 2024-04-21 VITALS
SYSTOLIC BLOOD PRESSURE: 135 MMHG | HEART RATE: 72 BPM | RESPIRATION RATE: 18 BRPM | TEMPERATURE: 97.8 F | DIASTOLIC BLOOD PRESSURE: 56 MMHG | OXYGEN SATURATION: 97 %

## 2024-04-21 DIAGNOSIS — T81.40XA POSTOPERATIVE INFECTION, UNSPECIFIED TYPE, INITIAL ENCOUNTER: Primary | ICD-10-CM

## 2024-04-21 DIAGNOSIS — Z98.1 S/P LUMBAR FUSION: ICD-10-CM

## 2024-04-21 PROCEDURE — 96374 THER/PROPH/DIAG INJ IV PUSH: CPT

## 2024-04-21 PROCEDURE — 96375 TX/PRO/DX INJ NEW DRUG ADDON: CPT

## 2024-04-21 PROCEDURE — 250N000009 HC RX 250: Performed by: INTERNAL MEDICINE

## 2024-04-21 PROCEDURE — 250N000011 HC RX IP 250 OP 636: Mod: JZ | Performed by: INTERNAL MEDICINE

## 2024-04-21 RX ORDER — HEPARIN SODIUM (PORCINE) LOCK FLUSH IV SOLN 100 UNIT/ML 100 UNIT/ML
5 SOLUTION INTRAVENOUS
Status: CANCELLED | OUTPATIENT
Start: 2024-04-22

## 2024-04-21 RX ORDER — HEPARIN SODIUM (PORCINE) LOCK FLUSH IV SOLN 100 UNIT/ML 100 UNIT/ML
5 SOLUTION INTRAVENOUS
Status: CANCELLED | OUTPATIENT
Start: 2024-04-26

## 2024-04-21 RX ORDER — DIPHENHYDRAMINE HYDROCHLORIDE 50 MG/ML
50 INJECTION INTRAMUSCULAR; INTRAVENOUS
Status: CANCELLED
Start: 2024-04-22

## 2024-04-21 RX ORDER — DAPTOMYCIN 50 MG/ML
8 INJECTION, POWDER, LYOPHILIZED, FOR SOLUTION INTRAVENOUS ONCE
Status: COMPLETED | OUTPATIENT
Start: 2024-04-21 | End: 2024-04-21

## 2024-04-21 RX ORDER — HEPARIN SODIUM,PORCINE 10 UNIT/ML
5-20 VIAL (ML) INTRAVENOUS DAILY PRN
Status: CANCELLED | OUTPATIENT
Start: 2024-04-22

## 2024-04-21 RX ORDER — ALBUTEROL SULFATE 90 UG/1
1-2 AEROSOL, METERED RESPIRATORY (INHALATION)
Status: CANCELLED
Start: 2024-04-22

## 2024-04-21 RX ORDER — EPINEPHRINE 1 MG/ML
0.3 INJECTION, SOLUTION INTRAMUSCULAR; SUBCUTANEOUS EVERY 5 MIN PRN
Status: CANCELLED | OUTPATIENT
Start: 2024-04-22

## 2024-04-21 RX ORDER — DAPTOMYCIN 50 MG/ML
8 INJECTION, POWDER, LYOPHILIZED, FOR SOLUTION INTRAVENOUS ONCE
Status: CANCELLED | OUTPATIENT
Start: 2024-04-22 | End: 2024-04-26

## 2024-04-21 RX ORDER — ALBUTEROL SULFATE 0.83 MG/ML
2.5 SOLUTION RESPIRATORY (INHALATION)
Status: CANCELLED | OUTPATIENT
Start: 2024-04-22

## 2024-04-21 RX ORDER — HEPARIN SODIUM,PORCINE 10 UNIT/ML
5-20 VIAL (ML) INTRAVENOUS DAILY PRN
Status: CANCELLED | OUTPATIENT
Start: 2024-04-26

## 2024-04-21 RX ORDER — MEPERIDINE HYDROCHLORIDE 25 MG/ML
25 INJECTION INTRAMUSCULAR; INTRAVENOUS; SUBCUTANEOUS EVERY 30 MIN PRN
Status: CANCELLED | OUTPATIENT
Start: 2024-04-22

## 2024-04-21 RX ORDER — METHYLPREDNISOLONE SODIUM SUCCINATE 125 MG/2ML
125 INJECTION, POWDER, LYOPHILIZED, FOR SOLUTION INTRAMUSCULAR; INTRAVENOUS
Status: CANCELLED
Start: 2024-04-22

## 2024-04-21 RX ADMIN — DAPTOMYCIN 800 MG: 500 INJECTION, POWDER, LYOPHILIZED, FOR SOLUTION INTRAVENOUS at 12:31

## 2024-04-21 RX ADMIN — ERTAPENEM SODIUM 1 G: 1 INJECTION, POWDER, LYOPHILIZED, FOR SOLUTION INTRAMUSCULAR; INTRAVENOUS at 12:25

## 2024-04-21 ASSESSMENT — PAIN SCALES - GENERAL: PAINLEVEL: NO PAIN (0)

## 2024-04-21 NOTE — PROGRESS NOTES
Infusion Nursing Note:  Tessa GUPTA Grace presents today for Daptomycin/Invanz.    Patient seen by provider today: No   present during visit today: Not Applicable.    Note: N/A.      Intravenous Access:  PICC.    Treatment Conditions:  Not Applicable.      Post Infusion Assessment:  Patient tolerated infusion without incident.  Site patent and intact, free from redness, edema or discomfort.  No evidence of extravasations.       Discharge Plan:   Discharge instructions reviewed with: Patient and Family.  Patient and/or family verbalized understanding of discharge instructions and all questions answered.  AVS to patient via Vusay.  Patient will return 4/22/24 for next appointment.   Patient discharged in stable condition accompanied by: son.  Departure Mode: Ambulatory.      Chayo Durán RN

## 2024-04-22 ENCOUNTER — INFUSION THERAPY VISIT (OUTPATIENT)
Dept: INFUSION THERAPY | Facility: CLINIC | Age: 69
End: 2024-04-22
Attending: INTERNAL MEDICINE
Payer: COMMERCIAL

## 2024-04-22 VITALS
OXYGEN SATURATION: 95 % | SYSTOLIC BLOOD PRESSURE: 108 MMHG | HEART RATE: 84 BPM | DIASTOLIC BLOOD PRESSURE: 66 MMHG | TEMPERATURE: 97.5 F

## 2024-04-22 DIAGNOSIS — Z98.1 S/P LUMBAR FUSION: ICD-10-CM

## 2024-04-22 DIAGNOSIS — T81.40XA POSTOPERATIVE INFECTION, UNSPECIFIED TYPE, INITIAL ENCOUNTER: Primary | ICD-10-CM

## 2024-04-22 LAB
ALBUMIN SERPL BCG-MCNC: 3.9 G/DL (ref 3.5–5.2)
ALP SERPL-CCNC: 108 U/L (ref 40–150)
ALT SERPL W P-5'-P-CCNC: 14 U/L (ref 0–50)
ANION GAP SERPL CALCULATED.3IONS-SCNC: 14 MMOL/L (ref 7–15)
AST SERPL W P-5'-P-CCNC: 19 U/L (ref 0–45)
BASOPHILS # BLD AUTO: ABNORMAL 10*3/UL
BASOPHILS # BLD MANUAL: 0 10E3/UL (ref 0–0.2)
BASOPHILS NFR BLD AUTO: ABNORMAL %
BASOPHILS NFR BLD MANUAL: 0 %
BILIRUB SERPL-MCNC: 0.5 MG/DL
BUN SERPL-MCNC: 16 MG/DL (ref 8–23)
CALCIUM SERPL-MCNC: 10.2 MG/DL (ref 8.8–10.2)
CHLORIDE SERPL-SCNC: 101 MMOL/L (ref 98–107)
CK SERPL-CCNC: 63 U/L (ref 26–192)
CREAT SERPL-MCNC: 0.81 MG/DL (ref 0.51–0.95)
CRP SERPL-MCNC: 6.41 MG/L
DEPRECATED HCO3 PLAS-SCNC: 24 MMOL/L (ref 22–29)
EGFRCR SERPLBLD CKD-EPI 2021: 78 ML/MIN/1.73M2
EOSINOPHIL # BLD AUTO: ABNORMAL 10*3/UL
EOSINOPHIL # BLD MANUAL: 0.3 10E3/UL (ref 0–0.7)
EOSINOPHIL NFR BLD AUTO: ABNORMAL %
EOSINOPHIL NFR BLD MANUAL: 2 %
ERYTHROCYTE [DISTWIDTH] IN BLOOD BY AUTOMATED COUNT: 14 % (ref 10–15)
GLUCOSE SERPL-MCNC: 131 MG/DL (ref 70–99)
HCT VFR BLD AUTO: 36.2 % (ref 35–47)
HGB BLD-MCNC: 11.1 G/DL (ref 11.7–15.7)
IMM GRANULOCYTES # BLD: ABNORMAL 10*3/UL
IMM GRANULOCYTES NFR BLD: ABNORMAL %
LYMPHOCYTES # BLD AUTO: ABNORMAL 10*3/UL
LYMPHOCYTES # BLD MANUAL: 8.2 10E3/UL (ref 0.8–5.3)
LYMPHOCYTES NFR BLD AUTO: ABNORMAL %
LYMPHOCYTES NFR BLD MANUAL: 49 %
MCH RBC QN AUTO: 25.1 PG (ref 26.5–33)
MCHC RBC AUTO-ENTMCNC: 30.7 G/DL (ref 31.5–36.5)
MCV RBC AUTO: 82 FL (ref 78–100)
MONOCYTES # BLD AUTO: ABNORMAL 10*3/UL
MONOCYTES # BLD MANUAL: 0.2 10E3/UL (ref 0–1.3)
MONOCYTES NFR BLD AUTO: ABNORMAL %
MONOCYTES NFR BLD MANUAL: 1 %
NEUTROPHILS # BLD AUTO: ABNORMAL 10*3/UL
NEUTROPHILS # BLD MANUAL: 8 10E3/UL (ref 1.6–8.3)
NEUTROPHILS NFR BLD AUTO: ABNORMAL %
NEUTROPHILS NFR BLD MANUAL: 48 %
NRBC # BLD AUTO: 0 10E3/UL
NRBC BLD AUTO-RTO: 0 /100
PLAT MORPH BLD: ABNORMAL
PLATELET # BLD AUTO: 226 10E3/UL (ref 150–450)
POTASSIUM SERPL-SCNC: 3.8 MMOL/L (ref 3.4–5.3)
PROT SERPL-MCNC: 6.7 G/DL (ref 6.4–8.3)
RBC # BLD AUTO: 4.42 10E6/UL (ref 3.8–5.2)
RBC MORPH BLD: ABNORMAL
SODIUM SERPL-SCNC: 139 MMOL/L (ref 135–145)
WBC # BLD AUTO: 16.7 10E3/UL (ref 4–11)

## 2024-04-22 PROCEDURE — 80053 COMPREHEN METABOLIC PANEL: CPT | Performed by: INTERNAL MEDICINE

## 2024-04-22 PROCEDURE — 250N000009 HC RX 250: Performed by: INTERNAL MEDICINE

## 2024-04-22 PROCEDURE — 96375 TX/PRO/DX INJ NEW DRUG ADDON: CPT

## 2024-04-22 PROCEDURE — 250N000011 HC RX IP 250 OP 636: Performed by: INTERNAL MEDICINE

## 2024-04-22 PROCEDURE — 86140 C-REACTIVE PROTEIN: CPT | Performed by: INTERNAL MEDICINE

## 2024-04-22 PROCEDURE — 96374 THER/PROPH/DIAG INJ IV PUSH: CPT

## 2024-04-22 PROCEDURE — 82550 ASSAY OF CK (CPK): CPT | Performed by: INTERNAL MEDICINE

## 2024-04-22 PROCEDURE — 85027 COMPLETE CBC AUTOMATED: CPT | Performed by: INTERNAL MEDICINE

## 2024-04-22 PROCEDURE — 85007 BL SMEAR W/DIFF WBC COUNT: CPT | Performed by: INTERNAL MEDICINE

## 2024-04-22 RX ORDER — MEPERIDINE HYDROCHLORIDE 25 MG/ML
25 INJECTION INTRAMUSCULAR; INTRAVENOUS; SUBCUTANEOUS EVERY 30 MIN PRN
Status: CANCELLED | OUTPATIENT
Start: 2024-04-23

## 2024-04-22 RX ORDER — HEPARIN SODIUM (PORCINE) LOCK FLUSH IV SOLN 100 UNIT/ML 100 UNIT/ML
5 SOLUTION INTRAVENOUS
Status: CANCELLED | OUTPATIENT
Start: 2024-04-23

## 2024-04-22 RX ORDER — DIPHENHYDRAMINE HYDROCHLORIDE 50 MG/ML
50 INJECTION INTRAMUSCULAR; INTRAVENOUS
Status: CANCELLED
Start: 2024-04-23

## 2024-04-22 RX ORDER — HEPARIN SODIUM (PORCINE) LOCK FLUSH IV SOLN 100 UNIT/ML 100 UNIT/ML
5 SOLUTION INTRAVENOUS
Status: CANCELLED | OUTPATIENT
Start: 2024-04-26

## 2024-04-22 RX ORDER — ALBUTEROL SULFATE 90 UG/1
1-2 AEROSOL, METERED RESPIRATORY (INHALATION)
Status: CANCELLED
Start: 2024-04-23

## 2024-04-22 RX ORDER — EPINEPHRINE 1 MG/ML
0.3 INJECTION, SOLUTION INTRAMUSCULAR; SUBCUTANEOUS EVERY 5 MIN PRN
Status: CANCELLED | OUTPATIENT
Start: 2024-04-23

## 2024-04-22 RX ORDER — HEPARIN SODIUM,PORCINE 10 UNIT/ML
5-20 VIAL (ML) INTRAVENOUS DAILY PRN
Status: CANCELLED | OUTPATIENT
Start: 2024-04-26

## 2024-04-22 RX ORDER — ALBUTEROL SULFATE 0.83 MG/ML
2.5 SOLUTION RESPIRATORY (INHALATION)
Status: CANCELLED | OUTPATIENT
Start: 2024-04-23

## 2024-04-22 RX ORDER — HEPARIN SODIUM,PORCINE 10 UNIT/ML
5-20 VIAL (ML) INTRAVENOUS DAILY PRN
Status: CANCELLED | OUTPATIENT
Start: 2024-04-23

## 2024-04-22 RX ORDER — DAPTOMYCIN 50 MG/ML
8 INJECTION, POWDER, LYOPHILIZED, FOR SOLUTION INTRAVENOUS ONCE
Status: CANCELLED | OUTPATIENT
Start: 2024-04-26 | End: 2024-04-26

## 2024-04-22 RX ORDER — METHYLPREDNISOLONE SODIUM SUCCINATE 125 MG/2ML
125 INJECTION, POWDER, LYOPHILIZED, FOR SOLUTION INTRAMUSCULAR; INTRAVENOUS
Status: CANCELLED
Start: 2024-04-23

## 2024-04-22 RX ORDER — DAPTOMYCIN 50 MG/ML
8 INJECTION, POWDER, LYOPHILIZED, FOR SOLUTION INTRAVENOUS ONCE
Status: COMPLETED | OUTPATIENT
Start: 2024-04-22 | End: 2024-04-22

## 2024-04-22 RX ADMIN — DAPTOMYCIN 800 MG: 500 INJECTION, POWDER, LYOPHILIZED, FOR SOLUTION INTRAVENOUS at 12:22

## 2024-04-22 RX ADMIN — ERTAPENEM SODIUM 1 G: 1 INJECTION, POWDER, LYOPHILIZED, FOR SOLUTION INTRAMUSCULAR; INTRAVENOUS at 12:25

## 2024-04-22 NOTE — PROGRESS NOTES
Infusion Nursing Note:  Tessa Edwards presents today for labs/Daptomycin/Invanz.    Patient seen by provider today: No   present during visit today: Not Applicable.    Note: Pt has follow up with ID tomorrow afternoon to determine next steps. Pt was instructed she will need to call make future appts following that visit, either for additional antibiotics or for picc line removal.    Yeast rash in groin area and underneath breasts has not improved at all. However, she has yet to  the antifungal powder which I instructed her on three times since 4/16/24. Informed pt to discuss skin issues with ID tomorrow      Intravenous Access:  Labs drawn without difficulty.  PICC.    Treatment Conditions:  Not Applicable.      Post Infusion Assessment:  Patient tolerated infusion without incident.  Blood return noted pre and post infusion.  Site patent and intact, free from redness, edema or discomfort.  No evidence of extravasations.       Discharge Plan:   AVS to patient via MYCHART.  Patient will return PRN pending follow up with ID tomorrow   Patient discharged in stable condition accompanied by: self.  Departure Mode: Ambulatory.      Carly Scott RN

## 2024-04-23 ENCOUNTER — OFFICE VISIT (OUTPATIENT)
Dept: INFECTIOUS DISEASES | Facility: CLINIC | Age: 69
End: 2024-04-23
Payer: COMMERCIAL

## 2024-04-23 VITALS
DIASTOLIC BLOOD PRESSURE: 66 MMHG | TEMPERATURE: 98.6 F | BODY MASS INDEX: 37.59 KG/M2 | OXYGEN SATURATION: 94 % | HEART RATE: 85 BPM | SYSTOLIC BLOOD PRESSURE: 122 MMHG | WEIGHT: 219 LBS

## 2024-04-23 DIAGNOSIS — M46.26 INFECTION OF LUMBAR SPINE (H): Primary | ICD-10-CM

## 2024-04-23 PROCEDURE — 99213 OFFICE O/P EST LOW 20 MIN: CPT | Performed by: INTERNAL MEDICINE

## 2024-04-23 PROCEDURE — G2211 COMPLEX E/M VISIT ADD ON: HCPCS | Performed by: INTERNAL MEDICINE

## 2024-04-23 RX ORDER — SULFAMETHOXAZOLE/TRIMETHOPRIM 800-160 MG
1 TABLET ORAL 2 TIMES DAILY
Qty: 180 TABLET | Refills: 3 | Status: SHIPPED | OUTPATIENT
Start: 2024-04-23

## 2024-04-23 NOTE — PATIENT INSTRUCTIONS
Thanks for the visit  Stop the IV antibiotics and remove the PICC line  Start bactrim antibiotic pills  Recheck kidney test in

## 2024-04-23 NOTE — PROGRESS NOTES
INFECTIOUS DISEASE [place] [facility] FOLLOW UP NOTE      Date: 04/23/2024   Patient Name: Tessa Edwards   YOB: 1955  MRN: [id number]      ASSESSMENT:  Post-operative wound infection. Hardware in place. Fluid collection on MRI subcutaneous, extends to deep structures. Aspirate 3/3 now growing enterobacter. Anaerobe, and staph epi. Deep infection. Now s/p I+D 3/7 -- large subQ fluid collection that tracked down to epidural space. Old hematoma noted. Hardware had covered over with granulation tissue. Growth of staph epi from aspirate of uncertain significance, but this is now confirmed on deep operative cultures, as is Enterobacter. Staph epi raises question of hardware involvement, despite it being covered with granulation tissue as noted at surgery. S epi in several cultures. Repeat OR 3/11 no purulence.   S/p L4--L5 transforaminal lumbar interbody fusion on 2/23/24.   H/o NHL, maintained on acalabrutinib    PLAN:  - start bactrim DS BID, potentially indefinitely if tolerates due to concern for hardware involvement  - stop IV abx, pull PICC line  - check BMP in 2 weeks-gets labs with oncology and PCP so can be checked there, too.      Return to clinic in 3 months.    Gaby Johnson MD  [facility]   Clinic phone: [phone]   Clinic fax: [phone]    ______________________________________________________________________    SUBJECTIVE / INTERVAL HISTORY: Tessa Edwards returns for follow up of post operative infection.    Doing ok. Rash for a couple of weeks, better on right arm but now on legs and left arm a bit. Not really bothering. Otherwise infusions are going well. Tolerating antibiotics. Discussed micro, concern for hardware involvement.      ROS: All other systems negative except as listed above.      Current Outpatient Medications:     acalabrutinib (CALQUENCE) 100 MG tablet, Take 100 mg by mouth 2 times daily, Disp: , Rfl:     calcium-vitamin D  (CALCIUM-VITAMIN D) 500 mg(1,250mg) -200 unit per tablet, Take 1 tablet by mouth every evening , Disp: , Rfl:     FLUoxetine (PROZAC) 40 MG capsule, Take 1 capsule by mouth once daily, Disp: 90 capsule, Rfl: 0    gabapentin (NEURONTIN) 100 MG capsule, Take 100 mg by mouth 3 times daily Plus additional 100mg at HS if needed, Disp: , Rfl:     gabapentin (NEURONTIN) 100 MG capsule, Take 100 mg by mouth nightly as needed In addition to 100mg TID, Disp: , Rfl:     loperamide (IMODIUM) 2 MG capsule, Take 2 mg by mouth 3 times daily, Disp: , Rfl:     multivitamin w/minerals (THERA-VIT-M) tablet, Take 1 tablet by mouth daily, Disp: , Rfl:     omeprazole 20 MG tablet, Take 20 mg by mouth daily, Disp: , Rfl:     oxyCODONE (ROXICODONE) 5 MG tablet, Take 1 tablet (5 mg) by mouth every 6 hours as needed for moderate pain, Disp: 40 tablet, Rfl: 0    pseudoePHEDrine-guaiFENesin (MUCINEX D)  MG 12 hr tablet, Take 1 tablet by mouth 2 times daily as needed for congestion, Disp: , Rfl:     rOPINIRole (REQUIP) 1 MG tablet, TAKE 1 TABLET BY MOUTH TWICE DAILY AND 2 AT BEDTIME, Disp: 180 tablet, Rfl: 0    sulfamethoxazole-trimethoprim (BACTRIM DS) 800-160 MG tablet, Take 1 tablet by mouth 2 times daily, Disp: 180 tablet, Rfl: 3    acetaminophen (TYLENOL) 325 MG tablet, Take 2 tablets (650 mg) by mouth every 4 hours as needed for other (mild pain) (Patient not taking: Reported on 4/23/2024), Disp: 100 tablet, Rfl: 0    DAPTOmycin 600 mg, Inject 600 mg into the vein every 24 hours for 40 days (Patient not taking: Reported on 4/23/2024), Disp: , Rfl:       OBJECTIVE:  /66 (BP Location: Left arm, Patient Position: Sitting, Cuff Size: Adult Large)   Pulse 85   Temp 98.6  F (37  C) (Oral)   Wt 99.3 kg (219 lb)   SpO2 94%   BMI 37.59 kg/m        GEN: No acute distress.    RESPIRATORY:  Normal breathing pattern.   EXTREMITIES: No edema.  SKIN/HAIR/NAILS:  rash right arm, per patient stable past week.       Pertinent labs:    No  "results found for: \"CRP\"  Last Comprehensive Metabolic Panel:  Sodium   Date Value Ref Range Status   04/22/2024 139 135 - 145 mmol/L Final     Comment:     Reference intervals for this test were updated on 09/26/2023 to more accurately reflect our healthy population. There may be differences in the flagging of prior results with similar values performed with this method. Interpretation of those prior results can be made in the context of the updated reference intervals.      Potassium   Date Value Ref Range Status   04/22/2024 3.8 3.4 - 5.3 mmol/L Final   11/18/2021 3.7 3.5 - 5.0 mmol/L Final     Chloride   Date Value Ref Range Status   04/22/2024 101 98 - 107 mmol/L Final   11/18/2021 106 98 - 107 mmol/L Final     Carbon Dioxide (CO2)   Date Value Ref Range Status   04/22/2024 24 22 - 29 mmol/L Final   11/18/2021 25 22 - 31 mmol/L Final     Anion Gap   Date Value Ref Range Status   04/22/2024 14 7 - 15 mmol/L Final   11/18/2021 8 5 - 18 mmol/L Final     Glucose   Date Value Ref Range Status   04/22/2024 131 (H) 70 - 99 mg/dL Final   11/18/2021 128 (H) 70 - 125 mg/dL Final     GLUCOSE BY METER POCT   Date Value Ref Range Status   02/23/2024 110 (H) 70 - 99 mg/dL Final     Urea Nitrogen   Date Value Ref Range Status   04/22/2024 16.0 8.0 - 23.0 mg/dL Final   11/18/2021 9 8 - 22 mg/dL Final     Creatinine   Date Value Ref Range Status   04/22/2024 0.81 0.51 - 0.95 mg/dL Final     GFR Estimate   Date Value Ref Range Status   04/22/2024 78 >60 mL/min/1.73m2 Final   08/27/2020 >60 >60 mL/min/1.73m2 Final     Calcium   Date Value Ref Range Status   04/22/2024 10.2 8.8 - 10.2 mg/dL Final     CBC RESULTS:   Recent Labs   Lab Test 04/22/24  1214   WBC 16.7*   RBC 4.42   HGB 11.1*   HCT 36.2   MCV 82   MCH 25.1*   MCHC 30.7*   RDW 14.0          MICROBIOLOGY DATA:    T Cell Subset:  WBC Count   Date Value Ref Range Status   04/22/2024 16.7 (H) 4.0 - 11.0 10e3/uL Final     % Lymphocytes   Date Value Ref Range Status "   04/22/2024 49 % Final   01/02/2023 70 % Final   09/05/2019 86 (H) 20 - 40 % Final       RADIOLOGY:    Recent Results (from the past 744 hour(s))   XR Lumbar Spine 2/3 Views    Narrative    EXAM: XR LUMBAR SPINE 2/3 VIEWS  LOCATION: [facility]  DATE: 4/4/2024    INDICATION:  Spinal stenosis of lumbar region with neurogenic claudication  COMPARISON: Lumbar spine radiograph: 03/03/2024      Impression    IMPRESSION:     1.  No radiographic evidence of acute fracture involving the lumbar spine. Vertebral body heights are maintained.  2.  Similar alignment with mild dextrocurvature as well as trace retrolisthesis of L2 on L3 and grade 1 anterolisthesis of L4 on L5.  3.  Postsurgical changes of instrumented posterior fusion with rods, pedicle screws, and interbody graft spanning L4-L5. Hardware is similarly positioned and intact without evidence of component loosening. Morselized bone graft material seen along the   posterior elements bilaterally at L4-L5.  4.  Advanced disc height loss at L2-L3.  5.  Calcified atherosclerosis of the abdominal aorta.        Total Time Spent 25 minutes including chart review, time with patient, orders, and documentation.    The longitudinal plan of care for the diagnosis(es)/condition(s) as documented were addressed during this visit. Due to the added complexity in care, I will continue to support [facility] in the subsequent management and with ongoing continuity of care.

## 2024-04-26 ENCOUNTER — OFFICE VISIT (OUTPATIENT)
Dept: NEUROSURGERY | Facility: CLINIC | Age: 69
End: 2024-04-26
Payer: COMMERCIAL

## 2024-04-26 VITALS
BODY MASS INDEX: 37.56 KG/M2 | SYSTOLIC BLOOD PRESSURE: 136 MMHG | HEART RATE: 84 BPM | WEIGHT: 220 LBS | OXYGEN SATURATION: 95 % | HEIGHT: 64 IN | DIASTOLIC BLOOD PRESSURE: 62 MMHG

## 2024-04-26 DIAGNOSIS — E66.812 CLASS 2 SEVERE OBESITY DUE TO EXCESS CALORIES WITH SERIOUS COMORBIDITY AND BODY MASS INDEX (BMI) OF 37.0 TO 37.9 IN ADULT (H): ICD-10-CM

## 2024-04-26 DIAGNOSIS — M48.062 SPINAL STENOSIS OF LUMBAR REGION WITH NEUROGENIC CLAUDICATION: Primary | ICD-10-CM

## 2024-04-26 DIAGNOSIS — E66.01 CLASS 2 SEVERE OBESITY DUE TO EXCESS CALORIES WITH SERIOUS COMORBIDITY AND BODY MASS INDEX (BMI) OF 37.0 TO 37.9 IN ADULT (H): ICD-10-CM

## 2024-04-26 DIAGNOSIS — Z98.1 S/P LUMBAR FUSION: ICD-10-CM

## 2024-04-26 PROCEDURE — 99024 POSTOP FOLLOW-UP VISIT: CPT | Performed by: NURSE PRACTITIONER

## 2024-04-26 ASSESSMENT — PAIN SCALES - GENERAL: PAINLEVEL: NO PAIN (0)

## 2024-04-26 NOTE — LETTER
4/26/2024         RE: Tessa Edwards  [address] [place] [facility] [address]        Dear Colleague,    Thank you for referring your patient, Tessa Edwards, to the [facility]. Please see a copy of my visit note below.    CHIEF COMPLAINT / REASON FOR VISIT  post operative visit.    ASSESSMENT/PLAN:  (M48.062) Spinal stenosis of lumbar region with neurogenic claudication  (primary encounter diagnosis)  (Z98.1) S/P lumbar fusion  (E66.01,  Z68.37) Class 2 severe obesity due to excess calories with serious comorbidity and body mass index (BMI) of 37.0 to 37.9 in adult (H)    Incision looks significantly improved from her last visit with us 2 weeks ago.  It is now closed, and while there is some redness around it, she does not state that has been itching her.  There is no areas that are open.  Sick stitches that were left were removed.  Her incision was cleaned with alcohol.  She is now on oral antibiotics, but her incision looks very good.  She will see Dr. Hudson in June for her postop visit she will need images for this visit. She is going to start physical therapy.      HISTORY OF PRESENT ILLNESS  Tessa Edwards is a 68 year old female who underwent underwent medial facetectomies, right transforaminal lumbar interbody fusion and bilateral posterior instrumented fusion with bilateral L4-5 laminectomies by Dr Hudson on 2/23/24. Procedure was to treat lumbar radiculopathy, cauda equina compression and lumbar stenosis.  She then underwent wound irrigation and debridement on 3/7/24  by Dr Ambrose and removal of wound vac with wound closure on 3/11/24 by Dr Hudson.  She is now 9 weeks from her original surgery and 5 weeks from her revision.  She has completed her IV antibiotics and is now on oral antibiotics only.  She is here for wound check and removal of her stitches.  Overall she is doing well and looking forward to participating in physical therapy.  She has many  questions about her surgery today which we discussed in detail.  WBC Count   Date Value Ref Range Status   04/22/2024 16.7 (H) 4.0 - 11.0 10e3/uL Final   Was 19.9 on 3/28/24      Current Outpatient Medications   Medication Sig Dispense Refill     acalabrutinib (CALQUENCE) 100 MG tablet Take 100 mg by mouth 2 times daily       acetaminophen (TYLENOL) 325 MG tablet Take 2 tablets (650 mg) by mouth every 4 hours as needed for other (mild pain) (Patient not taking: Reported on 4/23/2024) 100 tablet 0     calcium-vitamin D (CALCIUM-VITAMIN D) 500 mg(1,250mg) -200 unit per tablet Take 1 tablet by mouth every evening        FLUoxetine (PROZAC) 40 MG capsule Take 1 capsule by mouth once daily 90 capsule 0     gabapentin (NEURONTIN) 100 MG capsule Take 100 mg by mouth 3 times daily Plus additional 100mg at HS if needed       gabapentin (NEURONTIN) 100 MG capsule Take 100 mg by mouth nightly as needed In addition to 100mg TID       loperamide (IMODIUM) 2 MG capsule Take 2 mg by mouth 3 times daily       multivitamin w/minerals (THERA-VIT-M) tablet Take 1 tablet by mouth daily       omeprazole 20 MG tablet Take 20 mg by mouth daily       oxyCODONE (ROXICODONE) 5 MG tablet Take 1 tablet (5 mg) by mouth every 6 hours as needed for moderate pain 40 tablet 0     pseudoePHEDrine-guaiFENesin (MUCINEX D)  MG 12 hr tablet Take 1 tablet by mouth 2 times daily as needed for congestion       rOPINIRole (REQUIP) 1 MG tablet TAKE 1 TABLET BY MOUTH TWICE DAILY AND 2 AT BEDTIME 180 tablet 0     sulfamethoxazole-trimethoprim (BACTRIM DS) 800-160 MG tablet Take 1 tablet by mouth 2 times daily 180 tablet 3     No current facility-administered medications for this visit.       PHYSICAL EXAMINATION     General:  Well-appearing, non-distressed.  HEENT: Face is symmetric, hearing intact in general conversation.  Integument: Incision is closed, with no areas of drainage. Some redness but no swelling.   Musculoskeletal/Neurological: good bulk  tone and strength bilateral lower extremities, moving better, 5/5 all dermatomes, still slow on hip flexion.  symmetric sensation to light touch.      Again, thank you for allowing me to participate in the care of your patient.        Sincerely,        FELIPE Kaplan CNP

## 2024-04-26 NOTE — PROGRESS NOTES
CHIEF COMPLAINT / REASON FOR VISIT  post operative visit.    ASSESSMENT/PLAN:  (M48.062) Spinal stenosis of lumbar region with neurogenic claudication  (primary encounter diagnosis)  (Z98.1) S/P lumbar fusion  (E66.01,  Z68.37) Class 2 severe obesity due to excess calories with serious comorbidity and body mass index (BMI) of 37.0 to 37.9 in adult (H)    Incision looks significantly improved from her last visit with us 2 weeks ago.  It is now closed, and while there is some redness around it, she does not state that has been itching her.  There is no areas that are open.  Sick stitches that were left were removed.  Her incision was cleaned with alcohol.  She is now on oral antibiotics, but her incision looks very good.  She will see Dr. Hudson in June for her postop visit she will need images for this visit. She is going to start physical therapy.      HISTORY OF PRESENT ILLNESS  Tessa Edwards is a 68 year old female who underwent underwent medial facetectomies, right transforaminal lumbar interbody fusion and bilateral posterior instrumented fusion with bilateral L4-5 laminectomies by Dr Hudson on 2/23/24. Procedure was to treat lumbar radiculopathy, cauda equina compression and lumbar stenosis.  She then underwent wound irrigation and debridement on 3/7/24  by Dr Ambrose and removal of wound vac with wound closure on 3/11/24 by Dr Hudson.  She is now 9 weeks from her original surgery and 5 weeks from her revision.  She has completed her IV antibiotics and is now on oral antibiotics only.  She is here for wound check and removal of her stitches.  Overall she is doing well and looking forward to participating in physical therapy.  She has many questions about her surgery today which we discussed in detail.  WBC Count   Date Value Ref Range Status   04/22/2024 16.7 (H) 4.0 - 11.0 10e3/uL Final   Was 19.9 on 3/28/24      Current Outpatient Medications   Medication Sig Dispense Refill    acalabrutinib  (CALQUENCE) 100 MG tablet Take 100 mg by mouth 2 times daily      acetaminophen (TYLENOL) 325 MG tablet Take 2 tablets (650 mg) by mouth every 4 hours as needed for other (mild pain) (Patient not taking: Reported on 4/23/2024) 100 tablet 0    calcium-vitamin D (CALCIUM-VITAMIN D) 500 mg(1,250mg) -200 unit per tablet Take 1 tablet by mouth every evening       FLUoxetine (PROZAC) 40 MG capsule Take 1 capsule by mouth once daily 90 capsule 0    gabapentin (NEURONTIN) 100 MG capsule Take 100 mg by mouth 3 times daily Plus additional 100mg at HS if needed      gabapentin (NEURONTIN) 100 MG capsule Take 100 mg by mouth nightly as needed In addition to 100mg TID      loperamide (IMODIUM) 2 MG capsule Take 2 mg by mouth 3 times daily      multivitamin w/minerals (THERA-VIT-M) tablet Take 1 tablet by mouth daily      omeprazole 20 MG tablet Take 20 mg by mouth daily      oxyCODONE (ROXICODONE) 5 MG tablet Take 1 tablet (5 mg) by mouth every 6 hours as needed for moderate pain 40 tablet 0    pseudoePHEDrine-guaiFENesin (MUCINEX D)  MG 12 hr tablet Take 1 tablet by mouth 2 times daily as needed for congestion      rOPINIRole (REQUIP) 1 MG tablet TAKE 1 TABLET BY MOUTH TWICE DAILY AND 2 AT BEDTIME 180 tablet 0    sulfamethoxazole-trimethoprim (BACTRIM DS) 800-160 MG tablet Take 1 tablet by mouth 2 times daily 180 tablet 3     No current facility-administered medications for this visit.       PHYSICAL EXAMINATION     General:  Well-appearing, non-distressed.  HEENT: Face is symmetric, hearing intact in general conversation.  Integument: Incision is closed, with no areas of drainage. Some redness but no swelling.   Musculoskeletal/Neurological: good bulk tone and strength bilateral lower extremities, moving better, 5/5 all dermatomes, still slow on hip flexion.  symmetric sensation to light touch.

## 2024-04-26 NOTE — NURSING NOTE
"Tessa Edwards is a 68 year old female who presents for:  Chief Complaint   Patient presents with    RECHECK     2 week wound follow up. BP is elevated, patient agreed to recheck.        Initial Vitals:  BP (!) 178/74   Pulse 84   Ht 5' 4\" (1.626 m)   Wt 220 lb (99.8 kg)   SpO2 95%   BMI 37.76 kg/m   Estimated body mass index is 37.76 kg/m  as calculated from the following:    Height as of this encounter: 5' 4\" (1.626 m).    Weight as of this encounter: 220 lb (99.8 kg).. Body surface area is 2.12 meters squared. BP completed using cuff size: regular  No Pain (0)        Delroy Foley    "

## 2024-05-02 DIAGNOSIS — T81.40XA POSTOPERATIVE INFECTION, UNSPECIFIED TYPE, INITIAL ENCOUNTER: ICD-10-CM

## 2024-05-02 DIAGNOSIS — M46.26 INFECTION OF LUMBAR SPINE (H): Primary | ICD-10-CM

## 2024-05-02 LAB
ACID FAST STAIN (ARUP): NORMAL

## 2024-05-07 ENCOUNTER — LAB (OUTPATIENT)
Dept: LAB | Facility: CLINIC | Age: 69
End: 2024-05-07
Payer: COMMERCIAL

## 2024-05-07 DIAGNOSIS — M46.26 INFECTION OF LUMBAR SPINE (H): ICD-10-CM

## 2024-05-07 PROCEDURE — 36415 COLL VENOUS BLD VENIPUNCTURE: CPT

## 2024-05-07 PROCEDURE — 80048 BASIC METABOLIC PNL TOTAL CA: CPT

## 2024-05-08 LAB
ANION GAP SERPL CALCULATED.3IONS-SCNC: 12 MMOL/L (ref 7–15)
BUN SERPL-MCNC: 25.1 MG/DL (ref 8–23)
CALCIUM SERPL-MCNC: 9.6 MG/DL (ref 8.8–10.2)
CHLORIDE SERPL-SCNC: 104 MMOL/L (ref 98–107)
CREAT SERPL-MCNC: 0.93 MG/DL (ref 0.51–0.95)
DEPRECATED HCO3 PLAS-SCNC: 22 MMOL/L (ref 22–29)
EGFRCR SERPLBLD CKD-EPI 2021: 66 ML/MIN/1.73M2
GLUCOSE SERPL-MCNC: 152 MG/DL (ref 70–99)
POTASSIUM SERPL-SCNC: 3.8 MMOL/L (ref 3.4–5.3)
SODIUM SERPL-SCNC: 138 MMOL/L (ref 135–145)

## 2024-05-10 ENCOUNTER — TELEPHONE (OUTPATIENT)
Dept: INFECTIOUS DISEASES | Facility: CLINIC | Age: 69
End: 2024-05-10
Payer: COMMERCIAL

## 2024-05-10 NOTE — TELEPHONE ENCOUNTER
I called the pt, she had an appt 4/27 for a PICC removal and the appt was cancelled, for an unknown reason. I advised the pt to call the infusion center to schedule that and call me back if she has issues.

## 2024-05-10 NOTE — TELEPHONE ENCOUNTER
[facility] Call Center    Phone Message    May a detailed message be left on voicemail: yes     Reason for Call: Other: Caller requesting to speak to care team about having PICC LINE removed      Action Taken: Other: ID     Travel Screening: Not Applicable

## 2024-05-11 DIAGNOSIS — G25.81 RESTLESS LEG SYNDROME: ICD-10-CM

## 2024-05-13 NOTE — TELEPHONE ENCOUNTER
Please call the pt to schedule the PICC line removal at the infusion center near her or have her come to see the [facility] inf ds css nurse for removal.

## 2024-05-14 RX ORDER — ROPINIROLE 1 MG/1
TABLET, FILM COATED ORAL
Qty: 120 TABLET | Refills: 0 | Status: SHIPPED | OUTPATIENT
Start: 2024-05-14

## 2024-05-14 NOTE — TELEPHONE ENCOUNTER
Refill request for: Ropinirole    Directions: Take 1 tablet BID and 2 tablets at bedtime     LOV: 7/11/23  NOV: None - PRN - Need appointment for refills     30 day supply with 0 refills Medication T'd for review and signature     Left message for patient to return call to clinic to schedule appointment with Dr. Polanco for refills.

## 2024-05-17 NOTE — TELEPHONE ENCOUNTER
05.17- tcb x3-   Please call the pt to schedule the PICC line removal at the infusion center near her or have her come to see the [facility] inf ds css nurse for removal.     Please document when patient calls back

## 2024-05-20 NOTE — TELEPHONE ENCOUNTER
5.20- Left another message for the patient and also left a message on her Kamari-son's vm () to call us back to confirm if picc line was removed or if patient still need to have the picc line remove.

## 2024-06-04 ENCOUNTER — TELEPHONE (OUTPATIENT)
Dept: NEUROSURGERY | Facility: CLINIC | Age: 69
End: 2024-06-04

## 2024-06-04 NOTE — TELEPHONE ENCOUNTER
Date: June 4, 2024    Provider: Dr. Tristan Farias MD     Provider/Other:     Reason for out-going call: RESCHEDULE      Detailed message: Left message to call back and reschedule appointment with Dr. Hudson on 6/20/24 to either 6/25/24 or 6/27/24, provider will be out of clinic. [phone]

## 2024-06-05 ASSESSMENT — ENCOUNTER SYMPTOMS: FEVER: 1

## 2024-06-16 ENCOUNTER — HEALTH MAINTENANCE LETTER (OUTPATIENT)
Age: 69
End: 2024-06-16

## 2024-06-25 ENCOUNTER — TELEPHONE (OUTPATIENT)
Dept: NEUROSURGERY | Facility: CLINIC | Age: 69
End: 2024-06-25
Payer: COMMERCIAL

## 2024-06-25 NOTE — TELEPHONE ENCOUNTER
Other: Requesting c/b- wants to know if she's going to have an x-ray  for her appt on 06/27     Could we send this information to you in [employer] or would you prefer to receive a phone call?:   Patient would prefer a phone call   Okay to leave a detailed message?: No at Cell number on file:    Telephone Information:   Mobile [phone]

## 2024-06-26 ENCOUNTER — ANCILLARY PROCEDURE (OUTPATIENT)
Dept: GENERAL RADIOLOGY | Facility: CLINIC | Age: 69
End: 2024-06-26
Attending: NURSE PRACTITIONER
Payer: COMMERCIAL

## 2024-06-26 DIAGNOSIS — Z98.1 S/P LUMBAR FUSION: ICD-10-CM

## 2024-06-26 PROCEDURE — 72100 X-RAY EXAM L-S SPINE 2/3 VWS: CPT | Mod: TC | Performed by: RADIOLOGY

## 2024-06-27 ENCOUNTER — OFFICE VISIT (OUTPATIENT)
Dept: NEUROSURGERY | Facility: CLINIC | Age: 69
End: 2024-06-27
Payer: COMMERCIAL

## 2024-06-27 VITALS — SYSTOLIC BLOOD PRESSURE: 134 MMHG | OXYGEN SATURATION: 95 % | HEART RATE: 70 BPM | DIASTOLIC BLOOD PRESSURE: 64 MMHG

## 2024-06-27 DIAGNOSIS — Z98.1 S/P LUMBAR FUSION: Primary | ICD-10-CM

## 2024-06-27 PROCEDURE — 99213 OFFICE O/P EST LOW 20 MIN: CPT | Performed by: SURGERY

## 2024-06-27 ASSESSMENT — PAIN SCALES - GENERAL: PAINLEVEL: MODERATE PAIN (5)

## 2024-06-27 NOTE — LETTER
6/27/2024      Tessa Edwards  [address] [facility] [facility] [address]      Dear Colleague,    Thank you for referring your patient, Tessa Edwards, to the [facility]GERY. Please see a copy of my visit note below.    NEUROSURGERY FOLLOW UP  NOTE    Tessa Edwards comes today in follow-up.  67 yo female who presents with bilateral lumbar 4-5 laminectomies and instrumented fusion on 2/23/24. This was complicated by wound infection s/p wash out and wound vac placement and then complex closure.     Patient is continuing on PO antibiotics . Wound clean dry and intact. Posterolateral leg pain on right is improved. Intermittent right hip pain sometimes. Low back still gives discomfort. Overall getting better. Has not gone to physical therapy.       PHYSICAL EXAM:   Constitutional: /64 (BP Location: Left arm, Patient Position: Sitting, Cuff Size: Adult Regular)   Pulse 70   SpO2 95%      Mental Status: A & O in no acute distress.  Affect is appropriate.  Speech is fluent.  Recent and remote memory are intact.  Attention span and concentration are normal.     Motor:  Normal bulk and tone all muscle groups of lower extremities. 5/5 in bilateral LE      Sensory: Sensation intact bilaterally to light touch in LE     Coordination:   ambulates with a cane      Incision CDI    IMAGING:   I personally reviewed all radiographic images        CONSULTATION ASSESSMENT AND PLAN:    We reviewed the lumbar xray's. Looks overall good without evidence of loosening or hardware failure.  She is agreeable to go to therapy and have an evaluation for home exercises. With the post operative course complicated by infection and need for frequent healthcare visits patient is hoping not to have so many appointments in the future. Continue to avoid high impact activities  or lifting more then 50 pounds. Ok for massage with low intensity near prior incision until fully healed. Follow up will be in 3  months with a new xray for 6 month post operative follow up.     Brianne Hudson MD      CC:     [facility]  [address] [facility] [address]      Again, thank you for allowing me to participate in the care of your patient.        Sincerely,        Brianne Hudson MD

## 2024-06-27 NOTE — PROGRESS NOTES
NEUROSURGERY FOLLOW UP  NOTE    Tessa Edwards comes today in follow-up.  67 yo female who presents with bilateral lumbar 4-5 laminectomies and instrumented fusion on 2/23/24. This was complicated by wound infection s/p wash out and wound vac placement and then complex closure.     Patient is continuing on PO antibiotics . Wound clean dry and intact. Posterolateral leg pain on right is improved. Intermittent right hip pain sometimes. Low back still gives discomfort. Overall getting better. Has not gone to physical therapy.       PHYSICAL EXAM:   Constitutional: /64 (BP Location: Left arm, Patient Position: Sitting, Cuff Size: Adult Regular)   Pulse 70   SpO2 95%      Mental Status: A & O in no acute distress.  Affect is appropriate.  Speech is fluent.  Recent and remote memory are intact.  Attention span and concentration are normal.     Motor:  Normal bulk and tone all muscle groups of lower extremities. 5/5 in bilateral LE      Sensory: Sensation intact bilaterally to light touch in LE     Coordination:   ambulates with a cane      Incision CDI    IMAGING:   I personally reviewed all radiographic images        CONSULTATION ASSESSMENT AND PLAN:    We reviewed the lumbar xray's. Looks overall good without evidence of loosening or hardware failure.  She is agreeable to go to therapy and have an evaluation for home exercises. With the post operative course complicated by infection and need for frequent healthcare visits patient is hoping not to have so many appointments in the future. Continue to avoid high impact activities  or lifting more then 50 pounds. Ok for massage with low intensity near prior incision until fully healed. Follow up will be in 3 months with a new xray for 6 month post operative follow up.     Brianne Hudson MD      CC:     Clinic - [facility]  [address] [facility] [address]

## 2024-06-27 NOTE — NURSING NOTE
"Tessa Edwards is a 68 year old female who presents for:  Chief Complaint   Patient presents with    RECHECK        Initial Vitals:  /64 (BP Location: Left arm, Patient Position: Sitting, Cuff Size: Adult Regular)   Pulse 70   SpO2 95%  Estimated body mass index is 37.76 kg/m  as calculated from the following:    Height as of 4/26/24: 5' 4\" (1.626 m).    Weight as of 4/26/24: 220 lb (99.8 kg).. There is no height or weight on file to calculate BSA. BP completed using cuff size: regular  Moderate Pain (5)    Francisco Ordoñez    "

## 2024-07-19 ENCOUNTER — PATIENT OUTREACH (OUTPATIENT)
Dept: CARE COORDINATION | Facility: CLINIC | Age: 69
End: 2024-07-19
Payer: COMMERCIAL

## 2024-07-23 ENCOUNTER — LAB (OUTPATIENT)
Dept: LAB | Facility: CLINIC | Age: 69
End: 2024-07-23
Payer: COMMERCIAL

## 2024-07-23 ENCOUNTER — OFFICE VISIT (OUTPATIENT)
Dept: INFECTIOUS DISEASES | Facility: CLINIC | Age: 69
End: 2024-07-23
Payer: COMMERCIAL

## 2024-07-23 VITALS
SYSTOLIC BLOOD PRESSURE: 130 MMHG | HEART RATE: 74 BPM | TEMPERATURE: 98.7 F | DIASTOLIC BLOOD PRESSURE: 70 MMHG | OXYGEN SATURATION: 95 %

## 2024-07-23 DIAGNOSIS — M46.26 INFECTION OF LUMBAR SPINE (H): Primary | ICD-10-CM

## 2024-07-23 DIAGNOSIS — M46.26 INFECTION OF LUMBAR SPINE (H): ICD-10-CM

## 2024-07-23 LAB
ALBUMIN SERPL BCG-MCNC: 4 G/DL (ref 3.5–5.2)
ALP SERPL-CCNC: 78 U/L (ref 40–150)
ALT SERPL W P-5'-P-CCNC: 17 U/L (ref 0–50)
ANION GAP SERPL CALCULATED.3IONS-SCNC: 8 MMOL/L (ref 7–15)
AST SERPL W P-5'-P-CCNC: 23 U/L (ref 0–45)
BILIRUB SERPL-MCNC: 0.3 MG/DL
BUN SERPL-MCNC: 23.8 MG/DL (ref 8–23)
CALCIUM SERPL-MCNC: 9.8 MG/DL (ref 8.8–10.4)
CHLORIDE SERPL-SCNC: 106 MMOL/L (ref 98–107)
CREAT SERPL-MCNC: 1.1 MG/DL (ref 0.51–0.95)
CRP SERPL-MCNC: <3 MG/L
EGFRCR SERPLBLD CKD-EPI 2021: 54 ML/MIN/1.73M2
GLUCOSE SERPL-MCNC: 94 MG/DL (ref 70–99)
HCO3 SERPL-SCNC: 25 MMOL/L (ref 22–29)
POTASSIUM SERPL-SCNC: 4.4 MMOL/L (ref 3.4–5.3)
PROT SERPL-MCNC: 6.3 G/DL (ref 6.4–8.3)
SODIUM SERPL-SCNC: 139 MMOL/L (ref 135–145)

## 2024-07-23 PROCEDURE — 36415 COLL VENOUS BLD VENIPUNCTURE: CPT

## 2024-07-23 PROCEDURE — G2211 COMPLEX E/M VISIT ADD ON: HCPCS | Performed by: INTERNAL MEDICINE

## 2024-07-23 PROCEDURE — 80053 COMPREHEN METABOLIC PANEL: CPT

## 2024-07-23 PROCEDURE — 86140 C-REACTIVE PROTEIN: CPT

## 2024-07-23 PROCEDURE — 99213 OFFICE O/P EST LOW 20 MIN: CPT | Performed by: INTERNAL MEDICINE

## 2024-07-23 NOTE — PROGRESS NOTES
[facility] FOLLOW UP NOTE      Date: 07/23/2024   Patient Name: Tessa Edwards   YOB: 1955  MRN: [id number]      ASSESSMENT/PLAN:  Post-operative wound infection. Hardware in place. Fluid collection on MRI subcutaneous, extends to deep structures. Aspirate 3/3 growing enterobacter. Anaerobe, and staph epi. Deep infection. Now s/p I+D 3/7 -- large subQ fluid collection that tracked down to epidural space. Old hematoma noted. Hardware had covered over with granulation tissue. Growth of staph epi from aspirate of uncertain significance, but this is now confirmed on deep operative cultures, as is Enterobacter. Staph epi raises question of hardware involvement, despite it being covered with granulation tissue as noted at surgery. S epi in several cultures. Repeat OR 3/11 no purulence. Completed 6 weeks IV antibiotics and now on bactrim DS.   S/p L4--L5 transforaminal lumbar interbody fusion on 2/23/24.   H/o NHL, maintained on acalabrutinib  - continue bactrim DS BID, ideally a year of higher dose IV antibiotics and bactrim. If doing well around a year after antibiotic therapy then consider decreasing bactrim dose  - check cmp and CRP today  - reviewed labs from [facility] improving.    Return to clinic this spring.    Gaby Johnson MD  [facility]   Clinic phone: [phone]   Clinic fax: [phone]    ______________________________________________________________________    SUBJECTIVE / INTERVAL HISTORY: Tessa Edwards returns for follow up of spine infection, hardware.  On bactrim, doing well. Back to normal activities. Overall back is better, saw surgery last month. Loose stools are at her baseline. Tolerating PO. No issues with bactrim.      ROS: All other systems negative except as listed above.      Current Outpatient Medications:     acalabrutinib (CALQUENCE) 100 MG tablet, Take 100 mg by mouth 2 times daily, Disp: , Rfl:      "calcium-vitamin D (CALCIUM-VITAMIN D) 500 mg(1,250mg) -200 unit per tablet, Take 1 tablet by mouth every evening , Disp: , Rfl:     FLUoxetine (PROZAC) 40 MG capsule, Take 1 capsule by mouth once daily, Disp: 90 capsule, Rfl: 0    gabapentin (NEURONTIN) 100 MG capsule, Take 100 mg by mouth 3 times daily Plus additional 100mg at HS if needed, Disp: , Rfl:     gabapentin (NEURONTIN) 100 MG capsule, Take 100 mg by mouth nightly as needed In addition to 100mg TID, Disp: , Rfl:     loperamide (IMODIUM) 2 MG capsule, Take 2 mg by mouth 3 times daily, Disp: , Rfl:     multivitamin w/minerals (THERA-VIT-M) tablet, Take 1 tablet by mouth daily, Disp: , Rfl:     omeprazole 20 MG tablet, Take 20 mg by mouth daily, Disp: , Rfl:     oxyCODONE (ROXICODONE) 5 MG tablet, Take 1 tablet (5 mg) by mouth every 6 hours as needed for moderate pain, Disp: 40 tablet, Rfl: 0    rOPINIRole (REQUIP) 1 MG tablet, TAKE 1 TABLET BY MOUTH TWICE DAILY AND 2 AT BEDTIME, Disp: 120 tablet, Rfl: 0    sulfamethoxazole-trimethoprim (BACTRIM DS) 800-160 MG tablet, Take 1 tablet by mouth 2 times daily, Disp: 180 tablet, Rfl: 3      OBJECTIVE:  /70 (BP Location: Left arm)   Pulse 74   Temp 98.7  F (37.1  C) (Oral)   SpO2 95%       GEN: No acute distress.    RESPIRATORY:  Normal breathing pattern.   EXTREMITIES: No edema.  SKIN/HAIR/NAILS:  No rashes. Incision healed well      Pertinent labs:    No results found for: \"CRP\"  Last Comprehensive Metabolic Panel:  Sodium   Date Value Ref Range Status   05/07/2024 138 135 - 145 mmol/L Final     Comment:     Reference intervals for this test were updated on 09/26/2023 to more accurately reflect our healthy population. There may be differences in the flagging of prior results with similar values performed with this method. Interpretation of those prior results can be made in the context of the updated reference intervals.      Potassium   Date Value Ref Range Status   05/07/2024 3.8 3.4 - 5.3 mmol/L Final "   11/18/2021 3.7 3.5 - 5.0 mmol/L Final     Chloride   Date Value Ref Range Status   05/07/2024 104 98 - 107 mmol/L Final   11/18/2021 106 98 - 107 mmol/L Final     Carbon Dioxide (CO2)   Date Value Ref Range Status   05/07/2024 22 22 - 29 mmol/L Final   11/18/2021 25 22 - 31 mmol/L Final     Anion Gap   Date Value Ref Range Status   05/07/2024 12 7 - 15 mmol/L Final   11/18/2021 8 5 - 18 mmol/L Final     Glucose   Date Value Ref Range Status   05/07/2024 152 (H) 70 - 99 mg/dL Final   11/18/2021 128 (H) 70 - 125 mg/dL Final     GLUCOSE BY METER POCT   Date Value Ref Range Status   02/23/2024 110 (H) 70 - 99 mg/dL Final     Urea Nitrogen   Date Value Ref Range Status   05/07/2024 25.1 (H) 8.0 - 23.0 mg/dL Final   11/18/2021 9 8 - 22 mg/dL Final     Creatinine   Date Value Ref Range Status   05/07/2024 0.93 0.51 - 0.95 mg/dL Final     GFR Estimate   Date Value Ref Range Status   05/07/2024 66 >60 mL/min/1.73m2 Final   08/27/2020 >60 >60 mL/min/1.73m2 Final     Calcium   Date Value Ref Range Status   05/07/2024 9.6 8.8 - 10.2 mg/dL Final     CBC RESULTS:   Recent Labs   Lab Test 04/22/24  1214   WBC 16.7*   RBC 4.42   HGB 11.1*   HCT 36.2   MCV 82   MCH 25.1*   MCHC 30.7*   RDW 14.0          MICROBIOLOGY DATA:    T Cell Subset:  WBC Count   Date Value Ref Range Status   04/22/2024 16.7 (H) 4.0 - 11.0 10e3/uL Final     % Lymphocytes   Date Value Ref Range Status   04/22/2024 49 % Final   01/02/2023 70 % Final   09/05/2019 86 (H) 20 - 40 % Final       RADIOLOGY:  Recent Results (from the past 744 hour(s))   XR Lumbar Spine 2/3 Views    Narrative    LUMBAR SPINE TWO TO THREE VIEWS  6/26/2024 10:13 AM     HISTORY: Status post lumbar fusion.    COMPARISON: Lumbar spine radiographs 4/4/2024. MRI lumbar spine  3/3/2024.      Impression    IMPRESSION: Nomenclature is based on 5 lumbar vertebral bodies. No  gross lumbar vertebral body height loss is identified. Unchanged  minimal retrolisthesis of L2 on L3 and L3 on L4.  Grade 1  anterolisthesis of L4 on L5 measuring 5 mm, unchanged. There also  appears to be mild grade 1 degenerative anterolisthesis of L5 on S1,  unchanged. Mildly exaggerated lumbar lordosis. Minimal broad  dextroconvex curvature of the lumbar spine. Alignment appears  unchanged.    Interbody spacer in the L4-L5 disc space and bilateral posterior  fusion hardware at L4-L5, as before. No definite radiographic findings  of hardware loosening or failure identified.    Severe disc space narrowing at L2-L3, unchanged. Moderate to severe  multilevel disc space narrowing in the lower thoracic spine and at  T12-L1, as before. Lesser degrees of disc space narrowing elsewhere.  Scattered multilevel degenerative facet disease, as before.  Atherosclerotic calcifications of the aorta. Surgical clips in the  right upper quadrant of the abdomen, likely from prior  cholecystectomy.    Overall, no significant change compared to prior radiographs of  4/4/2024.    BROCK MENDOZA MD         SYSTEM ID:  [id number]        Total Time Spent 20 minutes including chart review, time with patient, orders, and documentation.    The longitudinal plan of care for the diagnosis(es)/condition(s) as documented were addressed during this visit. Due to the added complexity in care, I will continue to support [facility] in the subsequent management and with ongoing continuity of care.

## 2024-09-09 ENCOUNTER — TELEPHONE (OUTPATIENT)
Dept: NEUROSURGERY | Facility: CLINIC | Age: 69
End: 2024-09-09
Payer: COMMERCIAL

## 2024-09-09 NOTE — TELEPHONE ENCOUNTER
LVMTC-please help patient to reschedule 3 months follow up from surgeon to [facility]. Offered appointment with Sofya Izaguirre CNP for a telephone visit on 09/23, Monday per staff message. Also, please reschedule XR prior to this telephone appt.   daughter

## 2024-09-13 NOTE — TELEPHONE ENCOUNTER
2nd attempt. LVMTC-please help patient to reschedule 3 months follow up from surgeon to [facility]. Offered appointment with Sofya Izaguirre CNP for a telephone visit on 09/23, Monday per staff message. Also, please reschedule XR prior to this telephone appt.    Also, offered appt with Angelica Moreno on 09/19 if patient would like to do in-person visit.

## 2024-09-13 NOTE — TELEPHONE ENCOUNTER
Other: xray has been rescheduled and follow up appt also has been rescheduled, however, writer unable to change appt visit type to only telephone, prompts weren't letting writer availability options for telephone visit only. Please change visit type to telephone. Thank you.      Could we send this information to you in [place] or would you prefer to receive a phone call?:   Patient would prefer a phone call   Okay to leave a detailed message?: Yes at Cell number on file:    Telephone Information:   Mobile [phone]

## 2024-09-20 ENCOUNTER — ANCILLARY PROCEDURE (OUTPATIENT)
Dept: GENERAL RADIOLOGY | Facility: CLINIC | Age: 69
End: 2024-09-20
Attending: SURGERY
Payer: COMMERCIAL

## 2024-09-20 DIAGNOSIS — Z98.1 S/P LUMBAR FUSION: ICD-10-CM

## 2024-09-20 PROCEDURE — 72100 X-RAY EXAM L-S SPINE 2/3 VWS: CPT | Mod: TC | Performed by: PREVENTIVE MEDICINE

## 2024-09-23 ENCOUNTER — VIRTUAL VISIT (OUTPATIENT)
Dept: NEUROSURGERY | Facility: CLINIC | Age: 69
End: 2024-09-23
Payer: COMMERCIAL

## 2024-09-23 DIAGNOSIS — Z98.1 S/P LUMBAR FUSION: Primary | ICD-10-CM

## 2024-09-23 DIAGNOSIS — M48.062 SPINAL STENOSIS OF LUMBAR REGION WITH NEUROGENIC CLAUDICATION: ICD-10-CM

## 2024-09-23 PROCEDURE — 99443 PR PHYSICIAN TELEPHONE EVALUATION 21-30 MIN: CPT | Mod: 93 | Performed by: NURSE PRACTITIONER

## 2024-09-23 NOTE — LETTER
9/23/2024      Tessa Edwards  [address] [facility] [place] [address]      Dear Colleague,    Thank you for referring your patient, Tessa Edwards, to the [facility]. Please see a copy of my visit note below.    CHIEF COMPLAINT / REASON FOR VISIT  post operative visit.    ASSESSMENT/PLAN:  (M48.062) Spinal stenosis of lumbar region with neurogenic claudication  (primary encounter diagnosis)  (Z98.1) S/P lumbar fusion  (E66.01,  Z68.37) Class 2 severe obesity due to excess calories with serious comorbidity and body mass index (BMI) of 37.0 to 37.9 in adult (H)  S/p wound infection    Overall she is doing well.  She is contemplating starting physical therapy has not done so yet.  She continues to be on antibiotics till February 2025.  All of her symptoms taken together I think she is doing well.  Recommend she discuss increasing gabapentin with her provider who is caring for her neuropathy.  Start physical therapy for balance issues as able.  Follow-up in February 2025 for her 1 year follow-up appointment with x-rays prior.  Orders will be placed today.      HISTORY OF PRESENT ILLNESS  Tessa Edwards is a 69 year old female who underwent underwent underwent medial facetectomies, right transforaminal lumbar interbody fusion and bilateral posterior instrumented fusion with bilateral L4-5 laminectomies by Dr Hudson on 2/23/24. Procedure was to treat lumbar radiculopathy, cauda equina compression and lumbar stenosis.  She then underwent wound irrigation and debridement on 3/7/24  by Dr Ambrose and removal of wound vac with wound closure on 3/11/24 by Dr Hudson.  She is about 7 months from her procedure.     PAIN: She is currently taking 1 Aleve and 2 Tylenol in the morning and 2 Tylenol in the evening and that seems to manage her pain well.  She is previously taking up to 6 Aleve a day so this is a significant improvement.  Her neuropathy has gotten very bad in her lower  extremities however.  She is currently taking 200 mg of gabapentin in the morning, 200 mg of gabapentin at 2 PM and 300 mg at night.  She does not feel like this is really helping with the neuropathy anymore.    INCISION: Well-healed    ACTIVITY: She continues to endorse that she has fatigue, and has not started PT.    NEW SYMPTOMS: starting about mid August where she was having pain in the thigh and in the hip or in her groin exactly as she did when she was infected.  She thought maybe she pulled a muscle and it never developed into anything and ultimately went away.  However it was waking her up at night and she rated the pain at that time and 8.5 out of 10.  This has overall resolved however she occasionally will still get symptoms in the groin.  She has been afebrile.      Current Outpatient Medications   Medication Sig Dispense Refill     sulfamethoxazole-trimethoprim (BACTRIM DS) 800-160 MG tablet Take 1 tablet by mouth 2 times daily 180 tablet 3     acalabrutinib (CALQUENCE) 100 MG tablet Take 100 mg by mouth 2 times daily       calcium-vitamin D (CALCIUM-VITAMIN D) 500 mg(1,250mg) -200 unit per tablet Take 1 tablet by mouth every evening        FLUoxetine (PROZAC) 40 MG capsule Take 1 capsule by mouth once daily 90 capsule 0     gabapentin (NEURONTIN) 100 MG capsule Take 100 mg by mouth 3 times daily Plus additional 100mg at HS if needed       gabapentin (NEURONTIN) 100 MG capsule Take 100 mg by mouth nightly as needed In addition to 100mg TID       loperamide (IMODIUM) 2 MG capsule Take 2 mg by mouth 3 times daily       multivitamin w/minerals (THERA-VIT-M) tablet Take 1 tablet by mouth daily       omeprazole 20 MG tablet Take 20 mg by mouth daily       oxyCODONE (ROXICODONE) 5 MG tablet Take 1 tablet (5 mg) by mouth every 6 hours as needed for moderate pain 40 tablet 0     rOPINIRole (REQUIP) 1 MG tablet TAKE 1 TABLET BY MOUTH TWICE DAILY AND 2 AT BEDTIME 120 tablet 0     No current facility-administered  medications for this visit.       Originating Location (pt. Location): Home    Distant Location (provider location):  Off-site          Objective          Vitals:  No vitals were obtained today due to virtual visit.    Physical Exam   General: Alert and no distress //Respiratory: No audible wheeze, cough, or shortness of breath // Psychiatric:  Appropriate affect, tone, and pace of words    DIAGNOSTICS  I independently reviewed imaging in the form of lumbar spine xrays obtained  9/20/24 and the images were also reviewed them with the patient. They demonstrate stable spinal alignment with hardware integrity.      Phone call duration: 22 minutes  Signed Electronically by: FELIPE Kaplan CNP          Again, thank you for allowing me to participate in the care of your patient.        Sincerely,        FELIPE Kaplan CNP

## 2024-09-23 NOTE — PROGRESS NOTES
CHIEF COMPLAINT / REASON FOR VISIT  post operative visit.    ASSESSMENT/PLAN:  (M48.062) Spinal stenosis of lumbar region with neurogenic claudication  (primary encounter diagnosis)  (Z98.1) S/P lumbar fusion  (E66.01,  Z68.37) Class 2 severe obesity due to excess calories with serious comorbidity and body mass index (BMI) of 37.0 to 37.9 in adult (H)  S/p wound infection    Overall she is doing well.  She is contemplating starting physical therapy has not done so yet.  She continues to be on antibiotics till February 2025.  All of her symptoms taken together I think she is doing well.  Recommend she discuss increasing gabapentin with her provider who is caring for her neuropathy.  Start physical therapy for balance issues as able.  Follow-up in February 2025 for her 1 year follow-up appointment with x-rays prior.  Orders will be placed today.      HISTORY OF PRESENT ILLNESS  Tessa Edwards is a 69 year old female who underwent underwent underwent medial facetectomies, right transforaminal lumbar interbody fusion and bilateral posterior instrumented fusion with bilateral L4-5 laminectomies by Dr Hudson on 2/23/24. Procedure was to treat lumbar radiculopathy, cauda equina compression and lumbar stenosis.  She then underwent wound irrigation and debridement on 3/7/24  by Dr Ambrose and removal of wound vac with wound closure on 3/11/24 by Dr Hudson.  She is about 7 months from her procedure.     PAIN: She is currently taking 1 Aleve and 2 Tylenol in the morning and 2 Tylenol in the evening and that seems to manage her pain well.  She is previously taking up to 6 Aleve a day so this is a significant improvement.  Her neuropathy has gotten very bad in her lower extremities however.  She is currently taking 200 mg of gabapentin in the morning, 200 mg of gabapentin at 2 PM and 300 mg at night.  She does not feel like this is really helping with the neuropathy anymore.    INCISION: Well-healed    ACTIVITY: She  continues to endorse that she has fatigue, and has not started PT.    NEW SYMPTOMS: starting about mid August where she was having pain in the thigh and in the hip or in her groin exactly as she did when she was infected.  She thought maybe she pulled a muscle and it never developed into anything and ultimately went away.  However it was waking her up at night and she rated the pain at that time and 8.5 out of 10.  This has overall resolved however she occasionally will still get symptoms in the groin.  She has been afebrile.      Current Outpatient Medications   Medication Sig Dispense Refill    sulfamethoxazole-trimethoprim (BACTRIM DS) 800-160 MG tablet Take 1 tablet by mouth 2 times daily 180 tablet 3    acalabrutinib (CALQUENCE) 100 MG tablet Take 100 mg by mouth 2 times daily      calcium-vitamin D (CALCIUM-VITAMIN D) 500 mg(1,250mg) -200 unit per tablet Take 1 tablet by mouth every evening       FLUoxetine (PROZAC) 40 MG capsule Take 1 capsule by mouth once daily 90 capsule 0    gabapentin (NEURONTIN) 100 MG capsule Take 100 mg by mouth 3 times daily Plus additional 100mg at HS if needed      gabapentin (NEURONTIN) 100 MG capsule Take 100 mg by mouth nightly as needed In addition to 100mg TID      loperamide (IMODIUM) 2 MG capsule Take 2 mg by mouth 3 times daily      multivitamin w/minerals (THERA-VIT-M) tablet Take 1 tablet by mouth daily      omeprazole 20 MG tablet Take 20 mg by mouth daily      oxyCODONE (ROXICODONE) 5 MG tablet Take 1 tablet (5 mg) by mouth every 6 hours as needed for moderate pain 40 tablet 0    rOPINIRole (REQUIP) 1 MG tablet TAKE 1 TABLET BY MOUTH TWICE DAILY AND 2 AT BEDTIME 120 tablet 0     No current facility-administered medications for this visit.       Originating Location (pt. Location): Home    Distant Location (provider location):  Off-site          Objective           Vitals:  No vitals were obtained today due to virtual visit.    Physical Exam   General: Alert and no  distress //Respiratory: No audible wheeze, cough, or shortness of breath // Psychiatric:  Appropriate affect, tone, and pace of words    DIAGNOSTICS  I independently reviewed imaging in the form of lumbar spine xrays obtained  9/20/24 and the images were also reviewed them with the patient. They demonstrate stable spinal alignment with hardware integrity.      Phone call duration: 22 minutes  Signed Electronically by: FELIPE Kaplan CNP

## 2024-09-23 NOTE — NURSING NOTE
Tessa is a 69 year old who is being evaluated via a billable telephone visit.    What phone number would you like to be contacted at? [phone]   How would you like to obtain your AVS? Luis Armando  Originating Location (pt. Location): Home within [facility]    Distant Location (provider location):  Off-site  Phone call duration: 5 minutes

## 2024-10-11 ENCOUNTER — MYC REFILL (OUTPATIENT)
Dept: NEUROLOGY | Facility: CLINIC | Age: 69
End: 2024-10-11
Payer: COMMERCIAL

## 2024-10-11 DIAGNOSIS — G25.81 RESTLESS LEG SYNDROME: ICD-10-CM

## 2024-10-11 RX ORDER — ROPINIROLE 1 MG/1
TABLET, FILM COATED ORAL
Qty: 120 TABLET | Refills: 0 | Status: CANCELLED | OUTPATIENT
Start: 2024-10-11

## 2024-10-18 DIAGNOSIS — G25.81 RESTLESS LEG SYNDROME: ICD-10-CM

## 2024-10-21 NOTE — TELEPHONE ENCOUNTER
Refill request for: ropinirole 1mg   Directions: TAKE 1 TABLET BY MOUTH TWICE DAILY AND 2 AT BEDTIME     LOV: 07/11/23  NOV: Pt to follow up as needed. Do you want to refill 1 times, then further refills from primary? Or deny?  Will send to provider for review.      Alannah Osborne LPN on 10/21/2024 at 10:50 AM

## 2024-10-22 ENCOUNTER — TELEPHONE (OUTPATIENT)
Dept: NEUROSURGERY | Facility: CLINIC | Age: 69
End: 2024-10-22
Payer: COMMERCIAL

## 2024-10-22 RX ORDER — ROPINIROLE 1 MG/1
TABLET, FILM COATED ORAL
Qty: 120 TABLET | Refills: 0 | Status: SHIPPED | OUTPATIENT
Start: 2024-10-22

## 2024-10-22 NOTE — TELEPHONE ENCOUNTER
----- Message from Briseyda NAVA sent at 10/22/2024  2:34 PM CDT -----  She should follow up in February of 2025 for her 1 year post-op follow up with WARREN.  There is no documentation why it was scheduled so early.  But there is a note stating she should set up an appointment with neurology - so should see Dr. Polanco and not Dr. Hudson.  ----- Message -----  From: Neema Cotto  Sent: 10/22/2024   2:22 PM CDT  To: Briseyda Acharya RN    Can you help review, can't figure out why she needs to come back and see paola. She is suppose to come back for her 6 month with Warren. Please verify if definitely needs to be with WARREN

## 2024-10-22 NOTE — TELEPHONE ENCOUNTER
Date: October 22, 2024    Provider: Dr. Tristan Farias MD     Provider/Other: N/A    Reason for out-going call: FOLLOW-UP      Detailed message: [facility] for patient to call back in regards to appt with Dr. Hudson on 10/29/2024. Patient is to follow up in a year for her 1 year post op with ADONAY and schedule appt with Neurology.           Number provider for patient:  NEUROSURGERY: [phone]

## 2024-10-23 ENCOUNTER — TELEPHONE (OUTPATIENT)
Dept: NEUROSURGERY | Facility: CLINIC | Age: 69
End: 2024-10-23
Payer: COMMERCIAL

## 2024-10-23 DIAGNOSIS — Z98.1 S/P LUMBAR FUSION: Primary | ICD-10-CM

## 2024-10-23 DIAGNOSIS — M48.062 SPINAL STENOSIS OF LUMBAR REGION WITH NEUROGENIC CLAUDICATION: ICD-10-CM

## 2024-10-23 NOTE — TELEPHONE ENCOUNTER
Date: October 23, 2024    Provider: Dr. rTistan Farias MD     Provider/Other: n/a    Reason for out-going call: FOLLOW-UP      Detailed message: patient called my direct line and left a voicemail stating that patient started to have  pain about 6 weeks ago started out at the thigh and hip. Patient has a tele-visit with Sofya and was told to come back if anything arises. Now the pain has started left side shoulder going down the leg to the knee. (Patient has an appt with Tristan on 10/29/2024 and would like to stay on her schedule) please review and see if patient will need any imaging prior.           Number provider for patient:  NEUROSURGERY: [phone]

## 2024-10-23 NOTE — TELEPHONE ENCOUNTER
Called Tessa to discuss her current symptoms/concerns. She continues to experience intermittent positional sharp severe at times pain in her left buttock, hip, lateral thigh, groin and knee. Denies weakness in LE, denies bowel or bladder issues. Takes Tylenol and Baclofen that are beneficial.   Will obtain MRI prior to appt in clinic on 10/29/2024.  Briseyda Acharya RN, CNRN

## 2024-10-23 NOTE — TELEPHONE ENCOUNTER
[facility]    Phone Message    May a detailed message be left on voicemail: yes     Reason for Call: Other: Patient called returning Briseyda's call. Was unable to get a hold of her. Please call patient back at your convince. Thank you      Action Taken: Message routed to:  Other: [facility] Neurosurgery     Travel Screening: Not Applicable     Date of Service:

## 2024-10-26 ENCOUNTER — HOSPITAL ENCOUNTER (OUTPATIENT)
Dept: MRI IMAGING | Facility: CLINIC | Age: 69
Discharge: HOME | End: 2024-10-26
Attending: SURGERY | Admitting: SURGERY
Payer: COMMERCIAL

## 2024-10-26 DIAGNOSIS — M48.062 SPINAL STENOSIS OF LUMBAR REGION WITH NEUROGENIC CLAUDICATION: ICD-10-CM

## 2024-10-26 DIAGNOSIS — Z98.1 S/P LUMBAR FUSION: ICD-10-CM

## 2024-10-26 PROCEDURE — 72158 MRI LUMBAR SPINE W/O & W/DYE: CPT

## 2024-10-26 PROCEDURE — A9585 GADOBUTROL INJECTION: HCPCS | Performed by: SURGERY

## 2024-10-26 PROCEDURE — 255N000002 HC RX 255 OP 636: Performed by: SURGERY

## 2024-10-26 RX ORDER — GADOBUTROL 604.72 MG/ML
0.1 INJECTION INTRAVENOUS ONCE
Status: COMPLETED | OUTPATIENT
Start: 2024-10-26 | End: 2024-10-26

## 2024-10-26 RX ADMIN — GADOBUTROL 9 ML: 604.72 INJECTION INTRAVENOUS at 13:21

## 2024-10-28 ENCOUNTER — TELEPHONE (OUTPATIENT)
Dept: NEUROSURGERY | Facility: CLINIC | Age: 69
End: 2024-10-28
Payer: COMMERCIAL

## 2024-10-28 NOTE — TELEPHONE ENCOUNTER
[facility] Call [facility]    Phone Message    May a detailed message be left on voicemail: yes     Reason for Call: Other: Pt has follow up appt scheduled with provider SAVANNAH Hudson and looking to see if she can do this visit a telephone appt. Please contact to confirm if this can be done via phone asap please. Thank you.     Action Taken: Other: [facility] Neurosurgery    Travel Screening: Not Applicable     Date of Service:

## 2024-10-28 NOTE — TELEPHONE ENCOUNTER
Called [facility] and advised to keep appt in person for evaluation and exam.  Briseyda Acharya RN, CNRN

## 2024-10-29 ENCOUNTER — OFFICE VISIT (OUTPATIENT)
Dept: NEUROSURGERY | Facility: CLINIC | Age: 69
End: 2024-10-29
Payer: COMMERCIAL

## 2024-10-29 VITALS — BODY MASS INDEX: 37.56 KG/M2 | HEIGHT: 64 IN | WEIGHT: 220 LBS

## 2024-10-29 DIAGNOSIS — Z98.1 S/P LUMBAR FUSION: ICD-10-CM

## 2024-10-29 DIAGNOSIS — M48.062 SPINAL STENOSIS OF LUMBAR REGION WITH NEUROGENIC CLAUDICATION: Primary | ICD-10-CM

## 2024-10-29 PROCEDURE — 99213 OFFICE O/P EST LOW 20 MIN: CPT | Performed by: SURGERY

## 2024-10-29 ASSESSMENT — PAIN SCALES - GENERAL: PAINLEVEL_OUTOF10: SEVERE PAIN (6)

## 2024-10-29 NOTE — LETTER
"10/29/2024      Tessa Edwards  [address] [facility] [address]      Dear Colleague,    Thank you for referring your patient, Tessa Edwards, to the [facility] SPINE AND NEUROSURGERY. Please see a copy of my visit note below.    NEUROSURGERY FOLLOW UP  NOTE    Tessa Edwards comes today in follow-up. Patient is a 69 year old female who underwent underwent  right transforaminal lumbar interbody fusion and bilateral posterior instrumented fusion with bilateral L4-5 laminectomies on 2/23/24.     Overall was doing better after surgery then symptoms worsened again. Ambulates with a cane. On left side having pain. Feels pain is in her pelvic floor. Difficulty standing up straight again. Limited walking due to pain. Neuropathy at baseline without change. Previously on right as well but now only on left.     PHYSICAL EXAM:   Constitutional: Ht 1.626 m (5' 4\")   Wt 99.8 kg (220 lb)   BMI 37.76 kg/m       Mental Status: A & O in no acute distress.  Affect is appropriate.  Speech is fluent.  Recent and remote memory are intact.  Attention span and concentration are normal.     Motor:  Normal bulk and tone all muscle groups of upper and lower extremities.       IMAGING:   I personally reviewed all radiographic images        CONSULTATION ASSESSMENT AND PLAN:    We reviewed her lumbar MRI. No significant residual stenosis at lumbar 4-5. She does have progression of her stenosis at lumbar 3-4 > lumbar 2-3.Recommend left lumbar 3-4 TFESI for pain control.  If we can get pain better controlled would recommend proceeding with physical therapy. If she does not get pain relief she will call our office.     Brianne Hudson MD      CC:     Clinic - [facility]  [address] [facility] [address]      Again, thank you for allowing me to participate in the care of your patient.        Sincerely,        Brianne Hudson MD  "

## 2024-10-29 NOTE — PROGRESS NOTES
"NEUROSURGERY FOLLOW UP  NOTE    Tessa Edwards comes today in follow-up. Patient is a 69 year old female who underwent underwent  right transforaminal lumbar interbody fusion and bilateral posterior instrumented fusion with bilateral L4-5 laminectomies on 2/23/24.     Overall was doing better after surgery then symptoms worsened again. Ambulates with a cane. On left side having pain. Feels pain is in her pelvic floor. Difficulty standing up straight again. Limited walking due to pain. Neuropathy at baseline without change. Previously on right as well but now only on left.     PHYSICAL EXAM:   Constitutional: Ht 1.626 m (5' 4\")   Wt 99.8 kg (220 lb)   BMI 37.76 kg/m       Mental Status: A & O in no acute distress.  Affect is appropriate.  Speech is fluent.  Recent and remote memory are intact.  Attention span and concentration are normal.     Motor:  Normal bulk and tone all muscle groups of upper and lower extremities.       IMAGING:   I personally reviewed all radiographic images        CONSULTATION ASSESSMENT AND PLAN:    We reviewed her lumbar MRI. No significant residual stenosis at lumbar 4-5. She does have progression of her stenosis at lumbar 3-4 > lumbar 2-3.Recommend left lumbar 3-4 TFESI for pain control.  If we can get pain better controlled would recommend proceeding with physical therapy. If she does not get pain relief she will call our office.     Brianne Hudson MD      CC:     Clinic - [facility]  [address] [facility] [address]  "

## 2024-10-29 NOTE — PATIENT INSTRUCTIONS
Recommend left lumbar 3-4 TFESI for pain control.  If we can get pain better controlled would recommend proceeding with physical therapy. If no pain relief will obtain a lumbar CT followed by appointment in clinic.

## 2024-11-14 ENCOUNTER — TELEPHONE (OUTPATIENT)
Dept: FAMILY MEDICINE | Facility: CLINIC | Age: 69
End: 2024-11-14

## 2024-11-14 NOTE — TELEPHONE ENCOUNTER
Patient Quality Outreach    Patient is due for the following:       Topic Date Due    Flu Vaccine (1) 09/01/2024    COVID-19 Vaccine (7 - 2024-25 season) 09/01/2024       Action(s) Taken:   Schedule a nurse only visit for vaccines    Type of outreach:    Sent [employer] [facility] [employer] [facility] message.    Questions for provider review:    None           Rosa De Oliveira, CMA

## 2024-11-26 DIAGNOSIS — G25.81 RESTLESS LEG SYNDROME: ICD-10-CM

## 2024-11-26 NOTE — TELEPHONE ENCOUNTER
Refill request for: ropinirole 1mg          Directions: TAKE 1 TABLET BY MOUTH TWICE DAILY AND 2 AT BEDTIME FORTHER  REFILLS  FROM  PRIMARY  CARE      LOV: 07/11/23  NOV: Pt to follow up as needed. Do you want to refill 1 times, then further refills from primary? Or deny?  Will send to provider for review.        30 day supply with 0 refills Medication T'd for review and signature     Ashleigh Renee MA

## 2024-11-27 RX ORDER — ROPINIROLE 1 MG/1
TABLET, FILM COATED ORAL
Qty: 120 TABLET | Refills: 0 | OUTPATIENT
Start: 2024-11-27

## 2024-11-27 NOTE — TELEPHONE ENCOUNTER
Message sent to patient stating to have PCP manage Ropinirole prescription moving forward.     Ashleigh Renee MA

## 2024-12-02 RX ORDER — GABAPENTIN 100 MG/1
100 CAPSULE ORAL 3 TIMES DAILY
Qty: 90 CAPSULE | Refills: 0 | OUTPATIENT
Start: 2024-12-02

## 2024-12-02 NOTE — TELEPHONE ENCOUNTER
Rx to be DENIED.  Pt not seen in over 1 yr. Last OV 09/07/2023.  Need appt or PCP can manage. Note has been left for pharmacy.

## 2025-01-07 ENCOUNTER — TRANSFERRED RECORDS (OUTPATIENT)
Dept: HEALTH INFORMATION MANAGEMENT | Facility: CLINIC | Age: 70
End: 2025-01-07
Payer: COMMERCIAL

## 2025-03-04 ENCOUNTER — OFFICE VISIT (OUTPATIENT)
Dept: INFECTIOUS DISEASES | Facility: CLINIC | Age: 70
End: 2025-03-04
Payer: COMMERCIAL

## 2025-03-04 VITALS
HEART RATE: 71 BPM | OXYGEN SATURATION: 97 % | DIASTOLIC BLOOD PRESSURE: 75 MMHG | TEMPERATURE: 98.6 F | SYSTOLIC BLOOD PRESSURE: 139 MMHG

## 2025-03-04 DIAGNOSIS — T81.40XA POSTOPERATIVE INFECTION, UNSPECIFIED TYPE, INITIAL ENCOUNTER: Primary | ICD-10-CM

## 2025-03-04 PROCEDURE — 3078F DIAST BP <80 MM HG: CPT | Performed by: INTERNAL MEDICINE

## 2025-03-04 PROCEDURE — 3075F SYST BP GE 130 - 139MM HG: CPT | Performed by: INTERNAL MEDICINE

## 2025-03-04 PROCEDURE — 99213 OFFICE O/P EST LOW 20 MIN: CPT | Performed by: INTERNAL MEDICINE

## 2025-03-04 NOTE — PROGRESS NOTES
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 All OR cultures with enterobacter, s epi. S/p I&D 3/7/2024. Growth of staph epi does make some concern for hardware involvement (despite being covered by granulation tissue intraoperatively). Has completed IV now PO abx, nearly at a year  Hx NHL, on acalabrutinib    PLAN:  At a year of antibiotics. Discussed risks/benefits of continuing vs stopping and she would prefer to discontinue and monitor closely. Return precautions discussed.     Gaby Johnson MD  [facility]   Clinic phone: [phone]   Clinic fax: [phone]    ______________________________________________________________________    SUBJECTIVE / INTERVAL HISTORY: Tessa Edwards returns for follow up of spine infection. Doing well. Some GI issues that resolve if takes antibiotics with food. Some loose stools as well. Back ok. Had a few episodes of 1-2 weeks of leg issues that have since resolved. Got steroid injection in shoulder and tried to get vaccinations at pharmacy but they denied her due to antibiotics?       ROS: All other systems negative except as listed above.      Current Outpatient Medications:     acalabrutinib (CALQUENCE) 100 MG tablet, Take 100 mg by mouth 2 times daily, Disp: , Rfl:     calcium-vitamin D (CALCIUM-VITAMIN D) 500 mg(1,250mg) -200 unit per tablet, Take 1 tablet by mouth every evening , Disp: , Rfl:     gabapentin (NEURONTIN) 100 MG capsule, Take 100 mg by mouth 3 times daily Plus additional 100mg at HS if needed, Disp: , Rfl:     gabapentin (NEURONTIN) 100 MG capsule, Take 100 mg by mouth nightly as needed In addition to 100mg TID, Disp: , Rfl:     loperamide (IMODIUM) 2 MG capsule, Take 2 mg by mouth 3 times daily, Disp: , Rfl:     multivitamin w/minerals  "(THERA-VIT-M) tablet, Take 1 tablet by mouth daily, Disp: , Rfl:     omeprazole 20 MG tablet, Take 20 mg by mouth daily, Disp: , Rfl:     rOPINIRole (REQUIP) 1 MG tablet, TAKE 1 TAB BY MOUTH TWICE DAILY AND 2 AT BEDTIME FURTHER REFILLS FROM PRIMARY CARE, Disp: 120 tablet, Rfl: 0    sulfamethoxazole-trimethoprim (BACTRIM DS) 800-160 MG tablet, Take 1 tablet by mouth 2 times daily, Disp: 180 tablet, Rfl: 3    FLUoxetine (PROZAC) 40 MG capsule, Take 1 capsule by mouth once daily (Patient not taking: Reported on 3/4/2025), Disp: 90 capsule, Rfl: 0    oxyCODONE (ROXICODONE) 5 MG tablet, Take 1 tablet (5 mg) by mouth every 6 hours as needed for moderate pain (Patient not taking: Reported on 3/4/2025), Disp: 40 tablet, Rfl: 0      OBJECTIVE:  /75 (BP Location: Right arm, Patient Position: Sitting, Cuff Size: Adult Large)   Pulse 71   Temp 98.6  F (37  C)   SpO2 97%       GEN: No acute distress.    RESPIRATORY:  Normal breathing pattern.  EXTREMITIES: No edema.  SKIN/HAIR/NAILS:  No rashes      Pertinent labs:    No results found for: \"CRP\"  Last Comprehensive Metabolic Panel:  Sodium   Date Value Ref Range Status   07/23/2024 139 135 - 145 mmol/L Final     Potassium   Date Value Ref Range Status   07/23/2024 4.4 3.4 - 5.3 mmol/L Final   11/18/2021 3.7 3.5 - 5.0 mmol/L Final     Chloride   Date Value Ref Range Status   07/23/2024 106 98 - 107 mmol/L Final   11/18/2021 106 98 - 107 mmol/L Final     Carbon Dioxide (CO2)   Date Value Ref Range Status   07/23/2024 25 22 - 29 mmol/L Final   11/18/2021 25 22 - 31 mmol/L Final     Anion Gap   Date Value Ref Range Status   07/23/2024 8 7 - 15 mmol/L Final   11/18/2021 8 5 - 18 mmol/L Final     Glucose   Date Value Ref Range Status   07/23/2024 94 70 - 99 mg/dL Final   11/18/2021 128 (H) 70 - 125 mg/dL Final     GLUCOSE BY METER POCT   Date Value Ref Range Status   02/23/2024 110 (H) 70 - 99 mg/dL Final     Urea Nitrogen   Date Value Ref Range Status   07/23/2024 23.8 (H) " 8.0 - 23.0 mg/dL Final   11/18/2021 9 8 - 22 mg/dL Final     Creatinine   Date Value Ref Range Status   07/23/2024 1.10 (H) 0.51 - 0.95 mg/dL Final     GFR Estimate   Date Value Ref Range Status   07/23/2024 54 (L) >60 mL/min/1.73m2 Final     Comment:     eGFR calculated using 2021 CKD-EPI equation.   08/27/2020 >60 >60 mL/min/1.73m2 Final     Calcium   Date Value Ref Range Status   07/23/2024 9.8 8.8 - 10.4 mg/dL Final     Comment:     Reference intervals for this test were updated on 7/16/2024 to reflect our healthy population more accurately. There may be differences in the flagging of prior results with similar values performed with this method. Those prior results can be interpreted in the context of the updated reference intervals.     CBC RESULTS:   Recent Labs   Lab Test 04/22/24  1214   WBC 16.7*   RBC 4.42   HGB 11.1*   HCT 36.2   MCV 82   MCH 25.1*   MCHC 30.7*   RDW 14.0          MICROBIOLOGY DATA:  reviewed  T Cell Subset:  WBC Count   Date Value Ref Range Status   04/22/2024 16.7 (H) 4.0 - 11.0 10e3/uL Final     % Lymphocytes   Date Value Ref Range Status   04/22/2024 49 % Final   01/02/2023 70 % Final   09/05/2019 86 (H) 20 - 40 % Final       RADIOLOGY:    No results found for this or any previous visit (from the past 744 hours).     Total Time Spent 20 minutes including chart review, time with patient, orders, and documentation.

## 2025-03-28 ENCOUNTER — ANCILLARY ORDERS (OUTPATIENT)
Dept: MAMMOGRAPHY | Facility: CLINIC | Age: 70
End: 2025-03-28
Payer: COMMERCIAL

## 2025-03-28 DIAGNOSIS — Z12.31 VISIT FOR SCREENING MAMMOGRAM: Primary | ICD-10-CM

## 2025-04-09 ENCOUNTER — ANCILLARY PROCEDURE (OUTPATIENT)
Dept: MAMMOGRAPHY | Facility: CLINIC | Age: 70
End: 2025-04-09
Attending: PHYSICIAN ASSISTANT
Payer: COMMERCIAL

## 2025-04-09 DIAGNOSIS — Z12.31 VISIT FOR SCREENING MAMMOGRAM: ICD-10-CM

## 2025-04-09 PROCEDURE — 77067 SCR MAMMO BI INCL CAD: CPT | Mod: TC | Performed by: RADIOLOGY

## 2025-04-09 PROCEDURE — 77063 BREAST TOMOSYNTHESIS BI: CPT | Mod: TC | Performed by: RADIOLOGY

## 2025-08-23 ENCOUNTER — HEALTH MAINTENANCE LETTER (OUTPATIENT)
Age: 70
End: 2025-08-23

## (undated) DEVICE — ESU ELEC BLADE 2.75" COATED/INSULATED E1455

## (undated) DEVICE — CUSTOM PACK LUMBAR FUSION SNE5BLFHEA

## (undated) DEVICE — Device

## (undated) DEVICE — A3 SUPPLIES- SEE NURSING INFO PAGE

## (undated) DEVICE — ESU PENCIL SMOKE EVAC W/ROCKER SWITCH 0703-047-000

## (undated) DEVICE — PAD POS XL 1X20X40IN PINK PIGAZZI

## (undated) DEVICE — STPL SKIN 35W 6.9MM  PXW35

## (undated) DEVICE — GOWN XLG DISP 9545

## (undated) DEVICE — ESU LIGASURE LAPAROSCOPIC BLUNT TIP SEALER 5MMX37CM LF1837

## (undated) DEVICE — DRAPE IOBAN INCISE 23X17" 6650EZ

## (undated) DEVICE — DRAPE O ARM BAR MEDTRONIC 9733023

## (undated) DEVICE — SU VICRYL+ 0 27 UR6 VLT VCP603H

## (undated) DEVICE — SUCTION TIP YANKAUER W/O VENT K86

## (undated) DEVICE — DRSG MEPILEX BORDER FLEX 8.7X9.8" 282455

## (undated) DEVICE — CUSHION INSERT LG PRONE VIEW JACKSON TABLE

## (undated) DEVICE — SUTURE PDS 1 CTB-1 ZB347

## (undated) DEVICE — SU VICRYL+ 2-0 TIES 18 UND VCP111G

## (undated) DEVICE — TOOL DISSECT MIDAS MR8 14CM MATCH HEAD 3MM MR8-14MH30

## (undated) DEVICE — TIP CAUTERY L HOOK 36CM E377336C

## (undated) DEVICE — SPONGE KITNER DISSECTING 7102*

## (undated) DEVICE — GLOVE BIOGEL PI INDICATOR 9.0 LF  41690

## (undated) DEVICE — STPL LINEAR CUT 75MM TLC75

## (undated) DEVICE — CUSTOM PACK COLON CLOSING SBA5BCCHEA

## (undated) DEVICE — SUCTION SLEEVE NEPTUNE 2 165MM 0703-005-165

## (undated) DEVICE — SU VICRYL+ 0 TIES 18IN UND VCP112G

## (undated) DEVICE — SU MONOCRYL+ 4-0 18IN PS2 UND MCP496G

## (undated) DEVICE — SPONGE NEURO 1/2X1/2 WECK 200100

## (undated) DEVICE — ADH SKIN CLOSURE PREMIERPRO EXOFIN 1.0ML 3470

## (undated) DEVICE — CATH IV 14GA 2IN REM FLASHPLUG DEHP-FR PVC FR 4251717-02

## (undated) DEVICE — GOWN IMPERVIOUS BREATHABLE SMART LG 89015

## (undated) DEVICE — SYR EAR 3OZ BULB IRR STRL DISP BLU PVC 4173

## (undated) DEVICE — DRAIN FLAT 10MM FULL PERF SIL 0070440

## (undated) DEVICE — PUMP UNIT NEGATIVE PRESSURE PREVENA PLUS PRE4010

## (undated) DEVICE — BLADE KNIFE SURG 11 371111

## (undated) DEVICE — ELECTRODE PATIENT RETURN ADULT L10 FT 2 PLATE CORD 0855C

## (undated) DEVICE — CATH TRAY FOLEY SURESTEP 16FR DRAIN BAG STATOCK A899916

## (undated) DEVICE — PLATE GROUNDING ADULT W/CORD 9165L

## (undated) DEVICE — SOL NACL 0.9% IRRIG 1000ML BOTTLE 2F7124

## (undated) DEVICE — GLOVE BIOGEL INDICATOR 7.5 LF 41675

## (undated) DEVICE — LEGGINGS 31X48" LF KM89408

## (undated) DEVICE — STPL LINEAR 90 X 3.5MM TA9035S

## (undated) DEVICE — SUCTION MANIFOLD NEPTUNE 2 SYS 1 PORT 702-025-000

## (undated) DEVICE — SOL WATER IRRIG 1000ML BOTTLE 2F7114

## (undated) DEVICE — BINDER ABDOMINAL 3PANEL LG 45IN 08140345

## (undated) DEVICE — SU PROLENE 3-0 PS-1 18" 8663G

## (undated) DEVICE — DRSG GAUZE 4X4" 3033

## (undated) DEVICE — SUTURE VICRYL+ 4-0 UNDYED PS-2 VCP496H

## (undated) DEVICE — RX SURGIFLO HEMOSTATIC MATRIX 8ML 2991

## (undated) DEVICE — TUBING SUCTION MEDI-VAC 1/4"X20' N620A - HE

## (undated) DEVICE — ESU ELEC EDGE INSULATED BLADE 4" E1455-4

## (undated) DEVICE — TUBING SUCTION MEDI-VAC 1/4"X20' N620A

## (undated) DEVICE — DRAPE STERI TOWEL LG 1010

## (undated) DEVICE — PREP DYNA-HEX 4% CHG SCRUB 4OZ BOTTLE MDS098710

## (undated) DEVICE — MARKER SPHERES PASSIVE MEDT PACK 5 8801075

## (undated) DEVICE — NEEDLE SPINAL DISP 22GA X 3.5" QUINCKE 333320

## (undated) DEVICE — ESU HOLDER LAP INST DISP PURPLE LONG 330MM H-PRO-330

## (undated) DEVICE — GLOVE UNDER INDICATOR PI SZ 6.5 LF 41665

## (undated) DEVICE — SU VICRYL+ 0 8-18 CT1/CR UND VCP840D

## (undated) DEVICE — ENDO TROCAR FIRST ENTRY KII FIOS Z-THRD 05X100MM CTF03

## (undated) DEVICE — SUTURE VICRYL+ 3-0 18 SH/CR UND VCP864

## (undated) DEVICE — DRAIN RESERVOIR 100ML JP 0070740

## (undated) DEVICE — CUSTOM PACK GEN MAJOR SBA5BGMHEA

## (undated) DEVICE — SUTURE VICRYL+ 2-0 18 CT1/CR VLT VCP839D

## (undated) DEVICE — ADH LIQUID MASTISOL TOPICAL VIAL 2-3ML 0523-48

## (undated) DEVICE — TUBING SMOKE EVAC PNEUMOCLEAR HIGH FLOW 0620050250

## (undated) DEVICE — SUTURE VICRYL 0 SH UNDYED J418H

## (undated) DEVICE — SOL ISOPROPYL RUBBING ALCOHOL USP 70% 4OZ HDX-20 I0020

## (undated) DEVICE — PREP CHLORAPREP 26ML TINTED HI-LITE ORANGE 930815

## (undated) DEVICE — PITCHER STERILE 1000ML  SSK9004A

## (undated) DEVICE — KIT CUSTOM WILSON FRAME SPK10354

## (undated) DEVICE — SUTURE VICRYL+ 2-0 27IN CT-1 UND VCP259H

## (undated) DEVICE — POSITIONER ARM CRADLE LAMINECTOMY DISP

## (undated) DEVICE — GLOVE BIOGEL PI ULTRATOUCH G SZ 6.5 42165

## (undated) DEVICE — PACK 9X6IN THRP HOT COLD CMPR  MED GEL 80104

## (undated) DEVICE — GLOVE SURG PI ULTRA TOUCH M SZ 8-1/2 LF

## (undated) DEVICE — DRSG PRIMAPORE 03 1/8X6" 66000318

## (undated) DEVICE — DRAPE BACK TABLE PADDED 60X90

## (undated) DEVICE — ENDO TROCAR SLEEVE KII Z-THREADED 05X100MM CTS02

## (undated) DEVICE — SYSTEM CLEARIFY VISUALIZATION 21-345

## (undated) DEVICE — DECANTER VIAL 2006S

## (undated) DEVICE — DRSG ABD TNDRSRB WET PRUF 8IN X 10IN STRL  9194A

## (undated) DEVICE — CANISTER WOUND VAC W/GEL 500ML M8275063/5

## (undated) DEVICE — DRAPE LAP/CHOLE W/O POUCH 89225

## (undated) DEVICE — NEEDLE HYPO 18X1-1/2 SAFETY 305918

## (undated) DEVICE — GLOVE UNDER INDICATOR PI SZ 7.0 LF 41670

## (undated) DEVICE — BLADE KNIFE SURG 10 371110

## (undated) DEVICE — DRSG ADAPTIC 3X8" 6113

## (undated) DEVICE — ENDO SHEARS RENEW LAP ENDOCUT SCISSOR TIP 16.5MM 3142

## (undated) DEVICE — WRAP B-COOL TERI LUMBAR 08143380

## (undated) DEVICE — ENDO TROCAR BLUNT TIP KII BALLOON 12X100MM C0R47

## (undated) DEVICE — BLADE BONE MILL STRK 5.0MM MED 5400-701-000

## (undated) DEVICE — TRAY PREP DRY SKIN SCRUB 067

## (undated) DEVICE — TUBING LAP SUCT/IRRIG STRYKER 250070500

## (undated) DEVICE — STPL RELOAD LINEAR CUT 75MM TCR75

## (undated) RX ORDER — DEXAMETHASONE SODIUM PHOSPHATE 10 MG/ML
INJECTION, EMULSION INTRAMUSCULAR; INTRAVENOUS
Status: DISPENSED
Start: 2024-03-11

## (undated) RX ORDER — DEXAMETHASONE SODIUM PHOSPHATE 10 MG/ML
INJECTION, EMULSION INTRAMUSCULAR; INTRAVENOUS
Status: DISPENSED
Start: 2024-02-23

## (undated) RX ORDER — BUPIVACAINE HYDROCHLORIDE 2.5 MG/ML
INJECTION, SOLUTION INFILTRATION; PERINEURAL
Status: DISPENSED
Start: 2021-11-16

## (undated) RX ORDER — GLYCOPYRROLATE 0.2 MG/ML
INJECTION, SOLUTION INTRAMUSCULAR; INTRAVENOUS
Status: DISPENSED
Start: 2024-03-07

## (undated) RX ORDER — FENTANYL CITRATE 50 UG/ML
INJECTION, SOLUTION INTRAMUSCULAR; INTRAVENOUS
Status: DISPENSED
Start: 2024-03-11

## (undated) RX ORDER — PROPOFOL 10 MG/ML
INJECTION, EMULSION INTRAVENOUS
Status: DISPENSED
Start: 2024-03-07

## (undated) RX ORDER — LIDOCAINE HYDROCHLORIDE 10 MG/ML
INJECTION, SOLUTION EPIDURAL; INFILTRATION; INTRACAUDAL; PERINEURAL
Status: DISPENSED
Start: 2024-03-11

## (undated) RX ORDER — CEFAZOLIN SODIUM 1 G/3ML
INJECTION, POWDER, FOR SOLUTION INTRAMUSCULAR; INTRAVENOUS
Status: DISPENSED
Start: 2024-03-11

## (undated) RX ORDER — GLYCOPYRROLATE 0.2 MG/ML
INJECTION, SOLUTION INTRAMUSCULAR; INTRAVENOUS
Status: DISPENSED
Start: 2024-02-23

## (undated) RX ORDER — ONDANSETRON 2 MG/ML
INJECTION INTRAMUSCULAR; INTRAVENOUS
Status: DISPENSED
Start: 2024-02-23

## (undated) RX ORDER — LIDOCAINE HYDROCHLORIDE 10 MG/ML
INJECTION, SOLUTION EPIDURAL; INFILTRATION; INTRACAUDAL; PERINEURAL
Status: DISPENSED
Start: 2024-02-23

## (undated) RX ORDER — FENTANYL CITRATE 50 UG/ML
INJECTION, SOLUTION INTRAMUSCULAR; INTRAVENOUS
Status: DISPENSED
Start: 2024-02-23

## (undated) RX ORDER — FENTANYL CITRATE 50 UG/ML
INJECTION, SOLUTION INTRAMUSCULAR; INTRAVENOUS
Status: DISPENSED
Start: 2024-03-07

## (undated) RX ORDER — LIDOCAINE HYDROCHLORIDE 10 MG/ML
INJECTION, SOLUTION EPIDURAL; INFILTRATION; INTRACAUDAL; PERINEURAL
Status: DISPENSED
Start: 2024-03-07

## (undated) RX ORDER — ONDANSETRON 2 MG/ML
INJECTION INTRAMUSCULAR; INTRAVENOUS
Status: DISPENSED
Start: 2024-03-11

## (undated) RX ORDER — PROPOFOL 10 MG/ML
INJECTION, EMULSION INTRAVENOUS
Status: DISPENSED
Start: 2024-03-11

## (undated) RX ORDER — ONDANSETRON 2 MG/ML
INJECTION INTRAMUSCULAR; INTRAVENOUS
Status: DISPENSED
Start: 2024-03-07

## (undated) RX ORDER — CEFAZOLIN SODIUM 1 G/3ML
INJECTION, POWDER, FOR SOLUTION INTRAMUSCULAR; INTRAVENOUS
Status: DISPENSED
Start: 2024-02-23

## (undated) RX ORDER — PROPOFOL 10 MG/ML
INJECTION, EMULSION INTRAVENOUS
Status: DISPENSED
Start: 2024-02-23